# Patient Record
Sex: FEMALE | Race: BLACK OR AFRICAN AMERICAN | Employment: OTHER | URBAN - METROPOLITAN AREA
[De-identification: names, ages, dates, MRNs, and addresses within clinical notes are randomized per-mention and may not be internally consistent; named-entity substitution may affect disease eponyms.]

---

## 2017-01-02 RX ORDER — LOSARTAN POTASSIUM 100 MG/1
TABLET ORAL
Qty: 30 TAB | Refills: 0 | Status: SHIPPED | OUTPATIENT
Start: 2017-01-02 | End: 2017-01-03 | Stop reason: SDUPTHER

## 2017-01-03 RX ORDER — LOSARTAN POTASSIUM 100 MG/1
100 TABLET ORAL DAILY
Qty: 90 TAB | Refills: 3 | Status: SHIPPED | OUTPATIENT
Start: 2017-01-03 | End: 2017-04-28 | Stop reason: SDUPTHER

## 2017-01-03 NOTE — TELEPHONE ENCOUNTER
Requested Prescriptions     Signed Prescriptions Disp Refills    losartan (COZAAR) 100 mg tablet 90 Tab 3     Sig: Take 1 Tab by mouth daily.      Authorizing Provider: Destiney Terrazas     Ordering User: Bennett Watson

## 2017-01-04 ENCOUNTER — OFFICE VISIT (OUTPATIENT)
Dept: INTERNAL MEDICINE CLINIC | Age: 82
End: 2017-01-04

## 2017-01-04 VITALS
DIASTOLIC BLOOD PRESSURE: 83 MMHG | HEIGHT: 58 IN | TEMPERATURE: 96.3 F | HEART RATE: 70 BPM | BODY MASS INDEX: 35.05 KG/M2 | OXYGEN SATURATION: 100 % | WEIGHT: 167 LBS | SYSTOLIC BLOOD PRESSURE: 173 MMHG | RESPIRATION RATE: 20 BRPM

## 2017-01-04 DIAGNOSIS — I10 ESSENTIAL HYPERTENSION WITH GOAL BLOOD PRESSURE LESS THAN 140/90: ICD-10-CM

## 2017-01-04 DIAGNOSIS — E11.9 CONTROLLED TYPE 2 DIABETES MELLITUS WITHOUT COMPLICATION, UNSPECIFIED LONG TERM INSULIN USE STATUS: Primary | ICD-10-CM

## 2017-01-04 DIAGNOSIS — E78.00 HYPERCHOLESTEROLEMIA: ICD-10-CM

## 2017-01-04 DIAGNOSIS — K29.70 GASTRITIS WITHOUT BLEEDING, UNSPECIFIED CHRONICITY, UNSPECIFIED GASTRITIS TYPE: ICD-10-CM

## 2017-01-04 RX ORDER — CLONIDINE HYDROCHLORIDE 0.3 MG/1
0.3 TABLET ORAL 3 TIMES DAILY
Qty: 90 TAB | Refills: 11 | Status: SHIPPED | OUTPATIENT
Start: 2017-01-04 | End: 2018-01-01 | Stop reason: SDUPTHER

## 2017-01-04 RX ORDER — PIOGLITAZONEHYDROCHLORIDE 15 MG/1
15 TABLET ORAL
Qty: 30 TAB | Refills: 5
Start: 2017-01-04 | End: 2018-10-10

## 2017-01-04 RX ORDER — ALPRAZOLAM 0.5 MG/1
0.5 TABLET ORAL
Qty: 15 TAB | Refills: 0 | Status: SHIPPED | OUTPATIENT
Start: 2017-01-04 | End: 2017-04-17 | Stop reason: SDUPTHER

## 2017-01-04 RX ORDER — FAMOTIDINE 20 MG/1
20 TABLET, FILM COATED ORAL
Qty: 30 TAB | Refills: 4 | Status: SHIPPED | OUTPATIENT
Start: 2017-01-04 | End: 2017-12-01 | Stop reason: SDUPTHER

## 2017-01-04 NOTE — PROGRESS NOTES
HISTORY OF PRESENT ILLNESS  Yesenia Blackburn is a 80 y.o. female here for follow up. She is not taking actos yet. She is worried about weight gain and fluid retention. not able to tolerate januvia,caused stomach cramp. Her A1c was 8. 7.on other diabetic meds. She had abd distension and chest pressure 1 month back,went to see in ,omeprazole helped her.wants to continue with some meds. Has HTN,on multiple meds. Doing well otherwise. HPI    Review of Systems   Constitutional: Negative. Negative for chills and fever. HENT: Negative. Eyes: Negative. Negative for blurred vision and double vision. Respiratory: Negative. Cardiovascular: Negative. Gastrointestinal: Positive for heartburn. Negative for abdominal pain, diarrhea and nausea. Musculoskeletal: Negative. Skin: Negative. Neurological: Negative. Endo/Heme/Allergies: Negative. Psychiatric/Behavioral: Negative. Physical Exam   Constitutional: She appears well-developed and well-nourished. No distress. Neck: Normal range of motion. Neck supple. No JVD present. No thyromegaly present. Cardiovascular: Normal rate, regular rhythm, normal heart sounds and intact distal pulses. Pulmonary/Chest: Effort normal and breath sounds normal. No respiratory distress. She has no wheezes. Abdominal: Soft. Bowel sounds are normal. She exhibits no distension. There is no tenderness. Musculoskeletal: She exhibits no edema or tenderness. No leg edema   Psychiatric: She has a normal mood and affect. Her behavior is normal.       ASSESSMENT and Marylene Seals was seen today for follow-up, hypertension, cholesterol problem and diabetes. Diagnoses and all orders for this visit:     type 2 diabetes mellitus without complication, unspecified long term insulin use status (HCC)    A1C was high. Adv pt to take low dose actos. reassured it should not cause weight gain. She has no H/O HF. Will monitor.   She is willing to take it for 1 month to see if it helps her.  -     pioglitazone (ACTOS) 15 mg tablet; Take 1 Tab by mouth nightly. Will do,  -     METABOLIC PANEL, COMPREHENSIVE  -     HEMOGLOBIN A1C WITH EAG after 1 month. Essential hypertension with goal blood pressure less than 140/90    Stable. on multiple meds. Will refill,  -     cloNIDine HCl (CATAPRES) 0.3 mg tablet; Take 1 Tab by mouth three (3) times daily. Hypercholesterolemia    Stable. Gastritis without bleeding, unspecified chronicity, unspecified gastritis type    Was on omeprazole which she finished up. it helped her. Will give,  -     famotidine (PEPCID) 20 mg tablet; Take 1 Tab by mouth nightly. Discussed expected course/resolution/complications of diagnosis in detail with patient. Medication risks/benefits/costs/interactions/alternatives discussed with patient. Pt was given an after visit summary which includes diagnoses, current medications & vitals. Pt expressed understanding with the diagnosis and plan.

## 2017-01-04 NOTE — PATIENT INSTRUCTIONS

## 2017-01-04 NOTE — LETTER
2/9/2017 9:03 AM 
 
Ms. Pelon Ortiz [address] [place] [id number] Dear Pelon Ortiz: Please find your most recent results below. Resulted Orders METABOLIC PANEL, COMPREHENSIVE Result Value Ref Range Glucose 137 (H) 65 - 99 mg/dL BUN 14 8 - 27 mg/dL Creatinine 0.84 0.57 - 1.00 mg/dL GFR est non-AA 65 >59 mL/min/1.73 GFR est AA 75 >59 mL/min/1.73  
 BUN/Creatinine ratio 17 11 - 26 Sodium 139 134 - 144 mmol/L Potassium 4.2 3.5 - 5.2 mmol/L Chloride 97 96 - 106 mmol/L  
 CO2 27 18 - 29 mmol/L Calcium 9.3 8.7 - 10.3 mg/dL Protein, total 7.3 6.0 - 8.5 g/dL Albumin 4.5 3.5 - 4.7 g/dL GLOBULIN, TOTAL 2.8 1.5 - 4.5 g/dL A-G Ratio 1.6 1.1 - 2.5 Bilirubin, total 0.3 0.0 - 1.2 mg/dL Alk. phosphatase 86 39 - 117 IU/L  
 AST (SGOT) 23 0 - 40 IU/L  
 ALT (SGPT) 21 0 - 32 IU/L Narrative Performed at:  [address]  [id number] : Darletta Dancer MD, Phone:  [phone] HEMOGLOBIN A1C WITH EAG Result Value Ref Range Hemoglobin A1c 7.2 (H) 4.8 - 5.6 % Comment:  
            Pre-diabetes: 5.7 - 6.4 Diabetes: >6.4 Glycemic control for adults with diabetes: <7.0 Estimated average glucose 160 mg/dL Narrative Performed at:  [address]  [id number] : Darletta Dancer MD, Phone:  [phone] RECOMMENDATIONS: 
Pelon Ortiz your labs indicate that your diabetes is better, keep up the good work! All other labs are stable Please call me if you have any questions: [phone] Sincerely, Ioana Cason MD

## 2017-01-04 NOTE — MR AVS SNAPSHOT
Visit Information Date & Time Provider Department Dept. Phone Encounter #  
 1/4/2017  1:45 PM Tarsha Pink, [address] [facility] [phone] [id number] Your Appointments 6/2/2017  8:40 AM  
ESTABLISHED PATIENT with Jamaal Walden MD  
[facility] ([address]) Appt Note: 6 month follow up  
 [place]  
[address] [phone] [id number] Upcoming Health Maintenance Date Due DTaP/Tdap/Td series (1 - Tdap) 3/1/1955 MEDICARE YEARLY EXAM 11/12/2016 FOOT EXAM Q1 11/12/2016 HEMOGLOBIN A1C Q6M 5/4/2017 MICROALBUMIN Q1 9/16/2017 LIPID PANEL Q1 9/16/2017 EYE EXAM RETINAL OR DILATED Q1 10/14/2017 GLAUCOMA SCREENING Q2Y 10/14/2018 Allergies as of 1/4/2017  Review Complete On: 1/4/2017 By: Tarsha Pink MD  
  
 Severity Noted Reaction Type Reactions Cortisone  05/11/2011    Other (comments) \"Make me feels weak, like I'm going to die\" Januvia [Sitagliptin]  11/09/2016    Other (comments)  
 weakness Current Immunizations  Reviewed on 9/14/2016 Name Date Influenza High Dose Vaccine PF 10/28/2014 Influenza Vaccine 9/14/2016, 10/19/2013 Pneumococcal Conjugate (PCV-13) 9/14/2016 Pneumococcal Vaccine (Unspecified Type) 10/1/2008 Not reviewed this visit You Were Diagnosed With   
  
 Codes Comments Controlled type 2 diabetes mellitus without complication, unspecified long term insulin use status ([facility] [address]    -  Primary ICD-10-CM: E11.9 ICD-9-CM: 250.00 Essential hypertension with goal blood pressure less than 140/90     ICD-10-CM: I10 
ICD-9-CM: 401.9 Hypercholesterolemia     ICD-10-CM: E78.00 ICD-9-CM: 272.0 Gastritis without bleeding, unspecified chronicity, unspecified gastritis type     ICD-10-CM: K29.70 ICD-9-CM: 535.50 Vitals BP Pulse Temp Resp Height(growth percentile) Weight(growth percentile) 173/83 (BP 1 Location: Left arm, BP Patient Position: Sitting) 70 96.3 °F (35.7 °C) (Oral) 20 4' 10\" (1.473 m) 167 lb (75.8 kg) SpO2 BMI OB Status Smoking Status 100% 34.9 kg/m2 Hysterectomy Never Smoker Vitals History BMI and BSA Data Body Mass Index Body Surface Area 34.9 kg/m 2 1.76 m 2 Preferred Pharmacy Pharmacy Name Phone [facility] [phone] Your Updated Medication List  
  
   
This list is accurate as of: 1/4/17  2:24 PM.  Always use your most recent med list.  
  
  
  
  
 ALPRAZolam 0.5 mg tablet Commonly known as:  Leward Lennox Take 1 Tab by mouth nightly as needed for Anxiety. Max Daily Amount: 0.5 mg.  
  
 aspirin 81 mg chewable tablet Take 81 mg by mouth daily. atorvastatin 20 mg tablet Commonly known as:  LIPITOR Take 1 Tab by mouth daily. cloNIDine HCl 0.3 mg tablet Commonly known as:  CATAPRES Take 1 Tab by mouth three (3) times daily. dilTIAZem  mg ER capsule Commonly known as:  CARDIZEM CD  
TAKE ONE CAPSULE BY MOUTH EVERY DAY  
  
 famotidine 20 mg tablet Commonly known as:  PEPCID Take 1 Tab by mouth nightly. furosemide 40 mg tablet Commonly known as:  LASIX TAKE ONE TABLET BY MOUTH EVERY DAY  
  
 glipiZIDE 5 mg tablet Commonly known as:  Birdie Ry Take 1 Tab by mouth two (2) times a day. losartan 100 mg tablet Commonly known as:  COZAAR Take 1 Tab by mouth daily. metFORMIN  mg tablet Commonly known as:  GLUCOPHAGE XR  
TAKE TWO TABLETS BY MOUTH EVERY DAY WITH DINNER  
  
 metoprolol tartrate 100 mg IR tablet Commonly known as:  LOPRESSOR Take 1.5 Tabs by mouth two (2) times a day. pioglitazone 15 mg tablet Commonly known as:  ACTOS Take 1 Tab by mouth nightly. potassium chloride 10 mEq tablet Commonly known as:  K-DUR, KLOR-CON Take 2 Tabs by mouth daily. TYLENOL 325 mg tablet Generic drug:  acetaminophen Take  by mouth every four (4) hours as needed for Pain. VITAMIN D3 1,000 unit Cap Generic drug:  cholecalciferol Take 1,000 Units by mouth daily. Prescriptions Sent to Pharmacy Refills  
 cloNIDine HCl (CATAPRES) 0.3 mg tablet 11 Sig: Take 1 Tab by mouth three (3) times daily. Class: Normal  
 Pharmacy: [facility] Ph #: [phone] Route: Oral  
 famotidine (PEPCID) 20 mg tablet 4 Sig: Take 1 Tab by mouth nightly. Class: Normal  
 Pharmacy: [facility] Ph #: [phone] Route: Oral  
  
Patient Instructions Learning About Diabetes Food Guidelines Your Care Instructions Meal planning is important to manage diabetes. It helps keep your blood sugar at a target level (which you set with your doctor). You don't have to eat special foods. You can eat what your family eats, including sweets once in a while. But you do have to pay attention to how often you eat and how much you eat of certain foods. You may want to work with a dietitian or a certified diabetes educator (CDE) to help you plan meals and snacks. A dietitian or CDE can also help you lose weight if that is one of your goals. What should you know about eating carbs? Managing the amount of carbohydrate (carbs) you eat is an important part of healthy meals when you have diabetes. Carbohydrate is found in many foods. · Learn which foods have carbs. And learn the amounts of carbs in different foods. ¨ Bread, cereal, pasta, and rice have about 15 grams of carbs in a serving. A serving is 1 slice of bread (1 ounce), ½ cup of cooked cereal, or 1/3 cup of cooked pasta or rice. ¨ Fruits have 15 grams of carbs in a serving.  A serving is 1 small fresh fruit, such as an apple or orange; ½ of a banana; ½ cup of cooked or canned fruit; ½ cup of fruit juice; 1 cup of melon or raspberries; or 2 tablespoons of dried fruit. ¨ Milk and no-sugar-added yogurt have 15 grams of carbs in a serving. A serving is 1 cup of milk or 2/3 cup of no-sugar-added yogurt. ¨ Starchy vegetables have 15 grams of carbs in a serving. A serving is ½ cup of mashed potatoes or sweet potato; 1 cup winter squash; ½ of a small baked potato; ½ cup of cooked beans; or ½ cup cooked corn or green peas. · Learn how much carbs to eat each day and at each meal. A dietitian or CDE can teach you how to keep track of the amount of carbs you eat. This is called carbohydrate counting. · If you are not sure how to count carbohydrate grams, use the Plate Method to plan meals. It is a good, quick way to make sure that you have a balanced meal. It also helps you spread carbs throughout the day. ¨ Divide your plate by types of foods. Put non-starchy vegetables on half the plate, meat or other protein food on one-quarter of the plate, and a grain or starchy vegetable in the final quarter of the plate. To this you can add a small piece of fruit and 1 cup of milk or yogurt, depending on how many carbs you are supposed to eat at a meal. 
· Try to eat about the same amount of carbs at each meal. Do not \"save up\" your daily allowance of carbs to eat at one meal. 
· Proteins have very little or no carbs per serving. Examples of proteins are beef, chicken, turkey, fish, eggs, tofu, cheese, cottage cheese, and peanut butter. A serving size of meat is 3 ounces, which is about the size of a deck of cards. Examples of meat substitute serving sizes (equal to 1 ounce of meat) are 1/4 cup of cottage cheese, 1 egg, 1 tablespoon of peanut butter, and ½ cup of tofu. How can you eat out and still eat healthy? · Learn to estimate the serving sizes of foods that have carbohydrate.  If you measure food at home, it will be easier to estimate the amount in a serving of restaurant food. · If the meal you order has too much carbohydrate (such as potatoes, corn, or baked beans), ask to have a low-carbohydrate food instead. Ask for a salad or green vegetables. · If you use insulin, check your blood sugar before and after eating out to help you plan how much to eat in the future. · If you eat more carbohydrate at a meal than you had planned, take a walk or do other exercise. This will help lower your blood sugar. What else should you know? · Limit saturated fat, such as the fat from meat and dairy products. This is a healthy choice because people who have diabetes are at higher risk of heart disease. So choose lean cuts of meat and nonfat or low-fat dairy products. Use olive or canola oil instead of butter or shortening when cooking. · Don't skip meals. Your blood sugar may drop too low if you skip meals and take insulin or certain medicines for diabetes. · Check with your doctor before you drink alcohol. Alcohol can cause your blood sugar to drop too low. Alcohol can also cause a bad reaction if you take certain diabetes medicines. Follow-up care is a key part of your treatment and safety. Be sure to make and go to all appointments, and call your doctor if you are having problems. It's also a good idea to know your test results and keep a list of the medicines you take. Where can you learn more? Go to http://stacey-marie.info/. Enter A328 in the search box to learn more about \"Learning About Diabetes Food Guidelines. \" Current as of: May 23, 2016 Content Version: 11.1 © 3037-0119 [employer]. Care instructions adapted under license by [employer] (which disclaims liability or warranty for this information).  If you have questions about a medical condition or this instruction, always ask your healthcare professional. [employer] disclaims any warranty or liability for your use of this information. Please provide this summary of care documentation to your next provider. Your primary care clinician is listed as Lana Valenzuela. If you have any questions after today's visit, please call [phone].

## 2017-01-04 NOTE — PROGRESS NOTES
Health Maintenance Due   Topic Date Due    DTaP/Tdap/Td series (1 - Tdap) 03/01/1955    MEDICARE YEARLY EXAM  11/12/2016    FOOT EXAM Q1  11/12/2016       Chief Complaint   Patient presents with    Follow-up    Hypertension    Cholesterol Problem    Diabetes       1. Have you been to the ER, urgent care clinic since your last visit? Hospitalized since your last visit? No    2. Have you seen or consulted any other health care providers outside of the [address] [place] [address] since your last visit? Include any pap smears or colon screening. No    3) Do you have an Advance Directive on file? no    4) Are you interested in receiving information on Advance Directives? NO      Patient is accompanied by self I have received verbal consent from Mikala Short to discuss any/all medical information while they are present in the room.

## 2017-02-01 ENCOUNTER — HOSPITAL ENCOUNTER (OUTPATIENT)
Dept: LAB | Age: 82
Discharge: HOME | End: 2017-02-01
Payer: MEDICARE

## 2017-02-01 PROCEDURE — 36415 COLL VENOUS BLD VENIPUNCTURE: CPT

## 2017-02-01 PROCEDURE — 80053 COMPREHEN METABOLIC PANEL: CPT

## 2017-02-01 PROCEDURE — 83036 HEMOGLOBIN GLYCOSYLATED A1C: CPT

## 2017-02-02 LAB
ALBUMIN SERPL-MCNC: 4.5 G/DL (ref 3.5–4.7)
ALBUMIN/GLOB SERPL: 1.6 {RATIO} (ref 1.1–2.5)
ALP SERPL-CCNC: 86 IU/L (ref 39–117)
ALT SERPL-CCNC: 21 IU/L (ref 0–32)
AST SERPL-CCNC: 23 IU/L (ref 0–40)
BILIRUB SERPL-MCNC: 0.3 MG/DL (ref 0–1.2)
BUN SERPL-MCNC: 14 MG/DL (ref 8–27)
BUN/CREAT SERPL: 17 (ref 11–26)
CALCIUM SERPL-MCNC: 9.3 MG/DL (ref 8.7–10.3)
CHLORIDE SERPL-SCNC: 97 MMOL/L (ref 96–106)
CO2 SERPL-SCNC: 27 MMOL/L (ref 18–29)
CREAT SERPL-MCNC: 0.84 MG/DL (ref 0.57–1)
EST. AVERAGE GLUCOSE BLD GHB EST-MCNC: 160 MG/DL
GLOBULIN SER CALC-MCNC: 2.8 G/DL (ref 1.5–4.5)
GLUCOSE SERPL-MCNC: 137 MG/DL (ref 65–99)
HBA1C MFR BLD: 7.2 % (ref 4.8–5.6)
POTASSIUM SERPL-SCNC: 4.2 MMOL/L (ref 3.5–5.2)
PROT SERPL-MCNC: 7.3 G/DL (ref 6–8.5)
SODIUM SERPL-SCNC: 139 MMOL/L (ref 134–144)

## 2017-02-08 ENCOUNTER — OFFICE VISIT (OUTPATIENT)
Dept: INTERNAL MEDICINE CLINIC | Age: 82
End: 2017-02-08

## 2017-02-08 VITALS
SYSTOLIC BLOOD PRESSURE: 139 MMHG | HEART RATE: 64 BPM | BODY MASS INDEX: 34.43 KG/M2 | DIASTOLIC BLOOD PRESSURE: 68 MMHG | OXYGEN SATURATION: 98 % | HEIGHT: 58 IN | RESPIRATION RATE: 20 BRPM | TEMPERATURE: 96.3 F | WEIGHT: 164 LBS

## 2017-02-08 DIAGNOSIS — E78.2 MIXED HYPERLIPIDEMIA: ICD-10-CM

## 2017-02-08 DIAGNOSIS — E11.9 CONTROLLED TYPE 2 DIABETES MELLITUS WITHOUT COMPLICATION, UNSPECIFIED LONG TERM INSULIN USE STATUS: Primary | ICD-10-CM

## 2017-02-08 DIAGNOSIS — I10 ESSENTIAL HYPERTENSION WITH GOAL BLOOD PRESSURE LESS THAN 140/90: ICD-10-CM

## 2017-02-08 NOTE — PROGRESS NOTES
Health Maintenance Due   Topic Date Due    DTaP/Tdap/Td series (1 - Tdap) 03/01/1955    MEDICARE YEARLY EXAM  11/12/2016    FOOT EXAM Q1  11/12/2016       Chief Complaint   Patient presents with    Follow-up     states meds (actos and famotidine) caused her extreme weakness per pt,     Hypertension    Cholesterol Problem    Diabetes     hasnt had actos in 1 1/2 weeks       1. Have you been to the ER, urgent care clinic since your last visit? Hospitalized since your last visit? No    2. Have you seen or consulted any other health care providers outside of the [address] [place] [address] since your last visit? Include any pap smears or colon screening. No    3) Do you have an Advance Directive on file? no    4) Are you interested in receiving information on Advance Directives? NO      Patient is accompanied by self I have received verbal consent from Ada Whitehead to discuss any/all medical information while they are present in the room.

## 2017-02-08 NOTE — PROGRESS NOTES
HISTORY OF PRESENT ILLNESS  Doreatha Mortimer is a 80 y.o. female here for follow up. She has taken actos for 1 month and then stopped it for 10 days due to nausea. did not gain any weight. She is watching diet too. She is not taking actos yet. She is worried about weight gain and fluid retention. not able to tolerate januvia,caused stomach cramp. Her A1c was 8. 0.on other diabetic meds. Has HTN,on multiple meds. Doing well otherwise. Follow-up   Pertinent negatives include no abdominal pain. Hypertension    Pertinent negatives include no blurred vision and no nausea. Cholesterol Problem   Pertinent negatives include no abdominal pain. Diabetes   Pertinent negatives include no abdominal pain. Review of Systems   Constitutional: Negative. Negative for chills and fever. HENT: Negative. Eyes: Negative. Negative for blurred vision and double vision. Respiratory: Negative. Cardiovascular: Negative. Gastrointestinal: Negative. Negative for abdominal pain, diarrhea and nausea. Musculoskeletal: Negative. Skin: Negative. Neurological: Negative. Endo/Heme/Allergies: Negative. Psychiatric/Behavioral: Negative. Physical Exam   Constitutional: She appears well-developed and well-nourished. No distress. Neck: Normal range of motion. Neck supple. No JVD present. No thyromegaly present. Cardiovascular: Normal rate, regular rhythm, normal heart sounds and intact distal pulses. Pulmonary/Chest: Effort normal and breath sounds normal. No respiratory distress. She has no wheezes. Musculoskeletal: She exhibits no edema or tenderness. No leg edema   Psychiatric: She has a normal mood and affect. Her behavior is normal.       HI and Jazmyn Preethi was seen today for follow-up, hypertension, cholesterol problem and diabetes.     Diagnoses and all orders for this visit:    Controlled type 2 diabetes mellitus without complication, unspecified long term insulin use status ([address]    A1C has improved to 7. 2. She has taken actos 15 mg HS. She is not willing to continue actos,mention makes her nauseous. But she is happy that she did not gained weight with this med and her a1c improved. She will try to take half tab a day. Essential hypertension with goal blood pressure less than 140/90    Stable. on multiple meds. Mixed hyperlipidemia    Lipid is stable. On statin. Discussed expected course/resolution/complications of diagnosis in detail with patient. Medication risks/benefits/costs/interactions/alternatives discussed with patient. Pt was given an after visit summary which includes diagnoses, current medications & vitals. Pt expressed understanding with the diagnosis and plan.

## 2017-02-08 NOTE — PATIENT INSTRUCTIONS

## 2017-02-08 NOTE — MR AVS SNAPSHOT
Visit Information Date & Time Provider Department Dept. Phone Encounter #  
 2/8/2017 10:30 AM Alla Mata, 607 [facility] Internal Medicine [phone] [id number] Your Appointments 6/2/2017  8:40 AM  
ESTABLISHED PATIENT with Nithin Denson MD  
[facility] ([address]) Appt Note: 6 month follow up  
 [address]  
[address] [facility],[address] [facility] 7 [address] Upcoming Health Maintenance Date Due DTaP/Tdap/Td series (1 - Tdap) 3/1/1955 MEDICARE YEARLY EXAM 11/12/2016 FOOT EXAM Q1 11/12/2016 HEMOGLOBIN A1C Q6M 8/1/2017 MICROALBUMIN Q1 9/16/2017 LIPID PANEL Q1 9/16/2017 EYE EXAM RETINAL OR DILATED Q1 10/14/2017 GLAUCOMA SCREENING Q2Y 10/14/2018 Allergies as of 2/8/2017  Review Complete On: 2/8/2017 By: Alla Mata MD  
  
 Severity Noted Reaction Type Reactions Cortisone  05/11/2011    Other (comments) \"Make me feels weak, like I'm going to die\" Januvia [Sitagliptin]  11/09/2016    Other (comments)  
 weakness Current Immunizations  Reviewed on 9/14/2016 Name Date Influenza High Dose Vaccine PF 10/28/2014 Influenza Vaccine 9/14/2016, 10/19/2013 Pneumococcal Conjugate (PCV-13) 9/14/2016 Pneumococcal Vaccine (Unspecified Type) 10/1/2008 Not reviewed this visit You Were Diagnosed With   
  
 Codes Comments Controlled type 2 diabetes mellitus without complication, unspecified long term insulin use status ([facility] [address]    -  Primary ICD-10-CM: E11.9 ICD-9-CM: 250.00 Essential hypertension with goal blood pressure less than 140/90     ICD-10-CM: I10 
ICD-9-CM: 401.9 Mixed hyperlipidemia     ICD-10-CM: E78.2 ICD-9-CM: 272.2 Vitals BP Pulse Temp Resp Height(growth percentile) Weight(growth percentile)  139/68 (BP 1 Location: Right arm, BP Patient Position: Sitting) 64 96.3 °F (35.7 °C) (Oral) 20 4' 10\" (1.473 m) 164 lb (74.4 kg) SpO2 BMI OB Status Smoking Status 98% 34.28 kg/m2 Hysterectomy Never Smoker Vitals History BMI and BSA Data Body Mass Index Body Surface Area  
 34.28 kg/m 2 1.74 m 2 Preferred Pharmacy Pharmacy Name Phone [facility] [phone] Your Updated Medication List  
  
   
This list is accurate as of: 2/8/17 10:53 AM.  Always use your most recent med list.  
  
  
  
  
 ALPRAZolam 0.5 mg tablet Commonly known as:  [place] Take 1 Tab by mouth nightly as needed for Anxiety. Max Daily Amount: 0.5 mg.  
  
 aspirin 81 mg chewable tablet Take 81 mg by mouth daily. atorvastatin 20 mg tablet Commonly known as:  LIPITOR Take 1 Tab by mouth daily. cloNIDine HCl 0.3 mg tablet Commonly known as:  CATAPRES Take 1 Tab by mouth three (3) times daily. dilTIAZem  mg ER capsule Commonly known as:  CARDIZEM CD  
TAKE ONE CAPSULE BY MOUTH EVERY DAY  
  
 famotidine 20 mg tablet Commonly known as:  PEPCID Take 1 Tab by mouth nightly. furosemide 40 mg tablet Commonly known as:  LASIX TAKE ONE TABLET BY MOUTH EVERY DAY  
  
 glipiZIDE 5 mg tablet Commonly known as:  Loura Efra Take 1 Tab by mouth two (2) times a day. losartan 100 mg tablet Commonly known as:  COZAAR Take 1 Tab by mouth daily. metFORMIN  mg tablet Commonly known as:  GLUCOPHAGE XR  
TAKE TWO TABLETS BY MOUTH EVERY DAY WITH DINNER  
  
 metoprolol tartrate 100 mg IR tablet Commonly known as:  LOPRESSOR Take 1.5 Tabs by mouth two (2) times a day. pioglitazone 15 mg tablet Commonly known as:  ACTOS Take 1 Tab by mouth nightly. potassium chloride 10 mEq tablet Commonly known as:  K-DUR, KLOR-CON Take 2 Tabs by mouth daily. TYLENOL 325 mg tablet Generic drug:  acetaminophen Take  by mouth every four (4) hours as needed for Pain. VITAMIN D3 1,000 unit Cap Generic drug:  cholecalciferol Take 1,000 Units by mouth daily. Patient Instructions Learning About Diabetes Food Guidelines Your Care Instructions Meal planning is important to manage diabetes. It helps keep your blood sugar at a target level (which you set with your doctor). You don't have to eat special foods. You can eat what your family eats, including sweets once in a while. But you do have to pay attention to how often you eat and how much you eat of certain foods. You may want to work with a dietitian or a certified diabetes educator (CDE) to help you plan meals and snacks. A dietitian or CDE can also help you lose weight if that is one of your goals. What should you know about eating carbs? Managing the amount of carbohydrate (carbs) you eat is an important part of healthy meals when you have diabetes. Carbohydrate is found in many foods. · Learn which foods have carbs. And learn the amounts of carbs in different foods. ¨ Bread, cereal, pasta, and rice have about 15 grams of carbs in a serving. A serving is 1 slice of bread (1 ounce), ½ cup of cooked cereal, or 1/3 cup of cooked pasta or rice. ¨ Fruits have 15 grams of carbs in a serving. A serving is 1 small fresh fruit, such as an apple or orange; ½ of a banana; ½ cup of cooked or canned fruit; ½ cup of fruit juice; 1 cup of melon or raspberries; or 2 tablespoons of dried fruit. ¨ Milk and no-sugar-added yogurt have 15 grams of carbs in a serving. A serving is 1 cup of milk or 2/3 cup of no-sugar-added yogurt. ¨ Starchy vegetables have 15 grams of carbs in a serving. A serving is ½ cup of mashed potatoes or sweet potato; 1 cup winter squash; ½ of a small baked potato; ½ cup of cooked beans; or ½ cup cooked corn or green peas.  
· Learn how much carbs to eat each day and at each meal. A dietitian or CDE can teach you how to keep track of the amount of carbs you eat. This is called carbohydrate counting. · If you are not sure how to count carbohydrate grams, use the Plate Method to plan meals. It is a good, quick way to make sure that you have a balanced meal. It also helps you spread carbs throughout the day. ¨ Divide your plate by types of foods. Put non-starchy vegetables on half the plate, meat or other protein food on one-quarter of the plate, and a grain or starchy vegetable in the final quarter of the plate. To this you can add a small piece of fruit and 1 cup of milk or yogurt, depending on how many carbs you are supposed to eat at a meal. 
· Try to eat about the same amount of carbs at each meal. Do not \"save up\" your daily allowance of carbs to eat at one meal. 
· Proteins have very little or no carbs per serving. Examples of proteins are beef, chicken, turkey, fish, eggs, tofu, cheese, cottage cheese, and peanut butter. A serving size of meat is 3 ounces, which is about the size of a deck of cards. Examples of meat substitute serving sizes (equal to 1 ounce of meat) are 1/4 cup of cottage cheese, 1 egg, 1 tablespoon of peanut butter, and ½ cup of tofu. How can you eat out and still eat healthy? · Learn to estimate the serving sizes of foods that have carbohydrate. If you measure food at home, it will be easier to estimate the amount in a serving of restaurant food. · If the meal you order has too much carbohydrate (such as potatoes, corn, or baked beans), ask to have a low-carbohydrate food instead. Ask for a salad or green vegetables. · If you use insulin, check your blood sugar before and after eating out to help you plan how much to eat in the future. · If you eat more carbohydrate at a meal than you had planned, take a walk or do other exercise. This will help lower your blood sugar. What else should you know? · Limit saturated fat, such as the fat from meat and dairy products.  This is a healthy choice because people who have diabetes are at higher risk of heart disease. So choose lean cuts of meat and nonfat or low-fat dairy products. Use olive or canola oil instead of butter or shortening when cooking. · Don't skip meals. Your blood sugar may drop too low if you skip meals and take insulin or certain medicines for diabetes. · Check with your doctor before you drink alcohol. Alcohol can cause your blood sugar to drop too low. Alcohol can also cause a bad reaction if you take certain diabetes medicines. Follow-up care is a key part of your treatment and safety. Be sure to make and go to all appointments, and call your doctor if you are having problems. It's also a good idea to know your test results and keep a list of the medicines you take. Where can you learn more? Go to http://stacey-marie.info/. Enter B535 in the search box to learn more about \"Learning About Diabetes Food Guidelines. \" Current as of: May 23, 2016 Content Version: 11.1 © 9501-7897 [employer]. Care instructions adapted under license by [employer] (which disclaims liability or warranty for this information). If you have questions about a medical condition or this instruction, always ask your healthcare professional. [address] any warranty or liability for your use of this information. Please provide this summary of care documentation to your next provider. Your primary care clinician is listed as Saúl Slater. If you have any questions after today's visit, please call [phone].

## 2017-03-08 ENCOUNTER — OFFICE VISIT (OUTPATIENT)
Dept: INTERNAL MEDICINE CLINIC | Age: 82
End: 2017-03-08

## 2017-03-08 ENCOUNTER — HOSPITAL ENCOUNTER (OUTPATIENT)
Dept: LAB | Age: 82
Discharge: HOME | End: 2017-03-08
Payer: MEDICARE

## 2017-03-08 VITALS
SYSTOLIC BLOOD PRESSURE: 160 MMHG | OXYGEN SATURATION: 96 % | DIASTOLIC BLOOD PRESSURE: 80 MMHG | RESPIRATION RATE: 20 BRPM | HEART RATE: 71 BPM | WEIGHT: 164 LBS | BODY MASS INDEX: 34.43 KG/M2 | HEIGHT: 58 IN | TEMPERATURE: 96.2 F

## 2017-03-08 DIAGNOSIS — M54.2 NECK PAIN: ICD-10-CM

## 2017-03-08 DIAGNOSIS — M25.561 ARTHRALGIA OF RIGHT KNEE: ICD-10-CM

## 2017-03-08 DIAGNOSIS — R20.2 PARESTHESIA OF BOTH HANDS: Primary | ICD-10-CM

## 2017-03-08 DIAGNOSIS — R53.83 FATIGUE, UNSPECIFIED TYPE: ICD-10-CM

## 2017-03-08 PROCEDURE — 84443 ASSAY THYROID STIM HORMONE: CPT

## 2017-03-08 PROCEDURE — 85025 COMPLETE CBC W/AUTO DIFF WBC: CPT

## 2017-03-08 PROCEDURE — 80053 COMPREHEN METABOLIC PANEL: CPT

## 2017-03-08 PROCEDURE — 36415 COLL VENOUS BLD VENIPUNCTURE: CPT

## 2017-03-08 PROCEDURE — 82607 VITAMIN B-12: CPT

## 2017-03-08 NOTE — LETTER
3/15/2017 8:36 AM 
 
Ms. Charo Nunez [address] [place] [phone] Dear Charo Nunez: Please find your most recent results below. Resulted Orders CBC WITH AUTOMATED DIFF Result Value Ref Range WBC 3.8 3.4 - 10.8 x10E3/uL  
 RBC 4.37 3.77 - 5.28 x10E6/uL HGB 12.3 11.1 - 15.9 g/dL HCT 38.2 34.0 - 46.6 % MCV 87 79 - 97 fL  
 MCH 28.1 26.6 - 33.0 pg  
 MCHC 32.2 31.5 - 35.7 g/dL  
 RDW 14.7 12.3 - 15.4 % PLATELET 230 833 - 067 x10E3/uL NEUTROPHILS 53 % Lymphocytes 36 % MONOCYTES 8 % EOSINOPHILS 3 % BASOPHILS 0 %  
 ABS. NEUTROPHILS 2.0 1.4 - 7.0 x10E3/uL Abs Lymphocytes 1.4 0.7 - 3.1 x10E3/uL  
 ABS. MONOCYTES 0.3 0.1 - 0.9 x10E3/uL  
 ABS. EOSINOPHILS 0.1 0.0 - 0.4 x10E3/uL  
 ABS. BASOPHILS 0.0 0.0 - 0.2 x10E3/uL IMMATURE GRANULOCYTES 0 %  
 ABS. IMM. GRANS. 0.0 0.0 - 0.1 x10E3/uL Narrative Performed at:  [address]  [id number] : Elsie Quesada MD, Phone:  [phone] METABOLIC PANEL, COMPREHENSIVE Result Value Ref Range Glucose 129 (H) 65 - 99 mg/dL BUN 15 8 - 27 mg/dL Creatinine 0.82 0.57 - 1.00 mg/dL GFR est non-AA 66 >59 mL/min/1.73 GFR est AA 77 >59 mL/min/1.73  
 BUN/Creatinine ratio 18 11 - 26 Sodium 138 134 - 144 mmol/L Potassium 4.2 3.5 - 5.2 mmol/L Chloride 97 96 - 106 mmol/L  
 CO2 26 18 - 29 mmol/L Calcium 9.2 8.7 - 10.3 mg/dL Protein, total 7.0 6.0 - 8.5 g/dL Albumin 4.2 3.5 - 4.7 g/dL GLOBULIN, TOTAL 2.8 1.5 - 4.5 g/dL A-G Ratio 1.5 1.1 - 2.5 Comment: **Effective March 13, 2017 the reference interval** 
  for A/G Ratio will be changing to: Age                Male          Female 0 -  7 days       1.1 - 2.3       1.1 - 2.3 
          8 - 30 days       1.2 - 2.8       1.2 - 2.8 
          1 -  6 months     1.3 - 3.6       1.3 - 3.6 
   7 months -  5 years      1.5 - 2.6       1.5 - 2.6 > 5 years      1.2 - 2.2       1.2 - 2.2 Bilirubin, total 0.4 0.0 - 1.2 mg/dL Alk. phosphatase 76 39 - 117 IU/L  
 AST (SGOT) 24 0 - 40 IU/L  
 ALT (SGPT) 21 0 - 32 IU/L Narrative Performed at:  [address]  [id number] : Arianna Persaud MD, Phone:  [phone] TSH 3RD GENERATION Result Value Ref Range TSH 3.060 0.450 - 4.500 uIU/mL Narrative Performed at:  [address]  [id number] : Arianna Persaud MD, Phone:  [phone] VITAMIN B12 Result Value Ref Range Vitamin B12 351 211 - 946 pg/mL Narrative Performed at:  [address]  [id number] : Arianna Persaud MD, Phone:  [phone] RECOMMENDATIONS: 
None. Keep up the good work! Please call me if you have any questions: [phone] Sincerely, Denice Murdock MD

## 2017-03-08 NOTE — PATIENT INSTRUCTIONS
Neck: Exercises  Your Care Instructions  Here are some examples of typical rehabilitation exercises for your condition. Start each exercise slowly. Ease off the exercise if you start to have pain. Your doctor or physical therapist will tell you when you can start these exercises and which ones will work best for you. How to do the exercises  Note: Stretching should make you feel a gentle stretch, but no pain. Stop any strengthening exercise that makes pain worse. Neck stretch    1. This stretch works best if you keep your shoulder down as you lean away from it. To help you remember to do this, start by relaxing your shoulders and lightly holding on to your thighs or your chair. 2. Tilt your head toward your shoulder and hold for 15 to 30 seconds. Let the weight of your head stretch your muscles. 3. If you would like a little added stretch, use your hand to gently and steadily pull your head toward your shoulder. For example, keeping your right shoulder down, lean your head to the left. 4. Repeat 2 to 4 times toward each shoulder. Diagonal neck stretch    1. Turn your head slightly toward the direction you will be stretching, and tilt your head diagonally toward your chest and hold for 15 to 30 seconds. 2. If you would like a little added stretch, use your hand to gently and steadily pull your head forward on the diagonal.  3. Repeat 2 to 4 times toward each side. Dorsal glide stretch    1. Sit or stand tall and look straight ahead. 2. Slowly tuck your chin as you glide your head backward over your body  3. Hold for a count of 6, and then relax for up to 10 seconds. 4. Repeat 8 to 12 times. Note: The dorsal glide stretches the back of the neck. If you feel pain, do not glide so far back. Some people find this exercise easier to do while lying on their backs with an ice pack on the neck. Chest and shoulder stretch    1.  Sit or stand tall and glide your head backward as in the dorsal glide stretch. 2. Raise both arms so that your hands are next to your ears. 3. Take a deep breath, and as you breathe out, lower your elbows down and behind your back. You will feel your shoulder blades slide down and together, and at the same time you will feel a stretch across your chest and the front of your shoulders. 4. Hold for about 6 seconds, and then relax for up to 10 seconds. 5. Repeat 8 to 12 times. Strengthening: Hands on head    1. Move your head backward, forward, and side to side against gentle pressure from your hands, holding each position for about 6 seconds. 2. Repeat 8 to 12 times. Follow-up care is a key part of your treatment and safety. Be sure to make and go to all appointments, and call your doctor if you are having problems. It's also a good idea to know your test results and keep a list of the medicines you take. Where can you learn more? Go to http://stacey-marie.info/. Enter P975 in the search box to learn more about \"Neck: Exercises. \"  Current as of: May 23, 2016  Content Version: 11.1  © 6097-0807 [employer]. Care instructions adapted under license by [employer] (which disclaims liability or warranty for this information). If you have questions about a medical condition or this instruction, always ask your healthcare professional. [address] any warranty or liability for your use of this information.

## 2017-03-08 NOTE — MR AVS SNAPSHOT
Visit Information Date & Time Provider Department Dept. Phone Encounter #  
 3/8/2017  9:30 AM Haley Francois MD [facility] Internal Medicine [phone] [id number] Your Appointments 3/8/2017  9:30 AM  
ACUTE CARE with Haley Francois MD  
[facility] Internal Medicine [address] [place] [address]) Appt Note: medical issues she is having; confirmed [address] [facility] [address] [facility] [address]  
[phone]  
  
   
 [address]  
  
    
 6/2/2017  8:40 AM  
ESTABLISHED PATIENT with Mira Hansen MD  
[facility] ([address] [facility]) Appt Note: 6 month follow up  
 [address] [facility] [address]  
[address] [address]  
  
    
 6/5/2017  8:30 AM  
ROUTINE CARE with Haley Francois MD  
[facility] Internal Medicine [address] [place] [address]) Appt Note: 4 month follow up [address] [facility] [address] [facility] [address] [facility] [address]  
[phone]  
  
   
 [address] Upcoming Health Maintenance Date Due DTaP/Tdap/Td series (1 - Tdap) 3/1/1955 MEDICARE YEARLY EXAM 11/12/2016 FOOT EXAM Q1 11/12/2016 HEMOGLOBIN A1C Q6M 8/1/2017 MICROALBUMIN Q1 9/16/2017 LIPID PANEL Q1 9/16/2017 EYE EXAM RETINAL OR DILATED Q1 10/14/2017 GLAUCOMA SCREENING Q2Y 10/14/2018 Allergies as of 3/8/2017  Review Complete On: 3/8/2017 By: Haley Francois MD  
  
 Severity Noted Reaction Type Reactions Cortisone  05/11/2011    Other (comments) \"Make me feels weak, like I'm going to die\" Januvia [Sitagliptin]  11/09/2016    Other (comments)  
 weakness Current Immunizations  Reviewed on 9/14/2016 Name Date Influenza High Dose Vaccine PF 10/28/2014 Influenza Vaccine 9/14/2016, 10/19/2013 Pneumococcal Conjugate (PCV-13) 9/14/2016 Pneumococcal Vaccine (Unspecified Type) 10/1/2008 Not reviewed this visit You Were Diagnosed With   
  
 Codes Comments Paresthesia of both hands    -  Primary ICD-10-CM: R20.2 ICD-9-CM: 782.0 Neck pain     ICD-10-CM: M54.2 ICD-9-CM: 723.1 Fatigue, unspecified type     ICD-10-CM: R53.83 ICD-9-CM: 780.79 Arthralgia of right knee     ICD-10-CM: M25.561 ICD-9-CM: 719.46 Vitals BP Pulse Temp Resp Height(growth percentile) Weight(growth percentile) 177/74 (BP 1 Location: Left arm, BP Patient Position: Sitting) 71 96.2 °F (35.7 °C) (Oral) 20 4' 10\" (1.473 m) 164 lb (74.4 kg) SpO2 BMI OB Status Smoking Status 96% 34.28 kg/m2 Hysterectomy Never Smoker Vitals History BMI and BSA Data Body Mass Index Body Surface Area  
 34.28 kg/m 2 1.74 m 2 [facility] [phone] Your Updated Medication List  
  
   
This list is accurate as of: 3/8/17  9:13 AM.  Always use your most recent med list.  
  
  
  
  
 ALPRAZolam 0.5 mg tablet Commonly known as:  [place] Take 1 Tab by mouth nightly as needed for Anxiety. Max Daily Amount: 0.5 mg.  
  
 aspirin 81 mg chewable tablet Take 81 mg by mouth daily. atorvastatin 20 mg tablet Commonly known as:  LIPITOR Take 1 Tab by mouth daily. cloNIDine HCl 0.3 mg tablet Commonly known as:  CATAPRES Take 1 Tab by mouth three (3) times daily. dilTIAZem  mg ER capsule Commonly known as:  CARDIZEM CD  
TAKE ONE CAPSULE BY MOUTH EVERY DAY  
  
 famotidine 20 mg tablet Commonly known as:  PEPCID Take 1 Tab by mouth nightly. furosemide 40 mg tablet Commonly known as:  LASIX TAKE ONE TABLET BY MOUTH EVERY DAY  
  
 glipiZIDE 5 mg tablet Commonly known as:  Ritika Betters Take 1 Tab by mouth two (2) times a day. losartan 100 mg tablet Commonly known as:  COZAAR Take 1 Tab by mouth daily. metFORMIN  mg tablet Commonly known as:  GLUCOPHAGE XR  
TAKE TWO TABLETS BY MOUTH EVERY DAY WITH DINNER  
  
 metoprolol tartrate 100 mg IR tablet Commonly known as:  LOPRESSOR Take 1.5 Tabs by mouth two (2) times a day. pioglitazone 15 mg tablet Commonly known as:  ACTOS Take 1 Tab by mouth nightly. potassium chloride 10 mEq tablet Commonly known as:  K-DUR, KLOR-CON Take 2 Tabs by mouth daily. TYLENOL 325 mg tablet Generic drug:  acetaminophen Take  by mouth every four (4) hours as needed for Pain. VITAMIN D3 1,000 unit Cap Generic drug:  cholecalciferol Take 1,000 Units by mouth daily. We Performed the Following CBC WITH AUTOMATED DIFF [19209 CPT(R)] METABOLIC PANEL, COMPREHENSIVE [07600 CPT(R)] TSH 3RD GENERATION [78043 CPT(R)] VITAMIN B12 [id number] CPT(R)] To-Do List   
 03/09/2017 Imaging:  XR SPINE CERV 4 OR 5 V Patient Instructions Neck: Exercises Your Care Instructions Here are some examples of typical rehabilitation exercises for your condition. Start each exercise slowly. Ease off the exercise if you start to have pain. Your doctor or physical therapist will tell you when you can start these exercises and which ones will work best for you. How to do the exercises Note: Stretching should make you feel a gentle stretch, but no pain. Stop any strengthening exercise that makes pain worse. Neck stretch 1. This stretch works best if you keep your shoulder down as you lean away from it. To help you remember to do this, start by relaxing your shoulders and lightly holding on to your thighs or your chair. 2. Tilt your head toward your shoulder and hold for 15 to 30 seconds. Let the weight of your head stretch your muscles. 3. If you would like a little added stretch, use your hand to gently and steadily pull your head toward your shoulder. For example, keeping your right shoulder down, lean your head to the left. 4. Repeat 2 to 4 times toward each shoulder. Diagonal neck stretch 1. Turn your head slightly toward the direction you will be stretching, and tilt your head diagonally toward your chest and hold for 15 to 30 seconds. 2. If you would like a little added stretch, use your hand to gently and steadily pull your head forward on the diagonal. 
3. Repeat 2 to 4 times toward each side. Dorsal glide stretch 1. Sit or stand tall and look straight ahead. 2. Slowly tuck your chin as you glide your head backward over your body 3. Hold for a count of 6, and then relax for up to 10 seconds. 4. Repeat 8 to 12 times. Note: The dorsal glide stretches the back of the neck. If you feel pain, do not glide so far back. Some people find this exercise easier to do while lying on their backs with an ice pack on the neck. Chest and shoulder stretch 1. Sit or stand tall and glide your head backward as in the dorsal glide stretch. 2. Raise both arms so that your hands are next to your ears. 3. Take a deep breath, and as you breathe out, lower your elbows down and behind your back. You will feel your shoulder blades slide down and together, and at the same time you will feel a stretch across your chest and the front of your shoulders. 4. Hold for about 6 seconds, and then relax for up to 10 seconds. 5. Repeat 8 to 12 times. Strengthening: Hands on head 1. Move your head backward, forward, and side to side against gentle pressure from your hands, holding each position for about 6 seconds. 2. Repeat 8 to 12 times. Follow-up care is a key part of your treatment and safety. Be sure to make and go to all appointments, and call your doctor if you are having problems. It's also a good idea to know your test results and keep a list of the medicines you take. Where can you learn more? Go to http://stacey-marie.info/. Enter P975 in the search box to learn more about \"Neck: Exercises. \" 
 Current as of: May 23, 2016 Content Version: 11.1 © 8826-3409 [employer]. Care instructions adapted under license by [employer] (which disclaims liability or warranty for this information). If you have questions about a medical condition or this instruction, always ask your healthcare professional. [address] any warranty or liability for your use of this information. Please provide this summary of care documentation to your next provider. Your primary care clinician is listed as Yobany Penn. If you have any questions after today's visit, please call [phone].

## 2017-03-08 NOTE — PROGRESS NOTES
Health Maintenance Due   Topic Date Due    DTaP/Tdap/Td series (1 - Tdap) 03/01/1955    MEDICARE YEARLY EXAM  11/12/2016    FOOT EXAM Q1  11/12/2016       Chief Complaint   Patient presents with    Follow-up     pt states she has no energy, having some numbness in her fingers    Hypertension    Cholesterol Problem    Diabetes       1. Have you been to the ER, urgent care clinic since your last visit? Hospitalized since your last visit? No    2. Have you seen or consulted any other health care providers outside of the [address] [place] [address] since your last visit? Include any pap smears or colon screening. No    3) Do you have an Advance Directive on file? no    4) Are you interested in receiving information on Advance Directives? NO      Patient is accompanied by self I have received verbal consent from Yesenia Blackburn to discuss any/all medical information while they are present in the room.

## 2017-03-08 NOTE — PROGRESS NOTES
HISTORY OF PRESENT ILLNESS  Patrici Ganser is a 80 y.o. female here with C/O fatigue lately She is too active,always going out. feels no energy. taking all meds regularly. no problem. Reports tingling in both hands lately. no weakness or numbness in arms. Has diabetes. on meds. taking regularly. has HTN,BP is little elevated today. complaint with med. HPI    Review of Systems   Constitutional: Negative. HENT: Negative. Eyes: Negative. Respiratory: Negative. Cardiovascular: Negative. Gastrointestinal: Negative. Genitourinary: Negative. Musculoskeletal: Positive for joint pain. Skin: Negative. Neurological: Positive for tingling. Endo/Heme/Allergies: Negative. Psychiatric/Behavioral: Negative. Physical Exam   Constitutional: She appears well-developed and well-nourished. No distress. Neck: Normal range of motion. Neck supple. No JVD present. No thyromegaly present. Cardiovascular: Normal rate, regular rhythm, normal heart sounds and intact distal pulses. Pulmonary/Chest: Effort normal and breath sounds normal. No respiratory distress. She has no wheezes. Musculoskeletal: She exhibits no tenderness. Neck:mild paravertebral facet tenderness. ROm OK   Psychiatric: She has a normal mood and affect. Her behavior is normal.       ASSESSMENT and PLAN  Kate Gonzalze was seen today for fatigue, hypertension, tingling and diabetes. Diagnoses and all orders for this visit:    Paresthesia of both hands  Might be pinch nerve in neck.'  Adv to change to memory foam pillow. Will check,  -     VITAMIN B12  -     XR SPINE CERV 4 OR 5 V; Future    Neck pain    Will do,  -     XR SPINE CERV 4 OR 5 V; Future    Fatigue, unspecified type    Will do,  -     CBC WITH AUTOMATED DIFF  -     METABOLIC PANEL, COMPREHENSIVE  -     TSH 3RD GENERATION    Arthralgia of right knee    On knee brace. not able to walk. want to renew [employer] parking card.     Discussed expected course/resolution/complications of diagnosis in detail with patient. Medication risks/benefits/costs/interactions/alternatives discussed with patient. Pt was given an after visit summary which includes diagnoses, current medications & vitals. Pt expressed understanding with the diagnosis and plan.

## 2017-03-09 LAB
ALBUMIN SERPL-MCNC: 4.2 G/DL (ref 3.5–4.7)
ALBUMIN/GLOB SERPL: 1.5 {RATIO} (ref 1.1–2.5)
ALP SERPL-CCNC: 76 IU/L (ref 39–117)
ALT SERPL-CCNC: 21 IU/L (ref 0–32)
AST SERPL-CCNC: 24 IU/L (ref 0–40)
BASOPHILS # BLD AUTO: 0 X10E3/UL (ref 0–0.2)
BASOPHILS NFR BLD AUTO: 0 %
BILIRUB SERPL-MCNC: 0.4 MG/DL (ref 0–1.2)
BUN SERPL-MCNC: 15 MG/DL (ref 8–27)
BUN/CREAT SERPL: 18 (ref 11–26)
CALCIUM SERPL-MCNC: 9.2 MG/DL (ref 8.7–10.3)
CHLORIDE SERPL-SCNC: 97 MMOL/L (ref 96–106)
CO2 SERPL-SCNC: 26 MMOL/L (ref 18–29)
CREAT SERPL-MCNC: 0.82 MG/DL (ref 0.57–1)
EOSINOPHIL # BLD AUTO: 0.1 X10E3/UL (ref 0–0.4)
EOSINOPHIL NFR BLD AUTO: 3 %
ERYTHROCYTE [DISTWIDTH] IN BLOOD BY AUTOMATED COUNT: 14.7 % (ref 12.3–15.4)
GLOBULIN SER CALC-MCNC: 2.8 G/DL (ref 1.5–4.5)
GLUCOSE SERPL-MCNC: 129 MG/DL (ref 65–99)
HCT VFR BLD AUTO: 38.2 % (ref 34–46.6)
HGB BLD-MCNC: 12.3 G/DL (ref 11.1–15.9)
IMM GRANULOCYTES # BLD: 0 X10E3/UL (ref 0–0.1)
IMM GRANULOCYTES NFR BLD: 0 %
LYMPHOCYTES # BLD AUTO: 1.4 X10E3/UL (ref 0.7–3.1)
LYMPHOCYTES NFR BLD AUTO: 36 %
MCH RBC QN AUTO: 28.1 PG (ref 26.6–33)
MCHC RBC AUTO-ENTMCNC: 32.2 G/DL (ref 31.5–35.7)
MCV RBC AUTO: 87 FL (ref 79–97)
MONOCYTES # BLD AUTO: 0.3 X10E3/UL (ref 0.1–0.9)
MONOCYTES NFR BLD AUTO: 8 %
NEUTROPHILS # BLD AUTO: 2 X10E3/UL (ref 1.4–7)
NEUTROPHILS NFR BLD AUTO: 53 %
PLATELET # BLD AUTO: 178 X10E3/UL (ref 150–379)
POTASSIUM SERPL-SCNC: 4.2 MMOL/L (ref 3.5–5.2)
PROT SERPL-MCNC: 7 G/DL (ref 6–8.5)
RBC # BLD AUTO: 4.37 X10E6/UL (ref 3.77–5.28)
SODIUM SERPL-SCNC: 138 MMOL/L (ref 134–144)
TSH SERPL DL<=0.005 MIU/L-ACNC: 3.06 UIU/ML (ref 0.45–4.5)
VIT B12 SERPL-MCNC: 351 PG/ML (ref 211–946)
WBC # BLD AUTO: 3.8 X10E3/UL (ref 3.4–10.8)

## 2017-03-15 ENCOUNTER — HOSPITAL ENCOUNTER (EMERGENCY)
Age: 82
Discharge: HOME | End: 2017-03-15
Attending: FAMILY MEDICINE

## 2017-03-15 VITALS
BODY MASS INDEX: 14.27 KG/M2 | HEIGHT: 58 IN | WEIGHT: 68 LBS | SYSTOLIC BLOOD PRESSURE: 179 MMHG | TEMPERATURE: 98.2 F | DIASTOLIC BLOOD PRESSURE: 78 MMHG | HEART RATE: 78 BPM | RESPIRATION RATE: 18 BRPM | OXYGEN SATURATION: 97 %

## 2017-03-15 DIAGNOSIS — J06.9 ACUTE UPPER RESPIRATORY INFECTION: Primary | ICD-10-CM

## 2017-03-15 RX ORDER — BENZONATATE 200 MG/1
200 CAPSULE ORAL
Qty: 21 CAP | Refills: 0 | Status: SHIPPED | OUTPATIENT
Start: 2017-03-15 | End: 2017-03-22

## 2017-03-15 RX ORDER — LORATADINE 10 MG/1
10 TABLET ORAL DAILY
Qty: 15 TAB | Refills: 0 | Status: SHIPPED | OUTPATIENT
Start: 2017-03-15 | End: 2017-04-05 | Stop reason: SDUPTHER

## 2017-03-15 RX ORDER — FLUTICASONE PROPIONATE 50 MCG
2 SPRAY, SUSPENSION (ML) NASAL DAILY
Qty: 1 BOTTLE | Refills: 0 | Status: SHIPPED | OUTPATIENT
Start: 2017-03-15 | End: 2017-06-28 | Stop reason: SDUPTHER

## 2017-03-15 RX ORDER — PROMETHAZINE HYDROCHLORIDE AND DEXTROMETHORPHAN HYDROBROMIDE 6.25; 15 MG/5ML; MG/5ML
5 SYRUP ORAL
Qty: 100 ML | Refills: 0 | Status: SHIPPED | OUTPATIENT
Start: 2017-03-15 | End: 2017-05-08 | Stop reason: ALTCHOICE

## 2017-03-15 NOTE — UC PROVIDER NOTE
Patient is a 80 y.o. female presenting with cough. The history is provided by the patient. Cough   This is a new problem. The current episode started yesterday. The problem occurs every few minutes. The problem has not changed since onset. The cough is non-productive. There has been no fever. Associated symptoms include chills, headaches (on coughing) and rhinorrhea. Pertinent negatives include no ear congestion, no sore throat, no myalgias, no shortness of breath, no wheezing, no nausea and no vomiting. She has tried nothing for the symptoms. She is not a smoker. Her past medical history is significant for bronchitis. Her past medical history does not include pneumonia or asthma. Past Medical History:   Diagnosis Date    Adverse effect of anesthesia     pt states she was able to feel everything during colonoscopy    Chronic bronchitis     Diabetes ([address]     Essential hypertension     Hypercholesterolemia     Hypertension         Past Surgical History:   Procedure Laterality Date    HX CHOLECYSTECTOMY      HX GYN      tubal ligation    HX HYSTERECTOMY           No family history on file. Social History     Social History    Marital status:      Spouse name: N/A    Number of children: N/A    Years of education: N/A     Occupational History    Not on file. Social History Main Topics    Smoking status: Never Smoker    Smokeless tobacco: Never Used    Alcohol use No    Drug use: No    Sexual activity: Not on file     Other Topics Concern    Not on file     Social History Narrative                ALLERGIES: Cortisone and Januvia [sitagliptin]    Review of Systems   Constitutional: Positive for chills. HENT: Positive for congestion, rhinorrhea, sinus pressure and sneezing. Negative for sore throat. Respiratory: Positive for cough. Negative for shortness of breath and wheezing. Gastrointestinal: Negative for nausea and vomiting. Musculoskeletal: Negative for myalgias. Neurological: Positive for headaches (on coughing). Vitals:    03/15/17 1742   BP: 179/78   Pulse: 78   Resp: 18   Temp: 98.2 °F (36.8 °C)   SpO2: 97%   Weight: 30.8 kg (68 lb)   Height: 4' 10\" (1.473 m)       Physical Exam   Constitutional: No distress. HENT:   Right Ear: Tympanic membrane and ear canal normal.   Left Ear: Tympanic membrane and ear canal normal.   Nose: Nose normal.   Mouth/Throat: No oropharyngeal exudate, posterior oropharyngeal edema or posterior oropharyngeal erythema. Eyes: Conjunctivae are normal. Right eye exhibits no discharge. Left eye exhibits no discharge. Neck: Neck supple. Pulmonary/Chest: Effort normal and breath sounds normal. No respiratory distress. She has no wheezes. She has no rales. Lymphadenopathy:     She has no cervical adenopathy. Skin: No rash noted. Nursing note and vitals reviewed.       MDM     Differential Diagnosis; Clinical Impression; Plan:     CLINICAL IMPRESSION:  Acute upper respiratory infection  (primary encounter diagnosis)      DDX    Plan:    Fluids/ gargles  Claritin/ allegra   Tylenol cold-sinus - max strength 1-2 tab 4 times/ day    with Advil as needed    Rx Flonase, tessalon and Promethazine DM suspension    Amount and/or Complexity of Data Reviewed:   Tests in the radiology section of CPT®:  Ordered   Review and summarize past medical records:  Yes  Risk of Significant Complications, Morbidity, and/or Mortality:   Presenting problems:  Low  Management options:  Low  Progress:   Patient progress:  Stable      Procedures

## 2017-03-15 NOTE — DISCHARGE INSTRUCTIONS
Viral Respiratory Infection: Care Instructions  Your Care Instructions  Viruses are very small organisms. They grow in number after they enter your body. There are many types that cause different illnesses, such as colds and the mumps. The symptoms of a viral respiratory infection often start quickly. They include a fever, sore throat, and runny nose. You may also just not feel well. Or you may not want to eat much. Most viral respiratory infections are not serious. They usually get better with time and self-care. Antibiotics are not used to treat a viral infection. That's because antibiotics will not help cure a viral illness. In some cases, antiviral medicine can help your body fight a serious viral infection. Follow-up care is a key part of your treatment and safety. Be sure to make and go to all appointments, and call your doctor if you are having problems. It's also a good idea to know your test results and keep a list of the medicines you take. How can you care for yourself at home? · Rest as much as possible until you feel better. · Be safe with medicines. Take your medicine exactly as prescribed. Call your doctor if you think you are having a problem with your medicine. You will get more details on the specific medicine your doctor prescribes. · Take an over-the-counter pain medicine, such as acetaminophen (Tylenol), ibuprofen (Advil, Motrin), or naproxen (Aleve), as needed for pain and fever. Read and follow all instructions on the label. Do not give aspirin to anyone younger than 20. It has been linked to Reye syndrome, a serious illness. · Drink plenty of fluids, enough so that your urine is light yellow or clear like water. Hot fluids, such as tea or soup, may help relieve congestion in your nose and throat. If you have kidney, heart, or liver disease and have to limit fluids, talk with your doctor before you increase the amount of fluids you drink.   · Try to clear mucus from your lungs by breathing deeply and coughing. · Gargle with warm salt water once an hour. This can help reduce swelling and throat pain. Use 1 teaspoon of salt mixed in 1 cup of warm water. · Do not smoke or allow others to smoke around you. If you need help quitting, talk to your doctor about stop-smoking programs and medicines. These can increase your chances of quitting for good. To avoid spreading the virus  · Cough or sneeze into a tissue. Then throw the tissue away. · If you don't have a tissue, use your hand to cover your cough or sneeze. Then clean your hand. You can also cough into your sleeve. · Wash your hands often. Use soap and warm water. Wash for 15 to 20 seconds each time. · If you don't have soap and water near you, you can clean your hands with alcohol wipes or gel. When should you call for help? Call your doctor now or seek immediate medical care if:  · You have a new or higher fever. · Your fever lasts more than 48 hours. · You have trouble breathing. · You have a fever with a stiff neck or a severe headache. · You are sensitive to light. · You feel very sleepy or confused. Watch closely for changes in your health, and be sure to contact your doctor if:  · You do not get better as expected. Where can you learn more? Go to http://stacey-marie.info/. Enter Z698 in the search box to learn more about \"Viral Respiratory Infection: Care Instructions. \"  Current as of: June 30, 2016  Content Version: 11.1  © 9463-8638 [employer]. Care instructions adapted under license by "[employer] (which disclaims liability or warranty for this information). If you have questions about a medical condition or this instruction, always ask your healthcare professional. [address] any warranty or liability for your use of this information.

## 2017-03-16 NOTE — TELEPHONE ENCOUNTER
----- Message from Nash Macias sent at 3/15/2017 12:45 PM EDT -----  Regarding: Dr. Sally Zarate  Pt. request a Rx for chest cold.  Best pt contact [phone]

## 2017-03-17 NOTE — TELEPHONE ENCOUNTER
Writer placed call to pt 3/16/2017. Pt stated she wanted to make sure that Dr Edu Pan knew that she went to [facility] and that she wanted to make sure there wasn't any other medication that she should be taking for her cold.  Writer conferred with Dr Edu Pan and she stated that if pt is no better in the next few days that she is supposed to call the office and make an appt, pt made aware and voiced understanding

## 2017-03-18 ENCOUNTER — HOSPITAL ENCOUNTER (EMERGENCY)
Age: 82
Discharge: HOME | End: 2017-03-18
Attending: FAMILY MEDICINE

## 2017-03-18 ENCOUNTER — APPOINTMENT (OUTPATIENT)
Dept: GENERAL RADIOLOGY | Age: 82
End: 2017-03-18
Attending: FAMILY MEDICINE

## 2017-03-18 VITALS
OXYGEN SATURATION: 97 % | RESPIRATION RATE: 18 BRPM | BODY MASS INDEX: 34.43 KG/M2 | DIASTOLIC BLOOD PRESSURE: 84 MMHG | HEIGHT: 58 IN | HEART RATE: 87 BPM | SYSTOLIC BLOOD PRESSURE: 190 MMHG | WEIGHT: 164 LBS | TEMPERATURE: 98.6 F

## 2017-03-18 DIAGNOSIS — J20.9 ACUTE BRONCHITIS, UNSPECIFIED ORGANISM: Primary | ICD-10-CM

## 2017-03-18 RX ORDER — CEFDINIR 300 MG/1
300 CAPSULE ORAL EVERY 12 HOURS
Qty: 20 CAP | Refills: 0 | Status: SHIPPED | OUTPATIENT
Start: 2017-03-18 | End: 2017-03-28

## 2017-03-18 NOTE — UC PROVIDER NOTE
Patient is a 80 y.o. female presenting with cough. The history is provided by the patient. Cough   This is a new problem. Episode onset: 2 weeks ago. The problem occurs every few minutes. The problem has not changed since onset. The cough is productive of sputum. There has been no fever. Associated symptoms include rhinorrhea. Pertinent negatives include no chest pain, no chills, no sweats, no ear congestion, no ear pain, no headaches, no sore throat, no myalgias, no shortness of breath, no wheezing, no nausea and no vomiting. Treatments tried: tessalon, claritin. The treatment provided no relief. She is not a smoker. Past Medical History:   Diagnosis Date    Adverse effect of anesthesia     pt states she was able to feel everything during colonoscopy    Chronic bronchitis     Diabetes ([address]     Essential hypertension     Hypercholesterolemia     Hypertension         Past Surgical History:   Procedure Laterality Date    HX CHOLECYSTECTOMY      HX GYN      tubal ligation    HX HYSTERECTOMY           History reviewed. No pertinent family history. Social History     Social History    Marital status:      Spouse name: N/A    Number of children: N/A    Years of education: N/A     Occupational History    Not on file. Social History Main Topics    Smoking status: Never Smoker    Smokeless tobacco: Never Used    Alcohol use No    Drug use: No    Sexual activity: Not on file     Other Topics Concern    Not on file     Social History Narrative                ALLERGIES: Cortisone and Januvia [sitagliptin]    Review of Systems   Constitutional: Negative for chills and fever. HENT: Positive for congestion and rhinorrhea. Negative for ear pain and sore throat. Respiratory: Positive for cough. Negative for shortness of breath and wheezing. Cardiovascular: Negative for chest pain and palpitations. Gastrointestinal: Negative for nausea and vomiting.    Musculoskeletal: Negative for myalgias. Skin: Negative for rash. Neurological: Negative for dizziness and headaches. Vitals:    03/18/17 1647   BP: 190/84   Pulse: 87   Resp: 18   Temp: 98.6 °F (37 °C)   SpO2: 97%   Weight: 74.4 kg (164 lb)   Height: 4' 10\" (1.473 m)       Physical Exam   Constitutional: She appears well-developed and well-nourished. No distress. HENT:   Right Ear: Tympanic membrane, external ear and ear canal normal.   Left Ear: Tympanic membrane, external ear and ear canal normal.   Nose: Rhinorrhea present. Right sinus exhibits no maxillary sinus tenderness and no frontal sinus tenderness. Left sinus exhibits no maxillary sinus tenderness and no frontal sinus tenderness. Mouth/Throat: Oropharynx is clear and moist and mucous membranes are normal. No oropharyngeal exudate, posterior oropharyngeal edema, posterior oropharyngeal erythema or tonsillar abscesses. Cardiovascular: Normal rate, regular rhythm and normal heart sounds. Pulmonary/Chest: Effort normal. No respiratory distress. She has no decreased breath sounds. She has wheezes in the left lower field. She has no rhonchi. She has no rales. Lymphadenopathy:     She has no cervical adenopathy. Neurological: She is alert. Skin: She is not diaphoretic. Psychiatric: She has a normal mood and affect. Her behavior is normal. Judgment and thought content normal.   Nursing note and vitals reviewed. CXR Results  (Last 48 hours)               03/18/17 1731  XR CHEST PA LAT Final result    Impression:  IMPRESSION: No acute process           Narrative:  INDICATION:  cough and congestion since Thursday       COMPARISON: 11/10/2015       FINDINGS: PA and lateral views of the chest demonstrate a stable   cardiomediastinal silhouette and clear lungs bilaterally. The visualized osseous   structures are unremarkable.                  MDM     Differential Diagnosis; Clinical Impression; Plan:     CLINICAL IMPRESSION:  Acute bronchitis, unspecified organism (primary encounter diagnosis)    Plan:  1. Omnicef  2. PCP if no improvement  Amount and/or Complexity of Data Reviewed:   Tests in the radiology section of CPT®:  Ordered and reviewed  Risk of Significant Complications, Morbidity, and/or Mortality:   Presenting problems: Moderate  Diagnostic procedures: Moderate  Management options:   Moderate  Progress:   Patient progress:  Stable      Procedures

## 2017-03-18 NOTE — DISCHARGE INSTRUCTIONS
Bronchitis: Care Instructions  Your Care Instructions    Bronchitis is inflammation of the bronchial tubes, which carry air to the lungs. The tubes swell and produce mucus, or phlegm. The mucus and inflamed bronchial tubes make you cough. You may have trouble breathing. Most cases of bronchitis are caused by viruses like those that cause colds. Antibiotics usually do not help and they may be harmful. Bronchitis usually develops rapidly and lasts about 2 to 3 weeks in otherwise healthy people. Follow-up care is a key part of your treatment and safety. Be sure to make and go to all appointments, and call your doctor if you are having problems. It's also a good idea to know your test results and keep a list of the medicines you take. How can you care for yourself at home? · Take all medicines exactly as prescribed. Call your doctor if you think you are having a problem with your medicine. · Get some extra rest.  · Take an over-the-counter pain medicine, such as acetaminophen (Tylenol), ibuprofen (Advil, Motrin), or naproxen (Aleve) to reduce fever and relieve body aches. Read and follow all instructions on the label. · Do not take two or more pain medicines at the same time unless the doctor told you to. Many pain medicines have acetaminophen, which is Tylenol. Too much acetaminophen (Tylenol) can be harmful. · Take an over-the-counter cough medicine that contains dextromethorphan to help quiet a dry, hacking cough so that you can sleep. Avoid cough medicines that have more than one active ingredient. Read and follow all instructions on the label. · Breathe moist air from a humidifier, hot shower, or sink filled with hot water. The heat and moisture will thin mucus so you can cough it out. · Do not smoke. Smoking can make bronchitis worse. If you need help quitting, talk to your doctor about stop-smoking programs and medicines. These can increase your chances of quitting for good.   When should you call for help? Call 911 anytime you think you may need emergency care. For example, call if:  · You have severe trouble breathing. Call your doctor now or seek immediate medical care if:  · You have new or worse trouble breathing. · You cough up dark brown or bloody mucus (sputum). · You have a new or higher fever. · You have a new rash. Watch closely for changes in your health, and be sure to contact your doctor if:  · You cough more deeply or more often, especially if you notice more mucus or a change in the color of your mucus. · You are not getting better as expected. Where can you learn more? Go to http://stacey-marie.info/. Enter H333 in the search box to learn more about \"Bronchitis: Care Instructions. \"  Current as of: May 23, 2016  Content Version: 11.1  © 1452-3722 [employer]. Care instructions adapted under license by [employer] (which disclaims liability or warranty for this information). If you have questions about a medical condition or this instruction, always ask your healthcare professional. [address] any warranty or liability for your use of this information.

## 2017-03-23 ENCOUNTER — TELEPHONE (OUTPATIENT)
Dept: INTERNAL MEDICINE CLINIC | Age: 82
End: 2017-03-23

## 2017-03-23 NOTE — TELEPHONE ENCOUNTER
[facility] ANDRE[facility] Front Office Pool                     Patient went to urgent care last Thursday with a coughing spell. Please call her at [phone] and let her know what she recommends.

## 2017-03-24 NOTE — TELEPHONE ENCOUNTER
----- Message from Elias Lopez sent at 3/23/2017  5:12 PM EDT -----  Regarding: /Telephone   Pt would like a call back form the doctor regarding an Rx that was prescribed by the urgent care center. Pt has a terrible cough. Pt has left several messages. The best number for that [phone].

## 2017-03-27 NOTE — TELEPHONE ENCOUNTER
Writer spoke with this patient on 2/24/2017 and pt very upset that she cant talk to Dr Nelson but that she had wanted her to know, writer assured pt that MD would be made aware.  Pt also very upset that when she is \"sick\" she doesn't want to have to see the NP or another MD she only wants to see Dr Kendrick Campbell tried to explain that NP works very closely with MD and due to pt load and need that I could not guarantee that we would always have an appt, pt very argumentative, allowed pt to vent and then ended conversation

## 2017-04-05 ENCOUNTER — OFFICE VISIT (OUTPATIENT)
Dept: INTERNAL MEDICINE CLINIC | Age: 82
End: 2017-04-05

## 2017-04-05 VITALS
HEART RATE: 62 BPM | BODY MASS INDEX: 34.38 KG/M2 | HEIGHT: 58 IN | RESPIRATION RATE: 16 BRPM | WEIGHT: 163.8 LBS | OXYGEN SATURATION: 97 % | TEMPERATURE: 97.1 F | DIASTOLIC BLOOD PRESSURE: 78 MMHG | SYSTOLIC BLOOD PRESSURE: 150 MMHG

## 2017-04-05 DIAGNOSIS — H61.21 IMPACTED CERUMEN OF RIGHT EAR: ICD-10-CM

## 2017-04-05 DIAGNOSIS — J30.9 ALLERGIC RHINITIS, UNSPECIFIED ALLERGIC RHINITIS TRIGGER, UNSPECIFIED RHINITIS SEASONALITY: ICD-10-CM

## 2017-04-05 DIAGNOSIS — R05.9 COUGH: Primary | ICD-10-CM

## 2017-04-05 RX ORDER — LORATADINE 10 MG/1
10 TABLET ORAL
Qty: 30 TAB | Refills: 0 | Status: SHIPPED | OUTPATIENT
Start: 2017-04-05 | End: 2017-05-08 | Stop reason: ALTCHOICE

## 2017-04-05 NOTE — MR AVS SNAPSHOT
Visit Information Date & Time Provider Department Dept. Phone Encounter #  
 4/5/2017  8:30 AM Pastor Santana, [address] [facility] Internal Medicine [phone] [id number] Your Appointments 6/2/2017  8:40 AM  
ESTABLISHED PATIENT with Obie Urean MD  
[facility] ([facility]) Appt Note: 6 month follow up  
 [address] [facility] [address]  
[address] [address]  
  
    
 6/5/2017  8:30 AM  
ROUTINE CARE with Aurora Ansari MD  
[facility] Internal Medicine [facility]) Appt Note: 4 month follow up [address] [address] [facility] [address]  
[phone]  
  
   
 [address] Upcoming Health Maintenance Date Due DTaP/Tdap/Td series (1 - Tdap) 3/1/1955 MEDICARE YEARLY EXAM 11/12/2016 FOOT EXAM Q1 11/12/2016 HEMOGLOBIN A1C Q6M 8/1/2017 MICROALBUMIN Q1 9/16/2017 LIPID PANEL Q1 9/16/2017 EYE EXAM RETINAL OR DILATED Q1 10/14/2017 GLAUCOMA SCREENING Q2Y 10/14/2018 Allergies as of 4/5/2017  Review Complete On: 4/5/2017 By: Pastor Santana NP Severity Noted Reaction Type Reactions Cortisone  05/11/2011    Other (comments) \"Make me feels weak, like I'm going to die\" Januvia [Sitagliptin]  11/09/2016    Other (comments)  
 weakness Current Immunizations  Reviewed on 9/14/2016 Name Date Influenza High Dose Vaccine PF 10/28/2014 Influenza Vaccine 9/14/2016, 10/19/2013 Pneumococcal Conjugate (PCV-13) 9/14/2016 Pneumococcal Vaccine (Unspecified Type) 10/1/2008 Not reviewed this visit You Were Diagnosed With   
  
 Codes Comments Cough    -  Primary ICD-10-CM: H78 ICD-9-CM: 786.2 Allergic rhinitis, unspecified allergic rhinitis trigger, unspecified rhinitis seasonality     ICD-10-CM: J30.9 ICD-9-CM: 477.9 Vitals BP Pulse Temp Resp Height(growth percentile) Weight(growth percentile) 150/78 (BP 1 Location: Right arm, BP Patient Position: Sitting) 62 97.1 °F (36.2 °C) (Oral) 16 4' 9.99\" (1.473 m) 163 lb 12.8 oz (74.3 kg) SpO2 BMI OB Status Smoking Status 97% 34.24 kg/m2 Hysterectomy Never Smoker Vitals History BMI and BSA Data Body Mass Index Body Surface Area  
 34.24 kg/m 2 1.74 m 2 Preferred Pharmacy Pharmacy Name Phone [facility] [phone] Your Updated Medication List  
  
   
This list is accurate as of: 4/5/17  8:57 AM.  Always use your most recent med list.  
  
  
  
  
 ALPRAZolam 0.5 mg tablet Commonly known as:  Sondra Dobbins Take 1 Tab by mouth nightly as needed for Anxiety. Max Daily Amount: 0.5 mg.  
  
 aspirin 81 mg chewable tablet Take 81 mg by mouth daily. atorvastatin 20 mg tablet Commonly known as:  LIPITOR Take 1 Tab by mouth daily. cloNIDine HCl 0.3 mg tablet Commonly known as:  CATAPRES Take 1 Tab by mouth three (3) times daily. dilTIAZem  mg ER capsule Commonly known as:  CARDIZEM CD  
TAKE ONE CAPSULE BY MOUTH EVERY DAY  
  
 famotidine 20 mg tablet Commonly known as:  PEPCID Take 1 Tab by mouth nightly. fluticasone 50 mcg/actuation nasal spray Commonly known as:  Amber Earnest 2 Sprays by Both Nostrils route daily. furosemide 40 mg tablet Commonly known as:  LASIX TAKE ONE TABLET BY MOUTH EVERY DAY  
  
 glipiZIDE 5 mg tablet Commonly known as:  Duane David Take 1 Tab by mouth two (2) times a day. loratadine 10 mg tablet Commonly known as:  Carloyn Ely Take 1 Tab by mouth daily as needed for Allergies. losartan 100 mg tablet Commonly known as:  COZAAR Take 1 Tab by mouth daily. metFORMIN  mg tablet Commonly known as:  GLUCOPHAGE XR  
TAKE TWO TABLETS BY MOUTH EVERY DAY WITH DINNER  
  
 metoprolol tartrate 100 mg IR tablet Commonly known as:  LOPRESSOR Take 1.5 Tabs by mouth two (2) times a day. pioglitazone 15 mg tablet Commonly known as:  ACTOS Take 1 Tab by mouth nightly. potassium chloride 10 mEq tablet Commonly known as:  K-DUR, KLOR-CON Take 2 Tabs by mouth daily. promethazine-dextromethorphan 6.25-15 mg/5 mL syrup Commonly known as:  PROMETHAZINE-DM Take 5 mL by mouth nightly as needed for Cough. TYLENOL 325 mg tablet Generic drug:  acetaminophen Take  by mouth every four (4) hours as needed for Pain. VITAMIN D3 1,000 unit Cap Generic drug:  cholecalciferol Take 1,000 Units by mouth daily. Prescriptions Sent to Pharmacy Refills  
 loratadine (CLARITIN) 10 mg tablet 0 Sig: Take 1 Tab by mouth daily as needed for Allergies. Class: Normal  
 Pharmacy: [facility] #: [phone] Route: Oral  
  
Patient Instructions Allergies: Care Instructions Your Care Instructions Allergies occur when your body's defense system (immune system) overreacts to certain substances. The immune system treats a harmless substance as if it were a harmful germ or virus. Many things can cause this overreaction, including pollens, medicine, food, dust, animal dander, and mold. Allergies can be mild or severe. Mild allergies can be managed with home treatment. But medicine may be needed to prevent problems. Managing your allergies is an important part of staying healthy. Your doctor may suggest that you have allergy testing to help find out what is causing your allergies. When you know what things trigger your symptoms, you can avoid them. This can prevent allergy symptoms and other health problems.  
For severe allergies that cause reactions that affect your whole body (anaphylactic reactions), your doctor may prescribe a shot of epinephrine to carry with you in case you have a severe reaction. Learn how to give yourself the shot and keep it with you at all times. Make sure it is not . Follow-up care is a key part of your treatment and safety. Be sure to make and go to all appointments, and call your doctor if you are having problems. It's also a good idea to know your test results and keep a list of the medicines you take. How can you care for yourself at home? · If you have been told by your doctor that dust or dust mites are causing your allergy, decrease the dust around your bed: 
[facility] sheets, pillowcases, and other bedding in hot water every week. ¨ Use dust-proof covers for pillows, duvets, and mattresses. Avoid plastic covers because they tear easily and do not \"breathe. \" Wash as instructed on the label. ¨ Do not use any blankets and pillows that you do not need. ¨ Use blankets that you can wash in your washing machine. ¨ Consider removing drapes and carpets, which attract and hold dust, from your bedroom. · If you are allergic to house dust and mites, do not use home humidifiers. Your doctor can suggest ways you can control dust and mites. · Look for signs of cockroaches. Cockroaches cause allergic reactions. Use cockroach baits to get rid of them. Then, clean your home well. Cockroaches like areas where grocery bags, newspapers, empty bottles, or cardboard boxes are stored. Do not keep these inside your home, and keep trash and food containers sealed. Seal off any spots where cockroaches might enter your home. · If you are allergic to mold, get rid of furniture, rugs, and drapes that smell musty. Check for mold in the bathroom. · If you are allergic to outdoor pollen or mold spores, use air-conditioning. Change or clean all filters every month. Keep windows closed. · If you are allergic to pollen, stay inside when pollen counts are high.  Use a vacuum  with a HEPA filter or a double-thickness filter at least two times each week. · Stay inside when air pollution is bad. Avoid paint fumes, perfumes, and other strong odors. · Avoid conditions that make your allergies worse. Stay away from smoke. Do not smoke or let anyone else smoke in your house. Do not use fireplaces or wood-burning stoves. · If you are allergic to your pets, change the air filter in your furnace every month. Use high-efficiency filters. · If you are allergic to pet dander, keep pets outside or out of your bedroom. Old carpet and cloth furniture can hold a lot of animal dander. You may need to replace them. When should you call for help? Give an epinephrine shot if: 
· You think you are having a severe allergic reaction. · You have symptoms in more than one body area, such as mild nausea and an itchy mouth. After giving an epinephrine shot call 911, even if you feel better. Call 911 if: 
· You have symptoms of a severe allergic reaction. These may include: 
¨ Sudden raised, red areas (hives) all over your body. ¨ Swelling of the throat, mouth, lips, or tongue. ¨ Trouble breathing. ¨ Passing out (losing consciousness). Or you may feel very lightheaded or suddenly feel weak, confused, or restless. · You have been given an epinephrine shot, even if you feel better. Call your doctor now or seek immediate medical care if: 
· You have symptoms of an allergic reaction, such as: ¨ A rash or hives (raised, red areas on the skin). ¨ Itching. ¨ Swelling. ¨ Belly pain, nausea, or vomiting. Watch closely for changes in your health, and be sure to contact your doctor if: 
· You do not get better as expected. Where can you learn more? Go to http://stacey-marie.info/. Enter W479 in the search box to learn more about \"Allergies: Care Instructions. \" Current as of: February 12, 2016 Content Version: 11.2 © 5485-9577 [employer].  Care instructions adapted under license by [address] (which disclaims liability or warranty for this information). If you have questions about a medical condition or this instruction, always ask your healthcare professional. [address] any warranty or liability for your use of this information. Introducing [facility]! Dear Charley Hernandez: Thank you for requesting a [employer] account. Our records indicate that you have previously registered for a [employer] account but its currently inactive. Please call our [employer] support line at [phone]. Additional Information If you have questions, please visit the Frequently Asked Questions section of the [employer] website at [email or web address] Remember, [employer] is NOT to be used for urgent needs. For medical emergencies, dial 911. Now available from your iPhone and Android! Please provide this summary of care documentation to your next provider. Your primary care clinician is listed as Alyse Burt. If you have any questions after today's visit, please call [phone].

## 2017-04-05 NOTE — PATIENT INSTRUCTIONS

## 2017-04-05 NOTE — PROGRESS NOTES
HISTORY OF PRESENT ILLNESS  Mireille Kat is a 80 y.o. female. This is a patient of Dr. Mehran Emerson who presents today with complaints of cough. The patient was seen at urgent care on 3/15/17 and 3/18/17 related to cough. She had negative x-ray of chest and was treated for bronchitis with cefdinir. She completed a 10 day course. She has been taking Flonase and Promethazine DM PRN, as prescribed, as well. Patient states she is feeling better, but she has continued with non-productive cough. She states cough seems to be worse in the afternoon and when she goes outside. She mentions that cough improves after use of Flonase. No fever or chills. No chest pain or shortness of breath. Visit Vitals    /78 (BP 1 Location: Right arm, BP Patient Position: Sitting)    Pulse 62    Temp 97.1 °F (36.2 °C) (Oral)    Resp 16    Ht 4' 9.99\" (1.473 m)    Wt 163 lb 12.8 oz (74.3 kg)    SpO2 97%    BMI 34.24 kg/m2     HPI    Review of Systems   Constitutional: Negative for chills, fever, malaise/fatigue and weight loss. HENT: Positive for congestion. Negative for ear pain and sore throat. Eyes: Negative for blurred vision. Respiratory: Positive for cough. Negative for shortness of breath and wheezing. Cardiovascular: Negative for chest pain, palpitations and leg swelling. Gastrointestinal: Negative for abdominal pain. Genitourinary: Negative. Musculoskeletal: Negative. Skin: Negative. Neurological: Negative for dizziness, tingling, tremors, weakness and headaches. Endo/Heme/Allergies: Negative. Psychiatric/Behavioral: Negative. Physical Exam   Constitutional: She is oriented to person, place, and time. She appears well-developed and well-nourished. HENT:   Head: Normocephalic and atraumatic. Left Ear: External ear normal.   Mouth/Throat: No oropharyngeal exudate.    Right ear canal with cerumen impaction  OP with cobblestoning, no exudate   Eyes: Conjunctivae are normal.   Neck: Neck supple. Cardiovascular: Normal rate, regular rhythm and normal heart sounds. Pulmonary/Chest: Effort normal and breath sounds normal. No respiratory distress. She has no wheezes. She has no rales. Abdominal: Soft. She exhibits no distension. Musculoskeletal: She exhibits no edema. Lymphadenopathy:     She has no cervical adenopathy. Neurological: She is alert and oriented to person, place, and time. Skin: Skin is warm and dry. Psychiatric: She has a normal mood and affect. Her behavior is normal.   Nursing note and vitals reviewed. ASSESSMENT and PLAN    ICD-10-CM ICD-9-CM    1. Cough R05 786.2 Will treat for allergic rhinitis, as below. Patient encouraged to call or return to office if symptoms do not improve or worsen. 2. Allergic rhinitis, unspecified allergic rhinitis trigger, unspecified rhinitis seasonality J30.9 477.9 Will order  loratadine (CLARITIN) 10 mg tablet, 1 tab po daily x 10 days, then daily PRN   3. Impacted cerumen of right ear H61.21 380.4 Will order  carbamide peroxide (DEBROX) 6.5 % otic solution 5 gtts to right ear bid x 3 days. Lab results and schedule of future lab studies reviewed with patient  Reviewed diet, exercise and weight control  Reviewed medications and side effects in detail  Patient encouraged to call or return to office if symptoms do not improve or worsen. Reviewed plan of care with patient who acknowledges understanding and agrees. If experiencing severe shortness of breath or chest pain, present to the ER. Follow- up with Dr. Rodrigo Lacy in 6-8 weeks or sooner as needed.

## 2017-04-06 ENCOUNTER — DOCUMENTATION ONLY (OUTPATIENT)
Dept: INTERNAL MEDICINE CLINIC | Age: 82
End: 2017-04-06

## 2017-04-07 ENCOUNTER — DOCUMENTATION ONLY (OUTPATIENT)
Dept: INTERNAL MEDICINE CLINIC | Age: 82
End: 2017-04-07

## 2017-04-17 ENCOUNTER — OFFICE VISIT (OUTPATIENT)
Dept: INTERNAL MEDICINE CLINIC | Age: 82
End: 2017-04-17

## 2017-04-17 VITALS
HEIGHT: 58 IN | WEIGHT: 161 LBS | RESPIRATION RATE: 22 BRPM | DIASTOLIC BLOOD PRESSURE: 84 MMHG | BODY MASS INDEX: 33.8 KG/M2 | HEART RATE: 67 BPM | TEMPERATURE: 97.6 F | OXYGEN SATURATION: 97 % | SYSTOLIC BLOOD PRESSURE: 177 MMHG

## 2017-04-17 DIAGNOSIS — G47.00 INSOMNIA, UNSPECIFIED TYPE: ICD-10-CM

## 2017-04-17 DIAGNOSIS — I10 ESSENTIAL HYPERTENSION WITH GOAL BLOOD PRESSURE LESS THAN 140/90: ICD-10-CM

## 2017-04-17 DIAGNOSIS — J30.1 ALLERGIC RHINITIS DUE TO POLLEN, UNSPECIFIED RHINITIS SEASONALITY: Primary | ICD-10-CM

## 2017-04-17 DIAGNOSIS — Z00.00 ROUTINE GENERAL MEDICAL EXAMINATION AT A HEALTH CARE FACILITY: ICD-10-CM

## 2017-04-17 DIAGNOSIS — H61.20 WAX IN EAR: ICD-10-CM

## 2017-04-17 DIAGNOSIS — Z00.00 MEDICARE ANNUAL WELLNESS VISIT, SUBSEQUENT: ICD-10-CM

## 2017-04-17 DIAGNOSIS — R05.9 COUGH: ICD-10-CM

## 2017-04-17 RX ORDER — GUAIFENESIN 600 MG/1
600 TABLET, EXTENDED RELEASE ORAL
Qty: 20 TAB | Refills: 0 | Status: SHIPPED | OUTPATIENT
Start: 2017-04-17 | End: 2017-06-28

## 2017-04-17 RX ORDER — PREDNISONE 5 MG/1
TABLET ORAL
Qty: 1 PACKAGE | Refills: 0 | Status: SHIPPED | OUTPATIENT
Start: 2017-04-17 | End: 2017-05-08 | Stop reason: ALTCHOICE

## 2017-04-17 RX ORDER — ALPRAZOLAM 0.5 MG/1
0.5 TABLET ORAL
Qty: 15 TAB | Refills: 0 | Status: SHIPPED | OUTPATIENT
Start: 2017-04-17 | End: 2017-10-02 | Stop reason: SDUPTHER

## 2017-04-17 RX ORDER — ATORVASTATIN CALCIUM 10 MG/1
TABLET, FILM COATED ORAL
COMMUNITY
Start: 2017-03-30 | End: 2017-05-08 | Stop reason: SDUPTHER

## 2017-04-17 NOTE — PROGRESS NOTES
Chief Complaint   Patient presents with    Cough     coughing x one month--Urgent Care X 2      Reviewed record  In preparation for visit and have obtained necessary documentation. 1. Have you been to the ER, urgent care clinic since your last visit? Hospitalized since your last visit? Yes-  Urgent Care X 2    2. Have you seen or consulted any other health care providers outside of the [address] [place] [address] since your last visit? Include any pap smears or colon screening.  No    Used 2 patient I. D. s

## 2017-04-17 NOTE — PROGRESS NOTES
This is an Initial Medicare Annual Wellness Exam (AWV) (Performed 12 months after IPPE or effective date of Medicare Part B enrollment, Once in a lifetime)    I have reviewed the patient's medical history in detail and updated the computerized patient record. History     Past Medical History:   Diagnosis Date    Adverse effect of anesthesia     pt states she was able to feel everything during colonoscopy    Chronic bronchitis     Diabetes ([address]     Essential hypertension     Hypercholesterolemia     Hypertension       Past Surgical History:   Procedure Laterality Date    HX CHOLECYSTECTOMY      HX GYN      tubal ligation    HX HYSTERECTOMY       Current Outpatient Prescriptions   Medication Sig Dispense Refill    atorvastatin (LIPITOR) 10 mg tablet       predniSONE (STERAPRED) 5 mg dose pack As directed for 6 days 1 Package 0    ALPRAZolam (XANAX) 0.5 mg tablet Take 1 Tab by mouth nightly as needed for Anxiety. Max Daily Amount: 0.5 mg. 15 Tab 0    guaiFENesin ER (MUCINEX) 600 mg ER tablet Take 1 Tab by mouth two (2) times daily as needed for Congestion. 20 Tab 0    loratadine (CLARITIN) 10 mg tablet Take 1 Tab by mouth daily as needed for Allergies. 30 Tab 0    fluticasone (FLONASE) 50 mcg/actuation nasal spray 2 Sprays by Both Nostrils route daily. 1 Bottle 0    cloNIDine HCl (CATAPRES) 0.3 mg tablet Take 1 Tab by mouth three (3) times daily. 90 Tab 11    famotidine (PEPCID) 20 mg tablet Take 1 Tab by mouth nightly. 30 Tab 4    pioglitazone (ACTOS) 15 mg tablet Take 1 Tab by mouth nightly. 30 Tab 5    atorvastatin (LIPITOR) 20 mg tablet Take 1 Tab by mouth daily. 90 Tab 3    furosemide (LASIX) 40 mg tablet TAKE ONE TABLET BY MOUTH EVERY DAY 30 Tab 11    metoprolol tartrate (LOPRESSOR) 100 mg IR tablet Take 1.5 Tabs by mouth two (2) times a day. 90 Tab 11    potassium chloride (K-DUR, KLOR-CON) 10 mEq tablet Take 2 Tabs by mouth daily.  60 Tab 11    diltiazem CD (CARDIZEM CD) 180 mg ER capsule TAKE ONE CAPSULE BY MOUTH EVERY DAY 30 Cap 11    metFORMIN ER (GLUCOPHAGE XR) 500 mg tablet TAKE TWO TABLETS BY MOUTH EVERY DAY WITH DINNER 60 Tab 11    glipiZIDE (GLUCOTROL) 5 mg tablet Take 1 Tab by mouth two (2) times a day. 60 Tab 11    Cholecalciferol, Vitamin D3, (VITAMIN D3) 1,000 unit cap Take 1,000 Units by mouth daily.  aspirin 81 mg chewable tablet Take 81 mg by mouth daily.  promethazine-dextromethorphan (PROMETHAZINE-DM) 6.25-15 mg/5 mL syrup Take 5 mL by mouth nightly as needed for Cough. 100 mL 0    losartan (COZAAR) 100 mg tablet Take 1 Tab by mouth daily. 90 Tab 3    acetaminophen (TYLENOL) 325 mg tablet Take  by mouth every four (4) hours as needed for Pain. Allergies   Allergen Reactions    Cortisone Other (comments)     \"Make me feels weak, like I'm going to die\"      Januvia [Sitagliptin] Other (comments)     weakness     History reviewed. No pertinent family history.   Social History   Substance Use Topics    Smoking status: Never Smoker    Smokeless tobacco: Never Used    Alcohol use No     Patient Active Problem List   Diagnosis Code    Hypercholesterolemia E78.00    Palpitations R00.2    Lightheadedness R44    PVC's (premature ventricular contractions) I49.3    Stasis edema I87.309    Encounter for long-term (current) use of other medications Z79.899    Unspecified constipation K59.00    Allergic rhinitis, cause unspecified J30.9    Accelerated essential hypertension I10    Mixed hyperlipidemia E78.2    Controlled type 2 diabetes mellitus without complication (HCC) S74.3    Chronic nasal congestion R09.81    Advance directive discussed with patient Z70.80    Essential hypertension with goal blood pressure less than 140/90 I10         Depression Risk Factor Screening:     PHQ 2 / 9, over the last two weeks 4/17/2017   Little interest or pleasure in doing things Not at all   Feeling down, depressed or hopeless Not at all   Total Score PHQ 2 0 Alcohol Risk Factor Screening: On any occasion during the past 3 months, have you had more than 3 drinks containing alcohol? No    Do you average more than 7 drinks per week? No    Functional Ability and Level of Safety:     Hearing Loss   normal-to-mild    Activities of Daily Living   Self-care. Requires assistance with: no ADLs    Fall Risk     Fall Risk Assessment, last 12 mths 4/17/2017   Able to walk? Yes   Fall in past 12 months? No     Abuse Screen   Patient is not abused    Review of Systems   Not required  annual medicare wellness exam    Physical Examination     No exam data present    Evaluation of Cognitive Function:  Mood/affect:  happy  Appearance: age appropriate and casually dressed  Family member/caregiver input: not present     No exam performed today, annual medicare wellness exam.    Patient Care Team:  Glen Hull MD as PCP - General (Internal Medicine)  Cj Londono MD as Physician (Ophthalmology)  Sary Edwards MD as Physician (Cardiology)  Ricardo Sharma RN as Ambulatory Care Navigator (Cardiology)  Cj Londono MD as Physician (Ophthalmology)  Jose Luis Castle. Devin Porter MD as Physician (Gastroenterology)  Glenys Pallas, LPN as [address] (Internal Medicine)    Advice/Referrals/Counseling   Education and counseling provided:  Are appropriate based on today's review and evaluation      Assessment/Plan       ICD-10-CM ICD-9-CM    1. Allergic rhinitis due to pollen, unspecified rhinitis seasonality J30.1 477.0 predniSONE (STERAPRED) 5 mg dose pack   2. Wax in ear H61.20 380.4    3. Essential hypertension with goal blood pressure less than 140/90 I10 401.9    4. Cough R05 786.2 guaiFENesin ER (MUCINEX) 600 mg ER tablet   5. Insomnia, unspecified type G47.00 780.52 ALPRAZolam (XANAX) 0.5 mg tablet   6.  Routine general medical examination at a health care facility Z00.00 V70.0    .  1. Patient has AMD at home and will bring into scan in her chart. 2. Discussed the following wellness screenings: Patient will consider zoster vaccination and is otherwise up to date on her wellness screenings. 3. AVS and preventative screenings table printed, reviewed, and handed to patient. Patient verbalized understanding to all information.

## 2017-04-17 NOTE — PROGRESS NOTES
HISTORY OF PRESENT ILLNESS  Debbie Avalos is a 80 y.o. female here with C/O  Cough and nasal congestion for 1 month. cough is thick. she is not able to bring up.was treated with omnicef and claritin. not better yet. Had ear wax on right ear. need to clean. Has diabetes. on meds. taking regularly. has HTN,BP is little elevated today. complaint with med. Reports insomnia. need xanax refill. Here for medicare wellness visit too. Cough     Allergies         Review of Systems   Constitutional: Negative. HENT: Positive for congestion. Eyes: Negative. Respiratory: Positive for cough and sputum production. Cardiovascular: Negative. Gastrointestinal: Negative. Genitourinary: Negative. Musculoskeletal: Positive for joint pain. Skin: Negative. Neurological: Negative. Endo/Heme/Allergies: Negative. Psychiatric/Behavioral: Negative. Physical Exam   Constitutional: She appears well-developed and well-nourished. No distress. HENT:   Head: Normocephalic and atraumatic. Right Ear: External ear normal.   Left Ear: External ear normal.   Mouth/Throat: Oropharynx is clear and moist. No oropharyngeal exudate. Nasal turbinates:red inflamed,NT    Cobble stoning present. Neck: Normal range of motion. Neck supple. No JVD present. No thyromegaly present. Cardiovascular: Normal rate, regular rhythm, normal heart sounds and intact distal pulses. Pulmonary/Chest: Effort normal and breath sounds normal. No respiratory distress. She has no wheezes. Musculoskeletal:   Neck:mild paravertebral facet tenderness. ROm OK   Psychiatric: She has a normal mood and affect. Her behavior is normal.       ASSESSMENT and PLAN  Sj Cevallos was seen today for cough, allergies and annual wellness visit. Diagnoses and all orders for this visit:    Allergic rhinitis due to pollen, unspecified rhinitis seasonality    Will call in,  -     predniSONE (STERAPRED) 5 mg dose pack;  As directed for 6 days    Wax in ear    Removed. Essential hypertension with goal blood pressure less than 140/90    Stable. on meds. Cough    Will call in,  -     guaiFENesin ER (MUCINEX) 600 mg ER tablet; Take 1 Tab by mouth two (2) times daily as needed for Congestion. Insomnia, unspecified type    Will refill,  -     ALPRAZolam (XANAX) 0.5 mg tablet; Take 1 Tab by mouth nightly as needed for Anxiety. Max Daily Amount: 0.5 mg.          Discussed expected course/resolution/complications of diagnosis in detail with patient. Medication risks/benefits/costs/interactions/alternatives discussed with patient. Pt was given an after visit summary which includes diagnoses, current medications & vitals. Pt expressed understanding with the diagnosis and plan.

## 2017-05-08 ENCOUNTER — OFFICE VISIT (OUTPATIENT)
Dept: INTERNAL MEDICINE CLINIC | Age: 82
End: 2017-05-08

## 2017-05-08 VITALS
TEMPERATURE: 96.2 F | WEIGHT: 163.5 LBS | OXYGEN SATURATION: 99 % | BODY MASS INDEX: 35.27 KG/M2 | HEIGHT: 57 IN | HEART RATE: 59 BPM | DIASTOLIC BLOOD PRESSURE: 65 MMHG | RESPIRATION RATE: 20 BRPM | SYSTOLIC BLOOD PRESSURE: 136 MMHG

## 2017-05-08 DIAGNOSIS — I10 ESSENTIAL HYPERTENSION WITH GOAL BLOOD PRESSURE LESS THAN 140/90: ICD-10-CM

## 2017-05-08 DIAGNOSIS — J30.1 ALLERGIC RHINITIS DUE TO POLLEN, UNSPECIFIED RHINITIS SEASONALITY: Primary | ICD-10-CM

## 2017-05-08 DIAGNOSIS — E11.9 CONTROLLED TYPE 2 DIABETES MELLITUS WITHOUT COMPLICATION, UNSPECIFIED LONG TERM INSULIN USE STATUS: ICD-10-CM

## 2017-05-08 DIAGNOSIS — K21.9 GASTROESOPHAGEAL REFLUX DISEASE WITHOUT ESOPHAGITIS: ICD-10-CM

## 2017-05-08 RX ORDER — LATANOPROST 50 UG/ML
SOLUTION/ DROPS OPHTHALMIC
COMMUNITY
Start: 2017-04-25 | End: 2018-12-26 | Stop reason: ALTCHOICE

## 2017-05-08 RX ORDER — MINERAL OIL
180 ENEMA (ML) RECTAL
Qty: 30 TAB | Refills: 1 | Status: SHIPPED | OUTPATIENT
Start: 2017-05-08 | End: 2017-11-06 | Stop reason: ALTCHOICE

## 2017-05-08 RX ORDER — TIMOLOL MALEATE 5 MG/ML
SOLUTION OPHTHALMIC
COMMUNITY
Start: 2017-04-25 | End: 2018-12-26 | Stop reason: ALTCHOICE

## 2017-05-08 NOTE — PROGRESS NOTES
Health Maintenance Due   Topic Date Due    DTaP/Tdap/Td series (1 - Tdap) 03/01/1955    FOOT EXAM Q1  11/12/2016       Chief Complaint   Patient presents with    Follow-up     states still not feeling better    Allergic Rhinitis       1. Have you been to the ER, urgent care clinic since your last visit? Hospitalized since your last visit?n      2. Have you seen or consulted any other health care providers outside of the [address] [place] [address] since your last visit? Include any pap smears or colon screening. No    3) Do you have an Advance Directive on file? no    4) Are you interested in receiving information on Advance Directives? NO      Patient is accompanied by self I have received verbal consent from Yusra Monk to discuss any/all medical information while they are present in the room.

## 2017-05-08 NOTE — MR AVS SNAPSHOT
Visit Information Date & Time Provider Department Dept. Phone Encounter #  
 5/8/2017  8:30 AM Jayne Oneill, [address] [facility] Internal Medicine [phone] [id number] Your Appointments 6/2/2017  8:40 AM  
ESTABLISHED PATIENT with Katty Matos MD  
[facility] ([facility]) Appt Note: 6 month follow up  
 [address]  [facility] [address]  
[address] [facility] [address] [address]  
  
    
 6/5/2017  8:30 AM  
ROUTINE CARE with Jayne Oneill MD  
[facility] Internal Medicine [facility]) Appt Note: 4 month follow up [address] [facility] [address]  
[phone]  
  
   
 [address] [facility] [address] Upcoming Health Maintenance Date Due DTaP/Tdap/Td series (1 - Tdap) 3/1/1955 FOOT EXAM Q1 11/12/2016 HEMOGLOBIN A1C Q6M 8/1/2017 INFLUENZA AGE 9 TO ADULT 8/1/2017 MICROALBUMIN Q1 9/16/2017 LIPID PANEL Q1 9/16/2017 EYE EXAM RETINAL OR DILATED Q1 10/14/2017 MEDICARE YEARLY EXAM 4/18/2018 GLAUCOMA SCREENING Q2Y 10/14/2018 Allergies as of 5/8/2017  Review Complete On: 5/8/2017 By: Jayne Oneill MD  
  
 Severity Noted Reaction Type Reactions Cortisone  05/11/2011    Other (comments) \"Make me feels weak, like I'm going to die\" Januvia [Sitagliptin]  11/09/2016    Other (comments)  
 weakness Current Immunizations  Reviewed on 9/14/2016 Name Date Influenza High Dose Vaccine PF 10/28/2014 Influenza Vaccine 9/14/2016, 10/19/2013 Pneumococcal Conjugate (PCV-13) 9/14/2016 Pneumococcal Vaccine (Unspecified Type) 10/1/2008 Not reviewed this visit You Were Diagnosed With   
  
 Codes Comments Allergic rhinitis due to pollen, unspecified rhinitis seasonality    -  Primary ICD-10-CM: J30.1 ICD-9-CM: 477.0 Essential hypertension with goal blood pressure less than 140/90     ICD-10-CM: I10 
ICD-9-CM: 401.9 Controlled type 2 diabetes mellitus without complication, unspecified long term insulin use status ([facility]     ICD-10-CM: E11.9 ICD-9-CM: 250.00 Vitals BP Pulse Temp Resp Height(growth percentile) Weight(growth percentile) 136/65 (BP 1 Location: Right arm, BP Patient Position: Sitting) (!) 59 96.2 °F (35.7 °C) (Oral) 20 4' 9\" (1.448 m) 163 lb 8 oz (74.2 kg) SpO2 BMI OB Status Smoking Status 99% 35.38 kg/m2 Hysterectomy Never Smoker Vitals History BMI and BSA Data Body Mass Index Body Surface Area  
 35.38 kg/m 2 1.73 m 2 Preferred Pharmacy Pharmacy Name Phone [facility] [phone] Your Updated Medication List  
  
   
This list is accurate as of: 5/8/17  8:31 AM.  Always use your most recent med list.  
  
  
  
  
 ALPRAZolam 0.5 mg tablet Commonly known as:  Joyner Hummingbird Take 1 Tab by mouth nightly as needed for Anxiety. Max Daily Amount: 0.5 mg.  
  
 aspirin 81 mg chewable tablet Take 81 mg by mouth daily. atorvastatin 20 mg tablet Commonly known as:  LIPITOR Take 1 Tab by mouth daily. cloNIDine HCl 0.3 mg tablet Commonly known as:  CATAPRES Take 1 Tab by mouth three (3) times daily. dilTIAZem  mg ER capsule Commonly known as:  CARDIZEM CD  
TAKE ONE CAPSULE BY MOUTH EVERY DAY  
  
 famotidine 20 mg tablet Commonly known as:  PEPCID Take 1 Tab by mouth nightly. fexofenadine 180 mg tablet Commonly known as:  Merle Rubén Take 1 Tab by mouth daily as needed for Allergies. fluticasone 50 mcg/actuation nasal spray Commonly known as:  Carmarilee Schultzusick 2 Sprays by Both Nostrils route daily. furosemide 40 mg tablet Commonly known as:  LASIX TAKE ONE TABLET BY MOUTH EVERY DAY  
  
 glipiZIDE 5 mg tablet Commonly known as:  Arleta Papa Take 1 Tab by mouth two (2) times a day. guaiFENesin  mg ER tablet Commonly known as:  Boom Ideagen Boom Take 1 Tab by mouth two (2) times daily as needed for Congestion. latanoprost 0.005 % ophthalmic solution Commonly known as:  XALATAN  
  
 losartan 100 mg tablet Commonly known as:  COZAAR  
TAKE ONE TABLET BY MOUTH EVERY DAY  
  
 metFORMIN  mg tablet Commonly known as:  GLUCOPHAGE XR  
TAKE TWO TABLETS BY MOUTH EVERY DAY WITH DINNER  
  
 metoprolol tartrate 100 mg IR tablet Commonly known as:  LOPRESSOR Take 1.5 Tabs by mouth two (2) times a day. omeprazole 40 mg capsule Commonly known as:  PRILOSEC  
TAKE ONE CAPSULE BY MOUTH EVERY DAY BEFORE BREAKFAST  
  
 pioglitazone 15 mg tablet Commonly known as:  ACTOS Take 1 Tab by mouth nightly. potassium chloride 10 mEq tablet Commonly known as:  KLOR-CON Take 2 Tabs by mouth daily. TIMOPTIC-XE 0.5 % ophthalmic gel-forming Generic drug:  timolol TYLENOL 325 mg tablet Generic drug:  acetaminophen Take  by mouth every four (4) hours as needed for Pain. VITAMIN D3 1,000 unit Cap Generic drug:  cholecalciferol Take 1,000 Units by mouth daily. Prescriptions Sent to Pharmacy Refills  
 fexofenadine (ALLEGRA) 180 mg tablet 1 Sig: Take 1 Tab by mouth daily as needed for Allergies. Class: Normal  
 Pharmacy: [facility] Ph #: [phone] Route: Oral  
  
Patient Instructions Managing Your Allergies: Care Instructions Your Care Instructions Managing your allergies is an important part of staying healthy. Your doctor will help you find out what may be causing the allergies. Common causes of allergy symptoms are house dust and dust mites, animal dander, mold, and pollen. As soon as you know what triggers your symptoms, try to reduce your exposure to your triggers. This can help prevent allergy symptoms, asthma, and other health problems. Ask your doctor about allergy medicine or immunotherapy.  These treatments may help reduce or prevent allergy symptoms. Follow-up care is a key part of your treatment and safety. Be sure to make and go to all appointments, and call your doctor if you are having problems. It's also a good idea to know your test results and keep a list of the medicines you take. How can you care for yourself at home? · If you think that dust or dust mites are causing your allergies: 
¨ Wash sheets, pillowcases, and other bedding every week in hot water. ¨ Use airtight, dust-proof covers for pillows, duvets, and mattresses. Avoid plastic covers, because they tend to tear quickly and do not \"breathe. \" Wash according to the instructions. ¨ Remove extra blankets and pillows that you don't need. ¨ Use blankets that are machine-washable. ¨ Don't use home humidifiers. They can help mites live longer. · Use air-conditioning. Change or clean all filters every month. Keep windows closed. Use high-efficiency air filters. Don't use window or attic fans, which draw dust into the air. · If you're allergic to pet dander, keep pets outside or, at the very least, out of your bedroom. Old carpet and cloth-covered furniture can hold a lot of animal dander. You may need to replace them. · Look for signs of cockroaches. Use cockroach baits to get rid of them. Then clean your home well. · If you're allergic to mold, don't keep indoor plants, because molds can grow in soil. Get rid of furniture, rugs, and drapes that smell musty. Check for mold in the bathroom. · If you're allergic to pollen, stay inside when pollen counts are high. · Don't smoke or let anyone else smoke in your house. Don't use fireplaces or wood-burning stoves. Avoid paint fumes, perfumes, and other strong odors. When should you call for help? Give an epinephrine shot if: 
· You think you are having a severe allergic reaction. · You have symptoms in more than one body area, such as mild nausea and an itchy mouth. After giving an epinephrine shot call 911, even if you feel better. Call 911 if: 
· You have symptoms of a severe allergic reaction. These may include: 
¨ Sudden raised, red areas (hives) all over your body. ¨ Swelling of the throat, mouth, lips, or tongue. ¨ Trouble breathing. ¨ Passing out (losing consciousness). Or you may feel very lightheaded or suddenly feel weak, confused, or restless. · You have been given an epinephrine shot, even if you feel better. Call your doctor now or seek immediate medical care if: 
· You have symptoms of an allergic reaction, such as: ¨ A rash or hives (raised, red areas on the skin). ¨ Itching. ¨ Swelling. ¨ Belly pain, nausea, or vomiting. Watch closely for changes in your health, and be sure to contact your doctor if: 
· Your allergies get worse. · You need help controlling your allergies. · You have questions about allergy testing. · You do not get better as expected. Where can you learn more? Go to http://stacey-marie.info/. Enter L249 in the search box to learn more about \"Managing Your Allergies: Care Instructions. \" Current as of: February 12, 2016 Content Version: 11.2 © 0997-4656 [employer]. Care instructions adapted under license by [employer] (which disclaims liability or warranty for this information). If you have questions about a medical condition or this instruction, always ask your healthcare professional. [place] [address] any warranty or liability for your use of this information. Introducing [facility]! Dear Irvin Gifford: Thank you for requesting a Ogorod account. Our records indicate that you have previously registered for a Ogorod account but its currently inactive. Please call our O[employer] support line at [phone]. Additional Information If you have questions, please visit the Frequently Asked Questions section of the [email or web address] website at [email or web address] Remember, [email or web address] is NOT to be used for urgent needs. For medical emergencies, dial 911. Now available from your iPhone and Android! Please provide this summary of care documentation to your next provider. Your primary care clinician is listed as Aurora Ansari. If you have any questions after today's visit, please call [phone].

## 2017-05-08 NOTE — PATIENT INSTRUCTIONS
Managing Your Allergies: Care Instructions  Your Care Instructions  Managing your allergies is an important part of staying healthy. Your doctor will help you find out what may be causing the allergies. Common causes of allergy symptoms are house dust and dust mites, animal dander, mold, and pollen. As soon as you know what triggers your symptoms, try to reduce your exposure to your triggers. This can help prevent allergy symptoms, asthma, and other health problems. Ask your doctor about allergy medicine or immunotherapy. These treatments may help reduce or prevent allergy symptoms. Follow-up care is a key part of your treatment and safety. Be sure to make and go to all appointments, and call your doctor if you are having problems. It's also a good idea to know your test results and keep a list of the medicines you take. How can you care for yourself at home? · If you think that dust or dust mites are causing your allergies:  ¨ Wash sheets, pillowcases, and other bedding every week in hot water. ¨ Use airtight, dust-proof covers for pillows, duvets, and mattresses. Avoid plastic covers, because they tend to tear quickly and do not \"breathe. \" Wash according to the instructions. ¨ Remove extra blankets and pillows that you don't need. ¨ Use blankets that are machine-washable. ¨ Don't use home humidifiers. They can help mites live longer. · Use air-conditioning. Change or clean all filters every month. Keep windows closed. Use high-efficiency air filters. Don't use window or attic fans, which draw dust into the air. · If you're allergic to pet dander, keep pets outside or, at the very least, out of your bedroom. Old carpet and cloth-covered furniture can hold a lot of animal dander. You may need to replace them. · Look for signs of cockroaches. Use cockroach baits to get rid of them. Then clean your home well. · If you're allergic to mold, don't keep indoor plants, because molds can grow in soil.  Get rid of furniture, rugs, and drapes that smell musty. Check for mold in the bathroom. · If you're allergic to pollen, stay inside when pollen counts are high. · Don't smoke or let anyone else smoke in your house. Don't use fireplaces or wood-burning stoves. Avoid paint fumes, perfumes, and other strong odors. When should you call for help? Give an epinephrine shot if:  · You think you are having a severe allergic reaction. · You have symptoms in more than one body area, such as mild nausea and an itchy mouth. After giving an epinephrine shot call 911, even if you feel better. Call 911 if:  · You have symptoms of a severe allergic reaction. These may include:  ¨ Sudden raised, red areas (hives) all over your body. ¨ Swelling of the throat, mouth, lips, or tongue. ¨ Trouble breathing. ¨ Passing out (losing consciousness). Or you may feel very lightheaded or suddenly feel weak, confused, or restless. · You have been given an epinephrine shot, even if you feel better. Call your doctor now or seek immediate medical care if:  · You have symptoms of an allergic reaction, such as:  ¨ A rash or hives (raised, red areas on the skin). ¨ Itching. ¨ Swelling. ¨ Belly pain, nausea, or vomiting. Watch closely for changes in your health, and be sure to contact your doctor if:  · Your allergies get worse. · You need help controlling your allergies. · You have questions about allergy testing. · You do not get better as expected. Where can you learn more? Go to http://stacey-marie.info/. Enter L249 in the search box to learn more about \"Managing Your Allergies: Care Instructions. \"  Current as of: February 12, 2016  Content Version: 11.2  © 1283-6531 [employer]. Care instructions adapted under license by [employer] (which disclaims liability or warranty for this information).  If you have questions about a medical condition or this instruction, always ask your healthcare professional. [address] any warranty or liability for your use of this information.

## 2017-05-08 NOTE — PROGRESS NOTES
HISTORY OF PRESENT ILLNESS  Cherylene Napoleon is a 80 y.o. female here with C/O  Cough and nasal congestion for 1 month. cough is thick. she is not able to bring up. Have hoarse voice in morning. Finished up prednisone and using flonase sometimes. Has diabetes. on meds. taking regularly. Not able to take actos. has HTN,BP is stakle. complaint with med. Labs reviewed. Follow-up     Allergic Rhinitis     Cough     Allergies         Review of Systems   Constitutional: Negative. HENT: Positive for congestion. Eyes: Negative. Respiratory: Positive for cough and sputum production. Cardiovascular: Negative. Gastrointestinal: Positive for heartburn. Genitourinary: Negative. Musculoskeletal: Negative. Skin: Negative. Neurological: Negative. Endo/Heme/Allergies: Negative. Psychiatric/Behavioral: Negative. Physical Exam   Constitutional: She appears well-developed and well-nourished. No distress. HENT:   Head: Normocephalic and atraumatic. Right Ear: External ear normal.   Left Ear: External ear normal.   Mouth/Throat: Oropharynx is clear and moist. No oropharyngeal exudate. Nasal turbinates:red inflamed,NT    Cobble stoning present. Neck: Normal range of motion. Neck supple. No JVD present. No thyromegaly present. Cardiovascular: Normal rate, regular rhythm, normal heart sounds and intact distal pulses. Pulmonary/Chest: Effort normal and breath sounds normal. No respiratory distress. She has no wheezes. Musculoskeletal:   Neck:mild paravertebral facet tenderness. ROm OK   Psychiatric: She has a normal mood and affect. Her behavior is normal.       ASSESSMENT and PLAN    Micheline Roe was seen today for follow-up and allergic rhinitis. Diagnoses and all orders for this visit:    Allergic rhinitis due to pollen, unspecified rhinitis seasonality    Will call in,  -     fexofenadine (ALLEGRA) 180 mg tablet; Take 1 Tab by mouth daily as needed for Allergies.     On flonase. Adv to drink warm water. Essential hypertension with goal blood pressure less than 140/90    On multiple Bp emds. stable BP. CMP was OK. Controlled type 2 diabetes mellitus without complication, unspecified long term insulin use status (HCC)    A1C was stable 7. 2.but she mention she is not able to take actos. Adv to watch carb. Gastroesophageal reflux disease without esophagitis    On pepcid every day,. She takes PPi AS needed. Discussed expected course/resolution/complications of diagnosis in detail with patient. Medication risks/benefits/costs/interactions/alternatives discussed with patient. Pt was given an after visit summary which includes diagnoses, current medications & vitals. Pt expressed understanding with the diagnosis and plan.

## 2017-06-02 ENCOUNTER — OFFICE VISIT (OUTPATIENT)
Dept: CARDIOLOGY CLINIC | Age: 82
End: 2017-06-02

## 2017-06-02 ENCOUNTER — TELEPHONE (OUTPATIENT)
Dept: INTERNAL MEDICINE CLINIC | Age: 82
End: 2017-06-02

## 2017-06-02 VITALS
HEIGHT: 57 IN | RESPIRATION RATE: 16 BRPM | BODY MASS INDEX: 35.47 KG/M2 | SYSTOLIC BLOOD PRESSURE: 120 MMHG | WEIGHT: 164.4 LBS | HEART RATE: 60 BPM | DIASTOLIC BLOOD PRESSURE: 64 MMHG

## 2017-06-02 DIAGNOSIS — E11.9 CONTROLLED TYPE 2 DIABETES MELLITUS WITHOUT COMPLICATION, UNSPECIFIED LONG TERM INSULIN USE STATUS: ICD-10-CM

## 2017-06-02 DIAGNOSIS — R09.81 CHRONIC NASAL CONGESTION: ICD-10-CM

## 2017-06-02 DIAGNOSIS — E78.2 MIXED HYPERLIPIDEMIA: ICD-10-CM

## 2017-06-02 DIAGNOSIS — I10 ESSENTIAL HYPERTENSION WITH GOAL BLOOD PRESSURE LESS THAN 140/90: Primary | ICD-10-CM

## 2017-06-02 NOTE — TELEPHONE ENCOUNTER
Call placed to pt, made pt aware that Perla Cifuentes had already left for the day and offered to help pt if I could.  Patient had just wanted to ask what multivit was recommended for someone her age, writer advised that Centrum silver was a good choice and pt voiced understanding and appreciation

## 2017-06-02 NOTE — MR AVS SNAPSHOT
Visit Information Date & Time Provider Department Dept. Phone Encounter #  
 6/2/2017  8:40 AM Gilma Zheng MD [facility] [phone] [id number] Your Appointments 6/5/2017  8:30 AM  
ROUTINE CARE with Thuy Tian MD  
[facility] Internal Medicine [address]) Appt Note: 4 month follow up [address] [facility] [address] [place] [address] [facility] [address]  
[phone]  
  
   
 [address] [facility] [address] Upcoming Health Maintenance Date Due DTaP/Tdap/Td series (1 - Tdap) 3/1/1955 FOOT EXAM Q1 11/12/2016 HEMOGLOBIN A1C Q6M 8/1/2017 INFLUENZA AGE 9 TO ADULT 8/1/2017 MICROALBUMIN Q1 9/16/2017 LIPID PANEL Q1 9/16/2017 MEDICARE YEARLY EXAM 4/18/2018 EYE EXAM RETINAL OR DILATED Q1 4/25/2018 GLAUCOMA SCREENING Q2Y 4/25/2019 Allergies as of 6/2/2017  Review Complete On: 6/2/2017 By: Phyllis Mireles Severity Noted Reaction Type Reactions Cortisone  05/11/2011    Other (comments) \"Make me feels weak, like I'm going to die\" Januvia [Sitagliptin]  11/09/2016    Other (comments)  
 weakness Current Immunizations  Reviewed on 9/14/2016 Name Date Influenza High Dose Vaccine PF 10/28/2014 Influenza Vaccine 9/14/2016, 10/19/2013 Pneumococcal Conjugate (PCV-13) 9/14/2016 Pneumococcal Vaccine (Unspecified Type) 10/1/2008 Not reviewed this visit Vitals BP Pulse Resp Height(growth percentile) Weight(growth percentile) BMI  
 120/64 (BP 1 Location: Left arm, BP Patient Position: Sitting) 60 16 4' 9\" (1.448 m) 164 lb 6.4 oz (74.6 kg) 35.58 kg/m2 OB Status Smoking Status Hysterectomy Never Smoker Vitals History BMI and BSA Data Body Mass Index Body Surface Area 35.58 kg/m 2 1.73 m 2 Preferred Pharmacy Pharmacy Name Phone [facility] [phone] Your Updated Medication List  
 This list is accurate as of: 6/2/17  9:06 AM.  Always use your most recent med list.  
  
  
  
  
 ALPRAZolam 0.5 mg tablet Commonly known as:  Fernanda Eric Take 1 Tab by mouth nightly as needed for Anxiety. Max Daily Amount: 0.5 mg.  
  
 aspirin 81 mg chewable tablet Take 81 mg by mouth daily. atorvastatin 20 mg tablet Commonly known as:  LIPITOR Take 1 Tab by mouth daily. cloNIDine HCl 0.3 mg tablet Commonly known as:  CATAPRES Take 1 Tab by mouth three (3) times daily. dilTIAZem  mg ER capsule Commonly known as:  CARDIZEM CD  
TAKE ONE CAPSULE BY MOUTH EVERY DAY  
  
 famotidine 20 mg tablet Commonly known as:  PEPCID Take 1 Tab by mouth nightly. fexofenadine 180 mg tablet Commonly known as:  Marene Peterson Take 1 Tab by mouth daily as needed for Allergies. fluticasone 50 mcg/actuation nasal spray Commonly known as:  Marilyn Frame 2 Sprays by Both Nostrils route daily. furosemide 40 mg tablet Commonly known as:  LASIX TAKE ONE TABLET BY MOUTH EVERY DAY  
  
 glipiZIDE 5 mg tablet Commonly known as:  Corean Shingles Take 1 Tab by mouth two (2) times a day. guaiFENesin  mg ER tablet Commonly known as:  Boom & Boom Take 1 Tab by mouth two (2) times daily as needed for Congestion. latanoprost 0.005 % ophthalmic solution Commonly known as:  XALATAN  
  
 losartan 100 mg tablet Commonly known as:  COZAAR  
TAKE ONE TABLET BY MOUTH EVERY DAY  
  
 metFORMIN  mg tablet Commonly known as:  GLUCOPHAGE XR  
TAKE TWO TABLETS BY MOUTH EVERY DAY WITH DINNER  
  
 metoprolol tartrate 100 mg IR tablet Commonly known as:  LOPRESSOR Take 1.5 Tabs by mouth two (2) times a day. omeprazole 40 mg capsule Commonly known as:  PRILOSEC  
TAKE ONE CAPSULE BY MOUTH EVERY DAY BEFORE BREAKFAST  
  
 pioglitazone 15 mg tablet Commonly known as:  ACTOS Take 1 Tab by mouth nightly. potassium chloride 10 mEq tablet Commonly known as:  KLOR-CON Take 2 Tabs by mouth daily. TIMOPTIC-XE 0.5 % ophthalmic gel-forming Generic drug:  timolol TYLENOL 325 mg tablet Generic drug:  acetaminophen Take  by mouth every four (4) hours as needed for Pain. VITAMIN D3 1,000 unit Cap Generic drug:  cholecalciferol Take 1,000 Units by mouth daily. Introducing [facility]! Dear Carlie Nguyễn: Thank you for requesting a [employer] account. Our records indicate that you have previously registered for a [employer] account but its currently inactive. Please call our [employer] support line at [phone]. Additional Information If you have questions, please visit the Frequently Asked Questions section of the [employer] website at [email or web address] Remember, [employer] is NOT to be used for urgent needs. For medical emergencies, dial 911. Now available from your iPhone and Android! Please provide this summary of care documentation to your next provider. Your primary care clinician is listed as Se Weber. If you have any questions after today's visit, please call [phone].

## 2017-06-02 NOTE — TELEPHONE ENCOUNTER
Pt called and has requested a call back from [place] only. Pt would not tell me what questions she had.

## 2017-06-02 NOTE — PROGRESS NOTES
[facility] [address]: Sydnee Griffin  [phone]    HPI:  Ms. Cristiana Denson is a 81 yo F with a h/o HTN (previously on nadolol), hyperlipidemia, DM2 seen in the past for palpitations. Found on holter to have benign ectopy and started on beta blocker for symptoms. Previously off higher dose HCTZ due to low Na+. BP has been resistant; BP had improved on norvasc but had worsened LE edema. U/S 11/2012 for DVT was negative. Echo 8/2012 noted normal LV systolic function with mild to moderate MR; echo 2/17/14 unchanged. Seen by vascular in past for LE edema and c/w vascular insufficiency and recommended leg elevation and compression stockings. Echo 4/2015 was normal with EF 60% and mild MR. Since her last visit, she overall has been doing well from a cardiac standpoint with no exertional chest pain. Her breathing has been stable. She has been compliant with her medications. She did have a URI a few weeks ago and she had congestion she thinks for the last few months. She was recently started on Allegra, but has not been taking it regularly and does admit to nasal drainage and itchy eyes. She is compensated on exam with clear lungs and normotensive with a blood pressure of 120/64 with a heart rate of 60. Assessment and Plan:   1. Resistant hypertension. Blood pressure has been well controlled with a history of white coat hypertension and no changes made. Will have her follow back in six months. 2. Mitral regurgitation. Mild in the past and echocardiograms from here on an as needed basis. 3. Cough and nasal drainage. Likely allergies and she is going to try to take the recently prescribed Allegra more regularly. 4. Venous insufficiency. 5. Type 2 diabetes. .She  has a past medical history of Adverse effect of anesthesia; Chronic bronchitis; Diabetes ([address] Essential hypertension; Hypercholesterolemia; and Hypertension. All other systems negative except as above.      PE  Vitals: 06/02/17 0848   BP: 120/64   Pulse: 60   Resp: 16   Weight: 164 lb 6.4 oz (74.6 kg)   Height: 4' 9\" (1.448 m)    Body mass index is 35.58 kg/(m^2). General appearance - alert, well appearing, and in no distress  Mental status - affect appropriate to mood, pleasant   Neck - supple, no JVD, no bruits   Chest - clear to auscultation, no wheezes, rales or rhonchi  Heart - normal rate, regular rhythm, normal S1, S2, I-II/VI systolic murmur LUSB, rubs, clicks or gallops  Abdomen - soft, nontender, nondistended  Extremities - peripheral pulses normal, trace pedal edema bilateral lower extremities.      Recent Labs:  Lab Results   Component Value Date/Time    Cholesterol, total 124 09/16/2016 08:09 AM    HDL Cholesterol 41 09/16/2016 08:09 AM    LDL, calculated 53 09/16/2016 08:09 AM    Triglyceride 149 09/16/2016 08:09 AM    CHOL/HDL Ratio 3.3 08/21/2012 04:10 AM     Lab Results   Component Value Date/Time    Creatinine 0.82 03/08/2017 10:03 AM     Lab Results   Component Value Date/Time    BUN 15 03/08/2017 10:03 AM     Lab Results   Component Value Date/Time    Potassium 4.2 03/08/2017 10:03 AM     Lab Results   Component Value Date/Time    Hemoglobin A1c 7.2 02/01/2017 07:56 AM     Lab Results   Component Value Date/Time    HGB 12.3 03/08/2017 10:03 AM     Lab Results   Component Value Date/Time    PLATELET 630 59/91/3948 10:03 AM       Reviewed:  Past Medical History:   Diagnosis Date    Adverse effect of anesthesia     pt states she was able to feel everything during colonoscopy    Chronic bronchitis     Diabetes ([address]     Essential hypertension     Hypercholesterolemia     Hypertension      History   Smoking Status    Never Smoker   Smokeless Tobacco    Never Used     History   Alcohol Use No     Allergies   Allergen Reactions    Cortisone Other (comments)     \"Make me feels weak, like I'm going to die\"      Januvia [Sitagliptin] Other (comments)     weakness       Current Outpatient Prescriptions Medication Sig    latanoprost (XALATAN) 0.005 % ophthalmic solution     TIMOPTIC-XE 0.5 % ophthalmic gel-forming     fexofenadine (ALLEGRA) 180 mg tablet Take 1 Tab by mouth daily as needed for Allergies.  losartan (COZAAR) 100 mg tablet TAKE ONE TABLET BY MOUTH EVERY DAY    omeprazole (PRILOSEC) 40 mg capsule TAKE ONE CAPSULE BY MOUTH EVERY DAY BEFORE BREAKFAST    ALPRAZolam (XANAX) 0.5 mg tablet Take 1 Tab by mouth nightly as needed for Anxiety. Max Daily Amount: 0.5 mg.    guaiFENesin ER (MUCINEX) 600 mg ER tablet Take 1 Tab by mouth two (2) times daily as needed for Congestion.  fluticasone (FLONASE) 50 mcg/actuation nasal spray 2 Sprays by Both Nostrils route daily.  cloNIDine HCl (CATAPRES) 0.3 mg tablet Take 1 Tab by mouth three (3) times daily.  famotidine (PEPCID) 20 mg tablet Take 1 Tab by mouth nightly.  pioglitazone (ACTOS) 15 mg tablet Take 1 Tab by mouth nightly.  atorvastatin (LIPITOR) 20 mg tablet Take 1 Tab by mouth daily.  furosemide (LASIX) 40 mg tablet TAKE ONE TABLET BY MOUTH EVERY DAY    metoprolol tartrate (LOPRESSOR) 100 mg IR tablet Take 1.5 Tabs by mouth two (2) times a day.  potassium chloride (K-DUR, KLOR-CON) 10 mEq tablet Take 2 Tabs by mouth daily.  diltiazem CD (CARDIZEM CD) 180 mg ER capsule TAKE ONE CAPSULE BY MOUTH EVERY DAY    metFORMIN ER (GLUCOPHAGE XR) 500 mg tablet TAKE TWO TABLETS BY MOUTH EVERY DAY WITH DINNER    glipiZIDE (GLUCOTROL) 5 mg tablet Take 1 Tab by mouth two (2) times a day.  acetaminophen (TYLENOL) 325 mg tablet Take  by mouth every four (4) hours as needed for Pain.  Cholecalciferol, Vitamin D3, (VITAMIN D3) 1,000 unit cap Take 1,000 Units by mouth daily.  aspirin 81 mg chewable tablet Take 81 mg by mouth daily. No current facility-administered medications for this visit.         Dionne Joseph MD  [facility] [place]  [address] [facility] [address]  [address]

## 2017-06-05 ENCOUNTER — HOSPITAL ENCOUNTER (OUTPATIENT)
Dept: LAB | Age: 82
Discharge: HOME | End: 2017-06-05
Payer: MEDICARE

## 2017-06-05 ENCOUNTER — OFFICE VISIT (OUTPATIENT)
Dept: INTERNAL MEDICINE CLINIC | Age: 82
End: 2017-06-05

## 2017-06-05 VITALS
SYSTOLIC BLOOD PRESSURE: 117 MMHG | TEMPERATURE: 97.1 F | DIASTOLIC BLOOD PRESSURE: 67 MMHG | WEIGHT: 162.2 LBS | HEIGHT: 57 IN | BODY MASS INDEX: 34.99 KG/M2 | HEART RATE: 64 BPM | OXYGEN SATURATION: 99 % | RESPIRATION RATE: 20 BRPM

## 2017-06-05 DIAGNOSIS — E11.9 CONTROLLED TYPE 2 DIABETES MELLITUS WITHOUT COMPLICATION, UNSPECIFIED LONG TERM INSULIN USE STATUS: ICD-10-CM

## 2017-06-05 DIAGNOSIS — R53.83 FATIGUE, UNSPECIFIED TYPE: Primary | ICD-10-CM

## 2017-06-05 DIAGNOSIS — I10 ESSENTIAL HYPERTENSION WITH GOAL BLOOD PRESSURE LESS THAN 140/90: ICD-10-CM

## 2017-06-05 PROCEDURE — 85027 COMPLETE CBC AUTOMATED: CPT

## 2017-06-05 PROCEDURE — 84443 ASSAY THYROID STIM HORMONE: CPT

## 2017-06-05 PROCEDURE — 36415 COLL VENOUS BLD VENIPUNCTURE: CPT

## 2017-06-05 PROCEDURE — 83036 HEMOGLOBIN GLYCOSYLATED A1C: CPT

## 2017-06-05 PROCEDURE — 80053 COMPREHEN METABOLIC PANEL: CPT

## 2017-06-05 NOTE — MR AVS SNAPSHOT
Visit Information Date & Time Provider Department Dept. Phone Encounter #  
 6/5/2017  8:30 AM Maynor Cornelius, [address] [facility] [phone] [id number] Your Appointments 12/1/2017  8:40 AM  
ESTABLISHED PATIENT with Amy Shipley MD  
[facility] ([facility]) Appt Note: 6 month follow up  
 [phone]  
[phone] Upcoming Health Maintenance Date Due DTaP/Tdap/Td series (1 - Tdap) 3/1/1955 FOOT EXAM Q1 11/12/2016 HEMOGLOBIN A1C Q6M 8/1/2017 INFLUENZA AGE 9 TO ADULT 8/1/2017 MICROALBUMIN Q1 9/16/2017 LIPID PANEL Q1 9/16/2017 MEDICARE YEARLY EXAM 4/18/2018 EYE EXAM RETINAL OR DILATED Q1 4/25/2018 GLAUCOMA SCREENING Q2Y 4/25/2019 Allergies as of 6/5/2017  Review Complete On: 6/5/2017 By: Maynor Cornelius MD  
  
 Severity Noted Reaction Type Reactions Cortisone  05/11/2011    Other (comments) \"Make me feels weak, like I'm going to die\" Januvia [Sitagliptin]  11/09/2016    Other (comments)  
 weakness Current Immunizations  Reviewed on 9/14/2016 Name Date Influenza High Dose Vaccine PF 10/28/2014 Influenza Vaccine 9/14/2016, 10/19/2013 Pneumococcal Conjugate (PCV-13) 9/14/2016 Pneumococcal Vaccine (Unspecified Type) 10/1/2008 Not reviewed this visit You Were Diagnosed With   
  
 Codes Comments Fatigue, unspecified type    -  Primary ICD-10-CM: R53.83 ICD-9-CM: 780.79 Essential hypertension with goal blood pressure less than 140/90     ICD-10-CM: I10 
ICD-9-CM: 401.9 Controlled type 2 diabetes mellitus without complication, unspecified long term insulin use status ([facility]     ICD-10-CM: E11.9 ICD-9-CM: 250.00 Vitals  BP Pulse Temp Resp Height(growth percentile) Weight(growth percentile)  
 117/67 (BP 1 Location: Left arm, BP Patient Position: Sitting) 64 97.1 °F (36.2 °C) (Oral) 20 4' 9\" (1.448 m) 162 lb 3.2 oz (73.6 kg) SpO2 BMI OB Status Smoking Status 99% 35.1 kg/m2 Hysterectomy Never Smoker Vitals History BMI and BSA Data Body Mass Index Body Surface Area  
 35.1 kg/m 2 1.72 m 2 Preferred Pharmacy Pharmacy Name Phone [facility] [phone] Your Updated Medication List  
  
   
This list is accurate as of: 6/5/17  8:40 AM.  Always use your most recent med list.  
  
  
  
  
 ALPRAZolam 0.5 mg tablet Commonly known as:  [place] Take 1 Tab by mouth nightly as needed for Anxiety. Max Daily Amount: 0.5 mg.  
  
 aspirin 81 mg chewable tablet Take 81 mg by mouth daily. * atorvastatin 20 mg tablet Commonly known as:  LIPITOR Take 1 Tab by mouth daily. * atorvastatin 10 mg tablet Commonly known as:  LIPITOR  
TAKE ONE TABLET BY MOUTH DAILY  
  
 cloNIDine HCl 0.3 mg tablet Commonly known as:  CATAPRES Take 1 Tab by mouth three (3) times daily. dilTIAZem  mg ER capsule Commonly known as:  CARDIZEM CD  
TAKE ONE CAPSULE BY MOUTH EVERY DAY  
  
 famotidine 20 mg tablet Commonly known as:  PEPCID Take 1 Tab by mouth nightly. fexofenadine 180 mg tablet Commonly known as:  Shruti Nielson Take 1 Tab by mouth daily as needed for Allergies. fluticasone 50 mcg/actuation nasal spray Commonly known as:  Lindsay Carlos 2 Sprays by Both Nostrils route daily. furosemide 40 mg tablet Commonly known as:  LASIX TAKE ONE TABLET BY MOUTH EVERY DAY  
  
 glipiZIDE 5 mg tablet Commonly known as:  Gatito Sauger Take 1 Tab by mouth two (2) times a day. guaiFENesin  mg ER tablet Commonly known as:  [employer] [place] Take 1 Tab by mouth two (2) times daily as needed for Congestion. latanoprost 0.005 % ophthalmic solution Commonly known as:  XALATAN  
  
 losartan 100 mg tablet Commonly known as:  COZAAR  
 TAKE ONE TABLET BY MOUTH EVERY DAY  
  
 metFORMIN  mg tablet Commonly known as:  GLUCOPHAGE XR  
TAKE TWO TABLETS BY MOUTH EVERY DAY WITH DINNER  
  
 metoprolol tartrate 100 mg IR tablet Commonly known as:  LOPRESSOR Take 1.5 Tabs by mouth two (2) times a day. omeprazole 40 mg capsule Commonly known as:  PRILOSEC  
TAKE ONE CAPSULE BY MOUTH EVERY DAY BEFORE BREAKFAST  
  
 pioglitazone 15 mg tablet Commonly known as:  ACTOS Take 1 Tab by mouth nightly. potassium chloride 10 mEq tablet Commonly known as:  KLOR-CON Take 2 Tabs by mouth daily. TIMOPTIC-XE 0.5 % ophthalmic gel-forming Generic drug:  timolol TYLENOL 325 mg tablet Generic drug:  acetaminophen Take  by mouth every four (4) hours as needed for Pain. VITAMIN D3 1,000 unit Cap Generic drug:  cholecalciferol Take 1,000 Units by mouth daily. * Notice: This list has 2 medication(s) that are the same as other medications prescribed for you. Read the directions carefully, and ask your doctor or other care provider to review them with you. We Performed the Following CBC W/O DIFF [51301 CPT(R)] HEMOGLOBIN A1C WITH EAG [52338 CPT(R)] METABOLIC PANEL, COMPREHENSIVE [85092 CPT(R)] TSH 3RD GENERATION [37220 CPT(R)] Introducing [facility]! Dear Dontrell Storey: Thank you for requesting a [employer] account. Our records indicate that you have previously registered for a [employer] account but its currently inactive. Please call our [employer] support line at [phone]. Additional Information If you have questions, please visit the Frequently Asked Questions section of the [employer] website at [email or web address] Remember, [employer] is NOT to be used for urgent needs. For medical emergencies, dial 911. Now available from your iPhone and Android! Please provide this summary of care documentation to your next provider. Your primary care clinician is listed as Se Weber. If you have any questions after today's visit, please call [phone].

## 2017-06-05 NOTE — PROGRESS NOTES
HISTORY OF PRESENT ILLNESS  Arielle Magallanes is a 80 y.o. female here with C/O  Fatigue lately. not eating enough. No chest pain or SOB. Started taking centrum silver but her leg cramps. adv her to take half tablet a day. Have hoarse voice in morning. Finished up prednisone and using flonase sometimes. Has diabetes. on meds. taking regularly. off [place] and [place] and actos. has HTN,BP is stakle. complaint with med. Labs reviewed. eye check up is up to date. Hypertension      Cholesterol Problem     Diabetes     Hoarse    Associated symptoms include congestion and cough. Follow-up         Review of Systems   Constitutional: Negative. HENT: Positive for congestion and hoarse voice. Eyes: Negative. Respiratory: Positive for cough and sputum production. Cardiovascular: Negative. Gastrointestinal: Positive for heartburn. Genitourinary: Negative. Musculoskeletal: Negative. Skin: Negative. Neurological: Negative. Endo/Heme/Allergies: Negative. Psychiatric/Behavioral: Negative. Physical Exam   Constitutional: She appears well-developed and well-nourished. No distress. HENT:   Head: Normocephalic and atraumatic. Right Ear: External ear normal.   Left Ear: External ear normal.   Mouth/Throat: Oropharynx is clear and moist. No oropharyngeal exudate. Nasal turbinates:red inflamed,NT    Cobble stoning present. Neck: Normal range of motion. Neck supple. No JVD present. No thyromegaly present. Cardiovascular: Normal rate, regular rhythm, normal heart sounds and intact distal pulses. Pulmonary/Chest: Effort normal and breath sounds normal. No respiratory distress. She has no wheezes. Musculoskeletal:   Neck:mild paravertebral facet tenderness. ROm OK   Psychiatric: She has a normal mood and affect. Her behavior is normal.       HI and Cyndie Newman was seen today for hypertension, cholesterol problem, diabetes and hoarse.     Diagnoses and all orders for this visit:    Fatigue, unspecified type    Will do,  -     METABOLIC PANEL, COMPREHENSIVE  -     CBC W/O DIFF  -     TSH 3RD GENERATION    Essential hypertension with goal blood pressure less than 140/90    Stable. on multiple meds. Will do,  -     METABOLIC PANEL, COMPREHENSIVE    Controlled type 2 diabetes mellitus without complication, unspecified long term insulin use status (HCC)    Stable. will do,  -     METABOLIC PANEL, COMPREHENSIVE  -     HEMOGLOBIN A1C WITH EAG      Discussed expected course/resolution/complications of diagnosis in detail with patient. Medication risks/benefits/costs/interactions/alternatives discussed with patient. Pt was given an after visit summary which includes diagnoses, current medications & vitals. Pt expressed understanding with the diagnosis and plan.

## 2017-06-05 NOTE — PROGRESS NOTES
Health Maintenance Due   Topic Date Due    DTaP/Tdap/Td series (1 - Tdap) 03/01/1955    FOOT EXAM Q1  11/12/2016       Chief Complaint   Patient presents with    Hypertension     4 month follow up, states energy level is decreased lately    Cholesterol Problem    Diabetes    Hoarse     states every evening she gets build up of clear mucous in her throat       1. Have you been to the ER, urgent care clinic since your last visit? Hospitalized since your last visit? No    2. Have you seen or consulted any other health care providers outside of the [address] [place] [address] since your last visit? Include any pap smears or colon screening. No    3) Do you have an Advance Directive on file? no    4) Are you interested in receiving information on Advance Directives? NO      Patient is accompanied by self I have received verbal consent from Camron Fortune to discuss any/all medical information while they are present in the room.

## 2017-06-05 NOTE — LETTER
6/7/2017 3:23 PM 
 
Ms. Arielle Rivera [place] [phone] Dear Arielle Magallanes: Please find your most recent results below. Resulted Orders METABOLIC PANEL, COMPREHENSIVE Result Value Ref Range Glucose 152 (H) 65 - 99 mg/dL BUN 14 8 - 27 mg/dL Creatinine 0.79 0.57 - 1.00 mg/dL GFR est non-AA 69 >59 mL/min/1.73 GFR est AA 80 >59 mL/min/1.73  
 BUN/Creatinine ratio 18 12 - 28 Sodium 140 134 - 144 mmol/L Potassium 4.3 3.5 - 5.2 mmol/L Chloride 99 96 - 106 mmol/L  
 CO2 26 18 - 29 mmol/L Calcium 8.8 8.7 - 10.3 mg/dL Protein, total 6.8 6.0 - 8.5 g/dL Albumin 4.2 3.5 - 4.7 g/dL GLOBULIN, TOTAL 2.6 1.5 - 4.5 g/dL A-G Ratio 1.6 1.2 - 2.2 Bilirubin, total 0.4 0.0 - 1.2 mg/dL Alk. phosphatase 80 39 - 117 IU/L  
 AST (SGOT) 19 0 - 40 IU/L  
 ALT (SGPT) 17 0 - 32 IU/L Narrative Performed at:  [address]  [id number] : Mehran Flores MD, Phone:  [phone] CBC W/O DIFF Result Value Ref Range WBC 3.9 3.4 - 10.8 x10E3/uL  
 RBC 4.32 3.77 - 5.28 x10E6/uL HGB 12.2 11.1 - 15.9 g/dL HCT 37.4 34.0 - 46.6 % MCV 87 79 - 97 fL  
 MCH 28.2 26.6 - 33.0 pg  
 MCHC 32.6 31.5 - 35.7 g/dL  
 RDW 14.5 12.3 - 15.4 % PLATELET 316 353 - 140 x10E3/uL Narrative Performed at:  [address]  [id number] : Mehran Flores MD, Phone:  [phone] TSH 3RD GENERATION Result Value Ref Range TSH 3.530 0.450 - 4.500 uIU/mL Narrative Performed at:  [address]  [id number] : Mehran Flores MD, Phone:  [phone] HEMOGLOBIN A1C WITH EAG Result Value Ref Range Hemoglobin A1c 7.2 (H) 4.8 - 5.6 % Comment:  
            Pre-diabetes: 5.7 - 6.4 Diabetes: >6.4 Glycemic control for adults with diabetes: <7.0 Estimated average glucose 160 mg/dL Narrative Performed at:  [address]  [id number] : Mehran Flores MD, Phone:  [phone] RECOMMENDATIONS: 
None. Keep up the good work! Please call me if you have any questions: [phone] Sincerely, Nohemy Finley MD

## 2017-06-06 LAB
ALBUMIN SERPL-MCNC: 4.2 G/DL (ref 3.5–4.7)
ALBUMIN/GLOB SERPL: 1.6 {RATIO} (ref 1.2–2.2)
ALP SERPL-CCNC: 80 IU/L (ref 39–117)
ALT SERPL-CCNC: 17 IU/L (ref 0–32)
AST SERPL-CCNC: 19 IU/L (ref 0–40)
BILIRUB SERPL-MCNC: 0.4 MG/DL (ref 0–1.2)
BUN SERPL-MCNC: 14 MG/DL (ref 8–27)
BUN/CREAT SERPL: 18 (ref 12–28)
CALCIUM SERPL-MCNC: 8.8 MG/DL (ref 8.7–10.3)
CHLORIDE SERPL-SCNC: 99 MMOL/L (ref 96–106)
CO2 SERPL-SCNC: 26 MMOL/L (ref 18–29)
CREAT SERPL-MCNC: 0.79 MG/DL (ref 0.57–1)
ERYTHROCYTE [DISTWIDTH] IN BLOOD BY AUTOMATED COUNT: 14.5 % (ref 12.3–15.4)
EST. AVERAGE GLUCOSE BLD GHB EST-MCNC: 160 MG/DL
GLOBULIN SER CALC-MCNC: 2.6 G/DL (ref 1.5–4.5)
GLUCOSE SERPL-MCNC: 152 MG/DL (ref 65–99)
HBA1C MFR BLD: 7.2 % (ref 4.8–5.6)
HCT VFR BLD AUTO: 37.4 % (ref 34–46.6)
HGB BLD-MCNC: 12.2 G/DL (ref 11.1–15.9)
MCH RBC QN AUTO: 28.2 PG (ref 26.6–33)
MCHC RBC AUTO-ENTMCNC: 32.6 G/DL (ref 31.5–35.7)
MCV RBC AUTO: 87 FL (ref 79–97)
PLATELET # BLD AUTO: 163 X10E3/UL (ref 150–379)
POTASSIUM SERPL-SCNC: 4.3 MMOL/L (ref 3.5–5.2)
PROT SERPL-MCNC: 6.8 G/DL (ref 6–8.5)
RBC # BLD AUTO: 4.32 X10E6/UL (ref 3.77–5.28)
SODIUM SERPL-SCNC: 140 MMOL/L (ref 134–144)
TSH SERPL DL<=0.005 MIU/L-ACNC: 3.53 UIU/ML (ref 0.45–4.5)
WBC # BLD AUTO: 3.9 X10E3/UL (ref 3.4–10.8)

## 2017-06-27 ENCOUNTER — TELEPHONE (OUTPATIENT)
Dept: INTERNAL MEDICINE CLINIC | Age: 82
End: 2017-06-27

## 2017-06-28 ENCOUNTER — OFFICE VISIT (OUTPATIENT)
Dept: INTERNAL MEDICINE CLINIC | Age: 82
End: 2017-06-28

## 2017-06-28 VITALS
BODY MASS INDEX: 34.91 KG/M2 | TEMPERATURE: 97.6 F | OXYGEN SATURATION: 95 % | HEART RATE: 69 BPM | WEIGHT: 161.8 LBS | HEIGHT: 57 IN | DIASTOLIC BLOOD PRESSURE: 76 MMHG | SYSTOLIC BLOOD PRESSURE: 135 MMHG

## 2017-06-28 DIAGNOSIS — I87.2 VENOUS INSUFFICIENCY: ICD-10-CM

## 2017-06-28 DIAGNOSIS — R05.9 COUGH: ICD-10-CM

## 2017-06-28 DIAGNOSIS — R60.0 PEDAL EDEMA: Primary | ICD-10-CM

## 2017-06-28 DIAGNOSIS — J30.9 ALLERGIC RHINITIS, UNSPECIFIED ALLERGIC RHINITIS TRIGGER, UNSPECIFIED RHINITIS SEASONALITY: ICD-10-CM

## 2017-06-28 RX ORDER — GUAIFENESIN 600 MG/1
600 TABLET, EXTENDED RELEASE ORAL
Qty: 20 TAB | Refills: 0 | Status: SHIPPED | OUTPATIENT
Start: 2017-06-28 | End: 2017-09-06

## 2017-06-28 RX ORDER — FLUTICASONE PROPIONATE 50 MCG
2 SPRAY, SUSPENSION (ML) NASAL DAILY
Qty: 1 BOTTLE | Refills: 0 | Status: SHIPPED | OUTPATIENT
Start: 2017-06-28 | End: 2020-02-12 | Stop reason: ALTCHOICE

## 2017-06-28 NOTE — PATIENT INSTRUCTIONS
Leg and Ankle Edema: Care Instructions  Your Care Instructions  Swelling in the legs, ankles, and feet is called edema. It is common after you sit or stand for a while. Long plane flights or car rides often cause swelling in the legs and feet. You may also have swelling if you have to stand for long periods of time at your job. Problems with the veins in the legs (varicose veins) and changes in hormones can also cause swelling. Sometimes the swelling in the ankles and feet is caused by a more serious problem, such as heart failure, infection, blood clots, or liver or kidney disease. Follow-up care is a key part of your treatment and safety. Be sure to make and go to all appointments, and call your doctor if you are having problems. Its also a good idea to know your test results and keep a list of the medicines you take. How can you care for yourself at home? · If your doctor gave you medicine, take it as prescribed. Call your doctor if you think you are having a problem with your medicine. · Whenever you are resting, raise your legs up. Try to keep the swollen area higher than the level of your heart. · Take breaks from standing or sitting in one position. ¨ Walk around to increase the blood flow in your lower legs. ¨ Move your feet and ankles often while you stand, or tighten and relax your leg muscles. · Wear support stockings. Put them on in the morning, before swelling gets worse. · Eat a balanced diet. Lose weight if you need to. · Limit the amount of salt (sodium) in your diet. Salt holds fluid in the body and may increase swelling. When should you call for help? Call 911 anytime you think you may need emergency care. For example, call if:  · You have symptoms of a blood clot in your lung (called a pulmonary embolism). These may include:  ¨ Sudden chest pain. ¨ Trouble breathing. ¨ Coughing up blood.   Call your doctor now or seek immediate medical care if:  · You have signs of a blood clot, such as:  ¨ Pain in your calf, back of the knee, thigh, or groin. ¨ Redness and swelling in your leg or groin. · You have symptoms of infection, such as:  ¨ Increased pain, swelling, warmth, or redness. ¨ Red streaks or pus. ¨ A fever. Watch closely for changes in your health, and be sure to contact your doctor if:  · Your swelling is getting worse. · You have new or worsening pain in your legs. · You do not get better as expected. Where can you learn more? Go to http://stacey-marie.info/. Enter L481 in the search box to learn more about \"Leg and Ankle Edema: Care Instructions. \"  Current as of: March 20, 2017  Content Version: 11.3  © 7871-2271 [employer]. Care instructions adapted under license by [employer] (which disclaims liability or warranty for this information). If you have questions about a medical condition or this instruction, always ask your healthcare professional. [place] [address] any warranty or liability for your use of this information.

## 2017-06-28 NOTE — MR AVS SNAPSHOT
Visit Information Date & Time Provider Department Dept. Phone Encounter #  
 6/28/2017  1:00 PM Seth Lopez, [address] [facility] Internal Medicine [phone] [id number] Your Appointments 10/4/2017  8:15 AM  
ROUTINE CARE with Ray French MD  
[facility] Internal Medicine [facility]) Appt Note: 4 month follow up [address] [place] [address] [address] [place] [address]  
[phone]  
  
   
 [address]  
  
    
 12/1/2017  8:40 AM  
ESTABLISHED PATIENT with Dionne Joseph MD  
[facility] ([facility]) Appt Note: 6 month follow up  
 [address]  
[address] [address] Upcoming Health Maintenance Date Due DTaP/Tdap/Td series (1 - Tdap) 3[birth year]1/1955 FOOT EXAM Q1 11/12/2016 INFLUENZA AGE 9 TO ADULT 8/1/2017 MICROALBUMIN Q1 9/16/2017 LIPID PANEL Q1 9/16/2017 HEMOGLOBIN A1C Q6M 12/5/2017 MEDICARE YEARLY EXAM 4/18/2018 EYE EXAM RETINAL OR DILATED Q1 4/25/2018 GLAUCOMA SCREENING Q2Y 4/25/2019 Allergies as of 6/28/2017  Review Complete On: 6/28/2017 By: Ailyn Garcia LPN Severity Noted Reaction Type Reactions Cortisone  05/11/2011    Other (comments) \"Make me feels weak, like I'm going to die\" Januvia [Sitagliptin]  11/09/2016    Other (comments)  
 weakness Current Immunizations  Reviewed on 9/14/2016 Name Date Influenza High Dose Vaccine PF 10/28/2014 Influenza Vaccine 9/14/2016, 10/19/2013 Pneumococcal Conjugate (PCV-13) 9/14/2016 Pneumococcal Vaccine (Unspecified Type) 10/1/2008 Not reviewed this visit You Were Diagnosed With   
  
 Codes Comments Cough    -  Primary ICD-10-CM: W52 ICD-9-CM: 786.2 Allergic rhinitis, unspecified allergic rhinitis trigger, unspecified rhinitis seasonality     ICD-10-CM: J30.9 ICD-9-CM: 477.9 Venous insufficiency     ICD-10-CM: I87.2 ICD-9-CM: 459.81 Vitals BP Pulse Temp Height(growth percentile) Weight(growth percentile) SpO2  
 135/76 69 97.6 °F (36.4 °C) (Oral) 4' 9\" (1.448 m) 161 lb 12.8 oz (73.4 kg) 95% BMI OB Status Smoking Status 35.01 kg/m2 Hysterectomy Never Smoker BMI and BSA Data Body Mass Index Body Surface Area 35.01 kg/m 2 1.72 m 2 Preferred Pharmacy Pharmacy Name Phone [facility] [phone] Your Updated Medication List  
  
   
This list is accurate as of: 6/28/17  1:29 PM.  Always use your most recent med list.  
  
  
  
  
 ALPRAZolam 0.5 mg tablet Commonly known as:  Marisol Mannheim Take 1 Tab by mouth nightly as needed for Anxiety. Max Daily Amount: 0.5 mg.  
  
 aspirin 81 mg chewable tablet Take 81 mg by mouth daily. * atorvastatin 20 mg tablet Commonly known as:  LIPITOR Take 1 Tab by mouth daily. * atorvastatin 10 mg tablet Commonly known as:  LIPITOR  
TAKE ONE TABLET BY MOUTH DAILY  
  
 cloNIDine HCl 0.3 mg tablet Commonly known as:  CATAPRES Take 1 Tab by mouth three (3) times daily. dilTIAZem  mg ER capsule Commonly known as:  CARDIZEM CD  
TAKE ONE CAPSULE BY MOUTH EVERY DAY  
  
 famotidine 20 mg tablet Commonly known as:  PEPCID Take 1 Tab by mouth nightly. fexofenadine 180 mg tablet Commonly known as:  [place] Take 1 Tab by mouth daily as needed for Allergies. fluticasone 50 mcg/actuation nasal spray Commonly known as:  [place] Longest 2 Sprays by Both Nostrils route daily. furosemide 40 mg tablet Commonly known as:  LASIX TAKE ONE TABLET BY MOUTH EVERY DAY  
  
 glipiZIDE 5 mg tablet Commonly known as:  Hany Argue Take 1 Tab by mouth two (2) times a day. guaiFENesin  mg ER tablet Commonly known as:  [place] [address] Take 1 Tab by mouth two (2) times daily as needed for Congestion. latanoprost 0.005 % ophthalmic solution Commonly known as:  XALATAN  
  
 losartan 100 mg tablet Commonly known as:  COZAAR  
TAKE ONE TABLET BY MOUTH EVERY DAY  
  
 metFORMIN  mg tablet Commonly known as:  GLUCOPHAGE XR  
TAKE TWO TABLETS BY MOUTH EVERY DAY WITH DINNER  
  
 metoprolol tartrate 100 mg IR tablet Commonly known as:  LOPRESSOR Take 1.5 Tabs by mouth two (2) times a day. omeprazole 40 mg capsule Commonly known as:  PRILOSEC  
TAKE ONE CAPSULE BY MOUTH EVERY DAY BEFORE BREAKFAST  
  
 pioglitazone 15 mg tablet Commonly known as:  ACTOS Take 1 Tab by mouth nightly. potassium chloride 10 mEq tablet Commonly known as:  KLOR-CON Take 2 Tabs by mouth daily. TIMOPTIC-XE 0.5 % ophthalmic gel-forming Generic drug:  timolol TYLENOL 325 mg tablet Generic drug:  acetaminophen Take  by mouth every four (4) hours as needed for Pain. VITAMIN D3 1,000 unit Cap Generic drug:  cholecalciferol Take 1,000 Units by mouth daily. * Notice: This list has 2 medication(s) that are the same as other medications prescribed for you. Read the directions carefully, and ask your doctor or other care provider to review them with you. Prescriptions Sent to Pharmacy Refills  
 fluticasone (FLONASE) 50 mcg/actuation nasal spray 0 Si Sprays by Both Nostrils route daily. Class: Normal  
 Pharmacy: [facility] Ph #: [phone] Route: Both Nostrils  
 guaiFENesin ER (MUCINEX) 600 mg ER tablet 0 Sig: Take 1 Tab by mouth two (2) times daily as needed for Congestion. Class: Normal  
 Pharmacy: [facility] Ph #: [phone] Route: Oral  
  
Patient Instructions Leg and Ankle Edema: Care Instructions Your Care Instructions Swelling in the legs, ankles, and feet is called edema.  It is common after you sit or stand for a while. Long plane flights or car rides often cause swelling in the legs and feet. You may also have swelling if you have to stand for long periods of time at your job. Problems with the veins in the legs (varicose veins) and changes in hormones can also cause swelling. Sometimes the swelling in the ankles and feet is caused by a more serious problem, such as heart failure, infection, blood clots, or liver or kidney disease. Follow-up care is a key part of your treatment and safety. Be sure to make and go to all appointments, and call your doctor if you are having problems. Its also a good idea to know your test results and keep a list of the medicines you take. How can you care for yourself at home? · If your doctor gave you medicine, take it as prescribed. Call your doctor if you think you are having a problem with your medicine. · Whenever you are resting, raise your legs up. Try to keep the swollen area higher than the level of your heart. · Take breaks from standing or sitting in one position. ¨ Walk around to increase the blood flow in your lower legs. ¨ Move your feet and ankles often while you stand, or tighten and relax your leg muscles. · Wear support stockings. Put them on in the morning, before swelling gets worse. · Eat a balanced diet. Lose weight if you need to. · Limit the amount of salt (sodium) in your diet. Salt holds fluid in the body and may increase swelling. When should you call for help? Call 911 anytime you think you may need emergency care. For example, call if: 
· You have symptoms of a blood clot in your lung (called a pulmonary embolism). These may include: 
¨ Sudden chest pain. ¨ Trouble breathing. ¨ Coughing up blood. Call your doctor now or seek immediate medical care if: 
· You have signs of a blood clot, such as: 
¨ Pain in your calf, back of the knee, thigh, or groin. ¨ Redness and swelling in your leg or groin. · You have symptoms of infection, such as: 
¨ Increased pain, swelling, warmth, or redness. ¨ Red streaks or pus. ¨ A fever. Watch closely for changes in your health, and be sure to contact your doctor if: 
· Your swelling is getting worse. · You have new or worsening pain in your legs. · You do not get better as expected. Where can you learn more? Go to http://stacey-marie.info/. Enter Y596 in the search box to learn more about \"Leg and Ankle Edema: Care Instructions. \" Current as of: March 20, 2017 Content Version: 11.3 © 2237-1121 [employer]. Care instructions adapted under license by [employer] (which disclaims liability or warranty for this information). If you have questions about a medical condition or this instruction, always ask your healthcare professional. [address] any warranty or liability for your use of this information. Please provide this summary of care documentation to your next provider. Your primary care clinician is listed as Roberts Spatz. If you have any questions after today's visit, please call [phone].

## 2017-06-28 NOTE — PROGRESS NOTES
Chief Complaint   Patient presents with    Swelling     both ankles and both feet--swelling    Medication Evaluation     wants to discuss Xanax, Mucinex and Lasix     Reviewed record  In preparation for visit and have obtained necessary documentation. 1. Have you been to the ER, urgent care clinic since your last visit? Hospitalized since your last visit? No    2. Have you seen or consulted any other health care providers outside of the [facility] since your last visit? Include any pap smears or colon screening.     Used 2 patient I. D. 's

## 2017-06-28 NOTE — TELEPHONE ENCOUNTER
Call placed to pt and pt states her feet and ankles have been swollen for a \"few days now\", scheduled appointment with FNP for today, pt voiced understanding and appreciation

## 2017-06-28 NOTE — PROGRESS NOTES
HISTORY OF PRESENT ILLNESS  Kati Anaya is a 80 y.o. female. This is a patient of Dr. Doris Riggs who presents today with concerns of edema. She states she has noted intermittent foot and ankle swelling. It seems to be increased over the last two weeks. No chest pain or shortness of breath. Her history includes peripheral edema; she is taking Lasix 40 mg daily. She states leg swelling decreases overnight and worsens by the end of the day. The patient's history includes allergic rhinitis with cough, as well. She describes nasal drainage and post-nasal drip. Cough is intermittent and dry. She states she has an appointment with ENT, Dr. Carlyn Romano, next week. No fever or chills. Visit Vitals    /76    Pulse 69    Temp 97.6 °F (36.4 °C) (Oral)    Ht 4' 9\" (1.448 m)    Wt 161 lb 12.8 oz (73.4 kg)    SpO2 95%    BMI 35.01 kg/m2     HPI    Review of Systems   Constitutional: Negative for chills, fever and malaise/fatigue. HENT: Positive for congestion. Eyes: Negative for blurred vision. Respiratory: Positive for cough. Negative for sputum production, shortness of breath and wheezing. Cardiovascular: Positive for leg swelling. Negative for chest pain and palpitations. Gastrointestinal: Negative for abdominal pain. Genitourinary: Negative. Musculoskeletal: Negative. Skin: Negative. Neurological: Negative for dizziness and headaches. Endo/Heme/Allergies: Negative. Psychiatric/Behavioral: Negative. Physical Exam   Constitutional: She is oriented to person, place, and time. She appears well-developed and well-nourished. No distress. HENT:   Head: Normocephalic and atraumatic. Neck: Neck supple. Cardiovascular: Normal rate, regular rhythm, normal heart sounds and intact distal pulses. No murmur heard. Pulmonary/Chest: Effort normal and breath sounds normal. No respiratory distress. She has no wheezes. She has no rales. Abdominal: Soft. She exhibits no distension. Musculoskeletal: She exhibits edema. Trace pedal edema bilaterally   Neurological: She is alert and oriented to person, place, and time. Skin: Skin is warm and dry. Psychiatric: She has a normal mood and affect. Her behavior is normal.   Nursing note and vitals reviewed. ASSESSMENT and PLAN    ICD-10-CM ICD-9-CM    1. Pedal edema R60.0 782. 3 Discussed importance of elevating lower extremities when able. Low salt diet. Recommend compression stockings as tolerated. Continue Lasix 40 mg daily, as ordered. Patient encouraged to call or return to office if symptoms do not improve or worsen. If experiencing severe shortness of breath or chest pain, present to the ER. 2. Venous insufficiency I87.2 459.81 As above. 3. Allergic rhinitis, unspecified allergic rhinitis trigger, unspecified rhinitis seasonality J30.9 477.9 Will refill  fluticasone (FLONASE) 50 mcg/actuation nasal spray, 2 sprays to each nostril daily  Scheduled to see ENT next week. 4. Cough R05 786.2 Will refill:  guaiFENesin ER (MUCINEX) 600 mg ER tablet, 1 tab po bid PRN cough     Lab results and schedule of future lab studies reviewed with patient  Reviewed diet, exercise and weight control  Reviewed medications and side effects in detail  Patient encouraged to call or return to office if symptoms do not improve or worsen. Reviewed plan of care with patient who acknowledges understanding and agrees.

## 2017-07-04 RX ORDER — GLIPIZIDE 5 MG/1
TABLET ORAL
Qty: 60 TAB | Refills: 0 | Status: SHIPPED | COMMUNITY
Start: 2017-07-04 | End: 2017-08-08 | Stop reason: SDUPTHER

## 2017-07-24 RX ORDER — METFORMIN HYDROCHLORIDE 500 MG/1
TABLET, EXTENDED RELEASE ORAL
Qty: 60 TAB | Refills: 3 | Status: SHIPPED | OUTPATIENT
Start: 2017-07-24 | End: 2017-11-06 | Stop reason: SDUPTHER

## 2017-07-31 RX ORDER — DILTIAZEM HYDROCHLORIDE 180 MG/1
CAPSULE, COATED, EXTENDED RELEASE ORAL
Qty: 30 CAP | Refills: 0 | Status: SHIPPED | OUTPATIENT
Start: 2017-07-31 | End: 2017-09-01 | Stop reason: SDUPTHER

## 2017-08-28 ENCOUNTER — TELEPHONE (OUTPATIENT)
Dept: INTERNAL MEDICINE CLINIC | Age: 82
End: 2017-08-28

## 2017-08-28 NOTE — TELEPHONE ENCOUNTER
Pt is stating that she has not energy and did schedule an appt for 9/06/17. Please call and advise pt what she can do.

## 2017-09-01 RX ORDER — DILTIAZEM HYDROCHLORIDE 180 MG/1
CAPSULE, COATED, EXTENDED RELEASE ORAL
Qty: 30 CAP | Refills: 0 | Status: SHIPPED | OUTPATIENT
Start: 2017-09-01 | End: 2017-09-04 | Stop reason: SDUPTHER

## 2017-09-04 RX ORDER — DILTIAZEM HYDROCHLORIDE 180 MG/1
CAPSULE, COATED, EXTENDED RELEASE ORAL
Qty: 30 CAP | Refills: 0 | Status: SHIPPED | OUTPATIENT
Start: 2017-09-04 | End: 2017-10-30 | Stop reason: SDUPTHER

## 2017-09-06 ENCOUNTER — OFFICE VISIT (OUTPATIENT)
Dept: INTERNAL MEDICINE CLINIC | Age: 82
End: 2017-09-06

## 2017-09-06 ENCOUNTER — HOSPITAL ENCOUNTER (OUTPATIENT)
Dept: LAB | Age: 82
Discharge: HOME | End: 2017-09-06
Payer: MEDICARE

## 2017-09-06 VITALS
BODY MASS INDEX: 34.95 KG/M2 | SYSTOLIC BLOOD PRESSURE: 124 MMHG | TEMPERATURE: 96 F | HEART RATE: 64 BPM | WEIGHT: 162 LBS | RESPIRATION RATE: 20 BRPM | HEIGHT: 57 IN | DIASTOLIC BLOOD PRESSURE: 76 MMHG | OXYGEN SATURATION: 96 %

## 2017-09-06 DIAGNOSIS — Z78.0 MENOPAUSE: ICD-10-CM

## 2017-09-06 DIAGNOSIS — G47.00 INSOMNIA, UNSPECIFIED TYPE: ICD-10-CM

## 2017-09-06 DIAGNOSIS — I10 ESSENTIAL HYPERTENSION WITH GOAL BLOOD PRESSURE LESS THAN 140/90: ICD-10-CM

## 2017-09-06 DIAGNOSIS — I87.303 STASIS EDEMA, BILATERAL: ICD-10-CM

## 2017-09-06 DIAGNOSIS — E55.9 VITAMIN D DEFICIENCY: ICD-10-CM

## 2017-09-06 DIAGNOSIS — Z12.39 SCREENING FOR BREAST CANCER: ICD-10-CM

## 2017-09-06 DIAGNOSIS — E78.2 MIXED HYPERLIPIDEMIA: ICD-10-CM

## 2017-09-06 DIAGNOSIS — E11.9 CONTROLLED TYPE 2 DIABETES MELLITUS WITHOUT COMPLICATION, UNSPECIFIED LONG TERM INSULIN USE STATUS: Primary | ICD-10-CM

## 2017-09-06 PROCEDURE — 82043 UR ALBUMIN QUANTITATIVE: CPT

## 2017-09-06 PROCEDURE — 83036 HEMOGLOBIN GLYCOSYLATED A1C: CPT

## 2017-09-06 PROCEDURE — 36415 COLL VENOUS BLD VENIPUNCTURE: CPT

## 2017-09-06 PROCEDURE — 82306 VITAMIN D 25 HYDROXY: CPT

## 2017-09-06 PROCEDURE — 80053 COMPREHEN METABOLIC PANEL: CPT

## 2017-09-06 NOTE — PROGRESS NOTES
HISTORY OF PRESENT ILLNESS  Jessy Cardona is a 80 y.o. female here with C/O  Fatigue lately. not eating enough. she is taking nap after dinner at 6 pm.then she is not  able to fall asleep at night. adv her not to take a nap.feelign tired a lot. Has diabetes. on meds. taking regularly. off [place] and [place] and actos. has HTN,BP is stakle. complaint with med. Labs reviewed. eye check up is up to date. Need dexa scan and mammogram.  Lethargy     Malaise     Hypertension      Cholesterol Problem     Diabetes         Review of Systems   Constitutional: Negative. HENT: Positive for hoarse voice. Eyes: Negative. Cardiovascular: Negative. Gastrointestinal: Negative. Genitourinary: Negative. Musculoskeletal: Negative. Skin: Negative. Neurological: Negative. Endo/Heme/Allergies: Negative. Psychiatric/Behavioral: Negative. Physical Exam   Constitutional: She appears well-developed and well-nourished. No distress. HENT:   Head: Atraumatic. Neck: Normal range of motion. Neck supple. No tracheal deviation present. No thyromegaly present. Cardiovascular: Normal rate, regular rhythm, normal heart sounds and intact distal pulses. Pulmonary/Chest: Effort normal and breath sounds normal. No respiratory distress. She has no wheezes. Musculoskeletal: She exhibits edema. Trace edema   Psychiatric: She has a normal mood and affect. Her behavior is normal.       ASSESSMENT and PLAN    Diagnoses and all orders for this visit:    1. Controlled type 2 diabetes mellitus without complication, unspecified long term insulin use status ([facility] [address]    On med. will do,  -     METABOLIC PANEL, COMPREHENSIVE  -     HEMOGLOBIN A1C WITH EAG  -     MICROALBUMIN, UR, RAND    2. Essential hypertension with goal blood pressure less than 140/90    On multiple meds. Will do,  -     METABOLIC PANEL, COMPREHENSIVE    3.  Mixed hyperlipidemia  Will check,  -     METABOLIC PANEL, COMPREHENSIVE    4. Stasis edema, bilateral    On lasix. Leg elevation. 5. Insomnia, unspecified type  Stable. 6. Vitamin D deficiency  -     VITAMIN D, 25 HYDROXY    7. Screening for breast cancer  -     WILL MAMMO BI SCREENING INCL CAD; Future    8. Menopause  -     DEXA BONE DENSITY STUDY AXIAL; Future        Discussed expected course/resolution/complications of diagnosis in detail with patient. Medication risks/benefits/costs/interactions/alternatives discussed with patient. Pt was given an after visit summary which includes diagnoses, current medications & vitals. Pt expressed understanding with the diagnosis and plan.

## 2017-09-06 NOTE — PROGRESS NOTES
Health Maintenance Due   Topic Date Due    DTaP/Tdap/Td series (1 - Tdap) 03/01/1955    FOOT EXAM Q1  11/12/2016    INFLUENZA AGE 9 TO ADULT  08/01/2017    MICROALBUMIN Q1  09/16/2017    LIPID PANEL Q1  09/16/2017       Chief Complaint   Patient presents with    Lethargy     states has no energy, states shes only sleeping 4-5 hours at night and causing her to nap throughout the day    Malaise       1. Have you been to the ER, urgent care clinic since your last visit? Hospitalized since your last visit? No    2. Have you seen or consulted any other health care providers outside of the [address] [place] [address] since your last visit? Include any pap smears or colon screening. No    3) Do you have an Advance Directive on file? no    4) Are you interested in receiving information on Advance Directives? NO      Patient is accompanied by self I have received verbal consent from Eligio Rodriguez to discuss any/all medical information while they are present in the room.

## 2017-09-06 NOTE — MR AVS SNAPSHOT
Visit Information Date & Time Provider Department Dept. Phone Encounter #  
 9/6/2017  8:45 AM Bethany Peres MD [facility] Internal Medicine [phone] [id number] Your Appointments 10/4/2017  8:15 AM  
ROUTINE CARE with Bethany Peres MD  
[facility] Internal Medicine [address]) Appt Note: 4 month follow up [address] [place] [address]  
[phone]  
  
   
 [address]  
  
    
 12/1/2017  8:40 AM  
ESTABLISHED PATIENT with Tisha Blunt MD  
[facility] ([address]) Appt Note: 6 month follow up  
 [address]  
[address] [address] Upcoming Health Maintenance Date Due DTaP/Tdap/Td series (1 - Tdap) 3/1/1955 INFLUENZA AGE 9 TO ADULT 8/1/2017 MICROALBUMIN Q1 9/16/2017 LIPID PANEL Q1 9/16/2017 HEMOGLOBIN A1C Q6M 12/5/2017 MEDICARE YEARLY EXAM 4/18/2018 EYE EXAM RETINAL OR DILATED Q1 4/25/2018 FOOT EXAM Q1 9/6/2018 GLAUCOMA SCREENING Q2Y 4/25/2019 Allergies as of 9/6/2017  Review Complete On: 9/6/2017 By: Bethany Peres MD  
  
 Severity Noted Reaction Type Reactions Cortisone  05/11/2011    Other (comments) \"Make me feels weak, like I'm going to die\" Januvia [Sitagliptin]  11/09/2016    Other (comments)  
 weakness Current Immunizations  Reviewed on 9/14/2016 Name Date Influenza High Dose Vaccine PF 10/28/2014 Influenza Vaccine 9/14/2016, 10/19/2013 Pneumococcal Conjugate (PCV-13) 9/14/2016 ZZZ-RETIRED (DO NOT USE) Pneumococcal Vaccine (Unspecified Type) 10/1/2008 Not reviewed this visit You Were Diagnosed With   
  
 Codes Comments Controlled type 2 diabetes mellitus without complication, unspecified long term insulin use status ([facility]    -  Primary ICD-10-CM: E11.9 ICD-9-CM: 250.00 Essential hypertension with goal blood pressure less than 140/90     ICD-10-CM: I10 
ICD-9-CM: 401.9 Mixed hyperlipidemia     ICD-10-CM: E78.2 ICD-9-CM: 272.2 Stasis edema, bilateral     ICD-10-CM: I87.303 ICD-9-CM: 459.30 Insomnia, unspecified type     ICD-10-CM: G47.00 ICD-9-CM: 780.52 Vitamin D deficiency     ICD-10-CM: E55.9 ICD-9-CM: 268.9 Screening for breast cancer     ICD-10-CM: Z12.39 
ICD-9-CM: V76.10 Menopause     ICD-10-CM: Z78.0 ICD-9-CM: 627.2 Vitals BP Pulse Temp Resp Height(growth percentile) Weight(growth percentile) 124/76 (BP 1 Location: Left arm, BP Patient Position: Sitting) 64 96 °F (35.6 °C) (Oral) 20 4' 9\" (1.448 m) 162 lb (73.5 kg) SpO2 BMI OB Status Smoking Status 96% 35.06 kg/m2 Hysterectomy Never Smoker Vitals History BMI and BSA Data Body Mass Index Body Surface Area 35.06 kg/m 2 1.72 m 2 Preferred Pharmacy Pharmacy Name Phone [facility] [phone] Your Updated Medication List  
  
   
This list is accurate as of: 9/6/17  9:24 AM.  Always use your most recent med list.  
  
  
  
  
 ALPRAZolam 0.5 mg tablet Commonly known as:  [place] Filemon Take 1 Tab by mouth nightly as needed for Anxiety. Max Daily Amount: 0.5 mg.  
  
 aspirin 81 mg chewable tablet Take 81 mg by mouth daily. atorvastatin 10 mg tablet Commonly known as:  LIPITOR  
TAKE ONE TABLET BY MOUTH DAILY  
  
 cloNIDine HCl 0.3 mg tablet Commonly known as:  CATAPRES Take 1 Tab by mouth three (3) times daily. dilTIAZem  mg ER capsule Commonly known as:  CARDIZEM CD  
TAKE ONE CAPSULE BY MOUTH EVERY DAY  
  
 famotidine 20 mg tablet Commonly known as:  PEPCID Take 1 Tab by mouth nightly. fexofenadine 180 mg tablet Commonly known as:  Wilburt Saunas Take 1 Tab by mouth daily as needed for Allergies. fluticasone 50 mcg/actuation nasal spray Commonly known as:  [place] 2 Sprays by Both Nostrils route daily. furosemide 40 mg tablet Commonly known as:  LASIX TAKE ONE TABLET BY MOUTH EVERY DAY  
  
 glipiZIDE 5 mg tablet Commonly known as:  Glennie Colfax Take 1 Tab by mouth two (2) times a day. latanoprost 0.005 % ophthalmic solution Commonly known as:  XALATAN  
  
 losartan 100 mg tablet Commonly known as:  COZAAR  
TAKE ONE TABLET BY MOUTH EVERY DAY  
  
 metFORMIN  mg tablet Commonly known as:  GLUCOPHAGE XR  
TAKE TWO TABLETS BY MOUTH EVERY DAY WITH DINNER  
  
 metoprolol tartrate 100 mg IR tablet Commonly known as:  LOPRESSOR Take 1.5 Tabs by mouth two (2) times a day. omeprazole 40 mg capsule Commonly known as:  PRILOSEC  
TAKE ONE CAPSULE BY MOUTH EVERY DAY BEFORE BREAKFAST  
  
 pioglitazone 15 mg tablet Commonly known as:  ACTOS Take 1 Tab by mouth nightly. potassium chloride 10 mEq tablet Commonly known as:  KLOR-CON Take 2 Tabs by mouth daily. TIMOPTIC-XE 0.5 % ophthalmic gel-forming Generic drug:  timolol TYLENOL 325 mg tablet Generic drug:  acetaminophen Take  by mouth every four (4) hours as needed for Pain. VITAMIN D3 1,000 unit Cap Generic drug:  cholecalciferol Take 1,000 Units by mouth daily. We Performed the Following HEMOGLOBIN A1C WITH EAG [25020 CPT(R)] METABOLIC PANEL, COMPREHENSIVE [33952 CPT(R)] MICROALBUMIN, UR, RAND [id number] CPT(R)] VITAMIN D, 25 HYDROXY [id number] CPT(R)] To-Do List   
 12/06/2017 Imaging:  DEXA BONE DENSITY STUDY AXIAL   
  
 12/06/2017 Imaging:  WILL MAMMO BI SCREENING INCL CAD Patient Instructions Sleep Tips What to avoid   
· Do not have drinks with caffeine, such as coffee or black tea, for 8 hours before bed. · Do not smoke or use other types of tobacco near bedtime. Nicotine is a stimulant and can keep you awake. · Avoid drinking alcohol late in the evening, because it can cause you to wake in the middle of the night. · Do not eat a big meal close to bedtime. If you are hungry, eat a light snack. · Do not drink a lot of water close to bedtime, because the need to urinate may wake you up during the night. · Do not read or watch TV in bed. Use the bed only for sleeping and sexual activity. What to try   
· Go to bed at the same time every night, and wake up at the same time every morning. Do not take naps during the day. · Keep your bedroom quiet, dark, and cool. · Get regular exercise, but not within 3 to 4 hours of your bedtime. · Sleep on a comfortable pillow and mattress. · If watching the clock makes you anxious, turn it facing away from you so you cannot see the time. · If you worry when you lie down, start a worry book. Well before bedtime, write down your worries, and then set the book and your concerns aside. · Try meditation or other relaxation techniques before you go to bed. · If you cannot fall asleep, get up and go to another room until you feel sleepy. Do something relaxing. Repeat your bedtime routine before you go to bed again. Make your house quiet and calm about an hour before bedtime. Turn down the lights, turn off the TV, log off the computer, and turn down the volume on music. This can help you relax after a busy day. Please provide this summary of care documentation to your next provider. Your primary care clinician is listed as Riaz Dowling. If you have any questions after today's visit, please call [phone].

## 2017-09-06 NOTE — LETTER
9/15/2017 9:19 AM 
 
[facility]. [facility] [phone] Dear Pavithra Can: Please find your most recent results below. Resulted Orders METABOLIC PANEL, COMPREHENSIVE Result Value Ref Range Glucose 139 (H) 65 - 99 mg/dL BUN 11 8 - 27 mg/dL Creatinine 0.76 0.57 - 1.00 mg/dL GFR est non-AA 73 >59 mL/min/1.73 GFR est AA 84 >59 mL/min/1.73  
 BUN/Creatinine ratio 14 12 - 28 Sodium 141 134 - 144 mmol/L Potassium 4.2 3.5 - 5.2 mmol/L Chloride 99 96 - 106 mmol/L  
 CO2 25 18 - 29 mmol/L Calcium 9.4 8.7 - 10.3 mg/dL Protein, total 7.0 6.0 - 8.5 g/dL Albumin 4.3 3.5 - 4.7 g/dL GLOBULIN, TOTAL 2.7 1.5 - 4.5 g/dL A-G Ratio 1.6 1.2 - 2.2 Bilirubin, total 0.3 0.0 - 1.2 mg/dL Alk. phosphatase 94 39 - 117 IU/L  
 AST (SGOT) 29 0 - 40 IU/L  
 ALT (SGPT) 25 0 - 32 IU/L Narrative Performed at:  [address]  [id number] : Jarett Pena MD, Phone:  [phone] HEMOGLOBIN A1C WITH EAG Result Value Ref Range Hemoglobin A1c 6.9 (H) 4.8 - 5.6 % Comment:  
            Pre-diabetes: 5.7 - 6.4 Diabetes: >6.4 Glycemic control for adults with diabetes: <7.0 Estimated average glucose 151 mg/dL Narrative Performed at:  [address]  [id number] : Jarett Pena MD, Phone:  [phone] MICROALBUMIN, UR, RAND Result Value Ref Range Microalbumin, urine 5.5 Not Estab. ug/mL Narrative Performed at:  [address]  [id number] : Jarett Pena MD, Phone:  [phone] VITAMIN D, 25 HYDROXY Result Value Ref Range VITAMIN D, 25-HYDROXY 37.7 30.0 - 100.0 ng/mL    Comment:  
   Vitamin D deficiency has been defined by the [facility] practice guideline as a 
 level of serum 25-OH vitamin D less than 20 ng/mL (1,2). The Endocrine Society went on to further define vitamin D 
insufficiency as a level between 21 and 29 ng/mL (2). 1. IOM ([place] of [employer]). 2010. Dietary reference 
   intakes for calcium and D. [facility]: The 
   [employer]. 2. Mary MF, Vidal NC, Merrill ARRIOLA, et al. 
   Evaluation, treatment, and prevention of vitamin D 
   deficiency: an Endocrine Society clinical practice 
   guideline. JCEM. 2011 Jul; 96(7):1911-30. Narrative Performed at:  [address]  [id number] : Brittany Early MD, Phone:  [phone] RECOMMENDATION Dara Garduno  your recent labs indicate that your diabetes is slightly better, keep up the good work and continue on a diabetic diet and exercise as tolerated. Please call me if you have any questions: [phone] Sincerely, David Conteh MD

## 2017-09-07 LAB
25(OH)D3+25(OH)D2 SERPL-MCNC: 37.7 NG/ML (ref 30–100)
ALBUMIN SERPL-MCNC: 4.3 G/DL (ref 3.5–4.7)
ALBUMIN/GLOB SERPL: 1.6 {RATIO} (ref 1.2–2.2)
ALP SERPL-CCNC: 94 IU/L (ref 39–117)
ALT SERPL-CCNC: 25 IU/L (ref 0–32)
AST SERPL-CCNC: 29 IU/L (ref 0–40)
BILIRUB SERPL-MCNC: 0.3 MG/DL (ref 0–1.2)
BUN SERPL-MCNC: 11 MG/DL (ref 8–27)
BUN/CREAT SERPL: 14 (ref 12–28)
CALCIUM SERPL-MCNC: 9.4 MG/DL (ref 8.7–10.3)
CHLORIDE SERPL-SCNC: 99 MMOL/L (ref 96–106)
CO2 SERPL-SCNC: 25 MMOL/L (ref 18–29)
CREAT SERPL-MCNC: 0.76 MG/DL (ref 0.57–1)
EST. AVERAGE GLUCOSE BLD GHB EST-MCNC: 151 MG/DL
GLOBULIN SER CALC-MCNC: 2.7 G/DL (ref 1.5–4.5)
GLUCOSE SERPL-MCNC: 139 MG/DL (ref 65–99)
HBA1C MFR BLD: 6.9 % (ref 4.8–5.6)
MICROALBUMIN UR-MCNC: 5.5 UG/ML
POTASSIUM SERPL-SCNC: 4.2 MMOL/L (ref 3.5–5.2)
PROT SERPL-MCNC: 7 G/DL (ref 6–8.5)
SODIUM SERPL-SCNC: 141 MMOL/L (ref 134–144)

## 2017-09-11 NOTE — PROGRESS NOTES
Labs stable. Hgb A1c 6.9. Encourage patient to eat a healther Mediterranean style diet with 55% or less of calories from carbohydrates. Try to eat more vegetables, whole fruit, nuts, whole grains, yogurt and less refined grains. Eat less red meat. Chicken and fish would be good protein sources.  Aim to eat less than 45 grams of carbohydrates per meal.

## 2017-09-20 ENCOUNTER — HOSPITAL ENCOUNTER (OUTPATIENT)
Dept: MAMMOGRAPHY | Age: 82
Discharge: HOME | End: 2017-09-20
Attending: INTERNAL MEDICINE
Payer: MEDICARE

## 2017-09-20 ENCOUNTER — HOSPITAL ENCOUNTER (OUTPATIENT)
Dept: BONE DENSITY | Age: 82
Discharge: HOME | End: 2017-09-20
Attending: INTERNAL MEDICINE
Payer: MEDICARE

## 2017-09-20 DIAGNOSIS — Z78.0 MENOPAUSE: ICD-10-CM

## 2017-09-20 DIAGNOSIS — Z12.31 ENCOUNTER FOR SCREENING MAMMOGRAM FOR BREAST CANCER: ICD-10-CM

## 2017-09-20 PROCEDURE — 77067 SCR MAMMO BI INCL CAD: CPT

## 2017-09-20 PROCEDURE — 77080 DXA BONE DENSITY AXIAL: CPT

## 2017-09-20 NOTE — LETTER
9/29/2017 2:47 PM 
 
Ms. Dawna Ricci [address] [facility] 7 [phone] Dear Dawna Ricci: Please find your most recent results below. Resulted Orders DEXA BONE DENSITY STUDY AXIAL Narrative Bone Mineral Density Indication:  Screening for osteoporosis Age: 80 Sex: Female. Menopause status: postmenopausal. 
Hormone replacement therapy: None Number of falls in the past year:   None. Risk factors for osteoporosis:  Lasix Current medication for osteoporosis: Vitamin D. Comparison: 2014 Technique: Imaging was performed on the [employer]. World Health Organization 
meta-analysis fracture risk calculator (FRAX) analysis was performed for 10 year 
fracture risk probability assessment Excluded sites: 0 Findings: 
  
Fractures identified on Lateral scanogram:  None Femoral Neck:  Left Bone mineral density (gm/cm2):? 1.060 
% of peak bone mass: 102 
% for age matched controls:? 80 T-score: 0.2 Z-score: 1.4 Total Hip: Left Bone mineral density (gm/cm2):  1.103 
% of peak bone mass:   109 
% for age matched controls:  125 T-score:   0.8 Z-score:  1.8 Lumbar Spine:  L1-4 Bone mineral density (gm/cm2):  1.501 
% of peak bone mass:  125  
% for age matched controls:  80 T-score:  2.5 Z-score:  3.4 T score the distal one third left radius -0.5 Impression Impression: This patient is normal using the World Health Organization criteria The patient has had a prior study. While no formal comparison is feasible, the 
following observations are noted:  No change is observed. 10 year probability of major osteoporotic fracture:  3.4% 10 year probability of hip fracture:  0.5% Recommendations: 
Therapy recommendations need to be tailored to each individual patient.  Using 
the [facility]) FRAX absolute fracture algorithm, the 
National Osteoporosis Foundation recommends beginning pharmacological therapy in 
postmenopausal women and men over the age of 48 with a 8 year probability of a 
hip fracture of >3% OR with the 10 year probability of a major osteoporotic 
fracture of >20%. Please reconsider testing based on risk factors. Currently, Medicare will only 
reimburse for a central DXA examination every two years, unless the patient is 
on chronic glucocorticoid therapy. Note: Please note that reliable, valid comparisons cannot be made between 
studies which have been performed on machines from different manufacturers. If 
clinically warranted, a follow up study performed at this site, on the same 
unit, would allow the most sensitive assessment of change in bone mineral 
density. RECOMMENDATIONS: 
None. Keep up the good work! Please call me if you have any questions: [phone] Sincerely, JUNE DEXA 1

## 2017-09-20 NOTE — LETTER
9/29/2017 2:47 PM 
 
Ms. Jackie Segovia [address] [facility] 7 [phone] Dear Jackie Segovia: Please find your most recent results below. Resulted Orders [facility] MAMMO BI SCREENING INCL CAD Narrative STUDY: Bilateral digital screening mammogram 
 
INDICATION:  Screening. COMPARISON:  Prior studies dating back to 2012 BREAST COMPOSITION:  There are scattered areas of fibroglandular density. FINDINGS: Bilateral digital screening mammography was performed and is 
interpreted in conjunction with a computer assisted detection (CAD) system. No 
suspicious masses or calcifications are identified. There has been no 
significant change. Impression IMPRESSION: 
BI-RADS 1: Negative. No mammographic evidence of malignancy. RECOMMENDATIONS: 
Next screening mammogram is recommended in one year. The patient will be notified of these results. RECOMMENDATIONS: 
Jackie Segovia your recent mammogram was stable, recheck in 1 year. Please call me if you have any questions: [phone] Sincerely, JUNE [facility] 1

## 2017-09-25 ENCOUNTER — ANESTHESIA (OUTPATIENT)
Dept: ENDOSCOPY | Age: 82
End: 2017-09-25
Payer: MEDICARE

## 2017-09-25 ENCOUNTER — HOSPITAL ENCOUNTER (OUTPATIENT)
Age: 82
Setting detail: OUTPATIENT SURGERY
Discharge: HOME | End: 2017-09-25
Attending: INTERNAL MEDICINE | Admitting: INTERNAL MEDICINE
Payer: MEDICARE

## 2017-09-25 ENCOUNTER — ANESTHESIA EVENT (OUTPATIENT)
Dept: ENDOSCOPY | Age: 82
End: 2017-09-25
Payer: MEDICARE

## 2017-09-25 VITALS
HEART RATE: 67 BPM | BODY MASS INDEX: 32.25 KG/M2 | SYSTOLIC BLOOD PRESSURE: 153 MMHG | OXYGEN SATURATION: 97 % | WEIGHT: 160 LBS | HEIGHT: 59 IN | DIASTOLIC BLOOD PRESSURE: 66 MMHG | TEMPERATURE: 97.5 F | RESPIRATION RATE: 20 BRPM

## 2017-09-25 LAB
GLUCOSE BLD STRIP.AUTO-MCNC: 112 MG/DL (ref 65–100)
SERVICE CMNT-IMP: ABNORMAL

## 2017-09-25 PROCEDURE — 76040000019: Performed by: INTERNAL MEDICINE

## 2017-09-25 PROCEDURE — 77030009426 HC FCPS BIOP ENDOSC BSC -B: Performed by: INTERNAL MEDICINE

## 2017-09-25 PROCEDURE — 74011250636 HC RX REV CODE- 250/636

## 2017-09-25 PROCEDURE — 77030027957 HC TBNG IRR ENDOGTR BUSS -B: Performed by: INTERNAL MEDICINE

## 2017-09-25 PROCEDURE — 74011000250 HC RX REV CODE- 250

## 2017-09-25 PROCEDURE — 88305 TISSUE EXAM BY PATHOLOGIST: CPT | Performed by: INTERNAL MEDICINE

## 2017-09-25 PROCEDURE — 82962 GLUCOSE BLOOD TEST: CPT

## 2017-09-25 PROCEDURE — 76060000031 HC ANESTHESIA FIRST 0.5 HR: Performed by: INTERNAL MEDICINE

## 2017-09-25 RX ORDER — DIPHENHYDRAMINE HYDROCHLORIDE 50 MG/ML
50 INJECTION, SOLUTION INTRAMUSCULAR; INTRAVENOUS ONCE
Status: DISCONTINUED | OUTPATIENT
Start: 2017-09-25 | End: 2017-09-25 | Stop reason: HOSPADM

## 2017-09-25 RX ORDER — MIDAZOLAM HYDROCHLORIDE 1 MG/ML
.25-1 INJECTION, SOLUTION INTRAMUSCULAR; INTRAVENOUS
Status: DISCONTINUED | OUTPATIENT
Start: 2017-09-25 | End: 2017-09-25 | Stop reason: HOSPADM

## 2017-09-25 RX ORDER — LIDOCAINE HYDROCHLORIDE 20 MG/ML
INJECTION, SOLUTION EPIDURAL; INFILTRATION; INTRACAUDAL; PERINEURAL AS NEEDED
Status: DISCONTINUED | OUTPATIENT
Start: 2017-09-25 | End: 2017-09-25 | Stop reason: HOSPADM

## 2017-09-25 RX ORDER — DEXTROMETHORPHAN/PSEUDOEPHED 2.5-7.5/.8
1.2 DROPS ORAL
Status: DISCONTINUED | OUTPATIENT
Start: 2017-09-25 | End: 2017-09-25 | Stop reason: HOSPADM

## 2017-09-25 RX ORDER — PROPOFOL 10 MG/ML
INJECTION, EMULSION INTRAVENOUS AS NEEDED
Status: DISCONTINUED | OUTPATIENT
Start: 2017-09-25 | End: 2017-09-25 | Stop reason: HOSPADM

## 2017-09-25 RX ORDER — FENTANYL CITRATE 50 UG/ML
100 INJECTION, SOLUTION INTRAMUSCULAR; INTRAVENOUS
Status: DISCONTINUED | OUTPATIENT
Start: 2017-09-25 | End: 2017-09-25 | Stop reason: HOSPADM

## 2017-09-25 RX ORDER — FLUMAZENIL 0.1 MG/ML
0.2 INJECTION INTRAVENOUS
Status: DISCONTINUED | OUTPATIENT
Start: 2017-09-25 | End: 2017-09-25 | Stop reason: HOSPADM

## 2017-09-25 RX ORDER — SODIUM CHLORIDE 9 MG/ML
INJECTION, SOLUTION INTRAVENOUS
Status: DISCONTINUED | OUTPATIENT
Start: 2017-09-25 | End: 2017-09-25 | Stop reason: HOSPADM

## 2017-09-25 RX ORDER — SODIUM CHLORIDE 0.9 % (FLUSH) 0.9 %
5-10 SYRINGE (ML) INJECTION AS NEEDED
Status: DISCONTINUED | OUTPATIENT
Start: 2017-09-25 | End: 2017-09-25 | Stop reason: HOSPADM

## 2017-09-25 RX ORDER — SODIUM CHLORIDE 0.9 % (FLUSH) 0.9 %
5-10 SYRINGE (ML) INJECTION EVERY 8 HOURS
Status: DISCONTINUED | OUTPATIENT
Start: 2017-09-25 | End: 2017-09-25 | Stop reason: HOSPADM

## 2017-09-25 RX ORDER — ATROPINE SULFATE 0.1 MG/ML
0.5 INJECTION INTRAVENOUS
Status: DISCONTINUED | OUTPATIENT
Start: 2017-09-25 | End: 2017-09-25 | Stop reason: HOSPADM

## 2017-09-25 RX ORDER — EPINEPHRINE 0.1 MG/ML
1 INJECTION INTRACARDIAC; INTRAVENOUS
Status: DISCONTINUED | OUTPATIENT
Start: 2017-09-25 | End: 2017-09-25 | Stop reason: HOSPADM

## 2017-09-25 RX ORDER — SODIUM CHLORIDE 9 MG/ML
100 INJECTION, SOLUTION INTRAVENOUS CONTINUOUS
Status: DISCONTINUED | OUTPATIENT
Start: 2017-09-25 | End: 2017-09-25 | Stop reason: HOSPADM

## 2017-09-25 RX ORDER — NALOXONE HYDROCHLORIDE 0.4 MG/ML
0.4 INJECTION, SOLUTION INTRAMUSCULAR; INTRAVENOUS; SUBCUTANEOUS
Status: DISCONTINUED | OUTPATIENT
Start: 2017-09-25 | End: 2017-09-25 | Stop reason: HOSPADM

## 2017-09-25 RX ADMIN — PROPOFOL 50 MG: 10 INJECTION, EMULSION INTRAVENOUS at 10:18

## 2017-09-25 RX ADMIN — PROPOFOL 50 MG: 10 INJECTION, EMULSION INTRAVENOUS at 10:21

## 2017-09-25 RX ADMIN — LIDOCAINE HYDROCHLORIDE 60 MG: 20 INJECTION, SOLUTION EPIDURAL; INFILTRATION; INTRACAUDAL; PERINEURAL at 10:13

## 2017-09-25 RX ADMIN — PROPOFOL 50 MG: 10 INJECTION, EMULSION INTRAVENOUS at 10:30

## 2017-09-25 RX ADMIN — PROPOFOL 50 MG: 10 INJECTION, EMULSION INTRAVENOUS at 10:24

## 2017-09-25 RX ADMIN — SODIUM CHLORIDE: 9 INJECTION, SOLUTION INTRAVENOUS at 09:55

## 2017-09-25 RX ADMIN — PROPOFOL 50 MG: 10 INJECTION, EMULSION INTRAVENOUS at 10:15

## 2017-09-25 RX ADMIN — PROPOFOL 50 MG: 10 INJECTION, EMULSION INTRAVENOUS at 10:33

## 2017-09-25 RX ADMIN — PROPOFOL 50 MG: 10 INJECTION, EMULSION INTRAVENOUS at 10:25

## 2017-09-25 NOTE — DISCHARGE INSTRUCTIONS
[address] [facility] [address] [place] [address], [address] [facility]    EGD/COLON DISCHARGE INSTRUCTIONS    Pavithra Can  [id number]  1934    Discomfort:  Sore throat- throat lozenges or warm salt water gargle  redness at IV site- apply warm compress to area; if redness or soreness persist- contact your physician  Gaseous discomfort- walking, belching will help relieve any discomfort  You may not operate a vehicle for 12 hours  You may not engage in an occupation involving machinery or appliances for rest of today  You may not drink alcoholic beverages for at least 12 hours  Avoid making any critical decisions for at least 24 hour  DIET  You may resume your regular diet - however -  remember your colon is empty and a heavy meal will produce gas. Avoid these foods:  vegetables, fried / greasy foods, carbonated drinks    ACTIVITY  You may resume your normal daily activities   Spend the remainder of the day resting -  avoid any strenuous activity. CALL M.D. ANY SIGN OF   Increasing pain, nausea, vomiting  Abdominal distension (swelling)  New increased bleeding (oral or rectal)  Fever (chills)  Pain in chest area  Bloody discharge from nose or mouth  Shortness of breath    Follow-up Instructions:   Call Dr. Chris Espinoza for any questions or problems. Telephone # [phone]    ENDOSCOPY FINDINGS:   Your endoscopy was normal. Biopsies of the esophagus were taken. We will contact you about the pathology results. Your colonoscopy showed diverticulosis and internal hemorrhoids. Please maintain a high fiber diet. No further screening colonoscopy is needed. Signed By: Asim Sim MD     9/25/2017  10:39 AM

## 2017-09-25 NOTE — H&P
[address]  [address] [facility] 12, 6[facility]      History and Physical       NAME:  Luke Reddy   :   1934   MRN:   [id number]             History of Present Illness:  Patient is a 80 y.o. who is seen for dysphagia and colon cancer screening. Last colonoscopy was in  at which time no polyps were removed. PMH:  Past Medical History:   Diagnosis Date    Adverse effect of anesthesia     pt states she was able to feel everything during colonoscopy    Chronic bronchitis     Diabetes ([address]     Essential hypertension     Hypercholesterolemia     Hypertension        PSH:  Past Surgical History:   Procedure Laterality Date    HX CHOLECYSTECTOMY      HX GYN      tubal ligation    HX HYSTERECTOMY         Allergies: Allergies   Allergen Reactions    Cortisone Other (comments)     \"Make me feels weak, like I'm going to die\"      Januvia [Sitagliptin] Other (comments)     weakness       Home Medications:  Prior to Admission Medications   Prescriptions Last Dose Informant Patient Reported? Taking? ALPRAZolam (XANAX) 0.5 mg tablet 2017 at Unknown time  No Yes   Sig: Take 1 Tab by mouth nightly as needed for Anxiety. Max Daily Amount: 0.5 mg. Cholecalciferol, Vitamin D3, (VITAMIN D3) 1,000 unit cap 2017 at Unknown time  Yes Yes   Sig: Take 1,000 Units by mouth daily. TIMOPTIC-XE 0.5 % ophthalmic gel-forming 2017 at Unknown time  Yes Yes   acetaminophen (TYLENOL) 325 mg tablet 2017 at Unknown time  Yes Yes   Sig: Take  by mouth every four (4) hours as needed for Pain. aspirin 81 mg chewable tablet 2017 at Unknown time  Yes Yes   Sig: Take 81 mg by mouth daily. atorvastatin (LIPITOR) 10 mg tablet 2017 at Unknown time  No Yes   Sig: TAKE ONE TABLET BY MOUTH DAILY   cloNIDine HCl (CATAPRES) 0.3 mg tablet 2017 at Unknown time  No Yes   Sig: Take 1 Tab by mouth three (3) times daily.    dilTIAZem CD (CARDIZEM CD) 180 mg ER capsule 2017 at Unknown time  No Yes   Sig: TAKE ONE CAPSULE BY MOUTH EVERY DAY   famotidine (PEPCID) 20 mg tablet 2017 at Unknown time  No Yes   Sig: Take 1 Tab by mouth nightly. fexofenadine (ALLEGRA) 180 mg tablet Not Taking at Unknown time  No No   Sig: Take 1 Tab by mouth daily as needed for Allergies. fluticasone (FLONASE) 50 mcg/actuation nasal spray 2017 at Unknown time  No Yes   Si Sprays by Both Nostrils route daily. furosemide (LASIX) 40 mg tablet 2017 at Unknown time  No Yes   Sig: TAKE ONE TABLET BY MOUTH EVERY DAY   glipiZIDE (GLUCOTROL) 5 mg tablet 2017 at Unknown time  No Yes   Sig: Take 1 Tab by mouth two (2) times a day. latanoprost (XALATAN) 0.005 % ophthalmic solution 2017 at Unknown time  Yes Yes   losartan (COZAAR) 100 mg tablet 2017 at Unknown time  No Yes   Sig: TAKE ONE TABLET BY MOUTH EVERY DAY   metFORMIN ER (GLUCOPHAGE XR) 500 mg tablet 2017 at Unknown time  No Yes   Sig: TAKE TWO TABLETS BY MOUTH EVERY DAY WITH DINNER   metoprolol tartrate (LOPRESSOR) 100 mg IR tablet 2017 at Unknown time  No Yes   Sig: Take 1.5 Tabs by mouth two (2) times a day. omeprazole (PRILOSEC) 40 mg capsule 2017 at Unknown time  No Yes   Sig: TAKE ONE CAPSULE BY MOUTH EVERY DAY BEFORE BREAKFAST   pioglitazone (ACTOS) 15 mg tablet 2017 at Unknown time  No Yes   Sig: Take 1 Tab by mouth nightly.    potassium chloride (KLOR-CON) 10 mEq tablet 2017 at Unknown time  No Yes   Sig: TAKE TWO TABLETS BY MOUTH DAILY      Facility-Administered Medications: None       Hospital Medications:  Current Facility-Administered Medications   Medication Dose Route Frequency    0.9% sodium chloride infusion  100 mL/hr IntraVENous CONTINUOUS    sodium chloride (NS) flush 5-10 mL  5-10 mL IntraVENous Q8H    sodium chloride (NS) flush 5-10 mL  5-10 mL IntraVENous PRN    midazolam (VERSED) injection 0.25-10 mg  0.25-10 mg IntraVENous Multiple    fentaNYL citrate (PF) injection 100 mcg  100 mcg IntraVENous Multiple    naloxone (NARCAN) injection 0.4 mg  0.4 mg IntraVENous Multiple    flumazenil (ROMAZICON) 0.1 mg/mL injection 0.2 mg  0.2 mg IntraVENous Multiple    simethicone (MYLICON) 90TM/8.8JK oral drops 80 mg  1.2 mL Oral Multiple    diphenhydrAMINE (BENADRYL) injection 50 mg  50 mg IntraVENous ONCE    atropine injection 0.5 mg  0.5 mg IntraVENous ONCE PRN    EPINEPHrine (ADRENALIN) 0.1 mg/mL syringe 1 mg  1 mg Endoscopically ONCE PRN     Facility-Administered Medications Ordered in Other Encounters   Medication Dose Route Frequency    0.9% sodium chloride infusion   IntraVENous CONTINUOUS       Social History:  Social History   Substance Use Topics    Smoking status: Never Smoker    Smokeless tobacco: Never Used    Alcohol use No       Family History:  History reviewed. No pertinent family history. Review of Systems:      Constitutional: negative fever, negative chills, negative weight loss  Eyes:   negative visual changes  ENT:   negative sore throat, tongue or lip swelling  Respiratory:  negative cough, negative dyspnea  Cards:  negative for chest pain, palpitations, lower extremity edema  GI:   See HPI  :  negative for frequency, dysuria  Integument:  negative for rash and pruritus  Heme:  negative for easy bruising and gum/nose bleeding  Musculoskel: negative for myalgias,  back pain and muscle weakness  Neuro: negative for headaches, dizziness, vertigo  Psych:  negative for feelings of anxiety, depression       Objective:   Patient Vitals for the past 8 hrs:   BP Temp Pulse Resp SpO2 Height Weight   09/25/17 0922 140/77 98 °F (36.7 °C) 66 15 100 % 4' 11\" (1.499 m) 72.6 kg (160 lb)             EXAM:     NEURO-a&o   HEENT-wnl   LUNGS-clear    COR-regular rate and rhythym     ABD-soft , no tenderness, no rebound, good bs     EXT-no edema     Data Review     No results for input(s): WBC, HGB, HCT, PLT, HGBEXT, HCTEXT, PLTEXT in the last 72 hours.   No results for input(s): NA, K, CL, CO2, BUN, CREA, GLU, PHOS, CA in the last 72 hours. No results for input(s): SGOT, GPT, AP, TBIL, TP, ALB, GLOB, GGT, AML, LPSE in the last 72 hours. No lab exists for component: AMYP, HLPSE  No results for input(s): INR, PTP, APTT in the last 72 hours. No lab exists for component: INREXT       Assessment:   · Dysphagia  · Colon cancer screening     Patient Active Problem List   Diagnosis Code    Hypercholesterolemia E78.00    PVC's (premature ventricular contractions) I49.3    Stasis edema I87.309    Encounter for long-term (current) use of other medications Z79.899    Unspecified constipation K59.00    Allergic rhinitis, cause unspecified J30.9    Mixed hyperlipidemia E78.2    Controlled type 2 diabetes mellitus without complication (HCC) F10.7    Chronic nasal congestion R09.81    Advance directive discussed with patient Z70.80    Essential hypertension with goal blood pressure less than 140/90 I10     Plan:   · Endoscopic procedure with MAC     Signed By: Milly Green.  Nikki Rosales MD     9/25/2017  10:11 AM

## 2017-09-25 NOTE — ROUTINE PROCESS
Savi Sona  1934  [id number]    Situation:  Verbal report received from: Bernard Green RN  Procedure: Procedure(s):  ESOPHAGOGASTRODUODENOSCOPY (EGD)  COLONOSCOPY  ESOPHAGOGASTRODUODENAL (EGD) BIOPSY    Background:    Preoperative diagnosis: SCREENING, DYSPHAGIA  Postoperative diagnosis: 1.- Internal Hemorrhoids  2.- Diverticulosis    :  Dr. Sarbjit Magdaleno  Assistant(s): Endoscopy Technician-1: Jamarcus Silvestre  Endoscopy RN-1: Casilda Landau, RN  Endoscopy RN-2: Lola Sabillon RN    Specimens:   ID Type Source Tests Collected by Time Destination   1 : Esophagus Biopsy Preservative   Wesley Magdaleno MD 9/25/2017 1021 Pathology     H. Pylori  no    Assessment:  Intra-procedure medications   Anesthesia gave intra-procedure sedation and medications, see anesthesia flow sheet yes    Intravenous fluids: NS@ KVO     Vital signs stable     Abdominal assessment: round and soft     Recommendation:  Discharge patient per MD order.     Family or Friend   Permission to share finding with family or friend yes

## 2017-09-25 NOTE — ANESTHESIA POSTPROCEDURE EVALUATION
Post-Anesthesia Evaluation and Assessment    Patient: Tiffanie Mann MRN: [id number]  SSN: [id number]    YOB: 1934  Age: 80 y.o. Sex: female       Cardiovascular Function/Vital Signs  Visit Vitals    /66    Pulse 67    Temp 36.4 °C (97.5 °F)    Resp 20    Ht 4' 11\" (1.499 m)    Wt 72.6 kg (160 lb)    SpO2 97%    Breastfeeding No    BMI 32.32 kg/m2       Patient is status post MAC anesthesia for Procedure(s):  ESOPHAGOGASTRODUODENOSCOPY (EGD)  COLONOSCOPY  ESOPHAGOGASTRODUODENAL (EGD) BIOPSY. Nausea/Vomiting: None    Postoperative hydration reviewed and adequate. Pain:  Pain Scale 1: Numeric (0 - 10) (09/25/17 1110)  Pain Intensity 1: 0 (09/25/17 1110)   Managed    Neurological Status: At baseline    Mental Status and Level of Consciousness: Arousable    Pulmonary Status:   O2 Device: Room air (09/25/17 1110)   Adequate oxygenation and airway patent    Complications related to anesthesia: None    Post-anesthesia assessment completed.  No concerns    Signed By: Armani Hall DO     September 25, 2017

## 2017-09-25 NOTE — PROCEDURES
[address]  [facility] 25, [address] [facility]        Colonoscopy Operative Report    Pavithra Can  [id number]  1934      Procedure Type:   Colonoscopy - screening    Indications: Screening colonoscopy        Pre-operative Diagnosis: see indication above    Post-operative Diagnosis:  See findings below    :  Asim Sim MD      Referring Provider: Isaac Camargo MD      Sedation:  MAC anesthesia      Procedure Details:  After informed consent was obtained with all risks and benefits of procedure explained and preoperative exam completed, the patient was taken to the endoscopy suite and placed in the left lateral decubitus position. Upon sequential sedation as per above, a digital rectal exam was performed demonstrating internal hemorrhoids. The Olympus pediatric videocolonoscope  was inserted in the rectum and carefully advanced to the cecum, which was identified by the ileocecal valve and appendiceal orifice. The cecum was identified by the ileocecal valve and appendiceal orifice. The quality of preparation was good. The colonoscope was slowly withdrawn with careful evaluation between folds. Retroflexion in the rectum was completed . Findings:   Rectum: small internal hemorrhoids  Sigmoid: mild diverticulosis  Descending Colon: normal  Transverse Colon: normal  Ascending Colon: normal  Cecum: normal  Terminal Ileum: not intubated      Specimen Removed:  none    Complications: None. EBL:  None. Impression:   1. Mild diverticulosis  3. Small internal hemorrhoids    Recommendations:  1. High fiber diet education  2. No further screening colonoscopy needed. Diagnostic colonoscopy to be performed as clinically indicated based on general health. Signed By: Asim Sim MD     9/25/2017  10:42 AM

## 2017-09-25 NOTE — ANESTHESIA PREPROCEDURE EVALUATION
Anesthetic History   No history of anesthetic complications            Review of Systems / Medical History  Patient summary reviewed, nursing notes reviewed and pertinent labs reviewed    Pulmonary    COPD               Neuro/Psych   Within defined limits           Cardiovascular    Hypertension: poorly controlled        Dysrhythmias : PVC  Hyperlipidemia         GI/Hepatic/Renal  Within defined limits              Endo/Other    Diabetes    Obesity     Other Findings              Physical Exam    Airway  Mallampati: II  TM Distance: > 6 cm  Neck ROM: normal range of motion   Mouth opening: Normal     Cardiovascular  Regular rate and rhythm,  S1 and S2 normal,  no murmur, click, rub, or gallop             Dental    Dentition: Edentulous     Pulmonary  Breath sounds clear to auscultation               Abdominal  GI exam deferred       Other Findings            Anesthetic Plan    ASA: 3  Anesthesia type: MAC          Induction: Intravenous  Anesthetic plan and risks discussed with: Patient

## 2017-09-25 NOTE — PROGRESS NOTES

## 2017-09-25 NOTE — PROCEDURES
[address]  [address] [place] [address], [address] (EGD) Procedure Note    Gretel Macias  1934  [id number]      Procedure: Endoscopic Gastroduodenoscopy --diagnostic, with biopsy    Indication:  dysphagia     Pre-operative Diagnosis: see indication above    Post-operative Diagnosis: see findings below    : Marlon Elise. Aliyah Guzman MD    Referring Provider:  Ryan Valdez MD      Anesthesia/Sedation:  MAC anesthesia Propofol        Procedure Details     After informed consent was obtained for the procedure, with all risks and benefits of procedure explained the patient was taken to the endoscopy suite and placed in the left lateral decubitus position. Following sequential administration of sedation as per above, the endoscope was inserted into the mouth and advanced under direct vision to second portion of the duodenum. A careful inspection was made as the gastroscope was withdrawn, including a retroflexed view of the proximal stomach; findings and interventions are described below. Findings:   Esophagus:normal - cold biopsies taken  Stomach: normal   Duodenum: normal      Therapies: as above  Specimens: 1. esophagus         EBL: None      Complications:   None; patient tolerated the procedure well. Impression:    As above    Recommendations: Follow up surgical pathology  Proceed to colonoscopy    Signed By: Marlon Elise.  Aliyah Guzman MD     9/25/2017  10:40 AM

## 2017-09-25 NOTE — IP AVS SNAPSHOT
2700 [facility] [address] [place] [address] 
[phone] Patient: Stevenson Ortega MRN: [id number] YGA:[id number] You are allergic to the following Allergen Reactions Cortisone Other (comments) \"Make me feels weak, like I'm going to die\" Januvia (Sitagliptin) Other (comments)  
 weakness Recent Documentation Height Weight Breastfeeding? BMI OB Status Smoking Status 1.499 m 72.6 kg No 32.32 kg/m2 Hysterectomy Never Smoker Emergency Contacts Name Discharge Info Relation Home Work Mobile [address] CAREGIVER [3] Son [22] [phone] About your hospitalization You were admitted on:  September 25, 2017 You last received care in the:  [facility] ENDOSCOPY You were discharged on:  September 25, 2017 Unit phone number:  [phone] Why you were hospitalized Your primary diagnosis was:  Not on File Providers Seen During Your Hospitalizations Provider Role Specialty Primary office phone Wesley Love MD Attending Provider Gastroenterology [phone] Your Primary Care Physician (PCP) Primary Care Physician Office Phone Office Fax [address], [facility] [place] [address] [phone] Follow-up Information None Your Appointments Wednesday October 04, 2017  8:15 AM EDT ROUTINE CARE with Dereck Homans, MD  
[facility] Internal Medicine [address] [place] [address] [address] [place] [address]  
[phone] Current Discharge Medication List  
  
CONTINUE these medications which have CHANGED Dose & Instructions Dispensing Information Comments Morning Noon Evening Bedtime  
 potassium chloride 10 mEq tablet Commonly known as:  KLOR-CON What changed:  See the new instructions. Your last dose was: Your next dose is: TAKE TWO TABLETS BY MOUTH DAILY Quantity:  60 Tab Refills:  6 CONTINUE these medications which have NOT CHANGED Dose & Instructions Dispensing Information Comments Morning Noon Evening Bedtime ALPRAZolam 0.5 mg tablet Commonly known as:  Xochitl Barney Your last dose was: Your next dose is:    
   
   
 Dose:  0.5 mg Take 1 Tab by mouth nightly as needed for Anxiety. Max Daily Amount: 0.5 mg.  
 Quantity:  15 Tab Refills:  0  
     
   
   
   
  
 aspirin 81 mg chewable tablet Your last dose was: Your next dose is:    
   
   
 Dose:  81 mg Take 81 mg by mouth daily. Refills:  0  
     
   
   
   
  
 atorvastatin 10 mg tablet Commonly known as:  LIPITOR Your last dose was: Your next dose is: TAKE ONE TABLET BY MOUTH DAILY Quantity:  30 Tab Refills:  5  
     
   
   
   
  
 cloNIDine HCl 0.3 mg tablet Commonly known as:  CATAPRES Your last dose was: Your next dose is:    
   
   
 Dose:  0.3 mg Take 1 Tab by mouth three (3) times daily. Quantity:  90 Tab Refills:  11  
     
   
   
   
  
 dilTIAZem  mg ER capsule Commonly known as:  CARDIZEM CD Your last dose was: Your next dose is: TAKE ONE CAPSULE BY MOUTH EVERY DAY Quantity:  30 Cap Refills:  0  
     
   
   
   
  
 famotidine 20 mg tablet Commonly known as:  PEPCID Your last dose was: Your next dose is:    
   
   
 Dose:  20 mg Take 1 Tab by mouth nightly. Quantity:  30 Tab Refills:  4  
     
   
   
   
  
 fexofenadine 180 mg tablet Commonly known as:  Imer Ricketts Your last dose was: Your next dose is:    
   
   
 Dose:  180 mg Take 1 Tab by mouth daily as needed for Allergies. Quantity:  30 Tab Refills:  1  
     
   
   
   
  
 fluticasone 50 mcg/actuation nasal spray Commonly known as:  Gill Hathaway Your last dose was: Your next dose is: Dose:  2 Spray 2 Sprays by Both Nostrils route daily. Quantity:  1 Bottle Refills:  0  
     
   
   
   
  
 furosemide 40 mg tablet Commonly known as:  LASIX Your last dose was: Your next dose is: TAKE ONE TABLET BY MOUTH EVERY DAY Quantity:  30 Tab Refills:  11  
     
   
   
   
  
 glipiZIDE 5 mg tablet Commonly known as:  Glenn Picking Your last dose was: Your next dose is: Take 1 Tab by mouth two (2) times a day. Quantity:  60 Tab Refills:  11  
     
   
   
   
  
 latanoprost 0.005 % ophthalmic solution Commonly known as:  Quynh Santos Your last dose was: Your next dose is:    
   
   
  Refills:  0  
     
   
   
   
  
 losartan 100 mg tablet Commonly known as:  COZAAR Your last dose was: Your next dose is: TAKE ONE TABLET BY MOUTH EVERY DAY Quantity:  90 Tab Refills:  3  
     
   
   
   
  
 metFORMIN  mg tablet Commonly known as:  GLUCOPHAGE XR Your last dose was: Your next dose is: TAKE TWO TABLETS BY MOUTH EVERY DAY WITH DINNER Quantity:  60 Tab Refills:  3  
     
   
   
   
  
 metoprolol tartrate 100 mg IR tablet Commonly known as:  LOPRESSOR Your last dose was: Your next dose is:    
   
   
 Dose:  150 mg Take 1.5 Tabs by mouth two (2) times a day. Quantity:  90 Tab Refills:  11  
     
   
   
   
  
 omeprazole 40 mg capsule Commonly known as:  PRILOSEC Your last dose was: Your next dose is: TAKE ONE CAPSULE BY MOUTH EVERY DAY BEFORE BREAKFAST Quantity:  30 Cap Refills:  5  
     
   
   
   
  
 pioglitazone 15 mg tablet Commonly known as:  ACTOS Your last dose was: Your next dose is:    
   
   
 Dose:  15 mg Take 1 Tab by mouth nightly. Quantity:  30 Tab Refills:  5  
     
   
   
   
  
 TIMOPTIC-XE 0.5 % ophthalmic gel-forming Generic drug:  timolol Your last dose was: Your next dose is:    
   
   
  Refills:  0  
     
   
   
   
  
 TYLENOL 325 mg tablet Generic drug:  acetaminophen Your last dose was: Your next dose is: Take  by mouth every four (4) hours as needed for Pain. Refills:  0  
     
   
   
   
  
 VITAMIN D3 1,000 unit Cap Generic drug:  cholecalciferol Your last dose was: Your next dose is:    
   
   
 Dose:  1000 Units Take 1,000 Units by mouth daily. Refills:  0 Where to Get Your Medications These medications were sent to [facility]  Jose Raul Professor Vincenzo Boyle [facility] [address] [place] [address] Phone:  [phone]  
  potassium chloride 10 mEq tablet Discharge Instructions [address] 
[address] EGD/COLON DISCHARGE INSTRUCTIONS Nik Molina [phone] 
1934 Discomfort: 
Sore throat- throat lozenges or warm salt water gargle 
redness at IV site- apply warm compress to area; if redness or soreness persist- contact your physician Gaseous discomfort- walking, belching will help relieve any discomfort You may not operate a vehicle for 12 hours You may not engage in an occupation involving machinery or appliances for rest of today You may not drink alcoholic beverages for at least 12 hours Avoid making any critical decisions for at least 24 hour DIET You may resume your regular diet  however -  remember your colon is empty and a heavy meal will produce gas. Avoid these foods:  vegetables, fried / greasy foods, carbonated drinks ACTIVITY You may resume your normal daily activities Spend the remainder of the day resting -  avoid any strenuous activity. CALL M.D. ANY SIGN OF Increasing pain, nausea, vomiting Abdominal distension (swelling) New increased bleeding (oral or rectal) Fever (chills) Pain in chest area Bloody discharge from nose or mouth Shortness of breath Follow-up Instructions: 
 Call Dr. Bishnu Owen for any questions or problems. Telephone # [phone] ENDOSCOPY FINDINGS: 
 Your endoscopy was normal. Biopsies of the esophagus were taken. We will contact you about the pathology results. Your colonoscopy showed diverticulosis and internal hemorrhoids. Please maintain a high fiber diet. No further screening colonoscopy is needed. Signed By: Magnolia Marie. Cas Quiroz MD   
 9/25/2017  10:39 AM 
  
 
 
Discharge Orders None ACO Transitions of Care Introducing [employer] [address] Sissy Rosenthal offers a voluntary care coordination program to provide high quality service and care to [facility] fee-for-service beneficiaries. Cindy Kennedy was designed to help you enhance your health and well-being through the following services: ? Transitions of Care  support for individuals who are transitioning from one care setting to another (example: Hospital to home). ? Chronic and Complex Care Coordination  support for individuals and caregivers of those with serious or chronic illnesses or with more than one chronic (ongoing) condition and those who take a number of different medications. If you meet specific medical criteria, a [address] [place] [address] Rd may call you directly to coordinate your care with your primary care physician and your other care providers. For questions about the [facility] programs, please, contact your physicians office. For general questions or additional information about Accountable Care Organizations: 
Please visit www.medicare.gov/acos. html or call 1-800-MEDICARE (7-410.670.3288) TTY users should call 2-265.764.1237. General Information Please provide this summary of care documentation to your next provider. Patient Signature:  ____________________________________________________________ Date:  ____________________________________________________________  
  
Gwendloyn Finger Provider Signature:  ____________________________________________________________ Date:  ____________________________________________________________

## 2017-09-27 RX ORDER — METOPROLOL TARTRATE 100 MG/1
TABLET ORAL
Qty: 90 TAB | Refills: 0 | Status: SHIPPED | OUTPATIENT
Start: 2017-09-27 | End: 2017-10-30 | Stop reason: SDUPTHER

## 2017-10-02 DIAGNOSIS — G47.00 INSOMNIA, UNSPECIFIED TYPE: ICD-10-CM

## 2017-10-02 RX ORDER — ALPRAZOLAM 0.5 MG/1
0.5 TABLET ORAL
Qty: 15 TAB | Refills: 0 | Status: SHIPPED | OUTPATIENT
Start: 2017-10-02 | End: 2017-10-04 | Stop reason: SDUPTHER

## 2017-10-04 ENCOUNTER — OFFICE VISIT (OUTPATIENT)
Dept: INTERNAL MEDICINE CLINIC | Age: 82
End: 2017-10-04

## 2017-10-04 VITALS
RESPIRATION RATE: 20 BRPM | DIASTOLIC BLOOD PRESSURE: 59 MMHG | WEIGHT: 162 LBS | BODY MASS INDEX: 32.66 KG/M2 | HEIGHT: 59 IN | TEMPERATURE: 96.4 F | SYSTOLIC BLOOD PRESSURE: 106 MMHG | OXYGEN SATURATION: 98 % | HEART RATE: 58 BPM

## 2017-10-04 DIAGNOSIS — R09.81 CHRONIC NASAL CONGESTION: ICD-10-CM

## 2017-10-04 DIAGNOSIS — I10 ESSENTIAL HYPERTENSION WITH GOAL BLOOD PRESSURE LESS THAN 140/90: ICD-10-CM

## 2017-10-04 DIAGNOSIS — Z23 ENCOUNTER FOR IMMUNIZATION: ICD-10-CM

## 2017-10-04 DIAGNOSIS — E11.9 CONTROLLED TYPE 2 DIABETES MELLITUS WITHOUT COMPLICATION, UNSPECIFIED LONG TERM INSULIN USE STATUS: Primary | ICD-10-CM

## 2017-10-04 DIAGNOSIS — F41.0 ANXIETY ATTACK: ICD-10-CM

## 2017-10-04 RX ORDER — ALPRAZOLAM 0.5 MG/1
0.25 TABLET ORAL
Qty: 30 TAB | Refills: 0 | Status: SHIPPED | OUTPATIENT
Start: 2017-10-04 | End: 2018-06-21 | Stop reason: SDUPTHER

## 2017-10-04 NOTE — PATIENT INSTRUCTIONS
Vaccine Information Statement    Influenza (Flu) Vaccine (Inactivated or Recombinant): What you need to know    Many Vaccine Information Statements are available in Occitan and other languages. See www.immunize.org/vis  Hojas de Información Sobre Vacunas están disponibles en Español y en muchos otros idiomas. Visite www.immunize.org/vis    1. Why get vaccinated? Influenza (flu) is a contagious disease that spreads around the [place] every year, usually between October and May. Flu is caused by influenza viruses, and is spread mainly by coughing, sneezing, and close contact. Anyone can get flu. Flu strikes suddenly and can last several days. Symptoms vary by age, but can include:   fever/chills   sore throat   muscle aches   fatigue   cough   headache    runny or stuffy nose    Flu can also lead to pneumonia and blood infections, and cause diarrhea and seizures in children. If you have a medical condition, such as heart or lung disease, flu can make it worse. Flu is more dangerous for some people. Infants and young children, people 72years of age and older, pregnant women, and people with certain health conditions or a weakened immune system are at greatest risk. Each year thousands of people in the [facility] die from flu, and many more are hospitalized. Flu vaccine can:   keep you from getting flu,   make flu less severe if you do get it, and   keep you from spreading flu to your family and other people. 2. Inactivated and recombinant flu vaccines    A dose of flu vaccine is recommended every flu season. Children 6 months through 6years of age may need two doses during the same flu season. Everyone else needs only one dose each flu season.        Some inactivated flu vaccines contain a very small amount of a mercury-based preservative called thimerosal. Studies have not shown thimerosal in vaccines to be harmful, but flu vaccines that do not contain thimerosal are available. There is no live flu virus in flu shots. They cannot cause the flu. There are many flu viruses, and they are always changing. Each year a new flu vaccine is made to protect against three or four viruses that are likely to cause disease in the upcoming flu season. But even when the vaccine doesnt exactly match these viruses, it may still provide some protection    Flu vaccine cannot prevent:   flu that is caused by a virus not covered by the vaccine, or   illnesses that look like flu but are not. It takes about 2 weeks for protection to develop after vaccination, and protection lasts through the flu season. 3. Some people should not get this vaccine    Tell the person who is giving you the vaccine:     If you have any severe, life-threatening allergies. If you ever had a life-threatening allergic reaction after a dose of flu vaccine, or have a severe allergy to any part of this vaccine, you may be advised not to get vaccinated. Most, but not all, types of flu vaccine contain a small amount of egg protein.  If you ever had Guillain-Barré Syndrome (also called GBS). Some people with a history of GBS should not get this vaccine. This should be discussed with your doctor.  If you are not feeling well. It is usually okay to get flu vaccine when you have a mild illness, but you might be asked to come back when you feel better. 4. Risks of a vaccine reaction    With any medicine, including vaccines, there is a chance of reactions. These are usually mild and go away on their own, but serious reactions are also possible. Most people who get a flu shot do not have any problems with it.      Minor problems following a flu shot include:    soreness, redness, or swelling where the shot was given     hoarseness   sore, red or itchy eyes   cough   fever   aches   headache   itching   fatigue  If these problems occur, they usually begin soon after the shot and last 1 or 2 days. More serious problems following a flu shot can include the following:     There may be a small increased risk of Guillain-Barré Syndrome (GBS) after inactivated flu vaccine. This risk has been estimated at 1 or 2 additional cases per million people vaccinated. This is much lower than the risk of severe complications from flu, which can be prevented by flu vaccine.  Young children who get the flu shot along with pneumococcal vaccine (PCV13) and/or DTaP vaccine at the same time might be slightly more likely to have a seizure caused by fever. Ask your doctor for more information. Tell your doctor if a child who is getting flu vaccine has ever had a seizure. Problems that could happen after any injected vaccine:      People sometimes faint after a medical procedure, including vaccination. Sitting or lying down for about 15 minutes can help prevent fainting, and injuries caused by a fall. Tell your doctor if you feel dizzy, or have vision changes or ringing in the ears.  Some people get severe pain in the shoulder and have difficulty moving the arm where a shot was given. This happens very rarely.  Any medication can cause a severe allergic reaction. Such reactions from a vaccine are very rare, estimated at about 1 in a million doses, and would happen within a few minutes to a few hours after the vaccination. As with any medicine, there is a very remote chance of a vaccine causing a serious injury or death. The safety of vaccines is always being monitored. For more information, visit: www.cdc.gov/vaccinesafety/    5. What if there is a serious reaction? What should I look for?  Look for anything that concerns you, such as signs of a severe allergic reaction, very high fever, or unusual behavior.     Signs of a severe allergic reaction can include hives, swelling of the face and throat, difficulty breathing, a fast heartbeat, dizziness, and weakness  usually within a few minutes to a few hours after the vaccination. What should I do?  If you think it is a severe allergic reaction or other emergency that cant wait, call 9-1-1 and get the person to the nearest hospital. Otherwise, call your doctor.  Reactions should be reported to the Vaccine Adverse Event Reporting System (VAERS). Your doctor should file this report, or you can do it yourself through  the VAERS web site at www.vaers. [facility].gov, or by calling 8-929.766.1861. VAERS does not give medical advice. 6. The National Vaccine Injury Compensation Program    The [facility] Vaccine Injury Compensation Program (VICP) is a federal program that was created to compensate people who may have been injured by certain vaccines. Persons who believe they may have been injured by a vaccine can learn about the program and about filing a claim by calling 5-862.348.7052 or visiting the [employer] website at www.[facility].gov/vaccinecompensation. There is a time limit to file a claim for compensation. 7. How can I learn more?  Ask your healthcare provider. He or she can give you the vaccine package insert or suggest other sources of information.  Call your local or state health department.  Contact the Centers for Disease Control and Prevention (CDC):  - Call 8-128.592.7914 (1-800-CDC-INFO) or  - Visit CDCs website at www.cdc.gov/flu    Vaccine Information Statement   Inactivated Influenza Vaccine   8/7/2015  42 CHIARA Corrales [id number]    Department of Health and Human Services  Centers for Disease Control and Prevention    Office Use Only

## 2017-10-04 NOTE — MR AVS SNAPSHOT
Visit Information Date & Time Provider Department Dept. Phone Encounter #  
 10/4/2017  8:15 AM Nancy Erwin MD [facility] Internal Medicine [phone] [id number] Your Appointments 12/1/2017  8:40 AM  
ESTABLISHED PATIENT with Jhonny Osborn MD  
[facility] ([address]) Appt Note: 6 month follow up  
 [address] [facility] [address]  
[address] [place] [address]  
  
    
 1/3/2018  8:45 AM  
ROUTINE CARE with Nancy Erwin MD  
[facility] Internal Medicine [address]) Appt Note: 4 month follow up. ... [address] [facility] [address]  
[phone]  
  
   
 [address] Upcoming Health Maintenance Date Due DTaP/Tdap/Td series (1 - Tdap) 3/1/1955 INFLUENZA AGE 9 TO ADULT 8/1/2017 LIPID PANEL Q1 9/16/2017 HEMOGLOBIN A1C Q6M 3/6/2018 MEDICARE YEARLY EXAM 4/18/2018 EYE EXAM RETINAL OR DILATED Q1 4/25/2018 FOOT EXAM Q1 9/6/2018 MICROALBUMIN Q1 9/6/2018 GLAUCOMA SCREENING Q2Y 4/25/2019 COLONOSCOPY 9/25/2027 Allergies as of 10/4/2017  Review Complete On: 10/4/2017 By: Nancy Erwin MD  
  
 Severity Noted Reaction Type Reactions Cortisone  05/11/2011    Other (comments) \"Make me feels weak, like I'm going to die\" Januvia [Sitagliptin]  11/09/2016    Other (comments)  
 weakness Current Immunizations  Reviewed on 9/14/2016 Name Date Influenza High Dose Vaccine PF  Incomplete, 10/28/2014 Influenza Vaccine 9/14/2016, 10/19/2013 Pneumococcal Conjugate (PCV-13) 9/14/2016 ZZZ-RETIRED (DO NOT USE) Pneumococcal Vaccine (Unspecified Type) 10/1/2008 Not reviewed this visit You Were Diagnosed With   
  
 Codes Comments Controlled type 2 diabetes mellitus without complication, unspecified long term insulin use status ([facility].)    -  Primary ICD-10-CM: E11.9 ICD-9-CM: 250.00 Encounter for immunization     ICD-10-CM: M17 ICD-9-CM: V03.89 Essential hypertension with goal blood pressure less than 140/90     ICD-10-CM: I10 
ICD-9-CM: 401.9 Chronic nasal congestion     ICD-10-CM: R09.81 ICD-9-CM: 478.19 Anxiety attack     ICD-10-CM: F41.0 ICD-9-CM: 300.01 Vitals BP Pulse Temp Resp Height(growth percentile) Weight(growth percentile) 106/59 (BP 1 Location: Left arm, BP Patient Position: Sitting) (!) 58 96.4 °F (35.8 °C) (Oral) 20 4' 11\" (1.499 m) 162 lb (73.5 kg) SpO2 BMI OB Status Smoking Status 98% 32.72 kg/m2 Hysterectomy Never Smoker Vitals History BMI and BSA Data Body Mass Index Body Surface Area 32.72 kg/m 2 1.75 m 2 Preferred Pharmacy Pharmacy Name Phone Sabrina Curry, [facility] [phone] Your Updated Medication List  
  
   
This list is accurate as of: 10/4/17  8:27 AM.  Always use your most recent med list.  
  
  
  
  
 ALPRAZolam 0.5 mg tablet Commonly known as:  Carletha Puller Take 0.5 Tabs by mouth nightly as needed for Anxiety. Max Daily Amount: 0.25 mg.  
  
 aspirin 81 mg chewable tablet Take 81 mg by mouth daily. atorvastatin 10 mg tablet Commonly known as:  LIPITOR  
TAKE ONE TABLET BY MOUTH DAILY  
  
 cloNIDine HCl 0.3 mg tablet Commonly known as:  CATAPRES Take 1 Tab by mouth three (3) times daily. dilTIAZem  mg ER capsule Commonly known as:  CARDIZEM CD  
TAKE ONE CAPSULE BY MOUTH EVERY DAY  
  
 famotidine 20 mg tablet Commonly known as:  PEPCID Take 1 Tab by mouth nightly. fexofenadine 180 mg tablet Commonly known as:  Randine Kiesha Take 1 Tab by mouth daily as needed for Allergies. fluticasone 50 mcg/actuation nasal spray Commonly known as:  Michael [place] 2 Sprays by Both Nostrils route daily. furosemide 40 mg tablet Commonly known as:  LASIX TAKE ONE TABLET BY MOUTH EVERY DAY  
  
 glipiZIDE 5 mg tablet Commonly known as:  Brooksie Fillers Take 1 Tab by mouth two (2) times a day. latanoprost 0.005 % ophthalmic solution Commonly known as:  XALATAN  
  
 losartan 100 mg tablet Commonly known as:  COZAAR  
TAKE ONE TABLET BY MOUTH EVERY DAY  
  
 metFORMIN  mg tablet Commonly known as:  GLUCOPHAGE XR  
TAKE TWO TABLETS BY MOUTH EVERY DAY WITH DINNER  
  
 metoprolol tartrate 100 mg IR tablet Commonly known as:  LOPRESSOR  
TAKE 1 AND 1/2 TABLET BY MOUTH 2 TIMES A DAY  
  
 omeprazole 40 mg capsule Commonly known as:  PRILOSEC  
TAKE ONE CAPSULE BY MOUTH EVERY DAY BEFORE BREAKFAST  
  
 pioglitazone 15 mg tablet Commonly known as:  ACTOS Take 1 Tab by mouth nightly. potassium chloride 10 mEq tablet Commonly known as:  KLOR-CON  
TAKE TWO TABLETS BY MOUTH DAILY  
  
 TIMOPTIC-XE 0.5 % ophthalmic gel-forming Generic drug:  timolol TYLENOL 325 mg tablet Generic drug:  acetaminophen Take  by mouth every four (4) hours as needed for Pain. VITAMIN D3 1,000 unit Cap Generic drug:  cholecalciferol Take 1,000 Units by mouth daily. Prescriptions Printed Refills ALPRAZolam (XANAX) 0.5 mg tablet 0 Sig: Take 0.5 Tabs by mouth nightly as needed for Anxiety. Max Daily Amount: 0.25 mg.  
 Class: Print Route: Oral  
  
We Performed the Following INFLUENZA VIRUS VACCINE, HIGH DOSE SEASONAL, PRESERVATIVE FREE [53483 CPT(R)] Patient Instructions Vaccine Information Statement Influenza (Flu) Vaccine (Inactivated or Recombinant): What you need to know Many Vaccine Information Statements are available in [place] and other languages. See www.immunize.org/vis Hojas de Información Sobre Vacunas están disponibles en Español y en muchos otros idiomas. Visite www.immunize.org/vis 1. Why get vaccinated?  
 
Influenza (flu) is a contagious disease that spreads around the [facility] in the [place] every year, usually between October and May. Flu is caused by influenza viruses, and is spread mainly by coughing, sneezing, and close contact. Anyone can get flu. Flu strikes suddenly and can last several days. Symptoms vary by age, but can include: 
 fever/chills  sore throat  muscle aches  fatigue  cough  headache  runny or stuffy nose Flu can also lead to pneumonia and blood infections, and cause diarrhea and seizures in children. If you have a medical condition, such as heart or lung disease, flu can make it worse. Flu is more dangerous for some people. Infants and young children, people 72years of age and older, pregnant women, and people with certain health conditions or a weakened immune system are at greatest risk. Each year thousands of people in the [facility] die from flu, and many more are hospitalized. Flu vaccine can: 
 keep you from getting flu, 
 make flu less severe if you do get it, and 
 keep you from spreading flu to your family and other people. 2. Inactivated and recombinant flu vaccines A dose of flu vaccine is recommended every flu season. Children 6 months through 6years of age may need two doses during the same flu season. Everyone else needs only one dose each flu season. Some inactivated flu vaccines contain a very small amount of a mercury-based preservative called thimerosal. Studies have not shown thimerosal in vaccines to be harmful, but flu vaccines that do not contain thimerosal are available. There is no live flu virus in flu shots. They cannot cause the flu. There are many flu viruses, and they are always changing. Each year a new flu vaccine is made to protect against three or four viruses that are likely to cause disease in the upcoming flu season. But even when the vaccine doesnt exactly match these viruses, it may still provide some protection Flu vaccine cannot prevent:  flu that is caused by a virus not covered by the vaccine, or 
 illnesses that look like flu but are not. It takes about 2 weeks for protection to develop after vaccination, and protection lasts through the flu season. 3. Some people should not get this vaccine Tell the person who is giving you the vaccine:  If you have any severe, life-threatening allergies. If you ever had a life-threatening allergic reaction after a dose of flu vaccine, or have a severe allergy to any part of this vaccine, you may be advised not to get vaccinated. Most, but not all, types of flu vaccine contain a small amount of egg protein.  If you ever had Guillain-Barré Syndrome (also called GBS). Some people with a history of GBS should not get this vaccine. This should be discussed with your doctor.  If you are not feeling well. It is usually okay to get flu vaccine when you have a mild illness, but you might be asked to come back when you feel better. 4. Risks of a vaccine reaction With any medicine, including vaccines, there is a chance of reactions. These are usually mild and go away on their own, but serious reactions are also possible. Most people who get a flu shot do not have any problems with it. Minor problems following a flu shot include:  
 soreness, redness, or swelling where the shot was given  hoarseness  sore, red or itchy eyes  cough  fever  aches  headache  itching  fatigue If these problems occur, they usually begin soon after the shot and last 1 or 2 days. More serious problems following a flu shot can include the following:  There may be a small increased risk of Guillain-Barré Syndrome (GBS) after inactivated flu vaccine. This risk has been estimated at 1 or 2 additional cases per million people vaccinated. This is much lower than the risk of severe complications from flu, which can be prevented by flu vaccine.  Young children who get the flu shot along with pneumococcal vaccine (PCV13) and/or DTaP vaccine at the same time might be slightly more likely to have a seizure caused by fever. Ask your doctor for more information. Tell your doctor if a child who is getting flu vaccine has ever had a seizure. Problems that could happen after any injected vaccine:  People sometimes faint after a medical procedure, including vaccination. Sitting or lying down for about 15 minutes can help prevent fainting, and injuries caused by a fall. Tell your doctor if you feel dizzy, or have vision changes or ringing in the ears.  Some people get severe pain in the shoulder and have difficulty moving the arm where a shot was given. This happens very rarely.  Any medication can cause a severe allergic reaction. Such reactions from a vaccine are very rare, estimated at about 1 in a million doses, and would happen within a few minutes to a few hours after the vaccination. As with any medicine, there is a very remote chance of a vaccine causing a serious injury or death. The safety of vaccines is always being monitored. For more information, visit: www.cdc.gov/vaccinesafety/ 
 
5. What if there is a serious reaction? What should I look for?  Look for anything that concerns you, such as signs of a severe allergic reaction, very high fever, or unusual behavior. Signs of a severe allergic reaction can include hives, swelling of the face and throat, difficulty breathing, a fast heartbeat, dizziness, and weakness  usually within a few minutes to a few hours after the vaccination. What should I do?  If you think it is a severe allergic reaction or other emergency that cant wait, call 9-1-1 and get the person to the nearest hospital. Otherwise, call your doctor.  Reactions should be reported to the Vaccine Adverse Event Reporting System (VAERS).  Your doctor should file this report, or you can do it yourself through  the VAERS web site at www.vaers. [facility].gov, or by calling 6-926.279.6546. VAERS does not give medical advice. 6. The National Vaccine Injury Compensation Program 
 
The [facility] Vaccine Injury Compensation Program (VICP) is a federal program that was created to compensate people who may have been injured by certain vaccines. Persons who believe they may have been injured by a vaccine can learn about the program and about filing a claim by calling 7-963.357.3641 or visiting the [facility] [address] website at www.[facility].gov/vaccinecompensation. There is a time limit to file a claim for compensation. 7. How can I learn more?  Ask your healthcare provider. He or she can give you the vaccine package insert or suggest other sources of information.  Call your local or state health department.  Contact the Centers for Disease Control and Prevention (CDC): 
- Call 9-912.611.7836 (1-800-CDC-INFO) or 
- Visit CDCs website at www.cdc.gov/flu Vaccine Information Statement Inactivated Influenza Vaccine 8/7/2015 
[address] [id number] Department of [facility] [employer] Centers for Disease Control and Prevention Office Use Only Please provide this summary of care documentation to your next provider. Your primary care clinician is listed as Maria Del Rosario Julian. If you have any questions after today's visit, please call [phone].

## 2017-10-04 NOTE — PROGRESS NOTES
Health Maintenance Due   Topic Date Due    DTaP/Tdap/Td series (1 - Tdap) 03/01/1955    INFLUENZA AGE 9 TO ADULT  08/01/2017    LIPID PANEL Q1  09/16/2017       Chief Complaint   Patient presents with    Hypertension     4 month follow up    Cholesterol Problem    Diabetes    Immunization/Injection     wants flu shot       1. Have you been to the ER, urgent care clinic since your last visit? Hospitalized since your last visit?n      2. Have you seen or consulted any other health care providers outside of the [address] [place] [address] since your last visit? Include any pap smears or colon screening. No    3) Do you have an Advance Directive on file? no    4) Are you interested in receiving information on Advance Directives? NO      Patient is accompanied by self I have received verbal consent from Darrius Brown to discuss any/all medical information while they are present in the room. Darrius Brown is a 80 y.o. female  who presents for routine immunizations. She denies any symptoms , reactions or allergies that would exclude them from being immunized today. Risks and adverse reactions were discussed and the VIS was given to them. All questions were addressed. She was observed for 10 min post injection. There were no reactions observed.     Diony Kim LPN

## 2017-10-04 NOTE — PROGRESS NOTES
HISTORY OF PRESENT ILLNESS  Titus Sanchez is a 80 y.o. female here with C/O post nasal drip and nasla congestion. has been using allegra,not much flonase. no cough. Has diabetes and HTN,stable. labs are reviewed with her. Colonoscopy is done,normal.  Need flu shot. Lyndsey Turner is in critical condition in [facility].need xanax refill. Hypertension      Cholesterol Problem     Diabetes     Immunization/Injection         Review of Systems   Constitutional: Negative. HENT: Positive for congestion. Eyes: Negative. Cardiovascular: Negative. Gastrointestinal: Negative. Genitourinary: Negative. Musculoskeletal: Negative. Skin: Negative. Neurological: Negative. Endo/Heme/Allergies: Negative. Psychiatric/Behavioral: The patient is nervous/anxious. Physical Exam   Constitutional: She appears well-developed and well-nourished. No distress. HENT:   Head: Normocephalic and atraumatic. Right Ear: External ear normal.   Left Ear: External ear normal.   Mouth/Throat: Oropharynx is clear and moist. No oropharyngeal exudate. Nasal turbinates:red inflamed,NT    Cobble stoning present. Neck: Normal range of motion. Neck supple. No tracheal deviation present. No thyromegaly present. Cardiovascular: Normal rate, regular rhythm, normal heart sounds and intact distal pulses. Pulmonary/Chest: Effort normal and breath sounds normal. No respiratory distress. She has no wheezes. Musculoskeletal: She exhibits no edema. Psychiatric: She has a normal mood and affect. Her behavior is normal.   anxious       ASSESSMENT and PLAN    Diagnoses and all orders for this visit:    1. Controlled type 2 diabetes mellitus without complication, unspecified long term insulin use status ([facility])    A1C is 6. 9.stable on current regimen. 2. Encounter for immunization  -     Influenza virus vaccine (FLUZONE HIGH DOSE) 65 years and older (34947)    3.  Essential hypertension with goal blood pressure less than 140/90    On multiple BP meds. stable. 4. Chronic nasal congestion    On allegra and flonase. adv to use royal jelly    5. Anxiety attack  -     ALPRAZolam (XANAX) 0.5 mg tablet; Take 0.5 Tabs by mouth nightly as needed for Anxiety. Max Daily Amount: 0.25 mg. #30 no refill      Discussed expected course/resolution/complications of diagnosis in detail with patient. Medication risks/benefits/costs/interactions/alternatives discussed with patient. Pt was given an after visit summary which includes diagnoses, current medications & vitals. Pt expressed understanding with the diagnosis and plan.

## 2017-10-30 DIAGNOSIS — I87.303 STASIS EDEMA, BILATERAL: ICD-10-CM

## 2017-10-30 RX ORDER — FUROSEMIDE 40 MG/1
TABLET ORAL
Qty: 30 TAB | Refills: 0 | Status: SHIPPED | OUTPATIENT
Start: 2017-10-30 | End: 2017-12-28 | Stop reason: SDUPTHER

## 2017-10-30 RX ORDER — DILTIAZEM HYDROCHLORIDE 180 MG/1
CAPSULE, COATED, EXTENDED RELEASE ORAL
Qty: 30 CAP | Refills: 0 | Status: SHIPPED | OUTPATIENT
Start: 2017-10-30 | End: 2017-12-28 | Stop reason: SDUPTHER

## 2017-10-30 RX ORDER — METOPROLOL TARTRATE 100 MG/1
TABLET ORAL
Qty: 90 TAB | Refills: 0 | Status: SHIPPED | OUTPATIENT
Start: 2017-10-30 | End: 2017-12-28 | Stop reason: SDUPTHER

## 2017-11-06 ENCOUNTER — OFFICE VISIT (OUTPATIENT)
Dept: INTERNAL MEDICINE CLINIC | Age: 82
End: 2017-11-06

## 2017-11-06 VITALS
HEART RATE: 64 BPM | RESPIRATION RATE: 20 BRPM | TEMPERATURE: 96.7 F | SYSTOLIC BLOOD PRESSURE: 135 MMHG | OXYGEN SATURATION: 96 % | HEIGHT: 59 IN | BODY MASS INDEX: 32.7 KG/M2 | WEIGHT: 162.2 LBS | DIASTOLIC BLOOD PRESSURE: 69 MMHG

## 2017-11-06 DIAGNOSIS — M54.12 CERVICAL RADICULOPATHY: ICD-10-CM

## 2017-11-06 DIAGNOSIS — B37.2 CANDIDAL INTERTRIGO: Primary | ICD-10-CM

## 2017-11-06 DIAGNOSIS — E11.9 CONTROLLED TYPE 2 DIABETES MELLITUS WITHOUT COMPLICATION, UNSPECIFIED LONG TERM INSULIN USE STATUS: ICD-10-CM

## 2017-11-06 DIAGNOSIS — I10 ESSENTIAL HYPERTENSION WITH GOAL BLOOD PRESSURE LESS THAN 140/90: ICD-10-CM

## 2017-11-06 DIAGNOSIS — J30.1 ALLERGIC RHINITIS DUE TO POLLEN, UNSPECIFIED CHRONICITY, UNSPECIFIED SEASONALITY: ICD-10-CM

## 2017-11-06 RX ORDER — FLUCONAZOLE 150 MG/1
150 TABLET ORAL DAILY
Qty: 2 TAB | Refills: 0 | Status: SHIPPED | OUTPATIENT
Start: 2017-11-06 | End: 2017-11-08

## 2017-11-06 RX ORDER — CETIRIZINE HCL 10 MG
10 TABLET ORAL
Qty: 30 TAB | Refills: 1 | Status: SHIPPED | OUTPATIENT
Start: 2017-11-06 | End: 2018-03-02 | Stop reason: SDUPTHER

## 2017-11-06 RX ORDER — METFORMIN HYDROCHLORIDE 500 MG/1
1000 TABLET, EXTENDED RELEASE ORAL
Qty: 180 TAB | Refills: 3 | Status: SHIPPED | OUTPATIENT
Start: 2017-11-06 | End: 2018-05-16 | Stop reason: SDUPTHER

## 2017-11-06 RX ORDER — NYSTATIN 100000 [USP'U]/G
POWDER TOPICAL 2 TIMES DAILY
Qty: 1 BOTTLE | Refills: 0 | Status: SHIPPED | OUTPATIENT
Start: 2017-11-06 | End: 2018-12-26 | Stop reason: ALTCHOICE

## 2017-11-06 RX ORDER — LOSARTAN POTASSIUM 100 MG/1
100 TABLET ORAL DAILY
Qty: 90 TAB | Refills: 3 | Status: SHIPPED | OUTPATIENT
Start: 2017-11-06 | End: 2018-03-20 | Stop reason: SDUPTHER

## 2017-11-06 NOTE — MR AVS SNAPSHOT
Visit Information Date & Time Provider Department Dept. Phone Encounter #  
 11/6/2017  9:45 AM Carmen Melara MD [facility] Internal Medicine [phone] [id number] Your Appointments 11/6/2017  9:45 AM  
ACUTE CARE with Carmen Melara MD  
[facility]) Appt Note: has a rash; cf  
 [address] [facility] [address] [facility] [address]  
[phone]  
  
   
 [address]  
  
    
 12/1/2017  8:40 AM  
ESTABLISHED PATIENT with Arabella Redmond MD  
[facility] ([facility]) Appt Note: 6 month follow up  
 [address] [facility] [address]  
[address] [address]  
  
    
 1/3/2018  8:45 AM  
ROUTINE CARE with Carmen Melara MD  
[facility]) Appt Note: 4 month follow up. ... [address] [facility] [address]  
[phone]  
  
   
 [address] [facility] [address] [address]  
  
    
 1/15/2018  8:15 AM  
ROUTINE CARE with Carmen Melara MD  
[facility]) Appt Note: follow up [address] [facility] [address]  
[phone] Upcoming Health Maintenance Date Due DTaP/Tdap/Td series (1 - Tdap) 3/1/1955 LIPID PANEL Q1 9/16/2017 HEMOGLOBIN A1C Q6M 3/6/2018 MEDICARE YEARLY EXAM 4/18/2018 EYE EXAM RETINAL OR DILATED Q1 4/25/2018 FOOT EXAM Q1 9/6/2018 MICROALBUMIN Q1 9/6/2018 GLAUCOMA SCREENING Q2Y 4/25/2019 COLONOSCOPY 9/25/2027 Allergies as of 11/6/2017  Review Complete On: 11/6/2017 By: Álvaro Rush LPN Severity Noted Reaction Type Reactions Cortisone  05/11/2011    Other (comments) \"Make me feels weak, like I'm going to die\" Nikouvia [Sitagliptin]  11/09/2016    Other (comments)  
 weakness Current Immunizations  Reviewed on 10/4/2017 Name Date Influenza High Dose Vaccine PF 10/4/2017, 10/28/2014 Influenza Vaccine 9/14/2016, 10/19/2013 Pneumococcal Conjugate (PCV-13) 9/14/2016 ZZZ-RETIRED (DO NOT USE) Pneumococcal Vaccine (Unspecified Type) 10/1/2008 Not reviewed this visit You Were Diagnosed With   
  
 Codes Comments Candidal intertrigo    -  Primary ICD-10-CM: B37.2 ICD-9-CM: 112.3 Cervical radiculopathy     ICD-10-CM: M54.12 
ICD-9-CM: 723.4 Essential hypertension with goal blood pressure less than 140/90     ICD-10-CM: I10 
ICD-9-CM: 401.9 Allergic rhinitis due to pollen, unspecified chronicity, unspecified seasonality     ICD-10-CM: J30.1 ICD-9-CM: 477.0 Controlled type 2 diabetes mellitus without complication, unspecified long term insulin use status ([facility]     ICD-10-CM: E11.9 ICD-9-CM: 250.00 Vitals BP Pulse Temp Resp Height(growth percentile) Weight(growth percentile) 135/69 (BP 1 Location: Right arm, BP Patient Position: Sitting) 64 96.7 °F (35.9 °C) (Oral) 20 4' 11\" (1.499 m) 162 lb 3.2 oz (73.6 kg) SpO2 BMI OB Status Smoking Status 96% 32.76 kg/m2 Hysterectomy Never Smoker Vitals History BMI and BSA Data Body Mass Index Body Surface Area 32.76 kg/m 2 1.75 m 2 Preferred Pharmacy Pharmacy Name Phone Marychuy Bennett, [facility] [phone] Your Updated Medication List  
  
   
This list is accurate as of: 11/6/17  9:35 AM.  Always use your most recent med list.  
  
  
  
  
 ALPRAZolam 0.5 mg tablet Commonly known as:  [place] Take 0.5 Tabs by mouth nightly as needed for Anxiety. Max Daily Amount: 0.25 mg.  
  
 aspirin 81 mg chewable tablet Take 81 mg by mouth daily. atorvastatin 10 mg tablet Commonly known as:  LIPITOR  
TAKE ONE TABLET BY MOUTH DAILY  
  
 cetirizine 10 mg tablet Commonly known as:  ZYRTEC Take 1 Tab by mouth nightly as needed for Allergies. cloNIDine HCl 0.3 mg tablet Commonly known as:  CATAPRES Take 1 Tab by mouth three (3) times daily. dilTIAZem  mg ER capsule Commonly known as:  CARDIZEM CD  
TAKE ONE CAPSULE BY MOUTH EVERY DAY  
  
 famotidine 20 mg tablet Commonly known as:  PEPCID Take 1 Tab by mouth nightly. fluconazole 150 mg tablet Commonly known as:  DIFLUCAN Take 1 Tab by mouth daily for 2 days. FDA advises cautious prescribing of oral fluconazole in pregnancy. fluticasone 50 mcg/actuation nasal spray Commonly known as:  Yusef Cyphers 2 Sprays by Both Nostrils route daily. furosemide 40 mg tablet Commonly known as:  LASIX TAKE ONE TABLET BY MOUTH EVERY DAY  
  
 glipiZIDE 5 mg tablet Commonly known as:  [place] Take 1 Tab by mouth two (2) times a day. latanoprost 0.005 % ophthalmic solution Commonly known as:  XALATAN  
  
 losartan 100 mg tablet Commonly known as:  COZAAR Take 1 Tab by mouth daily. metFORMIN  mg tablet Commonly known as:  GLUCOPHAGE XR Take 2 Tabs by mouth daily (with dinner). metoprolol tartrate 100 mg IR tablet Commonly known as:  LOPRESSOR  
TAKE 1 AND 1/2 TABLET BY MOUTH 2 TIMES A DAY  
  
 nystatin powder Commonly known as:  MYCOSTATIN Apply  to affected area two (2) times a day. For 2 weeks  
  
 omeprazole 40 mg capsule Commonly known as:  PRILOSEC  
TAKE ONE CAPSULE BY MOUTH EVERY DAY BEFORE BREAKFAST  
  
 pioglitazone 15 mg tablet Commonly known as:  ACTOS Take 1 Tab by mouth nightly. potassium chloride 10 mEq tablet Commonly known as:  KLOR-CON  
TAKE TWO TABLETS BY MOUTH DAILY  
  
 TIMOPTIC-XE 0.5 % ophthalmic gel-forming Generic drug:  timolol TYLENOL 325 mg tablet Generic drug:  acetaminophen Take  by mouth every four (4) hours as needed for Pain. VITAMIN D3 1,000 unit Cap Generic drug:  cholecalciferol Take 1,000 Units by mouth daily. Prescriptions Sent to Pharmacy Refills  
 fluconazole (DIFLUCAN) 150 mg tablet 0 Sig: Take 1 Tab by mouth daily for 2 days. FDA advises cautious prescribing of oral fluconazole in pregnancy. Class: Normal  
 Pharmacy: [facility] Ph #: [phone] Route: Oral  
 nystatin (MYCOSTATIN) powder 0 Sig: Apply  to affected area two (2) times a day. For 2 weeks Class: Normal  
 [facility]: [phone] Route: Topical  
 metFORMIN ER (GLUCOPHAGE XR) 500 mg tablet 3 Sig: Take 2 Tabs by mouth daily (with dinner). Class: Normal  
 Pharmacy: [facility] Ph #: [phone] Route: Oral  
 losartan (COZAAR) 100 mg tablet 3 Sig: Take 1 Tab by mouth daily. Class: Normal  
 Pharmacy: [facility] Ph #: [phone] Route: Oral  
 cetirizine (ZYRTEC) 10 mg tablet 1 Sig: Take 1 Tab by mouth nightly as needed for Allergies. Class: Normal  
 [facility] Ph #: [phone] Route: Oral  
  
To-Do List   
 11/07/2017 Imaging:  XR SPINE CERV 4 OR 5 V Patient Instructions Neck Arthritis: Exercises Your Care Instructions Here are some examples of typical rehabilitation exercises for your condition. Start each exercise slowly. Ease off the exercise if you start to have pain. Your doctor or physical therapist will tell you when you can start these exercises and which ones will work best for you. How to do the exercises Neck stretches to the side 1. This stretch works best if you keep your shoulder down as you lean away from it. To help you remember to do this, start by relaxing your shoulders and lightly holding on to your thighs or your chair. 2. Tilt your head toward your shoulder and hold for 15 to 30 seconds. Let the weight of your head stretch your muscles. 3. Repeat 2 to 4 times toward each shoulder. Chin tuck 1. Lie on the floor with a rolled-up towel under your neck. Your head should be touching the floor. 2. Slowly bring your chin toward your chest. 
3. Hold for a count of 6, and then relax for up to 10 seconds. 4. Repeat 8 to 12 times. Active cervical rotation 1. Sit in a firm chair, or stand up straight. 2. Keeping your chin level, turn your head to the right, and hold for 15 to 30 seconds. 3. Turn your head to the left and hold for 15 to 30 seconds. 4. Repeat 2 to 4 times to each side. Shoulder blade squeeze 1. While standing, squeeze your shoulder blades together. 2. Do not raise your shoulders up as you are squeezing. 3. Hold for 6 seconds. 4. Repeat 8 to 12 times. Shoulder rolls 1. Sit comfortably with your feet shoulder-width apart. You can also do this exercise standing up. 2. Roll your shoulders up, then back, and then down in a smooth, circular motion. 3. Repeat 2 to 4 times. Follow-up care is a key part of your treatment and safety. Be sure to make and go to all appointments, and call your doctor if you are having problems. It's also a good idea to know your test results and keep a list of the medicines you take. Where can you learn more? Go to http://stacey-marie.info/. Enter L347 in the search box to learn more about \"Neck Arthritis: Exercises. \" Current as of: March 21, 2017 Content Version: 11.4 © 9954-7072 [employer]. Care instructions adapted under license by [employer] (which disclaims liability or warranty for this information). If you have questions about a medical condition or this instruction, always ask your healthcare professional. [address] any warranty or liability for your use of this information. Please provide this summary of care documentation to your next provider. Your primary care clinician is listed as Tracie Gu. If you have any questions after today's visit, please call [phone].

## 2017-11-06 NOTE — PATIENT INSTRUCTIONS
Neck Arthritis: Exercises  Your Care Instructions  Here are some examples of typical rehabilitation exercises for your condition. Start each exercise slowly. Ease off the exercise if you start to have pain. Your doctor or physical therapist will tell you when you can start these exercises and which ones will work best for you. How to do the exercises  Neck stretches to the side    1. This stretch works best if you keep your shoulder down as you lean away from it. To help you remember to do this, start by relaxing your shoulders and lightly holding on to your thighs or your chair. 2. Tilt your head toward your shoulder and hold for 15 to 30 seconds. Let the weight of your head stretch your muscles. 3. Repeat 2 to 4 times toward each shoulder. Chin tuck    1. Lie on the floor with a rolled-up towel under your neck. Your head should be touching the floor. 2. Slowly bring your chin toward your chest.  3. Hold for a count of 6, and then relax for up to 10 seconds. 4. Repeat 8 to 12 times. Active cervical rotation    1. Sit in a firm chair, or stand up straight. 2. Keeping your chin level, turn your head to the right, and hold for 15 to 30 seconds. 3. Turn your head to the left and hold for 15 to 30 seconds. 4. Repeat 2 to 4 times to each side. Shoulder blade squeeze    1. While standing, squeeze your shoulder blades together. 2. Do not raise your shoulders up as you are squeezing. 3. Hold for 6 seconds. 4. Repeat 8 to 12 times. Shoulder rolls    1. Sit comfortably with your feet shoulder-width apart. You can also do this exercise standing up. 2. Roll your shoulders up, then back, and then down in a smooth, circular motion. 3. Repeat 2 to 4 times. Follow-up care is a key part of your treatment and safety. Be sure to make and go to all appointments, and call your doctor if you are having problems. It's also a good idea to know your test results and keep a list of the medicines you take.   Where can you learn more? Go to http://stacey-marie.info/. Enter L266 in the search box to learn more about \"Neck Arthritis: Exercises. \"  Current as of: March 21, 2017  Content Version: 11.4  © 2854-0978 [employer], [employer]. Care instructions adapted under license by [employer] (which disclaims liability or warranty for this information). If you have questions about a medical condition or this instruction, always ask your healthcare professional. [address] any warranty or liability for your use of this information.

## 2017-11-06 NOTE — PROGRESS NOTES
HISTORY OF PRESENT ILLNESS  Karyle Parlor is a 80 y.o. female here with C/O rash in both breasts fold. It is itching a lot. She has post nasal drip and nasla congestion. has been using allegra,not much flonase. no cough. Report bilateral no fall or injury  numbness in the upper arm if she is sleeping on sides. ,  No numbness. Here for medication evaluations. Need refill on medicine too. Has diabetes and HTN,stable. labs are reviewed with her. Colonoscopy is done,normal.  Asking if she needs to use omeprazole. No heartburn. Rash      Hypertension      Medication Evaluation     Cholesterol Problem     Diabetes         Review of Systems   Constitutional: Negative. HENT: Positive for congestion. Eyes: Negative. Cardiovascular: Negative. Gastrointestinal: Negative. Genitourinary: Negative. Musculoskeletal: Negative. Skin: Positive for rash. Neurological: Positive for sensory change. Endo/Heme/Allergies: Negative. Physical Exam   Constitutional: She appears well-developed and well-nourished. No distress. HENT:          Neck: Normal range of motion. Neck supple. No tracheal deviation present. No thyromegaly present. Cardiovascular: Normal rate, regular rhythm, normal heart sounds and intact distal pulses. Pulmonary/Chest: Effort normal and breath sounds normal. No respiratory distress. She has no wheezes. Musculoskeletal: She exhibits no edema. Neck: Spine nontender. Spurling sign positive on right. ROM not restricted   Skin: Rash noted. Breast fold;red blochy infection  With well demarcated border. Itchy. Psychiatric: She has a normal mood and affect. Her behavior is normal.       ASSESSMENT and PLAN    Diagnoses and all orders for this visit:    1. Candidal intertrigo  -     fluconazole (DIFLUCAN) 150 mg tablet; Take 1 Tab by mouth daily for 2 days. FDA advises cautious prescribing of oral fluconazole in pregnancy. -     nystatin (MYCOSTATIN) powder;  Apply to affected area two (2) times a day. For 2 weeks    2. Cervical radiculopathy  -     XR SPINE CERV 4 OR 5 V; Future    3. Essential hypertension with goal blood pressure less than 140/90  Will call in,  -     losartan (COZAAR) 100 mg tablet; Take 1 Tab by mouth daily. 4. Allergic rhinitis due to pollen, unspecified chronicity, unspecified seasonality    We will change Allegra to,  -     cetirizine (ZYRTEC) 10 mg tablet; Take 1 Tab by mouth nightly as needed for Allergies. 5. Controlled type 2 diabetes mellitus without complication, unspecified long term insulin use status (HCC)    Stable A1c. Will refill,  -     metFORMIN ER (GLUCOPHAGE XR) 500 mg tablet; Take 2 Tabs by mouth daily (with dinner). Discussed expected course/resolution/complications of diagnosis in detail with patient. Medication risks/benefits/costs/interactions/alternatives discussed with patient. Pt was given an after visit summary which includes diagnoses, current medications & vitals. Pt expressed understanding with the diagnosis and plan.

## 2017-11-07 ENCOUNTER — HOSPITAL ENCOUNTER (OUTPATIENT)
Dept: GENERAL RADIOLOGY | Age: 82
Discharge: HOME | End: 2017-11-07
Payer: MEDICARE

## 2017-11-07 DIAGNOSIS — M54.2 NECK PAIN: ICD-10-CM

## 2017-11-07 DIAGNOSIS — R20.2 PARESTHESIA OF BOTH HANDS: ICD-10-CM

## 2017-11-07 PROCEDURE — 72050 X-RAY EXAM NECK SPINE 4/5VWS: CPT

## 2017-11-07 NOTE — LETTER
11/15/2017 9:00 AM 
 
[facility] [address]renetta [address] [facility] [address] Dear Karyle Parlor: Please find your most recent results below. Resulted Orders XR SPINE CERV 4 OR 5 V Narrative Cervical EXAM:  XR SPINE CERV 4 OR 5 V 
 
INDICATION:  neck pain and hand tingling COMPARISON: None. FINDINGS: AP, lateral, bilateral oblique and open mouth odontoid views  of the 
cervical spine were obtained. There is normal vertebral body height. Bone 
mineralization is mildly diminished. There is straightening of cervical 
lordosis although no listhesis. Borderline C3-4 and moderate to severe C5-6 and 
C6-7 degenerative disc space narrowing is shown. Endplate osteophyte formation 
is present at these levels with uncovertebral joint osteophytes as well. The 
posterior processes are intact. There is moderate left facet osteoarthrosis at 
C2-3 and C3-4, and mild right facet osteoarthrosis at C2-3 C4-5. Loletta Nunnery There is mild 
osseous foraminal narrowing present at C5-6 and C6-7 bilaterally. No 
prevertebral soft tissue swelling is shown. Impression IMPRESSION: Mild osteopenia. Degenerative findings. Loletta Nunnery RECOMMENDATIONS: 
Karyle Parlor  your recent xray showed Osteopenia (thinning of the bones). I recommend for you to take Caltrate 600 mg one tablet twice per day. Caltrate is sold over the counter and you may ask the pharmacist if they have a generic of the Caltrate. Please call me if you have any questions: [phone] Sincerely, [employer] [facility]

## 2017-11-07 NOTE — LETTER
11/13/2017 10:42 AM 
 
Ms. Annmarie Rubinstein [address] [facility] [address] [address] Dear Randi Rubinstein: Please find your most recent results below. Resulted Orders XR SPINE CERV 4 OR 5 V Narrative Cervical EXAM:  XR SPINE CERV 4 OR 5 V 
 
INDICATION:  neck pain and hand tingling COMPARISON: None. FINDINGS: AP, lateral, bilateral oblique and open mouth odontoid views  of the 
cervical spine were obtained. There is normal vertebral body height. Bone 
mineralization is mildly diminished. There is straightening of cervical 
lordosis although no listhesis. Borderline C3-4 and moderate to severe C5-6 and 
C6-7 degenerative disc space narrowing is shown. Endplate osteophyte formation 
is present at these levels with uncovertebral joint osteophytes as well. The 
posterior processes are intact. There is moderate left facet osteoarthrosis at 
C2-3 and C3-4, and mild right facet osteoarthrosis at C2-3 C4-5. Peggyann Gang There is mild 
osseous foraminal narrowing present at C5-6 and C6-7 bilaterally. No 
prevertebral soft tissue swelling is shown. Impression IMPRESSION: Mild osteopenia. Degenerative findings. Peggyann Gang RECOMMENDATIONS: 
Annmarie Rubinstein  your recent Dexa Bone Density test showed Osteopenia (thinning of the bones). I recommend for you to take Caltrate 600 mg one tablet twice per day. Caltrate is sold over the counter and you may ask the pharmacist if they have a generic of the Caltrate. Please call me if you have any questions: [phone] Sincerely, [employer] [facility]

## 2017-11-21 ENCOUNTER — HOSPITAL ENCOUNTER (EMERGENCY)
Age: 82
Discharge: HOME | End: 2017-11-21
Attending: STUDENT IN AN ORGANIZED HEALTH CARE EDUCATION/TRAINING PROGRAM | Admitting: STUDENT IN AN ORGANIZED HEALTH CARE EDUCATION/TRAINING PROGRAM
Payer: MEDICARE

## 2017-11-21 VITALS
BODY MASS INDEX: 33.26 KG/M2 | DIASTOLIC BLOOD PRESSURE: 82 MMHG | TEMPERATURE: 98.1 F | WEIGHT: 165 LBS | SYSTOLIC BLOOD PRESSURE: 181 MMHG | HEART RATE: 69 BPM | RESPIRATION RATE: 20 BRPM | OXYGEN SATURATION: 99 % | HEIGHT: 59 IN

## 2017-11-21 DIAGNOSIS — K62.5 RECTAL BLEEDING: Primary | ICD-10-CM

## 2017-11-21 LAB
ALBUMIN SERPL-MCNC: 3.8 G/DL (ref 3.5–5)
ALBUMIN/GLOB SERPL: 0.9 {RATIO} (ref 1.1–2.2)
ALP SERPL-CCNC: 101 U/L (ref 45–117)
ALT SERPL-CCNC: 29 U/L (ref 12–78)
ANION GAP SERPL CALC-SCNC: 7 MMOL/L (ref 5–15)
AST SERPL-CCNC: 26 U/L (ref 15–37)
BASOPHILS # BLD: 0 K/UL (ref 0–0.1)
BASOPHILS NFR BLD: 0 % (ref 0–1)
BILIRUB SERPL-MCNC: 0.3 MG/DL (ref 0.2–1)
BUN SERPL-MCNC: 15 MG/DL (ref 6–20)
BUN/CREAT SERPL: 15 (ref 12–20)
CALCIUM SERPL-MCNC: 9.4 MG/DL (ref 8.5–10.1)
CHLORIDE SERPL-SCNC: 101 MMOL/L (ref 97–108)
CO2 SERPL-SCNC: 30 MMOL/L (ref 21–32)
CREAT SERPL-MCNC: 1.01 MG/DL (ref 0.55–1.02)
EOSINOPHIL # BLD: 0.2 K/UL (ref 0–0.4)
EOSINOPHIL NFR BLD: 3 % (ref 0–7)
ERYTHROCYTE [DISTWIDTH] IN BLOOD BY AUTOMATED COUNT: 13.8 % (ref 11.5–14.5)
GLOBULIN SER CALC-MCNC: 4.1 G/DL (ref 2–4)
GLUCOSE SERPL-MCNC: 126 MG/DL (ref 65–100)
HCT VFR BLD AUTO: 37.4 % (ref 35–47)
HEMOCCULT STL QL: POSITIVE
HGB BLD-MCNC: 12.5 G/DL (ref 11.5–16)
LYMPHOCYTES # BLD: 1.7 K/UL (ref 0.8–3.5)
LYMPHOCYTES NFR BLD: 33 % (ref 12–49)
MCH RBC QN AUTO: 28.2 PG (ref 26–34)
MCHC RBC AUTO-ENTMCNC: 33.4 G/DL (ref 30–36.5)
MCV RBC AUTO: 84.2 FL (ref 80–99)
MONOCYTES # BLD: 0.4 K/UL (ref 0–1)
MONOCYTES NFR BLD: 8 % (ref 5–13)
NEUTS SEG # BLD: 2.8 K/UL (ref 1.8–8)
NEUTS SEG NFR BLD: 56 % (ref 32–75)
PLATELET # BLD AUTO: 188 K/UL (ref 150–400)
POTASSIUM SERPL-SCNC: 3.6 MMOL/L (ref 3.5–5.1)
PROT SERPL-MCNC: 7.9 G/DL (ref 6.4–8.2)
RBC # BLD AUTO: 4.44 M/UL (ref 3.8–5.2)
SODIUM SERPL-SCNC: 138 MMOL/L (ref 136–145)
WBC # BLD AUTO: 5 K/UL (ref 3.6–11)

## 2017-11-21 PROCEDURE — 85025 COMPLETE CBC W/AUTO DIFF WBC: CPT | Performed by: STUDENT IN AN ORGANIZED HEALTH CARE EDUCATION/TRAINING PROGRAM

## 2017-11-21 PROCEDURE — 80053 COMPREHEN METABOLIC PANEL: CPT | Performed by: STUDENT IN AN ORGANIZED HEALTH CARE EDUCATION/TRAINING PROGRAM

## 2017-11-21 PROCEDURE — 82272 OCCULT BLD FECES 1-3 TESTS: CPT | Performed by: STUDENT IN AN ORGANIZED HEALTH CARE EDUCATION/TRAINING PROGRAM

## 2017-11-21 PROCEDURE — 36415 COLL VENOUS BLD VENIPUNCTURE: CPT | Performed by: STUDENT IN AN ORGANIZED HEALTH CARE EDUCATION/TRAINING PROGRAM

## 2017-11-21 PROCEDURE — 99283 EMERGENCY DEPT VISIT LOW MDM: CPT

## 2017-11-22 NOTE — ED PROVIDER NOTES
HPI Comments: 80 y.o. female with past medical history significant for HTN, hypercholesterolemia, diabetes, and chronic bronchitis who presents from home with chief complaint of rectal bleeding. Patient states that she had 2 bowel movements today at 1500 and 1700 and noticed large amounts of bright red blood in the toilet bowl. Patient states that the stool itself was brown. She reports having a colonoscopy 2 months ago which was remarkable for mild sigmoid diverticulosis as well as small internal rectal hemorrhoids. She denies having hx of similar symptoms. Patient denies having any pain with bowel movements or abdominal pain. There are no other acute medical concerns at this time. Social hx: nonsmoker, no EtOH use, no drug use  PCP: Eduardo Tesfaye MD    Note written by Jocelyn Arcos, as dictated by Bulmaro Morejon MD 10:24 PM      The history is provided by the patient. No  was used. Past Medical History:   Diagnosis Date    Adverse effect of anesthesia     pt states she was able to feel everything during colonoscopy    Chronic bronchitis     Diabetes ([address]     Essential hypertension     Hypercholesterolemia     Hypertension        Past Surgical History:   Procedure Laterality Date    COLONOSCOPY N/A 9/25/2017    COLONOSCOPY performed by Bren Barney. Arash Hernandez MD at [facility] ENDOSCOPY    HX CHOLECYSTECTOMY      HX GYN      tubal ligation    HX HYSTERECTOMY           Family History:   Problem Relation Age of Onset    No Known Problems Mother     No Known Problems Father     No Known Problems Daughter     No Known Problems Daughter     No Known Problems Daughter     No Known Problems Son     No Known Problems Son     No Known Problems Son        Social History     Social History    Marital status:      Spouse name: N/A    Number of children: N/A    Years of education: N/A     Occupational History    Not on file.      Social History Main Topics    Smoking status: Never Smoker    Smokeless tobacco: Never Used    Alcohol use No    Drug use: No    Sexual activity: Not on file      Comment:  had 6 grown children (2 )     Other Topics Concern    Not on file     Social History Narrative         ALLERGIES: Cortisone and Januvia [sitagliptin]    Review of Systems   Constitutional: Negative for activity change, diaphoresis, fatigue and fever. HENT: Negative for congestion and sore throat. Eyes: Negative for photophobia and visual disturbance. Respiratory: Negative for chest tightness and shortness of breath. Cardiovascular: Negative for chest pain, palpitations and leg swelling. Gastrointestinal: Positive for anal bleeding. Negative for abdominal pain, blood in stool, constipation, diarrhea, nausea, rectal pain and vomiting. Genitourinary: Negative for difficulty urinating, dysuria, flank pain, frequency and hematuria. Musculoskeletal: Negative for back pain. Neurological: Negative for dizziness, syncope, numbness and headaches. All other systems reviewed and are negative. Vitals:    17 1937   BP: 181/82   Pulse: 69   Resp: 20   Temp: 98.1 °F (36.7 °C)   SpO2: 99%   Weight: 74.8 kg (165 lb)   Height: 4' 11\" (1.499 m)            Physical Exam   Constitutional: She is oriented to person, place, and time. She appears well-developed and well-nourished. No distress. HENT:   Head: Normocephalic and atraumatic. Nose: Nose normal.   Mouth/Throat: Oropharynx is clear and moist. No oropharyngeal exudate. Eyes: Conjunctivae and EOM are normal. Right eye exhibits no discharge. Left eye exhibits no discharge. No scleral icterus. Neck: Normal range of motion. Neck supple. No JVD present. No tracheal deviation present. No thyromegaly present. Cardiovascular: Normal rate, regular rhythm, normal heart sounds and intact distal pulses. Exam reveals no gallop and no friction rub. No murmur heard.   Pulmonary/Chest: Effort normal and breath sounds normal. No stridor. No respiratory distress. She has no wheezes. She has no rales. She exhibits no tenderness. Abdominal: Bowel sounds are normal. She exhibits no distension and no mass. There is no tenderness. There is no rebound. Genitourinary: Rectal exam shows guaiac positive stool. Genitourinary Comments: Bright red blood per rectum    Musculoskeletal: Normal range of motion. She exhibits no edema or tenderness. Lymphadenopathy:     She has no cervical adenopathy. Neurological: She is alert and oriented to person, place, and time. No cranial nerve deficit. Coordination normal.   Skin: Skin is warm and dry. No rash noted. She is not diaphoretic. No erythema. No pallor. Psychiatric: She has a normal mood and affect. Her behavior is normal. Judgment and thought content normal.   Note written by Jocelyn Hernandez, as dictated by Christ Henderson MD 10:24 PM      MDM  Number of Diagnoses or Management Options  Rectal bleeding:      Amount and/or Complexity of Data Reviewed  Clinical lab tests: ordered and reviewed  Review and summarize past medical records: yes    Risk of Complications, Morbidity, and/or Mortality  Presenting problems: moderate  Diagnostic procedures: moderate  Management options: moderate    Patient Progress  Patient progress: stable    ED Course       Procedures    3:12 PM  The patient has been reevaluated. The patient is ready for discharge. The patient's signs, symptoms, diagnosis, and discharge instructions have been discussed and the patient/ family has conveyed their understanding. The patient is to follow up as recommended or return to the ED should their symptoms worsen. Plan has been discussed and the patient is in agreement. LABORATORY TESTS:  No results found for this or any previous visit (from the past 12 hour(s)). IMAGING RESULTS:  No orders to display     No results found.       MEDICATIONS GIVEN:  Medications - No data to display    IMPRESSION:  1. Rectal bleeding        PLAN:  1. Discharge Medication List as of 11/21/2017 10:43 PM        2. Follow-up Information     Follow up With Details Comments Contact Info    Edwin Ervin MD  If symptoms worsen [address]  [address]  [phone]      [facility] EMERGENCY [facility]  [phone]    Wesley Smith MD Schedule an appointment as soon as possible for a visit  [place] [address]  [phone]            Return to ED for new or worsening symptoms       David Gonzalez MD

## 2017-11-22 NOTE — ED TRIAGE NOTES
TRIAGE NOTE: patient arrived from home with c/o rectal bleeding. Patient had 2 episodes of bright red blood in the commode this evening. Patient denies any abdominal pain. Denies N/V/D. Denies dizziness.

## 2017-11-22 NOTE — DISCHARGE INSTRUCTIONS
Rectal Bleeding: Care Instructions  Your Care Instructions    Rectal bleeding in small amounts is common. You may see red spotting on toilet paper or drops of blood in the toilet. Rectal bleeding has many possible causes, from something as minor as hemorrhoids to something as serious as colon cancer. You may need more tests to find the cause of your bleeding. Follow-up care is a key part of your treatment and safety. Be sure to make and go to all appointments, and call your doctor if you are having problems. It's also a good idea to know your test results and keep a list of the medicines you take. How can you care for yourself at home? · Avoid aspirin and other nonsteroidal anti-inflammatory drugs (NSAIDs), such as ibuprofen (Advil, Motrin) and naproxen (Aleve). They can cause you to bleed more. Ask your doctor if you can take acetaminophen (Tylenol). Read and follow all instructions on the label. · Use a stool softener that contains bran or psyllium. You can save money by buying bran or psyllium (available in bulk at most health food stores) and sprinkling it on foods or stirring it into fruit juice. You can also use a product such as Metamucil or Citrucel. · Take your medicines exactly as directed. Call your doctor if you think you are having a problem with your medicine. When should you call for help? Call 911 anytime you think you may need emergency care. For example, call if:  ? · You passed out (lost consciousness). ?Call your doctor now or seek immediate medical care if:  ? · You have new or worse pain. ? · You have new or worse bleeding from the rectum. ? · You are dizzy or light-headed, or you feel like you may faint. ? Watch closely for changes in your health, and be sure to contact your doctor if:  ? · You cannot pass stools or gas. ? · You do not get better as expected. Where can you learn more? Go to http://stacey-marie.info/.   Enter R774 in the search box to learn more about \"Rectal Bleeding: Care Instructions. \"  Current as of: May 12, 2017  Content Version: 11.4  © 8760-6217 Healthwise, [address]. Care instructions adapted under license by [employer] (which disclaims liability or warranty for this information). If you have questions about a medical condition or this instruction, always ask your healthcare professional. [address] any warranty or liability for your use of this information.

## 2017-11-27 ENCOUNTER — OFFICE VISIT (OUTPATIENT)
Dept: INTERNAL MEDICINE CLINIC | Age: 82
End: 2017-11-27

## 2017-11-27 VITALS
HEIGHT: 59 IN | SYSTOLIC BLOOD PRESSURE: 117 MMHG | DIASTOLIC BLOOD PRESSURE: 60 MMHG | BODY MASS INDEX: 33.18 KG/M2 | TEMPERATURE: 97.5 F | OXYGEN SATURATION: 95 % | RESPIRATION RATE: 20 BRPM | WEIGHT: 164.6 LBS | HEART RATE: 58 BPM

## 2017-11-27 DIAGNOSIS — K62.5 BRBPR (BRIGHT RED BLOOD PER RECTUM): Primary | ICD-10-CM

## 2017-11-27 DIAGNOSIS — M47.812 NECK ARTHRITIS: ICD-10-CM

## 2017-11-27 DIAGNOSIS — I10 ESSENTIAL HYPERTENSION WITH GOAL BLOOD PRESSURE LESS THAN 140/90: ICD-10-CM

## 2017-11-27 DIAGNOSIS — E11.9 CONTROLLED TYPE 2 DIABETES MELLITUS WITHOUT COMPLICATION, UNSPECIFIED LONG TERM INSULIN USE STATUS: ICD-10-CM

## 2017-11-27 DIAGNOSIS — K64.9 HEMORRHOIDS, UNSPECIFIED HEMORRHOID TYPE: ICD-10-CM

## 2017-11-27 NOTE — PROGRESS NOTES
Health Maintenance Due   Topic Date Due    DTaP/Tdap/Td series (1 - Tdap) 03/01/1955    LIPID PANEL Q1  09/16/2017       Chief Complaint   Patient presents with   [facility] ED Follow-up     rectal bleed and constipation, no further bleeding       1. Have you been to the ER, urgent care clinic since your last visit? Hospitalized since your last visit? yes     2. Have you seen or consulted any other health care providers outside of the [address] [place] [address] since your last visit? Include any pap smears or colon screening. No    3) Do you have an Advance Directive on file? no    4) Are you interested in receiving information on Advance Directives? NO      Patient is accompanied by self I have received verbal consent from Mathew Teran to discuss any/all medical information while they are present in the room.

## 2017-11-27 NOTE — MR AVS SNAPSHOT
Visit Information Date & Time Provider Department Dept. Phone Encounter #  
 11/27/2017  8:30 AM Connor Shah, [address] [facility] Internal Medicine [phone] Your Appointments 12/1/2017  8:40 AM  
ESTABLISHED PATIENT with Mery Cuellar MD  
[facility] ([facility]uan [facility]) Appt Note: 6 month follow up  
 [address] [facility] [address]  
[address] [address]  
  
    
 1/3/2018  8:45 AM  
ROUTINE CARE with Connor Shah MD  
[facility] Internal Medicine [facility]) Appt Note: 4 month follow up. ... [address] [facility] [address]  
[phone]  
  
   
 [facility] [address] [facility] [address] [address] [address]  
  
    
 1/15/2018  8:15 AM  
ROUTINE CARE with Connor Shah MD  
[facility] Internal Medicine [facility]ria) Appt Note: follow up [address] [place] [address] [address] [place] [address]  
[phone] Upcoming Health Maintenance Date Due DTaP/Tdap/Td series (1 - Tdap) 3/1/1955 LIPID PANEL Q1 9/16/2017 HEMOGLOBIN A1C Q6M 3/6/2018 MEDICARE YEARLY EXAM 4/18/2018 EYE EXAM RETINAL OR DILATED Q1 4/25/2018 FOOT EXAM Q1 9/6/2018 MICROALBUMIN Q1 9/6/2018 GLAUCOMA SCREENING Q2Y 4/25/2019 COLONOSCOPY 9/25/2027 Allergies as of 11/27/2017  Review Complete On: 11/27/2017 By: Connor Shah MD  
  
 Severity Noted Reaction Type Reactions Cortisone  05/11/2011    Other (comments) \"Make me feels weak, like I'm going to die\" Januvia [Sitagliptin]  11/09/2016    Other (comments)  
 weakness Current Immunizations  Reviewed on 10/4/2017 Name Date Influenza High Dose Vaccine PF 10/4/2017, 10/28/2014 Influenza Vaccine 9/14/2016, 10/19/2013 Pneumococcal Conjugate (PCV-13) 9/14/2016 ZZZ-RETIRED (DO NOT USE) Pneumococcal Vaccine (Unspecified Type) 10/1/2008 Not reviewed this visit You Were Diagnosed With   
  
 Codes Comments BRBPR (bright red blood per rectum)    -  Primary ICD-10-CM: K62.5 ICD-9-CM: 569.3 Hemorrhoids, unspecified hemorrhoid type     ICD-10-CM: K64.9 ICD-9-CM: 455.6 Controlled type 2 diabetes mellitus without complication, unspecified long term insulin use status ([facility]     ICD-10-CM: E11.9 ICD-9-CM: 250.00 Essential hypertension with goal blood pressure less than 140/90     ICD-10-CM: I10 
ICD-9-CM: 401.9 Neck arthritis ([facility]     ICD-10-CM: M46.92 
ICD-9-CM: 721.0 Vitals BP Pulse Temp Resp Height(growth percentile) Weight(growth percentile)  
 117/60 (BP 1 Location: Left arm, BP Patient Position: Sitting) (!) 58 97.5 °F (36.4 °C) (Oral) 20 4' 11\" (1.499 m) 164 lb 9.6 oz (74.7 kg) SpO2 BMI OB Status Smoking Status 95% 33.25 kg/m2 Hysterectomy Never Smoker Vitals History BMI and BSA Data Body Mass Index Body Surface Area  
 33.25 kg/m 2 1.76 m 2 Preferred Pharmacy Pharmacy Name Phone Wendy Stewart, [facility] [phone] Your Updated Medication List  
  
   
This list is accurate as of: 11/27/17  8:49 AM.  Always use your most recent med list.  
  
  
  
  
 ALPRAZolam 0.5 mg tablet Commonly known as:  Rubia Hearing Take 0.5 Tabs by mouth nightly as needed for Anxiety. Max Daily Amount: 0.25 mg.  
  
 aspirin 81 mg chewable tablet Take 81 mg by mouth daily. atorvastatin 10 mg tablet Commonly known as:  LIPITOR  
TAKE ONE TABLET BY MOUTH DAILY  
  
 cetirizine 10 mg tablet Commonly known as:  ZYRTEC Take 1 Tab by mouth nightly as needed for Allergies. cloNIDine HCl 0.3 mg tablet Commonly known as:  CATAPRES Take 1 Tab by mouth three (3) times daily. dilTIAZem  mg ER capsule Commonly known as:  CARDIZEM CD  
TAKE ONE CAPSULE BY MOUTH EVERY DAY  
  
 famotidine 20 mg tablet Commonly known as:  PEPCID Take 1 Tab by mouth nightly. fluticasone 50 mcg/actuation nasal spray Commonly known as:  Euna Himanshu 2 Sprays by Both Nostrils route daily. furosemide 40 mg tablet Commonly known as:  LASIX TAKE ONE TABLET BY MOUTH EVERY DAY  
  
 glipiZIDE 5 mg tablet Commonly known as:  Francisco Meuse Take 1 Tab by mouth two (2) times a day. latanoprost 0.005 % ophthalmic solution Commonly known as:  XALATAN  
  
 losartan 100 mg tablet Commonly known as:  COZAAR Take 1 Tab by mouth daily. metFORMIN  mg tablet Commonly known as:  GLUCOPHAGE XR Take 2 Tabs by mouth daily (with dinner). metoprolol tartrate 100 mg IR tablet Commonly known as:  LOPRESSOR  
TAKE 1 AND 1/2 TABLET BY MOUTH 2 TIMES A DAY  
  
 nystatin powder Commonly known as:  MYCOSTATIN Apply  to affected area two (2) times a day. For 2 weeks  
  
 omeprazole 40 mg capsule Commonly known as:  PRILOSEC  
TAKE ONE CAPSULE BY MOUTH EVERY DAY BEFORE BREAKFAST  
  
 pioglitazone 15 mg tablet Commonly known as:  ACTOS Take 1 Tab by mouth nightly. potassium chloride 10 mEq tablet Commonly known as:  KLOR-CON  
TAKE TWO TABLETS BY MOUTH DAILY  
  
 TIMOPTIC-XE 0.5 % ophthalmic gel-forming Generic drug:  timolol TYLENOL 325 mg tablet Generic drug:  acetaminophen Take  by mouth every four (4) hours as needed for Pain. VITAMIN D3 1,000 unit Cap Generic drug:  cholecalciferol Take 1,000 Units by mouth daily. We Performed the Following REFERRAL TO COLON AND RECTAL SURGERY [REF17 Custom] Comments: For possible hemorrhoid Referral Information Referral ID Referred By Referred To  
  
 [id number] [facility]   
   [address] Visits Status Start Date End Date 1 New Request 11/27/17 11/27/18  If your referral has a status of pending review or denied, additional information will be sent to support the outcome of this decision. Patient Instructions High-Fiber Diet: Care Instructions Your Care Instructions A high-fiber diet may help you relieve constipation and feel less bloated. Your doctor and dietitian will help you make a high-fiber eating plan based on your personal needs. The plan will include the things you like to eat. It will also make sure that you get 30 grams of fiber a day. Before you make changes to the way you eat, be sure to talk with your doctor or dietitian. Follow-up care is a key part of your treatment and safety. Be sure to make and go to all appointments, and call your doctor if you are having problems. It's also a good idea to know your test results and keep a list of the medicines you take. How can you care for yourself at home? · You can increase how much fiber you get if you eat more of certain foods. These foods include: ¨ Whole-grain breads and cereals. ¨ Fruits, such as pears, apples, and peaches. Eat the skins, peels, and seeds, if you can. ¨ Vegetables, such as broccoli, cabbage, spinach, carrots, asparagus, and squash. ¨ Starchy vegetables. These include potatoes with skins, kidney beans, and lima beans. · Take a fiber supplement every day if your doctor recommends it. Examples are Benefiber, Citrucel, FiberCon, and Metamucil. Ask your doctor how much to take. · Drink plenty of fluids, enough so that your urine is light yellow or clear like water. If you have kidney, heart, or liver disease and have to limit fluids, talk with your doctor before you increase the amount of fluids you drink. · Get some exercise every day. Exercise helps stool move through the colon. It also helps prevent constipation. · Keep a food diary. Try to notice and write down what foods cause gas, pain, or other symptoms. Then you can avoid these foods. Where can you learn more? Go to http://karina.info/. Enter C208 in the search box to learn more about \"High-Fiber Diet: Care Instructions. \" Current as of: May 12, 2017 Content Version: 11.4 © 5363-8365 Healthwise, [employer]. Care instructions adapted under license by [employer] (which disclaims liability or warranty for this information). If you have questions about a medical condition or this instruction, always ask your healthcare professional. [address] any warranty or liability for your use of this information. Please provide this summary of care documentation to your next provider. Your primary care clinician is listed as Tim Feng. If you have any questions after today's visit, please call [phone].

## 2017-11-27 NOTE — PATIENT INSTRUCTIONS
High-Fiber Diet: Care Instructions  Your Care Instructions    A high-fiber diet may help you relieve constipation and feel less bloated. Your doctor and dietitian will help you make a high-fiber eating plan based on your personal needs. The plan will include the things you like to eat. It will also make sure that you get 30 grams of fiber a day. Before you make changes to the way you eat, be sure to talk with your doctor or dietitian. Follow-up care is a key part of your treatment and safety. Be sure to make and go to all appointments, and call your doctor if you are having problems. It's also a good idea to know your test results and keep a list of the medicines you take. How can you care for yourself at home? · You can increase how much fiber you get if you eat more of certain foods. These foods include:  ¨ Whole-grain breads and cereals. ¨ Fruits, such as pears, apples, and peaches. Eat the skins, peels, and seeds, if you can. ¨ Vegetables, such as broccoli, cabbage, spinach, carrots, asparagus, and squash. ¨ Starchy vegetables. These include potatoes with skins, kidney beans, and lima beans. · Take a fiber supplement every day if your doctor recommends it. Examples are Benefiber, Citrucel, FiberCon, and Metamucil. Ask your doctor how much to take. · Drink plenty of fluids, enough so that your urine is light yellow or clear like water. If you have kidney, heart, or liver disease and have to limit fluids, talk with your doctor before you increase the amount of fluids you drink. · Get some exercise every day. Exercise helps stool move through the colon. It also helps prevent constipation. · Keep a food diary. Try to notice and write down what foods cause gas, pain, or other symptoms. Then you can avoid these foods. Where can you learn more? Go to http://stacey-marie.info/. Enter C781 in the search box to learn more about \"High-Fiber Diet: Care Instructions. \"  Current as of: May 12, 2017  Content Version: 11.4  © 7361-1715 Healthwise, [employer]. Care instructions adapted under license by [employer] (which disclaims liability or warranty for this information). If you have questions about a medical condition or this instruction, always ask your healthcare professional. [address] any warranty or liability for your use of this information.

## 2017-11-27 NOTE — PROGRESS NOTES
HISTORY OF PRESENT ILLNESS  Jemima Goldsmith is a 80 y.o. female here for ER follow-up. She had bright red blood per rectum twice which brought her to ER. Stool guaiac was positive. But hemorrhage stopped. CBC was normal.  She was sent home. No further bleeding per rectum. She had history of hemorrhoid and surgery done in the past.  She was constipated. Has diabetes and HTN,stable. labs are reviewed with her. Colonoscopy is done,normal.  Labs reviewed. ED Follow-up     Hypertension      Rectal Bleeding     Diabetes         Review of Systems   Constitutional: Negative. HENT: Positive for congestion. Eyes: Negative. Cardiovascular: Negative. Gastrointestinal: Positive for anal bleeding. Genitourinary: Negative. Musculoskeletal: Negative. Skin: Negative. Neurological: Negative. Endo/Heme/Allergies: Negative. Psychiatric/Behavioral: The patient is nervous/anxious. Physical Exam   Constitutional: She appears well-developed and well-nourished. No distress. Neck: Normal range of motion. Neck supple. No tracheal deviation present. No thyromegaly present. Cardiovascular: Normal rate, regular rhythm, normal heart sounds and intact distal pulses. Pulmonary/Chest: Effort normal and breath sounds normal. No respiratory distress. She has no wheezes. Abdominal: Soft. Bowel sounds are normal. She exhibits no distension. There is no tenderness. Musculoskeletal: She exhibits no edema. Psychiatric: She has a normal mood and affect. Her behavior is normal.       ASSESSMENT and PLAN  Diagnoses and all orders for this visit:    1. BRBPR (bright red blood per rectum)    Most likely from internal hemorrhoids. .  Need to be on high-fiber diet. No bleeding now. CBC was normal.  His bleeding recur,will refer her to,  -     [facility] and Rectal    2. Hemorrhoids, unspecified hemorrhoid type    Had hemorrhoid surgery in the past.  Will refer,  -     [facility] and Rectal     3. Controlled type 2 diabetes mellitus without complication, unspecified long term insulin use status (HCC)    Controlled. 4. Essential hypertension with goal blood pressure less than 140/90    Controlled with current regimen. 5. Neck arthritis ([address]    Need to do neck exercise. X-ray was not done yet. Discussed expected course/resolution/complications of diagnosis in detail with patient. Medication risks/benefits/costs/interactions/alternatives discussed with patient. Pt was given an after visit summary which includes diagnoses, current medications & vitals. Pt expressed understanding with the diagnosis and plan.

## 2017-12-01 ENCOUNTER — OFFICE VISIT (OUTPATIENT)
Dept: CARDIOLOGY CLINIC | Age: 82
End: 2017-12-01

## 2017-12-01 VITALS
RESPIRATION RATE: 16 BRPM | HEIGHT: 59 IN | DIASTOLIC BLOOD PRESSURE: 60 MMHG | BODY MASS INDEX: 33.06 KG/M2 | HEART RATE: 64 BPM | WEIGHT: 164 LBS | SYSTOLIC BLOOD PRESSURE: 130 MMHG

## 2017-12-01 DIAGNOSIS — I87.2 VENOUS INSUFFICIENCY: ICD-10-CM

## 2017-12-01 DIAGNOSIS — K29.70 GASTRITIS WITHOUT BLEEDING, UNSPECIFIED CHRONICITY, UNSPECIFIED GASTRITIS TYPE: ICD-10-CM

## 2017-12-01 DIAGNOSIS — I10 BENIGN ESSENTIAL HTN: Primary | ICD-10-CM

## 2017-12-01 DIAGNOSIS — E11.9 CONTROLLED TYPE 2 DIABETES MELLITUS WITHOUT COMPLICATION, UNSPECIFIED LONG TERM INSULIN USE STATUS: ICD-10-CM

## 2017-12-01 RX ORDER — MULTIVIT WITH IRON,MINERALS
1 TABLET ORAL DAILY
COMMUNITY

## 2017-12-01 NOTE — MR AVS SNAPSHOT
Visit Information Date & Time Provider Department Dept. Phone Encounter #  
 12/1/2017  8:40 AM Mery Cuellar MD [facility] Kaela Shukla [phone] [id number] Your Appointments 1/15/2018  8:15 AM  
ROUTINE CARE with Connor Shah MD  
[facility]) Appt Note: follow up [address] [facility] [address]  
[phone]  
  
   
 [facility]  
  
    
 6/8/2018  8:40 AM  
ESTABLISHED PATIENT with Mery Cuellar MD  
[facility] ([facility]) Appt Note: 6 mo fu appt Count includes the [facility] [address] [facility]  
[address] [facility] [address] [facility] 7 [address] Upcoming Health Maintenance Date Due DTaP/Tdap/Td series (1 - Tdap) 3[birth year]1/1955 LIPID PANEL Q1 9/16/2017 HEMOGLOBIN A1C Q6M 3/6/2018 MEDICARE YEARLY EXAM 4/18/2018 EYE EXAM RETINAL OR DILATED Q1 4/25/2018 FOOT EXAM Q1 9/6/2018 MICROALBUMIN Q1 9/6/2018 GLAUCOMA SCREENING Q2Y 4/25/2019 COLONOSCOPY 9/25/2027 Allergies as of 12/1/2017  Review Complete On: 12/1/2017 By: Kara Seip Severity Noted Reaction Type Reactions Cortisone  05/11/2011    Other (comments) \"Make me feels weak, like I'm going to die\" Januvia [Sitagliptin]  11/09/2016    Other (comments)  
 weakness Current Immunizations  Reviewed on 10/4/2017 Name Date Influenza High Dose Vaccine PF 10/4/2017, 10/28/2014 Influenza Vaccine 9/14/2016, 10/19/2013 Pneumococcal Conjugate (PCV-13) 9/14/2016 ZZZ-RETIRED (DO NOT USE) Pneumococcal Vaccine (Unspecified Type) 10/1/2008 Not reviewed this visit Vitals BP Pulse Resp Height(growth percentile) Weight(growth percentile) BMI  
 130/60 (BP 1 Location: Right arm, BP Patient Position: Sitting) 64 16 4' 11\" (1.499 m) 164 lb (74.4 kg) 33.12 kg/m2 OB Status Smoking Status Hysterectomy Never Smoker Vitals History BMI and BSA Data Body Mass Index Body Surface Area  
 33.12 kg/m 2 1.76 m 2 Preferred Pharmacy Pharmacy Name Phone Sarkis Ragland [phone] Your Updated Medication List  
  
   
This list is accurate as of: 12/1/17  9:00 AM.  Always use your most recent med list.  
  
  
  
  
 ALPRAZolam 0.5 mg tablet Commonly known as:  Kaleta Kelsey Take 0.5 Tabs by mouth nightly as needed for Anxiety. Max Daily Amount: 0.25 mg.  
  
 aspirin 81 mg chewable tablet Take 81 mg by mouth daily. atorvastatin 10 mg tablet Commonly known as:  LIPITOR  
TAKE ONE TABLET BY MOUTH DAILY CALCIUM 600 + D 600-125 mg-unit Tab Generic drug:  calcium-cholecalciferol (d3) Take 1 Tab by mouth daily. cetirizine 10 mg tablet Commonly known as:  ZYRTEC Take 1 Tab by mouth nightly as needed for Allergies. cloNIDine HCl 0.3 mg tablet Commonly known as:  CATAPRES Take 1 Tab by mouth three (3) times daily. cod liver oil Cap Take 1 Cap by mouth daily. dilTIAZem  mg ER capsule Commonly known as:  CARDIZEM CD  
TAKE ONE CAPSULE BY MOUTH EVERY DAY  
  
 famotidine 20 mg tablet Commonly known as:  PEPCID Take 1 Tab by mouth nightly. fluticasone 50 mcg/actuation nasal spray Commonly known as:  Mamie Midland 2 Sprays by Both Nostrils route daily. furosemide 40 mg tablet Commonly known as:  LASIX TAKE ONE TABLET BY MOUTH EVERY DAY  
  
 glipiZIDE 5 mg tablet Commonly known as:  Maxcine Lowry Take 1 Tab by mouth two (2) times a day. latanoprost 0.005 % ophthalmic solution Commonly known as:  XALATAN  
  
 losartan 100 mg tablet Commonly known as:  COZAAR Take 1 Tab by mouth daily. metFORMIN  mg tablet Commonly known as:  GLUCOPHAGE XR Take 2 Tabs by mouth daily (with dinner). metoprolol tartrate 100 mg IR tablet Commonly known as:  LOPRESSOR  
TAKE 1 AND 1/2 TABLET BY MOUTH 2 TIMES A DAY  
  
 nystatin powder Commonly known as:  MYCOSTATIN Apply  to affected area two (2) times a day. For 2 weeks  
  
 omeprazole 40 mg capsule Commonly known as:  PRILOSEC  
TAKE ONE CAPSULE BY MOUTH EVERY DAY BEFORE BREAKFAST  
  
 pioglitazone 15 mg tablet Commonly known as:  ACTOS Take 1 Tab by mouth nightly. potassium chloride 10 mEq tablet Commonly known as:  KLOR-CON  
TAKE TWO TABLETS BY MOUTH DAILY  
  
 TIMOPTIC-XE 0.5 % ophthalmic gel-forming Generic drug:  timolol TYLENOL 325 mg tablet Generic drug:  acetaminophen Take  by mouth every four (4) hours as needed for Pain. VITAMIN D3 1,000 unit Cap Generic drug:  cholecalciferol Take 1,000 Units by mouth daily. Please provide this summary of care documentation to your next provider. Your primary care clinician is listed as Henri Thompson. If you have any questions after today's visit, please call [phone].

## 2017-12-01 NOTE — PROGRESS NOTES
[place]: Blane Carrel  [phone]    HPI:  Ms. Nancie Kaur is a 81 yo F with a h/o HTN (previously on nadolol), hyperlipidemia, DM2 seen in the past for palpitations. Found on holter to have benign ectopy and started on beta blocker for symptoms. Previously off higher dose HCTZ due to low Na+. BP has been resistant; BP had improved on norvasc but had worsened LE edema. U/S 11/2012 for DVT was negative. Echo 8/2012 noted normal LV systolic function with mild to moderate MR; echo 2/17/14 unchanged. Seen by vascular in past for LE edema and c/w vascular insufficiency and recommended leg elevation and compression stockings. Echo 4/2015 was normal with EF 60% and mild MR. Since her last visit, she continues to do okay from a cardiac standpoint. No chest pain, shortness of breath, palpitations. She has been compliant with her medications. She may be having issues with hemorrhoids, but she says that has settled down now. She is compensated on exam with clear lungs and normotensive with a blood pressure of 130/60. Assessment and Plan:   1. Resistant hypertension. Blood pressure is controlled and no changes made. History of white coat hypertension. Will have her follow back in six months. 2. Mitral regurgitation. Mild in the past.  Echocardiogram from here on an as needed basis. 3. Venous insufficiency. 4. Type 2 diabetes. .She  has a past medical history of Adverse effect of anesthesia; Chronic bronchitis; Diabetes ([address] Essential hypertension; Hypercholesterolemia; and Hypertension. All other systems negative except as above. PE  Vitals:    12/01/17 0832   BP: 130/60   Pulse: 64   Resp: 16   Weight: 164 lb (74.4 kg)   Height: 4' 11\" (1.499 m)    Body mass index is 33.12 kg/(m^2).    General appearance - alert, well appearing, and in no distress  Mental status - affect appropriate to mood, pleasant   Neck - supple, no JVD, no bruits   Chest - clear to auscultation, no wheezes, rales or rhonchi  Heart - normal rate, regular rhythm, normal S1, S2, I-II/VI systolic murmur LUSB, rubs, clicks or gallops  Abdomen - soft, nontender, nondistended  Extremities - peripheral pulses normal, trace pedal edema bilateral lower extremities. Recent Labs:  Lab Results   Component Value Date/Time    Cholesterol, total 124 09/16/2016 08:09 AM    HDL Cholesterol 41 09/16/2016 08:09 AM    LDL, calculated 53 09/16/2016 08:09 AM    Triglyceride 149 09/16/2016 08:09 AM    CHOL/HDL Ratio 3.3 08/21/2012 04:10 AM     Lab Results   Component Value Date/Time    Creatinine 1.01 11/21/2017 08:08 PM     Lab Results   Component Value Date/Time    BUN 15 11/21/2017 08:08 PM     Lab Results   Component Value Date/Time    Potassium 3.6 11/21/2017 08:08 PM     Lab Results   Component Value Date/Time    Hemoglobin A1c 6.9 09/06/2017 09:38 AM     Lab Results   Component Value Date/Time    HGB 12.5 11/21/2017 08:08 PM     Lab Results   Component Value Date/Time    PLATELET 365 69/28/9889 08:08 PM       Reviewed:  Past Medical History:   Diagnosis Date    Adverse effect of anesthesia     pt states she was able to feel everything during colonoscopy    Chronic bronchitis     Diabetes ([address]     Essential hypertension     Hypercholesterolemia     Hypertension      History   Smoking Status    Never Smoker   Smokeless Tobacco    Never Used     History   Alcohol Use No     Allergies   Allergen Reactions    Cortisone Other (comments)     \"Make me feels weak, like I'm going to die\"      Januvia [Sitagliptin] Other (comments)     weakness       Current Outpatient Prescriptions   Medication Sig    cod liver oil cap Take 1 Cap by mouth daily.  calcium-cholecalciferol, d3, (CALCIUM 600 + D) 600-125 mg-unit tab Take 1 Tab by mouth daily.  nystatin (MYCOSTATIN) powder Apply  to affected area two (2) times a day. For 2 weeks    metFORMIN ER (GLUCOPHAGE XR) 500 mg tablet Take 2 Tabs by mouth daily (with dinner).     losartan (COZAAR) 100 mg tablet Take 1 Tab by mouth daily.  cetirizine (ZYRTEC) 10 mg tablet Take 1 Tab by mouth nightly as needed for Allergies.  dilTIAZem CD (CARDIZEM CD) 180 mg ER capsule TAKE ONE CAPSULE BY MOUTH EVERY DAY    furosemide (LASIX) 40 mg tablet TAKE ONE TABLET BY MOUTH EVERY DAY    metoprolol tartrate (LOPRESSOR) 100 mg IR tablet TAKE 1 AND 1/2 TABLET BY MOUTH 2 TIMES A DAY    ALPRAZolam (XANAX) 0.5 mg tablet Take 0.5 Tabs by mouth nightly as needed for Anxiety. Max Daily Amount: 0.25 mg.  potassium chloride (KLOR-CON) 10 mEq tablet TAKE TWO TABLETS BY MOUTH DAILY    fluticasone (FLONASE) 50 mcg/actuation nasal spray 2 Sprays by Both Nostrils route daily.  atorvastatin (LIPITOR) 10 mg tablet TAKE ONE TABLET BY MOUTH DAILY    latanoprost (XALATAN) 0.005 % ophthalmic solution     TIMOPTIC-XE 0.5 % ophthalmic gel-forming     omeprazole (PRILOSEC) 40 mg capsule TAKE ONE CAPSULE BY MOUTH EVERY DAY BEFORE BREAKFAST    cloNIDine HCl (CATAPRES) 0.3 mg tablet Take 1 Tab by mouth three (3) times daily.  famotidine (PEPCID) 20 mg tablet Take 1 Tab by mouth nightly.  pioglitazone (ACTOS) 15 mg tablet Take 1 Tab by mouth nightly.  glipiZIDE (GLUCOTROL) 5 mg tablet Take 1 Tab by mouth two (2) times a day.  acetaminophen (TYLENOL) 325 mg tablet Take  by mouth every four (4) hours as needed for Pain.  Cholecalciferol, Vitamin D3, (VITAMIN D3) 1,000 unit cap Take 1,000 Units by mouth daily.  aspirin 81 mg chewable tablet Take 81 mg by mouth daily. No current facility-administered medications for this visit.         Lisa Burton MD  [facility] [place]  [address] [facility] [address]  [address]

## 2017-12-04 RX ORDER — FAMOTIDINE 20 MG/1
TABLET, FILM COATED ORAL
Qty: 30 TAB | Refills: 0 | Status: SHIPPED | OUTPATIENT
Start: 2017-12-04 | End: 2018-02-12 | Stop reason: SDUPTHER

## 2017-12-22 RX ORDER — ATORVASTATIN CALCIUM 10 MG/1
TABLET, FILM COATED ORAL
Qty: 30 TAB | Refills: 0 | Status: SHIPPED | OUTPATIENT
Start: 2017-12-22 | End: 2018-02-23 | Stop reason: SDUPTHER

## 2017-12-28 DIAGNOSIS — I87.303 STASIS EDEMA, BILATERAL: ICD-10-CM

## 2017-12-28 RX ORDER — DILTIAZEM HYDROCHLORIDE 180 MG/1
CAPSULE, COATED, EXTENDED RELEASE ORAL
Qty: 30 CAP | Refills: 0 | Status: SHIPPED | OUTPATIENT
Start: 2017-12-28 | End: 2017-12-29 | Stop reason: SDUPTHER

## 2017-12-29 DIAGNOSIS — I87.303 STASIS EDEMA, BILATERAL: ICD-10-CM

## 2017-12-29 RX ORDER — FUROSEMIDE 40 MG/1
TABLET ORAL
Qty: 30 TAB | Refills: 0 | Status: SHIPPED | OUTPATIENT
Start: 2017-12-29 | End: 2018-07-16

## 2017-12-29 RX ORDER — METOPROLOL TARTRATE 100 MG/1
TABLET ORAL
Qty: 90 TAB | Refills: 0 | Status: SHIPPED | OUTPATIENT
Start: 2017-12-29 | End: 2018-02-23 | Stop reason: SDUPTHER

## 2017-12-29 RX ORDER — FUROSEMIDE 40 MG/1
TABLET ORAL
Qty: 30 TAB | Refills: 0 | Status: SHIPPED | OUTPATIENT
Start: 2017-12-29 | End: 2017-12-29 | Stop reason: SDUPTHER

## 2017-12-29 RX ORDER — DILTIAZEM HYDROCHLORIDE 180 MG/1
180 CAPSULE, COATED, EXTENDED RELEASE ORAL DAILY
Qty: 30 CAP | Refills: 3 | Status: SHIPPED | OUTPATIENT
Start: 2017-12-29 | End: 2018-05-16 | Stop reason: SDUPTHER

## 2017-12-29 NOTE — TELEPHONE ENCOUNTER
Requested Prescriptions     Signed Prescriptions Disp Refills    dilTIAZem CD (CARDIZEM CD) 180 mg ER capsule 30 Cap 3     Sig: Take 1 Cap by mouth daily.      Authorizing Provider: Guillermo Guerrero     Ordering User: Jose Harp

## 2018-01-01 DIAGNOSIS — I10 ESSENTIAL HYPERTENSION WITH GOAL BLOOD PRESSURE LESS THAN 140/90: ICD-10-CM

## 2018-01-02 RX ORDER — CLONIDINE HYDROCHLORIDE 0.3 MG/1
TABLET ORAL
Qty: 90 TAB | Refills: 0 | Status: SHIPPED | OUTPATIENT
Start: 2018-01-02 | End: 2018-03-02 | Stop reason: SDUPTHER

## 2018-01-24 ENCOUNTER — OFFICE VISIT (OUTPATIENT)
Dept: INTERNAL MEDICINE CLINIC | Age: 83
End: 2018-01-24

## 2018-01-24 VITALS
SYSTOLIC BLOOD PRESSURE: 138 MMHG | BODY MASS INDEX: 32.78 KG/M2 | TEMPERATURE: 98 F | WEIGHT: 162.6 LBS | HEIGHT: 59 IN | HEART RATE: 61 BPM | OXYGEN SATURATION: 96 % | RESPIRATION RATE: 20 BRPM | DIASTOLIC BLOOD PRESSURE: 77 MMHG

## 2018-01-24 DIAGNOSIS — I10 ESSENTIAL HYPERTENSION WITH GOAL BLOOD PRESSURE LESS THAN 140/90: ICD-10-CM

## 2018-01-24 DIAGNOSIS — H04.123 DRY EYES: ICD-10-CM

## 2018-01-24 DIAGNOSIS — K64.9 HEMORRHOIDS, UNSPECIFIED HEMORRHOID TYPE: ICD-10-CM

## 2018-01-24 DIAGNOSIS — E11.21 TYPE 2 DIABETES MELLITUS WITH NEPHROPATHY (HCC): Primary | ICD-10-CM

## 2018-01-24 DIAGNOSIS — E78.2 MIXED HYPERLIPIDEMIA: ICD-10-CM

## 2018-01-24 NOTE — MR AVS SNAPSHOT
[address] [place] [address] [place] [address] 
[phone] Patient: Raz Tierney MRN: [id number] PDW:[id number]/[id number] Visit Information Date & Time Provider Department Dept. Phone Encounter #  
 1/24/2018  1:45 PM Ellen Joseph, 607 [facility] Internal Medicine [id number] Your Appointments 1/24/2018  1:45 PM  
ROUTINE CARE with Ellen Joseph MD  
[facility] Internal Medicine [facility]) Appt Note: follow up; follow up [address] [facility] [address] [address]  
[phone]  
  
   
 [address] [facility] [address]  
  
    
 6/8/2018  8:40 AM  
ESTABLISHED PATIENT with Parker Saunders MD  
[facility] ([facility]) Appt Note: 6 mo fu appt [address] [facility] [address] [facility]  
[address] [facility] [address] Upcoming Health Maintenance Date Due DTaP/Tdap/Td series (1 - Tdap) 3[birth year]1/1955 LIPID PANEL Q1 9/16/2017 HEMOGLOBIN A1C Q6M 3/6/2018 MEDICARE YEARLY EXAM 4/18/2018 EYE EXAM RETINAL OR DILATED Q1 4/25/2018 FOOT EXAM Q1 9/6/2018 MICROALBUMIN Q1 9/6/2018 GLAUCOMA SCREENING Q2Y 4/25/2019 COLONOSCOPY 9/25/2027 Allergies as of 1/24/2018  Review Complete On: 1/24/2018 By: Ellen Joseph MD  
  
 Severity Noted Reaction Type Reactions Cortisone  05/11/2011    Other (comments) \"Make me feels weak, like I'm going to die\" Januvia [Sitagliptin]  11/09/2016    Other (comments)  
 weakness Current Immunizations  Reviewed on 10/4/2017 Name Date Influenza High Dose Vaccine PF 10/4/2017, 10/28/2014 Influenza Vaccine 9/14/2016, 10/19/2013 Pneumococcal Conjugate (PCV-13) 9/14/2016 ZZZ-RETIRED (DO NOT USE) Pneumococcal Vaccine (Unspecified Type) 10/1/2008 Not reviewed this visit You Were Diagnosed With   
  
 Codes Comments Type 2 diabetes mellitus with nephropathy [facility]    -  Primary ICD-10-CM: E11.21 
ICD-9-CM: 250.40, 583.81 Essential hypertension with goal blood pressure less than 140/90     ICD-10-CM: I10 
ICD-9-CM: 401.9 Mixed hyperlipidemia     ICD-10-CM: E78.2 ICD-9-CM: 272.2 Dry eyes     ICD-10-CM: K04.490 ICD-9-CM: 375.15 Hemorrhoids, unspecified hemorrhoid type     ICD-10-CM: K64.9 ICD-9-CM: 099. 6 Vitals BP Pulse Temp Resp Height(growth percentile) Weight(growth percentile) 138/77 (BP 1 Location: Left arm, BP Patient Position: Sitting) 61 98 °F (36.7 °C) (Oral) 20 4' 11\" (1.499 m) 162 lb 9.6 oz (73.8 kg) SpO2 BMI OB Status Smoking Status 96% 32.84 kg/m2 Hysterectomy Never Smoker Vitals History BMI and BSA Data Body Mass Index Body Surface Area  
 32.84 kg/m 2 1.75 m 2 Preferred Pharmacy Pharmacy Name Phone Marilynn Robert [facility] [phone] Your Updated Medication List  
  
   
This list is accurate as of: 1/24/18  1:23 PM.  Always use your most recent med list.  
  
  
  
  
 ALPRAZolam 0.5 mg tablet Commonly known as:  Joline Mcburney Take 0.5 Tabs by mouth nightly as needed for Anxiety. Max Daily Amount: 0.25 mg.  
  
 artificial tears(hypromellose) 0.3 % Gel ophthalmic ointment Commonly known as:  SYSTANE GEL Administer 10 g to both eyes as needed. aspirin 81 mg chewable tablet Take 81 mg by mouth daily. atorvastatin 10 mg tablet Commonly known as:  LIPITOR  
TAKE ONE TABLET BY MOUTH DAILY CALCIUM 600 + D 600-125 mg-unit Tab Generic drug:  calcium-cholecalciferol (d3) Take 1 Tab by mouth daily. cetirizine 10 mg tablet Commonly known as:  ZYRTEC Take 1 Tab by mouth nightly as needed for Allergies. cloNIDine HCl 0.3 mg tablet Commonly known as:  CATAPRES  
TAKE ONE TABLET BY MOUTH 3 TIMES A DAY  
  
 cod liver oil Cap Take 1 Cap by mouth daily. dilTIAZem  mg ER capsule Commonly known as:  CARDIZEM CD Take 1 Cap by mouth daily. famotidine 20 mg tablet Commonly known as:  PEPCID  
TAKE ONE TABLET BY MOUTH NIGHTLY  
  
 fluticasone 50 mcg/actuation nasal spray Commonly known as:  Leartis  2 Sprays by Both Nostrils route daily. furosemide 40 mg tablet Commonly known as:  LASIX TAKE ONE TABLET BY MOUTH EVERY DAY  
  
 glipiZIDE 5 mg tablet Commonly known as:  Isabela Hair Take 1 Tab by mouth two (2) times a day. latanoprost 0.005 % ophthalmic solution Commonly known as:  XALATAN  
  
 losartan 100 mg tablet Commonly known as:  COZAAR Take 1 Tab by mouth daily. metFORMIN  mg tablet Commonly known as:  GLUCOPHAGE XR Take 2 Tabs by mouth daily (with dinner). metoprolol tartrate 100 mg IR tablet Commonly known as:  LOPRESSOR  
TAKE 1 AND 1/2 TABLET BY MOUTH 2 TIMES A DAY  
  
 nystatin powder Commonly known as:  MYCOSTATIN Apply  to affected area two (2) times a day. For 2 weeks  
  
 omeprazole 40 mg capsule Commonly known as:  PRILOSEC  
TAKE ONE CAPSULE BY MOUTH EVERY DAY BEFORE BREAKFAST  
  
 pioglitazone 15 mg tablet Commonly known as:  ACTOS Take 1 Tab by mouth nightly. potassium chloride 10 mEq tablet Commonly known as:  KLOR-CON  
TAKE TWO TABLETS BY MOUTH DAILY  
  
 TIMOPTIC-XE 0.5 % ophthalmic gel-forming Generic drug:  timolol TYLENOL 325 mg tablet Generic drug:  acetaminophen Take  by mouth every four (4) hours as needed for Pain. VITAMIN D3 1,000 unit Cap Generic drug:  cholecalciferol Take 1,000 Units by mouth daily. Prescriptions Sent to Pharmacy Refills  
 artificial tears,hypromellose, (SYSTANE GEL) 0.3 % gel ophthalmic ointment 1 Sig: Administer 10 g to both eyes as needed. Class: Normal  
 Pharmacy: [facility] Ph #: [phone] Route: Both Eyes We Performed the Following CBC W/O DIFF [88029 CPT(R)] HEMOGLOBIN A1C WITH EAG [16478 CPT(R)] LIPID PANEL [15625 CPT(R)] METABOLIC PANEL, COMPREHENSIVE [22811 CPT(R)] Please provide this summary of care documentation to your next provider. Your primary care clinician is listed as Annalisa Triplett. If you have any questions after today's visit, please call [phone].

## 2018-01-24 NOTE — PROGRESS NOTES
Health Maintenance Due   Topic Date Due    DTaP/Tdap/Td series (1 - Tdap) 03[birth year]01/1955    LIPID PANEL Q1  09/16/2017       Chief Complaint   Patient presents with    Hypertension    Diabetes    Cholesterol Problem       1. Have you been to the ER, urgent care clinic since your last visit? Hospitalized since your last visit? No    2. Have you seen or consulted any other health care providers outside of the [address] [place] [address] since your last visit? Include any pap smears or colon screening. No    3) Do you have an Advance Directive on file? no    4) Are you interested in receiving information on Advance Directives? NO      Patient is accompanied by self I have received [facility] to discuss any/all medical information while they are present in the room.

## 2018-01-24 NOTE — LETTER
2/5/2018 10:36 AM 
 
Ms. Tori Moore [address] [place] [id number] Dear Tori Moore: Please find your most recent results below. Resulted Orders METABOLIC PANEL, COMPREHENSIVE Result Value Ref Range Glucose 138 (H) 65 - 99 mg/dL BUN 12 8 - 27 mg/dL Creatinine 0.82 0.57 - 1.00 mg/dL GFR est non-AA 66 >59 mL/min/1.73 GFR est AA 77 >59 mL/min/1.73  
 BUN/Creatinine ratio 15 12 - 28 Sodium 139 134 - 144 mmol/L Potassium 4.0 3.5 - 5.2 mmol/L Chloride 98 96 - 106 mmol/L  
 CO2 28 18 - 29 mmol/L Calcium 9.5 8.7 - 10.3 mg/dL Protein, total 7.3 6.0 - 8.5 g/dL Albumin 4.4 3.5 - 4.7 g/dL GLOBULIN, TOTAL 2.9 1.5 - 4.5 g/dL A-G Ratio 1.5 1.2 - 2.2 Bilirubin, total 0.4 0.0 - 1.2 mg/dL Alk. phosphatase 91 39 - 117 IU/L  
 AST (SGOT) 19 0 - 40 IU/L  
 ALT (SGPT) 18 0 - 32 IU/L Narrative Performed at:  [address]  [id number] : Zhou Early MD, Phone:  [phone] CBC W/O DIFF Result Value Ref Range WBC 4.5 3.4 - 10.8 x10E3/uL  
 RBC 4.52 3.77 - 5.28 x10E6/uL HGB 12.8 11.1 - 15.9 g/dL HCT 38.1 34.0 - 46.6 % MCV 84 79 - 97 fL  
 MCH 28.3 26.6 - 33.0 pg  
 MCHC 33.6 31.5 - 35.7 g/dL  
 RDW 14.4 12.3 - 15.4 % PLATELET 940 636 - 891 x10E3/uL Narrative Performed at:  [address]  [id number] : Zhou Early MD, Phone:  [phone] HEMOGLOBIN A1C WITH EAG Result Value Ref Range Hemoglobin A1c 6.9 (H) 4.8 - 5.6 % Comment:  
            Pre-diabetes: 5.7 - 6.4 Diabetes: >6.4 Glycemic control for adults with diabetes: <7.0 Estimated average glucose 151 mg/dL Narrative Performed at:  [address]  [id number] : Zhou Early MD, Phone:  [phone] LIPID PANEL Result Value Ref Range Cholesterol, total 149 100 - 199 mg/dL Triglyceride 182 (H) 0 - 149 mg/dL HDL Cholesterol 42 >39 mg/dL VLDL, calculated 36 5 - 40 mg/dL LDL, calculated 71 0 - 99 mg/dL Narrative Performed at:  [address]  [id number] : Tamara Hallman MD, Phone:  [phone] CVD REPORT Result Value Ref Range INTERPRETATION Note Comment:  
   Supplemental report is available. Narrative Performed at:  [address] [place] [address]  [id number] : Dotty oCoper PhD, Phone:  [phone] DIABETES PATIENT EDUCATION Result Value Ref Range PDF Image Not applicable Narrative Performed at:  [address] [place] [address]  [id number] : Dotty Cooper PhD, Phone:  [phone] RECOMMENDATIONS: 
None. Keep up the good work! Please call me if you have any questions: [phone] Sincerely, Romero Hastings MD

## 2018-01-24 NOTE — PROGRESS NOTES
HISTORY OF PRESENT ILLNESS  Duy Javed is a 80 y.o. female here for follow-up. She is doing well. Has hypertension and diabetes. Compliant with medication no chest pain palpitation or shortness of breath   report dry watery eyes. She reads book  a lot. No further bleeding per rectum. She had history of hemorrhoid and surgery done in the past.    Has diabetes and HTN,stable. labs are reviewed with her. Colonoscopy is done,normal.  DEXA scan normal.  Report rash in the breast fold, improved with hydrocortisone cream.  Now no rash. Advised her to change her bra to get cotton lining bra. Labs reviewed. Hypertension      Diabetes     Cholesterol Problem     Watery Eyes         Review of Systems   Constitutional: Negative. HENT: Negative. Eyes: Positive for discharge. Cardiovascular: Negative. Gastrointestinal: Positive for anal bleeding. Genitourinary: Negative. Musculoskeletal: Negative. Skin: Negative. Neurological: Negative. Endo/Heme/Allergies: Negative. Psychiatric/Behavioral: Negative. Physical Exam   Constitutional: She appears well-developed and well-nourished. No distress. Eyes: Conjunctivae and EOM are normal. Pupils are equal, round, and reactive to light. Right eye exhibits no discharge. Left eye exhibits no discharge. No scleral icterus. Neck: Normal range of motion. Neck supple. No tracheal deviation present. No thyromegaly present. Cardiovascular: Normal rate, regular rhythm, normal heart sounds and intact distal pulses. Pulmonary/Chest: Effort normal and breath sounds normal. No respiratory distress. She has no wheezes. Musculoskeletal: She exhibits no edema. Psychiatric: She has a normal mood and affect. Her behavior is normal.       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   Will check,    -     METABOLIC PANEL, COMPREHENSIVE  -     HEMOGLOBIN A1C WITH EAG    2. Essential hypertension with goal blood pressure less than 140/90    On multiple medications blood pressure stable. Will check,  -     METABOLIC PANEL, COMPREHENSIVE  -     CBC W/O DIFF    3. Mixed hyperlipidemia    Lipid was normal.  Will check,  -     LIPID PANEL    4. Dry eyes  Probably dry eyes. Will order,    -     artificial tears,hypromellose, (SYSTANE GEL) 0.3 % gel ophthalmic ointment; Administer 10 g to both eyes as needed. 5. Hemorrhoids, unspecified hemorrhoid type    No more bleeding. Will check,  -     CBC W/O DIFF      Discussed expected course/resolution/complications of diagnosis in detail with patient. Medication risks/benefits/costs/interactions/alternatives discussed with patient. Pt was given an after visit summary which includes diagnoses, current medications & vitals. Pt expressed understanding with the diagnosis and plan.

## 2018-01-25 ENCOUNTER — HOSPITAL ENCOUNTER (OUTPATIENT)
Dept: LAB | Age: 83
Discharge: HOME | End: 2018-01-25
Payer: MEDICARE

## 2018-01-25 PROCEDURE — 83036 HEMOGLOBIN GLYCOSYLATED A1C: CPT

## 2018-01-25 PROCEDURE — 85027 COMPLETE CBC AUTOMATED: CPT

## 2018-01-25 PROCEDURE — 80061 LIPID PANEL: CPT

## 2018-01-25 PROCEDURE — 80053 COMPREHEN METABOLIC PANEL: CPT

## 2018-01-25 PROCEDURE — 36415 COLL VENOUS BLD VENIPUNCTURE: CPT

## 2018-01-26 LAB
ALBUMIN SERPL-MCNC: 4.4 G/DL (ref 3.5–4.7)
ALBUMIN/GLOB SERPL: 1.5 {RATIO} (ref 1.2–2.2)
ALP SERPL-CCNC: 91 IU/L (ref 39–117)
ALT SERPL-CCNC: 18 IU/L (ref 0–32)
AST SERPL-CCNC: 19 IU/L (ref 0–40)
BILIRUB SERPL-MCNC: 0.4 MG/DL (ref 0–1.2)
BUN SERPL-MCNC: 12 MG/DL (ref 8–27)
BUN/CREAT SERPL: 15 (ref 12–28)
CALCIUM SERPL-MCNC: 9.5 MG/DL (ref 8.7–10.3)
CHLORIDE SERPL-SCNC: 98 MMOL/L (ref 96–106)
CHOLEST SERPL-MCNC: 149 MG/DL (ref 100–199)
CO2 SERPL-SCNC: 28 MMOL/L (ref 18–29)
CREAT SERPL-MCNC: 0.82 MG/DL (ref 0.57–1)
ERYTHROCYTE [DISTWIDTH] IN BLOOD BY AUTOMATED COUNT: 14.4 % (ref 12.3–15.4)
EST. AVERAGE GLUCOSE BLD GHB EST-MCNC: 151 MG/DL
GFR SERPLBLD CREATININE-BSD FMLA CKD-EPI: 66 ML/MIN/1.73
GFR SERPLBLD CREATININE-BSD FMLA CKD-EPI: 77 ML/MIN/1.73
GLOBULIN SER CALC-MCNC: 2.9 G/DL (ref 1.5–4.5)
GLUCOSE SERPL-MCNC: 138 MG/DL (ref 65–99)
HBA1C MFR BLD: 6.9 % (ref 4.8–5.6)
HCT VFR BLD AUTO: 38.1 % (ref 34–46.6)
HDLC SERPL-MCNC: 42 MG/DL
HGB BLD-MCNC: 12.8 G/DL (ref 11.1–15.9)
INTERPRETATION, 910389: NORMAL
LDLC SERPL CALC-MCNC: 71 MG/DL (ref 0–99)
Lab: NORMAL
MCH RBC QN AUTO: 28.3 PG (ref 26.6–33)
MCHC RBC AUTO-ENTMCNC: 33.6 G/DL (ref 31.5–35.7)
MCV RBC AUTO: 84 FL (ref 79–97)
PLATELET # BLD AUTO: 177 X10E3/UL (ref 150–379)
POTASSIUM SERPL-SCNC: 4 MMOL/L (ref 3.5–5.2)
PROT SERPL-MCNC: 7.3 G/DL (ref 6–8.5)
RBC # BLD AUTO: 4.52 X10E6/UL (ref 3.77–5.28)
SODIUM SERPL-SCNC: 139 MMOL/L (ref 134–144)
TRIGL SERPL-MCNC: 182 MG/DL (ref 0–149)
VLDLC SERPL CALC-MCNC: 36 MG/DL (ref 5–40)
WBC # BLD AUTO: 4.5 X10E3/UL (ref 3.4–10.8)

## 2018-02-12 DIAGNOSIS — K29.70 GASTRITIS WITHOUT BLEEDING, UNSPECIFIED CHRONICITY, UNSPECIFIED GASTRITIS TYPE: ICD-10-CM

## 2018-02-13 RX ORDER — FAMOTIDINE 20 MG/1
TABLET, FILM COATED ORAL
Qty: 30 TAB | Refills: 0 | Status: SHIPPED | OUTPATIENT
Start: 2018-02-13 | End: 2018-03-14 | Stop reason: SDUPTHER

## 2018-02-23 RX ORDER — METOPROLOL TARTRATE 100 MG/1
TABLET ORAL
Qty: 90 TAB | Refills: 0 | Status: SHIPPED | OUTPATIENT
Start: 2018-02-23 | End: 2018-02-23 | Stop reason: SDUPTHER

## 2018-02-26 RX ORDER — ATORVASTATIN CALCIUM 10 MG/1
10 TABLET, FILM COATED ORAL DAILY
Qty: 30 TAB | Refills: 5 | Status: SHIPPED | OUTPATIENT
Start: 2018-02-26 | End: 2018-11-04 | Stop reason: SDUPTHER

## 2018-02-26 RX ORDER — METOPROLOL TARTRATE 100 MG/1
TABLET ORAL
Qty: 90 TAB | Refills: 4 | Status: SHIPPED | OUTPATIENT
Start: 2018-02-26 | End: 2018-11-23 | Stop reason: SDUPTHER

## 2018-02-26 RX ORDER — ATORVASTATIN CALCIUM 10 MG/1
TABLET, FILM COATED ORAL
Qty: 30 TAB | Refills: 0 | Status: SHIPPED | OUTPATIENT
Start: 2018-02-26 | End: 2018-02-26 | Stop reason: SDUPTHER

## 2018-03-01 DIAGNOSIS — J30.1 ALLERGIC RHINITIS DUE TO POLLEN, UNSPECIFIED CHRONICITY, UNSPECIFIED SEASONALITY: ICD-10-CM

## 2018-03-01 DIAGNOSIS — I10 ESSENTIAL HYPERTENSION WITH GOAL BLOOD PRESSURE LESS THAN 140/90: ICD-10-CM

## 2018-03-02 RX ORDER — CETIRIZINE HCL 10 MG
TABLET ORAL
Qty: 30 TAB | Refills: 0 | Status: SHIPPED | COMMUNITY
Start: 2018-03-02 | End: 2018-05-02 | Stop reason: SDUPTHER

## 2018-03-02 RX ORDER — DICLOFENAC SODIUM 10 MG/G
GEL TOPICAL
Qty: 100 G | Refills: 0 | Status: SHIPPED | COMMUNITY
Start: 2018-03-02 | End: 2018-03-14 | Stop reason: CLARIF

## 2018-03-02 RX ORDER — CLONIDINE HYDROCHLORIDE 0.3 MG/1
TABLET ORAL
Qty: 90 TAB | Refills: 0 | Status: SHIPPED | COMMUNITY
Start: 2018-03-02 | End: 2018-05-04 | Stop reason: SDUPTHER

## 2018-03-14 ENCOUNTER — TELEPHONE (OUTPATIENT)
Dept: INTERNAL MEDICINE CLINIC | Age: 83
End: 2018-03-14

## 2018-03-14 DIAGNOSIS — K29.70 GASTRITIS WITHOUT BLEEDING, UNSPECIFIED CHRONICITY, UNSPECIFIED GASTRITIS TYPE: ICD-10-CM

## 2018-03-14 RX ORDER — FAMOTIDINE 20 MG/1
TABLET, FILM COATED ORAL
Qty: 30 TAB | Refills: 0 | Status: SHIPPED | OUTPATIENT
Start: 2018-03-14 | End: 2018-05-17 | Stop reason: SDUPTHER

## 2018-03-14 RX ORDER — MELOXICAM 7.5 MG/1
7.5 TABLET ORAL DAILY
Qty: 90 TAB | Refills: 1 | Status: SHIPPED | OUTPATIENT
Start: 2018-03-14 | End: 2018-12-26 | Stop reason: ALTCHOICE

## 2018-03-20 ENCOUNTER — TELEPHONE (OUTPATIENT)
Dept: INTERNAL MEDICINE CLINIC | Age: 83
End: 2018-03-20

## 2018-03-20 DIAGNOSIS — I10 ESSENTIAL HYPERTENSION WITH GOAL BLOOD PRESSURE LESS THAN 140/90: ICD-10-CM

## 2018-03-20 RX ORDER — LOSARTAN POTASSIUM 100 MG/1
TABLET ORAL
Qty: 30 TAB | Refills: 0 | Status: SHIPPED | OUTPATIENT
Start: 2018-03-20 | End: 2018-05-16 | Stop reason: SDUPTHER

## 2018-03-20 NOTE — TELEPHONE ENCOUNTER
----- Message from Jerald Pinto sent at 3/20/2018  1:18 PM EDT -----  Regarding: Dr. Karissa Thomas  Patient's legs around the knee are aching all the time for the last couple of days. Tylenol is not helping. No swelling or redness. She would like a call back to see if she should come in for an appt or not. Best contact number [phone].

## 2018-03-21 ENCOUNTER — TELEPHONE (OUTPATIENT)
Dept: INTERNAL MEDICINE CLINIC | Age: 83
End: 2018-03-21

## 2018-03-21 NOTE — TELEPHONE ENCOUNTER
----- Message from Shanda Moreira sent at 3/21/2018  8:03 AM EDT -----  Regarding: Dr. Dwayne Ordonez  Pt is concerned about taking medication prescribed by her former doctor, since she has not seen that other doctor in over a year, pt would like to discuss with Dr Dread Douglas contact # [phone]

## 2018-03-21 NOTE — TELEPHONE ENCOUNTER
Call placed to pt and she wanted to clarify metformin and nasal spray, writer clarified and pt voiced understanding

## 2018-03-28 ENCOUNTER — TELEPHONE (OUTPATIENT)
Dept: INTERNAL MEDICINE CLINIC | Age: 83
End: 2018-03-28

## 2018-03-28 NOTE — TELEPHONE ENCOUNTER
----- Message from [facility] sent at 3/28/2018  1:17 PM EDT -----  Regarding: Dr. Brandy Novak  Pt wanted to ask the nurse a question. Pt declined the reason. Pt best contact [phone].

## 2018-03-29 NOTE — TELEPHONE ENCOUNTER
Call placed to patient, she states she wants to make appointment with MD states her sleep is being disturbed due to intermittent numbness to her hands if she lays on her side and that's causing her energy level during the day to be very low. Writer assisted pt with scheduling an appointment, she wishes only to see Dr Kusum Noyola, appt scheduled.  Advised to look for a pillow that had more neck support until that appointment, voiced understanding and appreciation

## 2018-04-11 ENCOUNTER — OFFICE VISIT (OUTPATIENT)
Dept: INTERNAL MEDICINE CLINIC | Age: 83
End: 2018-04-11

## 2018-04-11 VITALS
BODY MASS INDEX: 33.06 KG/M2 | HEART RATE: 97 BPM | DIASTOLIC BLOOD PRESSURE: 64 MMHG | SYSTOLIC BLOOD PRESSURE: 114 MMHG | HEIGHT: 59 IN | OXYGEN SATURATION: 95 % | WEIGHT: 164 LBS | TEMPERATURE: 98.2 F | RESPIRATION RATE: 20 BRPM

## 2018-04-11 DIAGNOSIS — R20.0 NUMBNESS OF HAND: Primary | ICD-10-CM

## 2018-04-11 DIAGNOSIS — E11.21 TYPE 2 DIABETES MELLITUS WITH NEPHROPATHY (HCC): ICD-10-CM

## 2018-04-11 DIAGNOSIS — R53.83 FATIGUE, UNSPECIFIED TYPE: ICD-10-CM

## 2018-04-11 DIAGNOSIS — Z23 ENCOUNTER FOR IMMUNIZATION: ICD-10-CM

## 2018-04-11 DIAGNOSIS — E78.2 MIXED HYPERLIPIDEMIA: ICD-10-CM

## 2018-04-11 DIAGNOSIS — Z00.00 MEDICARE ANNUAL WELLNESS VISIT, SUBSEQUENT: ICD-10-CM

## 2018-04-11 DIAGNOSIS — I10 ESSENTIAL HYPERTENSION WITH GOAL BLOOD PRESSURE LESS THAN 140/90: ICD-10-CM

## 2018-04-11 NOTE — PROGRESS NOTES
Jose Brown is a 80 y.o. female and presents for annual Medicare Wellness Visit. Problem List: Reviewed with patient and discussed risk factors. Patient Active Problem List   Diagnosis Code    Hypercholesterolemia E78.00    PVC's (premature ventricular contractions) I49.3    Stasis edema I87.309    Encounter for long-term (current) use of other medications Z79.899    Unspecified constipation K59.00    Allergic rhinitis, cause unspecified J30.9    Mixed hyperlipidemia E78.2    Controlled type 2 diabetes mellitus without complication ([address] O89.8    Chronic nasal congestion R09.81    Advance directive discussed with patient Z70.80    Essential hypertension with goal blood pressure less than 140/90 I10    Type 2 diabetes mellitus with nephropathy ([facility] [address] E11.21       Current medical providers:  Patient Care Team:  Cristian Sharp MD as PCP - General (Internal Medicine)  Bashir Aguilera MD as Physician (Ophthalmology)  Molly Najera MD as Physician (Cardiology)  Omero Scanlon, GABO as Ambulatory Care Navigator (Cardiology)  Bashir Aguilera MD as Physician (Ophthalmology)  Stevenson Kaminski. Michelle Bueno MD as Physician (Gastroenterology)  Mark Anthony Morris LPN as Ambulatory Care Navigator (Internal Medicine)    PSH: Reviewed with patient  Past Surgical History:   Procedure Laterality Date    COLONOSCOPY N/A 9/25/2017    COLONOSCOPY performed by Stevenson Kaminski.  Michelle Bueno MD at [facility] ENDOSCOPY    HX CHOLECYSTECTOMY      HX GYN      tubal ligation    HX HYSTERECTOMY          SH: Reviewed with patient  Social History   Substance Use Topics    Smoking status: Never Smoker    Smokeless tobacco: Never Used    Alcohol use No       FH: Reviewed with patient  Family History   Problem Relation Age of Onset    No Known Problems Mother     No Known Problems Father     No Known Problems Daughter     No Known Problems Daughter     No Known Problems Daughter     No Known Problems Son     No Known Problems Son     No Known Problems Son        Medications/Allergies: Reviewed with patient  Current Outpatient Prescriptions on File Prior to Visit   Medication Sig Dispense Refill    losartan (COZAAR) 100 mg tablet TAKE ONE TABLET BY MOUTH DAILY 30 Tab 0    meloxicam (MOBIC) 7.5 mg tablet Take 1 Tab by mouth daily. (discontinue voltaren gel) 90 Tab 1    famotidine (PEPCID) 20 mg tablet TAKE ONE TABLET BY MOUTH NIGHTLY 30 Tab 0    furosemide (LASIX) 40 mg tablet TAKE ONE TABLET BY MOUTH EVERY DAY 30 Tab 3    cetirizine (ZYRTEC) 10 mg tablet TAKE ONE TABLET BY MOUTH NIGHTLY AS NEEDED FOR ALLERGIES 30 Tab 0    cloNIDine HCl (CATAPRES) 0.3 mg tablet TAKE ONE TABLET BY MOUTH 3 TIMES A DAY 90 Tab 0    glipiZIDE (GLUCOTROL) 5 mg tablet TAKE ONE TABLET BY MOUTH 2 TIMES A DAY 60 Tab 3    metoprolol tartrate (LOPRESSOR) 100 mg IR tablet Take 1.5 tab Po BID 90 Tab 4    atorvastatin (LIPITOR) 10 mg tablet Take 1 Tab by mouth daily. 30 Tab 5    artificial tears,hypromellose, (SYSTANE GEL) 0.3 % gel ophthalmic ointment Administer 10 g to both eyes as needed. 1 Bottle 1    dilTIAZem CD (CARDIZEM CD) 180 mg ER capsule Take 1 Cap by mouth daily. 30 Cap 3    cod liver oil cap Take 1 Cap by mouth daily.  calcium-cholecalciferol, d3, (CALCIUM 600 + D) 600-125 mg-unit tab Take 1 Tab by mouth daily.  nystatin (MYCOSTATIN) powder Apply  to affected area two (2) times a day. For 2 weeks 1 Bottle 0    metFORMIN ER (GLUCOPHAGE XR) 500 mg tablet Take 2 Tabs by mouth daily (with dinner). 180 Tab 3    ALPRAZolam (XANAX) 0.5 mg tablet Take 0.5 Tabs by mouth nightly as needed for Anxiety. Max Daily Amount: 0.25 mg. 30 Tab 0    potassium chloride (KLOR-CON) 10 mEq tablet TAKE TWO TABLETS BY MOUTH DAILY 60 Tab 6    fluticasone (FLONASE) 50 mcg/actuation nasal spray 2 Sprays by Both Nostrils route daily.  1 Bottle 0    latanoprost (XALATAN) 0.005 % ophthalmic solution       TIMOPTIC-XE 0.5 % ophthalmic gel-forming       omeprazole (PRILOSEC) 40 mg capsule TAKE ONE CAPSULE BY MOUTH EVERY DAY BEFORE BREAKFAST 30 Cap 5    acetaminophen (TYLENOL) 325 mg tablet Take  by mouth every four (4) hours as needed for Pain.  Cholecalciferol, Vitamin D3, (VITAMIN D3) 1,000 unit cap Take 1,000 Units by mouth daily.  aspirin 81 mg chewable tablet Take 81 mg by mouth daily.  furosemide (LASIX) 40 mg tablet TAKE ONE TABLET BY MOUTH EVERY DAY 30 Tab 0    pioglitazone (ACTOS) 15 mg tablet Take 1 Tab by mouth nightly. 30 Tab 5     No current facility-administered medications on file prior to visit. Allergies   Allergen Reactions    Cortisone Other (comments)     \"Make me feels weak, like I'm going to die\"      Januvia [Sitagliptin] Other (comments)     weakness       Objective:  Visit Vitals    /64 (BP 1 Location: Left arm, BP Patient Position: Sitting)    Pulse 97    Temp 98.2 °F (36.8 °C) (Oral)    Resp 20    Ht 4' 11\" (1.499 m)    Wt 164 lb (74.4 kg)    SpO2 95%    BMI 33.12 kg/m2    Body mass index is 33.12 kg/(m^2). Assessment of cognitive impairment: Alert and oriented x 3    Depression Screen:   PHQ over the last two weeks 4/11/2018   Little interest or pleasure in doing things Not at all   Feeling down, depressed or hopeless Not at all   Total Score PHQ 2 0       Fall Risk Assessment:    Fall Risk Assessment, last 12 mths 4/11/2018   Able to walk? Yes   Fall in past 12 months? No       Functional Ability:   Does the patient exhibit a steady gait? yes   How long did it take the patient to get up and walk from a sitting position? 10 sec   Is the patient self reliant?  (ie can do own laundry, meals, household chores)  yes     Does the patient handle his/her own medications? yes     Does the patient handle his/her own money? yes     Is the patients home safe (ie good lighting, handrails on stairs and bath, etc.)?    yes     Did you notice or did patient express any hearing difficulties? no     Did you notice or did patient express any vision difficulties?   no     Were distance and reading eye charts used? no       Advance Care Planning:   Patient was offered the opportunity to discuss advance care planning:  yes     Does patient have an Advance Directive:  yes   If no, did you provide information on Caring Connections? yes       Plan:      Orders Placed This Encounter    XR SPINE CERV 4 OR 5 V    Tetanus, diphtheria toxoids and acellular pertussis (TDAP) vaccine, in individuals >=7 years, IM    METABOLIC PANEL, COMPREHENSIVE    CBC W/O DIFF    TSH 3RD GENERATION    HEMOGLOBIN A1C WITH EAG    MICROALBUMIN, UR, RAND W/ MICROALB/CREAT RATIO       Health Maintenance   Topic Date Due    DTaP/Tdap/Td series (1 - Tdap) 03/01/1955    EYE EXAM RETINAL OR DILATED Q1  04/25/2018    HEMOGLOBIN A1C Q6M  07/25/2018    FOOT EXAM Q1  09/06/2018    MICROALBUMIN Q1  09/06/2018    LIPID PANEL Q1  01/25/2019    MEDICARE YEARLY EXAM  04/12/2019    GLAUCOMA SCREENING Q2Y  04/25/2019    COLONOSCOPY  09/25/2027    Bone Densitometry (Dexa) Screening  Completed    ZOSTER VACCINE AGE 60>  Completed    Pneumococcal 65+ Low/Medium Risk  Completed    Influenza Age 5 to Adult  Completed       *Patient verbalized understanding and agreement with the plan. A copy of the After Visit Summary with personalized health plan was given to the patient today.

## 2018-04-11 NOTE — LETTER
4/20/2018 12:57 PM 
 
[facility]. [facility] [address] [id number] Dear Caridad Fairbanks: Please find your most recent results below. Resulted Orders METABOLIC PANEL, COMPREHENSIVE Result Value Ref Range Glucose 138 (H) 65 - 99 mg/dL BUN 14 8 - 27 mg/dL Creatinine 0.79 0.57 - 1.00 mg/dL GFR est non-AA 69 >59 mL/min/1.73 GFR est AA 79 >59 mL/min/1.73  
 BUN/Creatinine ratio 18 12 - 28 Sodium 140 134 - 144 mmol/L Potassium 3.8 3.5 - 5.2 mmol/L Chloride 98 96 - 106 mmol/L  
 CO2 27 18 - 29 mmol/L Calcium 9.3 8.7 - 10.3 mg/dL Protein, total 7.0 6.0 - 8.5 g/dL Albumin 4.4 3.5 - 4.7 g/dL GLOBULIN, TOTAL 2.6 1.5 - 4.5 g/dL A-G Ratio 1.7 1.2 - 2.2 Bilirubin, total 0.3 0.0 - 1.2 mg/dL Alk. phosphatase 87 39 - 117 IU/L  
 AST (SGOT) 22 0 - 40 IU/L  
 ALT (SGPT) 18 0 - 32 IU/L Narrative Performed at:  [address]  [id number] : Olamide Finley MD, Phone:  [phone] CBC W/O DIFF Result Value Ref Range WBC 4.1 3.4 - 10.8 x10E3/uL  
 RBC 4.55 3.77 - 5.28 x10E6/uL HGB 12.6 11.1 - 15.9 g/dL HCT 37.6 34.0 - 46.6 % MCV 83 79 - 97 fL  
 MCH 27.7 26.6 - 33.0 pg  
 MCHC 33.5 31.5 - 35.7 g/dL  
 RDW 15.3 12.3 - 15.4 % PLATELET 793 612 - 247 x10E3/uL Narrative Performed at:  [address]  [id number] : Olamide Finley MD, Phone:  [phone] TSH 3RD GENERATION Result Value Ref Range TSH 4.860 (H) 0.450 - 4.500 uIU/mL Narrative Performed at:  [address]  [id number] : Olamide Finley MD, Phone:  [phone] HEMOGLOBIN A1C WITH EAG Result Value Ref Range Hemoglobin A1c 6.8 (H) 4.8 - 5.6 % Comment:  
            Pre-diabetes: 5.7 - 6.4 Diabetes: >6.4 Glycemic control for adults with diabetes: <7.0 Estimated average glucose 148 mg/dL Narrative Performed at:  [address]  [id number] : Niharika Rubio MD, Phone:  [phone] MICROALBUMIN, UR, RAND W/ MICROALB/CREAT RATIO Result Value Ref Range Creatinine, urine 97.1 Not Estab. mg/dL Microalbumin, urine 14.3 Not Estab. ug/mL Microalb/Creat ratio (ug/mg creat.) 14.7 0.0 - 30.0 mg/g creat Narrative Performed at:  [address]  [id number] : Niharika Rubio MD, Phone:  [phone] RECOMMENDATIONS: 
Gladies Margoth your labs indicate that your thyroid level is a little low, we will recheck those levels in 6 weeks, I have enclosed that lab slip for your convenience All other labs are stable Please call me if you have any questions: [phone] Sincerely, Stpehanie Berry MD

## 2018-04-11 NOTE — PATIENT INSTRUCTIONS
Vaccine Information Statement     Tdap (Tetanus, Diphtheria, Pertussis) Vaccine: What You Need to Know    Many Vaccine Information Statements are available in [place] and other languages. See www.immunize.org/vis. Hojas de Información Sobre Vacunas están disponibles en español y en muchos otros idiomas. Visite SaundraScale.[email or web address]    1. Why get vaccinated? Tetanus, diphtheria, and pertussis are very serious diseases. Tdap vaccine can protect us from these diseases. And, Tdap vaccine given to pregnant women can protect  babies against pertussis. TETANUS (Lockjaw) is rare in the [facility] today. It causes painful muscle tightening and stiffness, usually all over the body.  It can lead to tightening of muscles in the head and neck so you cant open your mouth, swallow, or sometimes even breathe. Tetanus kills about 1 out of 10 people who are infected even after receiving the best medical care. DIPHTHERIA is also rare in the [facility] today. It can cause a thick coating to form in the back of the throat.  It can lead to breathing problems, heart failure, paralysis, and death. PERTUSSIS (Whooping Cough) causes severe coughing spells, which can cause difficulty breathing, vomiting, and disturbed sleep.  It can also lead to weight loss, incontinence, and rib fractures. Up to 2 in 100 adolescents and 5 in 100 adults with pertussis are hospitalized or have complications, which could include pneumonia or death. These diseases are caused by bacteria. Diphtheria and pertussis are spread from person to person through secretions from coughing or sneezing. Tetanus enters the body through cuts, scratches, or wounds. Before vaccines, as many as 200,000 cases of diphtheria, 200,000 cases of pertussis, and hundreds of cases of tetanus, were reported in the [place] each year.  Since vaccination began, reports of cases for tetanus and diphtheria have dropped by about 99% and for pertussis by about 80%. 2. Tdap vaccine    Tdap vaccine can protect adolescents and adults from tetanus, diphtheria, and pertussis. One dose of Tdap is routinely given at age 6 or 15. People who did not get Tdap at that age should get it as soon as possible. Tdap is especially important for health care professionals and anyone having close contact with a baby younger than 12 months. Pregnant women should get a dose of Tdap during every pregnancy, to protect the  from pertussis. Infants are most at risk for severe, life-threatening complications from pertussis. Another vaccine, called Td, protects against tetanus and diphtheria, but not pertussis. A Td booster should be given every 10 years. Tdap may be given as one of these boosters if you have never gotten Tdap before. Tdap may also be given after a severe cut or burn to prevent tetanus infection. Your doctor or the person giving you the vaccine can give you more information. Tdap may safely be given at the same time as other vaccines. 3. Some people should not get this vaccine     A person who has ever had a life-threatening allergic reaction after a previous dose of any diphtheria, tetanus or pertussis containing vaccine, OR has a severe allergy to any part of this vaccine, should not get Tdap vaccine. Tell the person giving the vaccine about any severe allergies.  Anyone who had coma or long repeated seizures within 7 days after a childhood dose of DTP or DTaP, or a previous dose of Tdap, should not get Tdap, unless a cause other than the vaccine was found. They can still get Td.  Talk to your doctor if you:  - have seizures or another nervous system problem,  - had severe pain or swelling after any vaccine containing diphtheria, tetanus or pertussis,   - ever had a condition called Guillain Barré Syndrome (GBS),  - arent feeling well on the day the shot is scheduled.     4. Risks    With any medicine, including vaccines, there is a chance of side effects. These are usually mild and go away on their own. Serious reactions are also possible but are rare. Most people who get Tdap vaccine do not have any problems with it. Mild Problems following Tdap  (Did not interfere with activities)   Pain where the shot was given (about 3 in 4 adolescents or 2 in 3 adults)   Redness or swelling where the shot was given (about 1 person in 5)   Mild fever of at least 100.4°F (up to about 1 in 25 adolescents or 1 in 100 adults)   Headache (about 3 or 4 people in 10)   Tiredness (about 1 person in 3 or 4)   Nausea, vomiting, diarrhea, stomach ache (up to 1 in 4 adolescents or 1 in 10 adults)   Chills,  sore joints (about 1 person in 10)   Body aches (about 1 person in 3 or 4)    Rash, swollen glands (uncommon)    Moderate Problems following Tdap  (Interfered with activities, but did not require medical attention)   Pain where the shot was given (up to 1 in 5 or 6)    Redness or swelling where the shot was given (up to about 1 in 16 adolescents or 1 in 12 adults)   Fever over 102°F (about 1 in 100 adolescents or 1 in 250 adults)   Headache (about 1 in 7 adolescents or 1 in 10 adults)   Nausea, vomiting, diarrhea, stomach ache (up to 1 or 3 people in 100)   Swelling of the entire arm where the shot was given (up to about 1 in 500). Severe Problems following Tdap  (Unable to perform usual activities; required medical attention)   Swelling, severe pain, bleeding, and redness in the arm where the shot was given (rare). Problems that could happen after any vaccine:     People sometimes faint after a medical procedure, including vaccination. Sitting or lying down for about 15 minutes can help prevent fainting, and injuries caused by a fall. Tell your doctor if you feel dizzy, or have vision changes or ringing in the ears.      Some people get severe pain in the shoulder and have difficulty moving the arm where a shot was given. This happens very rarely.  Any medication can cause a severe allergic reaction. Such reactions from a vaccine are very rare, estimated at fewer than 1 in a million doses, and would happen within a few minutes to a few hours after the vaccination. As with any medicine, there is a very remote chance of a vaccine causing a serious injury or death. The safety of vaccines is always being monitored. For more information, visit: www.cdc.gov/vaccinesafety/    5. What if there is a serious problem? What should I look for?  Look for anything that concerns you, such as signs of a severe allergic reaction, very high fever, or unusual behavior.  Signs of a severe allergic reaction can include hives, swelling of the face and throat, difficulty breathing, a fast heartbeat, dizziness, and weakness. These would usually start a few minutes to a few hours after the vaccination. What should I do?  If you think it is a severe allergic reaction or other emergency that cant wait, call 9-1-1 or get the person to the nearest hospital. Otherwise, call your doctor.  Afterward, the reaction should be reported to the Vaccine Adverse Event Reporting System (VAERS). Your doctor might file this report, or you can do it yourself through the VAERS web site at www.vaers. [facility].gov, or by calling 0-987.634.9740. VAERS does not give medical advice. 6. The National Vaccine Injury Compensation Program    The [facility] Vaccine Injury Compensation Program (VICP) is a federal program that was created to compensate people who may have been injured by certain vaccines. Persons who believe they may have been injured by a vaccine can learn about the program and about filing a claim by calling 7-477.690.7889 or visiting the [employer] website at www.[facility].gov/vaccinecompensation. There is a time limit to file a claim for compensation. 7. How can I learn more?  Ask your doctor.  He or she can give you the vaccine package insert or suggest other sources of information.  Call your local or state health department.  Contact the Centers for Disease Control and Prevention (CDC):  - Call 6-836.278.2767 (1-800-CDC-INFO) or  - Visit CDCs website at www.cdc.gov/vaccines      Vaccine Information Statement   Tdap Vaccine  (2/24/2015)  [address] Jung [id number]    Department of Health and Human Services  Centers for Disease Control and Prevention    Office Use Only  Schedule of Personalized Health Plan  (Provide Copy to Patient)  The best way to stay healthy is to live a healthy lifestyle. A healthy lifestyle includes regular exercise, eating a well-balanced diet, keeping a healthy weight and not smoking. Regular physical exams and screening tests are another important way to take care of yourself. Preventive exams provided by health care providers can find health problems early when treatment works best and can keep you from getting certain diseases or illnesses. Preventive services include exams, lab tests, screenings, shots, monitoring and information to help you take care of your own health. All people over 65 should have a pneumonia shot. Pneumonia shots are usually only needed once in a lifetime unless your doctor decides differently. All people over 65 should have a yearly flu shot. People over 65 are at medium to high risk for Hepatitis B. Three shots are needed for complete protection. In addition to your physical exam, some screening tests are recommended:    Bone mass measurement (dexa scan) is recommended every two years. up to date  Diabetes Mellitus screening is recommended every year. up top date    Glaucoma is an eye disease caused by high pressure in the eye. An eye exam is recommended every year. Up to date    Cardiovascular screening tests that check your cholesterol and other blood fat (lipid) levels are recommended every five years.  Up to date    Colorectal Cancer screening tests help to find pre-cancerous polyps (growths in the colon) so they can be removed before they turn into cancer. Tests ordered for screening depend on your personal and family history risk factors. Screening for Breast Cancer is recommended yearly with a mammogram.N/A    Screening for Cervical Cancer is recommended every two years (annually for certain risk factors, such as previous history of STD or abnormal PAP in past 7 years), with a Pelvic Exam with PAP. N/A    Here is a list of your current Health Maintenance items with a due date:  Health Maintenance   Topic Date Due    DTaP/Tdap/Td series (1 - Tdap) 03[birth year]/1955    EYE EXAM RETINAL OR DILATED Q1  04/25/2018    HEMOGLOBIN A1C Q6M  07/25/2018    FOOT EXAM Q1  09/06/2018    MICROALBUMIN Q1  09/06/2018    LIPID PANEL Q1  01/25/2019    MEDICARE YEARLY EXAM  04/12/2019    GLAUCOMA SCREENING Q2Y  04/25/2019    COLONOSCOPY  09/25/2027    Bone Densitometry (Dexa) Screening  Completed    ZOSTER VACCINE AGE 60>  Completed    Pneumococcal 65+ Low/Medium Risk  Completed    Influenza Age 5 to Adult  Completed

## 2018-04-11 NOTE — PROGRESS NOTES
HPI    ROS    Physical Exam       HISTORY OF PRESENT ILLNESS  Savi Magallanes is a 80 y.o. female here for follow-up. Report fatigue lately. She is not sleeping well at night. Report numbness on right hand. No tingling. .    She is doing well. Has hypertension and diabetes. Compliant with medication no chest pain palpitation or shortness of breath  Has diabetes and HTN,stable. labs are reviewed with her. Here for Medicare wellness visit. Has living will she will bring a copy. Labs reviewed. Hypertension      Diabetes     Cholesterol Problem     Watery Eyes         Review of Systems   Constitutional: Negative. HENT: Negative. Cardiovascular: Negative. Genitourinary: Negative. Musculoskeletal: Negative. Skin: Negative. Neurological: Negative. Endo/Heme/Allergies: Negative. Psychiatric/Behavioral: Negative. Physical Exam   Constitutional: She appears well-developed and well-nourished. No distress. Eyes: Conjunctivae and EOM are normal. Pupils are equal, round, and reactive to light. Right eye exhibits no discharge. Left eye exhibits no discharge. No scleral icterus. Neck: Normal range of motion. Neck supple. No tracheal deviation present. No thyromegaly present. Cardiovascular: Normal rate, regular rhythm, normal heart sounds and intact distal pulses. Pulmonary/Chest: Effort normal and breath sounds normal. No respiratory distress. She has no wheezes. Musculoskeletal: She exhibits no edema. Psychiatric: She has a normal mood and affect. Her behavior is normal.   Tinel sign and Phalen sign negative. On the right hand. ASSESSMENT and PLAN  Diagnoses and all orders for this visit:    1. Numbness of hand    No carpal tunnel. Probable radiculopathy. Will check,  -     XR SPINE CERV 4 OR 5 V; Future    2. Encounter for immunization  We will give,  -     Tetanus, diphtheria toxoids and acellular pertussis (TDAP) vaccine, in individuals >=7 years, IM    3.  Type 2 diabetes mellitus with nephropathy (HCC)    Stable. Will check,  -     METABOLIC PANEL, COMPREHENSIVE  -     HEMOGLOBIN A1C WITH EAG  -     MICROALBUMIN, UR, RAND W/ MICROALB/CREAT RATIO    4. Essential hypertension with goal blood pressure less than 140/90    Stable on current regimen. Will continue same.  -     METABOLIC PANEL, COMPREHENSIVE  -     CBC W/O DIFF    5. Fatigue, unspecified type    Not sleeping well at night. Need to change mattress and pillow. Will check,  -     METABOLIC PANEL, COMPREHENSIVE  -     CBC W/O DIFF  -     TSH 3RD GENERATION    6. Mixed hyperlipidemia  Stable. 7. Medicare annual wellness visit, subsequent          Discussed expected course/resolution/complications of diagnosis in detail with patient. Medication risks/benefits/costs/interactions/alternatives discussed with patient. Pt was given an after visit summary which includes diagnoses, current medications & vitals. Pt expressed understanding with the diagnosis and plan.

## 2018-04-11 NOTE — PROGRESS NOTES
Health Maintenance Due   Topic Date Due    DTaP/Tdap/Td series (1 - Tdap) 03/01/1955    EYE EXAM RETINAL OR DILATED Q1  04/25/2018       Chief Complaint   Patient presents with   Suly Pinks    Numbness     hands bilateral    Immunization/Injection     TDAP due       1. Have you been to the ER, urgent care clinic since your last visit? Hospitalized since your last visit? No    2. Have you seen or consulted any other health care providers outside of the [facility] since your last visit? Include any pap smears or colon screening. No    3) Do you have an Advance Directive on file? no    4) Are you interested in receiving information on Advance Directives? NO      Patient is accompanied by self I have received verbal consent from Jose G Liu to discuss any/all medical information while they are present in the room. Jose G Liu is a 80 y.o. female  who presents for routine immunizations. She denies any symptoms , reactions or allergies that would exclude them from being immunized today. Risks and adverse reactions were discussed and the VIS was given to them. All questions were addressed. She was observed for 10 min post injection. There were no reactions observed.     Jono Almeida LPN

## 2018-04-12 ENCOUNTER — HOSPITAL ENCOUNTER (OUTPATIENT)
Dept: LAB | Age: 83
Discharge: HOME | End: 2018-04-12
Payer: MEDICARE

## 2018-04-12 PROCEDURE — 83036 HEMOGLOBIN GLYCOSYLATED A1C: CPT

## 2018-04-12 PROCEDURE — 84443 ASSAY THYROID STIM HORMONE: CPT

## 2018-04-12 PROCEDURE — 36415 COLL VENOUS BLD VENIPUNCTURE: CPT

## 2018-04-12 PROCEDURE — 85027 COMPLETE CBC AUTOMATED: CPT

## 2018-04-12 PROCEDURE — 80053 COMPREHEN METABOLIC PANEL: CPT

## 2018-04-12 PROCEDURE — 82043 UR ALBUMIN QUANTITATIVE: CPT

## 2018-04-13 ENCOUNTER — HOSPITAL ENCOUNTER (OUTPATIENT)
Dept: GENERAL RADIOLOGY | Age: 83
Discharge: HOME | End: 2018-04-13
Payer: MEDICARE

## 2018-04-13 DIAGNOSIS — R20.0 NUMBNESS OF HAND: ICD-10-CM

## 2018-04-13 LAB
ALBUMIN SERPL-MCNC: 4.4 G/DL (ref 3.5–4.7)
ALBUMIN/CREAT UR: 14.7 MG/G CREAT (ref 0–30)
ALBUMIN/GLOB SERPL: 1.7 {RATIO} (ref 1.2–2.2)
ALP SERPL-CCNC: 87 IU/L (ref 39–117)
ALT SERPL-CCNC: 18 IU/L (ref 0–32)
AST SERPL-CCNC: 22 IU/L (ref 0–40)
BILIRUB SERPL-MCNC: 0.3 MG/DL (ref 0–1.2)
BUN SERPL-MCNC: 14 MG/DL (ref 8–27)
BUN/CREAT SERPL: 18 (ref 12–28)
CALCIUM SERPL-MCNC: 9.3 MG/DL (ref 8.7–10.3)
CHLORIDE SERPL-SCNC: 98 MMOL/L (ref 96–106)
CO2 SERPL-SCNC: 27 MMOL/L (ref 18–29)
CREAT SERPL-MCNC: 0.79 MG/DL (ref 0.57–1)
CREAT UR-MCNC: 97.1 MG/DL
ERYTHROCYTE [DISTWIDTH] IN BLOOD BY AUTOMATED COUNT: 15.3 % (ref 12.3–15.4)
EST. AVERAGE GLUCOSE BLD GHB EST-MCNC: 148 MG/DL
GFR SERPLBLD CREATININE-BSD FMLA CKD-EPI: 69 ML/MIN/1.73
GFR SERPLBLD CREATININE-BSD FMLA CKD-EPI: 79 ML/MIN/1.73
GLOBULIN SER CALC-MCNC: 2.6 G/DL (ref 1.5–4.5)
GLUCOSE SERPL-MCNC: 138 MG/DL (ref 65–99)
HBA1C MFR BLD: 6.8 % (ref 4.8–5.6)
HCT VFR BLD AUTO: 37.6 % (ref 34–46.6)
HGB BLD-MCNC: 12.6 G/DL (ref 11.1–15.9)
MCH RBC QN AUTO: 27.7 PG (ref 26.6–33)
MCHC RBC AUTO-ENTMCNC: 33.5 G/DL (ref 31.5–35.7)
MCV RBC AUTO: 83 FL (ref 79–97)
MICROALBUMIN UR-MCNC: 14.3 UG/ML
PLATELET # BLD AUTO: 168 X10E3/UL (ref 150–379)
POTASSIUM SERPL-SCNC: 3.8 MMOL/L (ref 3.5–5.2)
PROT SERPL-MCNC: 7 G/DL (ref 6–8.5)
RBC # BLD AUTO: 4.55 X10E6/UL (ref 3.77–5.28)
SODIUM SERPL-SCNC: 140 MMOL/L (ref 134–144)
TSH SERPL DL<=0.005 MIU/L-ACNC: 4.86 UIU/ML (ref 0.45–4.5)
WBC # BLD AUTO: 4.1 X10E3/UL (ref 3.4–10.8)

## 2018-04-13 PROCEDURE — 72050 X-RAY EXAM NECK SPINE 4/5VWS: CPT

## 2018-04-13 NOTE — LETTER
4/19/2018 11:16 AM 
 
Ms. Maame Henriquez [address] [facility] 7 [id number] Dear Maame Henriquez: Please find your most recent results below. Resulted Orders XR SPINE CERV 4 OR 5 V Narrative INDICATION:  Right hand numbness. COMPARISON:  11/7/2017. FINDINGS:   
Five views, AP, lateral, bilateral oblique, and open-mouth odontoid were 
obtained of the cervical spine. The vertebral bodies show satisfactory height and alignment. Slight spurring at C4-C5, narrowed discs and anterior and posterior spurring 
with greater on the right foraminal encroachment at C5-C7, and multilevel facet 
arthropathy are seen. The prevertebral soft tissues are not widened. The odontoid appears intact. Impression IMPRESSION:   
Cervical degenerative spondylosis. RECOMMENDATIONS: 
 
Neck spondylosis and DJD.  Need physical therapy. Please call me if you have any questions: [phone] Sincerely, [employer] [facility] [address] [place] [address] Sw

## 2018-04-19 DIAGNOSIS — R79.89 ABNORMAL THYROID BLOOD TEST: Primary | ICD-10-CM

## 2018-04-19 NOTE — PROGRESS NOTES
Probable low thyroid. Will repeat TSH and free T4 in 6 weeks to decide if she needs to be on medications. All other labs are stable.

## 2018-04-25 RX ORDER — OMEPRAZOLE 40 MG/1
CAPSULE, DELAYED RELEASE ORAL
Qty: 30 CAP | Refills: 0 | Status: SHIPPED | OUTPATIENT
Start: 2018-04-25 | End: 2018-08-11 | Stop reason: SDUPTHER

## 2018-05-02 DIAGNOSIS — J30.1 ALLERGIC RHINITIS DUE TO POLLEN: ICD-10-CM

## 2018-05-02 RX ORDER — CETIRIZINE HCL 10 MG
TABLET ORAL
Qty: 30 TAB | Refills: 0 | Status: SHIPPED | OUTPATIENT
Start: 2018-05-02 | End: 2018-05-03 | Stop reason: SDUPTHER

## 2018-05-03 RX ORDER — CETIRIZINE HCL 10 MG
TABLET ORAL
Qty: 90 TAB | Refills: 2 | Status: SHIPPED | OUTPATIENT
Start: 2018-05-03 | End: 2018-07-03 | Stop reason: SDUPTHER

## 2018-05-04 DIAGNOSIS — I10 ESSENTIAL HYPERTENSION WITH GOAL BLOOD PRESSURE LESS THAN 140/90: ICD-10-CM

## 2018-05-04 RX ORDER — CLONIDINE HYDROCHLORIDE 0.3 MG/1
TABLET ORAL
Qty: 90 TAB | Refills: 0 | Status: SHIPPED | OUTPATIENT
Start: 2018-05-04 | End: 2018-05-08 | Stop reason: SDUPTHER

## 2018-05-08 DIAGNOSIS — I10 ESSENTIAL HYPERTENSION WITH GOAL BLOOD PRESSURE LESS THAN 140/90: ICD-10-CM

## 2018-05-09 RX ORDER — CLONIDINE HYDROCHLORIDE 0.3 MG/1
TABLET ORAL
Qty: 90 TAB | Refills: 0 | Status: SHIPPED | OUTPATIENT
Start: 2018-05-09 | End: 2018-07-03 | Stop reason: SDUPTHER

## 2018-05-16 DIAGNOSIS — I10 ESSENTIAL HYPERTENSION WITH GOAL BLOOD PRESSURE LESS THAN 140/90: ICD-10-CM

## 2018-05-16 RX ORDER — DILTIAZEM HYDROCHLORIDE 180 MG/1
180 CAPSULE, COATED, EXTENDED RELEASE ORAL DAILY
Qty: 30 CAP | Refills: 3 | Status: SHIPPED | OUTPATIENT
Start: 2018-05-16 | End: 2019-01-18 | Stop reason: SDUPTHER

## 2018-05-16 RX ORDER — METFORMIN HYDROCHLORIDE 500 MG/1
1000 TABLET, EXTENDED RELEASE ORAL
Qty: 180 TAB | Refills: 3 | Status: SHIPPED | OUTPATIENT
Start: 2018-05-16 | End: 2018-07-16 | Stop reason: SDUPTHER

## 2018-05-16 RX ORDER — LOSARTAN POTASSIUM 100 MG/1
100 TABLET ORAL DAILY
Qty: 30 TAB | Refills: 5 | Status: SHIPPED | OUTPATIENT
Start: 2018-05-16 | End: 2018-07-16 | Stop reason: SDUPTHER

## 2018-05-17 ENCOUNTER — TELEPHONE (OUTPATIENT)
Dept: INTERNAL MEDICINE CLINIC | Age: 83
End: 2018-05-17

## 2018-05-17 DIAGNOSIS — K29.70 GASTRITIS WITHOUT BLEEDING, UNSPECIFIED CHRONICITY, UNSPECIFIED GASTRITIS TYPE: ICD-10-CM

## 2018-05-17 RX ORDER — FAMOTIDINE 20 MG/1
TABLET, FILM COATED ORAL
Qty: 30 TAB | Refills: 0 | Status: SHIPPED | OUTPATIENT
Start: 2018-05-17 | End: 2018-06-18 | Stop reason: SDUPTHER

## 2018-05-17 NOTE — TELEPHONE ENCOUNTER
----- Message from Nancy Zapata sent at 5/17/2018  9:14 AM EDT -----  Regarding: Ali/telephone  Pt is requesting a to know what type of vitamin she could take. She stated she just does not have much energy. Pts number is [phone].

## 2018-05-18 NOTE — TELEPHONE ENCOUNTER
Call placed to pt and advised that centrum silver is a highly recommended multivit for her age group, pt voiced understanding and appreciation for return phone call

## 2018-05-31 DIAGNOSIS — R79.89 ABNORMAL THYROID BLOOD TEST: ICD-10-CM

## 2018-06-02 DIAGNOSIS — R79.89 ABNORMAL THYROID BLOOD TEST: ICD-10-CM

## 2018-06-12 ENCOUNTER — OFFICE VISIT (OUTPATIENT)
Dept: CARDIOLOGY CLINIC | Age: 83
End: 2018-06-12

## 2018-06-12 VITALS
HEART RATE: 70 BPM | HEIGHT: 59 IN | BODY MASS INDEX: 33.44 KG/M2 | RESPIRATION RATE: 16 BRPM | DIASTOLIC BLOOD PRESSURE: 80 MMHG | SYSTOLIC BLOOD PRESSURE: 140 MMHG | WEIGHT: 165.9 LBS

## 2018-06-12 DIAGNOSIS — I10 ESSENTIAL HYPERTENSION WITH GOAL BLOOD PRESSURE LESS THAN 140/90: Primary | ICD-10-CM

## 2018-06-12 DIAGNOSIS — E11.21 TYPE 2 DIABETES MELLITUS WITH NEPHROPATHY (HCC): ICD-10-CM

## 2018-06-12 DIAGNOSIS — E78.2 MIXED HYPERLIPIDEMIA: ICD-10-CM

## 2018-06-12 DIAGNOSIS — I49.3 PVC'S (PREMATURE VENTRICULAR CONTRACTIONS): ICD-10-CM

## 2018-06-12 NOTE — PROGRESS NOTES
[address]: Fabiola Kan  [phone]    HPI:  Ms. Neema Arreola is a 81 yo F with a h/o HTN (previously on nadolol), hyperlipidemia, DM2 seen in the past for palpitations. Found on holter to have benign ectopy and started on beta blocker for symptoms. Previously off higher dose HCTZ due to low Na+. BP has been resistant; BP had improved on norvasc but had worsened LE edema. U/S 11/2012 for DVT was negative. Echo 8/2012 noted normal LV systolic function with mild to moderate MR; echo 2/17/14 unchanged. Seen by vascular in past for LE edema and c/w vascular insufficiency and recommended leg elevation and compression stockings. Echo 4/2015 was normal with EF 60% and mild MR. She has been doing okay. She does note when she lays on her right side, she will have numbness in her hand when she wakes up. She also notes some increased edema when she is at [employer] that she attributes possibly to the shoes she wears to [employer]. She takes her Lasix in the evening and tries to avoid it when she is playing bingo at night. She does admit to some dietary indiscretion with eating out and [employer] [place]. She denies any chest pain or shortness of breath. She is compensated on exam with clear lungs. Blood pressure is controlled. Assessment and Plan:   1. Resistant hypertension. Blood pressure is controlled and no changes made. History of white coat hypertension. We will have her follow back in six months. 2. Venous insufficiency. I gave her a prescription for compression stockings, which she will try and she will also try to decrease her salt intake. 3. Mitral regurgitation. Mild in the past and echocardiogram on an as needed basis. 4. Type 2 diabetes. .She  has a past medical history of Adverse effect of anesthesia; Chronic bronchitis; Diabetes ([address] Essential hypertension; Hypercholesterolemia; and Hypertension. All other systems negative except as above.      PE  Vitals:    06/12/18 1523   BP: 140/80   Pulse: 70   Resp: 16   Weight: 165 lb 14.4 oz (75.3 kg)   Height: 4' 11\" (1.499 m)    Body mass index is 33.51 kg/(m^2). General appearance - alert, well appearing, and in no distress  Mental status - affect appropriate to mood, pleasant   Neck - supple, no JVD, no bruits   Chest - clear to auscultation, no wheezes, rales or rhonchi  Heart - normal rate, regular rhythm, normal S1, S2, I-II/VI systolic murmur LUSB, rubs, clicks or gallops  Abdomen - soft, nontender, nondistended  Extremities - peripheral pulses normal, trace pedal edema bilateral lower extremities.      Recent Labs:  Lab Results   Component Value Date/Time    Cholesterol, total 149 01/25/2018 07:57 AM    HDL Cholesterol 42 01/25/2018 07:57 AM    LDL, calculated 71 01/25/2018 07:57 AM    Triglyceride 182 (H) 01/25/2018 07:57 AM    CHOL/HDL Ratio 3.3 08/21/2012 04:10 AM     Lab Results   Component Value Date/Time    Creatinine 0.79 04/12/2018 07:12 AM     Lab Results   Component Value Date/Time    BUN 14 04/12/2018 07:12 AM     Lab Results   Component Value Date/Time    Potassium 3.8 04/12/2018 07:12 AM     Lab Results   Component Value Date/Time    Hemoglobin A1c 6.8 (H) 04/12/2018 07:12 AM     Lab Results   Component Value Date/Time    HGB 12.6 04/12/2018 07:12 AM     Lab Results   Component Value Date/Time    PLATELET 821 44/40/8855 07:12 AM       Reviewed:  Past Medical History:   Diagnosis Date    Adverse effect of anesthesia     pt states she was able to feel everything during colonoscopy    Chronic bronchitis     Diabetes ([address]     Essential hypertension     Hypercholesterolemia     Hypertension      History   Smoking Status    Never Smoker   Smokeless Tobacco    Never Used     History   Alcohol Use No     Allergies   Allergen Reactions    Cortisone Other (comments)     \"Make me feels weak, like I'm going to die\"      Januvia [Sitagliptin] Other (comments)     weakness       Current Outpatient Prescriptions Medication Sig    famotidine (PEPCID) 20 mg tablet TAKE ONE TABLET BY MOUTH NIGHTLY    dilTIAZem CD (CARDIZEM CD) 180 mg ER capsule Take 1 Cap by mouth daily.  losartan (COZAAR) 100 mg tablet Take 1 Tab by mouth daily.  metFORMIN ER (GLUCOPHAGE XR) 500 mg tablet Take 2 Tabs by mouth daily (with dinner).  cloNIDine HCl (CATAPRES) 0.3 mg tablet TAKE ONE TABLET BY MOUTH 3 TIMES A DAY    cetirizine (ZYRTEC) 10 mg tablet TAKE ONE TABLET BY MOUTH NIGHTLY AS NEEDED FOR ALLERGIES    omeprazole (PRILOSEC) 40 mg capsule TAKE ONE CAPSULE BY MOUTH EVERY DAY BEFORE BREAKFAST    meloxicam (MOBIC) 7.5 mg tablet Take 1 Tab by mouth daily. (discontinue voltaren gel)    furosemide (LASIX) 40 mg tablet TAKE ONE TABLET BY MOUTH EVERY DAY    glipiZIDE (GLUCOTROL) 5 mg tablet TAKE ONE TABLET BY MOUTH 2 TIMES A DAY    metoprolol tartrate (LOPRESSOR) 100 mg IR tablet Take 1.5 tab Po BID    atorvastatin (LIPITOR) 10 mg tablet Take 1 Tab by mouth daily.  artificial tears,hypromellose, (SYSTANE GEL) 0.3 % gel ophthalmic ointment Administer 10 g to both eyes as needed.  furosemide (LASIX) 40 mg tablet TAKE ONE TABLET BY MOUTH EVERY DAY    cod liver oil cap Take 1 Cap by mouth daily.  calcium-cholecalciferol, d3, (CALCIUM 600 + D) 600-125 mg-unit tab Take 1 Tab by mouth daily.  nystatin (MYCOSTATIN) powder Apply  to affected area two (2) times a day. For 2 weeks    ALPRAZolam (XANAX) 0.5 mg tablet Take 0.5 Tabs by mouth nightly as needed for Anxiety. Max Daily Amount: 0.25 mg.  potassium chloride (KLOR-CON) 10 mEq tablet TAKE TWO TABLETS BY MOUTH DAILY    fluticasone (FLONASE) 50 mcg/actuation nasal spray 2 Sprays by Both Nostrils route daily.  latanoprost (XALATAN) 0.005 % ophthalmic solution     TIMOPTIC-XE 0.5 % ophthalmic gel-forming     pioglitazone (ACTOS) 15 mg tablet Take 1 Tab by mouth nightly.  acetaminophen (TYLENOL) 325 mg tablet Take  by mouth every four (4) hours as needed for Pain.  Cholecalciferol, Vitamin D3, (VITAMIN D3) 1,000 unit cap Take 1,000 Units by mouth daily.  aspirin 81 mg chewable tablet Take 81 mg by mouth daily. No current facility-administered medications for this visit.         Glen Hahn MD  [facility] heart and Vascular [place]  [address] [facility] [address]  [facility], [address]

## 2018-06-12 NOTE — MR AVS SNAPSHOT
[address] [facility] [address] [place] [address] 
[phone] Patient: Jose G Liu MRN: [id number] YQU:[id number] Visit Information Date & Time Provider Department Dept. Phone Encounter #  
 6/12/2018  3:40 PM Polo Carrington MD [facility] [place] [phone] [id number] Your Appointments 12/14/2018  9:00 AM  
ESTABLISHED PATIENT with Polo Carrington MD  
[facility] ([facility]) Appt Note: 6 mo f/u per Dr. Lorie Aldana [address]  
[address] [facility] [address] Upcoming Health Maintenance Date Due  
 EYE EXAM RETINAL OR DILATED Q1 4/25/2018 Influenza Age 5 to Adult 8/1/2018 FOOT EXAM Q1 9/6/2018 HEMOGLOBIN A1C Q6M 10/12/2018 LIPID PANEL Q1 1/25/2019 MEDICARE YEARLY EXAM 4/12/2019 MICROALBUMIN Q1 4/12/2019 GLAUCOMA SCREENING Q2Y 4/25/2019 COLONOSCOPY 9/25/2027 DTaP/Tdap/Td series (2 - Td) 4/11/2028 Allergies as of 6/12/2018  Review Complete On: 6/12/2018 By: Hilary Wade Severity Noted Reaction Type Reactions Cortisone  05/11/2011    Other (comments) \"Make me feels weak, like I'm going to die\" Januvia [Sitagliptin]  11/09/2016    Other (comments)  
 weakness Current Immunizations  Reviewed on 1/24/2018 Name Date Influenza High Dose Vaccine PF 10/4/2017, 10/28/2014 Influenza Vaccine 9/14/2016, 10/19/2013 Pneumococcal Conjugate (PCV-13) 9/14/2016 Tdap 4/11/2018 ZZZ-RETIRED (DO NOT USE) Pneumococcal Vaccine (Unspecified Type) 10/1/2008 Not reviewed this visit Vitals BP Pulse Resp Height(growth percentile) Weight(growth percentile) BMI  
 140/80 (BP 1 Location: Right arm, BP Patient Position: Sitting) 70 16 4' 11\" (1.499 m) 165 lb 14.4 oz (75.3 kg) 33.51 kg/m2 OB Status Smoking Status Hysterectomy Never Smoker BMI and BSA Data Body Mass Index Body Surface Area  
 33.51 kg/m 2 1.77 m 2 Preferred Pharmacy Pharmacy Name Phone [facility] [phone] Your Updated Medication List  
  
   
This list is accurate as of 6/12/18  3:59 PM.  Always use your most recent med list.  
  
  
  
  
 ALPRAZolam 0.5 mg tablet Commonly known as:  Kelly Corti Take 0.5 Tabs by mouth nightly as needed for Anxiety. Max Daily Amount: 0.25 mg.  
  
 artificial tears(hypromellose) 0.3 % Gel ophthalmic ointment Commonly known as:  SYSTANE GEL Administer 10 g to both eyes as needed. aspirin 81 mg chewable tablet Take 81 mg by mouth daily. atorvastatin 10 mg tablet Commonly known as:  LIPITOR Take 1 Tab by mouth daily. CALCIUM 600 + D 600-125 mg-unit Tab Generic drug:  calcium-cholecalciferol (d3) Take 1 Tab by mouth daily. cetirizine 10 mg tablet Commonly known as:  ZYRTEC  
TAKE ONE TABLET BY MOUTH NIGHTLY AS NEEDED FOR ALLERGIES  
  
 cloNIDine HCl 0.3 mg tablet Commonly known as:  CATAPRES  
TAKE ONE TABLET BY MOUTH 3 TIMES A DAY  
  
 cod liver oil Cap Take 1 Cap by mouth daily. dilTIAZem  mg ER capsule Commonly known as:  CARDIZEM CD Take 1 Cap by mouth daily. famotidine 20 mg tablet Commonly known as:  PEPCID  
TAKE ONE TABLET BY MOUTH NIGHTLY  
  
 fluticasone 50 mcg/actuation nasal spray Commonly known as:  Nely [place] 2 Sprays by Both Nostrils route daily. * furosemide 40 mg tablet Commonly known as:  LASIX TAKE ONE TABLET BY MOUTH EVERY DAY  
  
 * furosemide 40 mg tablet Commonly known as:  LASIX TAKE ONE TABLET BY MOUTH EVERY DAY  
  
 glipiZIDE 5 mg tablet Commonly known as:  GLUCOTROL  
TAKE ONE TABLET BY MOUTH 2 TIMES A DAY  
  
 latanoprost 0.005 % ophthalmic solution Commonly known as:  XALATAN  
  
 losartan 100 mg tablet Commonly known as:  COZAAR Take 1 Tab by mouth daily. meloxicam 7.5 mg tablet Commonly known as:  MOBIC Take 1 Tab by mouth daily. (discontinue voltaren gel)  
  
 metFORMIN  mg tablet Commonly known as:  GLUCOPHAGE XR Take 2 Tabs by mouth daily (with dinner). metoprolol tartrate 100 mg IR tablet Commonly known as:  LOPRESSOR Take 1.5 tab Po BID  
  
 nystatin powder Commonly known as:  MYCOSTATIN Apply  to affected area two (2) times a day. For 2 weeks  
  
 omeprazole 40 mg capsule Commonly known as:  PRILOSEC  
TAKE ONE CAPSULE BY MOUTH EVERY DAY BEFORE BREAKFAST  
  
 pioglitazone 15 mg tablet Commonly known as:  ACTOS Take 1 Tab by mouth nightly. potassium chloride 10 mEq tablet Commonly known as:  KLOR-CON  
TAKE TWO TABLETS BY MOUTH DAILY  
  
 TIMOPTIC-XE 0.5 % ophthalmic gel-forming Generic drug:  timolol TYLENOL 325 mg tablet Generic drug:  acetaminophen Take  by mouth every four (4) hours as needed for Pain. VITAMIN D3 1,000 unit Cap Generic drug:  cholecalciferol Take 1,000 Units by mouth daily. * Notice: This list has 2 medication(s) that are the same as other medications prescribed for you. Read the directions carefully, and ask your doctor or other care provider to review them with you. Please provide this summary of care documentation to your next provider. Your primary care clinician is listed as Komal Pinzon. If you have any questions after today's visit, please call [phone].

## 2018-06-18 DIAGNOSIS — K29.70 GASTRITIS WITHOUT BLEEDING, UNSPECIFIED CHRONICITY, UNSPECIFIED GASTRITIS TYPE: ICD-10-CM

## 2018-06-18 RX ORDER — FAMOTIDINE 20 MG/1
TABLET, FILM COATED ORAL
Qty: 30 TAB | Refills: 0 | Status: SHIPPED | OUTPATIENT
Start: 2018-06-18 | End: 2018-07-20 | Stop reason: SDUPTHER

## 2018-06-21 DIAGNOSIS — F41.0 ANXIETY ATTACK: ICD-10-CM

## 2018-06-25 RX ORDER — ALPRAZOLAM 0.5 MG/1
TABLET ORAL
Qty: 30 TAB | Refills: 0 | Status: SHIPPED | OUTPATIENT
Start: 2018-06-25 | End: 2019-05-29 | Stop reason: SDUPTHER

## 2018-06-26 ENCOUNTER — TELEPHONE (OUTPATIENT)
Dept: INTERNAL MEDICINE CLINIC | Age: 83
End: 2018-06-26

## 2018-06-27 NOTE — TELEPHONE ENCOUNTER
Call placed to pt and she stated that she was seen by Dr Nicole Pop 6/12/18 and that she had verbalized to him that she is noticing ankle swelling every evening. She stated that he ordered her IAN hose but she can not afford $70 per pair, advised to try OTC support hose that are available at local pharm, decrease sodium intake and elevated her legs when able, pt voiced understanding and appreciation.  Also scheduled pt follow up appt for 8/8/18

## 2018-07-03 DIAGNOSIS — I10 ESSENTIAL HYPERTENSION WITH GOAL BLOOD PRESSURE LESS THAN 140/90: ICD-10-CM

## 2018-07-03 DIAGNOSIS — I87.303 STASIS EDEMA, BILATERAL: ICD-10-CM

## 2018-07-03 RX ORDER — CLONIDINE HYDROCHLORIDE 0.3 MG/1
0.3 TABLET ORAL 3 TIMES DAILY
Qty: 270 TAB | Refills: 1 | Status: SHIPPED | OUTPATIENT
Start: 2018-07-03 | End: 2018-08-02 | Stop reason: SDUPTHER

## 2018-07-03 RX ORDER — FUROSEMIDE 40 MG/1
40 TABLET ORAL DAILY
Qty: 90 TAB | Refills: 1 | Status: SHIPPED | OUTPATIENT
Start: 2018-07-03 | End: 2018-10-10 | Stop reason: SDUPTHER

## 2018-07-03 RX ORDER — CETIRIZINE HCL 10 MG
TABLET ORAL
Qty: 90 TAB | Refills: 1 | Status: SHIPPED | OUTPATIENT
Start: 2018-07-03 | End: 2018-08-02 | Stop reason: SDUPTHER

## 2018-07-16 ENCOUNTER — OFFICE VISIT (OUTPATIENT)
Dept: INTERNAL MEDICINE CLINIC | Age: 83
End: 2018-07-16

## 2018-07-16 VITALS
TEMPERATURE: 97.2 F | OXYGEN SATURATION: 97 % | WEIGHT: 161 LBS | BODY MASS INDEX: 32.46 KG/M2 | HEART RATE: 62 BPM | HEIGHT: 59 IN | RESPIRATION RATE: 20 BRPM | SYSTOLIC BLOOD PRESSURE: 130 MMHG | DIASTOLIC BLOOD PRESSURE: 70 MMHG

## 2018-07-16 DIAGNOSIS — R79.89 ABNORMAL THYROID BLOOD TEST: ICD-10-CM

## 2018-07-16 DIAGNOSIS — E55.9 VITAMIN D DEFICIENCY: ICD-10-CM

## 2018-07-16 DIAGNOSIS — M54.12 RADICULOPATHY, CERVICAL: ICD-10-CM

## 2018-07-16 DIAGNOSIS — R53.83 FATIGUE, UNSPECIFIED TYPE: ICD-10-CM

## 2018-07-16 DIAGNOSIS — I10 ESSENTIAL HYPERTENSION WITH GOAL BLOOD PRESSURE LESS THAN 140/90: ICD-10-CM

## 2018-07-16 DIAGNOSIS — E11.21 TYPE 2 DIABETES MELLITUS WITH NEPHROPATHY (HCC): ICD-10-CM

## 2018-07-16 DIAGNOSIS — R60.0 BILATERAL LEG EDEMA: Primary | ICD-10-CM

## 2018-07-16 RX ORDER — LOSARTAN POTASSIUM 100 MG/1
100 TABLET ORAL DAILY
Qty: 90 TAB | Refills: 3 | Status: SHIPPED | OUTPATIENT
Start: 2018-07-16 | End: 2018-12-26 | Stop reason: ALTCHOICE

## 2018-07-16 RX ORDER — POTASSIUM CHLORIDE 750 MG/1
10 TABLET, EXTENDED RELEASE ORAL DAILY
Qty: 90 TAB | Refills: 3 | Status: SHIPPED | OUTPATIENT
Start: 2018-07-16 | End: 2019-02-20 | Stop reason: SDUPTHER

## 2018-07-16 RX ORDER — METFORMIN HYDROCHLORIDE 500 MG/1
1000 TABLET, EXTENDED RELEASE ORAL
Qty: 180 TAB | Refills: 3 | Status: SHIPPED | OUTPATIENT
Start: 2018-07-16 | End: 2019-02-20 | Stop reason: SDUPTHER

## 2018-07-16 NOTE — PATIENT INSTRUCTIONS
Neck Arthritis: Exercises Your Care Instructions Here are some examples of typical rehabilitation exercises for your condition. Start each exercise slowly. Ease off the exercise if you start to have pain. Your doctor or physical therapist will tell you when you can start these exercises and which ones will work best for you. How to do the exercises Neck stretches to the side 1. This stretch works best if you keep your shoulder down as you lean away from it. To help you remember to do this, start by relaxing your shoulders and lightly holding on to your thighs or your chair. 2. Tilt your head toward your shoulder and hold for 15 to 30 seconds. Let the weight of your head stretch your muscles. 3. Repeat 2 to 4 times toward each shoulder. Chin tuck 1. Lie on the floor with a rolled-up towel under your neck. Your head should be touching the floor. 2. Slowly bring your chin toward your chest. 
3. Hold for a count of 6, and then relax for up to 10 seconds. 4. Repeat 8 to 12 times. Active cervical rotation 1. Sit in a firm chair, or stand up straight. 2. Keeping your chin level, turn your head to the right, and hold for 15 to 30 seconds. 3. Turn your head to the left and hold for 15 to 30 seconds. 4. Repeat 2 to 4 times to each side. Shoulder blade squeeze 1. While standing, squeeze your shoulder blades together. 2. Do not raise your shoulders up as you are squeezing. 3. Hold for 6 seconds. 4. Repeat 8 to 12 times. Shoulder rolls 1. Sit comfortably with your feet shoulder-width apart. You can also do this exercise standing up. 2. Roll your shoulders up, then back, and then down in a smooth, circular motion. 3. Repeat 2 to 4 times. Follow-up care is a key part of your treatment and safety. Be sure to make and go to all appointments, and call your doctor if you are having problems. It's also a good idea to know your test results and keep a list of the medicines you take.  
Where can you learn more? Go to http://stacey-marie.info/. Enter V035 in the search box to learn more about \"Neck Arthritis: Exercises. \" Current as of: November 29, 2017 Content Version: 11.7 © 8356-3163 [employer]. Care instructions adapted under license by [employer] (which disclaims liability or warranty for this information). If you have questions about a medical condition or this instruction, always ask your healthcare professional. [address] any warranty or liability for your use of this information.

## 2018-07-16 NOTE — PROGRESS NOTES
HISTORY OF PRESENT ILLNESS  Cristo Walker is a 80 y.o. female here for follow-up. She has been feeling fatigued and tired lately. Had abnormal thyroid function test, need to repeat it. Has hypertension, multiple medications. Blood pressure is normal.  No chest pain palpitation or shortness of breath. Noticed bilateral leg edema which is getting worse. No shortness of breath or orthopnea. No weight gain. Has diabetes, blood sugar under control. She has seen ophthalmologist.  Had tingling and numbness in the arms  , Has DJD in the neck. Have finished her physical therapy in the past.  Refused to go back to see therapist again. Labs reviewed,    HPI    Review of Systems   Constitutional: Positive for malaise/fatigue. HENT: Negative. Eyes: Negative. Respiratory: Negative. Cardiovascular: Positive for leg swelling. Gastrointestinal: Negative. Genitourinary: Negative. Musculoskeletal: Negative. Skin: Negative. Neurological: Negative. Endo/Heme/Allergies: Negative. Psychiatric/Behavioral: Negative. Physical Exam   Constitutional: She appears well-developed and well-nourished. No distress. Neck: Normal range of motion. Neck supple. No JVD present. No thyromegaly present. Cardiovascular: Normal rate, regular rhythm, normal heart sounds and intact distal pulses. Pulmonary/Chest: Effort normal and breath sounds normal. No respiratory distress. She has no wheezes. Musculoskeletal: She exhibits edema. She exhibits no tenderness. Bilateral ankle: 1+ leg edema present. Nontender. Lymphadenopathy:     She has no cervical adenopathy. Psychiatric: She has a normal mood and affect. Her behavior is normal.       ASSESSMENT and PLAN  Diagnoses and all orders for this visit:    1. Bilateral leg edema    Leg elevation. Watch salt intake. Advised to increase Lasix 40 mg in the morning and 20 mg at noon for 1 week. And then go back to 40 mg once a day.   Potassium chloride should be same dosage. will  check,   -     METABOLIC PANEL, COMPREHENSIVE  -     potassium chloride (KLOR-CON) 10 mEq tablet; Take 1 Tab by mouth daily. 2. Fatigue, unspecified type    might be from thyroid. Will check,  -     CBC WITH AUTOMATED DIFF  -     METABOLIC PANEL, COMPREHENSIVE  -     TSH 3RD GENERATION    3. Abnormal thyroid blood test  If abnormal this time, she will be on Synthroid. Will repeat,  -     T4, FREE  -     TSH 3RD GENERATION    4. Essential hypertension with goal blood pressure less than 140/90    Stable with current regimen. Will continue same.  -     METABOLIC PANEL, COMPREHENSIVE  -     losartan (COZAAR) 100 mg tablet; Take 1 Tab by mouth daily. 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 6. Radiculopathy, cervical    Finished physical therapy. If it got worse advised her to let me know to send for pain management.     7. Vitamin D deficiency  -     VITAMIN D, 25 HYDROXY

## 2018-07-16 NOTE — PROGRESS NOTES
Chief Complaint   Patient presents with    Ankle swelling     both ankles swelling    Fatigue     1. Have you been to the ER, urgent care clinic since your last visit? Hospitalized since your last visit? No    2. Have you seen or consulted any other health care providers outside of the [facility] since your last visit? Include any pap smears or colon screening. No     Needs med refills.

## 2018-07-16 NOTE — MR AVS SNAPSHOT
[address] [facility] 102 [address] 
[phone] Patient: Nelli Arriola MRN: [id number] LDR:8/9/2719 Visit Information Date & Time Provider Department Dept. Phone Encounter #  
 7/16/2018  9:00 AM Jeromy Patel [facility] Medicine [phone] [id number] Your Appointments 7/16/2018  9:00 AM  
ROUTINE CARE with Alla Mata MD  
[facility] Internal Medicine [address] [place] [address]) Appt Note: fatigue [address] [facility] 102 [facility] [address]  
[phone]  
  
   
 [address] [place] [address]  
  
    
 8/8/2018  8:45 AM  
ROUTINE CARE with Alla Mata MD  
[facility] Internal Medicine [address] [place] [address]) Appt Note: 4 month follow up & leg swelling [address] [facility] [address] [place] [address]  
[phone]  
  
    
 12/14/2018  9:00 AM  
ESTABLISHED PATIENT with Nithin Denson MD  
[facility] ([address] [place] [address]) Appt Note: 6 mo f/u per Dr. Seth Caal [address] [place] [id number] Upcoming Health Maintenance Date Due  
 EYE EXAM RETINAL OR DILATED Q1 4/25/2018 Influenza Age 5 to Adult 8/1/2018 FOOT EXAM Q1 9/6/2018 HEMOGLOBIN A1C Q6M 10/12/2018 LIPID PANEL Q1 1/25/2019 MEDICARE YEARLY EXAM 4/12/2019 MICROALBUMIN Q1 4/12/2019 GLAUCOMA SCREENING Q2Y 4/25/2019 COLONOSCOPY 9/25/2027 DTaP/Tdap/Td series (2 - Td) 4/11/2028 Allergies as of 7/16/2018  Review Complete On: 7/16/2018 By: Alla Mata MD  
  
 Severity Noted Reaction Type Reactions Cortisone  05/11/2011    Other (comments) \"Make me feels weak, like I'm going to die\" Januvia [Sitagliptin]  11/09/2016    Other (comments)  
 weakness Current Immunizations  Reviewed on 1/24/2018 Name Date Influenza High Dose Vaccine PF 10/4/2017, 10/28/2014 Influenza Vaccine 9/14/2016, 10/19/2013 Pneumococcal Conjugate (PCV-13) 9/14/2016 Tdap 4/11/2018 ZZZ-RETIRED (DO NOT USE) Pneumococcal Vaccine (Unspecified Type) 10/1/2008 Not reviewed this visit You Were Diagnosed With   
  
 Codes Comments Bilateral leg edema    -  Primary ICD-10-CM: R60.0 ICD-9-CM: 873. 3 Fatigue, unspecified type     ICD-10-CM: R53.83 ICD-9-CM: 780.79 Abnormal thyroid blood test     ICD-10-CM: R94.6 ICD-9-CM: 794.5 Essential hypertension with goal blood pressure less than 140/90     ICD-10-CM: I10 
ICD-9-CM: 401.9 Type 2 diabetes mellitus with nephropathy (HCC)     ICD-10-CM: E11.21 
ICD-9-CM: 250.40, 583.81 Radiculopathy, cervical     ICD-10-CM: M54.12 
ICD-9-CM: 723.4 Vitamin D deficiency     ICD-10-CM: E55.9 ICD-9-CM: 268.9 Vitals BP Pulse Temp Resp Height(growth percentile) Weight(growth percentile) 130/70 62 97.2 °F (36.2 °C) (Oral) 20 4' 11\" (1.499 m) 161 lb (73 kg) SpO2 BMI OB Status Smoking Status 97% 32.52 kg/m2 Hysterectomy Never Smoker Vitals History BMI and BSA Data Body Mass Index Body Surface Area 32.52 kg/m 2 1.74 m 2 Preferred Pharmacy Pharmacy Name Phone [facility] [phone] Your Updated Medication List  
  
   
This list is accurate as of 7/16/18  8:58 AM.  Always use your most recent med list.  
  
  
  
  
 ALPRAZolam 0.5 mg tablet Commonly known as:  XANAX  
TAKE ONE TABLET BY MOUTH AT BEDTIME AS NEEDED ANXIETY  
  
 artificial tears(hypromellose) 0.3 % Gel ophthalmic ointment Commonly known as:  SYSTANE GEL Administer 10 g to both eyes as needed. aspirin 81 mg chewable tablet Take 81 mg by mouth daily. atorvastatin 10 mg tablet Commonly known as:  LIPITOR Take 1 Tab by mouth daily. CALCIUM 600 + D 600-125 mg-unit Tab Generic drug:  calcium-cholecalciferol (d3) Take 1 Tab by mouth daily. cetirizine 10 mg tablet Commonly known as:  ZYRTEC  
TAKE ONE TABLET BY MOUTH NIGHTLY AS NEEDED FOR ALLERGIES  
  
 cloNIDine HCl 0.3 mg tablet Commonly known as:  CATAPRES Take 1 Tab by mouth three (3) times daily. cod liver oil Cap Take 1 Cap by mouth daily. dilTIAZem  mg ER capsule Commonly known as:  CARDIZEM CD Take 1 Cap by mouth daily. famotidine 20 mg tablet Commonly known as:  PEPCID  
TAKE ONE TABLET BY MOUTH NIGHTLY  
  
 fluticasone 50 mcg/actuation nasal spray Commonly known as:  Stone Riser 2 Sprays by Both Nostrils route daily. furosemide 40 mg tablet Commonly known as:  LASIX Take 1 Tab by mouth daily. glipiZIDE 5 mg tablet Commonly known as:  GLUCOTROL  
TAKE ONE TABLET BY MOUTH 2 TIMES A DAY  
  
 latanoprost 0.005 % ophthalmic solution Commonly known as:  XALATAN  
  
 losartan 100 mg tablet Commonly known as:  COZAAR Take 1 Tab by mouth daily. meloxicam 7.5 mg tablet Commonly known as:  MOBIC Take 1 Tab by mouth daily. (discontinue voltaren gel)  
  
 metFORMIN  mg tablet Commonly known as:  GLUCOPHAGE XR Take 2 Tabs by mouth daily (with dinner). metoprolol tartrate 100 mg IR tablet Commonly known as:  LOPRESSOR Take 1.5 tab Po BID  
  
 nystatin powder Commonly known as:  MYCOSTATIN Apply  to affected area two (2) times a day. For 2 weeks  
  
 omeprazole 40 mg capsule Commonly known as:  PRILOSEC  
TAKE ONE CAPSULE BY MOUTH EVERY DAY BEFORE BREAKFAST  
  
 pioglitazone 15 mg tablet Commonly known as:  ACTOS Take 1 Tab by mouth nightly. potassium chloride 10 mEq tablet Commonly known as:  KLOR-CON Take 1 Tab by mouth daily. TIMOPTIC-XE 0.5 % ophthalmic gel-forming Generic drug:  timolol TYLENOL 325 mg tablet Generic drug:  acetaminophen Take  by mouth every four (4) hours as needed for Pain. VITAMIN D3 1,000 unit Cap Generic drug:  cholecalciferol Take 1,000 Units by mouth daily. Prescriptions Sent to Pharmacy Refills  
 metFORMIN ER (GLUCOPHAGE XR) 500 mg tablet 3 Sig: Take 2 Tabs by mouth daily (with dinner). Class: Normal  
 Pharmacy: [facility] Ph #: [phone] Route: Oral  
 potassium chloride (KLOR-CON) 10 mEq tablet 3 Sig: Take 1 Tab by mouth daily. Class: Normal  
 Pharmacy: [facility] Ph #: [phone] Route: Oral  
 losartan (COZAAR) 100 mg tablet 3 Sig: Take 1 Tab by mouth daily. Class: Normal  
 Pharmacy: [facility] Ph #: [phone] Route: Oral  
  
We Performed the Following CBC WITH AUTOMATED DIFF [66172 CPT(R)] HEMOGLOBIN A1C WITH EAG [70838 CPT(R)] METABOLIC PANEL, COMPREHENSIVE [54062 CPT(R)] T4, FREE [id number] CPT(R)] TSH 3RD GENERATION [85401 CPT(R)] VITAMIN D, 25 HYDROXY [id number] CPT(R)] Patient Instructions Neck Arthritis: Exercises Your Care Instructions Here are some examples of typical rehabilitation exercises for your condition. Start each exercise slowly. Ease off the exercise if you start to have pain. Your doctor or physical therapist will tell you when you can start these exercises and which ones will work best for you. How to do the exercises Neck stretches to the side 1. This stretch works best if you keep your shoulder down as you lean away from it. To help you remember to do this, start by relaxing your shoulders and lightly holding on to your thighs or your chair. 2. Tilt your head toward your shoulder and hold for 15 to 30 seconds. Let the weight of your head stretch your muscles. 3. Repeat 2 to 4 times toward each shoulder. Chin tuck 1. Lie on the floor with a rolled-up towel under your neck. Your head should be touching the floor. 2. Slowly bring your chin toward your chest. 
3. Hold for a count of 6, and then relax for up to 10 seconds. 4. Repeat 8 to 12 times. Active cervical rotation 1. Sit in a firm chair, or stand up straight. 2. Keeping your chin level, turn your head to the right, and hold for 15 to 30 seconds. 3. Turn your head to the left and hold for 15 to 30 seconds. 4. Repeat 2 to 4 times to each side. Shoulder blade squeeze 1. While standing, squeeze your shoulder blades together. 2. Do not raise your shoulders up as you are squeezing. 3. Hold for 6 seconds. 4. Repeat 8 to 12 times. Shoulder rolls 1. Sit comfortably with your feet shoulder-width apart. You can also do this exercise standing up. 2. Roll your shoulders up, then back, and then down in a smooth, circular motion. 3. Repeat 2 to 4 times. Follow-up care is a key part of your treatment and safety. Be sure to make and go to all appointments, and call your doctor if you are having problems. It's also a good idea to know your test results and keep a list of the medicines you take. Where can you learn more? Go to http://stacey-marie.info/. Enter G336 in the search box to learn more about \"Neck Arthritis: Exercises. \" Current as of: November 29, 2017 Content Version: 11.7 © 2187-4179 [employer]. Care instructions adapted under license by [employer] (which disclaims liability or warranty for this information). If you have questions about a medical condition or this instruction, always ask your healthcare professional. [place] [address] any warranty or liability for your use of this information. Introducing [facility]! Dear Melisa Maurice: Thank you for requesting a paymio account. Our records indicate that you have previously registered for a paymio account but its currently inactive. Please call our paymio support line at [phone]. Additional Information If you have questions, please visit the Frequently Asked Questions section of the [employer] website at [email or web address] Remember, [facility] is NOT to be used for urgent needs. For medical emergencies, dial 911. Now available from your iPhone and Android! Please provide this summary of care documentation to your next provider. Your primary care clinician is listed as Tarsha Pink. If you have any questions after today's visit, please call [phone].

## 2018-07-17 ENCOUNTER — HOSPITAL ENCOUNTER (OUTPATIENT)
Dept: LAB | Age: 83
Discharge: HOME | End: 2018-07-17
Payer: MEDICARE

## 2018-07-17 PROCEDURE — 80053 COMPREHEN METABOLIC PANEL: CPT

## 2018-07-17 PROCEDURE — 85025 COMPLETE CBC W/AUTO DIFF WBC: CPT

## 2018-07-17 PROCEDURE — 36415 COLL VENOUS BLD VENIPUNCTURE: CPT

## 2018-07-17 PROCEDURE — 82306 VITAMIN D 25 HYDROXY: CPT

## 2018-07-17 PROCEDURE — 84439 ASSAY OF FREE THYROXINE: CPT

## 2018-07-17 PROCEDURE — 83036 HEMOGLOBIN GLYCOSYLATED A1C: CPT

## 2018-07-17 PROCEDURE — 84443 ASSAY THYROID STIM HORMONE: CPT

## 2018-07-19 LAB
25(OH)D3+25(OH)D2 SERPL-MCNC: 35.6 NG/ML (ref 30–100)
ALBUMIN SERPL-MCNC: 4.3 G/DL (ref 3.5–4.7)
ALBUMIN/GLOB SERPL: 1.5 {RATIO} (ref 1.2–2.2)
ALP SERPL-CCNC: 83 IU/L (ref 39–117)
ALT SERPL-CCNC: 15 IU/L (ref 0–32)
AST SERPL-CCNC: 17 IU/L (ref 0–40)
BASOPHILS # BLD AUTO: 0 X10E3/UL (ref 0–0.2)
BASOPHILS NFR BLD AUTO: 0 %
BILIRUB SERPL-MCNC: 0.3 MG/DL (ref 0–1.2)
BUN SERPL-MCNC: 13 MG/DL (ref 8–27)
BUN/CREAT SERPL: 13 (ref 12–28)
CALCIUM SERPL-MCNC: 9.3 MG/DL (ref 8.7–10.3)
CHLORIDE SERPL-SCNC: 98 MMOL/L (ref 96–106)
CO2 SERPL-SCNC: 25 MMOL/L (ref 20–29)
CREAT SERPL-MCNC: 1.02 MG/DL (ref 0.57–1)
EOSINOPHIL # BLD AUTO: 0.1 X10E3/UL (ref 0–0.4)
EOSINOPHIL NFR BLD AUTO: 2 %
ERYTHROCYTE [DISTWIDTH] IN BLOOD BY AUTOMATED COUNT: 15.1 % (ref 12.3–15.4)
EST. AVERAGE GLUCOSE BLD GHB EST-MCNC: 163 MG/DL
GLOBULIN SER CALC-MCNC: 2.8 G/DL (ref 1.5–4.5)
GLUCOSE SERPL-MCNC: 161 MG/DL (ref 65–99)
HBA1C MFR BLD: 7.3 % (ref 4.8–5.6)
HCT VFR BLD AUTO: 36.7 % (ref 34–46.6)
HGB BLD-MCNC: 12.1 G/DL (ref 11.1–15.9)
IMM GRANULOCYTES # BLD: 0 X10E3/UL (ref 0–0.1)
IMM GRANULOCYTES NFR BLD: 0 %
INTERPRETATION: NORMAL
LYMPHOCYTES # BLD AUTO: 1.4 X10E3/UL (ref 0.7–3.1)
LYMPHOCYTES NFR BLD AUTO: 31 %
Lab: NORMAL
MCH RBC QN AUTO: 28.3 PG (ref 26.6–33)
MCHC RBC AUTO-ENTMCNC: 33 G/DL (ref 31.5–35.7)
MCV RBC AUTO: 86 FL (ref 79–97)
MONOCYTES # BLD AUTO: 0.5 X10E3/UL (ref 0.1–0.9)
MONOCYTES NFR BLD AUTO: 10 %
NEUTROPHILS # BLD AUTO: 2.6 X10E3/UL (ref 1.4–7)
NEUTROPHILS NFR BLD AUTO: 57 %
PLATELET # BLD AUTO: 175 X10E3/UL (ref 150–379)
POTASSIUM SERPL-SCNC: 3.9 MMOL/L (ref 3.5–5.2)
PROT SERPL-MCNC: 7.1 G/DL (ref 6–8.5)
RBC # BLD AUTO: 4.28 X10E6/UL (ref 3.77–5.28)
SODIUM SERPL-SCNC: 138 MMOL/L (ref 134–144)
T4 FREE SERPL-MCNC: 1.04 NG/DL (ref 0.82–1.77)
TSH SERPL DL<=0.005 MIU/L-ACNC: 2.51 UIU/ML (ref 0.45–4.5)
WBC # BLD AUTO: 4.6 X10E3/UL (ref 3.4–10.8)

## 2018-07-20 DIAGNOSIS — K29.70 GASTRITIS WITHOUT BLEEDING, UNSPECIFIED CHRONICITY, UNSPECIFIED GASTRITIS TYPE: ICD-10-CM

## 2018-07-23 DIAGNOSIS — K29.70 GASTRITIS WITHOUT BLEEDING, UNSPECIFIED CHRONICITY, UNSPECIFIED GASTRITIS TYPE: ICD-10-CM

## 2018-07-23 RX ORDER — FAMOTIDINE 20 MG/1
TABLET, FILM COATED ORAL
Qty: 30 TAB | Refills: 0 | Status: SHIPPED | OUTPATIENT
Start: 2018-07-23 | End: 2018-07-23 | Stop reason: SDUPTHER

## 2018-07-24 RX ORDER — FAMOTIDINE 20 MG/1
TABLET, FILM COATED ORAL
Qty: 30 TAB | Refills: 0 | Status: SHIPPED | OUTPATIENT
Start: 2018-07-24 | End: 2018-10-10 | Stop reason: SDUPTHER

## 2018-08-02 DIAGNOSIS — I10 ESSENTIAL HYPERTENSION WITH GOAL BLOOD PRESSURE LESS THAN 140/90: ICD-10-CM

## 2018-08-02 RX ORDER — CETIRIZINE HCL 10 MG
TABLET ORAL
Qty: 90 TAB | Refills: 1 | Status: SHIPPED | OUTPATIENT
Start: 2018-08-02 | End: 2019-04-29 | Stop reason: SDUPTHER

## 2018-08-02 RX ORDER — CLONIDINE HYDROCHLORIDE 0.3 MG/1
0.3 TABLET ORAL 3 TIMES DAILY
Qty: 270 TAB | Refills: 1 | Status: SHIPPED | OUTPATIENT
Start: 2018-08-02 | End: 2019-01-29 | Stop reason: SDUPTHER

## 2018-08-13 RX ORDER — OMEPRAZOLE 40 MG/1
CAPSULE, DELAYED RELEASE ORAL
Qty: 30 CAP | Refills: 0 | Status: SHIPPED | OUTPATIENT
Start: 2018-08-13 | End: 2018-10-16 | Stop reason: SDUPTHER

## 2018-08-22 ENCOUNTER — TELEPHONE (OUTPATIENT)
Dept: INTERNAL MEDICINE CLINIC | Age: 83
End: 2018-08-22

## 2018-08-22 DIAGNOSIS — K29.70 GASTRITIS WITHOUT BLEEDING, UNSPECIFIED CHRONICITY, UNSPECIFIED GASTRITIS TYPE: ICD-10-CM

## 2018-08-22 RX ORDER — FAMOTIDINE 20 MG/1
TABLET, FILM COATED ORAL
Qty: 30 TAB | Refills: 0 | Status: CANCELLED | OUTPATIENT
Start: 2018-08-22

## 2018-08-22 NOTE — TELEPHONE ENCOUNTER
Called patient to verify she had her medication refill she called in needing today. Pt states she did  her Pepcid today and does not need any add'l meds at this time.

## 2018-09-12 ENCOUNTER — TELEPHONE (OUTPATIENT)
Dept: INTERNAL MEDICINE CLINIC | Age: 83
End: 2018-09-12

## 2018-09-12 NOTE — TELEPHONE ENCOUNTER
Patient is requesting to speak with Lo Peng. .. I told her that Lo Peng is not Dr Ashley Whitehead nurse. She just needs to speak with someone that can answer her question, and did not want to tell me what her question was. .. Please call her this afternoon if possible because she has a few errands to run this morning.  Thank you

## 2018-09-13 NOTE — TELEPHONE ENCOUNTER
Call placed to pt and pt had a few questions regarding IAN hose, writer provided clarification and pt voiced understanding and appreciation

## 2018-09-21 DIAGNOSIS — K29.70 GASTRITIS WITHOUT BLEEDING, UNSPECIFIED CHRONICITY, UNSPECIFIED GASTRITIS TYPE: ICD-10-CM

## 2018-09-24 RX ORDER — FAMOTIDINE 20 MG/1
TABLET, FILM COATED ORAL
Qty: 30 TAB | Refills: 0 | Status: SHIPPED | OUTPATIENT
Start: 2018-09-24 | End: 2019-01-22 | Stop reason: SDUPTHER

## 2018-10-10 ENCOUNTER — HOSPITAL ENCOUNTER (OUTPATIENT)
Dept: LAB | Age: 83
Discharge: HOME | End: 2018-10-10
Payer: MEDICARE

## 2018-10-10 ENCOUNTER — OFFICE VISIT (OUTPATIENT)
Dept: INTERNAL MEDICINE CLINIC | Age: 83
End: 2018-10-10

## 2018-10-10 VITALS
TEMPERATURE: 98.2 F | WEIGHT: 162 LBS | OXYGEN SATURATION: 97 % | RESPIRATION RATE: 18 BRPM | BODY MASS INDEX: 32.66 KG/M2 | SYSTOLIC BLOOD PRESSURE: 136 MMHG | HEIGHT: 59 IN | HEART RATE: 67 BPM | DIASTOLIC BLOOD PRESSURE: 77 MMHG

## 2018-10-10 DIAGNOSIS — M54.12 CERVICAL RADICULOPATHY: ICD-10-CM

## 2018-10-10 DIAGNOSIS — I87.303 STASIS EDEMA, BILATERAL: ICD-10-CM

## 2018-10-10 DIAGNOSIS — E11.21 TYPE 2 DIABETES MELLITUS WITH NEPHROPATHY (HCC): ICD-10-CM

## 2018-10-10 DIAGNOSIS — E78.2 MIXED HYPERLIPIDEMIA: ICD-10-CM

## 2018-10-10 DIAGNOSIS — Z23 ENCOUNTER FOR IMMUNIZATION: ICD-10-CM

## 2018-10-10 DIAGNOSIS — I10 ESSENTIAL HYPERTENSION WITH GOAL BLOOD PRESSURE LESS THAN 140/90: Primary | ICD-10-CM

## 2018-10-10 PROCEDURE — 36415 COLL VENOUS BLD VENIPUNCTURE: CPT

## 2018-10-10 PROCEDURE — 82043 UR ALBUMIN QUANTITATIVE: CPT

## 2018-10-10 PROCEDURE — 80053 COMPREHEN METABOLIC PANEL: CPT

## 2018-10-10 PROCEDURE — 83036 HEMOGLOBIN GLYCOSYLATED A1C: CPT

## 2018-10-10 RX ORDER — FUROSEMIDE 40 MG/1
40 TABLET ORAL DAILY
Qty: 90 TAB | Refills: 1 | Status: SHIPPED | OUTPATIENT
Start: 2018-10-10 | End: 2019-04-17 | Stop reason: SDUPTHER

## 2018-10-10 NOTE — PROGRESS NOTES
HISTORY OF PRESENT ILLNESS Aditya Samuels is a 80 y.o. female here for follow-up. Report right numbness sometimes when she is sleeping on right side. Has cervical spondylosis. Did not finish her physical therapy. Has hypertension, multiple medications. Blood pressure is normal.  No chest pain palpitation or shortness of breath. Noticed bilateral leg edema which is getting worse. No shortness of breath or orthopnea. No weight gain. Watching salt. Has diabetes, blood sugar under control. She has seen ophthalmologist.  
Need flu shot. Numbness Other Review of Systems Constitutional: Positive for malaise/fatigue. HENT: Negative. Eyes: Negative. Respiratory: Negative. Cardiovascular: Positive for leg swelling. Gastrointestinal: Negative. Genitourinary: Negative. Musculoskeletal: Negative. Skin: Negative. Neurological: Positive for numbness. Endo/Heme/Allergies: Negative. Psychiatric/Behavioral: Negative. Physical Exam  
Constitutional: She appears well-developed and well-nourished. No distress. Neck: Normal range of motion. Neck supple. No JVD present. No thyromegaly present. Cardiovascular: Normal rate, regular rhythm, normal heart sounds and intact distal pulses. Pulmonary/Chest: Effort normal and breath sounds normal. No respiratory distress. She has no wheezes. Musculoskeletal: She exhibits edema. She exhibits no tenderness. Bilateral ankle: 1+ leg edema present. Nontender. Lymphadenopathy:  
  She has no cervical adenopathy. Psychiatric: She has a normal mood and affect. Her behavior is normal.  
 
 
ASSESSMENT and PLAN Diagnoses and all orders for this visit: 1. Essential hypertension with goal blood pressure less than 140/90 Stable on current regimen. Will continue same. Will check, 
-     REFERRAL TO CARDIOLOGY 
-     METABOLIC PANEL, COMPREHENSIVE 2. Stasis edema, bilateral 
 
 leg elevation, watch salt. Advised to take extra 20 mg Lasix at noon time 2 days a week. Need to see Dr. Krista Zafar for repeat echocardiogram. 
-     furosemide (LASIX) 40 mg tablet; Take 1 Tab by mouth daily. 
-     REFERRAL TO CARDIOLOGY 3. Encounter for immunization Will give, 
-     Influenza Vaccine Inactivated (IIV)(FLUAD), Subunit, Adjuvanted, IM, (81157) -     Administration fee () for Medicare insured patients 4. Mixed hyperlipidemia Stable, on statin. 5. Type 2 diabetes mellitus with nephropathy ([facility] [address] On metformin and glipizide. A1c stable. Will check, 
-     METABOLIC PANEL, COMPREHENSIVE 
-     HEMOGLOBIN A1C WITH EAG 
-     MICROALBUMIN, UR, RAND W/ MICROALB/CREAT RATIO 6. Cervical radiculopathy 
 
Causing arm numbness. Advised to start physical therapy and cervical collar. Will refer, 
-     REFERRAL TO PHYSICAL THERAPY Discussed expected course/resolution/complications of diagnosis in detail with patient. Medication risks/benefits/costs/interactions/alternatives discussed with patient. Pt was given an after visit summary which includes diagnoses, current medications & vitals. Pt expressed understanding with the diagnosis and plan.

## 2018-10-10 NOTE — PROGRESS NOTES
Patient identified with 2 ID's, Name and  Rose Areas is a 80 y.o. female Chief Complaint Patient presents with  Numbness  
  right hand goes numb while sleeping  Other  
  describes having low energy  Other  
  right knee \"feels like it is going to give out\" 1. Have you been to the ER, urgent care clinic since your last visit? Hospitalized since your last visit? No 
 
2. Have you seen or consulted any other health care providers outside of the [address] [place] [address] since your last visit? Include any pap smears or colon screening. No  
 
Health Maintenance Topic Date Due  Shingrix Vaccine Age 50> (1 of 2) 1984  
 EYE EXAM RETINAL OR DILATED Q1  2018  
 FOOT EXAM Q1  2018  HEMOGLOBIN A1C Q6M  2019  LIPID PANEL Q1  2019  MEDICARE YEARLY EXAM  2019  MICROALBUMIN Q1  2019  GLAUCOMA SCREENING Q2Y  2019  COLONOSCOPY  2027  
 DTaP/Tdap/Td series (2 - Td) 2028  Bone Densitometry (Dexa) Screening  Completed  Pneumococcal 65+ Low/Medium Risk  Completed  Influenza Age 5 to Adult  Completed In the event something were to happen to you and you were unable to speak on your behalf, do you have an Advance Directive/ Living Will in place stating your wishes? No  
  
If no, would you like information?  declined Visit Vitals  /77 (BP 1 Location: Left arm, BP Patient Position: Sitting)  Pulse 67  Temp 98.2 °F (36.8 °C) (Oral)  Resp 18  Ht 4' 11\" (1.499 m)  Wt 162 lb (73.5 kg)  SpO2 97%  BMI 32.72 kg/m2 Medication Reconciliation reviewed with patient on this date Fall Risk Assessment, last 12 mths 2018 Able to walk? Yes Fall in past 12 months? No  
 
 
PHQ over the last two weeks 10/10/2018 Little interest or pleasure in doing things Not at all Feeling down, depressed, irritable, or hopeless Not at all Total Score PHQ 2 0  
 
 
 Learning Assessment 2/27/2015 PRIMARY LEARNER Patient HIGHEST LEVEL OF EDUCATION - PRIMARY LEARNER  DID NOT GRADUATE HIGH SCHOOL  
BARRIERS PRIMARY LEARNER NONE  
CO-LEARNER CAREGIVER No  
PRIMARY LANGUAGE ENGLISH  
LEARNER PREFERENCE PRIMARY READING  
ANSWERED BY Patient RELATIONSHIP SELF Abuse Screening Questionnaire 10/10/2018 Do you ever feel afraid of your partner? Rolando Tarango Are you in a relationship with someone who physically or mentally threatens you? Rolando Tarango Is it safe for you to go home? Jayashree Diego The patient presents for routine flu immunization, denies any symptoms, reactions or allergies that would exclude them from being immunized today. Risks and adverse reactions were discussed and the VIS was given to them. Vaccine administered to left deltoid. All questions were addressed. Patient was observed 15 min post injection. There were no reactions observed. No reactions noted.

## 2018-10-10 NOTE — MR AVS SNAPSHOT
[address] [place] [address] [address] [facility] 102 [address] 
[phone] Patient: Jessica Velasquez MRN: [id number] Visit Information Date & Time Provider Department Dept. Phone Encounter #  
 10/10/2018  9:45 AM Saloni Lerma MD [facility] [id number] Your Appointments 11/7/2018  8:15 AM  
ROUTINE CARE with Saloni Lerma MD  
[facility]) Appt Note: 4 month follow up & leg swelling; r/s 4 month follow up & leg swelling [address] [facility] [address] [facility] 102 [place] [address]  
[phone]  
  
   
 [address] [facility] [address]  
  
    
 12/14/2018  9:00 AM  
ESTABLISHED PATIENT with Edil Avila MD  
[facility] ([facility]) Appt Note: 6 mo f/u per Dr. Diane Tovar 200 [address]  
[address] [facility] 7 [phone] Upcoming Health Maintenance Date Due Shingrix Vaccine Age 50> (1 of 2) 3/1/1984 EYE EXAM RETINAL OR DILATED Q1 4/25/2018 Influenza Age 5 to Adult 8/1/2018 FOOT EXAM Q1 9/6/2018 HEMOGLOBIN A1C Q6M 1/17/2019 LIPID PANEL Q1 1/25/2019 MEDICARE YEARLY EXAM 4/12/2019 MICROALBUMIN Q1 4/12/2019 GLAUCOMA SCREENING Q2Y 4/25/2019 COLONOSCOPY 9/25/2027 DTaP/Tdap/Td series (2 - Td) 4/11/2028 Allergies as of 10/10/2018  Review Complete On: 10/10/2018 By: Saloni Lerma MD  
  
 Severity Noted Reaction Type Reactions Cortisone  05/11/2011    Other (comments) \"Make me feels weak, like I'm going to die\" Januvia [Sitagliptin]  11/09/2016    Other (comments)  
 weakness Current Immunizations  Reviewed on 1/24/2018 Name Date Influenza High Dose Vaccine PF 10/4/2017, 10/28/2014 Influenza Vaccine 9/14/2016, 10/19/2013 Influenza Vaccine (Tri) Adjuvanted  Incomplete Pneumococcal Conjugate (PCV-13) 9/14/2016 Tdap 4/11/2018 ZZZ-RETIRED (DO NOT USE) Pneumococcal Vaccine (Unspecified Type) 10/1/2008 Not reviewed this visit You Were Diagnosed With   
  
 Codes Comments Essential hypertension with goal blood pressure less than 140/90    -  Primary ICD-10-CM: I10 
ICD-9-CM: 401.9 Stasis edema, bilateral     ICD-10-CM: I87.303 ICD-9-CM: 459.30 Encounter for immunization     ICD-10-CM: T52 ICD-9-CM: V03.89 Mixed hyperlipidemia     ICD-10-CM: E78.2 ICD-9-CM: 272.2 Type 2 diabetes mellitus with nephropathy (HCC)     ICD-10-CM: E11.21 
ICD-9-CM: 250.40, 583.81 Cervical radiculopathy     ICD-10-CM: M54.12 
ICD-9-CM: 723.4 Vitals BP Pulse Temp Resp Height(growth percentile) Weight(growth percentile) 136/77 (BP 1 Location: Left arm, BP Patient Position: Sitting) 67 98.2 °F (36.8 °C) (Oral) 18 4' 11\" (1.499 m) 162 lb (73.5 kg) SpO2 BMI OB Status Smoking Status 97% 32.72 kg/m2 Hysterectomy Never Smoker Vitals History BMI and BSA Data Body Mass Index Body Surface Area 32.72 kg/m 2 1.75 m 2 Preferred Pharmacy Pharmacy Name Phone [facility] [phone] Your Updated Medication List  
  
   
This list is accurate as of 10/10/18  9:46 AM.  Always use your most recent med list.  
  
  
  
  
 ALPRAZolam 0.5 mg tablet Commonly known as:  XANAX  
TAKE ONE TABLET BY MOUTH AT BEDTIME AS NEEDED ANXIETY  
  
 artificial tears(hypromellose) 0.3 % Gel ophthalmic ointment Commonly known as:  SYSTANE GEL Administer 10 g to both eyes as needed. aspirin 81 mg chewable tablet Take 81 mg by mouth daily. atorvastatin 10 mg tablet Commonly known as:  LIPITOR Take 1 Tab by mouth daily. CALCIUM 600 + D 600-125 mg-unit Tab Generic drug:  calcium-cholecalciferol (d3) Take 1 Tab by mouth daily. cetirizine 10 mg tablet Commonly known as:  ZYRTEC  
TAKE ONE TABLET BY MOUTH NIGHTLY AS NEEDED FOR ALLERGIES  
  
 cloNIDine HCl 0.3 mg tablet Commonly known as:  CATAPRES Take 1 Tab by mouth three (3) times daily. cod liver oil Cap Take 1 Cap by mouth daily. dilTIAZem  mg ER capsule Commonly known as:  CARDIZEM CD Take 1 Cap by mouth daily. famotidine 20 mg tablet Commonly known as:  PEPCID  
TAKE ONE TABLET BY MOUTH NIGHTLY  
  
 fluticasone 50 mcg/actuation nasal spray Commonly known as:  Gaudencio Diver 2 Sprays by Both Nostrils route daily. furosemide 40 mg tablet Commonly known as:  LASIX Take 1 Tab by mouth daily. glipiZIDE 5 mg tablet Commonly known as:  GLUCOTROL  
TAKE ONE TABLET BY MOUTH 2 TIMES A DAY  
  
 latanoprost 0.005 % ophthalmic solution Commonly known as:  XALATAN  
  
 losartan 100 mg tablet Commonly known as:  COZAAR Take 1 Tab by mouth daily. meloxicam 7.5 mg tablet Commonly known as:  MOBIC Take 1 Tab by mouth daily. (discontinue voltaren gel)  
  
 metFORMIN  mg tablet Commonly known as:  GLUCOPHAGE XR Take 2 Tabs by mouth daily (with dinner). metoprolol tartrate 100 mg IR tablet Commonly known as:  LOPRESSOR Take 1.5 tab Po BID  
  
 nystatin powder Commonly known as:  MYCOSTATIN Apply  to affected area two (2) times a day. For 2 weeks  
  
 omeprazole 40 mg capsule Commonly known as:  PRILOSEC  
TAKE ONE CAPSULE BY MOUTH EVERY DAY BEFORE BREAKFAST potassium chloride 10 mEq tablet Commonly known as:  KLOR-CON Take 1 Tab by mouth daily. TIMOPTIC-XE 0.5 % ophthalmic gel-forming Generic drug:  timolol TYLENOL 325 mg tablet Generic drug:  acetaminophen Take  by mouth every four (4) hours as needed for Pain. VITAMIN D3 1,000 unit Cap Generic drug:  cholecalciferol Take 1,000 Units by mouth daily. Prescriptions Sent to Pharmacy Refills furosemide (LASIX) 40 mg tablet 1 Sig: Take 1 Tab by mouth daily. Class: Normal  
 [facility] Ph #: [phone] Route: Oral  
  
We Performed the Following ADMIN INFLUENZA VIRUS VAC [ HCPCS] HEMOGLOBIN A1C WITH EAG [96786 CPT(R)] INFLUENZA VACCINE INACTIVATED (IIV), SUBUNIT, ADJUVANTED, IM [id number] CPT(R)] METABOLIC PANEL, COMPREHENSIVE [55682 CPT(R)] MICROALBUMIN, UR, RAND W/ MICROALB/CREAT RATIO [id number] CPT(R)] REFERRAL TO CARDIOLOGY [BPO54 Custom] REFERRAL TO PHYSICAL THERAPY [DWJ80 Custom] Referral Information Referral ID Referred By Referred To  
  
 [id number] Vane AGUILAR MD   
   [address] [place] [address] [address] [place] [address] Phone: [phone] Fax: [phone] Visits Status Start Date End Date 1 New Request 10/10/18 10/10/19 If your referral has a status of pending review or denied, additional information will be sent to support the outcome of this decision. Referral ID Referred By Referred To  
 [id number] Ana María AGUILAR Not Available Visits Status Start Date End Date 1 New Request 10/10/18 10/10/19 If your referral has a status of pending review or denied, additional information will be sent to support the outcome of this decision. Patient Instructions Vaccine Information Statement Influenza (Flu) Vaccine (Inactivated or Recombinant): What you need to know Many Vaccine Information Statements are available in [place]ian and other languages. See www.immunize.org/vis Hojas de Información Sobre Vacunas están disponibles en Español y en muchos otros idiomas. Visite www.immunize.org/vis 1. Why get vaccinated? Influenza (flu) is a contagious disease that spreads around the [place] every year, usually between October and May.   
 
Flu is caused by influenza viruses, and is spread mainly by coughing, sneezing, and close contact. Anyone can get flu. Flu strikes suddenly and can last several days. Symptoms vary by age, but can include: 
 fever/chills  sore throat  muscle aches  fatigue  cough  headache  runny or stuffy nose Flu can also lead to pneumonia and blood infections, and cause diarrhea and seizures in children. If you have a medical condition, such as heart or lung disease, flu can make it worse. Flu is more dangerous for some people. Infants and young children, people 72years of age and older, pregnant women, and people with certain health conditions or a weakened immune system are at greatest risk. Each year thousands of people in the [facility] die from flu, and many more are hospitalized. Flu vaccine can: 
 keep you from getting flu, 
 make flu less severe if you do get it, and 
 keep you from spreading flu to your family and other people. 2. Inactivated and recombinant flu vaccines A dose of flu vaccine is recommended every flu season. Children 6 months through 6years of age may need two doses during the same flu season. Everyone else needs only one dose each flu season. Some inactivated flu vaccines contain a very small amount of a mercury-based preservative called thimerosal. Studies have not shown thimerosal in vaccines to be harmful, but flu vaccines that do not contain thimerosal are available. There is no live flu virus in flu shots. They cannot cause the flu. There are many flu viruses, and they are always changing. Each year a new flu vaccine is made to protect against three or four viruses that are likely to cause disease in the upcoming flu season. But even when the vaccine doesnt exactly match these viruses, it may still provide some protection Flu vaccine cannot prevent: 
 flu that is caused by a virus not covered by the vaccine, or 
 illnesses that look like flu but are not. It takes about 2 weeks for protection to develop after vaccination, and protection lasts through the flu season. 3. Some people should not get this vaccine Tell the person who is giving you the vaccine:  If you have any severe, life-threatening allergies. If you ever had a life-threatening allergic reaction after a dose of flu vaccine, or have a severe allergy to any part of this vaccine, you may be advised not to get vaccinated. Most, but not all, types of flu vaccine contain a small amount of egg protein.  If you ever had Guillain-Barré Syndrome (also called GBS). Some people with a history of GBS should not get this vaccine. This should be discussed with your doctor.  If you are not feeling well. It is usually okay to get flu vaccine when you have a mild illness, but you might be asked to come back when you feel better. 4. Risks of a vaccine reaction With any medicine, including vaccines, there is a chance of reactions. These are usually mild and go away on their own, but serious reactions are also possible. Most people who get a flu shot do not have any problems with it. Minor problems following a flu shot include:  
 soreness, redness, or swelling where the shot was given  hoarseness  sore, red or itchy eyes  cough  fever  aches  headache  itching  fatigue If these problems occur, they usually begin soon after the shot and last 1 or 2 days. More serious problems following a flu shot can include the following:  There may be a small increased risk of Guillain-Barré Syndrome (GBS) after inactivated flu vaccine. This risk has been estimated at 1 or 2 additional cases per million people vaccinated. This is much lower than the risk of severe complications from flu, which can be prevented by flu vaccine.    
 
 Young children who get the flu shot along with pneumococcal vaccine (PCV13) and/or DTaP vaccine at the same time might be slightly more likely to have a seizure caused by fever. Ask your doctor for more information. Tell your doctor if a child who is getting flu vaccine has ever had a seizure. Problems that could happen after any injected vaccine:  People sometimes faint after a medical procedure, including vaccination. Sitting or lying down for about 15 minutes can help prevent fainting, and injuries caused by a fall. Tell your doctor if you feel dizzy, or have vision changes or ringing in the ears.  Some people get severe pain in the shoulder and have difficulty moving the arm where a shot was given. This happens very rarely.  Any medication can cause a severe allergic reaction. Such reactions from a vaccine are very rare, estimated at about 1 in a million doses, and would happen within a few minutes to a few hours after the vaccination. As with any medicine, there is a very remote chance of a vaccine causing a serious injury or death. The safety of vaccines is always being monitored. For more information, visit: www.cdc.gov/vaccinesafety/ 
 
 
The [facility] Vaccine Injury Compensation Program (VICP) is a federal program that was created to compensate people who may have been injured by certain vaccines. Persons who believe they may have been injured by a vaccine can learn about the program and about filing a claim by calling 7-670.388.1182 or visiting the [employer] website at [email or web address].gov/vaccinecompensation. There is a time limit to file a claim for compensation. 7. How can I learn more?  Ask your healthcare provider. He or she can give you the vaccine package insert or suggest other sources of information.  Call your local or state health department.  Contact the Centers for Disease Control and Prevention (CDC): 
- Call 1-360.653.7923 (3-519-KLE-INFO) or 
- Visit CDCs website at www.cdc.gov/flu Vaccine Information Statement Inactivated Influenza Vaccine 8/7/2015 
[address] Brandy Brice [id number] [facility] and [place] Centers for Disease Control and Prevention Office Use Only Neck Arthritis: Exercises Your Care Instructions Here are some examples of typical rehabilitation exercises for your condition. Start each exercise slowly. Ease off the exercise if you start to have pain. Your doctor or physical therapist will tell you when you can start these exercises and which ones will work best for you. How to do the exercises Neck stretches to the side 1. This stretch works best if you keep your shoulder down as you lean away from it. To help you remember to do this, start by relaxing your shoulders and lightly holding on to your thighs or your chair. 2. Tilt your head toward your shoulder and hold for 15 to 30 seconds. Let the weight of your head stretch your muscles. 3. Repeat 2 to 4 times toward each shoulder. Chin tuck 1. Lie on the floor with a rolled-up towel under your neck.  Your head should be touching the floor. 2. Slowly bring your chin toward your chest. 
3. Hold for a count of 6, and then relax for up to 10 seconds. 4. Repeat 8 to 12 times. Active cervical rotation 1. Sit in a firm chair, or stand up straight. 2. Keeping your chin level, turn your head to the right, and hold for 15 to 30 seconds. 3. Turn your head to the left and hold for 15 to 30 seconds. 4. Repeat 2 to 4 times to each side. Shoulder blade squeeze 1. While standing, squeeze your shoulder blades together. 2. Do not raise your shoulders up as you are squeezing. 3. Hold for 6 seconds. 4. Repeat 8 to 12 times. Shoulder rolls 1. Sit comfortably with your feet shoulder-width apart. You can also do this exercise standing up. 2. Roll your shoulders up, then back, and then down in a smooth, circular motion. 3. Repeat 2 to 4 times. Follow-up care is a key part of your treatment and safety. Be sure to make and go to all appointments, and call your doctor if you are having problems. It's also a good idea to know your test results and keep a list of the medicines you take. Where can you learn more? Go to http://stacey-marie.info/. Enter X825 in the search box to learn more about \"Neck Arthritis: Exercises. \" Current as of: November 29, 2017 Content Version: 11.8 © 5697-4588 [employer]. Care instructions adapted under license by [employer] (which disclaims liability or warranty for this information). If you have questions about a medical condition or this instruction, always ask your healthcare professional. [place] [address] any warranty or liability for your use of this information. Introducing [facility] & HEALTH SERVICES! Dear Joanne Biswas: Thank you for requesting a [employer] account. Our records indicate that you have previously registered for a [employer] account but its currently inactive. Please call our [employer] support line at [phone]. Additional Information If you have questions, please visit the Frequently Asked Questions section of the [employer] website at [email or web address] Remember, [employer] is NOT to be used for urgent needs. For medical emergencies, dial 911. Now available from your iPhone and Android! Please provide this summary of care documentation to your next provider. Your primary care clinician is listed as Ginger Blackwood. If you have any questions after today's visit, please call [phone].

## 2018-10-10 NOTE — PATIENT INSTRUCTIONS
Vaccine Information Statement Influenza (Flu) Vaccine (Inactivated or Recombinant): What you need to know Many Vaccine Information Statements are available in [place]ian and other languages. See www.immunize.org/vis Hojas de Información Sobre Vacunas están disponibles en Español y en muchos otros idiomas. Visite www.immunize.org/vis 1. Why get vaccinated? Influenza (flu) is a contagious disease that spreads around the [place] every year, usually between October and May. Flu is caused by influenza viruses, and is spread mainly by coughing, sneezing, and close contact. Anyone can get flu. Flu strikes suddenly and can last several days. Symptoms vary by age, but can include: 
 fever/chills  sore throat  muscle aches  fatigue  cough  headache  runny or stuffy nose Flu can also lead to pneumonia and blood infections, and cause diarrhea and seizures in children. If you have a medical condition, such as heart or lung disease, flu can make it worse. Flu is more dangerous for some people. Infants and young children, people 72years of age and older, pregnant women, and people with certain health conditions or a weakened immune system are at greatest risk. Each year thousands of people in the [facility] die from flu, and many more are hospitalized. Flu vaccine can: 
 keep you from getting flu, 
 make flu less severe if you do get it, and 
 keep you from spreading flu to your family and other people. 2. Inactivated and recombinant flu vaccines A dose of flu vaccine is recommended every flu season. Children 6 months through 6years of age may need two doses during the same flu season. Everyone else needs only one dose each flu season.   
 
 
Some inactivated flu vaccines contain a very small amount of a mercury-based preservative called thimerosal. Studies have not shown thimerosal in vaccines to be harmful, but flu vaccines that do not contain thimerosal are available. There is no live flu virus in flu shots. They cannot cause the flu. There are many flu viruses, and they are always changing. Each year a new flu vaccine is made to protect against three or four viruses that are likely to cause disease in the upcoming flu season. But even when the vaccine doesnt exactly match these viruses, it may still provide some protection Flu vaccine cannot prevent: 
 flu that is caused by a virus not covered by the vaccine, or 
 illnesses that look like flu but are not. It takes about 2 weeks for protection to develop after vaccination, and protection lasts through the flu season. 3. Some people should not get this vaccine Tell the person who is giving you the vaccine:  If you have any severe, life-threatening allergies. If you ever had a life-threatening allergic reaction after a dose of flu vaccine, or have a severe allergy to any part of this vaccine, you may be advised not to get vaccinated. Most, but not all, types of flu vaccine contain a small amount of egg protein.  If you ever had Guillain-Barré Syndrome (also called GBS). Some people with a history of GBS should not get this vaccine. This should be discussed with your doctor.  If you are not feeling well. It is usually okay to get flu vaccine when you have a mild illness, but you might be asked to come back when you feel better. 4. Risks of a vaccine reaction With any medicine, including vaccines, there is a chance of reactions. These are usually mild and go away on their own, but serious reactions are also possible. Most people who get a flu shot do not have any problems with it. Minor problems following a flu shot include:  
 soreness, redness, or swelling where the shot was given  hoarseness  sore, red or itchy eyes  cough  fever  aches  headache  itching  fatigue If these problems occur, they usually begin soon after the shot and last 1 or 2 days. More serious problems following a flu shot can include the following:  There may be a small increased risk of Guillain-Barré Syndrome (GBS) after inactivated flu vaccine. This risk has been estimated at 1 or 2 additional cases per million people vaccinated. This is much lower than the risk of severe complications from flu, which can be prevented by flu vaccine.  Young children who get the flu shot along with pneumococcal vaccine (PCV13) and/or DTaP vaccine at the same time might be slightly more likely to have a seizure caused by fever. Ask your doctor for more information. Tell your doctor if a child who is getting flu vaccine has ever had a seizure. Problems that could happen after any injected vaccine:  People sometimes faint after a medical procedure, including vaccination. Sitting or lying down for about 15 minutes can help prevent fainting, and injuries caused by a fall. Tell your doctor if you feel dizzy, or have vision changes or ringing in the ears.  Some people get severe pain in the shoulder and have difficulty moving the arm where a shot was given. This happens very rarely.  Any medication can cause a severe allergic reaction. Such reactions from a vaccine are very rare, estimated at about 1 in a million doses, and would happen within a few minutes to a few hours after the vaccination. As with any medicine, there is a very remote chance of a vaccine causing a serious injury or death. The safety of vaccines is always being monitored. For more information, visit: www.cdc.gov/vaccinesafety/ 
 
5. What if there is a serious reaction? What should I look for?  Look for anything that concerns you, such as signs of a severe allergic reaction, very high fever, or unusual behavior.  
 
Signs of a severe allergic reaction can include hives, swelling of the face and throat, difficulty breathing, a fast heartbeat, dizziness, and weakness  usually within a few minutes to a few hours after the vaccination. What should I do?  If you think it is a severe allergic reaction or other emergency that cant wait, call 9-1-1 and get the person to the nearest hospital. Otherwise, call your doctor.  Reactions should be reported to the Vaccine Adverse Event Reporting System (VAERS). Your doctor should file this report, or you can do it yourself through  the VAERS web site at www.vaers. [facility].gov, or by calling 6-327.574.9729. VAERS does not give medical advice. 6. The National Vaccine Injury Compensation Program 
 
The [facility] Vaccine Injury Compensation Program (VICP) is a federal program that was created to compensate people who may have been injured by certain vaccines. Persons who believe they may have been injured by a vaccine can learn about the program and about filing a claim by calling 0-731.959.3115 or visiting the [address] [employer] website at www.[facility].gov/vaccinecompensation. There is a time limit to file a claim for compensation. 7. How can I learn more?  Ask your healthcare provider. He or she can give you the vaccine package insert or suggest other sources of information.  Call your local or state health department.  Contact the Centers for Disease Control and Prevention (CDC): 
- Call 8-449.737.4375 (1-800-CDC-INFO) or 
- Visit CDCs website at www.cdc.gov/flu Vaccine Information Statement Inactivated Influenza Vaccine 8/7/2015 
42 UBrandy Miller Novel 374RH-97 Department of [facility] and [employer] Centers for Disease Control and Prevention Office Use Only Neck Arthritis: Exercises Your Care Instructions Here are some examples of typical rehabilitation exercises for your condition. Start each exercise slowly. Ease off the exercise if you start to have pain.  
Your doctor or physical therapist will tell you when you can start these exercises and which ones will work best for you. How to do the exercises Neck stretches to the side 1. This stretch works best if you keep your shoulder down as you lean away from it. To help you remember to do this, start by relaxing your shoulders and lightly holding on to your thighs or your chair. 2. Tilt your head toward your shoulder and hold for 15 to 30 seconds. Let the weight of your head stretch your muscles. 3. Repeat 2 to 4 times toward each shoulder. Chin tuck 1. Lie on the floor with a rolled-up towel under your neck. Your head should be touching the floor. 2. Slowly bring your chin toward your chest. 
3. Hold for a count of 6, and then relax for up to 10 seconds. 4. Repeat 8 to 12 times. Active cervical rotation 1. Sit in a firm chair, or stand up straight. 2. Keeping your chin level, turn your head to the right, and hold for 15 to 30 seconds. 3. Turn your head to the left and hold for 15 to 30 seconds. 4. Repeat 2 to 4 times to each side. Shoulder blade squeeze 1. While standing, squeeze your shoulder blades together. 2. Do not raise your shoulders up as you are squeezing. 3. Hold for 6 seconds. 4. Repeat 8 to 12 times. Shoulder rolls 1. Sit comfortably with your feet shoulder-width apart. You can also do this exercise standing up. 2. Roll your shoulders up, then back, and then down in a smooth, circular motion. 3. Repeat 2 to 4 times. Follow-up care is a key part of your treatment and safety. Be sure to make and go to all appointments, and call your doctor if you are having problems. It's also a good idea to know your test results and keep a list of the medicines you take. Where can you learn more? Go to http://stacey-marie.info/. Enter D128 in the search box to learn more about \"Neck Arthritis: Exercises. \" Current as of: November 29, 2017 Content Version: 11.8 © 6045-1801 Healthwise, [employer].  Care instructions adapted under license by [address] (which disclaims liability or warranty for this information). If you have questions about a medical condition or this instruction, always ask your healthcare professional. [address] any warranty or liability for your use of this information.

## 2018-10-11 LAB
ALBUMIN SERPL-MCNC: 4.1 G/DL (ref 3.5–4.7)
ALBUMIN/CREAT UR: 19.3 MG/G CREAT (ref 0–30)
ALBUMIN/GLOB SERPL: 1.3 {RATIO} (ref 1.2–2.2)
ALP SERPL-CCNC: 89 IU/L (ref 39–117)
ALT SERPL-CCNC: 16 IU/L (ref 0–32)
AST SERPL-CCNC: 22 IU/L (ref 0–40)
BILIRUB SERPL-MCNC: 0.4 MG/DL (ref 0–1.2)
BUN SERPL-MCNC: 9 MG/DL (ref 8–27)
BUN/CREAT SERPL: 12 (ref 12–28)
CALCIUM SERPL-MCNC: 9 MG/DL (ref 8.7–10.3)
CHLORIDE SERPL-SCNC: 102 MMOL/L (ref 96–106)
CO2 SERPL-SCNC: 27 MMOL/L (ref 20–29)
CREAT SERPL-MCNC: 0.78 MG/DL (ref 0.57–1)
CREAT UR-MCNC: 77.6 MG/DL
EST. AVERAGE GLUCOSE BLD GHB EST-MCNC: 166 MG/DL
GLOBULIN SER CALC-MCNC: 3.1 G/DL (ref 1.5–4.5)
GLUCOSE SERPL-MCNC: 138 MG/DL (ref 65–99)
HBA1C MFR BLD: 7.4 % (ref 4.8–5.6)
MICROALBUMIN UR-MCNC: 15 UG/ML
POTASSIUM SERPL-SCNC: 4.1 MMOL/L (ref 3.5–5.2)
PROT SERPL-MCNC: 7.2 G/DL (ref 6–8.5)
SODIUM SERPL-SCNC: 142 MMOL/L (ref 134–144)

## 2018-10-17 ENCOUNTER — OFFICE VISIT (OUTPATIENT)
Dept: CARDIOLOGY CLINIC | Age: 83
End: 2018-10-17

## 2018-10-17 VITALS
BODY MASS INDEX: 32.54 KG/M2 | HEIGHT: 59 IN | DIASTOLIC BLOOD PRESSURE: 80 MMHG | RESPIRATION RATE: 16 BRPM | HEART RATE: 64 BPM | SYSTOLIC BLOOD PRESSURE: 130 MMHG | WEIGHT: 161.4 LBS

## 2018-10-17 DIAGNOSIS — I49.3 PVC'S (PREMATURE VENTRICULAR CONTRACTIONS): ICD-10-CM

## 2018-10-17 DIAGNOSIS — E78.00 HYPERCHOLESTEROLEMIA: ICD-10-CM

## 2018-10-17 DIAGNOSIS — R60.0 BILATERAL LOWER EXTREMITY EDEMA: Primary | ICD-10-CM

## 2018-10-17 RX ORDER — OMEPRAZOLE 40 MG/1
CAPSULE, DELAYED RELEASE ORAL
Qty: 30 CAP | Refills: 0 | Status: SHIPPED | OUTPATIENT
Start: 2018-10-17 | End: 2018-12-26 | Stop reason: ALTCHOICE

## 2018-10-17 NOTE — PROGRESS NOTES
[facility] [place]: Crystal Kowalski  [phone]    HPI:  Ms. Eyad Betts is a 79 yo F with a h/o HTN (previously on nadolol), hyperlipidemia, DM2 seen in the past for palpitations. Found on holter to have benign ectopy and started on beta blocker for symptoms. Previously off higher dose HCTZ due to low Na+. BP has been resistant; BP had improved on norvasc but had worsened LE edema. U/S 11/2012 for DVT was negative. Echo 8/2012 noted normal LV systolic function with mild to moderate MR; echo 2/17/14 unchanged. Seen by vascular in past for LE edema and c/w vascular insufficiency and recommended leg elevation and compression stockings. Echo 4/2015 was normal with EF 60% and mild MR. She is here noting that over the last month, she has had more lower extremity edema. She saw her primary care physician, who had her take an extra Lasix, but she says she has only taken it two times because the other times she forgets to take it. She does think the Lasix did help some. She attributes the edema to sitting around more, that she plays Bingo four days a week, as well as watching a lot of Gunsmoke on television. She also notes this past month she has been eating more white bread. She denies any chest pain and her breathing has been stable. She is compensated on exam with clear lungs. She does have trace lower extremity edema bilaterally. Blood pressure is 130/80 and it has been normal at home. Her heart rate is 64. Assessment and Plan:   1. Lower extremity edema. Will obtain an echocardiogram for further evaluation. She also has mitral regurgitation that had been mild in the past.  If this is okay, could be venous insufficiency. Will have her follow back in six months. 2. Resistant hypertension. Blood pressure has been controlled. She does have a history of white coat hypertension. 3. Type 2 diabetes.        .She  has a past medical history of Adverse effect of anesthesia, Chronic bronchitis, Diabetes ([address] Essential hypertension, Hypercholesterolemia, and Hypertension. All other systems negative except as above. PE  Vitals:    10/17/18 1339   BP: 130/80   Pulse: 64   Resp: 16   Weight: 161 lb 6.4 oz (73.2 kg)   Height: 4' 11\" (1.499 m)    Body mass index is 32.6 kg/m². General appearance - alert, well appearing, and in no distress  Mental status - affect appropriate to mood, pleasant   Neck - supple, no JVD, no bruits   Chest - clear to auscultation, no wheezes, rales or rhonchi  Heart - normal rate, regular rhythm, normal S1, S2, I-II/VI systolic murmur LUSB, rubs, clicks or gallops  Abdomen - soft, nontender, nondistended  Extremities - peripheral pulses normal, trace pedal edema bilateral lower extremities.      Recent Labs:  Lab Results   Component Value Date/Time    Cholesterol, total 149 01/25/2018 07:57 AM    HDL Cholesterol 42 01/25/2018 07:57 AM    LDL, calculated 71 01/25/2018 07:57 AM    Triglyceride 182 (H) 01/25/2018 07:57 AM    CHOL/HDL Ratio 3.3 08/21/2012 04:10 AM     Lab Results   Component Value Date/Time    Creatinine 0.78 10/10/2018 10:25 AM     Lab Results   Component Value Date/Time    BUN 9 10/10/2018 10:25 AM     Lab Results   Component Value Date/Time    Potassium 4.1 10/10/2018 10:25 AM     Lab Results   Component Value Date/Time    Hemoglobin A1c 7.4 (H) 10/10/2018 10:25 AM     Lab Results   Component Value Date/Time    HGB 12.1 07/17/2018 12:00 AM     Lab Results   Component Value Date/Time    PLATELET 013 06/58/0820 12:00 AM       Reviewed:  Past Medical History:   Diagnosis Date    Adverse effect of anesthesia     pt states she was able to feel everything during colonoscopy    Chronic bronchitis     Diabetes ([address]     Essential hypertension     Hypercholesterolemia     Hypertension      Social History     Tobacco Use   Smoking Status Never Smoker   Smokeless Tobacco Never Used     Social History     Substance and Sexual Activity   Alcohol Use No    Alcohol/week: 0.0 oz     Allergies   Allergen Reactions    Cortisone Other (comments)     \"Make me feels weak, like I'm going to die\"      Januvia [Sitagliptin] Other (comments)     weakness       Current Outpatient Medications   Medication Sig    omeprazole (PRILOSEC) 40 mg capsule TAKE ONE CAPSULE BY MOUTH EVERY DAY BEFORE BREAKFAST    furosemide (LASIX) 40 mg tablet Take 1 Tab by mouth daily.  famotidine (PEPCID) 20 mg tablet TAKE ONE TABLET BY MOUTH NIGHTLY    cetirizine (ZYRTEC) 10 mg tablet TAKE ONE TABLET BY MOUTH NIGHTLY AS NEEDED FOR ALLERGIES    cloNIDine HCl (CATAPRES) 0.3 mg tablet Take 1 Tab by mouth three (3) times daily.  metFORMIN ER (GLUCOPHAGE XR) 500 mg tablet Take 2 Tabs by mouth daily (with dinner).  potassium chloride (KLOR-CON) 10 mEq tablet Take 1 Tab by mouth daily.  losartan (COZAAR) 100 mg tablet Take 1 Tab by mouth daily.  ALPRAZolam (XANAX) 0.5 mg tablet TAKE ONE TABLET BY MOUTH AT BEDTIME AS NEEDED ANXIETY    dilTIAZem CD (CARDIZEM CD) 180 mg ER capsule Take 1 Cap by mouth daily.  meloxicam (MOBIC) 7.5 mg tablet Take 1 Tab by mouth daily. (discontinue voltaren gel) (Patient not taking: Reported on 10/10/2018)    glipiZIDE (GLUCOTROL) 5 mg tablet TAKE ONE TABLET BY MOUTH 2 TIMES A DAY    metoprolol tartrate (LOPRESSOR) 100 mg IR tablet Take 1.5 tab Po BID    atorvastatin (LIPITOR) 10 mg tablet Take 1 Tab by mouth daily.  artificial tears,hypromellose, (SYSTANE GEL) 0.3 % gel ophthalmic ointment Administer 10 g to both eyes as needed.  cod liver oil cap Take 1 Cap by mouth daily.  calcium-cholecalciferol, d3, (CALCIUM 600 + D) 600-125 mg-unit tab Take 1 Tab by mouth daily.  nystatin (MYCOSTATIN) powder Apply  to affected area two (2) times a day. For 2 weeks (Patient not taking: Reported on 10/10/2018)    fluticasone (FLONASE) 50 mcg/actuation nasal spray 2 Sprays by Both Nostrils route daily.     latanoprost (XALATAN) 0.005 % ophthalmic solution  TIMOPTIC-XE 0.5 % ophthalmic gel-forming     acetaminophen (TYLENOL) 325 mg tablet Take  by mouth every four (4) hours as needed for Pain.  Cholecalciferol, Vitamin D3, (VITAMIN D3) 1,000 unit cap Take 1,000 Units by mouth daily.  aspirin 81 mg chewable tablet Take 81 mg by mouth daily. No current facility-administered medications for this visit.         Preethi Aguilera MD  [facility] heart and Vascular [place]  [address], [address] [facility] [address]  [address]

## 2018-10-17 NOTE — PROGRESS NOTES
Labs stable. Hgb A1c is 7.4. Encourage patient to eat a healther Mediterranean style diet with 55% or less of calories from carbohydrates. Try to eat more vegetables, whole fruit, nuts, whole grains, yogurt and less refined grains. Eat less red meat. Chicken and fish would be good protein sources.  Aim to eat less than 45 grams of carbohydrates per meal.

## 2018-10-20 NOTE — PROGRESS NOTES
Called the pt and per NP, PIOTR Corbett advised her Labs stable. Hgb A1c is 7.4.  Encourage patient to eat a healther Mediterranean style diet with 55% or less of calories from carbohydrates. Try to eat more vegetables, whole fruit, nuts, whole grains, yogurt and less refined grains. Eat less red meat. Chicken and fish would be good protein sources. Aim to eat less than 45 grams of carbohydrates per meal. The pt voiced thanks and understanding.

## 2018-10-23 ENCOUNTER — TELEPHONE (OUTPATIENT)
Dept: INTERNAL MEDICINE CLINIC | Age: 83
End: 2018-10-23

## 2018-10-24 ENCOUNTER — CLINICAL SUPPORT (OUTPATIENT)
Dept: CARDIOLOGY CLINIC | Age: 83
End: 2018-10-24

## 2018-10-24 ENCOUNTER — HOSPITAL ENCOUNTER (OUTPATIENT)
Dept: PHYSICAL THERAPY | Age: 83
End: 2018-10-24

## 2018-10-24 ENCOUNTER — TELEPHONE (OUTPATIENT)
Dept: CARDIOLOGY CLINIC | Age: 83
End: 2018-10-24

## 2018-10-24 DIAGNOSIS — I49.3 PVC'S (PREMATURE VENTRICULAR CONTRACTIONS): ICD-10-CM

## 2018-10-24 DIAGNOSIS — E78.00 HYPERCHOLESTEROLEMIA: ICD-10-CM

## 2018-10-24 DIAGNOSIS — R60.0 BILATERAL LOWER EXTREMITY EDEMA: ICD-10-CM

## 2018-10-24 NOTE — TELEPHONE ENCOUNTER
----- Message from Wero Emmanuel MD sent at 10/24/2018 11:57 AM EDT -----  Please let pt know echo was overall normal. thx  ----- Message -----  From: Yon, Card Result In  Sent: 10/24/2018  11:57 AM  To: Wero Emmanuel MD      LVM for patient to return call at earliest convenience. LVM for patient to return call at earliest convenience.

## 2018-10-31 ENCOUNTER — HOSPITAL ENCOUNTER (OUTPATIENT)
Dept: PHYSICAL THERAPY | Age: 83
Discharge: HOME | End: 2018-10-31
Payer: MEDICARE

## 2018-10-31 PROCEDURE — 97161 PT EVAL LOW COMPLEX 20 MIN: CPT | Performed by: PHYSICAL THERAPIST

## 2018-10-31 PROCEDURE — G8985 CARRY GOAL STATUS: HCPCS | Performed by: PHYSICAL THERAPIST

## 2018-10-31 PROCEDURE — 97110 THERAPEUTIC EXERCISES: CPT | Performed by: PHYSICAL THERAPIST

## 2018-10-31 PROCEDURE — G8984 CARRY CURRENT STATUS: HCPCS | Performed by: PHYSICAL THERAPIST

## 2018-10-31 NOTE — PROGRESS NOTES
[employer] [facility] [address], [place], [address] Phone: [phone]  Fax: [phone] PT INITIAL EVALUATION NOTE - [facility] 2-15 Patient Name: Dev Bennett Date:10/31/2018 : 1934 [x]  Patient  Verified Payor: [place] [employer] / Plan: [address] / Product Type: Medicare / In time: 1035 AM  Out time: 11:25 AM 
Total Treatment Time (min): 50 Total Timed Codes (min): 15 
1:1 Treatment Time (MC only): 50 Visit #: 1 Treatment Area: Neck pain [M54.2] Subjective: Any medication changes, allergies to medications, adverse drug reactions, diagnosis change, or new procedure performed?: [] No    [x] Yes (see summary Date of onset/injury and chief compliant:  
Pt presents to PT with referral for cervical radiculopathy. She states \"tingling\" in R hand-- when sleeping on R side or when lifting heavy objects. She states inc'd R sided neck pain when sitting/looking down for prolonged periods- \"when I play bingo\". She sleeps with 3 pillows under her head. She had x-ray from MD-- \"I don't know the results, I guess if it were something bad I would've heard from her\". Pain:   Currently, no pain. Aggravated by:  See above Eased by: moving arm/hand Location and description of symptoms:  R sided neck pain Diagnostic Tests: [] Lab work [x] X-rays    [] CT [] MRI     [] Other: 
Results (per report of the patient): :not sure Per CC: Cervical degenerative spondylosis. Social/Recreation/Work: Not working. She  
 
Prior level of function: no history of tingling/arm pain 3 months ago Patient goal(s): \"to have things get better\" PMH: Significant for hysterectomy, gall bladder removal 
 
Headaches: Do you have headaches? [] Yes   [x] No 
 
Objective:   
 
Observation/posture:  
Pt demonstrates poor sitting and standing posture-- ant tilt scap bilat, protracted shldrs, forward head posture Active Movements: AROM Degrees Comments:pain, area Forward flexion 22 \"I can feel it\"- pt points to R lower cervical region Extension 45 No change in symptoms Rotation right 51 P! R  
Rotation left 54 No change in symptoms SB right 22 No change in symptoms SB left 19 \"stretch\" R UT region UE AROM: WNL bilat AROM shoulder flex, abd-- no onset of symptoms Strength (Upper Extremities): 
  R (0-5) L (0-5) Shoulder Elevation (C2-4) 5 5 Shoulder Abduction (C5) 4 4 Elbow Flexion (C6)  4 4 Elbow Extension (C7) 4 4 Wrist Flexion (C7) 4 4 Wrist Extension (C6) 4 4 Thumb Extension (C8) 4 4 Finger Abduction/Inter. (T1) 4 4 No onset of symptoms during strength testing Neurosensory: 
Sensory examination reveals intact. No current symptoms Palpation: 
Significant muscle banding/tightness bilat UT/LS Mod TTP R>L UT/LS, suboccipitals, cervical paraspinals Special Tests:  Cervical:  
     Spurling's:   [] R    [] L    [] +    [x] - Distraction:   [] R    [] L    [] +    [x] - (not able to accurately assess due to no current symptoms) Compression:  [] R    [] L    [] +    [x] - Outcome Measure: Patient presents with a FOTO Score of  58/100.   
 
15 min Therapeutic Exercise:  [x] See flow sheet : supine chin tuck, LS stretch, UT stretch, scap pinches Rationale: increase ROM and increase strength to improve the patients ability to sleep, lift objects with dec'd symptoms With 
 [x] TE 
 [] TA 
 [] neuro 
 [] other: Patient Education: [x] Review HEP [] Progressed/Changed HEP based on:  
[] positioning   [] body mechanics   [] transfers   [] heat/ice application   
[x] other: tiffanie/phys; PT POC; importance of performing HEP in order to maximize benefit of PT; reviewed proper posture and importance of postural awareness, hermelindo while sitting for prolonged periods; encouraged pt to stand every 45-60 min to reinforce posture; encouraged pt to sleep on 2 pillows vs 3 and to eventually transition to 1; also advised on sleeping supine vs R sidelying to avoid symptoms Pain Level (0-10 scale) post treatment: 0/10 Assessment: 
[x] See POC [] Other: 
 
Plan:  
[x] See POC Kely Zuleta, PT DPT     10/31/2018  10:32 AM

## 2018-10-31 NOTE — PROGRESS NOTES
[facility] [address] [place], [address] Phone: [phone]  Fax: [phone] Plan of Care/Statement of Necessity for Physical Therapy Services  2-15 Patient name: Srinath Angel  : 1934  Provider#: [id number] Referral source: Sienna Banuelos MD     
Medical/Treatment Diagnosis: Neck pain [M54.2] Prior Hospitalization: see medical history Comorbidities: see evaluation Prior Level of Function: see evaluation Medications: Verified on Patient Summary List 
Start of Care: 10/31/18      Onset Date: 3 months ago The Plan of Care and following information is based on the information from the initial evaluation. Assessment/ key information: Pt is an 80year old female presenting with signs and symptoms consistent with referring diagnosis of R cervical radiculopathy. She will benefit from PT to address problem list below Evaluation Complexity History MEDIUM  Complexity : 1-2 comorbidities / personal factors will impact the outcome/ POC ; Examination LOW Complexity : 1-2 Standardized tests and measures addressing body structure, function, activity limitation and / or participation in recreation  ;Presentation LOW Complexity : Stable, uncomplicated  ;Clinical Decision Making MEDIUM Complexity : FOTO score of 26-74 Overall Complexity Rating: LOW Problem List: decrease ROM, decrease ADL/ functional abilitiies, decrease activity tolerance and decrease flexibility/ joint mobility Treatment Plan may include any combination of the following: Therapeutic exercise, Therapeutic activities, Neuromuscular re-education, Physical agent/modality, Manual therapy, Patient education, Self Care training, Functional mobility training and Home safety training Patient / Family readiness to learn indicated by: asking questions, trying to perform skills and interest 
Persons(s) to be included in education: patient (P) Barriers to Learning/Limitations: None Patient Goal (s): see evaluation Patient Self Reported Health Status: good Rehabilitation Potential: good Short Term Goals: To be accomplished in 2-3 weeks: 
1) Pt will be independent in HEP 
2) Pt will report compliance with sleeping positions/dec'd pillows 3) Pt will report >/=25% decrease in exacerbation of symptoms Long Term Goals: To be accomplished in 6-8 weeks: 
1) Pt will report >/=75% decrease in radicular symptoms 2) Pt will report being able to sleep throughout the night without sx in AM 
3) Pt will be able to lift 10 lb object without onset of symptoms 4) Pt will improve FOTO score to >/=66/100 in order to demonstrate improvement in functional mobility Frequency / Duration: Patient to be seen 1-2 times per week for 6-8 weeks. Patient/ Caregiver education and instruction: self care, activity modification and exercises 
 
[x]  Plan of care has been reviewed with PTA 
 
G-Codes (GP) Carry  Current  CK= 40-59%  Goal  CJ= 20-39% The severity rating is based on clinical judgment and the FOTO Score score. Certification Period: 10/31/18- 1/25/19 Jalil Farley, PT 10/31/2018 10:33 AM 
 
________________________________________________________________________ I certify that the above Therapy Services are being furnished while the patient is under my care. I agree with the treatment plan and certify that this therapy is necessary. [de-identified] Signature:____________________  Date:____________Time: _________

## 2018-11-02 ENCOUNTER — HOSPITAL ENCOUNTER (OUTPATIENT)
Dept: PHYSICAL THERAPY | Age: 83
Discharge: HOME | End: 2018-11-02
Payer: MEDICARE

## 2018-11-02 PROCEDURE — 97110 THERAPEUTIC EXERCISES: CPT | Performed by: PHYSICAL THERAPIST

## 2018-11-02 PROCEDURE — 97140 MANUAL THERAPY 1/> REGIONS: CPT | Performed by: PHYSICAL THERAPIST

## 2018-11-02 NOTE — PROGRESS NOTES
PT DAILY TREATMENT NOTE - [facility] 2-15 Patient Name: Caitie Calderon Date:2018 : 1934 [x]  Patient  Verified Payor: [place] [employer] / Plan: Alden Leyva / Product Type: Medicare / In time:11:00  Out time:11:40 Total Treatment Time (min): 40 Total Timed Codes (min): 40 
1:1 Treatment Time ( only): 40 Visit #: 2 Treatment Area: Neck pain [M54.2] SUBJECTIVE Pain Level (0-10 scale): 0 Any medication changes, allergies to medications, adverse drug reactions, diagnosis change, or new procedure performed?: [x] No    [] Yes (see summary sheet for update) Subjective functional status/changes:   [] No changes reported Patient reports that she has been completing her HEP. She continues to experience R hand paresthesias with sleeping on her R side and writing in her word search book. OBJECTIVE Modality rationale: nt decrease pain to improve the patients ability to sleep Type Additional Details  
[] Estim: []Att   []Unatt    []TENS instruct []IFC  []Premod   []NMES []Other:  []w/US   []w/ice   []w/heat Position: Location:  
[]  Traction: [] Cervical       []Lumbar 
                     [] Prone          []Supine []Intermittent   []Continuous Lbs: 
[] before manual 
[] after manual 
[]w/heat  
[]  Ultrasound: []Continuous   [] Pulsed  
                    at: []1MHz   []3MHz Location: 
W/cm2:  
[] Paraffin Location:  
[]w/heat  
[]  Ice     []  Heat 
[]  Ice massage Position: Location:  
[]  Laser 
[]  Other: Position: Location:  
[]  Vasopneumatic Device Pressure:       [] lo [] med [] hi  
Temperature:   
 
[x] Skin assessment post-treatment:  [x]intact []redness- no adverse reaction 
  []redness  adverse reaction:  
 
30 min Therapeutic Exercise:  [x] See flow sheet :added Rows and B shoulder extension red Theraband Rationale: increase ROM and increase strength to improve the patients ability to sleep and complete word searches 10 min Manual Therapy: Grade 1 L cervical side glides C3-6, general cervical side gliding Rationale: increase ROM and increase tissue extensibility to improve the patients ability to sleep and complete word searches With 
 [x] TE 
 [] TA 
 [] neuro 
 [] other: Patient Education: [x] Review HEP [] Progressed/Changed HEP based on:  
[] positioning   [] body mechanics   [] transfers   [] heat/ice application   
[] other:   
 
Other Objective/Functional Measures: nt  
 
Pain Level (0-10 scale) post treatment:0 
 
ASSESSMENT/Changes in Function:  
Patient tolerated treatment well today. Patient will continue to benefit from skilled PT services to modify and progress therapeutic interventions, address functional mobility deficits, address ROM deficits, address strength deficits, analyze and address soft tissue restrictions, analyze and cue movement patterns, analyze and modify body mechanics/ergonomics and assess and modify postural abnormalities to attain remaining goals. Progress towards goals / Updated goals: 
nt 
 
PLAN 
[]  Upgrade activities as tolerated     [x]  Continue plan of care 
[]  Update interventions per flow sheet      
[]  Discharge due to:_ 
[]  Other:_ Bradley Vargas, PT 11/2/2018  11:41 AM

## 2018-11-05 RX ORDER — ATORVASTATIN CALCIUM 10 MG/1
TABLET, FILM COATED ORAL
Qty: 30 TAB | Refills: 0 | Status: SHIPPED | OUTPATIENT
Start: 2018-11-05 | End: 2019-01-04 | Stop reason: SDUPTHER

## 2018-11-07 ENCOUNTER — HOSPITAL ENCOUNTER (OUTPATIENT)
Dept: PHYSICAL THERAPY | Age: 83
Discharge: HOME | End: 2018-11-07
Payer: MEDICARE

## 2018-11-07 PROCEDURE — 97140 MANUAL THERAPY 1/> REGIONS: CPT | Performed by: PHYSICAL THERAPIST

## 2018-11-07 PROCEDURE — 97110 THERAPEUTIC EXERCISES: CPT | Performed by: PHYSICAL THERAPIST

## 2018-11-07 NOTE — PROGRESS NOTES
PT DAILY TREATMENT NOTE - [facility] 2-15 Patient Name: Rowan Gutierres Date:2018 : 1934 [x]  Patient  Verified Payor: [place] [employer] / Plan: Alden Leyva / Product Type: Medicare / In time:11:05  Out time:11:50 Total Treatment Time (min): 45 Total Timed Codes (min): 45 
1:1 Treatment Time ( only): 45 Visit #: 3 Treatment Area: Neck pain [M54.2] SUBJECTIVE Pain Level (0-10 scale): 0 Any medication changes, allergies to medications, adverse drug reactions, diagnosis change, or new procedure performed?: [x] No    [] Yes (see summary sheet for update) Subjective functional status/changes:   [] No changes reported \"last night was the first night I didn't wake up with any tingling! \" She reports compliance with PT recommendation of decreasing to 2 pillows vs 3-4 while sleeping. She states she will occasionally get tinging only in R hand when gripping steering wheel hard or when gripping her pen hard while writing Overall feels like she is getting better OBJECTIVE Modality rationale: nt decrease pain to improve the patients ability to sleep Type Additional Details  
[] Estim: []Att   []Unatt    []TENS instruct []IFC  []Premod   []NMES []Other:  []w/US   []w/ice   []w/heat Position: Location:  
[]  Traction: [] Cervical       []Lumbar 
                     [] Prone          []Supine []Intermittent   []Continuous Lbs: 
[] before manual 
[] after manual 
[]w/heat  
[]  Ultrasound: []Continuous   [] Pulsed  
                    at: []1MHz   []3MHz Location: 
W/cm2:  
[] Paraffin Location:  
[]w/heat  
[]  Ice     []  Heat 
[]  Ice massage Position: Location:  
[]  Laser 
[]  Other: Position: Location:  
[]  Vasopneumatic Device Pressure:       [] lo [] med [] hi  
Temperature:   
 
[x] Skin assessment post-treatment:  [x]intact []redness- no adverse reaction 
  []redness  adverse reaction: 35 min Therapeutic Exercise:  [x] See flow sheet :added Rows and B shoulder extension red Theraband Rationale: increase ROM and increase strength to improve the patients ability to sleep and complete word searches 10 min Manual Therapy: Grade 1 L cervical side glides C3-6, general cervical side gliding STM/TPR R UT/LS Manual cervical distraction Rationale: increase ROM and increase tissue extensibility to improve the patients ability to sleep and complete word searches With 
 [x] TE 
 [] TA 
 [] neuro 
 [] other: Patient Education: [x] Review HEP [] Progressed/Changed HEP based on:  
[] positioning   [] body mechanics   [] transfers   [] heat/ice application   
[] other:   
 
Other Objective/Functional Measures: nt  
 
Pain Level (0-10 scale) post treatment:0 
 
ASSESSMENT/Changes in Function:  
Slight tingling down R UE reported to therapist after completion of corner stretch-- encouraged pt to hold from performing at home. Overall mayco therapy session well Patient will continue to benefit from skilled PT services to modify and progress therapeutic interventions, address functional mobility deficits, address ROM deficits, address strength deficits, analyze and address soft tissue restrictions, analyze and cue movement patterns, analyze and modify body mechanics/ergonomics and assess and modify postural abnormalities to attain remaining goals.  
  
      
Progress towards goals / Updated goals: 
nt 
 
PLAN 
[]  Upgrade activities as tolerated     [x]  Continue plan of care 
[]  Update interventions per flow sheet      
[]  Discharge due to:_ 
[]  Other:_   
 
Wiliam Bell PT 11/7/2018  11:15 AM

## 2018-11-09 ENCOUNTER — HOSPITAL ENCOUNTER (OUTPATIENT)
Dept: PHYSICAL THERAPY | Age: 83
Discharge: HOME | End: 2018-11-09
Payer: MEDICARE

## 2018-11-09 PROCEDURE — 97110 THERAPEUTIC EXERCISES: CPT | Performed by: PHYSICAL THERAPIST

## 2018-11-09 PROCEDURE — 97140 MANUAL THERAPY 1/> REGIONS: CPT | Performed by: PHYSICAL THERAPIST

## 2018-11-09 NOTE — PROGRESS NOTES
PT DAILY TREATMENT NOTE - [facility] 2-15 Patient Name: Isiah Perez Date:2018 : 1934 [x]  Patient  Verified Payor: [place] [employer] / Plan: Alden Leyva / Product Type: Medicare / In time:11:05  Out time:11:30 Total Treatment Time (min): 25 Total Timed Codes (min): 25 
1:1 Treatment Time ([facility] only): 25 Visit #: 4 Treatment Area: Neck pain [M54.2] SUBJECTIVE Pain Level (0-10 scale): 0 Any medication changes, allergies to medications, adverse drug reactions, diagnosis change, or new procedure performed?: [x] No    [] Yes (see summary sheet for update) Subjective functional status/changes:   [] No changes reported \"I can feel myself making progress! \" She reports good compliance with HEP. No tingling in arm when she woke up this AM 
 
Pt 25 min early for PT appt. Requesting to leave by 11:30 AM 
 
OBJECTIVE Modality rationale: nt decrease pain to improve the patients ability to sleep Type Additional Details  
[] Estim: []Att   []Unatt    []TENS instruct []IFC  []Premod   []NMES []Other:  []w/US   []w/ice   []w/heat Position: Location:  
[]  Traction: [] Cervical       []Lumbar 
                     [] Prone          []Supine []Intermittent   []Continuous Lbs: 
[] before manual 
[] after manual 
[]w/heat  
[]  Ultrasound: []Continuous   [] Pulsed  
                    at: []1MHz   []3MHz Location: 
W/cm2:  
[] Paraffin Location:  
[]w/heat  
[]  Ice     []  Heat 
[]  Ice massage Position: Location:  
[]  Laser 
[]  Other: Position: Location:  
[]  Vasopneumatic Device Pressure:       [] lo [] med [] hi  
Temperature:   
 
[x] Skin assessment post-treatment:  [x]intact []redness- no adverse reaction 
  []redness  adverse reaction:  
 
17 min Therapeutic Exercise:  [x] See flow sheet :added Rows and B shoulder extension red Theraband Rationale: increase ROM and increase strength to improve the patients ability to sleep and complete word searches 8 min Manual Therapy: Grade 1 L cervical side glides C3-6, general cervical side gliding STM/TPR R UT/LS Manual cervical distraction Rationale: increase ROM and increase tissue extensibility to improve the patients ability to sleep and complete word searches With 
 [x] TE 
 [] TA 
 [] neuro 
 [] other: Patient Education: [x] Review HEP [] Progressed/Changed HEP based on:  
[] positioning   [] body mechanics   [] transfers   [] heat/ice application   
[] other:   
 
Other Objective/Functional Measures: nt  
 
Pain Level (0-10 scale) post treatment:0 
 
ASSESSMENT/Changes in Function:  
Progressing well. Patient will continue to benefit from skilled PT services to modify and progress therapeutic interventions, address functional mobility deficits, address ROM deficits, address strength deficits, analyze and address soft tissue restrictions, analyze and cue movement patterns, analyze and modify body mechanics/ergonomics and assess and modify postural abnormalities to attain remaining goals.  
  
      
Progress towards goals / Updated goals: 
nt 
 
PLAN 
[]  Upgrade activities as tolerated     [x]  Continue plan of care 
[]  Update interventions per flow sheet      
[]  Discharge due to:_ 
[]  Other:_   
 
Kely Zuleta, PT 11/9/2018  11:40 AM

## 2018-11-14 ENCOUNTER — HOSPITAL ENCOUNTER (OUTPATIENT)
Dept: PHYSICAL THERAPY | Age: 83
Discharge: HOME | End: 2018-11-14
Payer: MEDICARE

## 2018-11-14 PROCEDURE — 97140 MANUAL THERAPY 1/> REGIONS: CPT | Performed by: PHYSICAL THERAPIST

## 2018-11-14 PROCEDURE — 97110 THERAPEUTIC EXERCISES: CPT | Performed by: PHYSICAL THERAPIST

## 2018-11-14 NOTE — PROGRESS NOTES
PT DAILY TREATMENT NOTE - [facility] 2-15 Patient Name: Raymond Spears Date:2018 : 1934 [x]  Patient  Verified Payor: [place] [employer]ARE / Plan: Alden Headley [facility] / Product Type: Medicare / In time: 1035 AM  Out time: 11:15 AM 
Total Treatment Time (min): 40 Total Timed Codes (min): 40 
1:1 Treatment Time ( only): 40 Visit #: 6 Treatment Area: Neck pain [M54.2] SUBJECTIVE Pain Level (0-10 scale): 0 Any medication changes, allergies to medications, adverse drug reactions, diagnosis change, or new procedure performed?: [x] No    [] Yes (see summary sheet for update) Subjective functional status/changes:   [] No changes reported She reports onset of symptoms into R hand while doing standing scap pinches Overall is feeling much better- she no longer has symptoms when she wakes up in the AM, however she will occassionally get symptoms when holding pen/pencil or when driving- \"I think I  the steering wheel real hard\" OBJECTIVE Modality rationale: nt decrease pain to improve the patients ability to sleep Type Additional Details  
[] Estim: []Att   []Unatt    []TENS instruct []IFC  []Premod   []NMES []Other:  []w/US   []w/ice   []w/heat Position: Location:  
[]  Traction: [] Cervical       []Lumbar 
                     [] Prone          []Supine []Intermittent   []Continuous Lbs: 
[] before manual 
[] after manual 
[]w/heat  
[]  Ultrasound: []Continuous   [] Pulsed  
                    at: []1MHz   []3MHz Location: 
W/cm2:  
[] Paraffin Location:  
[]w/heat  
[]  Ice     []  Heat 
[]  Ice massage Position: Location:  
[]  Laser 
[]  Other: Position: Location:  
[]  Vasopneumatic Device Pressure:       [] lo [] med [] hi  
Temperature:   
 
[x] Skin assessment post-treatment:  [x]intact []redness- no adverse reaction 
  []redness  adverse reaction: 30 min Therapeutic Exercise:  [x] See flow sheet :  
Rationale: increase ROM and increase strength to improve the patients ability to sleep and complete word searches 10 min Manual Therapy: Grade 1 L cervical side glides C3-6, general cervical side gliding STM/TPR R UT/LS Manual cervical distraction Rationale: increase ROM and increase tissue extensibility to improve the patients ability to sleep and complete word searches With 
 [x] TE 
 [] TA 
 [] neuro 
 [] other: Patient Education: [x] Review HEP [] Progressed/Changed HEP based on:  
[] positioning   [] body mechanics   [] transfers   [] heat/ice application   
[] other:   
 
Other Objective/Functional Measures: nt  
 
Pain Level (0-10 scale) post treatment:0 
 
ASSESSMENT/Changes in Function:  
Dec'd symptoms/tenderness through R UT/LS after manual techniques. Progressing well. Reviewed and corrected form on scap pinches, which alleviated onset of radicular symptoms Patient will continue to benefit from skilled PT services to modify and progress therapeutic interventions, address functional mobility deficits, address ROM deficits, address strength deficits, analyze and address soft tissue restrictions, analyze and cue movement patterns, analyze and modify body mechanics/ergonomics and assess and modify postural abnormalities to attain remaining goals.  
  
      
Progress towards goals / Updated goals: 
nt 
 
PLAN 
[]  Upgrade activities as tolerated     [x]  Continue plan of care 
[]  Update interventions per flow sheet      
[]  Discharge due to:_ 
[]  Other:_   
 
Tete Santana, PT 11/14/2018  10:45 AM

## 2018-11-16 ENCOUNTER — HOSPITAL ENCOUNTER (OUTPATIENT)
Dept: PHYSICAL THERAPY | Age: 83
Discharge: HOME | End: 2018-11-16
Payer: MEDICARE

## 2018-11-16 PROCEDURE — 97110 THERAPEUTIC EXERCISES: CPT | Performed by: PHYSICAL THERAPIST

## 2018-11-16 PROCEDURE — 97140 MANUAL THERAPY 1/> REGIONS: CPT | Performed by: PHYSICAL THERAPIST

## 2018-11-16 NOTE — PROGRESS NOTES
PT DAILY TREATMENT NOTE - [facility] 2-15 Patient Name: Jie Ramesh Date:2018 : 1934 [x]  Patient  Verified Payor: [place] [employer]ARE / Plan: Alden Leyvay / Product Type: Medicare / In time: 11:00 AM  Out time: 11:45 AM 
Total Treatment Time (min): 45 Total Timed Codes (min): 45 
1:1 Treatment Time ( only): 40 Visit #: 6 Treatment Area: Neck pain [M54.2] SUBJECTIVE Pain Level (0-10 scale): 0 Any medication changes, allergies to medications, adverse drug reactions, diagnosis change, or new procedure performed?: [x] No    [] Yes (see summary sheet for update) Subjective functional status/changes:   [] No changes reported Pt states that she is still having tingling while gripping-- using pen/pencil and steering wheel. She reports overall significant decrease in radicular symptoms; she occasionally will get symptoms during scap pinch exercise OBJECTIVE Modality rationale: nt decrease pain to improve the patients ability to sleep Type Additional Details  
[] Estim: []Att   []Unatt    []TENS instruct []IFC  []Premod   []NMES []Other:  []w/US   []w/ice   []w/heat Position: Location:  
[]  Traction: [] Cervical       []Lumbar 
                     [] Prone          []Supine []Intermittent   []Continuous Lbs: 
[] before manual 
[] after manual 
[]w/heat  
[]  Ultrasound: []Continuous   [] Pulsed  
                    at: []1MHz   []3MHz Location: 
W/cm2:  
[] Paraffin Location:  
[]w/heat  
[]  Ice     []  Heat 
[]  Ice massage Position: Location:  
[]  Laser 
[]  Other: Position: Location:  
[]  Vasopneumatic Device Pressure:       [] lo [] med [] hi  
Temperature:   
 
[x] Skin assessment post-treatment:  [x]intact []redness- no adverse reaction 
  []redness  adverse reaction:  
 
35 min Therapeutic Exercise:  [x] See flow sheet :  
 Rationale: increase ROM and increase strength to improve the patients ability to sleep and complete word searches 10 min Manual Therapy: Grade 1 L cervical side glides C3-6, general cervical side gliding STM/TPR R UT/LS Manual cervical distraction Rationale: increase ROM and increase tissue extensibility to improve the patients ability to sleep and complete word searches With 
 [x] TE 
 [] TA 
 [] neuro 
 [] other: Patient Education: [x] Review HEP [] Progressed/Changed HEP based on:  
[] positioning   [] body mechanics   [] transfers   [] heat/ice application   
[] other:   
 
Other Objective/Functional Measures: nt  
 
Pain Level (0-10 scale) post treatment:0 
 
ASSESSMENT/Changes in Function:  
Pt educated on use of theracane for self TPR; progressing well Patient will continue to benefit from skilled PT services to modify and progress therapeutic interventions, address functional mobility deficits, address ROM deficits, address strength deficits, analyze and address soft tissue restrictions, analyze and cue movement patterns, analyze and modify body mechanics/ergonomics and assess and modify postural abnormalities to attain remaining goals.  
  
      
Progress towards goals / Updated goals: 
nt 
 
PLAN 
[]  Upgrade activities as tolerated     [x]  Continue plan of care 
[]  Update interventions per flow sheet      
[]  Discharge due to:_ 
[]  Other:_   
 
Jacky Sin PT 11/16/2018  11:35 AM

## 2018-11-21 ENCOUNTER — HOSPITAL ENCOUNTER (OUTPATIENT)
Dept: PHYSICAL THERAPY | Age: 83
Discharge: HOME | End: 2018-11-21
Payer: MEDICARE

## 2018-11-21 PROCEDURE — 97140 MANUAL THERAPY 1/> REGIONS: CPT | Performed by: PHYSICAL THERAPIST

## 2018-11-21 PROCEDURE — 97110 THERAPEUTIC EXERCISES: CPT | Performed by: PHYSICAL THERAPIST

## 2018-11-21 NOTE — PROGRESS NOTES
PT DAILY TREATMENT NOTE - [facility] 2-15 Patient Name: Dev Bennett Date:2018 : 1934 [x]  Patient  Verified Payor: [place] MED[employer]ARE / Plan: Alden Headley [facility] / Product Type: Medicare / In time: 11:10 AM  Out time: 11:50 AM 
Total Treatment Time (min): 40 Total Timed Codes (min): 40 
1:1 Treatment Time ( only): 40 Visit #: 5 Treatment Area: Neck pain [M54.2] SUBJECTIVE Pain Level (0-10 scale): 0 Any medication changes, allergies to medications, adverse drug reactions, diagnosis change, or new procedure performed?: [x] No    [] Yes (see summary sheet for update) Subjective functional status/changes:   [] No changes reported Pt woke up with tingling in R hand this AM-- she slept with 3 pillows and on her R side; relief of symptoms when sitting upright. She states the only other times she gets tingling in hand is when gripping-- either steering wheel or pencil- \"I hold them really tight, I don't know why\". Otherwise, no onset of symptoms throughout the day OBJECTIVE Modality rationale: nt decrease pain to improve the patients ability to sleep Type Additional Details  
[] Estim: []Att   []Unatt    []TENS instruct []IFC  []Premod   []NMES []Other:  []w/US   []w/ice   []w/heat Position: Location:  
[]  Traction: [] Cervical       []Lumbar 
                     [] Prone          []Supine []Intermittent   []Continuous Lbs: 
[] before manual 
[] after manual 
[]w/heat  
[]  Ultrasound: []Continuous   [] Pulsed  
                    at: []1MHz   []3MHz Location: 
W/cm2:  
[] Paraffin Location:  
[]w/heat  
[]  Ice     []  Heat 
[]  Ice massage Position: Location:  
[]  Laser 
[]  Other: Position: Location:  
[]  Vasopneumatic Device Pressure:       [] lo [] med [] hi  
Temperature:   
 
[x] Skin assessment post-treatment:  [x]intact []redness- no adverse reaction 
  []redness  adverse reaction: 30 min Therapeutic Exercise:  [x] See flow sheet :  
Rationale: increase ROM and increase strength to improve the patients ability to sleep and complete word searches 10 min Manual Therapy: Grade 1 L cervical side glides C3-6, general cervical side gliding STM/TPR R UT/LS Manual cervical distraction Rationale: increase ROM and increase tissue extensibility to improve the patients ability to sleep and complete word searches With 
 [x] TE 
 [] TA 
 [] neuro 
 [] other: Patient Education: [x] Review HEP [] Progressed/Changed HEP based on:  
[] positioning   [] body mechanics   [] transfers   [] heat/ice application   
[] other:   
 
Other Objective/Functional Measures: nt  
 
Pain Level (0-10 scale) post treatment:0 
 
ASSESSMENT/Changes in Function:  
Progressing well; plan for D/C next visit. Onset of tingling symptoms in R hand only during gripping activities likely originating from distal mechanism (ie carpal tunnel) vs cervical radic Patient will continue to benefit from skilled PT services to modify and progress therapeutic interventions, address functional mobility deficits, address ROM deficits, address strength deficits, analyze and address soft tissue restrictions, analyze and cue movement patterns, analyze and modify body mechanics/ergonomics and assess and modify postural abnormalities to attain remaining goals.  
  
      
Progress towards goals / Updated goals: 
nt 
 
PLAN 
[]  Upgrade activities as tolerated     [x]  Continue plan of care 
[]  Update interventions per flow sheet      
[]  Discharge due to:_ 
[]  Other:_   
 
Candy Kwan, PT 11/21/2018  11:14 AM

## 2018-11-26 ENCOUNTER — APPOINTMENT (OUTPATIENT)
Dept: PHYSICAL THERAPY | Age: 83
End: 2018-11-26
Payer: MEDICARE

## 2018-11-26 RX ORDER — METOPROLOL TARTRATE 100 MG/1
TABLET ORAL
Qty: 90 TAB | Refills: 0 | Status: SHIPPED | OUTPATIENT
Start: 2018-11-26 | End: 2018-11-28 | Stop reason: SDUPTHER

## 2018-11-28 ENCOUNTER — APPOINTMENT (OUTPATIENT)
Dept: PHYSICAL THERAPY | Age: 83
End: 2018-11-28
Payer: MEDICARE

## 2018-11-28 RX ORDER — METOPROLOL TARTRATE 100 MG/1
TABLET ORAL
Qty: 90 TAB | Refills: 0 | Status: SHIPPED | OUTPATIENT
Start: 2018-11-28 | End: 2019-04-17 | Stop reason: SDUPTHER

## 2018-11-28 NOTE — TELEPHONE ENCOUNTER
----- Message from Idalia Ludwig sent at 11/28/2018  1:09 PM EST -----  Regarding: Dr. Dilia Ceja  Pt requesting a refill on prescription: \"Metoprolol\". [address] on file. Pt best contact number is [phone].     Last OV 10/10/2018

## 2018-11-30 ENCOUNTER — HOSPITAL ENCOUNTER (OUTPATIENT)
Dept: PHYSICAL THERAPY | Age: 83
Discharge: HOME | End: 2018-11-30
Payer: MEDICARE

## 2018-11-30 PROCEDURE — G8985 CARRY GOAL STATUS: HCPCS | Performed by: PHYSICAL THERAPIST

## 2018-11-30 PROCEDURE — 97110 THERAPEUTIC EXERCISES: CPT | Performed by: PHYSICAL THERAPIST

## 2018-11-30 PROCEDURE — G8986 CARRY D/C STATUS: HCPCS | Performed by: PHYSICAL THERAPIST

## 2018-11-30 NOTE — PROGRESS NOTES
PT DAILY TREATMENT/Progress NOTE - [facility] 2-15 Patient Name: Zoe Victor Date:2018 : 1934 [x]  Patient  Verified Payor: [place] [employer] / Plan: Alden Headley y / Product Type: Medicare / In time: 1235 PM  Out time: 1:20 PM 
Total Treatment Time (min): 45 Total Timed Codes (min): 45 
1:1 Treatment Time ( only): 45 Visit #: 9 Treatment Area: Neck pain [M54.2] SUBJECTIVE Pain Level (0-10 scale): 0 Any medication changes, allergies to medications, adverse drug reactions, diagnosis change, or new procedure performed?: [x] No    [] Yes (see summary sheet for update) Subjective functional status/changes:   [] No changes reported Pt reports 75% improvement in symptoms since beginning PT. She has ordered the theracane, \"that spot feels so much better! \". She reports she is sleeping at night with two pillows, no symptoms in the AM. She reports she continues to have occasional tinging in R hand with gripping activities- such as holding a pencil or when gripping the steering wheel. OBJECTIVE 45 min Therapeutic Exercise:  [x] See flow sheet : Reviewed HEP; reassessment performed Rationale: increase ROM and increase strength to improve the patients ability to sleep and complete word searches HELD min Manual Therapy: Grade 1 L cervical side glides C3-6, general cervical side gliding STM/TPR R UT/LS Manual cervical distraction Rationale: increase ROM and increase tissue extensibility to improve the patients ability to sleep and complete word searches With 
 [x] TE 
 [] TA 
 [] neuro 
 [] other: Patient Education: [x] Review HEP [] Progressed/Changed HEP based on:  
[] positioning   [] body mechanics   [] transfers   [] heat/ice application   
[] other:   
 
Other Objective/Functional Measures: Active Movements: AROM Comments:pain, area Forward flexion No change in symptoms Extension No change in symptoms Rotation right No change in symptoms Rotation left No change in symptoms SB right No change in symptoms SB left No change in symptoms  
  
UE AROM: WNL bilat AROM shoulder flex, abd-- no onset of symptoms 
  
Strength (Upper Extremities): 
   R (0-5) L (0-5) Shoulder Elevation (C2-4) 5 5 Shoulder Abduction (C5) 4 4 Elbow Flexion (C6)  4 4 Elbow Extension (C7) 4 4 Wrist Flexion (C7) 4 4 Wrist Extension (C6) 4 4 Thumb Extension (C8) 4 4 Finger Abduction/Inter. (T1) 4 4 No onset of symptoms during strength testing 
  
Outcome Measure: FOTO= 96/100 (58 at eval) Pain Level (0-10 scale) post treatment:0 
 
ASSESSMENT/Changes in Function:  
Pt has completed 8 skilled PT visits. She has met 100% of PT goals. She improved score on functional outcome survey by 38 points indicating improvement in functional mobility. She reports 75% improvement in symptoms since beginning PT. Reviewed HEP today, pt given updated handout. Pt ready for D/C from PT. Progress towards goals / Updated goals: 
Short Term Goals: To be accomplished in 2-3 weeks: 
1) Pt will be independent in HEP MET 2) Pt will report compliance with sleeping positions/dec'd pillows MET 3) Pt will report >/=25% decrease in exacerbation of symptoms MET 
  
Long Term Goals: To be accomplished in 6-8 weeks: 
1) Pt will report >/=75% decrease in radicular symptoms MET 
2) Pt will report being able to sleep throughout the night without sx in AM MET 3) Pt will be able to lift 10 lb object without onset of symptoms MET 4) Pt will improve FOTO score to >/=66/100 in order to demonstrate improvement in functional mobility G-Codes (GP) Carry  Goal  CJ= 20-39%  D/C  CI= 1-19% PLAN 
D/C to HEP Tong Dixon PT 11/30/2018  1:00 PM

## 2018-12-04 NOTE — ANCILLARY DISCHARGE INSTRUCTIONS
[employer] [facility], [address] [place] [address] Phone: [phone]  Fax: [phone] Discharge Summary  2-15 Patient name: Anette Jacobs  : 1934  Provider#:[id number] Referral source: Jose Cordoba MD     
Medical/Treatment Diagnosis: Neck pain [M54.2] Prior Hospitalization: see medical history Comorbidities: see evaluation Prior Level of Function:see evaluation Medications: Verified on Patient Summary List 
 
Start of Care: 10/31/8      Onset Date:see evaluation Visits from Start of Care: 8     Missed Visits: see CC Reporting Period : 10/31/18 to 18 Summary of care: Active Movements: AROM Comments:pain, area Forward flexion No change in symptoms Extension No change in symptoms Rotation right No change in symptoms Rotation left No change in symptoms SB right No change in symptoms SB left No change in symptoms  
  
UE AROM: WNL bilat AROM shoulder flex, abd-- no onset of symptoms 
  
Strength (Upper Extremities): 
   R (0-5) L (0-5) Shoulder Elevation (C2-4) 5 5 Shoulder Abduction (C5) 4 4 Elbow Flexion (C6)  4 4 Elbow Extension (C7) 4 4 Wrist Flexion (C7) 4 4 Wrist Extension (C6) 4 4 Thumb Extension (C8) 4 4 Finger Abduction/Inter. (T1) 4 4 No onset of symptoms during strength testing 
  
Outcome Measure: FOTO= 96/100 (58 at eval) 
  
Progress towards goals / Updated goals: 
Short Term Goals: To be accomplished in 2-3 weeks: 
1) Pt will be independent in [facility] MET 2) Pt will report compliance with sleeping positions/dec'd pillows MET 3) Pt will report >/=25% decrease in exacerbation of symptoms MET 
  
Long Term Goals: To be accomplished in 6-8 weeks: 
1) Pt will report >/=75% decrease in radicular symptoms MET 
2) Pt will report being able to sleep throughout the night without sx in AM MET 3) Pt will be able to lift 10 lb object without onset of symptoms MET 
 4) Pt will improve FOTO score to >/=66/100 in order to demonstrate improvement in functional mobility MET 
  
ASSESSMENT Pt has completed 8 skilled PT visits. She has met 100% of PT goals. She improved score on functional outcome survey by 38 points indicating improvement in functional mobility. She reports 75% improvement in symptoms since beginning PT. Reviewed HEP today, pt given updated handout. Pt ready for D/C from PT. G-Codes (GP) Carry  Goal  CJ= 20-39%  D/C  CI= 1-19% RECOMMENDATIONS: 
[x]Discontinue therapy: [x]Patient has reached or is progressing toward set goals []Patient is non-compliant or has abdicated 
    []Due to lack of appreciable progress towards set goals Enrique Cisse, PT 12/4/2018 12:12 PM

## 2018-12-24 ENCOUNTER — TELEPHONE (OUTPATIENT)
Dept: INTERNAL MEDICINE CLINIC | Age: 83
End: 2018-12-24

## 2018-12-24 NOTE — TELEPHONE ENCOUNTER
Called the pt and advised her that it is best for her to make an appt to be seen to evaluate her medications.  Appt made for 12/26/18 @ 1683 with Gerhardt Anon, NP

## 2018-12-24 NOTE — TELEPHONE ENCOUNTER
Patient called and said her blood pressure medication has been recalled.  She said that she is going to stop taking it and wants a call back to know if there is an alternative

## 2018-12-26 ENCOUNTER — OFFICE VISIT (OUTPATIENT)
Dept: INTERNAL MEDICINE CLINIC | Age: 83
End: 2018-12-26

## 2018-12-26 VITALS
DIASTOLIC BLOOD PRESSURE: 60 MMHG | TEMPERATURE: 96.4 F | HEIGHT: 59 IN | OXYGEN SATURATION: 98 % | HEART RATE: 64 BPM | SYSTOLIC BLOOD PRESSURE: 124 MMHG | WEIGHT: 168 LBS | RESPIRATION RATE: 16 BRPM | BODY MASS INDEX: 33.87 KG/M2

## 2018-12-26 DIAGNOSIS — E66.09 CLASS 1 OBESITY DUE TO EXCESS CALORIES WITH SERIOUS COMORBIDITY AND BODY MASS INDEX (BMI) OF 33.0 TO 33.9 IN ADULT: ICD-10-CM

## 2018-12-26 DIAGNOSIS — I10 ESSENTIAL HYPERTENSION WITH GOAL BLOOD PRESSURE LESS THAN 140/90: Primary | ICD-10-CM

## 2018-12-26 RX ORDER — LISINOPRIL 10 MG/1
10 TABLET ORAL DAILY
Qty: 90 TAB | Refills: 1 | Status: SHIPPED | OUTPATIENT
Start: 2018-12-26 | End: 2019-02-20 | Stop reason: SDUPTHER

## 2018-12-26 NOTE — PROGRESS NOTES
Subjective:     Chief Complaint   Patient presents with    Medication Evaluation     discuss use of losartan. History of Present Illness    Dev Bennett is a 80y.o. year old female who is a patient of Dr. Lee Glez that presents today with a medication question. She recently saw that Losartan was recalled due to a cancer risk. She states she has a strong family hx of cancer and does not feel comfortable taking the medication. She has not taken it in 2 days. Discussed the recall with the patient and options moving forward. She denies any other new complaints at this time. NAD. No CP, SOB, GI, or  symptoms. Reviewed medications, recent lab work and imaging with patient. Pt reports compliance with medications. Current Outpatient Medications on File Prior to Visit   Medication Sig Dispense Refill    metoprolol tartrate (LOPRESSOR) 100 mg IR tablet Take 1 AND 1/2 tab BY MOUTH TWICE DAILY 90 Tab 0    atorvastatin (LIPITOR) 10 mg tablet TAKE ONE TABLET BY MOUTH DAILY 30 Tab 0    furosemide (LASIX) 40 mg tablet Take 1 Tab by mouth daily. 90 Tab 1    famotidine (PEPCID) 20 mg tablet TAKE ONE TABLET BY MOUTH NIGHTLY 30 Tab 0    cetirizine (ZYRTEC) 10 mg tablet TAKE ONE TABLET BY MOUTH NIGHTLY AS NEEDED FOR ALLERGIES 90 Tab 1    cloNIDine HCl (CATAPRES) 0.3 mg tablet Take 1 Tab by mouth three (3) times daily. 270 Tab 1    metFORMIN ER (GLUCOPHAGE XR) 500 mg tablet Take 2 Tabs by mouth daily (with dinner). 180 Tab 3    potassium chloride (KLOR-CON) 10 mEq tablet Take 1 Tab by mouth daily. 90 Tab 3    ALPRAZolam (XANAX) 0.5 mg tablet TAKE ONE TABLET BY MOUTH AT BEDTIME AS NEEDED ANXIETY 30 Tab 0    dilTIAZem CD (CARDIZEM CD) 180 mg ER capsule Take 1 Cap by mouth daily. 30 Cap 3    glipiZIDE (GLUCOTROL) 5 mg tablet TAKE ONE TABLET BY MOUTH 2 TIMES A DAY 60 Tab 3    artificial tears,hypromellose, (SYSTANE GEL) 0.3 % gel ophthalmic ointment Administer 10 g to both eyes as needed.  1 Bottle 1    cod liver oil cap Take 1 Cap by mouth daily.  calcium-cholecalciferol, d3, (CALCIUM 600 + D) 600-125 mg-unit tab Take 1 Tab by mouth daily.  acetaminophen (TYLENOL) 325 mg tablet Take  by mouth every four (4) hours as needed for Pain.  aspirin 81 mg chewable tablet Take 81 mg by mouth daily.  fluticasone (FLONASE) 50 mcg/actuation nasal spray 2 Sprays by Both Nostrils route daily. 1 Bottle 0     No current facility-administered medications on file prior to visit. Allergies   Allergen Reactions    Cortisone Other (comments)     \"Make me feels weak, like I'm going to die\"      Januvia [Sitagliptin] Other (comments)     weakness      Past Medical History:   Diagnosis Date    Adverse effect of anesthesia     pt states she was able to feel everything during colonoscopy    Chronic bronchitis     Diabetes ([address]     Essential hypertension     Hypercholesterolemia     Hypertension       Past Surgical History:   Procedure Laterality Date    COLONOSCOPY N/A 9/25/2017    COLONOSCOPY performed by Kasey Cushing. Merlinda Huger, MD at [facility] ENDOSCOPY    HX CHOLECYSTECTOMY      HX GYN      tubal ligation    HX HYSTERECTOMY        Social History     Tobacco Use    Smoking status: Never Smoker    Smokeless tobacco: Never Used   Substance Use Topics    Alcohol use: No     Alcohol/week: 0.0 oz    Drug use: No      Family History   Problem Relation Age of Onset    No Known Problems Mother     No Known Problems Father     No Known Problems Daughter     No Known Problems Daughter     No Known Problems Daughter     No Known Problems Son     No Known Problems Son     No Known Problems Son         Objective:     Vitals:    12/26/18 0807   BP: 124/60   Pulse: 64   Resp: 16   Temp: 96.4 °F (35.8 °C)   TempSrc: Oral   SpO2: 98%   Weight: 168 lb (76.2 kg)   Height: 4' 11\" (1.499 m)       Review of Systems   Constitutional: Negative. HENT: Negative. Eyes: Negative. Respiratory: Negative. Cardiovascular: Negative. Gastrointestinal: Negative. Genitourinary: Negative. Musculoskeletal: Negative. Skin: Negative. Neurological: Negative. Psychiatric/Behavioral: Negative. Physical Exam   Constitutional: She appears well-developed and well-nourished. No distress. Neck: Normal range of motion. Neck supple. No JVD present. No thyromegaly present. Cardiovascular: Normal rate, regular rhythm, normal heart sounds and intact distal pulses. Pulmonary/Chest: Effort normal and breath sounds normal. No respiratory distress. She has no wheezes. Musculoskeletal: She exhibits edema. She exhibits no tenderness. Bilateral ankle: 1+ leg edema present. Nontender. Lymphadenopathy:     She has no cervical adenopathy. Psychiatric: She has a normal mood and affect. Her behavior is normal.       Assessment/ Plan:   Diagnoses and all orders for this visit:    1. Essential hypertension with goal blood pressure less than 140/90   D/C losartan and begin  -     lisinopril (PRINIVIL, ZESTRIL) 10 mg tablet; Take 1 Tab by mouth daily. 2. Class 1 obesity due to excess calories with serious comorbidity and body mass index (BMI) of 33.0 to 33.9 in adult   Addressed weight, diet and exercise with patient. Decrease carbohydrates (white foods, sweet foods, sweet drinks and alcohol), increase green leafy vegetables and protein (lean meats and beans) with each meal. Avoid fried foods. Eat 3-5 small meals daily. Do not skip meals. Increase water intake. Increase physical activity to 30 minutes daily for health benefit or 60 minutes daily to prevent weight regain, as tolerated. Get 7-8 hours uninterrupted sleep nightly. Patient's plan of care has been reviewed with them.   Patient and/or family have verbally conveyed their understanding and agreement of the patient's signs, symptoms, diagnosis, treatment and prognosis and additionally agree to follow up as recommended or return to [facility] should their condition change prior to follow-up. Discharge instructions have also been provided to the patient with some educational information regarding their diagnosis as well a list of reasons why they would want to return to the office prior to their follow-up appointment should their condition change. Follow-up with Dr. Lee Glez as scheduled.

## 2018-12-26 NOTE — PROGRESS NOTES
Fern Diamond is a 80 y.o. female    Chief Complaint   Patient presents with    Medication Evaluation     discuss use of losartan. 1. Have you been to the ER, urgent care clinic since your last visit? Hospitalized since your last visit? No    2. Have you seen or consulted any other health care providers outside of the [facility] since your last visit? Include any pap smears or colon screening.  No     Visit Vitals  /60 (BP 1 Location: Left arm, BP Patient Position: Sitting)   Pulse 64   Temp 96.4 °F (35.8 °C) (Oral)   Resp 16   Ht 4' 11\" (1.499 m)   Wt 168 lb (76.2 kg)   SpO2 98%   BMI 33.93 kg/m²     Health Maintenance Due   Topic Date Due    Shingrix Vaccine Age 49> (1 of 2) 03/01/1984    FOOT EXAM Q1  09/06/2018    LIPID PANEL Q1  01/25/2019     Renelda Primrose, LPN

## 2019-01-21 RX ORDER — DILTIAZEM HYDROCHLORIDE 180 MG/1
180 CAPSULE, COATED, EXTENDED RELEASE ORAL DAILY
Qty: 30 CAP | Refills: 0 | Status: SHIPPED | OUTPATIENT
Start: 2019-01-21 | End: 2019-04-17 | Stop reason: SDUPTHER

## 2019-01-29 DIAGNOSIS — I10 ESSENTIAL HYPERTENSION WITH GOAL BLOOD PRESSURE LESS THAN 140/90: ICD-10-CM

## 2019-01-30 RX ORDER — CLONIDINE HYDROCHLORIDE 0.3 MG/1
TABLET ORAL
Qty: 90 TAB | Refills: 0 | Status: SHIPPED | OUTPATIENT
Start: 2019-01-30 | End: 2019-07-04 | Stop reason: SDUPTHER

## 2019-02-04 ENCOUNTER — OFFICE VISIT (OUTPATIENT)
Dept: INTERNAL MEDICINE CLINIC | Age: 84
End: 2019-02-04

## 2019-02-04 VITALS
RESPIRATION RATE: 18 BRPM | HEART RATE: 59 BPM | BODY MASS INDEX: 33.63 KG/M2 | TEMPERATURE: 96.3 F | HEIGHT: 59 IN | DIASTOLIC BLOOD PRESSURE: 72 MMHG | OXYGEN SATURATION: 96 % | SYSTOLIC BLOOD PRESSURE: 140 MMHG | WEIGHT: 166.8 LBS

## 2019-02-04 DIAGNOSIS — M79.641 PAIN OF RIGHT HAND: Primary | ICD-10-CM

## 2019-02-04 DIAGNOSIS — M54.12 RADICULOPATHY, CERVICAL: ICD-10-CM

## 2019-02-04 RX ORDER — MELOXICAM 7.5 MG/1
7.5 TABLET ORAL DAILY
Qty: 30 TAB | Refills: 0 | Status: SHIPPED | OUTPATIENT
Start: 2019-02-04 | End: 2019-02-20 | Stop reason: ALTCHOICE

## 2019-02-04 RX ORDER — OMEPRAZOLE 40 MG/1
CAPSULE, DELAYED RELEASE ORAL
Qty: 30 CAP | Refills: 0 | Status: SHIPPED | OUTPATIENT
Start: 2019-02-04 | End: 2019-02-20 | Stop reason: ALTCHOICE

## 2019-02-04 NOTE — PATIENT INSTRUCTIONS
Pinched Nerve in the Neck: Care Instructions  Your Care Instructions  A pinched nerve in the neck happens when a vertebra or disc in the upper part of your spine is damaged. This damage can happen because of an injury. Or it can just happen with age. The changes caused by the damage may put pressure on a nearby nerve root, pinching it. This causes symptoms such as sharp pain in your neck, shoulder, arm, hand, or back. You may also have tingling or numbness. Sometimes it makes your arm weaker. The symptoms are usually worse when you turn your head or strain your neck. For many people, the symptoms get better over time and finally go away. Early treatment usually includes medicines for pain and swelling. Sometimes physical therapy and special exercises may help. Follow-up care is a key part of your treatment and safety. Be sure to make and go to all appointments, and call your doctor if you are having problems. It's also a good idea to know your test results and keep a list of the medicines you take. How can you care for yourself at home? · Be safe with medicines. Read and follow all instructions on the label. ? If the doctor gave you a prescription medicine for pain, take it as prescribed. ? If you are not taking a prescription pain medicine, ask your doctor if you can take an over-the-counter medicine. · Try using a heating pad on a low or medium setting for 15 to 20 minutes every 2 or 3 hours. Try a warm shower in place of one session with the heating pad. You can also buy single-use heat wraps that last up to 8 hours. · You can also try an ice pack for 10 to 15 minutes every 2 to 3 hours. There isn't strong evidence that either heat or ice will help. But you can try them to see if they help you. · Don't spend too long in one position. Take short breaks to move around and change positions. · Wear a seat belt and shoulder harness when you are in a car.   · Sleep with a pillow under your head and neck that keeps your neck straight. · If you were given a neck brace (cervical collar) to limit neck motion, wear it as instructed for as many days as your doctor tells you to. Do not wear it longer than you were told to. Wearing a brace for too long can lead to neck stiffness and can weaken the neck muscles. · Follow your doctor's instructions for gentle neck-stretching exercises. · Do not smoke. Smoking can slow healing of your discs. If you need help quitting, talk to your doctor about stop-smoking programs and medicines. These can increase your chances of quitting for good. · Avoid strenuous work or exercise until your doctor says it is okay. When should you call for help? Call 911 anytime you think you may need emergency care. For example, call if:    · You are unable to move an arm or a leg at all.   [facility] your doctor now or seek immediate medical care if:    · You have new or worse symptoms in your arms, legs, chest, belly, or buttocks. Symptoms may include:  ? Numbness or tingling. ? Weakness. ? Pain.     · You lose bladder or bowel control.    Watch closely for changes in your health, and be sure to contact your doctor if:    · You are not getting better as expected. Where can you learn more? Go to http://stacey-marie.info/. Enter L553 in the search box to learn more about \"Pinched Nerve in the Neck: Care Instructions. \"  Current as of: September 20, 2018  Content Version: 11.9  © 5921-6037 Healthwise, Incorporated. Care instructions adapted under license by [employer] (which disclaims liability or warranty for this information). If you have questions about a medical condition or this instruction, always ask your healthcare professional. [place] [address] any warranty or liability for your use of this information.          Hand Pain: Care Instructions  Your Care Instructions    Common causes of hand pain are overuse and injuries, such as might happen during sports or home repair projects. Everyday wear and tear, especially as you get older, also can cause hand pain. Most minor hand injuries will heal on their own, and home treatment is usually all you need to do. If you have sudden and severe pain, you may need tests and treatment. Follow-up care is a key part of your treatment and safety. Be sure to make and go to all appointments, and call your doctor if you are having problems. It's also a good idea to know your test results and keep a list of the medicines you take. How can you care for yourself at home? · Take pain medicines exactly as directed. ? If the doctor gave you a prescription medicine for pain, take it as prescribed. ? If you are not taking a prescription pain medicine, ask your doctor if you can take an over-the-counter medicine. · Rest and protect your hand. Take a break from any activity that may cause pain. · Put ice or a cold pack on your hand for 10 to 20 minutes at a time. Put a thin cloth between the ice and your skin. · Prop up the sore hand on a pillow when you ice it or anytime you sit or lie down during the next 3 days. Try to keep it above the level of your heart. This will help reduce swelling. · If your doctor recommends a sling, splint, or elastic bandage to support your hand, wear it as directed. When should you call for help? Call 911 anytime you think you may need emergency care. For example, call if:    · Your hand turns cool or pale or changes color.    Call your doctor now or seek immediate medical care if:    · You cannot move your hand.     · Your hand pops, moves out of its normal position, and then returns to its normal position.     · You have signs of infection, such as:  ? Increased pain, swelling, warmth, or redness. ? Red streaks leading from the sore area. ? Pus draining from a place on your hand.   ? A fever.     · Your hand feels numb or tingly.    Watch closely for changes in your health, and be sure to contact your doctor if:    · Your hand feels unstable when you try to use it.     · You do not get better as expected.     · You have any new symptoms, such as swelling.     · Bruises from an injury to your hand last longer than 2 weeks. Where can you learn more? Go to http://stacey-marie.info/. Enter R273 in the search box to learn more about \"Hand Pain: Care Instructions. \"  Current as of: September 23, 2018  Content Version: 11.9  © 0738-0151 Healthwise, Incorporated. Care instructions adapted under license by [employer] (which disclaims liability or warranty for this information). If you have questions about a medical condition or this instruction, always ask your healthcare professional. [address] any warranty or liability for your use of this information.

## 2019-02-04 NOTE — PROGRESS NOTES
Subjective:     Chief Complaint   Patient presents with    Hand Pain     Right Hand       History of Present Illness    Mary Lima is a 80y.o. year old female who is a patient of Dr. Andrew Mc that presents today with complaints of right hand pain. She has a hx of the same. Dr. Andrew Mc has diagnosed her with cervical radiculopathy and referred her to PT. She states PT has not helped. She states that PT thought her pain might be related to grabbing her steering wheel too hard or because she does so much writing. She is concerned she has carpal tunnel. She states the pain is worse when she wakes up in the morning but improves throughout the day. She is not taking anything for her symptoms. No other new complaints at this time. NAD. No CP, SOB, GI, or  symptoms. Reviewed medications, recent lab work and imaging with patient. Pt reports compliance with medications. Current Outpatient Medications on File Prior to Visit   Medication Sig Dispense Refill    omeprazole (PRILOSEC) 40 mg capsule TAKE ONE CAPSULE BY MOUTH EVERY DAY BEFORE BREAKFAST 30 Cap 0    cloNIDine HCl (CATAPRES) 0.3 mg tablet TAKE ONE TABLET BY MOUTH 3 TIMES A DAY 90 Tab 0    dilTIAZem CD (CARDIZEM CD) 180 mg ER capsule Take 1 Cap by mouth daily. 30 Cap 0    atorvastatin (LIPITOR) 10 mg tablet TAKE ONE TABLET BY MOUTH DAILY 30 Tab 5    lisinopril (PRINIVIL, ZESTRIL) 10 mg tablet Take 1 Tab by mouth daily. 90 Tab 1    metoprolol tartrate (LOPRESSOR) 100 mg IR tablet Take 1 AND 1/2 tab BY MOUTH TWICE DAILY 90 Tab 0    furosemide (LASIX) 40 mg tablet Take 1 Tab by mouth daily. 90 Tab 1    cetirizine (ZYRTEC) 10 mg tablet TAKE ONE TABLET BY MOUTH NIGHTLY AS NEEDED FOR ALLERGIES 90 Tab 1    metFORMIN ER (GLUCOPHAGE XR) 500 mg tablet Take 2 Tabs by mouth daily (with dinner). 180 Tab 3    potassium chloride (KLOR-CON) 10 mEq tablet Take 1 Tab by mouth daily.  90 Tab 3    ALPRAZolam (XANAX) 0.5 mg tablet TAKE ONE TABLET BY MOUTH AT BEDTIME AS NEEDED ANXIETY 30 Tab 0    glipiZIDE (GLUCOTROL) 5 mg tablet TAKE ONE TABLET BY MOUTH 2 TIMES A DAY 60 Tab 3    artificial tears,hypromellose, (SYSTANE GEL) 0.3 % gel ophthalmic ointment Administer 10 g to both eyes as needed. 1 Bottle 1    cod liver oil cap Take 1 Cap by mouth daily.  calcium-cholecalciferol, d3, (CALCIUM 600 + D) 600-125 mg-unit tab Take 1 Tab by mouth daily.  fluticasone (FLONASE) 50 mcg/actuation nasal spray 2 Sprays by Both Nostrils route daily. 1 Bottle 0    acetaminophen (TYLENOL) 325 mg tablet Take  by mouth every four (4) hours as needed for Pain.  aspirin 81 mg chewable tablet Take 81 mg by mouth daily.  famotidine (PEPCID) 20 mg tablet TAKE ONE TABLET BY MOUTH NIGHTLY 90 Tab 4     No current facility-administered medications on file prior to visit. Allergies   Allergen Reactions    Cortisone Other (comments)     \"Make me feels weak, like I'm going to die\"      Januvia [Sitagliptin] Other (comments)     weakness      Past Medical History:   Diagnosis Date    Adverse effect of anesthesia     pt states she was able to feel everything during colonoscopy    Chronic bronchitis     Diabetes ([address]     Essential hypertension     Hypercholesterolemia     Hypertension       Past Surgical History:   Procedure Laterality Date    COLONOSCOPY N/A 9/25/2017    COLONOSCOPY performed by Marciano Steiner.  Ani Dominguez [facility] at [facility] ENDOSCOPY    HX CHOLECYSTECTOMY      HX GYN      tubal ligation    HX HYSTERECTOMY        Social History     Tobacco Use    Smoking status: Never Smoker    Smokeless tobacco: Never Used   Substance Use Topics    Alcohol use: No     Alcohol/week: 0.0 oz    Drug use: No      Family History   Problem Relation Age of Onset    No Known Problems Mother     No Known Problems Father     No Known Problems Daughter     No Known Problems Daughter     No Known Problems Daughter     No Known Problems Son     No Known Problems Son     No Known Problems Son         Objective:     Vitals:    02/04/19 1031   BP: 140/72   Pulse: (!) 59   Resp: 18   Temp: 96.3 °F (35.7 °C)   TempSrc: Oral   SpO2: 96%   Weight: 166 lb 12.8 oz (75.7 kg)   Height: 4' 11\" (1.499 m)       Review of Systems   Constitutional: Negative. HENT: Negative. Eyes: Negative. Respiratory: Negative. Cardiovascular: Negative. Gastrointestinal: Negative. Genitourinary: Negative. Musculoskeletal: Positive for myalgias. Skin: Negative. Neurological: Negative. Psychiatric/Behavioral: Negative. Physical Exam   Constitutional: She appears well-developed and well-nourished. No distress. HENT:   Head: Normocephalic and atraumatic. Eyes: EOM are normal. Pupils are equal, round, and reactive to light. Neck: Normal range of motion. Neck supple. No JVD present. No thyromegaly present. Cardiovascular: Normal rate, regular rhythm and normal heart sounds. Pulmonary/Chest: Effort normal and breath sounds normal. No respiratory distress. She has no wheezes. Musculoskeletal: She exhibits edema and tenderness (Righ hand tenderness. Denies neck or arm pain. ). Bilateral ankle: 1+ leg edema present. Nontender. Lymphadenopathy:     She has no cervical adenopathy. Skin: Skin is warm and dry. Psychiatric: She has a normal mood and affect. Her behavior is normal.       Assessment/ Plan:   Diagnoses and all orders for this visit:    1. Pain of right hand   Will order  -     meloxicam (MOBIC) 7.5 mg tablet; Take 1 Tab by mouth daily. 2. Radiculopathy, cervical   Will order  -     meloxicam (MOBIC) 7.5 mg tablet; Take 1 Tab by mouth daily. Patient's plan of care has been reviewed with them.   Patient and/or family have verbally conveyed their understanding and agreement of the patient's signs, symptoms, diagnosis, treatment and prognosis and additionally agree to follow up as recommended or return to [facility] should their condition change prior to follow-up. Discharge instructions have also been provided to the patient with some educational information regarding their diagnosis as well a list of reasons why they would want to return to the office prior to their follow-up appointment should their condition change. Follow-up with Dr. Sanchez Morales as scheduled.

## 2019-02-04 NOTE — PROGRESS NOTES
Health Maintenance Due   Topic Date Due    Shingrix Vaccine Age 49> (1 of 2) [birth year]/01/1984    FOOT EXAM Q1  09/06/2018    LIPID PANEL Q1  01/25/2019       Chief Complaint   Patient presents with    Hand Pain     Right Hand       1. Have you been to the ER, urgent care clinic since your last visit? Hospitalized since your last visit? No    2. Have you seen or consulted any other health care providers outside of the [address] [place] [address] since your last visit? Include any pap smears or colon screening. No    3) Do you have an Advance Directive on file? no    4) Are you interested in receiving information on Advance Directives? NO      Patient is accompanied by Self I have received verbal consent from Brenda Joshua to discuss any/all medical information while they are present in the room.

## 2019-02-20 ENCOUNTER — OFFICE VISIT (OUTPATIENT)
Dept: INTERNAL MEDICINE CLINIC | Age: 84
End: 2019-02-20

## 2019-02-20 VITALS
TEMPERATURE: 96.7 F | SYSTOLIC BLOOD PRESSURE: 136 MMHG | BODY MASS INDEX: 33.18 KG/M2 | OXYGEN SATURATION: 98 % | HEIGHT: 59 IN | HEART RATE: 63 BPM | RESPIRATION RATE: 18 BRPM | WEIGHT: 164.6 LBS | DIASTOLIC BLOOD PRESSURE: 63 MMHG

## 2019-02-20 DIAGNOSIS — I10 ESSENTIAL HYPERTENSION WITH GOAL BLOOD PRESSURE LESS THAN 140/90: ICD-10-CM

## 2019-02-20 DIAGNOSIS — E11.9 CONTROLLED TYPE 2 DIABETES MELLITUS WITHOUT COMPLICATION, UNSPECIFIED WHETHER LONG TERM INSULIN USE (HCC): ICD-10-CM

## 2019-02-20 DIAGNOSIS — G56.01 CARPAL TUNNEL SYNDROME OF RIGHT WRIST: Primary | ICD-10-CM

## 2019-02-20 DIAGNOSIS — E78.00 HYPERCHOLESTEROLEMIA: ICD-10-CM

## 2019-02-20 DIAGNOSIS — R60.0 BILATERAL LEG EDEMA: ICD-10-CM

## 2019-02-20 DIAGNOSIS — K29.70 GASTRITIS WITHOUT BLEEDING, UNSPECIFIED CHRONICITY, UNSPECIFIED GASTRITIS TYPE: ICD-10-CM

## 2019-02-20 DIAGNOSIS — E11.21 TYPE 2 DIABETES MELLITUS WITH NEPHROPATHY (HCC): ICD-10-CM

## 2019-02-20 RX ORDER — LISINOPRIL 10 MG/1
10 TABLET ORAL DAILY
Qty: 90 TAB | Refills: 1 | Status: SHIPPED | OUTPATIENT
Start: 2019-02-20 | End: 2019-05-28 | Stop reason: SDUPTHER

## 2019-02-20 RX ORDER — DICLOFENAC SODIUM 10 MG/G
GEL TOPICAL
Qty: 100 G | Refills: 1 | Status: SHIPPED | OUTPATIENT
Start: 2019-02-20 | End: 2021-04-26

## 2019-02-20 RX ORDER — METFORMIN HYDROCHLORIDE 500 MG/1
1000 TABLET, EXTENDED RELEASE ORAL
Qty: 180 TAB | Refills: 1 | Status: SHIPPED | OUTPATIENT
Start: 2019-02-20 | End: 2019-07-15 | Stop reason: SDUPTHER

## 2019-02-20 RX ORDER — GLIPIZIDE 5 MG/1
TABLET ORAL
Qty: 180 TAB | Refills: 1 | Status: SHIPPED | OUTPATIENT
Start: 2019-02-20 | End: 2019-05-30 | Stop reason: SDUPTHER

## 2019-02-20 RX ORDER — FAMOTIDINE 20 MG/1
20 TABLET, FILM COATED ORAL 2 TIMES DAILY
Qty: 180 TAB | Refills: 1 | Status: SHIPPED | OUTPATIENT
Start: 2019-02-20 | End: 2022-04-25 | Stop reason: ALTCHOICE

## 2019-02-20 RX ORDER — POTASSIUM CHLORIDE 750 MG/1
10 TABLET, EXTENDED RELEASE ORAL DAILY
Qty: 90 TAB | Refills: 3 | Status: SHIPPED | OUTPATIENT
Start: 2019-02-20 | End: 2019-08-07 | Stop reason: SDUPTHER

## 2019-02-20 NOTE — PROGRESS NOTES
Identified pt with two pt identifiers(name and ). Reviewed record in preparation for visit and have obtained necessary documentation. Chief Complaint Patient presents with  
 Hand Pain Radioculopathy of right hand. pt states, around 2am-5am right hand cramps up. 4 month f/u  Hypertension  Diabetes  Cholesterol Problem Health Maintenance Due Topic  Shingrix Vaccine Age 50> (1 of 2)  FOOT EXAM Q1   
 LIPID PANEL Q1 Vitals:  
 19 1059 BP: 136/63 Pulse: 63 Resp: 18 Temp: 96.7 °F (35.9 °C) TempSrc: Oral  
SpO2: 98% Weight: 164 lb 9.6 oz (74.7 kg) Height: 4' 11\" (1.499 m) PainSc:   0 - No pain Coordination of Care Questionnaire: 
:  
1) Have you been to an emergency room, urgent care, or hospitalized since your last visit?   no    
 
2. Have seen or consulted any other health care provider since your last visit? NO 
 
3) Do you have an Advanced Directive/ Living Will in place? NO If yes, do we have a copy on file NO If no, would you like information NO Patient is accompanied by self I have received verbal consent from Elena Franco to discuss any/all medical information while they are present in the room.

## 2019-02-20 NOTE — PROGRESS NOTES
HISTORY OF PRESENT ILLNESS Dawna Ricci is a 80 y.o. female here for follow-up. Report right hand pain and numbness mostly on the middle 3 fingers. Has cervical spondylosis, finished her physical therapy. She is uncomfortable with her wrist.  Not willing to take any meloxicam. 
Has hypertension, multiple medications. Blood pressure is normal.  No chest pain palpitation or shortness of breath. Has diabetes, blood sugar under control. She has seen ophthalmologist.  
Has gastritis, taking Pepcid. Need refill. Need lab work. Hand Pain Associated symptoms include tingling. Hypertension Diabetes Cholesterol Problem Review of Systems Constitutional: Negative. HENT: Negative. Eyes: Negative. Respiratory: Negative. Cardiovascular: Negative. Gastrointestinal: Negative. Genitourinary: Negative. Musculoskeletal: Positive for joint pain. Skin: Negative. Neurological: Positive for tingling. Endo/Heme/Allergies: Negative. Psychiatric/Behavioral: Negative. Physical Exam  
Constitutional: She appears well-developed and well-nourished. No distress. Neck: Normal range of motion. Neck supple. No JVD present. No thyromegaly present. Cardiovascular: Normal rate, regular rhythm, normal heart sounds and intact distal pulses. Pulmonary/Chest: Effort normal and breath sounds normal. No respiratory distress. She has no wheezes. Musculoskeletal: Normal range of motion. She exhibits no tenderness. Right hand: Tinel sign and Phalen sign negative. Lymphadenopathy:  
  She has no cervical adenopathy. Psychiatric: She has a normal mood and affect. Her behavior is normal.  
 
 
ASSESSMENT and PLAN Diagnoses and all orders for this visit: 1. Carpal tunnel syndrome of right wrist 
 
Rest.  Will order, 
-     Arm Brace (NEOPRENE WRIST SPLINT SUPPORT) [facility]; On right wrist 
-     diclofenac (VOLTAREN) 1 % gel;  Apply  to affected area two (2) times daily as needed. 2. Controlled type 2 diabetes mellitus without complication, unspecified whether long term insulin use ([address] On metformin and Glucotrol. Will check, 
 
-     METABOLIC PANEL, COMPREHENSIVE 
-     LIPID PANEL 
-     HEMOGLOBIN A1C WITH EAG 3. Hypercholesterolemia On statin. Will check, 
-     LIPID PANEL 4. Essential hypertension with goal blood pressure less than 140/90 Stable on current regimen. Will continue same. Will check, 
-     CBC W/O DIFF 
-     METABOLIC PANEL, COMPREHENSIVE 
-     lisinopril (PRINIVIL, ZESTRIL) 10 mg tablet; Take 1 Tab by mouth daily. 5. Bilateral leg edema -     potassium chloride (KLOR-CON) 10 mEq tablet; Take 1 Tab by mouth daily. 6. Type 2 diabetes mellitus with nephropathy (HCC) 
-     metFORMIN ER (GLUCOPHAGE XR) 500 mg tablet; Take 2 Tabs by mouth daily (with dinner). -     glipiZIDE (GLUCOTROL) 5 mg tablet; TAKE ONE TABLET BY MOUTH 2 TIMES A DAY 7. Gastritis without bleeding, unspecified chronicity, unspecified gastritis type Will refill, 
-     famotidine (PEPCID) 20 mg tablet; Take 1 Tab by mouth two (2) times a day. Discussed expected course/resolution/complications of diagnosis in detail with patient. Medication risks/benefits/costs/interactions/alternatives discussed with patient. Pt was given an after visit summary which includes diagnoses, current medications & vitals. Pt expressed understanding with the diagnosis and plan.

## 2019-02-20 NOTE — PATIENT INSTRUCTIONS
Carpal Tunnel Syndrome: Exercises Your Care Instructions Here are some examples of typical rehabilitation exercises for your condition. Start each exercise slowly. Ease off the exercise if you start to have pain. Your doctor or your physical or occupational therapist will tell you when you can start these exercises and which ones will work best for you. Warm-up stretches When you no longer have pain or numbness, you can do exercises to help prevent carpal tunnel syndrome from coming back. Do not do any stretch or movement that is uncomfortable or painful. 1. Rotate your wrist up, down, and from side to side. Repeat 4 times. 2. Stretch your fingers far apart. Relax them, and then stretch them again. Repeat 4 times. 3. Stretch your thumb by pulling it back gently, holding it, and then releasing it. Repeat 4 times. How to do the exercises Prayer stretch 1. Start with your palms together in front of your chest just below your chin. 2. Slowly lower your hands toward your waistline, keeping your hands close to your stomach and your palms together until you feel a mild to moderate stretch under your forearms. 3. Hold for at least 15 to 30 seconds. Repeat 2 to 4 times. Wrist flexor stretch 1. Extend your arm in front of you with your palm up. 2. Bend your wrist, pointing your hand toward the floor. 3. With your other hand, gently bend your wrist farther until you feel a mild to moderate stretch in your forearm. 4. Hold for at least 15 to 30 seconds. Repeat 2 to 4 times. Wrist extensor stretch 1. Repeat steps 1 through 4 of the stretch above, but begin with your extended hand palm down. Follow-up care is a key part of your treatment and safety. Be sure to make and go to all appointments, and call your doctor if you are having problems. It's also a good idea to know your test results and keep a list of the medicines you take. Where can you learn more? Go to http://stacey-marie.info/. Enter J775 in the search box to learn more about \"Carpal Tunnel Syndrome: Exercises. \" Current as of: September 20, 2018 Content Version: 11.9 © 2779-1418 [employer], [employer]. Care instructions adapted under license by [employer] (which disclaims liability or warranty for this information). If you have questions about a medical condition or this instruction, always ask your healthcare professional. [address] any warranty or liability for your use of this information.

## 2019-02-22 LAB
ALBUMIN SERPL-MCNC: 4.4 G/DL (ref 3.5–4.7)
ALBUMIN/GLOB SERPL: 1.5 {RATIO} (ref 1.2–2.2)
ALP SERPL-CCNC: 97 IU/L (ref 39–117)
ALT SERPL-CCNC: 19 IU/L (ref 0–32)
AST SERPL-CCNC: 19 IU/L (ref 0–40)
BILIRUB SERPL-MCNC: 0.4 MG/DL (ref 0–1.2)
BUN SERPL-MCNC: 11 MG/DL (ref 8–27)
BUN/CREAT SERPL: 12 (ref 12–28)
CALCIUM SERPL-MCNC: 9.3 MG/DL (ref 8.7–10.3)
CHLORIDE SERPL-SCNC: 100 MMOL/L (ref 96–106)
CHOLEST SERPL-MCNC: 145 MG/DL (ref 100–199)
CO2 SERPL-SCNC: 26 MMOL/L (ref 20–29)
CREAT SERPL-MCNC: 0.9 MG/DL (ref 0.57–1)
ERYTHROCYTE [DISTWIDTH] IN BLOOD BY AUTOMATED COUNT: 14.8 % (ref 12.3–15.4)
EST. AVERAGE GLUCOSE BLD GHB EST-MCNC: 177 MG/DL
GLOBULIN SER CALC-MCNC: 3 G/DL (ref 1.5–4.5)
GLUCOSE SERPL-MCNC: 156 MG/DL (ref 65–99)
HBA1C MFR BLD: 7.8 % (ref 4.8–5.6)
HCT VFR BLD AUTO: 39 % (ref 34–46.6)
HDLC SERPL-MCNC: 40 MG/DL
HGB BLD-MCNC: 13.1 G/DL (ref 11.1–15.9)
INTERPRETATION, 910389: NORMAL
INTERPRETATION: NORMAL
LDLC SERPL CALC-MCNC: 58 MG/DL (ref 0–99)
Lab: NORMAL
MCH RBC QN AUTO: 28 PG (ref 26.6–33)
MCHC RBC AUTO-ENTMCNC: 33.6 G/DL (ref 31.5–35.7)
MCV RBC AUTO: 83 FL (ref 79–97)
PDF IMAGE, 910387: NORMAL
PLATELET # BLD AUTO: 173 X10E3/UL (ref 150–379)
POTASSIUM SERPL-SCNC: 4 MMOL/L (ref 3.5–5.2)
PROT SERPL-MCNC: 7.4 G/DL (ref 6–8.5)
RBC # BLD AUTO: 4.68 X10E6/UL (ref 3.77–5.28)
SODIUM SERPL-SCNC: 141 MMOL/L (ref 134–144)
TRIGL SERPL-MCNC: 237 MG/DL (ref 0–149)
VLDLC SERPL CALC-MCNC: 47 MG/DL (ref 5–40)
WBC # BLD AUTO: 4.2 X10E3/UL (ref 3.4–10.8)

## 2019-02-25 RX ORDER — METOPROLOL TARTRATE 100 MG/1
TABLET ORAL
Qty: 90 TAB | Refills: 0 | Status: SHIPPED | OUTPATIENT
Start: 2019-02-25 | End: 2019-04-17 | Stop reason: SDUPTHER

## 2019-02-27 DIAGNOSIS — E11.21 TYPE 2 DIABETES MELLITUS WITH NEPHROPATHY (HCC): Primary | ICD-10-CM

## 2019-02-27 NOTE — PROGRESS NOTES
Diabetes is gradually getting worse. Will add Januvia 50 mg in the morning. Be on low-carb diet since triglyceride is also elevated.

## 2019-03-01 ENCOUNTER — TELEPHONE (OUTPATIENT)
Dept: INTERNAL MEDICINE CLINIC | Age: 84
End: 2019-03-01

## 2019-03-01 NOTE — TELEPHONE ENCOUNTER
Pt states one of her meds she read that the side effect may cause blood sugar to go up. Should she discontinue this med? Or try something different?

## 2019-03-01 NOTE — TELEPHONE ENCOUNTER
----- Message from Rob Devries sent at 2/28/2019  5:17 PM EST -----  Regarding: Susana Arrington / telephone   Patient is attempting to return missed phone call from office Best contact [phone]

## 2019-03-01 NOTE — TELEPHONE ENCOUNTER
Called pt with recommendations from Dr. Franki Maloney and sent her lab results out via [address] [place] [address].

## 2019-03-06 ENCOUNTER — OFFICE VISIT (OUTPATIENT)
Dept: INTERNAL MEDICINE CLINIC | Age: 84
End: 2019-03-06

## 2019-03-06 VITALS
TEMPERATURE: 95.6 F | WEIGHT: 164.4 LBS | RESPIRATION RATE: 18 BRPM | BODY MASS INDEX: 33.14 KG/M2 | OXYGEN SATURATION: 97 % | SYSTOLIC BLOOD PRESSURE: 131 MMHG | HEART RATE: 58 BPM | HEIGHT: 59 IN | DIASTOLIC BLOOD PRESSURE: 66 MMHG

## 2019-03-06 DIAGNOSIS — I10 ESSENTIAL HYPERTENSION WITH GOAL BLOOD PRESSURE LESS THAN 140/90: ICD-10-CM

## 2019-03-06 DIAGNOSIS — E78.00 HYPERCHOLESTEROLEMIA: ICD-10-CM

## 2019-03-06 DIAGNOSIS — E11.21 TYPE 2 DIABETES MELLITUS WITH NEPHROPATHY (HCC): Primary | ICD-10-CM

## 2019-03-06 DIAGNOSIS — M54.12 CERVICAL RADICULOPATHY: ICD-10-CM

## 2019-03-06 RX ORDER — OMEPRAZOLE 40 MG/1
CAPSULE, DELAYED RELEASE ORAL
COMMUNITY
Start: 2019-02-04 | End: 2019-07-24 | Stop reason: SDUPTHER

## 2019-03-06 RX ORDER — MELATONIN
DAILY
COMMUNITY

## 2019-03-06 RX ORDER — AZELASTINE 1 MG/ML
1 SPRAY, METERED NASAL 2 TIMES DAILY
COMMUNITY
End: 2019-04-29 | Stop reason: SDUPTHER

## 2019-03-06 RX ORDER — MELOXICAM 7.5 MG/1
TABLET ORAL
COMMUNITY
Start: 2019-02-04 | End: 2019-07-11 | Stop reason: SDUPTHER

## 2019-03-06 NOTE — PATIENT INSTRUCTIONS

## 2019-03-06 NOTE — PROGRESS NOTES
HISTORY OF PRESENT ILLNESS  Estee Penaloza is a 80 y.o. female here for follow-up. Here for medication evaluation. She is diabetic, A1c is getting worse. I have called in [address], patient is not willing to take it. Has hypertension, multiple medications. Blood pressure is normal.  No chest pain palpitation or shortness of breath. Has right wrist pain and arm pain. Has cervical radiculopathy and carpal tunnel. Using wrist splint which is helping her. Not willing to use meloxicam always. Labs done, all reviewed with patient. Hand Pain    Associated symptoms include tingling. Hypertension      Diabetes     Medication Evaluation         Review of Systems   Constitutional: Negative. HENT: Negative. Eyes: Negative. Respiratory: Negative. Cardiovascular: Negative. Gastrointestinal: Negative. Genitourinary: Negative. Musculoskeletal: Positive for joint pain. Skin: Negative. Neurological: Positive for tingling. Endo/Heme/Allergies: Negative. Psychiatric/Behavioral: Negative. Physical Exam   Constitutional: She appears well-developed and well-nourished. No distress. Neck: Normal range of motion. Neck supple. No JVD present. No thyromegaly present. Cardiovascular: Normal rate, regular rhythm, normal heart sounds and intact distal pulses. Pulmonary/Chest: Effort normal and breath sounds normal. No respiratory distress. She has no wheezes. Musculoskeletal: Normal range of motion. Lymphadenopathy:     She has no cervical adenopathy. Psychiatric: She has a normal mood and affect. Her behavior is normal.       ASSESSMENT and PLAN  Diagnoses and all orders for this visit:    1. Type 2 diabetes mellitus with nephropathy ([address]    On metformin and glipizide. Recent A1c 7.8. Patient mention she is having more carbohydrate and sweets lately. She will cut down on orange juice and bread and candies. I have ordered [address] in the pharmacy.   She is not willing to take it since it is too costly and she. Has a lot of things about [employer]. I have advised her to be an ADA diet and exercise. Repeat A1c in 3 months. 2. Essential hypertension with goal blood pressure less than 140/90  Stable blood pressure with current regimen. Will continue same. CMP stable. 3. Hypercholesterolemia  Stable lipid. 4. Cervical radiculopathy    Advised her to do neck arthritis exercise. Also using wrist splint for wrist.  Is helping her with pain. Not willing to take meloxicam every day. Advised her to use it as needed. Discussed expected course/resolution/complications of diagnosis in detail with patient. Medication risks/benefits/costs/interactions/alternatives discussed with patient. Pt was given an after visit summary which includes diagnoses, current medications & vitals. Pt expressed understanding with the diagnosis and plan.

## 2019-03-06 NOTE — PROGRESS NOTES
Identified pt with two pt identifiers(name and ). Reviewed record in preparation for visit and have obtained necessary documentation. All patient medications has been reviewed. Chief Complaint   Patient presents with    Medication Evaluation    Hypertension    Diabetes     pt states, lack of appetite due to BS results    Other     pt states, does not feel like herself. No fatigue or depression. Health Maintenance Due   Topic    Shingrix Vaccine Age 50> (1 of 2)    GLAUCOMA SCREENING Q2Y        Vitals:    19 0809   BP: 131/66   Pulse: (!) 58   Resp: 18   Temp: 95.6 °F (35.3 °C)   TempSrc: Oral   SpO2: 97%   Weight: 164 lb 6.4 oz (74.6 kg)   Height: 4' 11\" (1.499 m)   PainSc:   0 - No pain       Coordination of Care Questionnaire:  :   1) Have you been to an emergency room, urgent care, or hospitalized since your last visit?   no       2. Have seen or consulted any other health care provider since your last visit? NO    3) Do you have an Advanced Directive/ Living Will in place? NO  If yes, do we have a copy on file NO  If no, would you like information NO    Patient is accompanied by self I have received verbal consent from Luke Reddy to discuss any/all medical information while they are present in the room.

## 2019-03-15 DIAGNOSIS — I87.303 STASIS EDEMA, BILATERAL: ICD-10-CM

## 2019-03-18 RX ORDER — FUROSEMIDE 40 MG/1
40 TABLET ORAL DAILY
Qty: 30 TAB | Refills: 0 | Status: SHIPPED | OUTPATIENT
Start: 2019-03-18 | End: 2019-04-22 | Stop reason: SDUPTHER

## 2019-03-29 RX ORDER — METOPROLOL TARTRATE 100 MG/1
TABLET ORAL
Qty: 90 TAB | Refills: 0 | Status: SHIPPED | OUTPATIENT
Start: 2019-03-29 | End: 2019-05-28 | Stop reason: SDUPTHER

## 2019-04-17 ENCOUNTER — OFFICE VISIT (OUTPATIENT)
Dept: CARDIOLOGY CLINIC | Age: 84
End: 2019-04-17

## 2019-04-17 VITALS
HEIGHT: 59 IN | DIASTOLIC BLOOD PRESSURE: 84 MMHG | OXYGEN SATURATION: 97 % | RESPIRATION RATE: 16 BRPM | WEIGHT: 165.2 LBS | SYSTOLIC BLOOD PRESSURE: 132 MMHG | HEART RATE: 61 BPM | BODY MASS INDEX: 33.3 KG/M2

## 2019-04-17 DIAGNOSIS — R60.0 BILATERAL LOWER EXTREMITY EDEMA: ICD-10-CM

## 2019-04-17 DIAGNOSIS — I10 BENIGN ESSENTIAL HTN: Primary | ICD-10-CM

## 2019-04-17 DIAGNOSIS — I49.3 PVC'S (PREMATURE VENTRICULAR CONTRACTIONS): ICD-10-CM

## 2019-04-17 NOTE — PROGRESS NOTES
[facility]: Alayna Kennedys  [phone]    HPI:  Ms. Jami Rubio is a 81 yo F with a h/o HTN (previously on nadolol), hyperlipidemia, DM2 seen in the past for palpitations. Found on holter to have benign ectopy and started on beta blocker for symptoms. Previously off higher dose HCTZ due to low Na+. BP has been resistant; BP had improved on norvasc but had worsened LE edema. U/S 11/2012 for DVT was negative. Echo 8/2012 noted normal LV systolic function with mild to moderate MR; echo 2/17/14 unchanged. Seen by vascular in past for LE edema and c/w vascular insufficiency and recommended leg elevation and compression stockings. Echo 4/2015 was normal with EF 60% and mild MR. Since her last visit, overall she has been doing okay. She denies any chest pain and her breathing has been stable. No significant palpitations. Mostly, she complains of continued issues with right wrist pain. She tried a wrist splint, but this did not help. She was not excited about taking pain medication for this either. She is compensated on exam.  She has trace chronic bilateral lower extremity edema unchanged. Her blood pressure is well controlled at 132/84 with a heart rate of 61. Her weight is overall stable as well. Assessment and Plan:   1. Lower extremity edema. Consistent with venous insufficiency. Echocardiogram in 10/2018 demonstrated normal LV function and mitral regurgitation that was mild. Will have her follow back in six months. 2. Resistant hypertension. Blood pressure has been well controlled. She does have a history of white coat hypertension. 3. Type 2 diabetes. .She  has a past medical history of Adverse effect of anesthesia, Chronic bronchitis, Diabetes ([address] Essential hypertension, Hypercholesterolemia, and Hypertension. All other systems negative except as above.      PE  Vitals:    04/17/19 1251   BP: 132/84   Pulse: 61   Resp: 16   SpO2: 97%   Weight: 165 lb 3.2 oz (74.9 kg)   Height: 4' 11\" (1.499 m)    Body mass index is 33.37 kg/m². General appearance - alert, well appearing, and in no distress  Mental status - affect appropriate to mood, pleasant   Neck - supple, no JVD, no bruits   Chest - clear to auscultation, no wheezes, rales or rhonchi  Heart - normal rate, regular rhythm, normal S1, S2, I-II/VI systolic murmur LUSB, rubs, clicks or gallops  Abdomen - soft, nontender, nondistended  Extremities - peripheral pulses normal, trace pedal edema bilateral lower extremities.      Recent Labs:  Lab Results   Component Value Date/Time    Cholesterol, total 145 02/21/2019 08:02 AM    HDL Cholesterol 40 02/21/2019 08:02 AM    LDL, calculated 58 02/21/2019 08:02 AM    Triglyceride 237 (H) 02/21/2019 08:02 AM    CHOL/HDL Ratio 3.3 08/21/2012 04:10 AM     Lab Results   Component Value Date/Time    Creatinine 0.90 02/21/2019 08:02 AM     Lab Results   Component Value Date/Time    BUN 11 02/21/2019 08:02 AM     Lab Results   Component Value Date/Time    Potassium 4.0 02/21/2019 08:02 AM     Lab Results   Component Value Date/Time    Hemoglobin A1c 7.8 (H) 02/21/2019 08:02 AM     Lab Results   Component Value Date/Time    HGB 13.1 02/21/2019 08:02 AM     Lab Results   Component Value Date/Time    PLATELET 222 80/08/4644 08:02 AM       Reviewed:  Past Medical History:   Diagnosis Date    Adverse effect of anesthesia     pt states she was able to feel everything during colonoscopy    Chronic bronchitis     Diabetes [facility]     Essential hypertension     Hypercholesterolemia     Hypertension      Social History     Tobacco Use   Smoking Status Never Smoker   Smokeless Tobacco Never Used     Social History     Substance and Sexual Activity   Alcohol Use No    Alcohol/week: 0.0 oz     Allergies   Allergen Reactions    Cortisone Other (comments)     \"Make me feels weak, like I'm going to die\"      Januvia [Sitagliptin] Other (comments)     weakness       Current Outpatient Medications Medication Sig    metoprolol tartrate (LOPRESSOR) 100 mg IR tablet Take 1 AND 1/2 tab BY MOUTH TWICE DAILY    furosemide (LASIX) 40 mg tablet Take 1 Tab by mouth daily.  cholecalciferol (VITAMIN D3) 1,000 unit tablet Take  by mouth daily.  meloxicam (MOBIC) 7.5 mg tablet     dilTIAZem CD (CARDIZEM CD) 180 mg ER capsule Take 1 Cap by mouth daily.  diclofenac (VOLTAREN) 1 % gel Apply  to affected area two (2) times daily as needed.  potassium chloride (KLOR-CON) 10 mEq tablet Take 1 Tab by mouth daily.  metFORMIN ER (GLUCOPHAGE XR) 500 mg tablet Take 2 Tabs by mouth daily (with dinner).  glipiZIDE (GLUCOTROL) 5 mg tablet TAKE ONE TABLET BY MOUTH 2 TIMES A DAY    lisinopril (PRINIVIL, ZESTRIL) 10 mg tablet Take 1 Tab by mouth daily.  famotidine (PEPCID) 20 mg tablet Take 1 Tab by mouth two (2) times a day.  cloNIDine HCl (CATAPRES) 0.3 mg tablet TAKE ONE TABLET BY MOUTH 3 TIMES A DAY    atorvastatin (LIPITOR) 10 mg tablet TAKE ONE TABLET BY MOUTH DAILY    cetirizine (ZYRTEC) 10 mg tablet TAKE ONE TABLET BY MOUTH NIGHTLY AS NEEDED FOR ALLERGIES    ALPRAZolam (XANAX) 0.5 mg tablet TAKE ONE TABLET BY MOUTH AT BEDTIME AS NEEDED ANXIETY    artificial tears,hypromellose, (SYSTANE GEL) 0.3 % gel ophthalmic ointment Administer 10 g to both eyes as needed.  cod liver oil cap Take 1 Cap by mouth daily.  calcium-cholecalciferol, d3, (CALCIUM 600 + D) 600-125 mg-unit tab Take 1 Tab by mouth daily.  acetaminophen (TYLENOL) 325 mg tablet Take  by mouth every four (4) hours as needed for Pain.  aspirin 81 mg chewable tablet Take 81 mg by mouth daily.  omeprazole (PRILOSEC) 40 mg capsule TAKE ONE CAPSULE BY MOUTH EVERY DAY BEFORE BREAKFAST    azelastine (ASTELIN) 137 mcg (0.1 %) nasal spray 1 Spray two (2) times a day.  Use in each nostril as directed    omeprazole (PRILOSEC) 40 mg capsule     Arm Brace (NEOPRENE WRIST SPLINT SUPPORT) misc On right wrist    fluticasone (FLONASE) 50 mcg/actuation nasal spray 2 Sprays by Both Nostrils route daily. No current facility-administered medications for this visit.         Edgar Barrientos MD  [facility] heart and Vascular [place]  [facility], [address]  [address]

## 2019-04-22 DIAGNOSIS — I87.303 STASIS EDEMA, BILATERAL: ICD-10-CM

## 2019-04-22 RX ORDER — FUROSEMIDE 40 MG/1
40 TABLET ORAL DAILY
Qty: 30 TAB | Refills: 0 | Status: SHIPPED | OUTPATIENT
Start: 2019-04-22 | End: 2019-05-28 | Stop reason: SDUPTHER

## 2019-04-29 ENCOUNTER — TELEPHONE (OUTPATIENT)
Dept: INTERNAL MEDICINE CLINIC | Age: 84
End: 2019-04-29

## 2019-04-29 RX ORDER — CETIRIZINE HCL 10 MG
TABLET ORAL
Qty: 90 TAB | Refills: 1 | Status: SHIPPED | OUTPATIENT
Start: 2019-04-29 | End: 2020-07-13

## 2019-04-29 RX ORDER — AZELASTINE 1 MG/ML
1 SPRAY, METERED NASAL 2 TIMES DAILY
Qty: 1 BOTTLE | Refills: 3 | Status: SHIPPED | OUTPATIENT
Start: 2019-04-29 | End: 2020-02-12 | Stop reason: ALTCHOICE

## 2019-04-29 NOTE — TELEPHONE ENCOUNTER
----- Message from Adrien Ryan sent at 4/29/2019 11:01 AM EDT -----  Regarding: Dr. Cristo Lundberg: [phone]  Pt requesting a return call concerning having something sent into her pharmacy for allergy symptoms. Pt is having runny eyes, runny nose, slight sore throat, nasal draining in to throat. [employer] on file at [phone].

## 2019-05-01 ENCOUNTER — TELEPHONE (OUTPATIENT)
Dept: INTERNAL MEDICINE CLINIC | Age: 84
End: 2019-05-01

## 2019-05-01 DIAGNOSIS — J30.9 ALLERGIC RHINITIS, UNSPECIFIED SEASONALITY, UNSPECIFIED TRIGGER: Primary | ICD-10-CM

## 2019-05-01 NOTE — TELEPHONE ENCOUNTER
Per verbal order by Dr. Boubacar Hernandez, read back for confirmation called the pt and advised her that she may take Allegra 180mg daily and if she notes no improvement she should see an ENT/allergist. Per the pt she has seen Dr. Skipper Hammans in the past and would like to see him again since her allergies are so bad. She would like to take the Allegra, and will f/up with Dr. Skipper Hammans as directed. Allegra 180mg once daily #30 w/ 1 refill called in to pt's pharmacy.

## 2019-05-01 NOTE — TELEPHONE ENCOUNTER
----- Message from [facility] sent at 5/1/2019  8:22 AM EDT -----  Regarding: Dr. Laird Julio Cesar / telephone  Patient is calling to speak with nurse. Stated medication Dr. Rikki Elizondo prescribed she already has and does not work Rx- azelastine hcl nasa nasal,  Allergist-all day. Need to see if can give something else.        Best contact [phone]

## 2019-05-01 NOTE — ADDENDUM NOTE
Addended by: Maude Grayson on: 5/1/2019 10:45 AM     Modules accepted: [facility] Post [facility] Visit    Patient: Kalee Hurst Date: 3/26/2018   : 1938 Meds: Reviewed and updated in Epic   79 year old male Coumadin: Managed by: [facility]     Date and type of surgery: Evacuation of groin seroma and imbricated closure of wound on 2018 at [facility]    Reason for visit: Post-op visit    PMD: Bunny Evans MD   Appointment date: 2018  Cardiologist: Christiano Ruff MD  Appointment date: 2018    Readmission as an inpatient within 30 days from date of original surgery: No    HPI: On 2018 he underwent the above mentioned procedure and did well and was discharged from the hospital on 2018 with discharge instructions.      He was seen in our office today 3/26/2018 for a routine follow-up examination accompanied by his wife.     His general condition was good with no significant complaints. He denies fever or chills. No tenderness, edema, erythema, warmth or drainage from right groin incision. He is able to accomplish ADL's without difficulties and has been participating in cardiac rehab at [facility], which is going well. He denies sleep disturbances or appetite changes. Right groin incision is healing well without signs or symptoms of infection.     Activity/Exercise: Accomplishing all ADL's    Cardiac Rehab: Yes, at [facility]    Physical Examination    Vitals:    18 1201   BP: 102/68   Pulse: 67   Resp: 16   SpO2: 94%   Weight: 82.6 kg   Height: 5' 11\" (1.803 m)       Weight Weight: 82.6 kg (18 1201)   Height Height: 5' 11\" (180.3 cm) (18 1201)   BMI BMI (Calculated): 25.44 (18 120)     Lungs:Clear to auscultation bilaterally  Heart:Regular rate and rhythm, S1, S2 normal, no murmur, click, rub or gallop  Incision(s):Right groin Clean, dry and intact  Extremities:no edema, redness or tenderness in the calves or thighs    Diagnostics: none    Impression: Making a satisfactory surgical  recovery    Plan: Kalee SIMPSON Wages to call with any questions or concerns.  He should continue to follow up with his current physicians. No further follow up is required with my service.     Thank you for allowing us to participate in the care of your patient.  If you have any questions or concerns please do not hesitate to contact us.    Dr. Parker    CC: MD Bunny Soliman MD    On 3/26/2018, IAlanna RN scribed the services personally performed by Alejo Parker MD.The documentation recorded by the scribe accurately and completely reflects the service(s) I personally performed and the decisions made by me. DANIEL

## 2019-05-29 DIAGNOSIS — I10 ESSENTIAL HYPERTENSION WITH GOAL BLOOD PRESSURE LESS THAN 140/90: ICD-10-CM

## 2019-05-29 DIAGNOSIS — F41.0 ANXIETY ATTACK: ICD-10-CM

## 2019-05-29 RX ORDER — LISINOPRIL 10 MG/1
10 TABLET ORAL DAILY
Qty: 30 TAB | Refills: 5 | Status: SHIPPED | OUTPATIENT
Start: 2019-05-29 | End: 2020-06-30

## 2019-05-29 RX ORDER — METOPROLOL TARTRATE 100 MG/1
TABLET ORAL
Qty: 45 TAB | Refills: 4 | Status: SHIPPED | OUTPATIENT
Start: 2019-05-29 | End: 2019-11-28 | Stop reason: SDUPTHER

## 2019-05-30 RX ORDER — ALPRAZOLAM 0.5 MG/1
TABLET ORAL
Qty: 15 TAB | Refills: 0 | Status: SHIPPED | OUTPATIENT
Start: 2019-05-30 | End: 2019-11-28 | Stop reason: SDUPTHER

## 2019-06-19 ENCOUNTER — OFFICE VISIT (OUTPATIENT)
Dept: INTERNAL MEDICINE CLINIC | Age: 84
End: 2019-06-19

## 2019-06-19 VITALS
RESPIRATION RATE: 16 BRPM | OXYGEN SATURATION: 94 % | HEIGHT: 59 IN | WEIGHT: 161.2 LBS | DIASTOLIC BLOOD PRESSURE: 68 MMHG | HEART RATE: 61 BPM | BODY MASS INDEX: 32.5 KG/M2 | SYSTOLIC BLOOD PRESSURE: 122 MMHG | TEMPERATURE: 97.4 F

## 2019-06-19 DIAGNOSIS — E11.21 TYPE 2 DIABETES MELLITUS WITH NEPHROPATHY (HCC): ICD-10-CM

## 2019-06-19 DIAGNOSIS — I10 ESSENTIAL HYPERTENSION WITH GOAL BLOOD PRESSURE LESS THAN 140/90: Primary | ICD-10-CM

## 2019-06-19 DIAGNOSIS — Z00.00 MEDICARE ANNUAL WELLNESS VISIT, SUBSEQUENT: ICD-10-CM

## 2019-06-19 DIAGNOSIS — I87.2 CHRONIC VENOUS INSUFFICIENCY: ICD-10-CM

## 2019-06-19 DIAGNOSIS — Z71.89 ACP (ADVANCE CARE PLANNING): ICD-10-CM

## 2019-06-19 NOTE — PROGRESS NOTES
HISTORY OF PRESENT ILLNESS  Bill Ledesma is a 80 y.o. female here for follow-up. Report bilateral leg swelling on and off. 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 No shortness of breath orthopnea. Taking Lasix every day. Has diabetes, on metformin and glipizide. Blood sugar stable. Eye checkup up-to-date. Has hypertension, multiple medications. Blood pressure is normal.  No chest pain palpitation or shortness of breath. Here for Medicare wellness visit. Has no living well. Medication Evaluation     Hypertension      Diabetes     Cholesterol Problem         Review of Systems   Constitutional: Negative. HENT: Negative. Eyes: Negative. Respiratory: Negative. Cardiovascular: Positive for leg swelling. Gastrointestinal: Negative. Genitourinary: Negative. Skin: Negative. Neurological: Negative. Endo/Heme/Allergies: Negative. Psychiatric/Behavioral: Negative. Physical Exam   Constitutional: She appears well-developed and well-nourished. No distress. Neck: Normal range of motion. Neck supple. No JVD present. No thyromegaly present. Cardiovascular: Normal rate, regular rhythm, normal heart sounds and intact distal pulses. Pulmonary/Chest: Effort normal and breath sounds normal. No respiratory distress. She has no wheezes. Musculoskeletal: Normal range of motion. She exhibits edema. Trace leg edema. Lymphadenopathy:     She has no cervical adenopathy. Psychiatric: She has a normal mood and affect. Her behavior is normal.       ASSESSMENT and PLAN  Diagnoses and all orders for this visit:    1. Essential hypertension with goal blood pressure less than 140/90     stable on multiple medications. Will continue same. Will check,  -     METABOLIC PANEL, COMPREHENSIVE    2. Type 2 diabetes mellitus with nephropathy (HCC)  On glipizide and metformin.   Will check,    -     METABOLIC PANEL, COMPREHENSIVE  -     HEMOGLOBIN A1C WITH EAG  -     MICROALBUMIN, UR, RAND W/ MICROALB/CREAT RATIO    3. Medicare annual wellness visit, subsequent    4. ACP (advance care planning)    5. Chronic venous insufficiency  Leg elevation, low-salt diet. Need to wear stockings. Discussed expected course/resolution/complications of diagnosis in detail with patient. Medication risks/benefits/costs/interactions/alternatives discussed with patient. Pt was given an after visit summary which includes diagnoses, current medications & vitals. Pt expressed understanding with the diagnosis and plan.

## 2019-06-19 NOTE — PROGRESS NOTES
This is the Subsequent Medicare Annual Wellness Exam, performed 12 months or more after the Initial AWV or the last Subsequent AWV    I have reviewed the patient's medical history in detail and updated the computerized patient record. History     Past Medical History:   Diagnosis Date    Adverse effect of anesthesia     pt states she was able to feel everything during colonoscopy    Chronic bronchitis     Diabetes ([address]     Essential hypertension     Hypercholesterolemia     Hypertension       Past Surgical History:   Procedure Laterality Date    COLONOSCOPY N/A 9/25/2017    COLONOSCOPY performed by Andrey Hicks. Hubert Hanson MD at [facility] ENDOSCOPY    HX CHOLECYSTECTOMY      HX GYN      tubal ligation    HX HYSTERECTOMY       Current Outpatient Medications   Medication Sig Dispense Refill    glipiZIDE (GLUCOTROL) 5 mg tablet TAKE ONE TABLET BY MOUTH 2 TIMES A DAY 60 Tab 0    ALPRAZolam (XANAX) 0.5 mg tablet TAKE ONE TABLET BY MOUTH NIGHTLY AS NEEDED FOR ANXIETY, MAX DAILY AMOUNT IS 1 TABLET 15 Tab 0    furosemide (LASIX) 40 mg tablet Take 1 Tab by mouth daily. 30 Tab 3    metoprolol tartrate (LOPRESSOR) 100 mg IR tablet Take 1 AND 1/2 tab BY MOUTH TWICE DAILY 45 Tab 4    lisinopril (PRINIVIL, ZESTRIL) 10 mg tablet Take 1 Tab by mouth daily. 30 Tab 5    cetirizine (ZYRTEC) 10 mg tablet TAKE ONE TABLET BY MOUTH NIGHTLY AS NEEDED FOR ALLERGIES 90 Tab 1    azelastine (ASTELIN) 137 mcg (0.1 %) nasal spray 1 Powell by Both Nostrils route two (2) times a day. Use in each nostril as directed 1 Bottle 3    omeprazole (PRILOSEC) 40 mg capsule TAKE ONE CAPSULE BY MOUTH EVERY DAY BEFORE BREAKFAST 30 Cap 2    cholecalciferol (VITAMIN D3) 1,000 unit tablet Take  by mouth daily.  meloxicam (MOBIC) 7.5 mg tablet       omeprazole (PRILOSEC) 40 mg capsule       dilTIAZem CD (CARDIZEM CD) 180 mg ER capsule Take 1 Cap by mouth daily.  30 Cap 5    Arm Brace (NEOPRENE WRIST SPLINT SUPPORT) misc On right wrist 1 Each 0  diclofenac (VOLTAREN) 1 % gel Apply  to affected area two (2) times daily as needed. 100 g 1    potassium chloride (KLOR-CON) 10 mEq tablet Take 1 Tab by mouth daily. 90 Tab 3    metFORMIN ER (GLUCOPHAGE XR) 500 mg tablet Take 2 Tabs by mouth daily (with dinner). 180 Tab 1    famotidine (PEPCID) 20 mg tablet Take 1 Tab by mouth two (2) times a day. 180 Tab 1    cloNIDine HCl (CATAPRES) 0.3 mg tablet TAKE ONE TABLET BY MOUTH 3 TIMES A DAY 90 Tab 0    atorvastatin (LIPITOR) 10 mg tablet TAKE ONE TABLET BY MOUTH DAILY 30 Tab 5    artificial tears,hypromellose, (SYSTANE GEL) 0.3 % gel ophthalmic ointment Administer 10 g to both eyes as needed. 1 Bottle 1    cod liver oil cap Take 1 Cap by mouth daily.  calcium-cholecalciferol, d3, (CALCIUM 600 + D) 600-125 mg-unit tab Take 1 Tab by mouth daily.  fluticasone (FLONASE) 50 mcg/actuation nasal spray 2 Sprays by Both Nostrils route daily. 1 Bottle 0    acetaminophen (TYLENOL) 325 mg tablet Take  by mouth every four (4) hours as needed for Pain.  aspirin 81 mg chewable tablet Take 81 mg by mouth daily.        Allergies   Allergen Reactions    Cortisone Other (comments)     \"Make me feels weak, like I'm going to die\"      Januvia [Sitagliptin] Other (comments)     weakness     Family History   Problem Relation Age of Onset    No Known Problems Mother     No Known Problems Father     No Known Problems Daughter     No Known Problems Daughter     No Known Problems Daughter     No Known Problems Son     No Known Problems Son     No Known Problems Son      Social History     Tobacco Use    Smoking status: Never Smoker    Smokeless tobacco: Never Used   Substance Use Topics    Alcohol use: No     Alcohol/week: 0.0 oz     Patient Active Problem List   Diagnosis Code    Hypercholesterolemia E78.00    PVC's (premature ventricular contractions) I49.3    Stasis edema I87.309    Encounter for long-term (current) use of other medications Z79.891  Unspecified constipation K59.00    Allergic rhinitis, cause unspecified J30.9    Mixed hyperlipidemia E78.2    Controlled type 2 diabetes mellitus without complication (HCC) X06.4    Chronic nasal congestion R09.81    Advance directive discussed with patient Z70.80    Essential hypertension with goal blood pressure less than 140/90 I10    Type 2 diabetes mellitus with nephropathy (HCC) E11.21    Radiculopathy, cervical M54.12    Class 1 obesity due to excess calories with serious comorbidity and body mass index (BMI) of 33.0 to 33.9 in adult E66.09, Z68.33       Depression Risk Factor Screening:     3 most recent PHQ Screens 3/6/2019   Little interest or pleasure in doing things Not at all   Feeling down, depressed, irritable, or hopeless Not at all   Total Score PHQ 2 0     Alcohol Risk Factor Screening: You do not drink alcohol or very rarely. Functional Ability and Level of Safety:   Hearing Loss  Hearing is good. Activities of Daily Living  The home contains: no safety equipment. Patient does total self care    Fall Risk  Fall Risk Assessment, last 12 mths 3/6/2019   Able to walk? Yes   Fall in past 12 months?  No       Abuse Screen  Patient is not abused    Cognitive Screening   Evaluation of Cognitive Function:  Has your family/caregiver stated any concerns about your memory: no  Normal    Patient Care Team   Patient Care Team:  Arnaldo Camejo MD as PCP - General (Internal Medicine)  Rajni Farris MD as Physician (Ophthalmology)  Nohemi Quiñones MD as Physician (Cardiology)  Leia Wilson LCSW Odie Jo., RN as Ambulatory Care Navigator (Cardiology)  Rajni Farris MD as Physician (Ophthalmology)  Jairo Welsh MD as Physician (Gastroenterology)    Assessment/Plan   Education and counseling provided:  Are appropriate based on today's review and evaluation        Health Maintenance Due   Topic Date Due    Shingrix Vaccine Age 49> (1 of 2) 03/01/1984  Pneumococcal 65+ years (2 of 2 - PPSV23) 09/14/2017    GLAUCOMA SCREENING Q2Y  04/25/2019    EYE EXAM RETINAL OR DILATED  04/25/2019

## 2019-06-19 NOTE — PATIENT INSTRUCTIONS
Medicare Wellness Visit, Female The best way to live healthy is to have a lifestyle where you eat a well-balanced diet, exercise regularly, limit alcohol use, and quit all forms of tobacco/nicotine, if applicable. Regular preventive services are another way to keep healthy. Preventive services (vaccines, screening tests, monitoring & exams) can help personalize your care plan, which helps you manage your own care. Screening tests can find health problems at the earliest stages, when they are easiest to treat. [facility] follows the current, evidence-based guidelines published by the [facility] Dann Huber ([facility]) when recommending preventive services for our patients. Because we follow these guidelines, sometimes recommendations change over time as research supports it. (For example, mammograms used to be recommended annually. Even though Medicare will still pay for an annual mammogram, the newer guidelines recommend a mammogram every two years for women of average risk.) Of course, you and your doctor may decide to screen more often for some diseases, based on your risk and your health status. Preventive services for you include: - Medicare offers their members a free annual wellness visit, which is time for you and your primary care provider to discuss and plan for your preventive service needs. Take advantage of this benefit every year! 
-All adults over the age of 72 should receive the recommended pneumonia vaccines. Current USPSTF guidelines recommend a series of two vaccines for the best pneumonia protection.  
-All adults should have a flu vaccine yearly and a tetanus vaccine every 10 years. All adults age 61 and older should receive a shingles vaccine once in their lifetime.   
-A bone mass density test is recommended when a woman turns 65 to screen for osteoporosis. This test is only recommended one time, as a screening. Some providers will use this same test as a disease monitoring tool if you already have osteoporosis. -All adults age 38-68 who are overweight should have a diabetes screening test once every three years.  
-Other screening tests and preventive services for persons with diabetes include: an eye exam to screen for diabetic retinopathy, a kidney function test, a foot exam, and stricter control over your cholesterol.  
-Cardiovascular screening for adults with routine risk involves an electrocardiogram (ECG) at intervals determined by your doctor.  
-Colorectal cancer screenings should be done for adults age 54-65 with no increased risk factors for colorectal cancer. There are a number of acceptable methods of screening for this type of cancer. Each test has its own benefits and drawbacks. Discuss with your doctor what is most appropriate for you during your annual wellness visit. The different tests include: colonoscopy (considered the best screening method), a fecal occult blood test, a fecal DNA test, and sigmoidoscopy. -Breast cancer screenings are recommended every other year for women of normal risk, age 54-69. 
-Cervical cancer screenings for women over age 72 are only recommended with certain risk factors.  
-All adults born between [facility] should be screened once for Hepatitis C. Here is a list of your current Health Maintenance items (your personalized list of preventive services) with a due date: 
Health Maintenance Due Topic Date Due  Shingles Vaccine (1 of 2) 03/01/1984  Pneumococcal Vaccine (2 of 2 - PPSV23) 09/14/2017  Glaucoma Screening   04/25/2019 Velta Ganser Eye Exam  04/25/2019

## 2019-06-19 NOTE — ACP (ADVANCE CARE PLANNING)
Advance Care Planning (ACP) Provider Conversation Snapshot    Date of ACP Conversation: 06/19/19  Persons included in Conversation:  patient  Length of ACP Conversation in minutes:  16 minutes    Authorized Decision Maker (if patient is incapable of making informed decisions):    This person is:   Nitza Garay ,Son            For Patients with Decision Making Capacity:   Values/Goals: Exploration of values, goals, and preferences if recovery is not expected, even with continued medical treatment in the event of:  Imminent death  Severe, permanent brain injury    Conversation Outcomes / Follow-Up Plan:   Recommended completion of Advance Directive form after review of ACP materials and conversation with prospective healthcare agent

## 2019-06-19 NOTE — PROGRESS NOTES
Dylan Fu is a 80 y.o. female    Chief Complaint   Patient presents with    Cholesterol Problem    Diabetes    Hypertension       1. Have you been to the ER, urgent care clinic since your last visit? Hospitalized since your last visit? No     2. Have you seen or consulted any other health care providers outside of the [address] [place] [address] since your last visit? Include any pap smears or colon screening.   No      Visit Vitals  /68 (BP 1 Location: Right arm, BP Patient Position: Sitting)   Pulse 61   Temp 97.4 °F (36.3 °C) (Oral)   Resp 16   Ht 4' 11\" (1.499 m)   Wt 161 lb 3.2 oz (73.1 kg)   SpO2 94%   BMI 32.56 kg/m²

## 2019-06-21 LAB
ALBUMIN SERPL-MCNC: 4.2 G/DL (ref 3.5–4.7)
ALBUMIN/CREAT UR: 18.5 MG/G CREAT (ref 0–30)
ALBUMIN/GLOB SERPL: 1.4 {RATIO} (ref 1.2–2.2)
ALP SERPL-CCNC: 88 IU/L (ref 39–117)
ALT SERPL-CCNC: 20 IU/L (ref 0–32)
AST SERPL-CCNC: 23 IU/L (ref 0–40)
BILIRUB SERPL-MCNC: 0.3 MG/DL (ref 0–1.2)
BUN SERPL-MCNC: 13 MG/DL (ref 8–27)
BUN/CREAT SERPL: 14 (ref 12–28)
CALCIUM SERPL-MCNC: 9.2 MG/DL (ref 8.7–10.3)
CHLORIDE SERPL-SCNC: 103 MMOL/L (ref 96–106)
CO2 SERPL-SCNC: 26 MMOL/L (ref 20–29)
CREAT SERPL-MCNC: 0.95 MG/DL (ref 0.57–1)
CREAT UR-MCNC: 69.2 MG/DL
EST. AVERAGE GLUCOSE BLD GHB EST-MCNC: 171 MG/DL
GLOBULIN SER CALC-MCNC: 3.1 G/DL (ref 1.5–4.5)
GLUCOSE SERPL-MCNC: 146 MG/DL (ref 65–99)
HBA1C MFR BLD: 7.6 % (ref 4.8–5.6)
INTERPRETATION: NORMAL
Lab: NORMAL
MICROALBUMIN UR-MCNC: 12.8 UG/ML
POTASSIUM SERPL-SCNC: 4.2 MMOL/L (ref 3.5–5.2)
PROT SERPL-MCNC: 7.3 G/DL (ref 6–8.5)
SODIUM SERPL-SCNC: 144 MMOL/L (ref 134–144)

## 2019-07-02 ENCOUNTER — TELEPHONE (OUTPATIENT)
Dept: INTERNAL MEDICINE CLINIC | Age: 84
End: 2019-07-02

## 2019-07-02 NOTE — TELEPHONE ENCOUNTER
Returned pt's call. Phone went directly to a voicemail box which was full. Will attempt to call at a later time.

## 2019-07-03 NOTE — TELEPHONE ENCOUNTER
----- Message from Yudi Qudario sent at 7/2/2019  5:26 PM EDT -----  Regarding: Dr. Jcarlos Fajardo  Pt inquiring the status of request sent earlier today regarding knee pain from a fall on Saturday 06/29/2019. Pt stated, the knee pain is very sore especially when walking. Pt is a little fearful she might fall. Pt stated, she did not go to ER in regards to matter as she did not feel it was required. Pt has been applying heat to the sore spot \"Bolcgel\". Pt requesting for medication/ ointment to help with this issue to be sent to [facility]. Requesting to speak with the nurse in regards to this matter. Pt stated, she will be going to bank tomorrow morning.  Requesting for nurse to call anytime after 12 PM.   Best contact number [phone]

## 2019-07-03 NOTE — TELEPHONE ENCOUNTER
Called and verified pt with name and . Informed pt that I spoke with Dr. Gavino Zamora and explained pt's message in detail to MD. Informed pt that Dr. Gavino Zamora is recommending she be seen for the fall and knee pain/possible injury. Informed pt that I would look to see if our office has openings today or she could call [facility]. Explained how Maximo Vang comes to the home, is able to order x-ray's if necessary and will give us her report of the assessment and anything else that may have been ordered/done during the visit. Pt opted for [facility]. Pt was given their phone number and stated she is calling ASAP. No further questions at this time and pt verbalized understanding of why she needs to be evaluated.

## 2019-07-04 DIAGNOSIS — I10 ESSENTIAL HYPERTENSION WITH GOAL BLOOD PRESSURE LESS THAN 140/90: ICD-10-CM

## 2019-07-05 RX ORDER — CLONIDINE HYDROCHLORIDE 0.3 MG/1
TABLET ORAL
Qty: 90 TAB | Refills: 0 | Status: SHIPPED | OUTPATIENT
Start: 2019-07-05 | End: 2019-09-03 | Stop reason: SDUPTHER

## 2019-07-08 ENCOUNTER — TELEPHONE (OUTPATIENT)
Dept: INTERNAL MEDICINE CLINIC | Age: 84
End: 2019-07-08

## 2019-07-08 DIAGNOSIS — M17.9 OSTEOARTHRITIS OF KNEE, UNSPECIFIED LATERALITY, UNSPECIFIED OSTEOARTHRITIS TYPE: ICD-10-CM

## 2019-07-08 DIAGNOSIS — M25.569 KNEE PAIN, UNSPECIFIED CHRONICITY, UNSPECIFIED LATERALITY: Primary | ICD-10-CM

## 2019-07-08 NOTE — TELEPHONE ENCOUNTER
Wants to talk to someone regarding the pain in her knee  And wants to know what [facility] said.  Available today before 2 and tomorrow

## 2019-07-11 RX ORDER — MELOXICAM 7.5 MG/1
7.5 TABLET ORAL DAILY
Qty: 30 TAB | Refills: 1 | Status: SHIPPED | OUTPATIENT
Start: 2019-07-11 | End: 2021-06-28 | Stop reason: ALTCHOICE

## 2019-07-11 NOTE — TELEPHONE ENCOUNTER
Returned the pt's call and after verifying HIPAA, advised her that Dr. Esther Cárdenas reviewed her note from [facility]. Dr. Esther Cárdenas has prescribed meloxicam 7.5mg once daily and wants the pt to take OTC Tylenol BID for pain. She also referred the pt to orthopedics. After discussing locations of orthopedics with the pt, she was referred to [place] [address] and will call them to schedule an appointment. Patient will call back with appt date and time for records to be faxed.

## 2019-07-11 NOTE — TELEPHONE ENCOUNTER
----- Message from Johnny Bowman sent at 7/11/2019 10:03 AM EDT -----  Regarding: Dr. Dylan Alexander first and last name:(( patient))      Reason for call: Fluid       Callback required yes/no and why: Yes. ... For prescription      Best contact number(s):  [phone]       Details to clarify the request: Pt stated that this would be her 3rd Attempt to have the nurse to contact her about fluid in her knee and it being swollen. Pt is requesting to have a prescription sent to the [address] [place] [address] to get the fluid off and Pain.  Pharmacy callback: [phone]       Johnny Bowman

## 2019-07-15 ENCOUNTER — TELEPHONE (OUTPATIENT)
Dept: INTERNAL MEDICINE CLINIC | Age: 84
End: 2019-07-15

## 2019-07-15 DIAGNOSIS — E11.21 TYPE 2 DIABETES MELLITUS WITH NEPHROPATHY (HCC): ICD-10-CM

## 2019-07-15 RX ORDER — METFORMIN HYDROCHLORIDE 500 MG/1
1000 TABLET, EXTENDED RELEASE ORAL
Qty: 60 TAB | Refills: 0 | Status: SHIPPED | OUTPATIENT
Start: 2019-07-15 | End: 2019-08-13 | Stop reason: SDUPTHER

## 2019-07-16 NOTE — TELEPHONE ENCOUNTER
The pt returned call and after verifying HIPAA reviewed the pt's medications and why she is taking them. The pt also mentioned that the swelling in her legs has increased. She denies pain, redness, or erythema. Appt was made for 7/17/19 @ 2742. The pt voiced thanks and understanding.

## 2019-07-24 ENCOUNTER — OFFICE VISIT (OUTPATIENT)
Dept: INTERNAL MEDICINE CLINIC | Age: 84
End: 2019-07-24

## 2019-07-24 VITALS
TEMPERATURE: 97.4 F | HEART RATE: 68 BPM | BODY MASS INDEX: 32.82 KG/M2 | HEIGHT: 59 IN | OXYGEN SATURATION: 96 % | RESPIRATION RATE: 18 BRPM | SYSTOLIC BLOOD PRESSURE: 130 MMHG | WEIGHT: 162.8 LBS | DIASTOLIC BLOOD PRESSURE: 82 MMHG

## 2019-07-24 DIAGNOSIS — I87.2 VENOUS INSUFFICIENCY: Primary | ICD-10-CM
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
## 2019-07-24 DIAGNOSIS — R60.0 PEDAL EDEMA: ICD-10-CM

## 2019-07-24 DIAGNOSIS — I10 ESSENTIAL HYPERTENSION WITH GOAL BLOOD PRESSURE LESS THAN 140/90: ICD-10-CM

## 2019-07-24 DIAGNOSIS — M19.90 ARTHRITIS: ICD-10-CM

## 2019-07-24 DIAGNOSIS — M25.561 ACUTE PAIN OF RIGHT KNEE: ICD-10-CM

## 2019-07-24 RX ORDER — FUROSEMIDE 40 MG/1
20 TABLET ORAL DAILY
Qty: 7 TAB | Refills: 0 | Status: SHIPPED | OUTPATIENT
Start: 2019-07-24 | End: 2019-08-07 | Stop reason: ALTCHOICE

## 2019-07-24 NOTE — PROGRESS NOTES
HISTORY OF PRESENT ILLNESS  Sheron Odom is a 80 y.o. female. This is a patient of Dr. Rober Keller who presents today with complaints of ankle swelling. The patient states she has had increased swelling to bilateral ankles x 3 weeks. No chest pain or shortness of breath. She is currently taking Lasix 40 mg po daily. The patient mentions that she fell down onto her right knee about 2 weeks ago. She says she had x-ray done by [facility] that indicated arthritic change without acute process. She is wearing knee brace. She says she has been using Voltaren gel, which has helped with pain and knee is feeling much better. Visit Vitals  /82 (BP 1 Location: Right arm, BP Patient Position: Sitting)   Pulse 68   Temp 97.4 °F (36.3 °C) (Oral)   Resp 18   Ht 4' 11\" (1.499 m)   Wt 162 lb 12.8 oz (73.8 kg)   SpO2 96%   BMI 32.88 kg/m²     HPI    Review of Systems   Constitutional: Negative for chills and fever. HENT: Negative for congestion. Respiratory: Negative for cough and shortness of breath. Cardiovascular: Positive for leg swelling. Negative for chest pain. Gastrointestinal: Negative for abdominal pain. Genitourinary: Negative. Musculoskeletal: Positive for falls and joint pain. Skin: Negative. Neurological: Negative. Endo/Heme/Allergies: Negative. Psychiatric/Behavioral: Negative. Physical Exam   Constitutional: She is oriented to person, place, and time. She appears well-developed and well-nourished. No distress. HENT:   Head: Normocephalic and atraumatic. Neck: Neck supple. Cardiovascular: Normal rate and regular rhythm. Pulmonary/Chest: Effort normal and breath sounds normal. No respiratory distress. She has no wheezes. She has no rales. Abdominal: Soft. She exhibits no distension. Musculoskeletal: She exhibits edema and tenderness.    Trace pedal edema bilaterally  Mild right anterior knee tenderness; ROM of right knee intact   Neurological: She is alert and oriented to person, place, and time. Skin: Skin is warm and dry. Psychiatric: She has a normal mood and affect. Her behavior is normal.   Nursing note and vitals reviewed. ASSESSMENT and PLAN  Encounter Diagnoses   Name Primary?  Venous insufficiency Yes    Pedal edema     Acute pain of right knee     Arthritis     Fall, subsequent encounter     Essential hypertension with goal blood pressure less than 140/90      Encouraged elevation of lower extremities when possible, low salt diet, and compression stockings during the day. Will increase,   Lasix 40 mg in the morning and 20 mg at noon x 2 weeks. Then return to Lasix 40 mg po daily. May continue Voltaren 1% gel- 4g td bid PRN pain, as ordered. Lab results and schedule of future lab studies reviewed with patient  Reviewed medications and side effects in detail    Patient encouraged to call or return to office if symptoms do not improve or worsen. Reviewed plan of care with patient who acknowledges understanding and agrees. Follow-up in 2 weeks, or sooner as needed.

## 2019-07-24 NOTE — PROGRESS NOTES
Health Maintenance Due   Topic Date Due    Shingrix Vaccine Age 49> (1 of 2) 03/01/1984    Pneumococcal 65+ years (2 of 2 - PPSV23) 09/14/2017    GLAUCOMA SCREENING Q2Y  04/25/2019    EYE EXAM RETINAL OR DILATED  04/25/2019       Chief Complaint   Patient presents with    Leg Swelling     4+ pitting edema in ankles and feet    Foot Pain     Right foot    Knee Pain     Right knee s/p fall       1. Have you been to the ER, urgent care clinic since your last visit? Hospitalized since your last visit? No    2. Have you seen or consulted any other health care providers outside of the [address] [place] [address] since your last visit? Include any pap smears or colon screening. No    3) Do you have an Advance Directive on file? no    4) Are you interested in receiving information on Advance Directives? NO      Patient is accompanied by self I have received verbal consent from James Ku to discuss any/all medical information while they are present in the room.

## 2019-08-07 ENCOUNTER — OFFICE VISIT (OUTPATIENT)
Dept: INTERNAL MEDICINE CLINIC | Age: 84
End: 2019-08-07

## 2019-08-07 VITALS
WEIGHT: 162.4 LBS | HEART RATE: 62 BPM | HEIGHT: 59 IN | TEMPERATURE: 98 F | DIASTOLIC BLOOD PRESSURE: 80 MMHG | BODY MASS INDEX: 32.74 KG/M2 | SYSTOLIC BLOOD PRESSURE: 132 MMHG | OXYGEN SATURATION: 97 % | RESPIRATION RATE: 18 BRPM

## 2019-08-07 DIAGNOSIS — L85.3 DRY SKIN: ICD-10-CM

## 2019-08-07 DIAGNOSIS — I87.2 CHRONIC VENOUS INSUFFICIENCY: Primary | ICD-10-CM

## 2019-08-07 DIAGNOSIS — I87.2 VENOUS INSUFFICIENCY: ICD-10-CM

## 2019-08-07 DIAGNOSIS — E11.21 TYPE 2 DIABETES MELLITUS WITH NEPHROPATHY (HCC): ICD-10-CM

## 2019-08-07 DIAGNOSIS — I10 ESSENTIAL HYPERTENSION WITH GOAL BLOOD PRESSURE LESS THAN 140/90: ICD-10-CM

## 2019-08-07 DIAGNOSIS — R60.0 BILATERAL LEG EDEMA: ICD-10-CM

## 2019-08-07 RX ORDER — POTASSIUM CHLORIDE 750 MG/1
10 TABLET, EXTENDED RELEASE ORAL DAILY
Qty: 90 TAB | Refills: 3 | Status: SHIPPED | OUTPATIENT
Start: 2019-08-07 | End: 2020-09-02

## 2019-08-07 RX ORDER — FUROSEMIDE 40 MG/1
TABLET ORAL
Qty: 45 TAB | Refills: 5 | Status: SHIPPED | OUTPATIENT
Start: 2019-08-07 | End: 2020-09-04

## 2019-08-07 RX ORDER — AMMONIUM LACTATE 12 G/100G
LOTION TOPICAL
Qty: 1 BOTTLE | Refills: 1 | Status: SHIPPED | OUTPATIENT
Start: 2019-08-07 | End: 2021-03-10

## 2019-08-07 NOTE — PROGRESS NOTES
Health Maintenance Due   Topic Date Due    Shingrix Vaccine Age 49> (1 of 2) 03/01/1984    Pneumococcal 65+ years (2 of 2 - PPSV23) 09/14/2017    GLAUCOMA SCREENING Q2Y  04/25/2019    EYE EXAM RETINAL OR DILATED  04/25/2019    Influenza Age 5 to Adult  08/01/2019       Chief Complaint   Patient presents with    Cholesterol Problem     2 week follow up    Diabetes    Hypertension       1. Have you been to the ER, urgent care clinic since your last visit? Hospitalized since your last visit? No    2. Have you seen or consulted any other health care providers outside of the [address] [place] [address] since your last visit? Include any pap smears or colon screening. No    3) Do you have an Advance Directive on file? no    4) Are you interested in receiving information on Advance Directives? NO      Patient is accompanied by self I have received verbal consent from [facility] to discuss any/all medical information while they are present in the room.

## 2019-08-07 NOTE — PROGRESS NOTES
HISTORY OF PRESENT ILLNESS  Yana Yanez is a 80 y.o. female here for follow-up. Noticed to have more leg swelling lately. She has been playing bingo asked sitting on chair for long. Of time. No shortness of breath orthopnea. Has been using Lasix 40 mg in the morning and afternoon. Which is helping her. Has hypertension, on multiple medications. no chest pain or palpitation. Has diabetes, on metformin and glipizide. Blood sugar stable. Eye checkup up-to-date. Cholesterol Problem     Diabetes     Hypertension      Medication Evaluation         Review of Systems   Constitutional: Negative. HENT: Negative. Eyes: Negative. Respiratory: Negative. Cardiovascular: Positive for leg swelling. Gastrointestinal: Negative. Genitourinary: Negative. Skin: Negative. Neurological: Negative. Endo/Heme/Allergies: Negative. Psychiatric/Behavioral: Negative. Physical Exam   Constitutional: She appears well-developed and well-nourished. No distress. Neck: Normal range of motion. Neck supple. No JVD present. No thyromegaly present. Cardiovascular: Normal rate, regular rhythm, normal heart sounds and intact distal pulses. Pulmonary/Chest: Effort normal and breath sounds normal. No respiratory distress. She has no wheezes. Musculoskeletal: Normal range of motion. She exhibits edema. Trace leg edema. Lymphadenopathy:     She has no cervical adenopathy. Psychiatric: She has a normal mood and affect. Her behavior is normal.       ASSESSMENT and PLAN  Diagnoses and all orders for this visit:    1. Chronic venous insufficiency    Foot elevation and watch salt. Need to use stockings when she is playing [employer]. Will order,  -     OTHER; 15 mm Hg below knee stockings B/L  -     potassium chloride (KLOR-CON) 10 mEq tablet; Take 1 Tab by mouth daily. Will give,  -     furosemide (LASIX) 40 mg tablet; 1 tab po am and half tab at noon as needed for leg edema    2.  Essential hypertension with goal blood pressure less than 140/90  Stable on current blood pressure medicines. Will continue same. Labs are stable. 3. Type 2 diabetes mellitus with nephropathy (HCC)  A1c stable. On metformin and glipizide. Doing well. 4. Dry skin    Need to drink more fluid. Will give,  -     ammonium lactate (LAC-HYDRIN) 12 % lotion; rub in to affected area well    5. Venous insufficiency    6. Bilateral leg edema  -     potassium chloride (KLOR-CON) 10 mEq tablet; Take 1 Tab by mouth daily. Discussed expected course/resolution/complications of diagnosis in detail with patient. Medication risks/benefits/costs/interactions/alternatives discussed with patient. Pt was given an after visit summary which includes diagnoses, current medications & vitals. Pt expressed understanding with the diagnosis and plan.

## 2019-09-03 DIAGNOSIS — I10 ESSENTIAL HYPERTENSION WITH GOAL BLOOD PRESSURE LESS THAN 140/90: ICD-10-CM

## 2019-09-03 RX ORDER — CLONIDINE HYDROCHLORIDE 0.3 MG/1
TABLET ORAL
Qty: 90 TAB | Refills: 0 | Status: SHIPPED | OUTPATIENT
Start: 2019-09-03 | End: 2019-11-03 | Stop reason: SDUPTHER

## 2019-10-09 ENCOUNTER — OFFICE VISIT (OUTPATIENT)
Dept: INTERNAL MEDICINE CLINIC | Age: 84
End: 2019-10-09

## 2019-10-09 VITALS
DIASTOLIC BLOOD PRESSURE: 74 MMHG | WEIGHT: 163.6 LBS | TEMPERATURE: 98.4 F | HEIGHT: 59 IN | HEART RATE: 64 BPM | OXYGEN SATURATION: 96 % | RESPIRATION RATE: 15 BRPM | BODY MASS INDEX: 32.98 KG/M2 | SYSTOLIC BLOOD PRESSURE: 132 MMHG

## 2019-10-09 DIAGNOSIS — L84 CORN OF FOOT: ICD-10-CM

## 2019-10-09 DIAGNOSIS — Z78.0 POST-MENOPAUSAL: ICD-10-CM

## 2019-10-09 DIAGNOSIS — I73.9 PERIPHERAL VASCULAR DISEASE (HCC): ICD-10-CM

## 2019-10-09 DIAGNOSIS — Z23 ENCOUNTER FOR IMMUNIZATION: ICD-10-CM

## 2019-10-09 DIAGNOSIS — M21.611 BUNION OF GREAT TOE OF RIGHT FOOT: Primary | ICD-10-CM

## 2019-10-09 DIAGNOSIS — E11.21 TYPE 2 DIABETES MELLITUS WITH NEPHROPATHY (HCC): ICD-10-CM

## 2019-10-09 DIAGNOSIS — I87.2 VENOUS INSUFFICIENCY: ICD-10-CM

## 2019-10-09 DIAGNOSIS — I10 ESSENTIAL HYPERTENSION WITH GOAL BLOOD PRESSURE LESS THAN 140/90: ICD-10-CM

## 2019-10-09 DIAGNOSIS — M54.12 CERVICAL RADICULOPATHY: ICD-10-CM

## 2019-10-09 DIAGNOSIS — Z12.39 SCREENING BREAST EXAMINATION: ICD-10-CM

## 2019-10-09 NOTE — PROGRESS NOTES
Health Maintenance Due   Topic Date Due    Shingrix Vaccine Age 49> (1 of 2) 03/01/1984    Pneumococcal 65+ years (2 of 2 - PPSV23) 09/14/2017    GLAUCOMA SCREENING Q2Y  04/25/2019    EYE EXAM RETINAL OR DILATED  04/25/2019    Influenza Age 5 to Adult  08/01/2019       Chief Complaint   Patient presents with    Hypertension    Diabetes    Cholesterol Problem       1. Have you been to the ER, urgent care clinic since your last visit? Hospitalized since your last visit? No    2. Have you seen or consulted any other health care providers outside of the [address] [place] [address] since your last visit? Include any pap smears or colon screening. No    3) Do you have an Advance Directive on file? no    4) Are you interested in receiving information on Advance Directives? NO      Patient is accompanied by self I have received verbal consent from Myra Hutchinson to discuss any/all medical information while they are present in the room. Myra Hutchinson is a 80 y.o. female  who presents for routine immunization(s) Fluad. Patient denies any symptoms , reactions or allergies that would exclude them from being immunized today. Risks and adverse reactions were discussed and the VIS was given to them. Patient voiced full understanding and signed Adult Immunization Consent form. All questions were addressed. Patient was observed for 10 min post injection. There were no reactions observed.     [place] F[employer], SNEHAL

## 2019-10-09 NOTE — PATIENT INSTRUCTIONS
Vaccine Information Statement Influenza (Flu) Vaccine (Inactivated or Recombinant): What You Need to Know Many Vaccine Information Statements are available in German and other languages. See www.immunize.org/vis Hojas de información sobre vacunas están disponibles en español y en muchos otros idiomas. Visite www.immunize.org/vis 1. Why get vaccinated? Influenza vaccine can prevent influenza (flu). Flu is a contagious disease that spreads around the [facility] every year, usually between October and May. Anyone can get the flu, but it is more dangerous for some people. Infants and young children, people 72years of age and older, pregnant women, and people with certain health conditions or a weakened immune system are at greatest risk of flu complications. Pneumonia, bronchitis, sinus infections and ear infections are examples of flu-related complications. If you have a medical condition, such as heart disease, cancer or diabetes, flu can make it worse. Flu can cause fever and chills, sore throat, muscle aches, fatigue, cough, headache, and runny or stuffy nose. Some people may have vomiting and diarrhea, though this is more common in children than adults. Each year thousands of people in the [facility] die from flu, and many more are hospitalized. Flu vaccine prevents millions of illnesses and flu-related visits to the doctor each year. 2. Influenza vaccines CDC recommends everyone 10months of age and older get vaccinated every flu season. Children 6 months through 6years of age may need 2 doses during a single flu season. Everyone else needs only 1 dose each flu season. It takes about 2 weeks for protection to develop after vaccination. There are many flu viruses, and they are always changing. Each year a new flu vaccine is made to protect against three or four viruses that are likely to cause disease in the upcoming flu season.  Even when the vaccine doesnt exactly match these viruses, it may still provide some protection. Influenza vaccine does not cause flu. Influenza vaccine may be given at the same time as other vaccines. 3. Talk with your health care provider Tell your vaccine provider if the person getting the vaccine: 
 Has had an allergic reaction after a previous dose of influenza vaccine, or has any severe, life-threatening allergies.  Has ever had Guillain-Barré Syndrome (also called GBS). In some cases, your health care provider may decide to postpone influenza vaccination to a future visit. People with minor illnesses, such as a cold, may be vaccinated. People who are moderately or severely ill should usually wait until they recover before getting influenza vaccine. Your health care provider can give you more information. 4. Risks of a reaction  Soreness, redness, and swelling where shot is given, fever, muscle aches, and headache can happen after influenza vaccine.  There may be a very small increased risk of Guillain-Barré Syndrome (GBS) after inactivated influenza vaccine (the flu shot). Aydee Rubin children who get the flu shot along with pneumococcal vaccine (PCV13), and/or DTaP vaccine at the same time might be slightly more likely to have a seizure caused by fever. Tell your health care provider if a child who is getting flu vaccine has ever had a seizure. People sometimes faint after medical procedures, including vaccination. Tell your provider if you feel dizzy or have vision changes or ringing in the ears. As with any medicine, there is a very remote chance of a vaccine causing a severe allergic reaction, other serious injury, or death. 5. What if there is a serious problem? An allergic reaction could occur after the vaccinated person leaves the clinic.  If you see signs of a severe allergic reaction (hives, swelling of the face and throat, difficulty breathing, a fast heartbeat, dizziness, or weakness), call 9-1-1 and get the person to the nearest hospital. 
 
For other signs that concern you, call your health care provider. Adverse reactions should be reported to the Vaccine Adverse Event Reporting System (VAERS). Your health care provider will usually file this report, or you can do it yourself. Visit the VAERS website at www.vaers. hhs.gov or call 2-239.541.6632. VAERS is only for reporting reactions, and VAERS staff do not give medical advice. 6. The National Vaccine Injury Compensation Program 
 
The [facility] Vaccine Injury Compensation Program (VICP) is a federal program that was created to compensate people who may have been injured by certain vaccines. Visit the VICP website at www.[facility].gov/vaccinecompensation or call 9-559.573.1482 to learn about the program and about filing a claim. There is a time limit to file a claim for compensation. 7. How can I learn more?  Ask your health care provider.  Call your local or state health department.  Contact the Centers for Disease Control and Prevention (CDC): 
- Call 8-199.567.4763 (2-895-[facility]-INFO) or 
- Visit CDCs influenza website at www.cdc.gov/flu Vaccine Information Statement (Interim) Inactivated Influenza Vaccine 8/15/2019 
[address] CHI[address] Pauly UrbinaLeslie [id number] Department of Health and [employer] Centers for Disease Control and Prevention Office Use Only Neck Arthritis: Exercises Introduction Here are some examples of exercises for you to try. The exercises may be suggested for a condition or for rehabilitation. Start each exercise slowly. Ease off the exercises if you start to have pain. You will be told when to start these exercises and which ones will work best for you. How to do the exercises Neck stretches to the side 1. This stretch works best if you keep your shoulder down as you lean away from it.  To help you remember to do this, start by relaxing your shoulders and lightly holding on to your thighs or your chair. 2. Tilt your head toward your shoulder and hold for 15 to 30 seconds. Let the weight of your head stretch your muscles. 3. Repeat 2 to 4 times toward each shoulder. Chin tuck 1. Lie on the floor with a rolled-up towel under your neck. Your head should be touching the floor. 2. Slowly bring your chin toward your chest. 
3. Hold for a count of 6, and then relax for up to 10 seconds. 4. Repeat 8 to 12 times. Active cervical rotation 1. Sit in a firm chair, or stand up straight. 2. Keeping your chin level, turn your head to the right, and hold for 15 to 30 seconds. 3. Turn your head to the left and hold for 15 to 30 seconds. 4. Repeat 2 to 4 times to each side. Shoulder blade squeeze 1. While standing, squeeze your shoulder blades together. 2. Do not raise your shoulders up as you are squeezing. 3. Hold for 6 seconds. 4. Repeat 8 to 12 times. Shoulder rolls 1. Sit comfortably with your feet shoulder-width apart. You can also do this exercise standing up. 2. Roll your shoulders up, then back, and then down in a smooth, circular motion. 3. Repeat 2 to 4 times. Follow-up care is a key part of your treatment and safety. Be sure to make and go to all appointments, and call your doctor if you are having problems. It's also a good idea to know your test results and keep a list of the medicines you take. Where can you learn more? Go to http://stacey-marie.info/. Enter C522 in the search box to learn more about \"Neck Arthritis: Exercises. \" Current as of: June 26, 2019 Content Version: 12.2 © 7005-2564 [employer]. Care instructions adapted under license by [employer] (which disclaims liability or warranty for this information).  If you have questions about a medical condition or this instruction, always ask your healthcare professional. [employer] disclaims any warranty or liability for your use of this information.

## 2019-10-09 NOTE — PROGRESS NOTES
HISTORY OF PRESENT ILLNESS  Ranjan Rai is a 80 y.o. female here for follow-up. Reports sore on the right sided great toe. She is unable to walk well. Also noticed to have some corn. He is here for specialist yet. Has chronic venous insufficiency, using stockings. Still having some leg swelling. No shortness of breath orthopnea. Has hypertension, on multiple medications. no chest pain or palpitation. Reported tingling and numbness on spondylo-listhesis and pinched nerve. Has diabetes, on metformin and glipizide. Blood sugar stable. Eye checkup up-to-date. Need mammogram and bone density. No need flu shot. Cholesterol Problem     Diabetes     Hypertension      Medication Evaluation     Foot Pain     Numbness         Review of Systems   Constitutional: Negative. HENT: Negative. Eyes: Negative. Respiratory: Negative. Cardiovascular: Positive for leg swelling. Gastrointestinal: Negative. Genitourinary: Negative. Musculoskeletal: Positive for joint pain. Skin: Negative. Neurological: Positive for tingling and numbness. Endo/Heme/Allergies: Negative. Psychiatric/Behavioral: Negative. Physical Exam   Constitutional: She appears well-developed and well-nourished. No distress. Neck: Normal range of motion. Neck supple. No JVD present. No thyromegaly present. Cardiovascular: Normal rate, regular rhythm, normal heart sounds and intact distal pulses. Pulmonary/Chest: Effort normal and breath sounds normal. No respiratory distress. She has no wheezes. Musculoskeletal: Normal range of motion. She exhibits edema. Trace leg edema. Neck: Spine nontender. Mild paravertebral facet tenderness on the right side. Range of motion is okay. Right foot: Small sore on top of right-sided bunion. Small area of corn formations on the lateral side of the sole of foot. Slightly tender. Lymphadenopathy:     She has no cervical adenopathy.    Psychiatric: She has a normal mood and affect. Her behavior is normal.       ASSESSMENT and PLAN  Diagnoses and all orders for this visit:    1. Bunion of great toe of right foot    She will need specialized shoe. Will refer,  -     REFERRAL TO PODIATRY    2. Encounter for immunization    Will give,  -     INFLUENZA VACCINE INACTIVATED (IIV), SUBUNIT, ADJUVANTED, IM  -     ADMIN INFLUENZA VIRUS VAC    3. Corn of foot    Will refer,  -     REFERRAL TO PODIATRY    4. Type 2 diabetes mellitus with nephropathy (HCC)   A1c is stable. Continue same medications.  -     METABOLIC PANEL, COMPREHENSIVE  -     HEMOGLOBIN A1C WITH EAG    5. Essential hypertension with goal blood pressure less than 140/90  Stable. On multiple medications. Will check,    -     METABOLIC PANEL, COMPREHENSIVE    6. Venous insufficiency  Using stockings. Advised to do leg elevation. 7. Peripheral vascular disease ([address]    8. Cervical radiculopathy  Need to do neck arthritis exercise. Change pillow. 9. Screening breast examination  -     WILL MAMMO BI SCREENING INCL CAD; Future    10. Post-menopausal  -     DEXA BONE DENSITY STUDY AXIAL; Future              Discussed expected course/resolution/complications of diagnosis in detail with patient. Medication risks/benefits/costs/interactions/alternatives discussed with patient. Pt was given an after visit summary which includes diagnoses, current medications & vitals. Pt expressed understanding with the diagnosis and plan.

## 2019-10-10 LAB
ALBUMIN SERPL-MCNC: 4.1 G/DL (ref 3.5–4.7)
ALBUMIN/GLOB SERPL: 1.5 {RATIO} (ref 1.2–2.2)
ALP SERPL-CCNC: 86 IU/L (ref 39–117)
ALT SERPL-CCNC: 19 IU/L (ref 0–32)
AST SERPL-CCNC: 12 IU/L (ref 0–40)
BILIRUB SERPL-MCNC: 0.3 MG/DL (ref 0–1.2)
BUN SERPL-MCNC: 13 MG/DL (ref 8–27)
BUN/CREAT SERPL: 15 (ref 12–28)
CALCIUM SERPL-MCNC: 9.4 MG/DL (ref 8.7–10.3)
CHLORIDE SERPL-SCNC: 97 MMOL/L (ref 96–106)
CO2 SERPL-SCNC: 27 MMOL/L (ref 20–29)
CREAT SERPL-MCNC: 0.89 MG/DL (ref 0.57–1)
EST. AVERAGE GLUCOSE BLD GHB EST-MCNC: 183 MG/DL
GLOBULIN SER CALC-MCNC: 2.7 G/DL (ref 1.5–4.5)
GLUCOSE SERPL-MCNC: 185 MG/DL (ref 65–99)
HBA1C MFR BLD: 8 % (ref 4.8–5.6)
INTERPRETATION: NORMAL
Lab: NORMAL
POTASSIUM SERPL-SCNC: 4.3 MMOL/L (ref 3.5–5.2)
PROT SERPL-MCNC: 6.8 G/DL (ref 6–8.5)
SODIUM SERPL-SCNC: 139 MMOL/L (ref 134–144)

## 2019-10-18 ENCOUNTER — HOSPITAL ENCOUNTER (OUTPATIENT)
Dept: BONE DENSITY | Age: 84
Discharge: HOME | End: 2019-10-18
Attending: INTERNAL MEDICINE
Payer: MEDICARE

## 2019-10-18 ENCOUNTER — HOSPITAL ENCOUNTER (OUTPATIENT)
Dept: MAMMOGRAPHY | Age: 84
Discharge: HOME | End: 2019-10-18
Attending: INTERNAL MEDICINE
Payer: MEDICARE

## 2019-10-18 DIAGNOSIS — Z78.0 POST-MENOPAUSAL: ICD-10-CM

## 2019-10-18 DIAGNOSIS — Z12.31 VISIT FOR SCREENING MAMMOGRAM: ICD-10-CM

## 2019-10-18 PROCEDURE — 77080 DXA BONE DENSITY AXIAL: CPT

## 2019-10-18 PROCEDURE — 77067 SCR MAMMO BI INCL CAD: CPT

## 2019-10-19 DIAGNOSIS — E11.9 CONTROLLED TYPE 2 DIABETES MELLITUS WITHOUT COMPLICATION, UNSPECIFIED WHETHER LONG TERM INSULIN USE (HCC): Primary | ICD-10-CM

## 2019-10-19 DIAGNOSIS — I10 ESSENTIAL HYPERTENSION WITH GOAL BLOOD PRESSURE LESS THAN 140/90: ICD-10-CM

## 2019-10-19 DIAGNOSIS — E11.21 TYPE 2 DIABETES MELLITUS WITH NEPHROPATHY (HCC): ICD-10-CM

## 2019-10-19 NOTE — PROGRESS NOTES
Called the pt and after verifying HIPAA advised her of her lab results and the provider's recommendations.  Lab slip and ADA diet mailed to pt

## 2019-10-21 ENCOUNTER — TELEPHONE (OUTPATIENT)
Dept: INTERNAL MEDICINE CLINIC | Age: 84
End: 2019-10-21

## 2019-10-21 RX ORDER — METFORMIN HYDROCHLORIDE 500 MG/1
TABLET, EXTENDED RELEASE ORAL
Qty: 90 TAB | Refills: 5 | Status: SHIPPED | OUTPATIENT
Start: 2019-10-21 | End: 2020-10-15 | Stop reason: SDUPTHER

## 2019-10-21 NOTE — TELEPHONE ENCOUNTER
Called the pt and left a VM advising her that her A1c was 8.0 and her glucose the day of her labs was 185. Asked her to call the office with any further questions or concerns.

## 2019-10-22 ENCOUNTER — TELEPHONE (OUTPATIENT)
Dept: INTERNAL MEDICINE CLINIC | Age: 84
End: 2019-10-22

## 2019-10-22 NOTE — TELEPHONE ENCOUNTER
Pt states pharmacist told her to clarify on directions on taking Metformin.  Dosage is a little higher than usual

## 2019-10-23 NOTE — TELEPHONE ENCOUNTER
Returned the pt's call and after verifying HIPAA, the pt stated that her pharmacy would not fill her entire script yet due to insurance payment issues. Advised her to have the pharmacy call with any questions, but to increase her current dose to one in the morning and two at night. The pt voiced thanks and understanding.

## 2019-10-25 ENCOUNTER — OFFICE VISIT (OUTPATIENT)
Dept: CARDIOLOGY CLINIC | Age: 84
End: 2019-10-25

## 2019-10-25 VITALS
OXYGEN SATURATION: 97 % | WEIGHT: 161 LBS | SYSTOLIC BLOOD PRESSURE: 156 MMHG | HEIGHT: 59 IN | DIASTOLIC BLOOD PRESSURE: 76 MMHG | RESPIRATION RATE: 16 BRPM | BODY MASS INDEX: 32.46 KG/M2

## 2019-10-25 DIAGNOSIS — I10 BENIGN ESSENTIAL HTN: ICD-10-CM

## 2019-10-25 DIAGNOSIS — I87.2 VENOUS INSUFFICIENCY: Primary | ICD-10-CM

## 2019-10-25 DIAGNOSIS — E78.00 HYPERCHOLESTEROLEMIA: ICD-10-CM

## 2019-10-25 DIAGNOSIS — I49.3 PVC'S (PREMATURE VENTRICULAR CONTRACTIONS): ICD-10-CM

## 2019-10-25 NOTE — PROGRESS NOTES
Chief Complaint   Patient presents with    Follow-up     Visit Vitals  Resp 16   Ht 4' 11\" (1.499 m)   Wt 161 lb (73 kg)   BMI 32.52 kg/m²       Patient presents in office with c/o BLE edema, almost resolves completely overnight. Denies all other cardiac complaints. She states that she wakes up at 3AM with 10/10 pain in her right hand for 3 months. Dr. Red Serna believes pain to be d/t a pinched nerve and gave her exercises to perform. No recent hospital visits. No refills needed at this time.

## 2019-10-25 NOTE — PROGRESS NOTES
[facility]: Leatha Newberry  [phone]    HPI:  Ms. Yobany Middleton is a 81 yo F with a h/o HTN (previously on nadolol), hyperlipidemia, DM2 seen in the past for palpitations. Found on holter to have benign ectopy and started on beta blocker for symptoms. Previously off higher dose HCTZ due to low Na+. BP has been resistant; BP had improved on norvasc but had worsened LE edema. U/S 11/2012 for DVT was negative. Echo 8/2012 noted normal LV systolic function with mild to moderate MR; echo 2/17/14 unchanged. Seen by vascular in past for LE edema and c/w vascular insufficiency and recommended leg elevation and compression stockings. Echo 4/2015 was normal with EF 60% and mild MR. Since her last visit, overall she has been doing okay. No exertional chest pain and her breathing has been stable. She has chronic bilateral lower extremity edema that is unchanged. However, she notes when she plays [employer] not everyone has swelling like this and so she was wondering if anything could be done. In the past, this has been consistent with venous insufficiency and we talked a little bit about this. She is compensated on exam with clear lungs otherwise. Her blood pressure was initially 156/76, it was 135/75 on recheck. Assessment and Plan:    1. Lower extremity edema. Consistent with venous insufficiency. Echocardiogram in 2018 demonstrated normal LV function and mitral regurgitation that was mild. Continue with elevation of legs at rest, avoid socks, prn compression stockings. Will have her follow back in six months. 2. Resistant hypertension. Blood pressure is well controlled. History of white coat hypertension. 3. Type 2 diabetes. .She  has a past medical history of Adverse effect of anesthesia, Chronic bronchitis, Diabetes ([facility] [address] Essential hypertension, Hypercholesterolemia, and Hypertension. All other systems negative except as above.      PE  Vitals:    10/25/19 1129   BP: 156/76 Resp: 16   SpO2: 97%   Weight: 161 lb (73 kg)   Height: 4' 11\" (1.499 m)    Body mass index is 32.52 kg/m². General appearance - alert, well appearing, and in no distress  Mental status - affect appropriate to mood, pleasant   Neck - supple, no JVD, no bruits   Chest - clear to auscultation, no wheezes, rales or rhonchi  Heart - normal rate, regular rhythm, normal S1, S2, I-II/VI systolic murmur LUSB, rubs, clicks or gallops  Abdomen - soft, nontender, nondistended  Extremities - peripheral pulses normal, 1+ LE chronic edema bilateral lower extremities.      Recent Labs:  Lab Results   Component Value Date/Time    Cholesterol, total 145 02/21/2019 08:02 AM    HDL Cholesterol 40 02/21/2019 08:02 AM    LDL, calculated 58 02/21/2019 08:02 AM    Triglyceride 237 (H) 02/21/2019 08:02 AM    CHOL/HDL Ratio 3.3 08/21/2012 04:10 AM     Lab Results   Component Value Date/Time    Creatinine 0.89 10/09/2019 09:54 AM     Lab Results   Component Value Date/Time    BUN 13 10/09/2019 09:54 AM     Lab Results   Component Value Date/Time    Potassium 4.3 10/09/2019 09:54 AM     Lab Results   Component Value Date/Time    Hemoglobin A1c 8.0 (H) 10/09/2019 09:54 AM     Lab Results   Component Value Date/Time    HGB 13.1 02/21/2019 08:02 AM     Lab Results   Component Value Date/Time    PLATELET 854 19/19/1541 08:02 AM       Reviewed:  Past Medical History:   Diagnosis Date    Adverse effect of anesthesia     pt states she was able to feel everything during colonoscopy    Chronic bronchitis     Diabetes ([facility] [address]     Essential hypertension     Hypercholesterolemia     Hypertension      Social History     Tobacco Use   Smoking Status Never Smoker   Smokeless Tobacco Never Used     Social History     Substance and Sexual Activity   Alcohol Use No    Alcohol/week: 0.0 standard drinks     Allergies   Allergen Reactions    Cortisone Other (comments)     \"Make me feels weak, like I'm going to die\"      Januvia [Sitagliptin] Other (comments)     weakness       Current Outpatient Medications   Medication Sig    metFORMIN ER (GLUCOPHAGE XR) 500 mg tablet Take 500 mg in the morning and 1000 mg with dinner    atorvastatin (LIPITOR) 10 mg tablet TAKE ONE TABLET BY MOUTH DAILY    cloNIDine HCl (CATAPRES) 0.3 mg tablet TAKE ONE TABLET BY MOUTH 3 TIMES A DAY    OTHER 15 mm Hg below knee stockings B/L    ammonium lactate (LAC-HYDRIN) 12 % lotion rub in to affected area well    potassium chloride (KLOR-CON) 10 mEq tablet Take 1 Tab by mouth daily.  furosemide (LASIX) 40 mg tablet 1 tab po am and half tab at noon as needed for leg edema    glipiZIDE (GLUCOTROL) 5 mg tablet TAKE ONE TABLET BY MOUTH 2 TIMES A DAY    meloxicam (MOBIC) 7.5 mg tablet Take 1 Tab by mouth daily.  ALPRAZolam (XANAX) 0.5 mg tablet TAKE ONE TABLET BY MOUTH NIGHTLY AS NEEDED FOR ANXIETY, MAX DAILY AMOUNT IS 1 TABLET    metoprolol tartrate (LOPRESSOR) 100 mg IR tablet Take 1 AND 1/2 tab BY MOUTH TWICE DAILY    lisinopril (PRINIVIL, ZESTRIL) 10 mg tablet Take 1 Tab by mouth daily.  cetirizine (ZYRTEC) 10 mg tablet TAKE ONE TABLET BY MOUTH NIGHTLY AS NEEDED FOR ALLERGIES    azelastine (ASTELIN) 137 mcg (0.1 %) nasal spray 1 [place] by Both Nostrils route two (2) times a day. Use in each nostril as directed    omeprazole (PRILOSEC) 40 mg capsule TAKE ONE CAPSULE BY MOUTH EVERY DAY BEFORE BREAKFAST    cholecalciferol (VITAMIN D3) 1,000 unit tablet Take  by mouth daily.  dilTIAZem CD (CARDIZEM CD) 180 mg ER capsule Take 1 Cap by mouth daily.  Arm Brace (NEOPRENE WRIST SPLINT SUPPORT) misc On right wrist    diclofenac (VOLTAREN) 1 % gel Apply  to affected area two (2) times daily as needed.  famotidine (PEPCID) 20 mg tablet Take 1 Tab by mouth two (2) times a day.  artificial tears,hypromellose, (SYSTANE GEL) 0.3 % gel ophthalmic ointment Administer 10 g to both eyes as needed.  cod liver oil cap Take 1 Cap by mouth daily.     calcium-cholecalciferol, d3, (CALCIUM 600 + D) 600-125 mg-unit tab Take 1 Tab by mouth daily.  fluticasone (FLONASE) 50 mcg/actuation nasal spray 2 Sprays by Both Nostrils route daily.  acetaminophen (TYLENOL) 325 mg tablet Take  by mouth every four (4) hours as needed for Pain.  aspirin 81 mg chewable tablet Take 81 mg by mouth daily. No current facility-administered medications for this visit.         Chidi Valiente MD  [facility] [place]  [address] [facility] [address]  [address]

## 2019-11-12 DIAGNOSIS — H04.123 DRY EYES: ICD-10-CM

## 2019-11-13 RX ORDER — POLYETHYLENE GLYCOL 400 AND PROPYLENE GLYCOL 4; 3 MG/ML; MG/ML
SOLUTION/ DROPS OPHTHALMIC
Qty: 15 ML | Refills: 0 | Status: SHIPPED | OUTPATIENT
Start: 2019-11-13

## 2019-11-19 DIAGNOSIS — I10 ESSENTIAL HYPERTENSION WITH GOAL BLOOD PRESSURE LESS THAN 140/90: ICD-10-CM

## 2019-11-19 DIAGNOSIS — E11.9 CONTROLLED TYPE 2 DIABETES MELLITUS WITHOUT COMPLICATION, UNSPECIFIED WHETHER LONG TERM INSULIN USE (HCC): ICD-10-CM

## 2019-11-26 ENCOUNTER — TELEPHONE (OUTPATIENT)
Dept: INTERNAL MEDICINE CLINIC | Age: 84
End: 2019-11-26

## 2019-11-29 DIAGNOSIS — F41.0 ANXIETY ATTACK: ICD-10-CM

## 2019-11-29 DIAGNOSIS — I10 ESSENTIAL HYPERTENSION WITH GOAL BLOOD PRESSURE LESS THAN 140/90: ICD-10-CM

## 2019-12-02 RX ORDER — METOPROLOL TARTRATE 100 MG/1
TABLET ORAL
Qty: 45 TAB | Refills: 4 | Status: SHIPPED | OUTPATIENT
Start: 2019-12-02 | End: 2019-12-16 | Stop reason: SDUPTHER

## 2019-12-02 RX ORDER — CLONIDINE HYDROCHLORIDE 0.3 MG/1
TABLET ORAL
Qty: 90 TAB | Refills: 1 | Status: SHIPPED | OUTPATIENT
Start: 2019-12-02 | End: 2020-04-01

## 2019-12-02 RX ORDER — ALPRAZOLAM 0.5 MG/1
TABLET ORAL
Qty: 15 TAB | Refills: 0 | OUTPATIENT
Start: 2019-12-02

## 2019-12-16 RX ORDER — METOPROLOL TARTRATE 100 MG/1
TABLET ORAL
Qty: 45 TAB | Refills: 4 | Status: SHIPPED | OUTPATIENT
Start: 2019-12-16 | End: 2020-03-16 | Stop reason: SDUPTHER

## 2020-01-22 ENCOUNTER — TELEPHONE (OUTPATIENT)
Dept: INTERNAL MEDICINE CLINIC | Age: 85
End: 2020-01-22

## 2020-02-12 ENCOUNTER — OFFICE VISIT (OUTPATIENT)
Dept: INTERNAL MEDICINE CLINIC | Age: 85
End: 2020-02-12

## 2020-02-12 ENCOUNTER — DOCUMENTATION ONLY (OUTPATIENT)
Dept: INTERNAL MEDICINE CLINIC | Age: 85
End: 2020-02-12

## 2020-02-12 VITALS
TEMPERATURE: 97.5 F | HEIGHT: 59 IN | HEART RATE: 69 BPM | DIASTOLIC BLOOD PRESSURE: 78 MMHG | WEIGHT: 163 LBS | BODY MASS INDEX: 32.86 KG/M2 | OXYGEN SATURATION: 96 % | RESPIRATION RATE: 18 BRPM | SYSTOLIC BLOOD PRESSURE: 140 MMHG

## 2020-02-12 DIAGNOSIS — E78.2 MIXED HYPERLIPIDEMIA: ICD-10-CM

## 2020-02-12 DIAGNOSIS — I10 ESSENTIAL HYPERTENSION WITH GOAL BLOOD PRESSURE LESS THAN 140/90: Primary | ICD-10-CM

## 2020-02-12 DIAGNOSIS — E11.21 TYPE 2 DIABETES MELLITUS WITH NEPHROPATHY (HCC): ICD-10-CM

## 2020-02-12 DIAGNOSIS — J30.1 SEASONAL ALLERGIC RHINITIS DUE TO POLLEN: ICD-10-CM

## 2020-02-12 RX ORDER — MOMETASONE FUROATE 50 UG/1
2 SPRAY, METERED NASAL DAILY
Qty: 1 CONTAINER | Refills: 1 | Status: SHIPPED | OUTPATIENT
Start: 2020-02-12 | End: 2021-04-26

## 2020-02-12 RX ORDER — TIMOLOL MALEATE 5 MG/ML
SOLUTION OPHTHALMIC
COMMUNITY
End: 2021-04-26

## 2020-02-12 RX ORDER — DILTIAZEM HYDROCHLORIDE 90 MG/1
90 TABLET, FILM COATED ORAL 2 TIMES DAILY
Qty: 60 TAB | Refills: 3 | Status: SHIPPED | OUTPATIENT
Start: 2020-02-12 | End: 2020-03-05 | Stop reason: ALTCHOICE

## 2020-02-12 NOTE — PROGRESS NOTES
Chief Complaint   Patient presents with    Hypertension    Diabetes    Anxiety     1. Have you been to the ER, urgent care clinic since your last visit? Hospitalized since your last visit? No    2. Have you seen or consulted any other health care providers outside of the [address] [place] [address] since your last visit? Include any pap smears or colon screening.  No

## 2020-02-12 NOTE — PROGRESS NOTES
Approvedtoday   CaseId:[id number];Status:Approved; Review Type:Prior Auth; Coverage Start Date:01/13/2020; Coverage End Date:02/11/2021

## 2020-02-12 NOTE — PROGRESS NOTES
HISTORY OF PRESENT ILLNESS  Yesenia Blackburn is a 80 y.o. female here for follow-up. Has hypertension, on multiple medications. no chest pain or palpitation. Has diabetes, on metformin and glipizide. Blood sugar stable. Eye checkup up-to-date. Need foot exam.  Cardizem CD is making her nauseous. She would like to get a change to tablet form. Having nasal congestion and postnasal drip. Mild cough. Not able to afford azelastine nasal spray. Using Zyrtec. Hypertension      Diabetes     Cholesterol Problem     Medication Evaluation     Anxiety         Review of Systems   Constitutional: Negative. HENT: Positive for congestion. Eyes: Negative. Respiratory: Positive for cough. Cardiovascular: Positive for leg swelling. Gastrointestinal: Negative. Genitourinary: Negative. Musculoskeletal: Positive for joint pain. Skin: Negative. Neurological: Positive for numbness. Endo/Heme/Allergies: Negative. Psychiatric/Behavioral: Negative. Physical Exam  Constitutional:       General: She is not in acute distress. Appearance: Normal appearance. She is well-developed. She is obese. HENT:      Right Ear: Tympanic membrane normal.      Nose: Congestion present. Comments: Nasal turbinates:red inflamed,NT    Cobble stoning present. Mouth/Throat:      Mouth: Mucous membranes are moist.   Neck:      Musculoskeletal: Normal range of motion and neck supple. Thyroid: No thyromegaly. Vascular: No JVD. Cardiovascular:      Rate and Rhythm: Normal rate and regular rhythm. Heart sounds: Normal heart sounds. Pulmonary:      Effort: Pulmonary effort is normal. No respiratory distress. Breath sounds: Normal breath sounds. No wheezing. Musculoskeletal: Normal range of motion.       Comments: Diabetic foot exam:     Left Foot:   Visual Exam: normal    Pulse DP: 2+ (normal)   Filament test: normal sensation    Vibratory sensation: normal      Right Foot:   Visual Exam: normal    Pulse DP: 2+ (normal)   Filament test: normal sensation    Vibratory sensation: diminished     Lymphadenopathy:      Cervical: No cervical adenopathy. Neurological:      Mental Status: She is alert. Psychiatric:         Mood and Affect: Mood normal.         Behavior: Behavior normal.         ASSESSMENT and PLAN  Diagnoses and all orders for this visit:    1. Essential hypertension with goal blood pressure less than 140/90    Cardizem  mg tablets are making her nauseous. Will change,  -     dilTIAZem (CARDIZEM) 90 mg tablet; Take 1 Tab by mouth two (2) times a day. DC cardizem  mg  -     METABOLIC PANEL, COMPREHENSIVE    2. Seasonal allergic rhinitis due to pollen    Not able to afford azelastine. Will discontinue it. She is using Zyrtec, not helping much. Will add,  -     mometasone (NASONEX) 50 mcg/actuation nasal spray; 2 Sprays by Both Nostrils route daily. Able to use as nasal spray    3. Type 2 diabetes mellitus with nephropathy (HCC)    On glipizide and metformin. Will check,  -     METABOLIC PANEL, COMPREHENSIVE  -     HEMOGLOBIN A1C WITH EAG  -     MICROALBUMIN, UR, RAND W/ MICROALB/CREAT RATIO    4. Mixed hyperlipidemia     on statin. Will check,      -     LIPID PANEL  -     METABOLIC PANEL, COMPREHENSIVE        Discussed expected course/resolution/complications of diagnosis in detail with patient. Medication risks/benefits/costs/interactions/alternatives discussed with patient. Pt was given an after visit summary which includes diagnoses, current medications & vitals. Pt expressed understanding with the diagnosis and plan.

## 2020-02-13 LAB
ALBUMIN SERPL-MCNC: 4.3 G/DL (ref 3.6–4.6)
ALBUMIN/CREAT UR: 21 MG/G CREAT (ref 0–29)
ALBUMIN/GLOB SERPL: 1.5 {RATIO} (ref 1.2–2.2)
ALP SERPL-CCNC: 86 IU/L (ref 39–117)
ALT SERPL-CCNC: 17 IU/L (ref 0–32)
AST SERPL-CCNC: 21 IU/L (ref 0–40)
BILIRUB SERPL-MCNC: 0.3 MG/DL (ref 0–1.2)
BUN SERPL-MCNC: 10 MG/DL (ref 8–27)
BUN/CREAT SERPL: 13 (ref 12–28)
CALCIUM SERPL-MCNC: 8.9 MG/DL (ref 8.7–10.3)
CHLORIDE SERPL-SCNC: 99 MMOL/L (ref 96–106)
CHOLEST SERPL-MCNC: 133 MG/DL (ref 100–199)
CO2 SERPL-SCNC: 23 MMOL/L (ref 20–29)
CREAT SERPL-MCNC: 0.8 MG/DL (ref 0.57–1)
CREAT UR-MCNC: 86.1 MG/DL
EST. AVERAGE GLUCOSE BLD GHB EST-MCNC: 186 MG/DL
GLOBULIN SER CALC-MCNC: 2.8 G/DL (ref 1.5–4.5)
GLUCOSE SERPL-MCNC: 163 MG/DL (ref 65–99)
HBA1C MFR BLD: 8.1 % (ref 4.8–5.6)
HDLC SERPL-MCNC: 38 MG/DL
INTERPRETATION, 910389: NORMAL
LDLC SERPL CALC-MCNC: 62 MG/DL (ref 0–99)
Lab: NORMAL
MICROALBUMIN UR-MCNC: 18.4 UG/ML
POTASSIUM SERPL-SCNC: 4 MMOL/L (ref 3.5–5.2)
PROT SERPL-MCNC: 7.1 G/DL (ref 6–8.5)
SODIUM SERPL-SCNC: 138 MMOL/L (ref 134–144)
TRIGL SERPL-MCNC: 165 MG/DL (ref 0–149)
VLDLC SERPL CALC-MCNC: 33 MG/DL (ref 5–40)

## 2020-03-02 ENCOUNTER — TELEPHONE (OUTPATIENT)
Dept: INTERNAL MEDICINE CLINIC | Age: 85
End: 2020-03-02

## 2020-03-02 NOTE — TELEPHONE ENCOUNTER
----- Message from Tristin Guevara sent at 2/28/2020  4:22 PM EST -----  Regarding: Dr. Comfort Bradshaw first and last name: Christin Roberts  Reason for call:   like to discuss some important things.    Callback required yes/no and why:yes  Best contact number(s):  [phone]  Details to clarify the request:

## 2020-03-02 NOTE — TELEPHONE ENCOUNTER
Returned the pt's call and after verifying HIPAA the pt stated that she doesn't like the diltiazem that Dr. Crista Kat prescribed. She states that it causes her stomach to feel upset and \"Weak\". She would like to switch back to the medication that she was on previously. She also states that her medication is costing her a lot of money and she wants to know if there is anything that she can come off of. Advised that writer would speak to Dr. Crista Kat and get back to her. The pt stated that she actually wants to make an appt to discuss her concerns with Dr. Crista Kat. Appt made for Thursday 3/5/20 @ 11:00. The pt voiced thanks and understanding.

## 2020-03-05 ENCOUNTER — OFFICE VISIT (OUTPATIENT)
Dept: INTERNAL MEDICINE CLINIC | Age: 85
End: 2020-03-05

## 2020-03-05 VITALS
DIASTOLIC BLOOD PRESSURE: 70 MMHG | SYSTOLIC BLOOD PRESSURE: 130 MMHG | HEIGHT: 59 IN | RESPIRATION RATE: 16 BRPM | OXYGEN SATURATION: 98 % | HEART RATE: 65 BPM | TEMPERATURE: 97.1 F | WEIGHT: 163 LBS | BODY MASS INDEX: 32.86 KG/M2

## 2020-03-05 DIAGNOSIS — E11.21 TYPE 2 DIABETES MELLITUS WITH NEPHROPATHY (HCC): ICD-10-CM

## 2020-03-05 DIAGNOSIS — I10 ESSENTIAL HYPERTENSION WITH GOAL BLOOD PRESSURE LESS THAN 140/90: Primary | ICD-10-CM

## 2020-03-05 DIAGNOSIS — R11.0 NAUSEA: ICD-10-CM

## 2020-03-05 DIAGNOSIS — J30.1 ALLERGIC RHINITIS DUE TO POLLEN, UNSPECIFIED SEASONALITY: ICD-10-CM

## 2020-03-05 RX ORDER — DILTIAZEM HYDROCHLORIDE 180 MG/1
180 CAPSULE, COATED, EXTENDED RELEASE ORAL DAILY
Qty: 90 CAP | Refills: 1 | Status: SHIPPED | OUTPATIENT
Start: 2020-03-05 | End: 2020-11-04

## 2020-03-05 NOTE — PROGRESS NOTES
HISTORY OF PRESENT ILLNESS  Nelli Arriola is a 80 y.o. female here for follow-up. Has hypertension, on multiple medications. no chest pain or palpitation. Taking plain Cardizem 90 mg twice a day, making her more nauseous. She would like to go back to her original Cardizem CD tablet. Has diabetes, on metformin and glipizide. A1c 8, not watching diet. Eye checkup up-to-date. Has allergic rhinitis, taking Claritin, helping her. All labs reviewed with her. Hypertension    Associated symptoms include anxiety and nausea. Diabetes     Anxiety     Medication Evaluation     Follow-up         Review of Systems   Constitutional: Negative. Eyes: Negative. Cardiovascular: Negative. Gastrointestinal: Positive for nausea. Genitourinary: Negative. Musculoskeletal: Negative. Skin: Negative. Neurological: Negative. Endo/Heme/Allergies: Negative. Psychiatric/Behavioral: Negative. Physical Exam  Constitutional:       General: She is not in acute distress. Appearance: Normal appearance. She is well-developed. She is obese. HENT:      Mouth/Throat:      Mouth: Mucous membranes are moist.   Neck:      Musculoskeletal: Normal range of motion and neck supple. Thyroid: No thyromegaly. Vascular: No JVD. Cardiovascular:      Rate and Rhythm: Normal rate and regular rhythm. Pulses: Normal pulses. Heart sounds: Normal heart sounds. Pulmonary:      Effort: Pulmonary effort is normal. No respiratory distress. Breath sounds: Normal breath sounds. No wheezing. Abdominal:      General: Abdomen is flat. Bowel sounds are normal.      Palpations: Abdomen is soft. Musculoskeletal: Normal range of motion. Lymphadenopathy:      Cervical: No cervical adenopathy. Neurological:      Mental Status: She is alert.    Psychiatric:         Mood and Affect: Mood normal.         Behavior: Behavior normal.         ASSESSMENT and PLAN  Diagnoses and all orders for this visit: 1. Essential hypertension with goal blood pressure less than 140/90    She is not able to tolerate Cardizem 90 mg twice a day, making her more nauseous. She would like to go back to her original Cardizem CD once a day. Advised her to take it with food. Will give,  -     dilTIAZem CD (CARDIZEM CD) 180 mg ER capsule; Take 1 Cap by mouth daily. DC new cardizem 90 mg tab    2. Nausea  From plain Cardizem. Will change it. 3. Type 2 diabetes mellitus with nephropathy (HCC)    A1c 8, on metformin and glipizide. Not able to take Januvia. Will give, community diet and exercise. Be on ADA diet and exercise. -     canagliflozin (INVOKANA) 100 mg tablet; Take 1 Tab by mouth Daily (before breakfast). 4. Allergic rhinitis due to pollen, unspecified seasonality    Advised to take Claritin 10 mg every day. Discussed expected course/resolution/complications of diagnosis in detail with patient. Medication risks/benefits/costs/interactions/alternatives discussed with patient. Pt was given an after visit summary which includes diagnoses, current medications & vitals. Pt expressed understanding with the diagnosis and plan.

## 2020-03-05 NOTE — PROGRESS NOTES
1. Have you been to the ER, urgent care clinic since your last visit? Hospitalized since your last visit? No    2. Have you seen or consulted any other health care providers outside of the [address] [place] [address] since your last visit? Include any pap smears or colon screening.  No     Chief Complaint   Patient presents with    Follow-up    Medication Management       Visit Vitals  /70 (BP 1 Location: Left arm, BP Patient Position: Sitting)   Pulse 65   Temp 97.1 °F (36.2 °C) (Oral)   Resp 16   Ht 4' 11\" (1.499 m)   Wt 163 lb (73.9 kg)   SpO2 98%   BMI 32.92 kg/m²

## 2020-03-08 RX ORDER — OMEPRAZOLE 40 MG/1
CAPSULE, DELAYED RELEASE ORAL
Qty: 30 CAP | Refills: 0 | Status: SHIPPED | OUTPATIENT
Start: 2020-03-08 | End: 2020-03-09 | Stop reason: SDUPTHER

## 2020-03-09 RX ORDER — OMEPRAZOLE 40 MG/1
CAPSULE, DELAYED RELEASE ORAL
Qty: 30 CAP | Refills: 5 | Status: SHIPPED | OUTPATIENT
Start: 2020-03-09 | End: 2020-10-05 | Stop reason: SDUPTHER

## 2020-03-09 NOTE — TELEPHONE ENCOUNTER
Patient states the invokana med is too expensive out of pocket $66. Should she go back to what she was taking for sugar? She is questioning if she should be on anything for her sugar or can she try to control her diet to bring her levels down? Please contact patient. She is available before two today or after 5pm. Tomorrow she will be available after lunch time. Yes

## 2020-03-11 DIAGNOSIS — E11.21 TYPE 2 DIABETES MELLITUS WITH NEPHROPATHY (HCC): ICD-10-CM

## 2020-03-11 RX ORDER — LANCETS
EACH MISCELLANEOUS
Qty: 100 EACH | Refills: 11 | Status: SHIPPED | OUTPATIENT
Start: 2020-03-11

## 2020-03-13 ENCOUNTER — CLINICAL SUPPORT (OUTPATIENT)
Dept: INTERNAL MEDICINE CLINIC | Age: 85
End: 2020-03-13

## 2020-03-13 DIAGNOSIS — E11.9 CONTROLLED TYPE 2 DIABETES MELLITUS WITHOUT COMPLICATION, WITHOUT LONG-TERM CURRENT USE OF INSULIN (HCC): Primary | ICD-10-CM

## 2020-03-13 NOTE — PROGRESS NOTES
The patient presents today to the office for diabetic education and glucometer teaching. The American Diabetes Association diet was reviewed with the patient who was given the opportunity to ask questions. The pt had a glucometer and testing supplies. Educated the patient on proper handwashing prior to testing. A mock testing was performed to show technique. After answering any questions the patient performed her own blood sugar check. She was not fasting and her blood sugar was 147. Reviewed blood sugar log and discussed testing times (fasting in the morning, and 2 hours post prandial in the evening). The pt voiced thanks and understanding and will follow up in the office as scheduled with Dr. Diane Wolf. She will ring her blood sugar log with her to her appt.

## 2020-03-16 RX ORDER — METOPROLOL TARTRATE 100 MG/1
TABLET ORAL
Qty: 45 TAB | Refills: 4 | Status: SHIPPED | OUTPATIENT
Start: 2020-03-16 | End: 2020-08-14

## 2020-03-31 DIAGNOSIS — I10 ESSENTIAL HYPERTENSION WITH GOAL BLOOD PRESSURE LESS THAN 140/90: ICD-10-CM

## 2020-04-01 RX ORDER — CLONIDINE HYDROCHLORIDE 0.3 MG/1
TABLET ORAL
Qty: 90 TAB | Refills: 0 | Status: SHIPPED | OUTPATIENT
Start: 2020-04-01 | End: 2020-07-06

## 2020-04-14 ENCOUNTER — TELEPHONE (OUTPATIENT)
Dept: INTERNAL MEDICINE CLINIC | Age: 85
End: 2020-04-14

## 2020-04-14 NOTE — TELEPHONE ENCOUNTER
Call placed to patient and patient wanted to make sure that it is safe for her to continue to take her calcium supplement with all other medications, advised that it is not contraindicated to any of her other medications, patient voiced understanding

## 2020-04-14 NOTE — TELEPHONE ENCOUNTER
Patient called wanting to talk with a nurse. She refused to disclose the reason for the call. please call her back at [phone]

## 2020-04-16 RX ORDER — ATORVASTATIN CALCIUM 10 MG/1
TABLET, FILM COATED ORAL
Qty: 30 TAB | Refills: 0 | Status: SHIPPED | OUTPATIENT
Start: 2020-04-16 | End: 2020-06-17

## 2020-04-24 DIAGNOSIS — F41.0 ANXIETY ATTACK: ICD-10-CM

## 2020-04-24 NOTE — TELEPHONE ENCOUNTER
Requested Prescriptions     Pending Prescriptions Disp Refills    ALPRAZolam (XANAX) 0.5 mg tablet 15 Tab 0       03/05/2020 06/17/2020  [facility]

## 2020-04-27 RX ORDER — ALPRAZOLAM 0.5 MG/1
TABLET ORAL
Qty: 15 TAB | Refills: 0 | Status: SHIPPED | OUTPATIENT
Start: 2020-04-27 | End: 2020-08-26

## 2020-06-01 ENCOUNTER — TELEPHONE (OUTPATIENT)
Dept: INTERNAL MEDICINE CLINIC | Age: 85
End: 2020-06-01

## 2020-06-01 NOTE — TELEPHONE ENCOUNTER
Pt wants to know what she can take over the counter for night time mucus drainage.    Per Gideon Toledo Mucinex is recommended to take over the counter

## 2020-06-17 ENCOUNTER — VIRTUAL VISIT (OUTPATIENT)
Dept: INTERNAL MEDICINE CLINIC | Age: 85
End: 2020-06-17

## 2020-06-17 DIAGNOSIS — I10 ESSENTIAL HYPERTENSION WITH GOAL BLOOD PRESSURE LESS THAN 140/90: Primary | ICD-10-CM

## 2020-06-17 DIAGNOSIS — Z71.89 ACP (ADVANCE CARE PLANNING): ICD-10-CM

## 2020-06-17 DIAGNOSIS — E11.9 CONTROLLED TYPE 2 DIABETES MELLITUS WITHOUT COMPLICATION, WITHOUT LONG-TERM CURRENT USE OF INSULIN (HCC): ICD-10-CM

## 2020-06-17 DIAGNOSIS — E78.2 MIXED HYPERLIPIDEMIA: ICD-10-CM

## 2020-06-17 DIAGNOSIS — Z00.00 MEDICARE ANNUAL WELLNESS VISIT, SUBSEQUENT: ICD-10-CM

## 2020-06-17 DIAGNOSIS — J30.1 ALLERGIC RHINITIS DUE TO POLLEN, UNSPECIFIED SEASONALITY: ICD-10-CM

## 2020-06-17 DIAGNOSIS — E55.9 VITAMIN D DEFICIENCY: ICD-10-CM

## 2020-06-17 RX ORDER — ATORVASTATIN CALCIUM 10 MG/1
TABLET, FILM COATED ORAL
Qty: 30 TAB | Refills: 0 | Status: SHIPPED | OUTPATIENT
Start: 2020-06-17 | End: 2020-07-16

## 2020-06-17 NOTE — PROGRESS NOTES
Srikanth Baez is a 80 y.o. female    Chief Complaint   Patient presents with    Hypertension    Diabetes    Nasal Discharge     Patient states that she has some mucus in the back of her throat and the mucinex hasn't worked for her.  Ankle swelling     Patient states she has some swelling in both her ankles. Health Maintenance Due   Topic Date Due    Shingrix Vaccine Age 49> (1 of 2) 03/01/1984    Pneumococcal 65+ years (2 of 2 - PPSV23) 09/14/2017    GLAUCOMA SCREENING Q2Y  04/25/2019    Eye Exam Retinal or Dilated  04/25/2019    Medicare Yearly Exam  06/19/2020     Telephone call only [phone]    1. Have you been to the ER, urgent care clinic since your last visit? Hospitalized since your last visit? No    2. Have you seen or consulted any other health care providers outside of the [address] [place] [address] since your last visit? Include any pap smears or colon screening.  No

## 2020-06-17 NOTE — PATIENT INSTRUCTIONS
Medicare Wellness Visit, Female The best way to live healthy is to have a lifestyle where you eat a well-balanced diet, exercise regularly, limit alcohol use, and quit all forms of tobacco/nicotine, if applicable. Regular preventive services are another way to keep healthy. Preventive services (vaccines, screening tests, monitoring & exams) can help personalize your care plan, which helps you manage your own care. Screening tests can find health problems at the earliest stages, when they are easiest to treat. [facility] follows the current, evidence-based guidelines published by the [facility] Dann Ngo ([facility]) when recommending preventive services for our patients. Because we follow these guidelines, sometimes recommendations change over time as research supports it. (For example, mammograms used to be recommended annually. Even though Medicare will still pay for an annual mammogram, the newer guidelines recommend a mammogram every two years for women of average risk). Of course, you and your doctor may decide to screen more often for some diseases, based on your risk and your co-morbidities (chronic disease you are already diagnosed with). Preventive services for you include: - Medicare offers their members a free annual wellness visit, which is time for you and your primary care provider to discuss and plan for your preventive service needs. Take advantage of this benefit every year! 
-All adults over the age of 72 should receive the recommended pneumonia vaccines. Current USPSTF guidelines recommend a series of two vaccines for the best pneumonia protection.  
-All adults should have a flu vaccine yearly and a tetanus vaccine every 10 years.  
-All adults age 48 and older should receive the shingles vaccines (series of two vaccines). -All adults age 38-68 who are overweight should have a diabetes screening test once every three years. -All adults born between [birth year] and 1965 should be screened once for Hepatitis C. 
-Other screening tests and preventive services for persons with diabetes include: an eye exam to screen for diabetic retinopathy, a kidney function test, a foot exam, and stricter control over your cholesterol.  
-Cardiovascular screening for adults with routine risk involves an electrocardiogram (ECG) at intervals determined by your doctor.  
-Colorectal cancer screenings should be done for adults age 54-65 with no increased risk factors for colorectal cancer. There are a number of acceptable methods of screening for this type of cancer. Each test has its own benefits and drawbacks. Discuss with your doctor what is most appropriate for you during your annual wellness visit. The different tests include: colonoscopy (considered the best screening method), a fecal occult blood test, a fecal DNA test, and sigmoidoscopy. 
 
-A bone mass density test is recommended when a woman turns 65 to screen for osteoporosis. This test is only recommended one time, as a screening. Some providers will use this same test as a disease monitoring tool if you already have osteoporosis. -Breast cancer screenings are recommended every other year for women of normal risk, age 54-69. 
-Cervical cancer screenings for women over age 72 are only recommended with certain risk factors. Here is a list of your current Health Maintenance items (your personalized list of preventive services) with a due date: 
Health Maintenance Due Topic Date Due  Shingles Vaccine (1 of 2) 03/01/1984  Pneumococcal Vaccine (2 of 2 - PPSV23) 09/14/2017  Glaucoma Screening   04/25/2019 [facility] Eye Exam  04/25/2019

## 2020-06-17 NOTE — ACP (ADVANCE CARE PLANNING)

## 2020-06-17 NOTE — PROGRESS NOTES
This is the Subsequent Medicare Annual Wellness Exam, performed 12 months or more after the Initial AWV or the last Subsequent AWV    Consent: Rober Carmona, who was seen by synchronous (real-time) audio-video technology, and/or her healthcare decision maker, is aware that this patient-initiated, Telehealth encounter on 6/17/2020 is a billable service. While AWVs are fully covered by Medicare, any services rendered on this date that are not included in an AWV are subject to additional billing, with coverage as determined by her insurance carrier. She is aware that she may receive a bill for any such additional services and has provided verbal consent to proceed: Yes. I have reviewed the patient's medical history in detail and updated the computerized patient record.      History     Patient Active Problem List   Diagnosis Code    Hypercholesterolemia E78.00    PVC's (premature ventricular contractions) I49.3    Stasis edema I87.309    Encounter for long-term (current) use of other medications Z79.899    Unspecified constipation K59.00    Allergic rhinitis, cause unspecified J30.9    Mixed hyperlipidemia E78.2    Controlled type 2 diabetes mellitus without complication ([address] C61.6    Chronic nasal congestion R09.81    Advance directive discussed with patient Z70.80    Essential hypertension with goal blood pressure less than 140/90 I10    Type 2 diabetes mellitus with nephropathy (HCC) E11.21    Radiculopathy, cervical M54.12    Class 1 obesity due to excess calories with serious comorbidity and body mass index (BMI) of 33.0 to 33.9 in adult E66.09, Z68.33    Peripheral vascular disease (HCC) I73.9     Past Medical History:   Diagnosis Date    Adverse effect of anesthesia     pt states she was able to feel everything during colonoscopy    Chronic bronchitis     Diabetes ([address]     Essential hypertension     Hypercholesterolemia     Hypertension       Past Surgical History:   Procedure Laterality Date    COLONOSCOPY N/A 9/25/2017    COLONOSCOPY performed by Dianne Javier. Ramirez Dong MD at [facility] ENDOSCOPY    HX CHOLECYSTECTOMY      HX GYN      tubal ligation    HX HYSTERECTOMY       Current Outpatient Medications   Medication Sig Dispense Refill    ascorbic acid (VITAMIN C PO) Take  by mouth daily.  ALPRAZolam (XANAX) 0.5 mg tablet TAKE ONE TABLET BY MOUTH NIGHTLY AS NEEDED FOR ANXIETY. MAX DAILY AMOUNT 1 TABLET 15 Tab 0    atorvastatin (LIPITOR) 10 mg tablet TAKE ONE TABLET BY MOUTH DAILY 30 Tab 0    cloNIDine HCL (CATAPRES) 0.3 mg tablet TAKE ONE TABLET BY MOUTH 3 TIMES A DAY 90 Tab 0    metoprolol tartrate (LOPRESSOR) 100 mg IR tablet Take 1 AND 1/2 tab BY MOUTH TWICE DAILY 45 Tab 4    glucose blood VI test strips (True Metrix Glucose Test Strip) strip Use to check blood sugar daily and PRN 50 Strip 11    lancets misc Use to check blood sugar daily and  Each 11    omeprazole (PRILOSEC) 40 mg capsule TAKE ONE CAPSULE BY MOUTH EVERY DAY BEFORE BREAKFAST 30 Cap 5    dilTIAZem CD (CARDIZEM CD) 180 mg ER capsule Take 1 Cap by mouth daily. DC new cardizem 90 mg tab 90 Cap 1    canagliflozin (INVOKANA) 100 mg tablet Take 1 Tab by mouth Daily (before breakfast). 30 Tab 3    mometasone (NASONEX) 50 mcg/actuation nasal spray 2 Sprays by Both Nostrils route daily. Able to use as nasal spray 1 Container 1    glipiZIDE (GLUCOTROL) 5 mg tablet TAKE ONE TABLET BY MOUTH 2 TIMES A DAY 60 Tab 4    SYSTANE, PROPYLENE GLYCOL, 0.4-0.3 % drop Administer 10 g to both eyes as needed. 15 mL 0    metFORMIN ER (GLUCOPHAGE XR) 500 mg tablet Take 500 mg in the morning and 1000 mg with dinner 90 Tab 5    OTHER 15 mm Hg below knee stockings B/L 2 Each 0    potassium chloride (KLOR-CON) 10 mEq tablet Take 1 Tab by mouth daily. 90 Tab 3    furosemide (LASIX) 40 mg tablet 1 tab po am and half tab at noon as needed for leg edema 45 Tab 5    meloxicam (MOBIC) 7.5 mg tablet Take 1 Tab by mouth daily.  30 Tab 1    lisinopril (PRINIVIL, ZESTRIL) 10 mg tablet Take 1 Tab by mouth daily. 30 Tab 5    cetirizine (ZYRTEC) 10 mg tablet TAKE ONE TABLET BY MOUTH NIGHTLY AS NEEDED FOR ALLERGIES 90 Tab 1    cholecalciferol (VITAMIN D3) 1,000 unit tablet Take  by mouth daily.  diclofenac (VOLTAREN) 1 % gel Apply  to affected area two (2) times daily as needed. 100 g 1    famotidine (PEPCID) 20 mg tablet Take 1 Tab by mouth two (2) times a day. 180 Tab 1    cod liver oil cap Take 1 Cap by mouth daily.  calcium-cholecalciferol, d3, (CALCIUM 600 + D) 600-125 mg-unit tab Take 1 Tab by mouth daily.  acetaminophen (TYLENOL) 325 mg tablet Take  by mouth every four (4) hours as needed for Pain.  aspirin 81 mg chewable tablet Take 81 mg by mouth daily.       timolol (TIMOPTIC-XE) 0.5 % ophthalmic gel-forming timolol maleate 0.5 % eye gel forming solution      ammonium lactate (LAC-HYDRIN) 12 % lotion rub in to affected area well (Patient not taking: Reported on 2/12/2020) 1 Bottle 1    Arm Brace (NEOPRENE WRIST SPLINT SUPPORT) misc On right wrist (Patient not taking: Reported on 2/12/2020) 1 Each 0     Allergies   Allergen Reactions    Cortisone Other (comments)     \"Make me feels weak, like I'm going to die\"      Januvia [Sitagliptin] Other (comments)     weakness       Family History   Problem Relation Age of Onset    No Known Problems Mother     No Known Problems Father     No Known Problems Daughter     No Known Problems Daughter     No Known Problems Daughter     No Known Problems Son     No Known Problems Son     No Known Problems Son      Social History     Tobacco Use    Smoking status: Never Smoker    Smokeless tobacco: Never Used   Substance Use Topics    Alcohol use: No     Alcohol/week: 0.0 standard drinks       Depression Risk Factor Screening:     3 most recent PHQ Screens 6/17/2020   Little interest or pleasure in doing things Not at all   Feeling down, depressed, irritable, or hopeless Not at all   Total Score PHQ 2 0       Alcohol Risk Factor Screening:   Do you average 1 drink per night or more than 7 drinks a week:  Yes    On any one occasion in the past three months have you have had more than 3 drinks containing alcohol:  No      Functional Ability and Level of Safety:   Hearing: Hearing is good. Activities of Daily Living: The home contains: no safety equipment. Patient does total self care     Ambulation: with no difficulty     Fall Risk:  Fall Risk Assessment, last 12 mths 6/17/2020   Able to walk? Yes   Fall in past 12 months? No   Fall with injury? -   Number of falls in past 12 months -   Fall Risk Score -     Abuse Screen:  Patient is not abused       Cognitive Screening   Has your family/caregiver stated any concerns about your memory: no    Cognitive Screening: Normal - MMSE (Mini Mental Status Exam)    Patient Care Team   Patient Care Team:  Ruby Taveras MD as PCP - General (Internal Medicine)  Ruby Taveras MD as PCP - [facility] Provider  Marleen Ly MD as Physician (Ophthalmology)  Vee Botello MD as Physician (Cardiology)  Marleen Ly MD as Physician (Ophthalmology)  Otilia Kenney MD as Physician (Gastroenterology)    Assessment/Plan   Education and counseling provided:  Are appropriate based on today's review and evaluation  Bone mass measurement (DEXA)  Screening for glaucoma        Health Maintenance Due   Topic Date Due    Shingrix Vaccine Age 49> (1 of 2) 03/01/1984    Pneumococcal 65+ years (2 of 2 - PPSV23) 09/14/2017    GLAUCOMA SCREENING Q2Y  04/25/2019    Eye Exam Retinal or Dilated  04/25/2019       Denver Caller is a 80 y.o. female who was evaluated by an audio-video encounter for concerns as above. Patient identification was verified prior to start of the visit. A caregiver was present when appropriate.  Due to this being a TeleHealth encounter (During [place] [address] public health emergency), evaluation of the following organ systems was limited: Vitals/Constitutional/EENT/Resp/CV/GI//MS/Neuro/Skin/Heme-Lymph-Imm. Pursuant to the emergency declaration under the [facility], [facility] waiver authority and the [employer] and [employer] Act, this Virtual Visit was conducted, with patient's (and/or legal guardian's) consent, to reduce the patient's risk of exposure to COVID-19 and provide necessary medical care. Services were provided through a synchronous discussion virtually to substitute for in-person clinic visit. I was in the office. The patient was at home.       Aliza Rubio MD

## 2020-06-17 NOTE — TELEPHONE ENCOUNTER
Per verbal order by Dr. Sabine Dorsey, read back for confirmation, called the pt and advised her to hold her 500mg QAM dose and continue her 1000 mg QHS dose and Dr. Sabine Dorsey will recheck her labs with her NOV. The pt voiced thanks and understanding.

## 2020-06-17 NOTE — PROGRESS NOTES
Bill Beltran is a 80 y.o. female who was seen by synchronous (real-time) audio-video technology on 6/17/2020. Consent: Bill Beltran, who was seen by synchronous (real-time) audio-video technology, and/or her healthcare decision maker, is aware that this patient-initiated, Telehealth encounter on 6/17/2020 is a billable service, with coverage as determined by her insurance carrier. She is aware that she may receive a bill and has provided verbal consent to proceed: Yes. Assessment & Plan:   Diagnoses and all orders for this visit:    1. Essential hypertension with goal blood pressure less than 140/90    Stable blood pressure. On multiple medications. Will continue same. Will check,  -     METABOLIC PANEL, COMPREHENSIVE    2. Controlled type 2 diabetes mellitus without complication, without long-term current use of insulin (HCC)  Last A1c was 8.1. Advised her to be an ADA diet and exercise. She is not walking much. Advised her to walk every day. Will continue metformin and glipizide. And Invokana. Will check,    -     METABOLIC PANEL, COMPREHENSIVE  -     HEMOGLOBIN A1C WITH EAG    3. Mixed hyperlipidemia  -     METABOLIC PANEL, COMPREHENSIVE    4. Medicare annual wellness visit, subsequent    5. ACP (advance care planning)    6. Vitamin D deficiency    We will check,  -     VITAMIN D, 25 HYDROXY    7. Allergic rhinitis due to pollen, unspecified seasonality    Advised to use Allegra every day for the next few days. Advised to drink warm water. I spent at least 25 minutes on this visit with this established patient. Subjective: Bill Beltran is a 80 y.o. female who was seen for Hypertension; Diabetes; Nasal Discharge (Patient states that she has some mucus in the back of her throat and the mucinex hasn't worked for her. ); and Ankle swelling (Patient states she has some swelling in both her ankles. )    Ms. Mane   is staying home.   Report as some nasal congestion and postnasal drip for past several days. No shortness of breath cough or fever. She is diabetic, and not checking blood sugar. She is staying home. Compliant with medications. Eye checkup almost due. Has hypertension, compliant with medications. No need for medication refill. Denies chest pain palpitation or shortness of breath. Bone density density is up-to-date. Some ankle swelling. She she is advised to elevate her legs. Watching salt intake. Here for Medicare wellness visit. Has no living will. Prior to Admission medications    Medication Sig Start Date End Date Taking? Authorizing Provider   ascorbic acid (VITAMIN C PO) Take  by mouth daily. Yes Provider, Historical   ALPRAZolam (XANAX) 0.5 mg tablet TAKE ONE TABLET BY MOUTH NIGHTLY AS NEEDED FOR ANXIETY. MAX DAILY AMOUNT 1 TABLET 4/27/20  Yes Michele Anthony MD   atorvastatin (LIPITOR) 10 mg tablet TAKE ONE TABLET BY MOUTH DAILY 4/16/20  Yes Michele Anthony MD   cloNIDine HCL (CATAPRES) 0.3 mg tablet TAKE ONE TABLET BY MOUTH 3 TIMES A DAY 4/1/20  Yes Michele Anthony MD   metoprolol tartrate (LOPRESSOR) 100 mg IR tablet Take 1 AND 1/2 tab BY MOUTH TWICE DAILY 3/16/20  Yes Michele Anthony MD   glucose blood VI test strips (True Metrix Glucose Test Strip) strip Use to check blood sugar daily and PRN 3/11/20  Yes Michele Anthony MD   lancets misc Use to check blood sugar daily and PRN 3/11/20  Yes Michele Anthony MD   omeprazole (PRILOSEC) 40 mg capsule TAKE ONE CAPSULE BY MOUTH EVERY DAY BEFORE BREAKFAST 3/9/20  Yes Michele Anthony MD   dilTIAZem CD (CARDIZEM CD) 180 mg ER capsule Take 1 Cap by mouth daily. DC new cardizem 90 mg tab 3/5/20  Yes Michele Anthony MD   canagliflozin [facility]) 100 mg tablet Take 1 Tab by mouth Daily (before breakfast). 3/5/20  Yes Michele Anthony MD   mometasone (NASONEX) 50 mcg/actuation nasal spray 2 Sprays by Both Nostrils route daily.  Able to use as nasal spray 2/12/20  Yes Michele Anthony MD   glipiZIDE (GLUCOTROL) 5 mg tablet TAKE ONE TABLET BY MOUTH 2 TIMES A DAY 12/4/19  Yes Concetta Hooker NP   SYSTANE, PROPYLENE GLYCOL, 0.4-0.3 % drop Administer 10 g to both eyes as needed. 11/13/19  Yes Lakia Beck MD   metFORMIN ER (GLUCOPHAGE XR) 500 mg tablet Take 500 mg in the morning and 1000 mg with dinner 10/21/19  Yes Lakia Beck MD   OTHER 15 mm Hg below knee stockings B/L 8/7/19  Yes Lakia Beck MD   potassium chloride (KLOR-CON) 10 mEq tablet Take 1 Tab by mouth daily. 8/7/19  Yes Lakia Beck MD   furosemide (LASIX) 40 mg tablet 1 tab po am and half tab at noon as needed for leg edema 8/7/19  Yes Lakia Beck MD   meloxicam (MOBIC) 7.5 mg tablet Take 1 Tab by mouth daily. 7/11/19  Yes Lakia Beck MD   lisinopril (PRINIVIL, ZESTRIL) 10 mg tablet Take 1 Tab by mouth daily. 5/29/19  Yes Lakia Beck MD   cetirizine (ZYRTEC) 10 mg tablet TAKE ONE TABLET BY MOUTH NIGHTLY AS NEEDED FOR ALLERGIES 4/29/19  Yes Concetta Hooker NP   cholecalciferol (VITAMIN D3) 1,000 unit tablet Take  by mouth daily. Yes Provider, Historical   diclofenac (VOLTAREN) 1 % gel Apply  to affected area two (2) times daily as needed. 2/20/19  Yes Lakia Beck MD   famotidine (PEPCID) 20 mg tablet Take 1 Tab by mouth two (2) times a day. 2/20/19  Yes Lakia Beck MD   cod liver oil cap Take 1 Cap by mouth daily. Yes Provider, Historical   calcium-cholecalciferol, d3, (CALCIUM 600 + D) 600-125 mg-unit tab Take 1 Tab by mouth daily. Yes Provider, Historical   acetaminophen (TYLENOL) 325 mg tablet Take  by mouth every four (4) hours as needed for Pain. Yes Provider, Historical   aspirin 81 mg chewable tablet Take 81 mg by mouth daily.    Yes Other, MD Stacey   timolol (TIMOPTIC-XE) 0.5 % ophthalmic gel-forming timolol maleate 0.5 % eye gel forming solution    Provider, Historical   ammonium lactate (LAC-HYDRIN) 12 % lotion rub in to affected area well  Patient not taking: Reported on 2/12/2020 8/7/19   Lakia Beck MD   Arm Brace (NEOPRENE WRIST SPLINT SUPPORT) misc On right wrist  Patient not taking: Reported on 2/12/2020 2/20/19   Ruby Taveras MD     Allergies   Allergen Reactions    Cortisone Other (comments)     \"Make me feels weak, like I'm going to die\"      Januvia [Sitagliptin] Other (comments)     weakness       Past Medical History:   Diagnosis Date    Adverse effect of anesthesia     pt states she was able to feel everything during colonoscopy    Chronic bronchitis     Diabetes ([address]     Essential hypertension     Hypercholesterolemia     Hypertension        ROS for postnasal drip and ankle swelling. Objective:   Vital Signs: (As obtained by patient/caregiver at home)  There were no vitals taken for this visit. Constitutional: [x] Appears well-developed and well-nourished [x] No apparent distress      [] Abnormal -     Mental status: [x] Alert and awake  [x] Oriented to person/place/time [x] Able to follow commands    [] Abnormal -   HEENT normal.  Mild nasal congestion present. Neck: [x] No visualized mass [] Abnormal -     Pulmonary/Chest: [x] Respiratory effort normal   [x] No visualized signs of difficulty breathing or respiratory distress        [] Abnormal -      Musculoskeletal:   [x] Normal gait with no signs of ataxia         [x] Normal range of motion of neck        [] Abnormal -   Bilateral legs: Trace edema present. Neurological:        [x] No Facial Asymmetry (Cranial nerve 7 motor function) (limited exam due to video visit)          [x] No gaze palsy        [] Abnormal -          Skin:        [x] No significant exanthematous lesions or discoloration noted on facial skin         [] Abnormal -            Psychiatric:       [x] Normal Affect [] Abnormal -        [x] No Hallucinations    Other pertinent observable physical exam findings:-        We discussed the expected course, resolution and complications of the diagnosis(es) in detail. Medication risks, benefits, costs, interactions, and alternatives were discussed as indicated.   I advised her to contact the office if her condition worsens, changes or fails to improve as anticipated. She expressed understanding with the diagnosis(es) and plan. Fang Lovett is a 80 y.o. female who was evaluated by a video visit encounter for concerns as above. Patient identification was verified prior to start of the visit. A caregiver was present when appropriate. Due to this being a TeleHealth encounter (During [place]-[address] public health emergency), evaluation of the following organ systems was limited: Vitals/Constitutional/EENT/Resp/CV/GI//MS/Neuro/Skin/Heme-Lymph-Imm. Pursuant to the emergency declaration under the [facility] waiver authority and the [employer] and [employer] Act, this Virtual  Visit was conducted, with patient's (and/or legal guardian's) consent, to reduce the patient's risk of exposure to COVID-19 and provide necessary medical care. Services were provided through a video synchronous discussion virtually to substitute for in-person clinic visit. Patient and provider were located at their individual homes.       Zohreh Vickers MD

## 2020-06-17 NOTE — TELEPHONE ENCOUNTER
----- Message from German Aranda sent at 6/17/2020 12:07 PM EDT -----  Regarding: Dr. Itzel Hong: [phone]  Caller's first and last name: N/A  Reason for call: Pt stated forgot to mention can't take \"Metformin HCL\" in the morning, upsets stomach. Callback required yes/no and why: Yes, if necessary.   Best contact number(s): [phone]  Details to clarify the request: N/A

## 2020-06-23 ENCOUNTER — TELEPHONE (OUTPATIENT)
Dept: INTERNAL MEDICINE CLINIC | Age: 85
End: 2020-06-23

## 2020-06-23 NOTE — TELEPHONE ENCOUNTER
----- Message from Smiley Alexandre sent at 6/23/2020 11:37 AM EDT -----  Regarding: Joann Raza MD  General Message/Vendor Calls    Caller's first and last name: Linda Reason [[id number]]      Reason for call: Confirmation of lab scheduling       Callback required yes/no and why: Yes      Best contact number(s): [phone]      Details to clarify the request: pt would like to confirm if appt needed for labs based off documentation received.       Alexis Ashley

## 2020-06-24 NOTE — TELEPHONE ENCOUNTER
Returned the pt's call and after verifying HIPAA discussed her question. The pt asked what time [facility] in the hospital opened up. Advised her of the hours and that while they are still doing walk-ins, appointments are always appreciated. The pt stated that she will go Friday to have labs done. She voiced thanks and understanding.

## 2020-06-27 LAB
25(OH)D3+25(OH)D2 SERPL-MCNC: 33.3 NG/ML (ref 30–100)
ALBUMIN SERPL-MCNC: 4.2 G/DL (ref 3.6–4.6)
ALBUMIN/GLOB SERPL: 1.5 {RATIO} (ref 1.2–2.2)
ALP SERPL-CCNC: 95 IU/L (ref 39–117)
ALT SERPL-CCNC: 20 IU/L (ref 0–32)
AST SERPL-CCNC: 19 IU/L (ref 0–40)
BILIRUB SERPL-MCNC: 0.3 MG/DL (ref 0–1.2)
BUN SERPL-MCNC: 10 MG/DL (ref 8–27)
BUN/CREAT SERPL: 12 (ref 12–28)
CALCIUM SERPL-MCNC: 9.3 MG/DL (ref 8.7–10.3)
CHLORIDE SERPL-SCNC: 94 MMOL/L (ref 96–106)
CO2 SERPL-SCNC: 25 MMOL/L (ref 20–29)
CREAT SERPL-MCNC: 0.83 MG/DL (ref 0.57–1)
EST. AVERAGE GLUCOSE BLD GHB EST-MCNC: 232 MG/DL
GLOBULIN SER CALC-MCNC: 2.8 G/DL (ref 1.5–4.5)
GLUCOSE SERPL-MCNC: 235 MG/DL (ref 65–99)
HBA1C MFR BLD: 9.7 % (ref 4.8–5.6)
POTASSIUM SERPL-SCNC: 4.1 MMOL/L (ref 3.5–5.2)
PROT SERPL-MCNC: 7 G/DL (ref 6–8.5)
SODIUM SERPL-SCNC: 134 MMOL/L (ref 134–144)

## 2020-06-30 DIAGNOSIS — I10 ESSENTIAL HYPERTENSION WITH GOAL BLOOD PRESSURE LESS THAN 140/90: ICD-10-CM

## 2020-06-30 RX ORDER — LISINOPRIL 10 MG/1
TABLET ORAL
Qty: 30 TAB | Refills: 0 | Status: SHIPPED | OUTPATIENT
Start: 2020-06-30 | End: 2020-07-29 | Stop reason: SDUPTHER

## 2020-07-02 DIAGNOSIS — E11.9 CONTROLLED TYPE 2 DIABETES MELLITUS WITHOUT COMPLICATION, WITHOUT LONG-TERM CURRENT USE OF INSULIN (HCC): Primary | ICD-10-CM

## 2020-07-02 DIAGNOSIS — I10 ESSENTIAL HYPERTENSION WITH GOAL BLOOD PRESSURE LESS THAN 140/90: ICD-10-CM

## 2020-07-02 NOTE — PROGRESS NOTES
Uncontrolled diabetes. A1c is 9.7. Will increase Invokana to 300 mg a day. Will add Januvia 50 mg in the morning. She needs to be an ADA diet and exercise. Need to monitor blood sugar closely. All other labs are stable.

## 2020-07-06 ENCOUNTER — TELEPHONE (OUTPATIENT)
Dept: INTERNAL MEDICINE CLINIC | Age: 85
End: 2020-07-06

## 2020-07-06 DIAGNOSIS — E11.21 TYPE 2 DIABETES MELLITUS WITH NEPHROPATHY (HCC): Primary | ICD-10-CM

## 2020-07-06 RX ORDER — CLONIDINE HYDROCHLORIDE 0.3 MG/1
TABLET ORAL
Qty: 90 TAB | Refills: 0 | Status: SHIPPED | OUTPATIENT
Start: 2020-07-06 | End: 2020-07-29 | Stop reason: SDUPTHER

## 2020-07-06 NOTE — TELEPHONE ENCOUNTER
Pt would like Dr. Bee Angry to know her medications are too pricey. Can you advise what to do?   Pls call

## 2020-07-08 NOTE — TELEPHONE ENCOUNTER
Writer conferred with provider and per provider ok to increase as patient has but to have labs in 1 month to assess kidney function.  Advised patient regarding diabetic diet and diet information placed in mail with patients lab slip, patient voiced understanding and appreciation

## 2020-07-08 NOTE — TELEPHONE ENCOUNTER
Call placed to patient and she states both new medications for her DM are too expensive, states Dr Regina Jensen had suggested to increase her Metformin to 1000mg BID, states she has done that and is feeling well. Denies ability to monitor her BS at home.  Will advise provider and let patient know of providers answer

## 2020-07-13 RX ORDER — CETIRIZINE HCL 10 MG
TABLET ORAL
Qty: 100 TAB | Refills: 0 | Status: SHIPPED | OUTPATIENT
Start: 2020-07-13 | End: 2021-01-13

## 2020-07-29 DIAGNOSIS — I10 ESSENTIAL HYPERTENSION WITH GOAL BLOOD PRESSURE LESS THAN 140/90: ICD-10-CM

## 2020-07-29 DIAGNOSIS — E11.21 TYPE 2 DIABETES MELLITUS WITH NEPHROPATHY (HCC): ICD-10-CM

## 2020-07-29 RX ORDER — CLONIDINE HYDROCHLORIDE 0.3 MG/1
TABLET ORAL
Qty: 90 TAB | Refills: 4 | Status: SHIPPED | OUTPATIENT
Start: 2020-07-29 | End: 2021-02-03

## 2020-07-29 RX ORDER — LISINOPRIL 10 MG/1
TABLET ORAL
Qty: 30 TAB | Refills: 4 | Status: SHIPPED | OUTPATIENT
Start: 2020-07-29 | End: 2021-01-25

## 2020-07-29 RX ORDER — GLIPIZIDE 5 MG/1
TABLET ORAL
Qty: 60 TAB | Refills: 4 | Status: SHIPPED | OUTPATIENT
Start: 2020-07-29 | End: 2021-01-04

## 2020-07-29 NOTE — TELEPHONE ENCOUNTER
Requested Prescriptions     Pending Prescriptions Disp Refills    cloNIDine HCL (CATAPRES) 0.3 mg tablet 90 Tab 0    glipiZIDE (GLUCOTROL) 5 mg tablet 60 Tab 4    lisinopriL (PRINIVIL, ZESTRIL) 10 mg tablet 30 Tab 0     LOV 6/17

## 2020-08-04 LAB
BUN SERPL-MCNC: 9 MG/DL (ref 8–27)
BUN/CREAT SERPL: 10 (ref 12–28)
CALCIUM SERPL-MCNC: 9.1 MG/DL (ref 8.7–10.3)
CHLORIDE SERPL-SCNC: 97 MMOL/L (ref 96–106)
CO2 SERPL-SCNC: 24 MMOL/L (ref 20–29)
CREAT SERPL-MCNC: 0.91 MG/DL (ref 0.57–1)
GLUCOSE SERPL-MCNC: 257 MG/DL (ref 65–99)
INTERPRETATION: NORMAL
Lab: NORMAL
POTASSIUM SERPL-SCNC: 4.1 MMOL/L (ref 3.5–5.2)
SODIUM SERPL-SCNC: 136 MMOL/L (ref 134–144)

## 2020-08-05 NOTE — TELEPHONE ENCOUNTER
Creatinine increased mildly on increased dose of metformin. May continue current dose at this time. Encourage oral water intake, as tolerated.

## 2020-08-26 DIAGNOSIS — F41.0 ANXIETY ATTACK: ICD-10-CM

## 2020-08-26 RX ORDER — ALPRAZOLAM 0.5 MG/1
TABLET ORAL
Qty: 15 TAB | Refills: 0 | Status: SHIPPED | OUTPATIENT
Start: 2020-08-26 | End: 2020-08-26 | Stop reason: SDUPTHER

## 2020-08-26 RX ORDER — ALPRAZOLAM 0.5 MG/1
TABLET ORAL
Qty: 15 TAB | Refills: 0 | Status: SHIPPED | OUTPATIENT
Start: 2020-08-26 | End: 2021-01-06

## 2020-08-31 ENCOUNTER — TELEPHONE (OUTPATIENT)
Dept: INTERNAL MEDICINE CLINIC | Age: 85
End: 2020-08-31

## 2020-08-31 NOTE — TELEPHONE ENCOUNTER
Returned the pt's call and after verifying HIPAA reviewed her medications. Per the pt her diltiazem was $19 and this is very costly for her. She wants to know if she can get something cheaper. Famotidine is not covered by her insurance and she is asking to stop it. Per verbal order by Dr. Nelson, read back for confirmation, advised the pt that she can stop the famotidine. Also advised her to contact her pharmacist to to discuss the increase in the price of her medication. The pt voiced thanks and understanding.

## 2020-09-09 RX ORDER — METOPROLOL TARTRATE 100 MG/1
TABLET ORAL
Qty: 270 TAB | Refills: 1 | Status: SHIPPED | OUTPATIENT
Start: 2020-09-09 | End: 2020-10-05 | Stop reason: SDUPTHER

## 2020-09-10 ENCOUNTER — TELEPHONE (OUTPATIENT)
Dept: INTERNAL MEDICINE CLINIC | Age: 85
End: 2020-09-10

## 2020-09-10 NOTE — TELEPHONE ENCOUNTER
Pt wants to know if she can wait until her appointment in October to get her flu shot in the office? Pt usually gets this done before October. She also heard that the flu shot is a little different this year for the elderly. She wants to know if that is true?

## 2020-09-10 NOTE — TELEPHONE ENCOUNTER
Returned the pt's call and after verifying HIPAA advised her of the two different flu shots (high dose for seniors and regular). Also advised her that per Dr. Henrry Suarez, she may wait until her October visit to have her shot. The pt voiced thanks and understanding.

## 2020-10-05 ENCOUNTER — OFFICE VISIT (OUTPATIENT)
Dept: INTERNAL MEDICINE CLINIC | Age: 85
End: 2020-10-05
Payer: MEDICARE

## 2020-10-05 VITALS
HEART RATE: 97 BPM | DIASTOLIC BLOOD PRESSURE: 72 MMHG | OXYGEN SATURATION: 99 % | RESPIRATION RATE: 18 BRPM | SYSTOLIC BLOOD PRESSURE: 134 MMHG | BODY MASS INDEX: 33.06 KG/M2 | WEIGHT: 164 LBS | HEIGHT: 59 IN | TEMPERATURE: 98 F

## 2020-10-05 DIAGNOSIS — E78.00 HYPERCHOLESTEROLEMIA: ICD-10-CM

## 2020-10-05 DIAGNOSIS — E11.9 CONTROLLED TYPE 2 DIABETES MELLITUS WITHOUT COMPLICATION, UNSPECIFIED WHETHER LONG TERM INSULIN USE (HCC): ICD-10-CM

## 2020-10-05 DIAGNOSIS — I10 ESSENTIAL HYPERTENSION: Primary | ICD-10-CM

## 2020-10-05 DIAGNOSIS — Z23 NEEDS FLU SHOT: ICD-10-CM

## 2020-10-05 DIAGNOSIS — I49.3 PVC'S (PREMATURE VENTRICULAR CONTRACTIONS): ICD-10-CM

## 2020-10-05 DIAGNOSIS — K21.9 GASTROESOPHAGEAL REFLUX DISEASE WITHOUT ESOPHAGITIS: ICD-10-CM

## 2020-10-05 PROCEDURE — 99214 OFFICE O/P EST MOD 30 MIN: CPT | Performed by: INTERNAL MEDICINE

## 2020-10-05 PROCEDURE — 90694 VACC AIIV4 NO PRSRV 0.5ML IM: CPT

## 2020-10-05 PROCEDURE — G0008 ADMIN INFLUENZA VIRUS VAC: HCPCS

## 2020-10-05 RX ORDER — METOPROLOL TARTRATE 100 MG/1
TABLET ORAL
Qty: 45 TAB | Refills: 5 | Status: SHIPPED | OUTPATIENT
Start: 2020-10-05 | End: 2021-04-14

## 2020-10-05 RX ORDER — OMEPRAZOLE 40 MG/1
CAPSULE, DELAYED RELEASE ORAL
Qty: 30 CAP | Refills: 5 | Status: SHIPPED | OUTPATIENT
Start: 2020-10-05 | End: 2021-04-07

## 2020-10-05 NOTE — PROGRESS NOTES
HISTORY OF PRESENT ILLNESS  Gudelia Cintron is a 80 y.o. female. HPI    Review of Systems   Constitutional: Negative. HENT: Negative. Eyes: Negative. Respiratory: Negative. Cardiovascular: Negative. Gastrointestinal: Negative. Genitourinary: Negative. Musculoskeletal: Negative. Skin: Negative. Neurological: Negative. Endo/Heme/Allergies: Negative. Psychiatric/Behavioral: Negative. Physical Exam  Constitutional:       Appearance: Normal appearance. She is obese. HENT:      Mouth/Throat:      Mouth: Mucous membranes are moist.   Neck:      Musculoskeletal: Normal range of motion. Cardiovascular:      Rate and Rhythm: Normal rate and regular rhythm. Pulses: Normal pulses. Heart sounds: Normal heart sounds. Pulmonary:      Effort: Pulmonary effort is normal.      Breath sounds: Normal breath sounds. Neurological:      Mental Status: She is alert. Psychiatric:         Mood and Affect: Mood normal.         Behavior: Behavior normal.         Thought Content: Thought content normal.         ASSESSMENT and PLAN  Diagnoses and all orders for this visit:    1. Needs flu shot  -     FLU (FLUAD QUAD INFLUENZA VACCINE,QUAD,ADJUVANTED)    2. Essential hypertension    Will refill,  -     metoprolol tartrate (LOPRESSOR) 100 mg IR tablet; Take 1 AND 1/2 TABLETS BY MOUTH TWICE DAILY  -     METABOLIC PANEL, COMPREHENSIVE    3. PVC's (premature ventricular contractions)  -     metoprolol tartrate (LOPRESSOR) 100 mg IR tablet; Take 1 AND 1/2 TABLETS BY MOUTH TWICE DAILY    4. Hypercholesterolemia  Stable. On Lipitor. 5. Controlled type 2 diabetes mellitus without complication, unspecified whether long term insulin use (HCC)    Sugars are not controlled. Not able to afford Tradjenta or Tiny Prints. On metformin only. Will check,  -     METABOLIC PANEL, COMPREHENSIVE  -     HEMOGLOBIN A1C WITH EAG  -     MICROALBUMIN, UR, RAND W/ MICROALB/CREAT RATIO    6.  Gastroesophageal reflux disease without esophagitis  -     omeprazole (PRILOSEC) 40 mg capsule; TAKE ONE CAPSULE BY MOUTH EVERY DAY BEFORE BREAKFAST    Issues reviewed with her: referral to Diabetic Education department, diabetic diet discussed in detail, written exchange diet given, home glucose monitoring emphasized, all medications, side effects and compliance discussed carefully, foot care discussed and Podiatry visits discussed, annual eye examinations at Ophthalmology discussed, long term diabetic complications discussed and labs immediately prior to next visit.

## 2020-10-05 NOTE — PATIENT INSTRUCTIONS
Vaccine Information Statement Influenza (Flu) Vaccine (Inactivated or Recombinant): What You Need to Know Many Vaccine Information Statements are available in [place] and other languages. See www.immunize.org/vis Hojas de información sobre vacunas están disponibles en español y en muchos otros idiomas. Visite www.immunize.org/vis 1. Why get vaccinated? Influenza vaccine can prevent influenza (flu). Flu is a contagious disease that spreads around the [facility] every year, usually between October and May. Anyone can get the flu, but it is more dangerous for some people. Infants and young children, people 72years of age and older, pregnant women, and people with certain health conditions or a weakened immune system are at greatest risk of flu complications. Pneumonia, bronchitis, sinus infections and ear infections are examples of flu-related complications. If you have a medical condition, such as heart disease, cancer or diabetes, flu can make it worse. Flu can cause fever and chills, sore throat, muscle aches, fatigue, cough, headache, and runny or stuffy nose. Some people may have vomiting and diarrhea, though this is more common in children than adults. Each year thousands of people in the [facility] die from flu, and many more are hospitalized. Flu vaccine prevents millions of illnesses and flu-related visits to the doctor each year. 2. Influenza vaccines CDC recommends everyone 10months of age and older get vaccinated every flu season. Children 6 months through 6years of age may need 2 doses during a single flu season. Everyone else needs only 1 dose each flu season. It takes about 2 weeks for protection to develop after vaccination. There are many flu viruses, and they are always changing. Each year a new flu vaccine is made to protect against three or four viruses that are likely to cause disease in the upcoming flu season.  Even when the vaccine doesnt exactly match these viruses, it may still provide some protection. Influenza vaccine does not cause flu. Influenza vaccine may be given at the same time as other vaccines. 3. Talk with your health care provider Tell your vaccine provider if the person getting the vaccine: 
 Has had an allergic reaction after a previous dose of influenza vaccine, or has any severe, life-threatening allergies.  Has ever had Guillain-Barré Syndrome (also called GBS). In some cases, your health care provider may decide to postpone influenza vaccination to a future visit. People with minor illnesses, such as a cold, may be vaccinated. People who are moderately or severely ill should usually wait until they recover before getting influenza vaccine. Your health care provider can give you more information. 4. Risks of a reaction  Soreness, redness, and swelling where shot is given, fever, muscle aches, and headache can happen after influenza vaccine.  There may be a very small increased risk of Guillain-Barré Syndrome (GBS) after inactivated influenza vaccine (the flu shot). Mikel Polite children who get the flu shot along with pneumococcal vaccine (PCV13), and/or DTaP vaccine at the same time might be slightly more likely to have a seizure caused by fever. Tell your health care provider if a child who is getting flu vaccine has ever had a seizure. People sometimes faint after medical procedures, including vaccination. Tell your provider if you feel dizzy or have vision changes or ringing in the ears. As with any medicine, there is a very remote chance of a vaccine causing a severe allergic reaction, other serious injury, or death. 5. What if there is a serious problem? An allergic reaction could occur after the vaccinated person leaves the clinic.  If you see signs of a severe allergic reaction (hives, swelling of the face and throat, difficulty breathing, a fast heartbeat, dizziness, or weakness), call 9-1-1 and get the person to the nearest hospital. 
 
For other signs that concern you, call your health care provider. Adverse reactions should be reported to the Vaccine Adverse Event Reporting System (VAERS). Your health care provider will usually file this report, or you can do it yourself. Visit the VAERS website at www.vaers. hhs.gov or call 3-957.405.8921. VAERS is only for reporting reactions, and VAERS staff do not give medical advice. 6. The National Vaccine Injury Compensation Program 
 
The [facility] Vaccine Injury Compensation Program (VICP) is a federal program that was created to compensate people who may have been injured by certain vaccines. Visit the VICP website at www.[facility].gov/vaccinecompensation or call 9-262.969.8427 to learn about the program and about filing a claim. There is a time limit to file a claim for compensation. 7. How can I learn more?  Ask your health care provider.  Call your local or state health department.  Contact the Centers for Disease Control and Prevention (CDC): 
- Call 5-172.686.9109 (1-800-CDC-INFO) or 
- Visit CDCs influenza website at www.cdc.gov/flu Vaccine Information Statement (Interim) Inactivated Influenza Vaccine 8/15/2019 
[address] CHIARA Garcia [id number] Department of Health and [employer] Interactive Centers for Disease Control and Prevention Office Use Only Learning About Diabetes Food Guidelines Your Care Instructions Meal planning is important to manage diabetes. It helps keep your blood sugar at a target level (which you set with your doctor). You don't have to eat special foods. You can eat what your family eats, including sweets once in a while. But you do have to pay attention to how often you eat and how much you eat of certain foods. You may want to work with a dietitian or a certified diabetes educator (CDE) to help you plan meals and snacks.  A dietitian or CDE can also help you lose weight if that is one of your goals. What should you know about eating carbs? Managing the amount of carbohydrate (carbs) you eat is an important part of healthy meals when you have diabetes. Carbohydrate is found in many foods. · Learn which foods have carbs. And learn the amounts of carbs in different foods. ? Bread, cereal, pasta, and rice have about 15 grams of carbs in a serving. A serving is 1 slice of bread (1 ounce), ½ cup of cooked cereal, or 1/3 cup of cooked pasta or rice. ? Fruits have 15 grams of carbs in a serving. A serving is 1 small fresh fruit, such as an apple or orange; ½ of a banana; ½ cup of cooked or canned fruit; ½ cup of fruit juice; 1 cup of melon or raspberries; or 2 tablespoons of dried fruit. ? Milk and no-sugar-added yogurt have 15 grams of carbs in a serving. A serving is 1 cup of milk or 2/3 cup of no-sugar-added yogurt. ? Starchy vegetables have 15 grams of carbs in a serving. A serving is ½ cup of mashed potatoes or sweet potato; 1 cup winter squash; ½ of a small baked potato; ½ cup of cooked beans; or ½ cup cooked corn or green peas. · Learn how much carbs to eat each day and at each meal. A dietitian or CDE can teach you how to keep track of the amount of carbs you eat. This is called carbohydrate counting. · If you are not sure how to count carbohydrate grams, use the Plate Method to plan meals. It is a good, quick way to make sure that you have a balanced meal. It also helps you spread carbs throughout the day. ? Divide your plate by types of foods. Put non-starchy vegetables on half the plate, meat or other protein food on one-quarter of the plate, and a grain or starchy vegetable in the final quarter of the plate.  To this you can add a small piece of fruit and 1 cup of milk or yogurt, depending on how many carbs you are supposed to eat at a meal. 
· Try to eat about the same amount of carbs at each meal. Do not \"save up\" your daily allowance of carbs to eat at one meal. 
· Proteins have very little or no carbs per serving. Examples of proteins are beef, chicken, turkey, fish, eggs, tofu, cheese, cottage cheese, and peanut butter. A serving size of meat is 3 ounces, which is about the size of a deck of cards. Examples of meat substitute serving sizes (equal to 1 ounce of meat) are 1/4 cup of cottage cheese, 1 egg, 1 tablespoon of peanut butter, and ½ cup of tofu. How can you eat out and still eat healthy? · Learn to estimate the serving sizes of foods that have carbohydrate. If you measure food at home, it will be easier to estimate the amount in a serving of restaurant food. · If the meal you order has too much carbohydrate (such as potatoes, corn, or baked beans), ask to have a low-carbohydrate food instead. Ask for a salad or green vegetables. · If you use insulin, check your blood sugar before and after eating out to help you plan how much to eat in the future. · If you eat more carbohydrate at a meal than you had planned, take a walk or do other exercise. This will help lower your blood sugar. What else should you know? · Limit saturated fat, such as the fat from meat and dairy products. This is a healthy choice because people who have diabetes are at higher risk of heart disease. So choose lean cuts of meat and nonfat or low-fat dairy products. Use olive or canola oil instead of butter or shortening when cooking. · Don't skip meals. Your blood sugar may drop too low if you skip meals and take insulin or certain medicines for diabetes. · Check with your doctor before you drink alcohol. Alcohol can cause your blood sugar to drop too low. Alcohol can also cause a bad reaction if you take certain diabetes medicines. Follow-up care is a key part of your treatment and safety.  Be sure to make and go to all appointments, and call your doctor if you are having problems. It's also a good idea to know your test results and keep a list of the medicines you take. Where can you learn more? Go to http://www.gray.com/ Enter B621 in the search box to learn more about \"Learning About Diabetes Food Guidelines. \" Current as of: December 20, 2019               Content Version: 12.6 © 0947-6204 [employer]. Care instructions adapted under license by [employer] (which disclaims liability or warranty for this information). If you have questions about a medical condition or this instruction, always ask your healthcare professional. [place] [address] any warranty or liability for your use of this information.

## 2020-10-05 NOTE — PROGRESS NOTES
Health Maintenance Due   Topic Date Due    Shingrix Vaccine Age 49> (1 of 2) 03/01/1984    Pneumococcal 65+ years (2 of 2 - PPSV23) 09/14/2017    GLAUCOMA SCREENING Q2Y  04/25/2019    Eye Exam Retinal or Dilated  04/25/2019    Flu Vaccine (1) 09/01/2020       Chief Complaint   Patient presents with    Diabetes    Obesity    Cholesterol Problem       1. Have you been to the ER, urgent care clinic since your last visit? Hospitalized since your last visit? No    2. Have you seen or consulted any other health care providers outside of the [address] [place] [address] since your last visit? Include any pap smears or colon screening. No    3) Do you have an Advance Directive on file? no    4) Are you interested in receiving information on Advance Directives? NO      Patient is accompanied by self I have received verbal consent from Aubrey Womack to discuss any/all medical information while they are present in the room.

## 2020-10-07 ENCOUNTER — TRANSCRIBE ORDER (OUTPATIENT)
Dept: REGISTRATION | Age: 85
End: 2020-10-07

## 2020-10-07 DIAGNOSIS — Z12.31 VISIT FOR SCREENING MAMMOGRAM: Primary | ICD-10-CM

## 2020-10-10 LAB
ALBUMIN SERPL-MCNC: 4.5 G/DL (ref 3.6–4.6)
ALBUMIN/CREAT UR: 47 MG/G CREAT (ref 0–29)
ALBUMIN/GLOB SERPL: 1.7 {RATIO} (ref 1.2–2.2)
ALP SERPL-CCNC: 108 IU/L (ref 39–117)
ALT SERPL-CCNC: 19 IU/L (ref 0–32)
AST SERPL-CCNC: 19 IU/L (ref 0–40)
BILIRUB SERPL-MCNC: 0.4 MG/DL (ref 0–1.2)
BUN SERPL-MCNC: 10 MG/DL (ref 8–27)
BUN/CREAT SERPL: 12 (ref 12–28)
CALCIUM SERPL-MCNC: 9.2 MG/DL (ref 8.7–10.3)
CHLORIDE SERPL-SCNC: 95 MMOL/L (ref 96–106)
CO2 SERPL-SCNC: 27 MMOL/L (ref 20–29)
CREAT SERPL-MCNC: 0.86 MG/DL (ref 0.57–1)
CREAT UR-MCNC: 82.4 MG/DL
EST. AVERAGE GLUCOSE BLD GHB EST-MCNC: 266 MG/DL
GLOBULIN SER CALC-MCNC: 2.6 G/DL (ref 1.5–4.5)
GLUCOSE SERPL-MCNC: 254 MG/DL (ref 65–99)
HBA1C MFR BLD: 10.9 % (ref 4.8–5.6)
MICROALBUMIN UR-MCNC: 39 UG/ML
POTASSIUM SERPL-SCNC: 4.3 MMOL/L (ref 3.5–5.2)
PROT SERPL-MCNC: 7.1 G/DL (ref 6–8.5)
SODIUM SERPL-SCNC: 136 MMOL/L (ref 134–144)

## 2020-10-15 DIAGNOSIS — E11.21 TYPE 2 DIABETES MELLITUS WITH NEPHROPATHY (HCC): ICD-10-CM

## 2020-10-15 RX ORDER — METFORMIN HYDROCHLORIDE 500 MG/1
TABLET, EXTENDED RELEASE ORAL
Qty: 120 TAB | Refills: 5 | Status: SHIPPED | OUTPATIENT
Start: 2020-10-15 | End: 2021-02-08 | Stop reason: SDUPTHER

## 2020-10-15 NOTE — PROGRESS NOTES
Uncontrolled diabetes. As she is unable to afford her Januvia or Jardiance. Advised her to increase Metformin to 500 mg 2 tablet in the morning and 2 at night. She should be on glipizide 5 mg twice a day and be on ADA diet and exercise. Repeat BMP in 1 month.

## 2020-10-16 NOTE — PROGRESS NOTES
Reviewed results and recommendation with patient via telephone after confirming name and date of birth. Reviewed diabetic diet.

## 2020-10-21 ENCOUNTER — HOSPITAL ENCOUNTER (OUTPATIENT)
Dept: MAMMOGRAPHY | Age: 85
Discharge: HOME | End: 2020-10-21
Attending: INTERNAL MEDICINE
Payer: MEDICARE

## 2020-10-21 DIAGNOSIS — Z12.31 VISIT FOR SCREENING MAMMOGRAM: ICD-10-CM

## 2020-10-21 PROCEDURE — 77067 SCR MAMMO BI INCL CAD: CPT

## 2020-10-26 ENCOUNTER — OFFICE VISIT (OUTPATIENT)
Dept: CARDIOLOGY CLINIC | Age: 85
End: 2020-10-26
Payer: MEDICARE

## 2020-10-26 VITALS
BODY MASS INDEX: 33.75 KG/M2 | DIASTOLIC BLOOD PRESSURE: 90 MMHG | HEART RATE: 74 BPM | SYSTOLIC BLOOD PRESSURE: 140 MMHG | OXYGEN SATURATION: 98 % | RESPIRATION RATE: 14 BRPM | WEIGHT: 167.4 LBS | HEIGHT: 59 IN

## 2020-10-26 DIAGNOSIS — I87.2 VENOUS INSUFFICIENCY: Primary | ICD-10-CM

## 2020-10-26 DIAGNOSIS — E11.8 DM TYPE 2, CONTROLLED, WITH COMPLICATION (HCC): ICD-10-CM

## 2020-10-26 DIAGNOSIS — I49.3 PVC'S (PREMATURE VENTRICULAR CONTRACTIONS): ICD-10-CM

## 2020-10-26 DIAGNOSIS — I10 BENIGN ESSENTIAL HTN: ICD-10-CM

## 2020-10-26 PROCEDURE — G8427 DOCREV CUR MEDS BY ELIG CLIN: HCPCS | Performed by: INTERNAL MEDICINE

## 2020-10-26 PROCEDURE — 99214 OFFICE O/P EST MOD 30 MIN: CPT | Performed by: INTERNAL MEDICINE

## 2020-10-26 PROCEDURE — G8510 SCR DEP NEG, NO PLAN REQD: HCPCS | Performed by: INTERNAL MEDICINE

## 2020-10-26 PROCEDURE — 1101F PT FALLS ASSESS-DOCD LE1/YR: CPT | Performed by: INTERNAL MEDICINE

## 2020-10-26 PROCEDURE — G8536 NO DOC ELDER MAL SCRN: HCPCS | Performed by: INTERNAL MEDICINE

## 2020-10-26 PROCEDURE — 1090F PRES/ABSN URINE INCON ASSESS: CPT | Performed by: INTERNAL MEDICINE

## 2020-10-26 PROCEDURE — G8417 CALC BMI ABV UP PARAM F/U: HCPCS | Performed by: INTERNAL MEDICINE

## 2020-10-26 NOTE — PROGRESS NOTES
[facility]: Nga Katz  [phone]    HPI:  Ms. Berto Alejo is a 81 yo F with a h/o HTN (previously on nadolol), hyperlipidemia, DM2 seen in the past for palpitations. Found on holter to have benign ectopy and started on beta blocker for symptoms. Previously off higher dose HCTZ due to low Na+. BP has been resistant; BP had improved on norvasc but had worsened LE edema. U/S 11/2012 for DVT was negative. Echo 8/2012 noted normal LV systolic function with mild to moderate MR; echo 2/17/14 unchanged. Seen by vascular in past for LE edema and c/w vascular insufficiency and recommended leg elevation and compression stockings. Echo 4/2015 was normal with EF 60% and mild MR. Since her last visit, she overall has been doing well. She has had some elevation in her blood sugar and saw her primary care physician recently and they are making adjustments. With COVID, she has not been able to exercise as much and we did talk about trying to get in regular walking. She is on a street where she can safely walk outside and encouraged doing 20-30 minutes five days a week. Her passion was playing [employer], but now with the virus this has been a lot more challenging. She is able to purchase a game at the store and play it, but it is not the same as before. She is compensated on exam with clear lungs. She has chronic trace to 1+ bilateral lower extremity edema that is overall unchanged. She has been compliant with her medications. Blood pressure overall has been normal.  I reviewed personally her EKG. Assessment and Plan:    1. Lower extremity edema. Consistent with venous insufficiency. Echocardiogram in 2018 demonstrated normal LV function and mild mitral regurgitation. Continue with elevating legs, minimizing salt intake. Encouraged regular exercise and activity. Will tentatively have her follow back in one year. 2. Resistant hypertension. Blood pressure is controlled.   History of white coat hypertension. 3. Type 2 diabetes. She is having her blood sugars adjusted. Hopefully with routine exercise and activity, blood sugars will also improve. .She  has a past medical history of Adverse effect of anesthesia, Chronic bronchitis, Diabetes ([address] Essential hypertension, Hypercholesterolemia, and Hypertension. All other systems negative except as above. PE  Vitals:    10/26/20 1331   BP: (!) 140/90   Pulse: 74   Resp: 14   SpO2: 98%   Weight: 167 lb 6.4 oz (75.9 kg)   Height: 4' 11\" (1.499 m)    Body mass index is 33.81 kg/m². General appearance - alert, well appearing, and in no distress  Mental status - affect appropriate to mood, pleasant   Neck - supple, no JVD, no bruits   Chest - clear to auscultation, no wheezes, rales or rhonchi  Heart - normal rate, regular rhythm, normal S1, S2, I-II/VI systolic murmur LUSB, rubs, clicks or gallops  Abdomen - soft, nontender, nondistended  Extremities - peripheral pulses normal, 1+ LE chronic edema bilateral lower extremities.      Recent Labs:  Lab Results   Component Value Date/Time    Cholesterol, total 133 02/12/2020 09:57 AM    HDL Cholesterol 38 (L) 02/12/2020 09:57 AM    LDL, calculated 62 02/12/2020 09:57 AM    Triglyceride 165 (H) 02/12/2020 09:57 AM    CHOL/HDL Ratio 3.3 08/21/2012 04:10 AM     Lab Results   Component Value Date/Time    Creatinine 0.86 10/09/2020 08:34 AM     Lab Results   Component Value Date/Time    BUN 10 10/09/2020 08:34 AM     Lab Results   Component Value Date/Time    Potassium 4.3 10/09/2020 08:34 AM     Lab Results   Component Value Date/Time    Hemoglobin A1c 10.9 (H) 10/09/2020 08:34 AM     Lab Results   Component Value Date/Time    HGB 13.1 02/21/2019 08:02 AM     Lab Results   Component Value Date/Time    PLATELET 422 80/95/5476 08:02 AM       Reviewed:  Past Medical History:   Diagnosis Date    Adverse effect of anesthesia     pt states she was able to feel everything during colonoscopy    Chronic bronchitis     Diabetes [address] [address]     Essential hypertension     Hypercholesterolemia     Hypertension      Social History     Tobacco Use   Smoking Status Never Smoker   Smokeless Tobacco Never Used     Social History     Substance and Sexual Activity   Alcohol Use No    Alcohol/week: 0.0 standard drinks     Allergies   Allergen Reactions    Cortisone Other (comments)     \"Make me feels weak, like I'm going to die\"      Januvia [Sitagliptin] Other (comments)     weakness       Current Outpatient Medications   Medication Sig    metFORMIN ER (GLUCOPHAGE XR) 500 mg tablet Take 1000 mg in the morning and 1000 mg with dinner    metoprolol tartrate (LOPRESSOR) 100 mg IR tablet Take 1 AND 1/2 TABLETS BY MOUTH TWICE DAILY    omeprazole (PRILOSEC) 40 mg capsule TAKE ONE CAPSULE BY MOUTH EVERY DAY BEFORE BREAKFAST    furosemide (LASIX) 40 mg tablet TAKE1 TABLET EVERY DAY    potassium chloride SR (KLOR-CON 10) 10 mEq tablet TAKE ONE TABLET BY MOUTH DAILY    ALPRAZolam (XANAX) 0.5 mg tablet 1 tab po every day PRN    cloNIDine HCL (CATAPRES) 0.3 mg tablet TAKE ONE TABLET BY MOUTH 3 TIMES A DAY    glipiZIDE (GLUCOTROL) 5 mg tablet TAKE ONE TABLET BY MOUTH 2 TIMES A DAY    lisinopriL (PRINIVIL, ZESTRIL) 10 mg tablet TAKE ONE TABLET EVERY DAY    atorvastatin (LIPITOR) 10 mg tablet TAKE ONE TABLET BY MOUTH DAILY    cetirizine (ZYRTEC) 10 mg tablet TAKE ONE TABLET BY MOUTH NIGHTLY AS NEEDED FOR ALLERGIES    ascorbic acid (VITAMIN C PO) Take  by mouth daily.  glucose blood VI test strips (True Metrix Glucose Test Strip) strip Use to check blood sugar daily and PRN    lancets misc Use to check blood sugar daily and PRN    dilTIAZem CD (CARDIZEM CD) 180 mg ER capsule Take 1 Cap by mouth daily. DC new cardizem 90 mg tab    timolol (TIMOPTIC-XE) 0.5 % ophthalmic gel-forming timolol maleate 0.5 % eye gel forming solution    mometasone (NASONEX) 50 mcg/actuation nasal spray 2 Sprays by Both Nostrils route daily.  Able to use as nasal spray    SYSTANE, PROPYLENE GLYCOL, 0.4-0.3 % drop Administer 10 g to both eyes as needed.  OTHER 15 mm Hg below knee stockings B/L    ammonium lactate (LAC-HYDRIN) 12 % lotion rub in to affected area well    meloxicam (MOBIC) 7.5 mg tablet Take 1 Tab by mouth daily.  cholecalciferol (VITAMIN D3) 1,000 unit tablet Take  by mouth daily.  Arm Brace (NEOPRENE WRIST SPLINT SUPPORT) misc On right wrist    diclofenac (VOLTAREN) 1 % gel Apply  to affected area two (2) times daily as needed.  famotidine (PEPCID) 20 mg tablet Take 1 Tab by mouth two (2) times a day.  cod liver oil cap Take 1 Cap by mouth daily.  calcium-cholecalciferol, d3, (CALCIUM 600 + D) 600-125 mg-unit tab Take 1 Tab by mouth daily.  acetaminophen (TYLENOL) 325 mg tablet Take  by mouth every four (4) hours as needed for Pain.  aspirin 81 mg chewable tablet Take 81 mg by mouth daily. No current facility-administered medications for this visit.         Chelly Tavares MD  [facility]  [address] [facility] [address]  [facility], [address]

## 2020-11-03 ENCOUNTER — TELEPHONE (OUTPATIENT)
Dept: INTERNAL MEDICINE CLINIC | Age: 85
End: 2020-11-03

## 2020-11-03 NOTE — TELEPHONE ENCOUNTER
Patient wants a call back. She refused to give any detail as to the nature of the call. She mentioned needing a lab slip. I told her that I could mail her the lab order in the system and she said no. That she did not want me to mail it she only wanted it mailed from the nurse.

## 2020-11-03 NOTE — TELEPHONE ENCOUNTER
Call placed to pt - she asked that lab slip be mailed out to her. I advised her that the lab slip will be mailed out today.

## 2020-12-03 ENCOUNTER — HOSPITAL ENCOUNTER (OUTPATIENT)
Dept: LAB | Age: 85
Discharge: HOME | End: 2020-12-03
Payer: MEDICARE

## 2020-12-03 PROCEDURE — 36415 COLL VENOUS BLD VENIPUNCTURE: CPT

## 2020-12-03 PROCEDURE — 80048 BASIC METABOLIC PNL TOTAL CA: CPT

## 2020-12-04 LAB
BUN SERPL-MCNC: 8 MG/DL (ref 8–27)
BUN/CREAT SERPL: 10 (ref 12–28)
CALCIUM SERPL-MCNC: 9.4 MG/DL (ref 8.7–10.3)
CHLORIDE SERPL-SCNC: 99 MMOL/L (ref 96–106)
CO2 SERPL-SCNC: 25 MMOL/L (ref 20–29)
CREAT SERPL-MCNC: 0.8 MG/DL (ref 0.57–1)
GLUCOSE SERPL-MCNC: 188 MG/DL (ref 65–99)
POTASSIUM SERPL-SCNC: 3.9 MMOL/L (ref 3.5–5.2)
SODIUM SERPL-SCNC: 139 MMOL/L (ref 134–144)

## 2020-12-10 DIAGNOSIS — I87.2 CHRONIC VENOUS INSUFFICIENCY: ICD-10-CM

## 2020-12-11 DIAGNOSIS — I87.2 CHRONIC VENOUS INSUFFICIENCY: ICD-10-CM

## 2020-12-14 RX ORDER — FUROSEMIDE 40 MG/1
TABLET ORAL
Qty: 90 TAB | Refills: 0 | Status: SHIPPED | OUTPATIENT
Start: 2020-12-14 | End: 2021-02-08 | Stop reason: SDUPTHER

## 2020-12-15 RX ORDER — FUROSEMIDE 40 MG/1
TABLET ORAL
Qty: 30 TAB | Refills: 0 | Status: SHIPPED | OUTPATIENT
Start: 2020-12-15 | End: 2021-04-26

## 2021-01-01 DIAGNOSIS — E11.21 TYPE 2 DIABETES MELLITUS WITH NEPHROPATHY (HCC): ICD-10-CM

## 2021-01-04 RX ORDER — GLIPIZIDE 5 MG/1
TABLET ORAL
Qty: 60 TAB | Refills: 0 | Status: SHIPPED | OUTPATIENT
Start: 2021-01-04 | End: 2021-02-03

## 2021-01-06 DIAGNOSIS — F41.0 ANXIETY ATTACK: ICD-10-CM

## 2021-01-06 RX ORDER — ALPRAZOLAM 0.5 MG/1
TABLET ORAL
Qty: 15 TAB | Refills: 0 | Status: SHIPPED | OUTPATIENT
Start: 2021-01-06 | End: 2021-08-25

## 2021-01-13 RX ORDER — CETIRIZINE HCL 10 MG
TABLET ORAL
Qty: 90 TAB | Refills: 0 | Status: SHIPPED | OUTPATIENT
Start: 2021-01-13 | End: 2021-02-08 | Stop reason: ALTCHOICE

## 2021-02-08 ENCOUNTER — OFFICE VISIT (OUTPATIENT)
Dept: INTERNAL MEDICINE CLINIC | Age: 86
End: 2021-02-08
Payer: MEDICARE

## 2021-02-08 VITALS
HEART RATE: 69 BPM | WEIGHT: 163.8 LBS | RESPIRATION RATE: 16 BRPM | OXYGEN SATURATION: 99 % | TEMPERATURE: 97.4 F | DIASTOLIC BLOOD PRESSURE: 82 MMHG | BODY MASS INDEX: 33.02 KG/M2 | HEIGHT: 59 IN | SYSTOLIC BLOOD PRESSURE: 160 MMHG

## 2021-02-08 DIAGNOSIS — E78.2 MIXED HYPERLIPIDEMIA: ICD-10-CM

## 2021-02-08 DIAGNOSIS — J30.1 ALLERGIC RHINITIS DUE TO POLLEN, UNSPECIFIED SEASONALITY: ICD-10-CM

## 2021-02-08 DIAGNOSIS — E11.21 TYPE 2 DIABETES MELLITUS WITH NEPHROPATHY (HCC): Primary | ICD-10-CM

## 2021-02-08 DIAGNOSIS — R60.0 BILATERAL LEG EDEMA: ICD-10-CM

## 2021-02-08 DIAGNOSIS — E11.21 TYPE 2 DIABETES MELLITUS WITH NEPHROPATHY (HCC): ICD-10-CM

## 2021-02-08 DIAGNOSIS — I87.2 CHRONIC VENOUS INSUFFICIENCY: ICD-10-CM

## 2021-02-08 PROCEDURE — 1090F PRES/ABSN URINE INCON ASSESS: CPT | Performed by: INTERNAL MEDICINE

## 2021-02-08 PROCEDURE — G8536 NO DOC ELDER MAL SCRN: HCPCS | Performed by: INTERNAL MEDICINE

## 2021-02-08 PROCEDURE — G8417 CALC BMI ABV UP PARAM F/U: HCPCS | Performed by: INTERNAL MEDICINE

## 2021-02-08 PROCEDURE — G8427 DOCREV CUR MEDS BY ELIG CLIN: HCPCS | Performed by: INTERNAL MEDICINE

## 2021-02-08 PROCEDURE — 1101F PT FALLS ASSESS-DOCD LE1/YR: CPT | Performed by: INTERNAL MEDICINE

## 2021-02-08 PROCEDURE — 99214 OFFICE O/P EST MOD 30 MIN: CPT | Performed by: INTERNAL MEDICINE

## 2021-02-08 PROCEDURE — G8510 SCR DEP NEG, NO PLAN REQD: HCPCS | Performed by: INTERNAL MEDICINE

## 2021-02-08 RX ORDER — LORATADINE 10 MG/1
10 TABLET ORAL
Qty: 30 TAB | Refills: 1 | Status: SHIPPED | OUTPATIENT
Start: 2021-02-08

## 2021-02-08 RX ORDER — METFORMIN HYDROCHLORIDE 500 MG/1
TABLET, EXTENDED RELEASE ORAL
Qty: 90 TAB | Refills: 5 | Status: SHIPPED | OUTPATIENT
Start: 2021-02-08 | End: 2021-02-17

## 2021-02-08 RX ORDER — ATORVASTATIN CALCIUM 10 MG/1
TABLET, FILM COATED ORAL
Qty: 30 TAB | Refills: 5 | Status: SHIPPED | OUTPATIENT
Start: 2021-02-08 | End: 2021-08-12

## 2021-02-08 RX ORDER — POTASSIUM CHLORIDE 750 MG/1
10 TABLET, FILM COATED, EXTENDED RELEASE ORAL 2 TIMES DAILY
Qty: 30 TAB | Refills: 3 | Status: SHIPPED | OUTPATIENT
Start: 2021-02-08 | End: 2021-05-27 | Stop reason: SDUPTHER

## 2021-02-08 NOTE — PROGRESS NOTES
HISTORY OF PRESENT ILLNESS  Estee Marte is a 80 y.o. female here for follow-up. She is not exposed to Covid. Has diabetes, blood sugars running slightly high. She is not able to tolerate Metformin 4 tablets a day. She is only taking 3 tablet. Eye checkup done, up-to-date. Need foot exam.  Has hypertension, compliant with medications. Denies chest pain palpitation shortness of breath. Has leg edema, no shortness of breath or orthopnea. Doing well. Report nasal congestion and postnasal drip. No cough or shortness of breath. Has aches and pains in the joints. On meloxicam which is helping her. Did not receive a Covid vaccine yet. HPI      Review of Systems   Constitutional: Negative. HENT: Negative. Eyes: Negative. Respiratory: Negative. Cardiovascular: Positive for leg swelling. Gastrointestinal: Negative. Genitourinary: Negative. Musculoskeletal: Negative. Skin: Negative. Neurological: Negative. Endo/Heme/Allergies: Negative. Psychiatric/Behavioral: Negative. Physical Exam  Constitutional:       Appearance: Normal appearance. She is obese. HENT:      Mouth/Throat:      Mouth: Mucous membranes are moist.   Neck:      Musculoskeletal: Normal range of motion and neck supple. Cardiovascular:      Rate and Rhythm: Normal rate and regular rhythm. Pulses: Normal pulses. Heart sounds: Normal heart sounds. Pulmonary:      Effort: Pulmonary effort is normal.      Breath sounds: Normal breath sounds. Musculoskeletal:      Comments: Diabetic foot exam:     Left Foot:   Visual Exam: normal    Pulse DP: 2+ (normal)   Filament test: normal sensation    Vibratory sensation: normal      Right Foot:   Visual Exam: normal    Pulse DP: 2+ (normal)   Filament test: normal sensation    Vibratory sensation: normal       Neurological:      Mental Status: She is alert.    Psychiatric:         Mood and Affect: Mood normal.         Behavior: Behavior normal. Thought Content: Thought content normal.         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 She is not able to tolerate Metformin 2 tablet twice a day. Advised her to be an ADA diet and exercise. Taking Glucotrol. Will check,  -     metFORMIN ER (GLUCOPHAGE XR) 500 mg tablet; Take 500 mg in the morning and 1000 mg with dinner  -     METABOLIC PANEL, COMPREHENSIVE; Future  -     LIPID PANEL; Future  -     HEMOGLOBIN A1C WITH EAG; Future    2. Bilateral leg edema    On Lasix. Elevate legs. Will check,  -     potassium chloride SR (KLOR-CON 10) 10 mEq tablet; Take 1 Tab by mouth two (2) times a day. -     CBC WITH AUTOMATED DIFF; Future    3. Chronic venous insufficiency    Doing well. -     potassium chloride SR (KLOR-CON 10) 10 mEq tablet; Take 1 Tab by mouth two (2) times a day. 4. Mixed hyperlipidemia    We will refill,  -     atorvastatin (LIPITOR) 10 mg tablet; TAKE ONE TABLET BY MOUTH DAILY  -     METABOLIC PANEL, COMPREHENSIVE; Future  -     LIPID PANEL; Future    5. Allergic rhinitis due to pollen, unspecified seasonality    We will get,  -     loratadine (Claritin) 10 mg tablet; Take 1 Tab by mouth daily as needed for Allergies. Issues reviewed with her: referral to Diabetic Education department, diabetic diet discussed in detail, written exchange diet given, home glucose monitoring emphasized, all medications, side effects and compliance discussed carefully, foot care discussed and Podiatry visits discussed, annual eye examinations at Ophthalmology discussed, long term diabetic complications discussed and labs immediately prior to next visit.

## 2021-02-08 NOTE — PROGRESS NOTES
Maru Brambila is a 80 y.o. female     Chief Complaint   Patient presents with    Hypertension    Diabetes    Hyperlipidemia    GERD     1. Have you been to the ER, urgent care clinic since your last visit? Hospitalized since your last visit? No    2. Have you seen or consulted any other health care providers outside of the [address] [place] [address] since your last visit? Include any pap smears or colon screening.  No     Visit Vitals  BP (!) 160/82 (BP 1 Location: Left upper arm, BP Patient Position: Sitting, BP Cuff Size: Adult)   Pulse 69   Temp 97.4 °F (36.3 °C) (Oral)   Resp 16   Ht 4' 11\" (1.499 m)   Wt 163 lb 12.8 oz (74.3 kg)   SpO2 99%   BMI 33.08 kg/m²

## 2021-02-09 LAB
ALBUMIN SERPL-MCNC: 4.2 G/DL (ref 3.5–5)
ALBUMIN/GLOB SERPL: 1.2 {RATIO} (ref 1.1–2.2)
ALP SERPL-CCNC: 96 U/L (ref 45–117)
ALT SERPL-CCNC: 24 U/L (ref 12–78)
ANION GAP SERPL CALC-SCNC: 9 MMOL/L (ref 5–15)
AST SERPL-CCNC: 17 U/L (ref 15–37)
BASOPHILS # BLD: 0 K/UL (ref 0–0.1)
BASOPHILS NFR BLD: 1 % (ref 0–1)
BILIRUB SERPL-MCNC: 0.5 MG/DL (ref 0.2–1)
BUN SERPL-MCNC: 13 MG/DL (ref 6–20)
BUN/CREAT SERPL: 15 (ref 12–20)
CALCIUM SERPL-MCNC: 9.6 MG/DL (ref 8.5–10.1)
CHLORIDE SERPL-SCNC: 97 MMOL/L (ref 97–108)
CHOLEST SERPL-MCNC: 155 MG/DL
CO2 SERPL-SCNC: 30 MMOL/L (ref 21–32)
CREAT SERPL-MCNC: 0.87 MG/DL (ref 0.55–1.02)
DIFFERENTIAL METHOD BLD: ABNORMAL
EOSINOPHIL # BLD: 0.1 K/UL (ref 0–0.4)
EOSINOPHIL NFR BLD: 3 % (ref 0–7)
ERYTHROCYTE [DISTWIDTH] IN BLOOD BY AUTOMATED COUNT: 13.6 % (ref 11.5–14.5)
EST. AVERAGE GLUCOSE BLD GHB EST-MCNC: 235 MG/DL
GLOBULIN SER CALC-MCNC: 3.4 G/DL (ref 2–4)
GLUCOSE SERPL-MCNC: 214 MG/DL (ref 65–100)
HBA1C MFR BLD: 9.8 % (ref 4–5.6)
HCT VFR BLD AUTO: 40.3 % (ref 35–47)
HDLC SERPL-MCNC: 38 MG/DL
HDLC SERPL: 4.1 {RATIO} (ref 0–5)
HGB BLD-MCNC: 13.3 G/DL (ref 11.5–16)
IMM GRANULOCYTES # BLD AUTO: 0 K/UL (ref 0–0.04)
IMM GRANULOCYTES NFR BLD AUTO: 1 % (ref 0–0.5)
LDLC SERPL CALC-MCNC: 67.2 MG/DL (ref 0–100)
LIPID PROFILE,FLP: ABNORMAL
LYMPHOCYTES # BLD: 1.4 K/UL (ref 0.8–3.5)
LYMPHOCYTES NFR BLD: 34 % (ref 12–49)
MCH RBC QN AUTO: 28.1 PG (ref 26–34)
MCHC RBC AUTO-ENTMCNC: 33 G/DL (ref 30–36.5)
MCV RBC AUTO: 85 FL (ref 80–99)
MONOCYTES # BLD: 0.5 K/UL (ref 0–1)
MONOCYTES NFR BLD: 12 % (ref 5–13)
NEUTS SEG # BLD: 2 K/UL (ref 1.8–8)
NEUTS SEG NFR BLD: 49 % (ref 32–75)
NRBC # BLD: 0 K/UL (ref 0–0.01)
NRBC BLD-RTO: 0 PER 100 WBC
PLATELET # BLD AUTO: 184 K/UL (ref 150–400)
PMV BLD AUTO: 10.7 FL (ref 8.9–12.9)
POTASSIUM SERPL-SCNC: 3.8 MMOL/L (ref 3.5–5.1)
PROT SERPL-MCNC: 7.6 G/DL (ref 6.4–8.2)
RBC # BLD AUTO: 4.74 M/UL (ref 3.8–5.2)
SODIUM SERPL-SCNC: 136 MMOL/L (ref 136–145)
TRIGL SERPL-MCNC: 249 MG/DL (ref ?–150)
VLDLC SERPL CALC-MCNC: 49.8 MG/DL
WBC # BLD AUTO: 4.1 K/UL (ref 3.6–11)

## 2021-02-10 NOTE — PROGRESS NOTES
Triglycerides elevated. HgbA1c elevated to 9.8. Watch diet for sugars and carbohydrates. Please schedule follow-up appointment with Dr. Ricardo Brambila to discuss further.

## 2021-02-11 ENCOUNTER — TELEPHONE (OUTPATIENT)
Dept: INTERNAL MEDICINE CLINIC | Age: 86
End: 2021-02-11

## 2021-02-11 NOTE — TELEPHONE ENCOUNTER
----- Message from Rhona Dennis NP sent at 2/10/2021  1:25 PM EST -----  Triglycerides elevated. HgbA1c elevated to 9.8. Watch diet for sugars and carbohydrates. Please schedule follow-up appointment with Dr. Obdulia Moe to discuss further.

## 2021-02-17 ENCOUNTER — VIRTUAL VISIT (OUTPATIENT)
Dept: INTERNAL MEDICINE CLINIC | Age: 86
End: 2021-02-17
Payer: MEDICARE

## 2021-02-17 DIAGNOSIS — E78.2 MIXED HYPERLIPIDEMIA: ICD-10-CM

## 2021-02-17 DIAGNOSIS — E11.21 TYPE 2 DIABETES MELLITUS WITH NEPHROPATHY (HCC): ICD-10-CM

## 2021-02-17 DIAGNOSIS — I10 ESSENTIAL HYPERTENSION WITH GOAL BLOOD PRESSURE LESS THAN 140/90: Primary | ICD-10-CM

## 2021-02-17 PROCEDURE — G8432 DEP SCR NOT DOC, RNG: HCPCS | Performed by: INTERNAL MEDICINE

## 2021-02-17 PROCEDURE — 1090F PRES/ABSN URINE INCON ASSESS: CPT | Performed by: INTERNAL MEDICINE

## 2021-02-17 PROCEDURE — G8536 NO DOC ELDER MAL SCRN: HCPCS | Performed by: INTERNAL MEDICINE

## 2021-02-17 PROCEDURE — 1101F PT FALLS ASSESS-DOCD LE1/YR: CPT | Performed by: INTERNAL MEDICINE

## 2021-02-17 PROCEDURE — G8427 DOCREV CUR MEDS BY ELIG CLIN: HCPCS | Performed by: INTERNAL MEDICINE

## 2021-02-17 PROCEDURE — G8417 CALC BMI ABV UP PARAM F/U: HCPCS | Performed by: INTERNAL MEDICINE

## 2021-02-17 PROCEDURE — 99214 OFFICE O/P EST MOD 30 MIN: CPT | Performed by: INTERNAL MEDICINE

## 2021-02-17 RX ORDER — GLIPIZIDE 5 MG/1
TABLET ORAL
Qty: 60 TAB | Refills: 2
Start: 2021-02-17 | End: 2021-04-30

## 2021-02-17 RX ORDER — METFORMIN HYDROCHLORIDE 500 MG/1
TABLET, EXTENDED RELEASE ORAL
Qty: 90 TAB | Refills: 5
Start: 2021-02-17 | End: 2021-07-06

## 2021-02-17 NOTE — PROGRESS NOTES
Anahi Aguilar is a 80 y.o. female who was seen by synchronous (real-time) audio-video technology on 2/17/2021 for Labs and Hypertension        Assessment & Plan:   Diagnoses and all orders for this visit:    1. Essential hypertension with goal blood pressure less than 140/90  Stable blood pressure. On lisinopril, metoprolol, and Cardizem. CMP normal.     2. Type 2 diabetes mellitus with nephropathy ([address]    Still uncontrolled diabetes. A1c has improved. But right now A1c 9.8. Advised that to avoid carbohydrate rich diet and sweets. As she is already trying hard not to have too much sweet potatoes and pasta. We would like to add Jardiance along with her glipizide and Metformin regimen. -     metFORMIN ER (GLUCOPHAGE XR) 500 mg tablet; Take 1000 mg in the morning and 1000 mg with dinner  -     glipiZIDE (GLUCOTROL) 5 mg tablet; 1 tab po BID  -     empagliflozin (JARDIANCE) 10 mg tablet; Take 1 Tab by mouth daily. 3. Mixed hyperlipidemia  On Lipitor. Lipid panel seems stable. I spent at least 30 minutes on this visit with this established patient. Subjective:   Ms. Bob Spencer is here for follow-up. She has hypertension, compliant with medicine. My blood pressure seems stable. Denies chest pain palpitation or shortness of breath. She is diabetic, not monitoring blood sugar regularly. .  Normally she eats a lot of carb rich diet. Trying to work on diet. She eats sweet potatoes every day. Eye checkup just got canceled. She is going to reschedule it. Lab work done, A1c 9.8. She is on Metformin and glipizide. Not able to afford other antidiabetic medications. Has elevated lipids, on statin. No myalgia. Lab work seems stable with cholesterol. Received first dose of Covid vaccine. All labs discussed with her. Prior to Admission medications    Medication Sig Start Date End Date Taking?  Authorizing Provider   metFORMIN ER (GLUCOPHAGE XR) 500 mg tablet Take 1000 mg in the morning and 1000 mg with dinner 2/17/21  Yes Guillermo Kaba MD   glipiZIDE (GLUCOTROL) 5 mg tablet 1 tab po BID 2/17/21  Yes Guillermo Kaba MD   empagliflozin (JARDIANCE) 10 mg tablet Take 1 Tab by mouth daily. 2/17/21  Yes Guillermo Kaba MD   potassium chloride SR (KLOR-CON 10) 10 mEq tablet Take 1 Tab by mouth two (2) times a day. 2/8/21  Yes Guillermo Kaba MD   atorvastatin (LIPITOR) 10 mg tablet TAKE ONE TABLET BY MOUTH DAILY 2/8/21  Yes Guillermo Kaba MD   loratadine (Claritin) 10 mg tablet Take 1 Tab by mouth daily as needed for Allergies. 2/8/21  Yes Guillermo Kaba MD   cloNIDine HCL (CATAPRES) 0.3 mg tablet TAKE ONE TABLET BY MOUTH 3 TIMES A DAY 2/3/21  Yes Axel Monge NP   lisinopriL (PRINIVIL, ZESTRIL) 10 mg tablet TAKE ONE TABLET EVERY DAY 1/25/21  Yes Guillermo Kaba MD   ALPRAZolam Brittany Pile) 0.5 mg tablet TAKE ONE TABLET BY MOUTH NIGHTLY AS NEEDED FOR ANXIETY. MAX DAILY AMOUNT 1 TABLET 1/6/21  Yes Guillermo Kaba MD   furosemide (LASIX) 40 mg tablet TAKE1 TABLET EVERY DAY 12/15/20  Yes Balta Stokes NP   dilTIAZem ER (CARDIZEM CD) 180 mg capsule Take 1 Cap by mouth daily. 11/4/20  Yes Axel Monge NP   metoprolol tartrate (LOPRESSOR) 100 mg IR tablet Take 1 AND 1/2 TABLETS BY MOUTH TWICE DAILY 10/5/20  Yes Guillermo Kaba MD   omeprazole (PRILOSEC) 40 mg capsule TAKE ONE CAPSULE BY MOUTH EVERY DAY BEFORE BREAKFAST 10/5/20  Yes Guillermo Kaba MD   ascorbic acid (VITAMIN C PO) Take  by mouth daily. Yes Provider, Historical   timolol (TIMOPTIC-XE) 0.5 % ophthalmic gel-forming timolol maleate 0.5 % eye gel forming solution   Yes Provider, Historical   mometasone (NASONEX) 50 mcg/actuation nasal spray 2 Sprays by Both Nostrils route daily. Able to use as nasal spray 2/12/20  Yes Guillermo Kaba MD   SYSTANE, PROPYLENE GLYCOL, 0.4-0.3 % drop Administer 10 g to both eyes as needed.  11/13/19  Yes Guillermo Kaba MD   ammonium lactate (LAC-HYDRIN) 12 % lotion rub in to affected area well 8/7/19  Yes Guillermo Kaba MD   meloxicam (MOBIC) 7.5 mg tablet Take 1 Tab by mouth daily. 7/11/19  Yes Keenan Ramírez MD   cholecalciferol (VITAMIN D3) 1,000 unit tablet Take  by mouth daily. Yes Provider, Historical   diclofenac (VOLTAREN) 1 % gel Apply  to affected area two (2) times daily as needed. 2/20/19  Yes Keenan Ramírez MD   famotidine (PEPCID) 20 mg tablet Take 1 Tab by mouth two (2) times a day. 2/20/19  Yes Keenan Ramírez MD   cod liver oil cap Take 1 Cap by mouth daily. Yes Provider, Historical   calcium-cholecalciferol, d3, (CALCIUM 600 + D) 600-125 mg-unit tab Take 1 Tab by mouth daily. Yes Provider, Historical   acetaminophen (TYLENOL) 325 mg tablet Take  by mouth every four (4) hours as needed for Pain. Yes Provider, Historical   aspirin 81 mg chewable tablet Take 81 mg by mouth daily. Yes Other, MD Stacey   metFORMIN ER (GLUCOPHAGE XR) 500 mg tablet Take 500 mg in the morning and 1000 mg with dinner 2/8/21 2/17/21  Keenan Ramírez MD   glipiZIDE (GLUCOTROL) 5 mg tablet TAKE ONE TABLET BY MOUTH 2 TIMES A DAY 2/3/21 2/17/21  Smitha Jiménez NP   glucose blood VI test strips (True Metrix Glucose Test Strip) strip Use to check blood sugar daily and PRN 3/11/20   Keenan Ramírez MD   lancets misc Use to check blood sugar daily and PRN 3/11/20   Keenan Ramírez MD   OTHER 15 mm Hg below knee stockings B/L 8/7/19   Keenan Ramírez MD   Arm Brace (NEOPRENE WRIST SPLINT SUPPORT) misc On right wrist 2/20/19   Keenan Ramírez MD     Past Medical History:   Diagnosis Date    Adverse effect of anesthesia     pt states she was able to feel everything during colonoscopy    Chronic bronchitis     Diabetes ([address]     Essential hypertension     Hypercholesterolemia     Hypertension        ROS negative    Objective:   No flowsheet data found.        Constitutional: [x] Appears well-developed and well-nourished [x] No apparent distress      [] Abnormal -     Mental status: [x] Alert and awake  [x] Oriented to person/place/time [x] Able to follow commands    [] Abnormal - Eyes:   EOM    [x]  Normal    [] Abnormal -   Sclera  [x]  Normal    [] Abnormal -          Discharge [x]  None visible   [] Abnormal -     HENT: [x] Normocephalic, atraumatic  [] Abnormal -   [x] Mouth/Throat: Mucous membranes are moist    External Ears [x] Normal  [] Abnormal -    Neck: [x] No visualized mass [] Abnormal -     Pulmonary/Chest: [x] Respiratory effort normal   [x] No visualized signs of difficulty breathing or respiratory distress        [] Abnormal -      Musculoskeletal:   [x] Normal gait with no signs of ataxia         [x] Normal range of motion of neck        [] Abnormal -     Neurological:        [x] No Facial Asymmetry (Cranial nerve 7 motor function) (limited exam due to video visit)          [x] No gaze palsy        [] Abnormal -                    Psychiatric:       [x] Normal Affect [] Abnormal -        [x] No Hallucinations    Other pertinent observable physical exam findings:-        We discussed the expected course, resolution and complications of the diagnosis(es) in detail. Medication risks, benefits, costs, interactions, and alternatives were discussed as indicated. I advised her to contact the office if her condition worsens, changes or fails to improve as anticipated. She expressed understanding with the diagnosis(es) and plan. Srikanth Baez, who was evaluated through a patient-initiated, synchronous (real-time) audio-video encounter, and/or her healthcare decision maker, is aware that it is a billable service, with coverage as determined by her insurance carrier. She provided verbal consent to proceed: Yes, and patient identification was verified. It was conducted pursuant to the emergency declaration under the [address] [place] [address] and the [employer] General Act. A caregiver was present when appropriate. Ability to conduct physical exam was limited. I was in the office. The patient was at home.       Hermelindo Castillo MD

## 2021-02-17 NOTE — PROGRESS NOTES
Wilner Boateng is a 80 y.o. female  HIPAA verified by two patient identifiers. Health Maintenance Due   Topic    COVID-19 Vaccine (1 of 2)    Shingrix Vaccine Age 50> (1 of 2)    Pneumococcal 65+ years (2 of 2 - PPSV23)    GLAUCOMA SCREENING Q2Y     Eye Exam Retinal or Dilated      Chief Complaint   Patient presents with    Labs     No flowsheet data found. Pain Scale:0 /10  Pain Location:        1. Have you been to the ER, urgent care clinic since your last visit? Hospitalized since your last visit? No    2. Have you seen or consulted any other health care providers outside of the [address] [place] [address] since your last visit? Include any pap smears or colon screening.  No

## 2021-03-31 ENCOUNTER — TELEPHONE (OUTPATIENT)
Dept: INTERNAL MEDICINE CLINIC | Age: 86
End: 2021-03-31

## 2021-04-09 NOTE — TELEPHONE ENCOUNTER
Call placed to patient and left message to call back
Call placed to patient and patient just wanted to make sure it would be ok to use lozenger such as halls to help at times clear up scratchy throat, advised t was ok to do so, patient voiced understanding and appreciation
Please try and call again pt will be home all day
Pt wanted to know if she can drink cough syrup for her excessive mucus that she's been having in the morning when waking up.      Please Advise  Phone # [phone]
None

## 2021-04-14 RX ORDER — GLYCERIN ADULT
SUPPOSITORY, RECTAL RECTAL
Qty: 90 TAB | Refills: 0 | Status: SHIPPED | OUTPATIENT
Start: 2021-04-14

## 2021-04-26 ENCOUNTER — OFFICE VISIT (OUTPATIENT)
Dept: CARDIOLOGY CLINIC | Age: 86
End: 2021-04-26
Payer: MEDICARE

## 2021-04-26 VITALS
BODY MASS INDEX: 32.94 KG/M2 | OXYGEN SATURATION: 97 % | SYSTOLIC BLOOD PRESSURE: 120 MMHG | HEIGHT: 59 IN | WEIGHT: 163.4 LBS | HEART RATE: 60 BPM | RESPIRATION RATE: 16 BRPM | DIASTOLIC BLOOD PRESSURE: 70 MMHG

## 2021-04-26 DIAGNOSIS — I10 BENIGN ESSENTIAL HTN: Primary | ICD-10-CM

## 2021-04-26 DIAGNOSIS — I87.2 VENOUS INSUFFICIENCY: ICD-10-CM

## 2021-04-26 DIAGNOSIS — E11.8 DM TYPE 2, CONTROLLED, WITH COMPLICATION (HCC): ICD-10-CM

## 2021-04-26 PROCEDURE — G8510 SCR DEP NEG, NO PLAN REQD: HCPCS | Performed by: INTERNAL MEDICINE

## 2021-04-26 PROCEDURE — G8427 DOCREV CUR MEDS BY ELIG CLIN: HCPCS | Performed by: INTERNAL MEDICINE

## 2021-04-26 PROCEDURE — G8417 CALC BMI ABV UP PARAM F/U: HCPCS | Performed by: INTERNAL MEDICINE

## 2021-04-26 PROCEDURE — 99213 OFFICE O/P EST LOW 20 MIN: CPT | Performed by: INTERNAL MEDICINE

## 2021-04-26 PROCEDURE — 1090F PRES/ABSN URINE INCON ASSESS: CPT | Performed by: INTERNAL MEDICINE

## 2021-04-26 PROCEDURE — G8536 NO DOC ELDER MAL SCRN: HCPCS | Performed by: INTERNAL MEDICINE

## 2021-04-26 PROCEDURE — 1101F PT FALLS ASSESS-DOCD LE1/YR: CPT | Performed by: INTERNAL MEDICINE

## 2021-04-26 NOTE — PROGRESS NOTES
Chief Complaint   Patient presents with    Follow-up     6 month. Denies chest pain/shortness of breath/dizziness/swelling.       Visit Vitals  /70 (BP 1 Location: Left upper arm, BP Patient Position: Sitting, BP Cuff Size: Adult)   Pulse 60   Resp 16   Ht 4' 11\" (1.499 m)   Wt 163 lb 6.4 oz (74.1 kg)   SpO2 97%   BMI 33.00 kg/m²

## 2021-04-26 NOTE — PROGRESS NOTES
[facility]: Analisa Bond  [phone]    HPI:  Ms. Eduin Bonilla is a 79 yo F with a h/o HTN (previously on nadolol), hyperlipidemia, DM2 seen in the past for palpitations. Found on holter to have benign ectopy and started on beta blocker for symptoms. Previously off higher dose HCTZ due to low Na+. BP has been resistant; BP had improved on norvasc but had worsened LE edema. U/S 11/2012 for DVT was negative. Echo 8/2012 noted normal LV systolic function with mild to moderate MR; echo 2/17/14 unchanged. Seen by vascular in past for LE edema and c/w vascular insufficiency and recommended leg elevation and compression stockings. Echo 4/2015 was normal with EF 60% and mild MR. Since her last visit, she has been doing very well. She denies any chest pain, shortness of breath, palpitations, lightheadedness or dizziness. She has lost some weight and has been watching her diet closely and eating healthier and commended her on this. Her blood pressure is 120/70 with a heart rate of 60. She is compensated on exam with clear lungs and trace bilateral lower extremity edema unchanged. Assessment and Plan:   1. Lower extremity edema, venous insufficiency. Continues stable and compensated. Echocardiogram in the past demonstrated normal LV function. Continue with elevating legs, minimize salt intake and regular exercise and activity. Will have her follow back in six months. 2. Resistant hypertension. Blood pressure is controlled. History of white coat hypertension. 3. Type 2 diabetes. Followed by her primary care physician and adjustments being made there as needed. .She  has a past medical history of Adverse effect of anesthesia, Chronic bronchitis, Diabetes ([address] Essential hypertension, Hypercholesterolemia, and Hypertension. All other systems negative except as above.      PE  Vitals:    04/26/21 0845   BP: 120/70   Pulse: 60   Resp: 16   SpO2: 97%   Weight: 163 lb 6.4 oz (74.1 kg) Height: 4' 11\" (1.499 m)    Body mass index is 33 kg/m². General appearance - alert, well appearing, and in no distress  Mental status - affect appropriate to mood, pleasant   Neck - supple, no JVD, no bruits   Chest - clear to auscultation, no wheezes, rales or rhonchi  Heart - normal rate, regular rhythm, normal S1, S2, I-II/VI systolic murmur LUSB, rubs, clicks or gallops  Abdomen - soft, nontender, nondistended  Extremities - peripheral pulses normal, 1+ LE chronic edema bilateral lower extremities.      Recent Labs:  Lab Results   Component Value Date/Time    Cholesterol, total 155 02/09/2021 09:10 AM    HDL Cholesterol 38 02/09/2021 09:10 AM    LDL, calculated 67.2 02/09/2021 09:10 AM    Triglyceride 249 (H) 02/09/2021 09:10 AM    CHOL/HDL Ratio 4.1 02/09/2021 09:10 AM     Lab Results   Component Value Date/Time    Creatinine 0.87 02/09/2021 09:10 AM     Lab Results   Component Value Date/Time    BUN 13 02/09/2021 09:10 AM     Lab Results   Component Value Date/Time    Potassium 3.8 02/09/2021 09:10 AM     Lab Results   Component Value Date/Time    Hemoglobin A1c 9.8 (H) 02/09/2021 09:10 AM     Lab Results   Component Value Date/Time    HGB 13.3 02/09/2021 09:10 AM     Lab Results   Component Value Date/Time    PLATELET 004 79/76/3228 09:10 AM       Reviewed:  Past Medical History:   Diagnosis Date    Adverse effect of anesthesia     pt states she was able to feel everything during colonoscopy    Chronic bronchitis     Diabetes ([address]     Essential hypertension     Hypercholesterolemia     Hypertension      Social History     Tobacco Use   Smoking Status Never Smoker   Smokeless Tobacco Never Used     Social History     Substance and Sexual Activity   Alcohol Use No    Alcohol/week: 0.0 standard drinks     Allergies   Allergen Reactions    Cortisone Other (comments)     \"Make me feels weak, like I'm going to die\"      Januvia [Sitagliptin] Other (comments)     weakness       Current Outpatient Medications   Medication Sig    furosemide (LASIX) 40 mg tablet TAKE1 TABLET EVERY DAY    metoprolol tartrate (LOPRESSOR) 100 mg IR tablet Take 1 AND 1/2 TABLETS BY MOUTH TWICE DAILY    All Day Allergy 10 mg tablet TAKE ONE TABLET BY MOUTH NIGHTLY AS NEEDED FOR ALLERGIES    omeprazole (PRILOSEC) 40 mg capsule TAKE ONE CAPSULE BY MOUTH EVERY DAY BEFORE BREAKFAST    dilTIAZem ER (CARDIZEM CD) 180 mg capsule Take 1 Cap by mouth daily.  metFORMIN ER (GLUCOPHAGE XR) 500 mg tablet Take 1000 mg in the morning and 1000 mg with dinner    glipiZIDE (GLUCOTROL) 5 mg tablet 1 tab po BID    empagliflozin (JARDIANCE) 10 mg tablet Take 1 Tab by mouth daily.  potassium chloride SR (KLOR-CON 10) 10 mEq tablet Take 1 Tab by mouth two (2) times a day.  atorvastatin (LIPITOR) 10 mg tablet TAKE ONE TABLET BY MOUTH DAILY    loratadine (Claritin) 10 mg tablet Take 1 Tab by mouth daily as needed for Allergies.  cloNIDine HCL (CATAPRES) 0.3 mg tablet TAKE ONE TABLET BY MOUTH 3 TIMES A DAY    lisinopriL (PRINIVIL, ZESTRIL) 10 mg tablet TAKE ONE TABLET EVERY DAY    ALPRAZolam (XANAX) 0.5 mg tablet TAKE ONE TABLET BY MOUTH NIGHTLY AS NEEDED FOR ANXIETY. MAX DAILY AMOUNT 1 TABLET    ascorbic acid (VITAMIN C PO) Take  by mouth daily.  glucose blood VI test strips (True Metrix Glucose Test Strip) strip Use to check blood sugar daily and PRN    lancets misc Use to check blood sugar daily and PRN    SYSTANE, PROPYLENE GLYCOL, 0.4-0.3 % drop Administer 10 g to both eyes as needed.  OTHER 15 mm Hg below knee stockings B/L    meloxicam (MOBIC) 7.5 mg tablet Take 1 Tab by mouth daily.  cholecalciferol (VITAMIN D3) 1,000 unit tablet Take  by mouth daily.  Arm Brace (NEOPRENE WRIST SPLINT SUPPORT) misc On right wrist    famotidine (PEPCID) 20 mg tablet Take 1 Tab by mouth two (2) times a day.  cod liver oil cap Take 1 Cap by mouth daily.     calcium-cholecalciferol, d3, (CALCIUM 600 + D) 600-125 mg-unit tab Take 1 Tab by mouth daily.  acetaminophen (TYLENOL) 325 mg tablet Take  by mouth every four (4) hours as needed for Pain.  aspirin 81 mg chewable tablet Take 81 mg by mouth daily. No current facility-administered medications for this visit.         Sylvia Berry MD  [address] [facility] [place]  [address] [facility] [address]  [address]

## 2021-05-27 DIAGNOSIS — R60.0 BILATERAL LEG EDEMA: ICD-10-CM

## 2021-05-27 DIAGNOSIS — I87.2 CHRONIC VENOUS INSUFFICIENCY: ICD-10-CM

## 2021-05-27 RX ORDER — POTASSIUM CHLORIDE 750 MG/1
10 TABLET, FILM COATED, EXTENDED RELEASE ORAL 2 TIMES DAILY
Qty: 60 TABLET | Refills: 3 | Status: SHIPPED | OUTPATIENT
Start: 2021-05-27 | End: 2021-10-06

## 2021-05-27 NOTE — TELEPHONE ENCOUNTER
Spoke with pt regarding her Potassium Chloride scipt. Pt states she just picked up her refill and her sig is now stating to take 1 tab by mouth daily instead of two as Dr. Ping Palma instructed. I advised the pt that Dr. Ping Palma told her correct and I can see the script on file where it says Potassium Chlr take 1 tab twice daily. I advised pt the pharmacy made a mistake on their in. There was verbalizing understanding.    Mariela Bowman @ [facility]  States a mistake was made and she spoke with Jimy here and she corrected it

## 2021-05-27 NOTE — TELEPHONE ENCOUNTER
Patient called wanting to know if her potassium script had been changed to once a day since her last refill was twice a day. I told her that we had not changed the medication but she would rather hear that from a nurse or a doctor. I also suggested that she call her pharmacy since is looks as though it is a mistake on their part.

## 2021-06-28 ENCOUNTER — OFFICE VISIT (OUTPATIENT)
Dept: INTERNAL MEDICINE CLINIC | Age: 86
End: 2021-06-28
Payer: MEDICARE

## 2021-06-28 VITALS
HEART RATE: 68 BPM | WEIGHT: 162 LBS | OXYGEN SATURATION: 98 % | SYSTOLIC BLOOD PRESSURE: 122 MMHG | DIASTOLIC BLOOD PRESSURE: 80 MMHG | BODY MASS INDEX: 32.66 KG/M2 | HEIGHT: 59 IN | RESPIRATION RATE: 16 BRPM | TEMPERATURE: 97.7 F

## 2021-06-28 DIAGNOSIS — I10 ESSENTIAL HYPERTENSION: ICD-10-CM

## 2021-06-28 DIAGNOSIS — E11.21 TYPE 2 DIABETES MELLITUS WITH NEPHROPATHY (HCC): Primary | ICD-10-CM

## 2021-06-28 DIAGNOSIS — Z00.00 MEDICARE ANNUAL WELLNESS VISIT, SUBSEQUENT: ICD-10-CM

## 2021-06-28 DIAGNOSIS — I10 ESSENTIAL HYPERTENSION WITH GOAL BLOOD PRESSURE LESS THAN 140/90: ICD-10-CM

## 2021-06-28 DIAGNOSIS — Z71.89 ACP (ADVANCE CARE PLANNING): ICD-10-CM

## 2021-06-28 DIAGNOSIS — L30.9 DERMATITIS: ICD-10-CM

## 2021-06-28 PROCEDURE — 99497 ADVNCD CARE PLAN 30 MIN: CPT | Performed by: INTERNAL MEDICINE

## 2021-06-28 PROCEDURE — G8417 CALC BMI ABV UP PARAM F/U: HCPCS | Performed by: INTERNAL MEDICINE

## 2021-06-28 PROCEDURE — G0439 PPPS, SUBSEQ VISIT: HCPCS | Performed by: INTERNAL MEDICINE

## 2021-06-28 PROCEDURE — G8536 NO DOC ELDER MAL SCRN: HCPCS | Performed by: INTERNAL MEDICINE

## 2021-06-28 PROCEDURE — G8510 SCR DEP NEG, NO PLAN REQD: HCPCS | Performed by: INTERNAL MEDICINE

## 2021-06-28 PROCEDURE — G8427 DOCREV CUR MEDS BY ELIG CLIN: HCPCS | Performed by: INTERNAL MEDICINE

## 2021-06-28 PROCEDURE — 99214 OFFICE O/P EST MOD 30 MIN: CPT | Performed by: INTERNAL MEDICINE

## 2021-06-28 PROCEDURE — 1090F PRES/ABSN URINE INCON ASSESS: CPT | Performed by: INTERNAL MEDICINE

## 2021-06-28 PROCEDURE — 1101F PT FALLS ASSESS-DOCD LE1/YR: CPT | Performed by: INTERNAL MEDICINE

## 2021-06-28 RX ORDER — HYDROCORTISONE 25 MG/ML
LOTION TOPICAL 2 TIMES DAILY
Qty: 60 ML | Refills: 0 | Status: SHIPPED | OUTPATIENT
Start: 2021-06-28

## 2021-06-28 RX ORDER — CALCIUM CITRATE/VITAMIN D3 200MG-6.25
TABLET ORAL
Qty: 50 STRIP | Refills: 11 | Status: SHIPPED | OUTPATIENT
Start: 2021-06-28

## 2021-06-28 RX ORDER — INSULIN PUMP SYRINGE, 3 ML
EACH MISCELLANEOUS
Qty: 1 KIT | Refills: 0 | Status: SHIPPED | OUTPATIENT
Start: 2021-06-28

## 2021-06-28 RX ORDER — LISINOPRIL 10 MG/1
TABLET ORAL
Qty: 90 TABLET | Refills: 1 | Status: SHIPPED
Start: 2021-06-28 | End: 2021-08-04 | Stop reason: DRUGHIGH

## 2021-06-28 RX ORDER — LANCETS
EACH MISCELLANEOUS
Qty: 100 EACH | Refills: 5 | Status: SHIPPED | OUTPATIENT
Start: 2021-06-28

## 2021-06-28 NOTE — PATIENT INSTRUCTIONS
Medicare Wellness Visit, Female     The best way to live healthy is to have a lifestyle where you eat a well-balanced diet, exercise regularly, limit alcohol use, and quit all forms of tobacco/nicotine, if applicable. Regular preventive services are another way to keep healthy. Preventive services (vaccines, screening tests, monitoring & exams) can help personalize your care plan, which helps you manage your own care. Screening tests can find health problems at the earliest stages, when they are easiest to treat. [facility] follows the current, evidence-based guidelines published by the [facility] ([facility]) when recommending preventive services for our patients. Because we follow these guidelines, sometimes recommendations change over time as research supports it. (For example, mammograms used to be recommended annually. Even though Medicare will still pay for an annual mammogram, the newer guidelines recommend a mammogram every two years for women of average risk). Of course, you and your doctor may decide to screen more often for some diseases, based on your risk and your co-morbidities (chronic disease you are already diagnosed with). Preventive services for you include:  - Medicare offers their members a free annual wellness visit, which is time for you and your primary care provider to discuss and plan for your preventive service needs. Take advantage of this benefit every year!  -All adults over the age of 72 should receive the recommended pneumonia vaccines. Current USPSTF guidelines recommend a series of two vaccines for the best pneumonia protection.   -All adults should have a flu vaccine yearly and a tetanus vaccine every 10 years.   -All adults age 48 and older should receive the shingles vaccines (series of two vaccines).       -All adults age 38-68 who are overweight should have a diabetes screening test once every three years.   -All adults born between [birth year] and 1965 should be screened once for Hepatitis C.  -Other screening tests and preventive services for persons with diabetes include: an eye exam to screen for diabetic retinopathy, a kidney function test, a foot exam, and stricter control over your cholesterol.   -Cardiovascular screening for adults with routine risk involves an electrocardiogram (ECG) at intervals determined by your doctor.   -Colorectal cancer screenings should be done for adults age 54-65 with no increased risk factors for colorectal cancer. There are a number of acceptable methods of screening for this type of cancer. Each test has its own benefits and drawbacks. Discuss with your doctor what is most appropriate for you during your annual wellness visit. The different tests include: colonoscopy (considered the best screening method), a fecal occult blood test, a fecal DNA test, and sigmoidoscopy.    -A bone mass density test is recommended when a woman turns 65 to screen for osteoporosis. This test is only recommended one time, as a screening. Some providers will use this same test as a disease monitoring tool if you already have osteoporosis. -Breast cancer screenings are recommended every other year for women of normal risk, age 54-69.  -Cervical cancer screenings for women over age 72 are only recommended with certain risk factors. Here is a list of your current Health Maintenance items (your personalized list of preventive services) with a due date:  Health Maintenance Due   Topic Date Due    Shingles Vaccine (1 of 2) Never done    Pneumococcal Vaccine (2 of 2 - PPSV23) 09/14/2017    Eye Exam  04/25/2019            Learning About Meal Planning for Diabetes  Why plan your meals? Meal planning can be a key part of managing diabetes. Planning meals and snacks with the right balance of carbohydrate, protein, and fat can help you keep your blood sugar at the target level you set with your doctor.   You don't have to eat special foods. You can eat what your family eats, including sweets once in a while. But you do have to pay attention to how often you eat and how much you eat of certain foods. You may want to work with a dietitian or a certified diabetes educator. He or she can give you tips and meal ideas and can answer your questions about meal planning. This health professional can also help you reach a healthy weight if that is one of your goals. What plan is right for you? Your dietitian or diabetes educator may suggest that you start with the plate format or carbohydrate counting. The plate format  The plate format is a simple way to help you manage how you eat. You plan meals by learning how much space each food should take on a plate. Using the plate format helps you spread carbohydrate throughout the day. It can make it easier to keep your blood sugar level within your target range. It also helps you see if you're eating healthy portion sizes. To use the plate format, you put non-starchy vegetables on half your plate. Add meat or meat substitutes on one-quarter of the plate. Put a grain or starchy vegetable (such as brown rice or a potato) on the final quarter of the plate. You can add a small piece of fruit and some low-fat or fat-free milk or yogurt, depending on your carbohydrate goal for each meal.  Here are some tips for using the plate format:  · Make sure that you are not using an oversized plate. A 9-inch plate is best. Many restaurants use larger plates. · Get used to using the plate format at home. Then you can use it when you eat out. · Write down your questions about using the plate format. Talk to your doctor, a dietitian, or a diabetes educator about your concerns. Carbohydrate counting  With carbohydrate counting, you plan meals based on the amount of carbohydrate in each food. Carbohydrate raises blood sugar higher and more quickly than any other nutrient.  It is found in desserts, breads and cereals, and fruit. It's also found in starchy vegetables such as potatoes and corn, grains such as rice and pasta, and milk and yogurt. Spreading carbohydrate throughout the day helps keep your blood sugar levels within your target range. Your daily amount depends on several things, including your weight, how active you are, which diabetes medicines you take, and what your goals are for your blood sugar levels. A registered dietitian or diabetes educator can help you plan how much carbohydrate to include in each meal and snack. A guideline for your daily amount of carbohydrate is:  · 45 to 60 grams at each meal. That's about the same as 3 to 4 carbohydrate servings. · 15 to 20 grams at each snack. That's about the same as 1 carbohydrate serving. The Nutrition Facts label on packaged foods tells you how much carbohydrate is in a serving of the food. First, look at the serving size on the food label. Is that the amount you eat in a serving? All of the nutrition information on a food label is based on that serving size. So if you eat more or less than that, you'll need to adjust the other numbers. Total carbohydrate is the next thing you need to look for on the label. If you count carbohydrate servings, one serving of carbohydrate is 15 grams. For foods that don't come with labels, such as fresh fruits and vegetables, you'll need a guide that lists carbohydrate in these foods. Ask your doctor, dietitian, or diabetes educator about books or other nutrition guides you can use. If you take insulin, you need to know how many grams of carbohydrate are in a meal. This lets you know how much rapid-acting insulin to take before you eat. If you use an insulin pump, you get a constant rate of insulin during the day. So the pump must be programmed at meals to give you extra insulin to cover the rise in blood sugar after meals. When you know how much carbohydrate you will eat, you can take the right amount of insulin. Or, if you always use the same amount of insulin, you need to make sure that you eat the same amount of carbohydrate at meals. If you need more help to understand carbohydrate counting and food labels, ask your doctor, dietitian, or diabetes educator. How can you plan healthy meals? Here are some tips to get started:  · Plan your meals a week at a time. Don't forget to include snacks too. · Use cookbooks or online recipes to plan several main meals. Plan some quick meals for busy nights. You also can double some recipes that freeze well. Then you can save half for other busy nights when you don't have time to cook. · Make sure you have the ingredients you need for your recipes. If you're running low on basic items, put these items on your shopping list too. · List foods that you use to make breakfasts, lunches, and snacks. List plenty of fruits and vegetables. · Post this list on the refrigerator. Add to it as you think of more things you need. · Take the list to the store to do your weekly shopping. Follow-up care is a key part of your treatment and safety. Be sure to make and go to all appointments, and call your doctor if you are having problems. It's also a good idea to know your test results and keep a list of the medicines you take. Where can you learn more? Go to http://www.gray.com/  Enter M921 in the search box to learn more about \"Learning About Meal Planning for Diabetes. \"  Current as of: August 31, 2020               Content Version: 12.8  © 1476-1908 [employer]. Care instructions adapted under license by [employer] (which disclaims liability or warranty for this information). If you have questions about a medical condition or this instruction, always ask your healthcare professional. [address] any warranty or liability for your use of this information.

## 2021-06-28 NOTE — PROGRESS NOTES
Health Maintenance Due   Topic Date Due    Shingrix Vaccine Age 49> (1 of 2) Never done    Pneumococcal 65+ years (2 of 2 - PPSV23) 09/14/2017    Eye Exam Retinal or Dilated  04/25/2019    Medicare Yearly Exam  06/18/2021       Chief Complaint   Patient presents with    Complete Physical       1. Have you been to the ER, urgent care clinic since your last visit? Hospitalized since your last visit? No    2. Have you seen or consulted any other health care providers outside of the [address] [place] [address] since your last visit? Include any pap smears or colon screening. No    3) Do you have an Advance Directive on file? no    4) Are you interested in receiving information on Advance Directives? NO      Patient is accompanied by self I have received verbal consent from Andi Andre to discuss any/all medical information while they are present in the room.

## 2021-06-28 NOTE — PROGRESS NOTES
This is the Subsequent Medicare Annual Wellness Exam, performed 12 months or more after the Initial AWV or the last Subsequent AWV    I have reviewed the patient's medical history in detail and updated the computerized patient record. Assessment/Plan   Education and counseling provided:  Are appropriate based on today's review and evaluation  End-of-Life planning (with patient's consent)  Screening Mammography  Bone mass measurement (DEXA)  Screening for glaucoma         Depression Risk Factor Screening     3 most recent PHQ Screens 6/28/2021   Little interest or pleasure in doing things Not at all   Feeling down, depressed, irritable, or hopeless Not at all   Total Score PHQ 2 0       Alcohol Risk Screen    Do you average more than 1 drink per night or more than 7 drinks a week:  No    On any one occasion in the past three months have you have had more than 3 drinks containing alcohol:  No        Functional Ability and Level of Safety    Hearing: Hearing is good. Activities of Daily Living: The home contains: no safety equipment. Patient does total self care      Ambulation: with no difficulty     Fall Risk:  Fall Risk Assessment, last 12 mths 6/28/2021   Able to walk? Yes   Fall in past 12 months? 0   Do you feel unsteady? 0   Are you worried about falling 0   Number of falls in past 12 months -   Fall with injury?  -      Abuse Screen:  Patient is not abused       Cognitive Screening    Has your family/caregiver stated any concerns about your memory: no     Cognitive Screening: Normal - MMSE (Mini Mental Status Exam)    Health Maintenance Due     Health Maintenance Due   Topic Date Due    Shingrix Vaccine Age 49> (1 of 2) Never done    Pneumococcal 65+ years (2 of 2 - PPSV23) 09/14/2017    Eye Exam Retinal or Dilated  04/25/2019       Patient Care Team   Patient Care Team:  Leandro Cunha MD as PCP - General (Internal Medicine)  Leandro Cunha MD as PCP - [facility] Empaneled Provider  Adama Franks., MD as Physician (Ophthalmology)  Beth Arevalo MD as Physician (Cardiology)  Adama Silverman MD as Physician (Ophthalmology)  John Francis MD as Physician (Gastroenterology)    History     Patient Active Problem List   Diagnosis Code    Hypercholesterolemia E78.00    PVC's (premature ventricular contractions) I49.3    Stasis edema I87.309    Encounter for long-term (current) use of other medications Z79.899    Unspecified constipation K59.00    Allergic rhinitis, cause unspecified J30.9    Mixed hyperlipidemia E78.2    Controlled type 2 diabetes mellitus without complication ([address] E80.1    Chronic nasal congestion R09.81    Advance directive discussed with patient Z70.80    Essential hypertension with goal blood pressure less than 140/90 I10    Type 2 diabetes mellitus with nephropathy ([address] E11.21    Radiculopathy, cervical M54.12    Class 1 obesity due to excess calories with serious comorbidity and body mass index (BMI) of 33.0 to 33.9 in adult E66.09, Z68.33    Peripheral vascular disease (HCC) I73.9     Past Medical History:   Diagnosis Date    Adverse effect of anesthesia     pt states she was able to feel everything during colonoscopy    Chronic bronchitis     Diabetes ([address]     Essential hypertension     Hypercholesterolemia     Hypertension       Past Surgical History:   Procedure Laterality Date    COLONOSCOPY N/A 9/25/2017    COLONOSCOPY performed by Thao Sanders. Mela [facility]ala [facility] at [facility] ENDOSCOPY    HX CHOLECYSTECTOMY      HX GYN      tubal ligation    HX HYSTERECTOMY       Current Outpatient Medications   Medication Sig Dispense Refill    potassium chloride SR (KLOR-CON 10) 10 mEq tablet Take 1 Tablet by mouth two (2) times a day.  60 Tablet 3    cloNIDine HCL (CATAPRES) 0.3 mg tablet TAKE ONE TABLET BY MOUTH 3 TIMES A DAY 90 Tab 3    glipiZIDE (GLUCOTROL) 5 mg tablet TAKE ONE TABLET BY MOUTH 2 TIMES A DAY 60 Tab 3    furosemide (LASIX) 40 mg tablet TAKE1 TABLET EVERY DAY 30 Tab 5    metoprolol tartrate (LOPRESSOR) 100 mg IR tablet Take 1 AND 1/2 TABLETS BY MOUTH TWICE DAILY 90 Tab 3    All Day Allergy 10 mg tablet TAKE ONE TABLET BY MOUTH NIGHTLY AS NEEDED FOR ALLERGIES 90 Tab 0    omeprazole (PRILOSEC) 40 mg capsule TAKE ONE CAPSULE BY MOUTH EVERY DAY BEFORE BREAKFAST 30 Cap 3    dilTIAZem ER (CARDIZEM CD) 180 mg capsule Take 1 Cap by mouth daily. 30 Cap 4    metFORMIN ER (GLUCOPHAGE XR) 500 mg tablet Take 1000 mg in the morning and 1000 mg with dinner 90 Tab 5    empagliflozin (JARDIANCE) 10 mg tablet Take 1 Tab by mouth daily. 30 Tab 3    atorvastatin (LIPITOR) 10 mg tablet TAKE ONE TABLET BY MOUTH DAILY 30 Tab 5    loratadine (Claritin) 10 mg tablet Take 1 Tab by mouth daily as needed for Allergies. 30 Tab 1    lisinopriL (PRINIVIL, ZESTRIL) 10 mg tablet TAKE ONE TABLET EVERY DAY 90 Tab 1    ALPRAZolam (XANAX) 0.5 mg tablet TAKE ONE TABLET BY MOUTH NIGHTLY AS NEEDED FOR ANXIETY. MAX DAILY AMOUNT 1 TABLET 15 Tab 0    ascorbic acid (VITAMIN C PO) Take  by mouth daily.  glucose blood VI test strips (True Metrix Glucose Test Strip) strip Use to check blood sugar daily and PRN 50 Strip 11    lancets misc Use to check blood sugar daily and  Each 11    SYSTANE, PROPYLENE GLYCOL, 0.4-0.3 % drop Administer 10 g to both eyes as needed. 15 mL 0    OTHER 15 mm Hg below knee stockings B/L 2 Each 0    meloxicam (MOBIC) 7.5 mg tablet Take 1 Tab by mouth daily. 30 Tab 1    cholecalciferol (VITAMIN D3) 1,000 unit tablet Take  by mouth daily.  Arm Brace (NEOPRENE WRIST SPLINT SUPPORT) misc On right wrist 1 Each 0    famotidine (PEPCID) 20 mg tablet Take 1 Tab by mouth two (2) times a day. 180 Tab 1    cod liver oil cap Take 1 Cap by mouth daily.  calcium-cholecalciferol, d3, (CALCIUM 600 + D) 600-125 mg-unit tab Take 1 Tab by mouth daily.  acetaminophen (TYLENOL) 325 mg tablet Take  by mouth every four (4) hours as needed for Pain.       aspirin 81 mg chewable tablet Take 81 mg by mouth daily.        Allergies   Allergen Reactions    Cortisone Other (comments)     \"Make me feels weak, like I'm going to die\"      Januvia [Sitagliptin] Other (comments)     weakness       Family History   Problem Relation Age of Onset    No Known Problems Mother     No Known Problems Father     No Known Problems Daughter     No Known Problems Daughter     No Known Problems Daughter     No Known Problems Son     No Known Problems Son     No Known Problems Son      Social History     Tobacco Use    Smoking status: Never Smoker    Smokeless tobacco: Never Used   Substance Use Topics    Alcohol use: No     Alcohol/week: 0.0 standard drinks         Riley Vargas MD

## 2021-06-28 NOTE — PROGRESS NOTES
HISTORY OF PRESENT ILLNESS  Cristy Ward is a 80 y.o. female here for follow-up. Has diabetes, A1c was very high. On Metformin and Jardiance. Not checking blood sugar at all. Not sure if she is watching diet. Has hypertension, compliant with medications. Denies chest pain palpitation shortness of breath. Needs refill on lisinopril. On elevated lipid, on statin. Lipid panel was normal.  Noticed to have dry skin on both legs. Would like to get medicine. Here for Medicare wellness visit. Has no living will. Hypertension     Diabetes    GERD    Complete Physical        Review of Systems   Constitutional: Negative. HENT: Negative. Eyes: Negative. Respiratory: Negative. Cardiovascular: Positive for leg swelling. Gastrointestinal: Negative. Genitourinary: Negative. Musculoskeletal: Negative. Skin: Negative. Neurological: Negative. Endo/Heme/Allergies: Negative. Psychiatric/Behavioral: Negative. Physical Exam  Constitutional:       Appearance: Normal appearance. She is obese. HENT:      Mouth/Throat:      Mouth: Mucous membranes are moist.   Cardiovascular:      Rate and Rhythm: Normal rate and regular rhythm. Pulses: Normal pulses. Heart sounds: Normal heart sounds. Pulmonary:      Effort: Pulmonary effort is normal.      Breath sounds: Normal breath sounds. Musculoskeletal:      Cervical back: Normal range of motion and neck supple. Comments:        Skin:     General: Skin is dry. Comments: Both legs: Dry leathery skin present. Neurological:      Mental Status: She is alert. Psychiatric:         Mood and Affect: Mood normal.         Behavior: Behavior normal.         Thought Content: Thought content normal.         ASSESSMENT and PLAN  Diagnoses and all orders for this visit:    1. Type 2 diabetes mellitus with nephropathy (HCC)    Last A1c was uncontrolled. Advised her to be an ADA diet and exercise.   Will check,  -     METABOLIC PANEL, COMPREHENSIVE  -     CBC WITH AUTOMATED DIFF  -     HEMOGLOBIN A1C WITH EAG  -     MICROALBUMIN, UR, RAND W/ MICROALB/CREAT RATIO  -     Blood-Glucose Meter monitoring kit; check BS BID  -     glucose blood VI test strips (True Metrix Glucose Test Strip) strip; Use to check blood sugar daily and PRN  -     lancets misc; Check BS BID    2. Essential hypertension with goal blood pressure less than 140/90    Stable. Will refill,  -     lisinopriL (PRINIVIL, ZESTRIL) 10 mg tablet; 1 tab po qd    3. Essential hypertension    Stable on current regimen, will order,  -     METABOLIC PANEL, COMPREHENSIVE  -     CBC WITH AUTOMATED DIFF    4. Dermatitis  Need to drink more fluid. We will give,  -     hydrocortisone (HYTONE) 2.5 % lotion; Apply  to affected area two (2) times a day. use thin layer                    Issues reviewed with her: referral to Diabetic Education department, diabetic diet discussed in detail, written exchange diet given, home glucose monitoring emphasized, all medications, side effects and compliance discussed carefully, foot care discussed and Podiatry visits discussed, annual eye examinations at Ophthalmology discussed, long term diabetic complications discussed and labs immediately prior to next visit.

## 2021-06-28 NOTE — ACP (ADVANCE CARE PLANNING)

## 2021-07-03 DIAGNOSIS — E11.21 TYPE 2 DIABETES MELLITUS WITH NEPHROPATHY (HCC): ICD-10-CM

## 2021-07-05 LAB
ALBUMIN SERPL-MCNC: 4.6 G/DL (ref 3.6–4.6)
ALBUMIN/CREAT UR: 95 MG/G CREAT (ref 0–29)
ALBUMIN/GLOB SERPL: 1.5 {RATIO} (ref 1.2–2.2)
ALP SERPL-CCNC: 86 IU/L (ref 48–121)
ALT SERPL-CCNC: 16 IU/L (ref 0–32)
AST SERPL-CCNC: 20 IU/L (ref 0–40)
BASOPHILS # BLD AUTO: 0 X10E3/UL (ref 0–0.2)
BASOPHILS NFR BLD AUTO: 1 %
BILIRUB SERPL-MCNC: 0.4 MG/DL (ref 0–1.2)
BUN SERPL-MCNC: 12 MG/DL (ref 8–27)
BUN/CREAT SERPL: 16 (ref 12–28)
CALCIUM SERPL-MCNC: 9.6 MG/DL (ref 8.7–10.3)
CHLORIDE SERPL-SCNC: 97 MMOL/L (ref 96–106)
CO2 SERPL-SCNC: 24 MMOL/L (ref 20–29)
CREAT SERPL-MCNC: 0.77 MG/DL (ref 0.57–1)
CREAT UR-MCNC: 48.8 MG/DL
EOSINOPHIL # BLD AUTO: 0.1 X10E3/UL (ref 0–0.4)
EOSINOPHIL NFR BLD AUTO: 3 %
ERYTHROCYTE [DISTWIDTH] IN BLOOD BY AUTOMATED COUNT: 14.1 % (ref 11.7–15.4)
EST. AVERAGE GLUCOSE BLD GHB EST-MCNC: 212 MG/DL
GLOBULIN SER CALC-MCNC: 3.1 G/DL (ref 1.5–4.5)
GLUCOSE SERPL-MCNC: 174 MG/DL (ref 65–99)
HBA1C MFR BLD: 9 % (ref 4.8–5.6)
HCT VFR BLD AUTO: 39.8 % (ref 34–46.6)
HGB BLD-MCNC: 13.2 G/DL (ref 11.1–15.9)
IMM GRANULOCYTES # BLD AUTO: 0 X10E3/UL (ref 0–0.1)
IMM GRANULOCYTES NFR BLD AUTO: 0 %
LYMPHOCYTES # BLD AUTO: 1.3 X10E3/UL (ref 0.7–3.1)
LYMPHOCYTES NFR BLD AUTO: 31 %
MCH RBC QN AUTO: 28.1 PG (ref 26.6–33)
MCHC RBC AUTO-ENTMCNC: 33.2 G/DL (ref 31.5–35.7)
MCV RBC AUTO: 85 FL (ref 79–97)
MICROALBUMIN UR-MCNC: 46.6 UG/ML
MONOCYTES # BLD AUTO: 0.5 X10E3/UL (ref 0.1–0.9)
MONOCYTES NFR BLD AUTO: 12 %
NEUTROPHILS # BLD AUTO: 2.2 X10E3/UL (ref 1.4–7)
NEUTROPHILS NFR BLD AUTO: 53 %
PLATELET # BLD AUTO: 199 X10E3/UL (ref 150–450)
POTASSIUM SERPL-SCNC: 4 MMOL/L (ref 3.5–5.2)
PROT SERPL-MCNC: 7.7 G/DL (ref 6–8.5)
RBC # BLD AUTO: 4.69 X10E6/UL (ref 3.77–5.28)
SODIUM SERPL-SCNC: 136 MMOL/L (ref 134–144)
WBC # BLD AUTO: 4.1 X10E3/UL (ref 3.4–10.8)

## 2021-07-06 RX ORDER — METFORMIN HYDROCHLORIDE 500 MG/1
TABLET, EXTENDED RELEASE ORAL
Qty: 120 TABLET | Refills: 0 | Status: SHIPPED | OUTPATIENT
Start: 2021-07-06 | End: 2021-08-11

## 2021-07-07 NOTE — PROGRESS NOTES
Reviewed results and recommendations with patient via telephone after confirming name and date of birth. Patient notes she has only been taking 1 metformin  mg tablet in the morning and 2 in the evening. She plans to increased to 2 tabs bid, per order.

## 2021-07-07 NOTE — PROGRESS NOTES
Slight improvement of diabetes, but still  uncontrolled. Continue Metformin and glipizide. Need to add Jardiance 10 mg 1 every morning. Please ask patient if he does not get covered, she needs to call us. Advised to be on ADA diet and exercise.

## 2021-07-30 ENCOUNTER — TELEPHONE (OUTPATIENT)
Dept: INTERNAL MEDICINE CLINIC | Age: 86
End: 2021-07-30

## 2021-07-30 NOTE — TELEPHONE ENCOUNTER
dilTIAzem 180 mg   Pt stated that this medication has been bothering her stomach for about a month now. Pt also stated it makes her feel sick within 2 hours of taking it. Please advise.

## 2021-08-03 NOTE — TELEPHONE ENCOUNTER
----- Message from Abdullahi Kelly sent at 8/2/2021  5:07 PM EDT -----  Regarding: Dr Leo Wilson first and last name:      Reason for call:pt is follow up on message she left late Friday morning  7/30?2021 regarding the diltiazem medication said that gives her stomach irritation and would like advise on what to do    Callback required yes/no and why:yes for reason given above, said best to call after 1pm  tomorrow       Best contact number(s):[phone]      Details to clarify the request:      Abdullahi Kelly

## 2021-08-04 ENCOUNTER — VIRTUAL VISIT (OUTPATIENT)
Dept: INTERNAL MEDICINE CLINIC | Age: 86
End: 2021-08-04
Payer: MEDICARE

## 2021-08-04 DIAGNOSIS — E11.9 CONTROLLED TYPE 2 DIABETES MELLITUS WITHOUT COMPLICATION, UNSPECIFIED WHETHER LONG TERM INSULIN USE (HCC): ICD-10-CM

## 2021-08-04 DIAGNOSIS — R11.0 NAUSEA: Primary | ICD-10-CM

## 2021-08-04 DIAGNOSIS — E11.21 TYPE 2 DIABETES MELLITUS WITH NEPHROPATHY (HCC): ICD-10-CM

## 2021-08-04 DIAGNOSIS — I10 ESSENTIAL HYPERTENSION: ICD-10-CM

## 2021-08-04 PROCEDURE — 1101F PT FALLS ASSESS-DOCD LE1/YR: CPT | Performed by: INTERNAL MEDICINE

## 2021-08-04 PROCEDURE — G8510 SCR DEP NEG, NO PLAN REQD: HCPCS | Performed by: INTERNAL MEDICINE

## 2021-08-04 PROCEDURE — 1090F PRES/ABSN URINE INCON ASSESS: CPT | Performed by: INTERNAL MEDICINE

## 2021-08-04 PROCEDURE — 99214 OFFICE O/P EST MOD 30 MIN: CPT | Performed by: INTERNAL MEDICINE

## 2021-08-04 PROCEDURE — G8536 NO DOC ELDER MAL SCRN: HCPCS | Performed by: INTERNAL MEDICINE

## 2021-08-04 PROCEDURE — G8417 CALC BMI ABV UP PARAM F/U: HCPCS | Performed by: INTERNAL MEDICINE

## 2021-08-04 PROCEDURE — G8427 DOCREV CUR MEDS BY ELIG CLIN: HCPCS | Performed by: INTERNAL MEDICINE

## 2021-08-04 RX ORDER — LISINOPRIL 40 MG/1
40 TABLET ORAL DAILY
Qty: 30 TABLET | Refills: 1 | Status: SHIPPED | OUTPATIENT
Start: 2021-08-04 | End: 2021-12-15 | Stop reason: ALTCHOICE

## 2021-08-04 NOTE — PROGRESS NOTES
Health Maintenance Due   Topic Date Due    Shingrix Vaccine Age 49> (1 of 2) Never done    Pneumococcal 65+ years (2 of 2 - PPSV23) 09/14/2017    Eye Exam Retinal or Dilated  04/25/2019       No chief complaint on file. 1. Have you been to the ER, urgent care clinic since your last visit? Hospitalized since your last visit? No    2. Have you seen or consulted any other health care providers outside of the [address] [place] [address] since your last visit? Include any pap smears or colon screening. No    3) Do you have an Advance Directive on file? no    4) Are you interested in receiving information on Advance Directives? NO      Patient is accompanied by self I have received verbal consent from Tylor Sosa to discuss any/all medical information while they are present in the room.

## 2021-08-04 NOTE — TELEPHONE ENCOUNTER
Writer conferred with provider and requested a VV with patient for further discussion    Call placed to patient and scheduled a VV for 11:30am

## 2021-08-04 NOTE — PROGRESS NOTES
Andrea Abdalla is a 80 y.o. female who was seen by synchronous (real-time) audio-video technology on 8/4/2021 for Discuss Medications, Medication Problem (Diltiazem makes her feel sick ), and Nausea        Assessment & Plan:   Diagnoses and all orders for this visit:    1. Nausea  Every time she takes diltiazem CD, causing her to have abdominal cramping and nausea. She is not able to tolerate it at all. Will discontinue diltiazem CD. Will increase lisinopril to 40 mg a day  Since that kidney function test is okay. Will repeat kidney functions in couple of months. 2. Essential hypertension    Blood pressure stable, she is on multiple medications concerning clonidine diltiazem and lisinopril. Will discontinue diltiazem. Will increase,  -     lisinopriL (PRINIVIL, ZESTRIL) 40 mg tablet; Take 1 Tablet by mouth daily. DC lisinopril 10 mg and also DC cardizem CD    3. Controlled type 2 diabetes mellitus without complication, unspecified whether long term insulin use (HCC)  Recent A1c 9, was 9.8. She decided to take Jardiance, right now she is taking Jardiance 10 mg. Advised her to be an ADA diet and exercise. Continue all other medications for diabetes. 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 Subjective:       Prior to Admission medications    Medication Sig Start Date End Date Taking? Authorizing Provider   lisinopriL (PRINIVIL, ZESTRIL) 40 mg tablet Take 1 Tablet by mouth daily. DC lisinopril 10 mg and also DC cardizem CD 8/4/21  Yes Sridhar Oden MD   empagliflozin (Jardiance) 10 mg tablet Take 1 Tablet by mouth daily.  7/7/21  Yes Jaki Cormier NP   metFORMIN ER (GLUCOPHAGE XR) 500 mg tablet Take 2 tablets in the morning and 2 tablets with dinner 7/6/21  Yes Sridhar Oden MD   Blood-Glucose Meter monitoring kit check BS BID 6/28/21  Yes Sridhar Oden MD   glucose blood VI test strips (True Metrix Glucose Test Strip) strip Use to check blood sugar daily and PRN 6/28/21  Yes Sridhar Oden MD   lancets misc Check BS BID 6/28/21  Yes Sridhar Oden MD   hydrocortisone (HYTONE) 2.5 % lotion Apply  to affected area two (2) times a day. use thin layer 6/28/21  Yes Sridhar Oden MD   potassium chloride SR (KLOR-CON 10) 10 mEq tablet Take 1 Tablet by mouth two (2) times a day. 5/27/21  Yes Farzana Stokes NP   cloNIDine HCL (CATAPRES) 0.3 mg tablet TAKE ONE TABLET BY MOUTH 3 TIMES A DAY 4/30/21  Yes Jaki Cormier NP   glipiZIDE (GLUCOTROL) 5 mg tablet TAKE ONE TABLET BY MOUTH 2 TIMES A DAY 4/30/21  Yes Jaki Cormier NP   furosemide (LASIX) 40 mg tablet TAKE1 TABLET EVERY DAY 4/26/21  Yes Sridhar Oden MD   metoprolol tartrate (LOPRESSOR) 100 mg IR tablet Take 1 AND 1/2 TABLETS BY MOUTH TWICE DAILY 4/14/21  Yes Jaki Cormier NP   All Day Allergy 10 mg tablet TAKE ONE TABLET BY MOUTH NIGHTLY AS NEEDED FOR ALLERGIES 4/14/21  Yes Jaki Cormier NP   omeprazole (PRILOSEC) 40 mg capsule TAKE ONE CAPSULE BY MOUTH EVERY DAY BEFORE BREAKFAST 4/7/21  Yes Jaki Cormier NP   atorvastatin (LIPITOR) 10 mg tablet TAKE ONE TABLET BY MOUTH DAILY 2/8/21  Yes Sridhar Oden MD   loratadine (Claritin) 10 mg tablet Take 1 Tab by mouth daily as needed for Allergies. 2/8/21  Yes Sridhar Oden MD   ALPRAZolam [place]) 0.5 mg tablet TAKE ONE TABLET BY MOUTH NIGHTLY AS NEEDED FOR ANXIETY. MAX DAILY AMOUNT 1 TABLET 1/6/21  Yes Sridhar Oden MD   ascorbic acid (VITAMIN C PO) Take  by mouth daily. Yes Provider, Cassandra   lancets misc Use to check blood sugar daily and PRN 3/11/20  Yes Sridhar Oden MD   SYSTANE, PROPYLENE GLYCOL, 0.4-0.3 % drop Administer 10 g to both eyes as needed. 11/13/19  Yes Sridhar Oden MD   OTHER 15 mm Hg below knee stockings B/L 8/7/19  Yes Sridhar Oden MD   cholecalciferol (VITAMIN D3) 1,000 unit tablet Take  by mouth daily.    Yes Provider, Historical   Arm Brace (NEOPRENE WRIST SPLINT SUPPORT) [facility] right wrist 2/20/19  Yes Paulo Nieves MD   famotidine (PEPCID) 20 mg tablet Take 1 Tab by mouth two (2) times a day. 2/20/19  Yes Paulo Nieves MD   cod liver oil cap Take 1 Cap by mouth daily. Yes Provider, Historical   calcium-cholecalciferol, d3, (CALCIUM 600 + D) 600-125 mg-unit tab Take 1 Tab by mouth daily. Yes Provider, Historical   acetaminophen (TYLENOL) 325 mg tablet Take  by mouth every four (4) hours as needed for Pain. Yes Provider, Historical   aspirin 81 mg chewable tablet Take 81 mg by mouth daily. Yes Other, MD Stacey   lisinopriL (PRINIVIL, ZESTRIL) 10 mg tablet 1 tab po qd 6/28/21 8/4/21  Paulo Nieves MD   dilTIAZem ER (CARDIZEM CD) 180 mg capsule Take 1 Cap by mouth daily. Patient not taking: Reported on 8/4/2021 3/5/21 8/4/21  Lorena Lerma NP   empagliflozin (JARDIANCE) 10 mg tablet Take 1 Tab by mouth daily. 2/17/21 8/4/21  Paulo Nieves MD     Past Medical History:   Diagnosis Date    Adverse effect of anesthesia     pt states she was able to feel everything during colonoscopy    Chronic bronchitis     Diabetes ([address]     Essential hypertension     Hypercholesterolemia     Hypertension        ROS nausea and abd cramping. Objective:   No flowsheet data found.          Constitutional: [x] Appears well-developed and well-nourished [x] No apparent distress      [] Abnormal -     Mental status: [x] Alert and awake  [x] Oriented to person/place/time [x] Able to follow commands    [] Abnormal -     -     HENT: [x] Normocephalic, atraumatic  [] Abnormal -   [x] Mouth/Throat: Mucous membranes are moist    External Ears [x] Normal  [] Abnormal -    Neck: [x] No visualized mass [] Abnormal -     Pulmonary/Chest: [x] Respiratory effort normal   [x] No visualized signs of difficulty breathing or respiratory distress        [] Abnormal -      Musculoskeletal:   [x] Normal gait with no signs of ataxia         [x] Normal range of motion of neck        [] Abnormal -     Neurological: [x] No Facial Asymmetry (Cranial nerve 7 motor function) (limited exam due to video visit)          [x] No gaze palsy        [] Abnormal -          Skin:        [x] No significant exanthematous lesions or discoloration noted on facial skin         [] Abnormal -            Psychiatric:       [x] Normal Affect [] Abnormal -        [x] No Hallucinations    Other pertinent observable physical exam findings:-        We discussed the expected course, resolution and complications of the diagnosis(es) in detail. Medication risks, benefits, costs, interactions, and alternatives were discussed as indicated. I advised her to contact the office if her condition worsens, changes or fails to improve as anticipated. She expressed understanding with the diagnosis(es) and plan. Joe Kraus, was evaluated through a synchronous (real-time) audio-video encounter. The patient (or guardian if applicable) is aware that this is a billable service. Verbal consent to proceed has been obtained within the past 12 months. The visit was conducted pursuant to the emergency declaration under the [address] [place] [address] authority and the [employer] and [employer] Act. Patient identification was verified, and a caregiver was present when appropriate. The patient was located in a state where the provider was credentialed to provide care.       Kameron Estrada MD

## 2021-08-06 ENCOUNTER — TELEPHONE (OUTPATIENT)
Dept: INTERNAL MEDICINE CLINIC | Age: 86
End: 2021-08-06

## 2021-08-06 NOTE — TELEPHONE ENCOUNTER
Writer spoke with patient and advised to drink plenty of thin fluids prior to hs and she states she has been drinking a large glass of 2% milk, encouraged to have her milk at another time during the day to see if thin liquids will assist in thinning out and decreasing mucous. Patient denies any other sx and no sx during the day just states at night.  Advised patient to call back if sx continue or worsen, voiced understanding and appreciation

## 2021-08-10 DIAGNOSIS — E11.21 TYPE 2 DIABETES MELLITUS WITH NEPHROPATHY (HCC): ICD-10-CM

## 2021-08-11 RX ORDER — METFORMIN HYDROCHLORIDE 500 MG/1
TABLET, EXTENDED RELEASE ORAL
Qty: 120 TABLET | Refills: 0 | Status: SHIPPED | OUTPATIENT
Start: 2021-08-11 | End: 2021-09-27

## 2021-08-25 DIAGNOSIS — F41.0 ANXIETY ATTACK: ICD-10-CM

## 2021-08-25 DIAGNOSIS — I10 ESSENTIAL HYPERTENSION WITH GOAL BLOOD PRESSURE LESS THAN 140/90: ICD-10-CM

## 2021-08-25 RX ORDER — ALPRAZOLAM 0.5 MG/1
TABLET ORAL
Qty: 15 TABLET | Refills: 0 | Status: SHIPPED | OUTPATIENT
Start: 2021-08-25 | End: 2022-04-04

## 2021-08-25 RX ORDER — LISINOPRIL 10 MG/1
TABLET ORAL
Qty: 90 TABLET | Refills: 1 | Status: SHIPPED
Start: 2021-08-25 | End: 2021-12-15 | Stop reason: DRUGHIGH

## 2021-08-25 NOTE — TELEPHONE ENCOUNTER
Pt states she has never taken the lisinopril 40mg and does not want to take this  She states she is feeling fine and her blood pressure is not up  Pt states that she is doing fine with Lisinopril 10mg pt and she is able to take  capsules now, they are not hurting her stomach because she switched to taking the med in the evening right before she eats dinner  She thinks the increased dose will elevate her bp  Pt would like a new script for the 10mg lisinopril since she is out of it

## 2021-09-04 DIAGNOSIS — I10 ESSENTIAL HYPERTENSION WITH GOAL BLOOD PRESSURE LESS THAN 140/90: ICD-10-CM

## 2021-09-06 RX ORDER — CLONIDINE HYDROCHLORIDE 0.3 MG/1
TABLET ORAL
Qty: 90 TABLET | Refills: 0 | Status: SHIPPED | OUTPATIENT
Start: 2021-09-06 | End: 2021-10-01 | Stop reason: SDUPTHER

## 2021-09-07 DIAGNOSIS — E11.21 TYPE 2 DIABETES MELLITUS WITH NEPHROPATHY (HCC): ICD-10-CM

## 2021-09-07 RX ORDER — GLIPIZIDE 5 MG/1
TABLET ORAL
Qty: 60 TABLET | Refills: 3 | Status: SHIPPED | OUTPATIENT
Start: 2021-09-07 | End: 2022-01-06

## 2021-09-11 DIAGNOSIS — I10 ESSENTIAL HYPERTENSION: ICD-10-CM

## 2021-09-11 DIAGNOSIS — I49.3 PVC'S (PREMATURE VENTRICULAR CONTRACTIONS): ICD-10-CM

## 2021-09-13 RX ORDER — METOPROLOL TARTRATE 100 MG/1
TABLET ORAL
Qty: 90 TABLET | Refills: 0 | Status: SHIPPED | OUTPATIENT
Start: 2021-09-13 | End: 2021-10-15

## 2021-09-25 DIAGNOSIS — E11.21 TYPE 2 DIABETES MELLITUS WITH NEPHROPATHY (HCC): ICD-10-CM

## 2021-09-27 RX ORDER — METFORMIN HYDROCHLORIDE 500 MG/1
TABLET, EXTENDED RELEASE ORAL
Qty: 120 TABLET | Refills: 0 | Status: SHIPPED | OUTPATIENT
Start: 2021-09-27 | End: 2021-11-05

## 2021-10-01 DIAGNOSIS — I10 ESSENTIAL HYPERTENSION WITH GOAL BLOOD PRESSURE LESS THAN 140/90: ICD-10-CM

## 2021-10-01 RX ORDER — CLONIDINE HYDROCHLORIDE 0.3 MG/1
TABLET ORAL
Qty: 90 TABLET | Refills: 0 | Status: SHIPPED | OUTPATIENT
Start: 2021-10-01 | End: 2021-11-05

## 2021-10-06 DIAGNOSIS — I87.2 CHRONIC VENOUS INSUFFICIENCY: ICD-10-CM

## 2021-10-06 DIAGNOSIS — R60.0 BILATERAL LEG EDEMA: ICD-10-CM

## 2021-10-06 RX ORDER — POTASSIUM CHLORIDE 750 MG/1
TABLET, EXTENDED RELEASE ORAL
Qty: 60 TABLET | Refills: 0 | Status: SHIPPED | OUTPATIENT
Start: 2021-10-06 | End: 2021-10-21 | Stop reason: ALTCHOICE

## 2021-10-13 ENCOUNTER — TRANSCRIBE ORDER (OUTPATIENT)
Dept: SCHEDULING | Age: 86
End: 2021-10-13

## 2021-10-13 DIAGNOSIS — Z12.31 SCREENING MAMMOGRAM FOR HIGH-RISK PATIENT: Primary | ICD-10-CM

## 2021-10-13 DIAGNOSIS — K21.9 GASTROESOPHAGEAL REFLUX DISEASE WITHOUT ESOPHAGITIS: ICD-10-CM

## 2021-10-13 RX ORDER — OMEPRAZOLE 40 MG/1
CAPSULE, DELAYED RELEASE ORAL
Qty: 30 CAPSULE | Refills: 0 | Status: SHIPPED | OUTPATIENT
Start: 2021-10-13 | End: 2021-11-11

## 2021-10-21 ENCOUNTER — TELEPHONE (OUTPATIENT)
Dept: INTERNAL MEDICINE CLINIC | Age: 86
End: 2021-10-21

## 2021-10-21 ENCOUNTER — OFFICE VISIT (OUTPATIENT)
Dept: INTERNAL MEDICINE CLINIC | Age: 86
End: 2021-10-21
Payer: MEDICARE

## 2021-10-21 VITALS
RESPIRATION RATE: 16 BRPM | TEMPERATURE: 97.7 F | BODY MASS INDEX: 31.89 KG/M2 | SYSTOLIC BLOOD PRESSURE: 132 MMHG | HEIGHT: 59 IN | HEART RATE: 71 BPM | WEIGHT: 158.2 LBS | OXYGEN SATURATION: 99 % | DIASTOLIC BLOOD PRESSURE: 80 MMHG

## 2021-10-21 DIAGNOSIS — M54.50 LOW BACK PAIN, UNSPECIFIED BACK PAIN LATERALITY, UNSPECIFIED CHRONICITY, UNSPECIFIED WHETHER SCIATICA PRESENT: ICD-10-CM

## 2021-10-21 DIAGNOSIS — E11.21 TYPE 2 DIABETES MELLITUS WITH NEPHROPATHY (HCC): ICD-10-CM

## 2021-10-21 DIAGNOSIS — I10 ESSENTIAL HYPERTENSION WITH GOAL BLOOD PRESSURE LESS THAN 140/90: Primary | ICD-10-CM

## 2021-10-21 DIAGNOSIS — Z23 ENCOUNTER FOR IMMUNIZATION: ICD-10-CM

## 2021-10-21 DIAGNOSIS — M25.369 INSTABILITY OF KNEE JOINT, UNSPECIFIED LATERALITY: ICD-10-CM

## 2021-10-21 PROCEDURE — G8510 SCR DEP NEG, NO PLAN REQD: HCPCS | Performed by: INTERNAL MEDICINE

## 2021-10-21 PROCEDURE — G8417 CALC BMI ABV UP PARAM F/U: HCPCS | Performed by: INTERNAL MEDICINE

## 2021-10-21 PROCEDURE — 1090F PRES/ABSN URINE INCON ASSESS: CPT | Performed by: INTERNAL MEDICINE

## 2021-10-21 PROCEDURE — G8536 NO DOC ELDER MAL SCRN: HCPCS | Performed by: INTERNAL MEDICINE

## 2021-10-21 PROCEDURE — G0008 ADMIN INFLUENZA VIRUS VAC: HCPCS | Performed by: INTERNAL MEDICINE

## 2021-10-21 PROCEDURE — 99214 OFFICE O/P EST MOD 30 MIN: CPT | Performed by: INTERNAL MEDICINE

## 2021-10-21 PROCEDURE — 90694 VACC AIIV4 NO PRSRV 0.5ML IM: CPT | Performed by: INTERNAL MEDICINE

## 2021-10-21 PROCEDURE — 1101F PT FALLS ASSESS-DOCD LE1/YR: CPT | Performed by: INTERNAL MEDICINE

## 2021-10-21 PROCEDURE — G8427 DOCREV CUR MEDS BY ELIG CLIN: HCPCS | Performed by: INTERNAL MEDICINE

## 2021-10-21 RX ORDER — ARM BRACE
EACH MISCELLANEOUS
Qty: 2 EACH | Refills: 1 | Status: SHIPPED | OUTPATIENT
Start: 2021-10-21

## 2021-10-21 NOTE — PROGRESS NOTES
Health Maintenance Due   Topic Date Due    Shingrix Vaccine Age 49> (1 of 2) Never done    Pneumococcal 65+ years (2 of 2 - PPSV23) 09/14/2017    Eye Exam Retinal or Dilated  04/25/2019    Flu Vaccine (1) 09/01/2021       Chief Complaint   Patient presents with    Back Pain    Knee Pain    Other     4m f/u       1. Have you been to the ER, urgent care clinic since your last visit? Hospitalized since your last visit? No    2. Have you seen or consulted any other health care providers outside of the [address] [place] [address] since your last visit? Include any pap smears or colon screening. No    3) Do you have an Advance Directive on file? no    4) Are you interested in receiving information on Advance Directives? NO      Patient is accompanied by self I have received verbal consent from Estee Marte to discuss any/all medical information while they are present in the room.

## 2021-10-21 NOTE — PROGRESS NOTES
HISTORY OF PRESENT ILLNESS  Anahi Aguilar is a 80 y.o. female here for follow-up. Report pain on lower back, she is sitting and sleeping a lot. She is not sitting properly. No fall or injury. Also having instability in both knee when she tried to walk. Would like to get a knee brace. Has hypertension, compliant with medications. Denies chest pain palpitation shortness of breath. Needs refill on lisinopril. Has diabetes, compliant with medication. Need lab work. On elevated lipid, on statin. Lipid panel was normal.  Received Covid shot, wondering if she can get the third or booster shot. Also need mammogram.  Need flu shot. Hypertension     Diabetes    GERD    Back Pain     Knee Pain        Review of Systems   Constitutional: Negative. HENT: Negative. Eyes: Negative. Respiratory: Negative. Cardiovascular: Negative. Gastrointestinal: Negative. Genitourinary: Negative. Musculoskeletal: Positive for back pain and joint pain. Skin: Negative. Neurological: Negative. Endo/Heme/Allergies: Negative. Psychiatric/Behavioral: Negative. Physical Exam  Constitutional:       Appearance: Normal appearance. She is obese. HENT:      Mouth/Throat:      Mouth: Mucous membranes are moist.   Cardiovascular:      Rate and Rhythm: Normal rate and regular rhythm. Pulses: Normal pulses. Heart sounds: Normal heart sounds. Pulmonary:      Effort: Pulmonary effort is normal.      Breath sounds: Normal breath sounds. Musculoskeletal:      Cervical back: Normal range of motion and neck supple. Comments: Both knee: Wrapped with Ace bandage. Some instability noticed. Range of motion mildly restricted. Lower back: Spine nontender. Paravertebral facet tenderness present. Range of motion restricted. Skin:     General: Skin is warm. Neurological:      Mental Status: She is alert.    Psychiatric:         Mood and Affect: Mood normal.         Behavior: Behavior normal.         Thought Content: Thought content normal.         ASSESSMENT and PLAN  Diagnoses and all orders for this visit:    1. Essential hypertension with goal blood pressure less than 140/90    Stable blood pressure. On lisinopril, Cardizem CD and clonidine along with metoprolol. Will check,  -     METABOLIC PANEL, COMPREHENSIVE    2. Instability of knee joint, unspecified laterality    Need to do quad exercise. Will give,  -     Leg Brace (ACE Knee Brace) misc; by Does Not Apply route. Use in both knee daily    3. Low back pain, unspecified back pain laterality, unspecified chronicity, unspecified whether sciatica present  Bowel DJD. She does not have good posture. Advised her to do lower back exercise. Take Tylenol as needed. 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 Will check,  -     METABOLIC PANEL, COMPREHENSIVE  -     HEMOGLOBIN A1C WITH EAG    5. Encounter for immunization    We will give,  -     FLU (FLUAD QUAD INFLUENZA VACCINE,QUAD,ADJUVANTED)                    Issues reviewed with her: referral to Diabetic Education department, diabetic diet discussed in detail, written exchange diet given, home glucose monitoring emphasized, all medications, side effects and compliance discussed carefully, foot care discussed and Podiatry visits discussed, annual eye examinations at Ophthalmology discussed, long term diabetic complications discussed and labs immediately prior to next visit.

## 2021-10-21 NOTE — TELEPHONE ENCOUNTER
Spoke w/ Wojciech Bosch @ Outpatient Scheduling and made duplex appt for 10/22/21 @ 10:00am.  Pt advised in office.

## 2021-10-21 NOTE — PROGRESS NOTES
Fang Lovett is a 80 y.o. female  who presents for routine immunization(s). Patient denies any symptoms , reactions or allergies that would exclude them from being immunized today. Risks and adverse reactions were discussed. The patient/caregiver was provided the VIS and allotted time to read and ask questions prior to administration of vaccine. Patient voiced full understanding and signed Adult Immunization Consent form. All questions were addressed. Patient was observed for 10 min post injection. There were no reactions observed. Results for orders placed or performed in visit on 92/36/23   METABOLIC PANEL, COMPREHENSIVE   Result Value Ref Range    Glucose 174 (H) 65 - 99 mg/dL    BUN 12 8 - 27 mg/dL    Creatinine 0.77 0.57 - 1.00 mg/dL    GFR est non-AA 70 >59 mL/min/1.73    GFR est AA 80 >59 mL/min/1.73    BUN/Creatinine ratio 16 12 - 28    Sodium 136 134 - 144 mmol/L    Potassium 4.0 3.5 - 5.2 mmol/L    Chloride 97 96 - 106 mmol/L    CO2 24 20 - 29 mmol/L    Calcium 9.6 8.7 - 10.3 mg/dL    Protein, total 7.7 6.0 - 8.5 g/dL    Albumin 4.6 3.6 - 4.6 g/dL    GLOBULIN, TOTAL 3.1 1.5 - 4.5 g/dL    A-G Ratio 1.5 1.2 - 2.2    Bilirubin, total 0.4 0.0 - 1.2 mg/dL    Alk. phosphatase 86 48 - 121 IU/L    AST (SGOT) 20 0 - 40 IU/L    ALT (SGPT) 16 0 - 32 IU/L   CBC WITH AUTOMATED DIFF   Result Value Ref Range    WBC 4.1 3.4 - 10.8 x10E3/uL    RBC 4.69 3.77 - 5.28 x10E6/uL    HGB 13.2 11.1 - 15.9 g/dL    HCT 39.8 34.0 - 46.6 %    MCV 85 79 - 97 fL    MCH 28.1 26.6 - 33.0 pg    MCHC 33.2 31.5 - 35.7 g/dL    RDW 14.1 11.7 - 15.4 %    PLATELET 377 906 - 736 x10E3/uL    NEUTROPHILS 53 Not Estab. %    Lymphocytes 31 Not Estab. %    MONOCYTES 12 Not Estab. %    EOSINOPHILS 3 Not Estab. %    BASOPHILS 1 Not Estab. %    ABS. NEUTROPHILS 2.2 1.4 - 7.0 x10E3/uL    Abs Lymphocytes 1.3 0.7 - 3.1 x10E3/uL    ABS. MONOCYTES 0.5 0.1 - 0.9 x10E3/uL    ABS. EOSINOPHILS 0.1 0.0 - 0.4 x10E3/uL    ABS.  BASOPHILS 0.0 0.0 - 0.2 x10E3/uL    IMMATURE GRANULOCYTES 0 Not Estab. %    ABS. IMM.  GRANS. 0.0 0.0 - 0.1 x10E3/uL   HEMOGLOBIN A1C WITH EAG   Result Value Ref Range    Hemoglobin A1c 9.0 (H) 4.8 - 5.6 %    Estimated average glucose 212 mg/dL   MICROALBUMIN, UR, RAND W/ MICROALB/CREAT RATIO   Result Value Ref Range    Creatinine, urine 48.8 Not Estab. mg/dL    Microalbumin, urine 46.6 Not Estab. ug/mL    Microalb/Creat ratio (ug/mg creat.) 95 (H) 0 - 29 mg/g creat

## 2021-10-22 ENCOUNTER — HOSPITAL ENCOUNTER (OUTPATIENT)
Dept: MAMMOGRAPHY | Age: 86
Discharge: HOME | End: 2021-10-22
Attending: INTERNAL MEDICINE
Payer: MEDICARE

## 2021-10-22 DIAGNOSIS — Z12.31 SCREENING MAMMOGRAM FOR HIGH-RISK PATIENT: ICD-10-CM

## 2021-10-22 LAB
ALBUMIN SERPL-MCNC: 4.6 G/DL (ref 3.6–4.6)
ALBUMIN/GLOB SERPL: 1.6 {RATIO} (ref 1.2–2.2)
ALP SERPL-CCNC: 90 IU/L (ref 44–121)
ALT SERPL-CCNC: 13 IU/L (ref 0–32)
AST SERPL-CCNC: 16 IU/L (ref 0–40)
BILIRUB SERPL-MCNC: 0.3 MG/DL (ref 0–1.2)
BUN SERPL-MCNC: 9 MG/DL (ref 8–27)
BUN/CREAT SERPL: 12 (ref 12–28)
CALCIUM SERPL-MCNC: 9.6 MG/DL (ref 8.7–10.3)
CHLORIDE SERPL-SCNC: 95 MMOL/L (ref 96–106)
CO2 SERPL-SCNC: 24 MMOL/L (ref 20–29)
CREAT SERPL-MCNC: 0.78 MG/DL (ref 0.57–1)
EST. AVERAGE GLUCOSE BLD GHB EST-MCNC: 200 MG/DL
GLOBULIN SER CALC-MCNC: 2.9 G/DL (ref 1.5–4.5)
GLUCOSE SERPL-MCNC: 170 MG/DL (ref 65–99)
HBA1C MFR BLD: 8.6 % (ref 4.8–5.6)
POTASSIUM SERPL-SCNC: 4.3 MMOL/L (ref 3.5–5.2)
PROT SERPL-MCNC: 7.5 G/DL (ref 6–8.5)
SODIUM SERPL-SCNC: 132 MMOL/L (ref 134–144)

## 2021-10-22 PROCEDURE — 77067 SCR MAMMO BI INCL CAD: CPT

## 2021-10-25 ENCOUNTER — OFFICE VISIT (OUTPATIENT)
Dept: CARDIOLOGY CLINIC | Age: 86
End: 2021-10-25
Payer: MEDICARE

## 2021-10-25 VITALS
DIASTOLIC BLOOD PRESSURE: 98 MMHG | OXYGEN SATURATION: 98 % | BODY MASS INDEX: 31.65 KG/M2 | SYSTOLIC BLOOD PRESSURE: 140 MMHG | HEIGHT: 59 IN | WEIGHT: 157 LBS | HEART RATE: 75 BPM | RESPIRATION RATE: 16 BRPM

## 2021-10-25 DIAGNOSIS — E11.8 DM TYPE 2, CONTROLLED, WITH COMPLICATION (HCC): ICD-10-CM

## 2021-10-25 DIAGNOSIS — I87.2 VENOUS INSUFFICIENCY: ICD-10-CM

## 2021-10-25 DIAGNOSIS — I10 RESISTANT HYPERTENSION: Primary | ICD-10-CM

## 2021-10-25 PROCEDURE — 93000 ELECTROCARDIOGRAM COMPLETE: CPT | Performed by: INTERNAL MEDICINE

## 2021-10-25 PROCEDURE — 99214 OFFICE O/P EST MOD 30 MIN: CPT | Performed by: INTERNAL MEDICINE

## 2021-10-25 PROCEDURE — G8432 DEP SCR NOT DOC, RNG: HCPCS | Performed by: INTERNAL MEDICINE

## 2021-10-25 PROCEDURE — G8427 DOCREV CUR MEDS BY ELIG CLIN: HCPCS | Performed by: INTERNAL MEDICINE

## 2021-10-25 PROCEDURE — G8417 CALC BMI ABV UP PARAM F/U: HCPCS | Performed by: INTERNAL MEDICINE

## 2021-10-25 PROCEDURE — 1090F PRES/ABSN URINE INCON ASSESS: CPT | Performed by: INTERNAL MEDICINE

## 2021-10-25 PROCEDURE — 1101F PT FALLS ASSESS-DOCD LE1/YR: CPT | Performed by: INTERNAL MEDICINE

## 2021-10-25 PROCEDURE — G8536 NO DOC ELDER MAL SCRN: HCPCS | Performed by: INTERNAL MEDICINE

## 2021-10-25 PROCEDURE — 3052F HG A1C>EQUAL 8.0%<EQUAL 9.0%: CPT | Performed by: INTERNAL MEDICINE

## 2021-10-25 NOTE — PROGRESS NOTES
[address]: [place]  [phone]    HPI:  Ms. Marty Encarnacion is a 81 yo F with a h/o HTN (previously on nadolol), hyperlipidemia, DM2 seen in the past for palpitations. Found on holter to have benign ectopy and started on beta blocker for symptoms. Previously off higher dose HCTZ due to low Na+. BP has been resistant; BP had improved on norvasc but had worsened LE edema. U/S 11/2012 for DVT was negative. Echo 8/2012 noted normal LV systolic function with mild to moderate MR; echo 2/17/14 unchanged. Seen by vascular in past for LE edema and c/w vascular insufficiency and recommended leg elevation and compression stockings. Echo 4/2015 was normal with EF 60% and mild MR. Since her last visit, she has been doing okay. Just expresses frustration about having to deal with the pandemic. She goes to the grocery store, but otherwise ends up staying at home. She denies any exertional chest pain, shortness of breath, palpitations, lightheadedness or dizziness. She has been compliant with her medications. She is compensated on exam with clear lungs and no lower extremity edema. Her blood pressure is slightly up systolic at 696, but has been normal at home. I did recommend she at least check it once or twice a month. Assessment and Plan:   1. Lower extremity edema, venous insufficiency. Continues stable and compensated. Echocardiogram in the past demonstrated preserved LV function. Continue to elevate legs, minimize salt intake, regular exercise and activity. Will have her follow back in six months. 2. Resistant hypertension. Blood pressure is controlled and no changes made. History of white coat hypertension. 3. Type 2 diabetes. Followed by her primary care physician and adjustments are being made there as needed.           .She  has a past medical history of Adverse effect of anesthesia, Chronic bronchitis, Diabetes ([address] Essential hypertension, Hypercholesterolemia, and Hypertension. All other systems negative except as above. PE  Vitals:    10/25/21 0851 10/25/21 0903   BP: (!) 140/98 (!) 140/98   Pulse: 75    Resp: 16    SpO2: 98%    Weight: 157 lb (71.2 kg)    Height: 4' 11\" (1.499 m)     Body mass index is 31.71 kg/m². General appearance - alert, well appearing, and in no distress  Mental status - affect appropriate to mood, pleasant   Neck - supple, no JVD, no bruits   Chest - clear to auscultation, no wheezes, rales or rhonchi  Heart - normal rate, regular rhythm, normal S1, S2, I-II/VI systolic murmur LUSB, rubs, clicks or gallops  Abdomen - soft, nontender, nondistended  Extremities - peripheral pulses normal, 1+ LE chronic edema bilateral lower extremities.      Recent Labs:  Lab Results   Component Value Date/Time    Cholesterol, total 155 02/09/2021 09:10 AM    HDL Cholesterol 38 02/09/2021 09:10 AM    LDL, calculated 67.2 02/09/2021 09:10 AM    Triglyceride 249 (H) 02/09/2021 09:10 AM    CHOL/HDL Ratio 4.1 02/09/2021 09:10 AM     Lab Results   Component Value Date/Time    Creatinine 0.78 10/21/2021 04:06 PM     Lab Results   Component Value Date/Time    BUN 9 10/21/2021 04:06 PM     Lab Results   Component Value Date/Time    Potassium 4.3 10/21/2021 04:06 PM     Lab Results   Component Value Date/Time    Hemoglobin A1c 8.6 (H) 10/21/2021 04:06 PM     Lab Results   Component Value Date/Time    HGB 13.2 06/30/2021 07:50 AM     Lab Results   Component Value Date/Time    PLATELET 114 27/32/6357 07:50 AM       Reviewed:  Past Medical History:   Diagnosis Date    Adverse effect of anesthesia     pt states she was able to feel everything during colonoscopy    Chronic bronchitis     Diabetes ([address]     Essential hypertension     Hypercholesterolemia     Hypertension      Social History     Tobacco Use   Smoking Status Never Smoker   Smokeless Tobacco Never Used     Social History     Substance and Sexual Activity   Alcohol Use No    Alcohol/week: 0.0 standard drinks     Allergies   Allergen Reactions    Cortisone Other (comments)     \"Make me feels weak, like I'm going to die\"      Januvia [Sitagliptin] Other (comments)     weakness       Current Outpatient Medications   Medication Sig    Leg Brace (ACE Knee Brace) misc by Does Not Apply route. Use in both knee daily    metoprolol tartrate (LOPRESSOR) 100 mg IR tablet Take 1 AND 1/2 TABLETS BY MOUTH TWICE DAILY    omeprazole (PRILOSEC) 40 mg capsule TAKE ONE CAPSULE BY MOUTH EVERY DAY BEFORE BREAKFAST    cloNIDine HCL (CATAPRES) 0.3 mg tablet TAKE ONE TABLET BY MOUTH 3 TIMES A DAY    metFORMIN ER (GLUCOPHAGE XR) 500 mg tablet Take 2 tablets in the morning and 2 tablets with dinner    glipiZIDE (GLUCOTROL) 5 mg tablet TAKE ONE TABLET BY MOUTH 2 TIMES A DAY    lisinopriL (PRINIVIL, ZESTRIL) 10 mg tablet 1 tab po qd    ALPRAZolam (XANAX) 0.5 mg tablet TAKE ONE TABLET BY MOUTH NIGHTLY AS NEEDED FOR ANXIETY. MAX DAILY AMOUNT 1 TABLET    atorvastatin (LIPITOR) 10 mg tablet TAKE ONE TABLET BY MOUTH DAILY    dilTIAZem ER (CARDIZEM CD) 180 mg capsule Take 1 Cap by mouth daily.  lisinopriL (PRINIVIL, ZESTRIL) 40 mg tablet Take 1 Tablet by mouth daily. DC lisinopril 10 mg and also DC cardizem CD    empagliflozin (Jardiance) 10 mg tablet Take 1 Tablet by mouth daily.  Blood-Glucose Meter monitoring kit check BS BID    glucose blood VI test strips (True Metrix Glucose Test Strip) strip Use to check blood sugar daily and PRN    lancets misc Check BS BID    hydrocortisone (HYTONE) 2.5 % lotion Apply  to affected area two (2) times a day. use thin layer    All Day Allergy 10 mg tablet TAKE ONE TABLET BY MOUTH NIGHTLY AS NEEDED FOR ALLERGIES    loratadine (Claritin) 10 mg tablet Take 1 Tab by mouth daily as needed for Allergies.  ascorbic acid (VITAMIN C PO) Take  by mouth daily.     lancets misc Use to check blood sugar daily and PRN    SYSTANE, PROPYLENE GLYCOL, 0.4-0.3 % drop Administer 10 g to both eyes as needed.  OTHER 15 mm Hg below knee stockings B/L    cholecalciferol (VITAMIN D3) 1,000 unit tablet Take  by mouth daily.  Arm Brace (NEOPRENE WRIST SPLINT SUPPORT) misc On right wrist    famotidine (PEPCID) 20 mg tablet Take 1 Tab by mouth two (2) times a day.  cod liver oil cap Take 1 Cap by mouth daily.  calcium-cholecalciferol, d3, (CALCIUM 600 + D) 600-125 mg-unit tab Take 1 Tab by mouth daily.  acetaminophen (TYLENOL) 325 mg tablet Take  by mouth every four (4) hours as needed for Pain.  aspirin 81 mg chewable tablet Take 81 mg by mouth daily. No current facility-administered medications for this visit.        Ilir Garcia MD  [facility] [place]  [address], [address]  [address]

## 2021-10-29 NOTE — PROGRESS NOTES
Diabetes slightly improved but still elevated. Advised to be an ADA diet and exercise. Please call in general.  50 mg 1 tablet in the morning. Sodium level slightly low. Need to restrict fluid intake. She may use pinch of table salt once a day for 3 days. Repeat BMP in a month.

## 2021-11-01 DIAGNOSIS — E11.21 TYPE 2 DIABETES MELLITUS WITH NEPHROPATHY (HCC): Primary | ICD-10-CM

## 2021-11-05 DIAGNOSIS — E11.21 TYPE 2 DIABETES MELLITUS WITH NEPHROPATHY (HCC): ICD-10-CM

## 2021-11-05 DIAGNOSIS — I10 ESSENTIAL HYPERTENSION WITH GOAL BLOOD PRESSURE LESS THAN 140/90: ICD-10-CM

## 2021-11-05 RX ORDER — CLONIDINE HYDROCHLORIDE 0.3 MG/1
TABLET ORAL
Qty: 90 TABLET | Refills: 0 | Status: SHIPPED | OUTPATIENT
Start: 2021-11-05 | End: 2021-12-03

## 2021-11-05 RX ORDER — METFORMIN HYDROCHLORIDE 500 MG/1
TABLET, EXTENDED RELEASE ORAL
Qty: 120 TABLET | Refills: 0 | Status: SHIPPED | OUTPATIENT
Start: 2021-11-05 | End: 2021-12-15 | Stop reason: SDUPTHER

## 2021-11-11 DIAGNOSIS — K21.9 GASTROESOPHAGEAL REFLUX DISEASE WITHOUT ESOPHAGITIS: ICD-10-CM

## 2021-11-11 RX ORDER — OMEPRAZOLE 40 MG/1
CAPSULE, DELAYED RELEASE ORAL
Qty: 30 CAPSULE | Refills: 0 | Status: SHIPPED | OUTPATIENT
Start: 2021-11-11 | End: 2021-12-13

## 2021-12-06 ENCOUNTER — TELEPHONE (OUTPATIENT)
Dept: INTERNAL MEDICINE CLINIC | Age: 86
End: 2021-12-06

## 2021-12-06 NOTE — TELEPHONE ENCOUNTER
Returned call to pt, She states she had a nurse come in from her insurance and check on her, She did not like it and the  Nurse left papers for her to bring here and stated her blood pressure was elevated.  I advised her to bring them with her next week

## 2021-12-06 NOTE — TELEPHONE ENCOUNTER
[address] Practitioner from [facility],called stating that she had been to the patient's home to do some screenings. She said that patient's peripheral artery screening showed moderate PAD on both legs. She said that patient's blood pressure was 190/90 and 190/100.  She also performed a mini cognitive test. Paco's number if needed is [phone]

## 2021-12-15 ENCOUNTER — OFFICE VISIT (OUTPATIENT)
Dept: INTERNAL MEDICINE CLINIC | Age: 86
End: 2021-12-15
Payer: MEDICARE

## 2021-12-15 VITALS
DIASTOLIC BLOOD PRESSURE: 90 MMHG | HEIGHT: 59 IN | OXYGEN SATURATION: 98 % | SYSTOLIC BLOOD PRESSURE: 150 MMHG | TEMPERATURE: 97.8 F | RESPIRATION RATE: 18 BRPM | HEART RATE: 78 BPM | BODY MASS INDEX: 31.25 KG/M2 | WEIGHT: 155 LBS

## 2021-12-15 DIAGNOSIS — I73.9 PERIPHERAL VASCULAR DISEASE (HCC): ICD-10-CM

## 2021-12-15 DIAGNOSIS — E78.2 MIXED HYPERLIPIDEMIA: ICD-10-CM

## 2021-12-15 DIAGNOSIS — E78.00 HYPERCHOLESTEROLEMIA: ICD-10-CM

## 2021-12-15 DIAGNOSIS — E11.21 TYPE 2 DIABETES MELLITUS WITH NEPHROPATHY (HCC): ICD-10-CM

## 2021-12-15 DIAGNOSIS — I10 PRIMARY HYPERTENSION: Primary | ICD-10-CM

## 2021-12-15 PROCEDURE — 1101F PT FALLS ASSESS-DOCD LE1/YR: CPT | Performed by: INTERNAL MEDICINE

## 2021-12-15 PROCEDURE — G8427 DOCREV CUR MEDS BY ELIG CLIN: HCPCS | Performed by: INTERNAL MEDICINE

## 2021-12-15 PROCEDURE — G8510 SCR DEP NEG, NO PLAN REQD: HCPCS | Performed by: INTERNAL MEDICINE

## 2021-12-15 PROCEDURE — G8417 CALC BMI ABV UP PARAM F/U: HCPCS | Performed by: INTERNAL MEDICINE

## 2021-12-15 PROCEDURE — 99214 OFFICE O/P EST MOD 30 MIN: CPT | Performed by: INTERNAL MEDICINE

## 2021-12-15 PROCEDURE — 1090F PRES/ABSN URINE INCON ASSESS: CPT | Performed by: INTERNAL MEDICINE

## 2021-12-15 PROCEDURE — G8536 NO DOC ELDER MAL SCRN: HCPCS | Performed by: INTERNAL MEDICINE

## 2021-12-15 RX ORDER — LISINOPRIL 20 MG/1
20 TABLET ORAL DAILY
Qty: 30 TABLET | Refills: 5 | Status: SHIPPED | OUTPATIENT
Start: 2021-12-15 | End: 2022-08-17 | Stop reason: SDUPTHER

## 2021-12-15 RX ORDER — METFORMIN HYDROCHLORIDE 500 MG/1
TABLET, EXTENDED RELEASE ORAL
Qty: 120 TABLET | Refills: 5 | Status: SHIPPED | OUTPATIENT
Start: 2021-12-15 | End: 2022-02-21 | Stop reason: SDUPTHER

## 2021-12-15 NOTE — PROGRESS NOTES
HISTORY OF PRESENT ILLNESS  Ismael Dotson is a 80 y.o. female here for follow-up. She is doing well, she had [facility] nurse came to her house and monitored her. She brought her glucometer and strips. Will get 8 set up. A1c is still elevated but is slightly better. She is not taking Jardiance, probably insurance not covering it. She is taking Metformin 3 tablets sometimes she takes 4 tablets a day. Need eye checkup. Blood pressure elevated, compliant with medication. She brought all her medication. Memory seems okay. On elevated lipid, on statin. Lipid panel was normal.  Received Covid shot. Hypertension     Diabetes    GERD    Cholesterol Problem        Review of Systems   Constitutional: Negative. HENT: Negative. Eyes: Negative. Respiratory: Negative. Cardiovascular: Negative. Gastrointestinal: Negative. Genitourinary: Negative. Musculoskeletal: Negative. Skin: Negative. Neurological: Negative. Endo/Heme/Allergies: Negative. Psychiatric/Behavioral: Negative. Physical Exam  Constitutional:       Appearance: Normal appearance. She is obese. HENT:      Mouth/Throat:      Mouth: Mucous membranes are moist.   Cardiovascular:      Rate and Rhythm: Normal rate and regular rhythm. Pulses: Normal pulses. Heart sounds: Normal heart sounds. Pulmonary:      Effort: Pulmonary effort is normal.      Breath sounds: Normal breath sounds. Musculoskeletal:      Cervical back: Normal range of motion and neck supple. Comments:        Skin:     General: Skin is warm. Neurological:      Mental Status: She is alert. Psychiatric:         Mood and Affect: Mood normal.         Behavior: Behavior normal.         Thought Content: Thought content normal.         ASSESSMENT and PLAN  Diagnoses and all orders for this visit:    1. Primary hypertension  elevated blood pressure. On clonidine 3 times a day and Cardizem CD.  Will increase lisinopril,    -     lisinopriL (PRINIVIL, ZESTRIL) 20 mg tablet; Take 1 Tablet by mouth daily. DC lisinopril 10 mg    2. Type 2 diabetes mellitus with nephropathy (HCC)    Taking glipizide and Metformin. Did not take Jardiance, probably insurance is not covering it. Will discontinue Jardiance. A1c 8.6, slight improvement. Advised to monitor blood sugar closely and be on ADA diet. She brought her glucometer, I have my nurse to show her how to use it. Will increase,  -     metFORMIN ER (GLUCOPHAGE XR) 500 mg tablet; 2 tab po BID  -     REFERRAL TO OPHTHALMOLOGY    3. Peripheral vascular disease ([facility] [address]  doing well, has mild insufficiency on the left side. Taking aspirin and statin. 4. Hypercholesterolemia  on statin. Doing well. Issues reviewed with her: referral to Diabetic Education department, diabetic diet discussed in detail, written exchange diet given, home glucose monitoring emphasized, all medications, side effects and compliance discussed carefully, foot care discussed and Podiatry visits discussed, annual eye examinations at Ophthalmology discussed, long term diabetic complications discussed and labs immediately prior to next visit.

## 2021-12-15 NOTE — PROGRESS NOTES
Health Maintenance Due   Topic Date Due    Shingrix Vaccine Age 49> (1 of 2) Never done    Pneumococcal 65+ years (2 of 2 - PPSV23) 09/14/2017    Eye Exam Retinal or Dilated  04/25/2019    COVID-19 Vaccine (3 - Booster for [address] [place] [place] series) 09/15/2021       Chief Complaint   Patient presents with    Diabetes     follow up    Hypertension     follow up    Cholesterol Problem     follow up       1. Have you been to the ER, urgent care clinic since your last visit? Hospitalized since your last visit? No    2. Have you seen or consulted any other health care providers outside of the [address] [place] [address] since your last visit? Include any pap smears or colon screening. No    3) Do you have an Advance Directive on file? yes    4) Are you interested in receiving information on Advance Directives? NO      Patient is accompanied by self I have received verbal consent from Ismael Dotson to discuss any/all medical information while they are present in the room.

## 2022-01-31 DIAGNOSIS — I10 ESSENTIAL HYPERTENSION WITH GOAL BLOOD PRESSURE LESS THAN 140/90: ICD-10-CM

## 2022-01-31 RX ORDER — CLONIDINE HYDROCHLORIDE 0.3 MG/1
TABLET ORAL
Qty: 90 TABLET | Refills: 0 | Status: SHIPPED | OUTPATIENT
Start: 2022-01-31 | End: 2022-03-02

## 2022-02-07 RX ORDER — DILTIAZEM HYDROCHLORIDE 180 MG/1
CAPSULE, COATED, EXTENDED RELEASE ORAL
Qty: 30 CAPSULE | Refills: 5 | Status: SHIPPED | OUTPATIENT
Start: 2022-02-07 | End: 2022-08-17 | Stop reason: SDUPTHER

## 2022-02-21 ENCOUNTER — OFFICE VISIT (OUTPATIENT)
Dept: INTERNAL MEDICINE CLINIC | Age: 87
End: 2022-02-21
Payer: MEDICARE

## 2022-02-21 VITALS
WEIGHT: 150 LBS | SYSTOLIC BLOOD PRESSURE: 142 MMHG | HEART RATE: 62 BPM | OXYGEN SATURATION: 99 % | HEIGHT: 59 IN | BODY MASS INDEX: 30.24 KG/M2 | TEMPERATURE: 98.1 F | DIASTOLIC BLOOD PRESSURE: 80 MMHG | RESPIRATION RATE: 18 BRPM

## 2022-02-21 DIAGNOSIS — E11.21 TYPE 2 DIABETES MELLITUS WITH NEPHROPATHY (HCC): Primary | ICD-10-CM

## 2022-02-21 DIAGNOSIS — E78.00 HYPERCHOLESTEROLEMIA: ICD-10-CM

## 2022-02-21 DIAGNOSIS — E78.2 MIXED HYPERLIPIDEMIA: ICD-10-CM

## 2022-02-21 DIAGNOSIS — Z23 ENCOUNTER FOR IMMUNIZATION: ICD-10-CM

## 2022-02-21 DIAGNOSIS — Z78.0 POST-MENOPAUSE: ICD-10-CM

## 2022-02-21 DIAGNOSIS — I10 ESSENTIAL HYPERTENSION WITH GOAL BLOOD PRESSURE LESS THAN 140/90: ICD-10-CM

## 2022-02-21 PROCEDURE — 99214 OFFICE O/P EST MOD 30 MIN: CPT | Performed by: INTERNAL MEDICINE

## 2022-02-21 PROCEDURE — 1090F PRES/ABSN URINE INCON ASSESS: CPT | Performed by: INTERNAL MEDICINE

## 2022-02-21 PROCEDURE — 1101F PT FALLS ASSESS-DOCD LE1/YR: CPT | Performed by: INTERNAL MEDICINE

## 2022-02-21 PROCEDURE — G8536 NO DOC ELDER MAL SCRN: HCPCS | Performed by: INTERNAL MEDICINE

## 2022-02-21 PROCEDURE — G8510 SCR DEP NEG, NO PLAN REQD: HCPCS | Performed by: INTERNAL MEDICINE

## 2022-02-21 PROCEDURE — G8427 DOCREV CUR MEDS BY ELIG CLIN: HCPCS | Performed by: INTERNAL MEDICINE

## 2022-02-21 PROCEDURE — G8417 CALC BMI ABV UP PARAM F/U: HCPCS | Performed by: INTERNAL MEDICINE

## 2022-02-21 RX ORDER — ATORVASTATIN CALCIUM 10 MG/1
10 TABLET, FILM COATED ORAL DAILY
Qty: 90 TABLET | Refills: 1 | Status: SHIPPED | OUTPATIENT
Start: 2022-02-21 | End: 2022-06-16 | Stop reason: SDUPTHER

## 2022-02-21 RX ORDER — METFORMIN HYDROCHLORIDE 500 MG/1
TABLET, EXTENDED RELEASE ORAL
Qty: 120 TABLET | Refills: 5 | Status: SHIPPED | OUTPATIENT
Start: 2022-02-21 | End: 2022-08-17 | Stop reason: SDUPTHER

## 2022-02-21 NOTE — PROGRESS NOTES
Health Maintenance Due   Topic Date Due    Shingrix Vaccine Age 49> (1 of 2) Never done    Pneumococcal 65+ years (2 of 2 - PPSV23) 09/14/2017    Eye Exam Retinal or Dilated  04/25/2019    Foot Exam Q1  02/08/2022    Lipid Screen  02/09/2022       Chief Complaint   Patient presents with    Cholesterol Problem    Hypertension    Diabetes       1. Have you been to the ER, urgent care clinic since your last visit? Hospitalized since your last visit? No    2. Have you seen or consulted any other health care providers outside of the [address] [place] [address] since your last visit? Include any pap smears or colon screening. No    3) Do you have an Advance Directive on file? no    4) Are you interested in receiving information on Advance Directives? NO      Patient is accompanied by self I have received verbal consent from Emilia Bailey to discuss any/all medical information while they are present in the room.

## 2022-02-21 NOTE — PROGRESS NOTES
HISTORY OF PRESENT ILLNESS  Lorenzo Mendez is a 80 y.o. female here for follow-up. She is diabetic, lab work done last time showed A1c getting better but still high. I have increased her Metformin dosage but she did not start like this. Trying to watch her diet. Eye checkup up-to-date. Need foot exam.  Has hypertension, compliant with medications. Denies chest pain palpitation shortness of breath. Needs refill on lisinopril. On elevated lipid, on statin. Lipid panel was normal.  Need bone density. Mammogram up-to-date. Received COVID booster. Need shingles vaccine. Hypertension     Diabetes    GERD    Cholesterol Problem        Review of Systems   Constitutional: Negative. HENT: Negative. Eyes: Negative. Respiratory: Negative. Cardiovascular: Negative. Gastrointestinal: Negative. Genitourinary: Negative. Skin: Negative. Neurological: Negative. Endo/Heme/Allergies: Negative. Psychiatric/Behavioral: Negative. Physical Exam  Constitutional:       Appearance: Normal appearance. She is obese. HENT:      Mouth/Throat:      Mouth: Mucous membranes are moist.   Cardiovascular:      Rate and Rhythm: Normal rate and regular rhythm. Pulses: Normal pulses. Heart sounds: Normal heart sounds. Pulmonary:      Effort: Pulmonary effort is normal.      Breath sounds: Normal breath sounds. Musculoskeletal:      Cervical back: Normal range of motion and neck supple. Comments:   Diabetic foot exam:     Left Foot:   Visual Exam: normal    Pulse DP: 2+ (normal)   Filament test: normal sensation    Vibratory sensation: normal      Right Foot:   Visual Exam: normal    Pulse DP: 1+ (weak)   Filament test: normal sensation    Vibratory sensation: normal       Skin:     General: Skin is warm. Neurological:      Mental Status: She is alert. Psychiatric:         Mood and Affect: Mood normal.         Behavior: Behavior normal.         Thought Content:  Thought content normal.         ASSESSMENT and PLAN  Diagnoses and all orders for this visit:    1. Type 2 diabetes mellitus with nephropathy (HCC)    A1c 8.6, coming down but still high. I have increased her Metformin 500 mg 2 tablet twice a day. She did not start it. Advised her to start 2 tablet twice a day and repeat CMP in a month. Will refill,  -     empagliflozin (Jardiance) 10 mg tablet; Take 1 Tablet by mouth daily. -     metFORMIN ER (GLUCOPHAGE XR) 500 mg tablet; 2 tab po BID  -     METABOLIC PANEL, COMPREHENSIVE  -     HEMOGLOBIN A1C WITH EAG    2. Mixed hyperlipidemia  -     atorvastatin (LIPITOR) 10 mg tablet; Take 1 Tablet by mouth daily.  -     METABOLIC PANEL, COMPREHENSIVE  -     LIPID PANEL    3. Hypercholesterolemia  We will check lipid panel next month. On Lipitor. 4. Essential hypertension with goal blood pressure less than 140/90  Stable blood pressure. On lisinopril and Cardizem. Also on metoprolol and clonidine. 5. Encounter for immunization    Will order,  -     varicella-zoster recombinant, PF, (SHINGRIX) 50 mcg/0.5 mL susr injection; 0.5 mL by IntraMUSCular route once for 1 dose. 6. Post-menopause    Calcium rich diet. Will order,  -     DEXA BONE DENSITY STUDY AXIAL; Future    Discussed expected course/resolution/complications of diagnosis in detail with patient. Medication risks/benefits/costs/interactions/alternatives discussed with patient. Discussed COVID-19 infection precaution with patient. Pt was given an after visit summary which includes diagnoses, current medications & vitals. Pt expressed understanding with the diagnosis and plan.

## 2022-02-22 DIAGNOSIS — E11.21 TYPE 2 DIABETES MELLITUS WITH NEPHROPATHY (HCC): Primary | ICD-10-CM

## 2022-02-22 NOTE — TELEPHONE ENCOUNTER
Patient called stating that one of the scripts that [facility] sent to her pharmacy is $99. She does not know which medication it is. I told her that we would not know which medication needs to be replaced if we do not know the name of the medication. She said that I should be able to look it up in the computer to see what medication as last sent to her pharmacy. There were three mediations sent on 02/10/2022. She demanded to speak with a nurse. I told her that the nurse would not know which medication costs her $99. I told her I would send a message but in the meantime she should call her pharmacy and find out what medication it is. She refused to call the pharmacy then hung up on me while I was talking.

## 2022-02-23 NOTE — TELEPHONE ENCOUNTER
Pt returned call this morning requesting to speak with a nurse about her prescription. Pt was notified that Dr. Mi Fernandez sent over Emmyviry Cardoza to her pharmacy. Pt voiced understanding and stated that she will reach out to her pharmacy. denies

## 2022-02-28 ENCOUNTER — TELEPHONE (OUTPATIENT)
Dept: INTERNAL MEDICINE CLINIC | Age: 87
End: 2022-02-28

## 2022-02-28 NOTE — TELEPHONE ENCOUNTER
Pt states she has stopped taking jardiance since it upset her stomach. She wants to know if there is something else she should try?

## 2022-03-02 DIAGNOSIS — I10 ESSENTIAL HYPERTENSION WITH GOAL BLOOD PRESSURE LESS THAN 140/90: ICD-10-CM

## 2022-03-02 RX ORDER — CLONIDINE HYDROCHLORIDE 0.3 MG/1
TABLET ORAL
Qty: 90 TABLET | Refills: 0 | Status: SHIPPED | OUTPATIENT
Start: 2022-03-02 | End: 2022-04-04

## 2022-03-04 NOTE — TELEPHONE ENCOUNTER
Patient calling back about the issue dealing with her stomach while taking Jardiance meds. ,she stated no one has contact her,she doesn't see any message on her phone.

## 2022-03-07 DIAGNOSIS — E11.21 TYPE 2 DIABETES MELLITUS WITH NEPHROPATHY (HCC): ICD-10-CM

## 2022-03-07 NOTE — TELEPHONE ENCOUNTER
Returned call to pt and discussed medications and new medication that was ordered. She thought that a new med was ordered but the pharmacy did not have one. I advised her I would call the pharmacy. Call placed to to pharmacy and they state the RX was picked up on 2/23. Call placed to pt and advised of this. She will call the pharmacy and talk with them.

## 2022-03-07 NOTE — TELEPHONE ENCOUNTER
Patient couldn't remember the name of the med that suppose to substitute the Jardiance meds. and she still went to the pharmacy and they stated there wasn't any meds I did informed the patient of the Clonidine that was last order and she was sure about that.lov-2/21/22

## 2022-03-11 NOTE — TELEPHONE ENCOUNTER
Patient is still waiting on a response about the Substitution for Jardiance. and she also a little confuse about her return appt: 4 months or 4 wks. I informed her what was listed in the system and she stated that wasn't what she was told. lov-2/21/21

## 2022-03-15 NOTE — TELEPHONE ENCOUNTER
2. 21.22  6.13.22    Call placed to patient, patient states she never received her Clarence Moore. She informed me she had a very heated argument with her pharmacy and her medications were dispensed to the wrong patient. Would like this med resent to her pharamacy. States Candace Pearl is making her very sick.  (Nausea and stomach aches)

## 2022-03-19 PROBLEM — I73.9 PERIPHERAL VASCULAR DISEASE (HCC): Status: ACTIVE | Noted: 2019-10-09

## 2022-03-19 PROBLEM — E66.811 CLASS 1 OBESITY DUE TO EXCESS CALORIES WITH SERIOUS COMORBIDITY AND BODY MASS INDEX (BMI) OF 33.0 TO 33.9 IN ADULT: Status: ACTIVE | Noted: 2018-12-26

## 2022-03-19 PROBLEM — E11.21 TYPE 2 DIABETES MELLITUS WITH NEPHROPATHY (HCC): Status: ACTIVE | Noted: 2018-01-24

## 2022-03-19 PROBLEM — M54.12 RADICULOPATHY, CERVICAL: Status: ACTIVE | Noted: 2018-07-16

## 2022-03-19 PROBLEM — E66.09 CLASS 1 OBESITY DUE TO EXCESS CALORIES WITH SERIOUS COMORBIDITY AND BODY MASS INDEX (BMI) OF 33.0 TO 33.9 IN ADULT: Status: ACTIVE | Noted: 2018-12-26

## 2022-03-21 NOTE — TELEPHONE ENCOUNTER
calling back again stating it's the wrong medication. She can't do the Jardiance meds:it upset her stomach . she is very angery, Its taking so long to make that correction . lov-2/21/22 -6/13/22

## 2022-03-22 NOTE — TELEPHONE ENCOUNTER
Call placed to patient, she is stating she has been having a lot of problems with her pharmacy. States that when she went to go  new medication, they refilled her Jardiance instead of the Barbara Faustin. Pharmacy claimed they did not have the new med on file. Will call pharmacy once they open to inquire about this matter.

## 2022-03-24 NOTE — TELEPHONE ENCOUNTER
Call placed to [facility]loe, informed me patient's son picked up [place] on 3/17/22. F/u scheduled and patient was informed.    Scheduled f/u for her o further discuss medications with Dr Shelby Smith

## 2022-03-29 LAB
ALBUMIN SERPL-MCNC: 4.6 G/DL (ref 3.6–4.6)
ALBUMIN/GLOB SERPL: 1.7 {RATIO} (ref 1.2–2.2)
ALP SERPL-CCNC: 89 IU/L (ref 44–121)
ALT SERPL-CCNC: 13 IU/L (ref 0–32)
AST SERPL-CCNC: 20 IU/L (ref 0–40)
BILIRUB SERPL-MCNC: 0.5 MG/DL (ref 0–1.2)
BUN SERPL-MCNC: 11 MG/DL (ref 8–27)
BUN/CREAT SERPL: 15 (ref 12–28)
CALCIUM SERPL-MCNC: 9.5 MG/DL (ref 8.7–10.3)
CHLORIDE SERPL-SCNC: 101 MMOL/L (ref 96–106)
CHOLEST SERPL-MCNC: 137 MG/DL (ref 100–199)
CO2 SERPL-SCNC: 23 MMOL/L (ref 20–29)
CREAT SERPL-MCNC: 0.72 MG/DL (ref 0.57–1)
EGFR: 80 ML/MIN/1.73
EST. AVERAGE GLUCOSE BLD GHB EST-MCNC: 154 MG/DL
GLOBULIN SER CALC-MCNC: 2.7 G/DL (ref 1.5–4.5)
GLUCOSE SERPL-MCNC: 119 MG/DL (ref 65–99)
HBA1C MFR BLD: 7 % (ref 4.8–5.6)
HDLC SERPL-MCNC: 41 MG/DL
IMP & REVIEW OF LAB RESULTS: NORMAL
LDLC SERPL CALC-MCNC: 70 MG/DL (ref 0–99)
POTASSIUM SERPL-SCNC: 4.1 MMOL/L (ref 3.5–5.2)
PROT SERPL-MCNC: 7.3 G/DL (ref 6–8.5)
SODIUM SERPL-SCNC: 141 MMOL/L (ref 134–144)
TRIGL SERPL-MCNC: 150 MG/DL (ref 0–149)
VLDLC SERPL CALC-MCNC: 26 MG/DL (ref 5–40)

## 2022-04-03 DIAGNOSIS — I10 ESSENTIAL HYPERTENSION WITH GOAL BLOOD PRESSURE LESS THAN 140/90: ICD-10-CM

## 2022-04-03 DIAGNOSIS — F41.0 ANXIETY ATTACK: ICD-10-CM

## 2022-04-04 RX ORDER — ALPRAZOLAM 0.5 MG/1
TABLET ORAL
Qty: 15 TABLET | Refills: 0 | Status: SHIPPED | OUTPATIENT
Start: 2022-04-04 | End: 2022-10-25

## 2022-04-04 RX ORDER — CLONIDINE HYDROCHLORIDE 0.3 MG/1
TABLET ORAL
Qty: 90 TABLET | Refills: 0 | Status: SHIPPED | OUTPATIENT
Start: 2022-04-04 | End: 2022-05-09

## 2022-04-25 ENCOUNTER — OFFICE VISIT (OUTPATIENT)
Dept: CARDIOLOGY CLINIC | Age: 87
End: 2022-04-25
Payer: MEDICARE

## 2022-04-25 ENCOUNTER — OFFICE VISIT (OUTPATIENT)
Dept: INTERNAL MEDICINE CLINIC | Age: 87
End: 2022-04-25

## 2022-04-25 VITALS
DIASTOLIC BLOOD PRESSURE: 76 MMHG | HEIGHT: 59 IN | SYSTOLIC BLOOD PRESSURE: 142 MMHG | RESPIRATION RATE: 16 BRPM | OXYGEN SATURATION: 99 % | TEMPERATURE: 98.1 F | HEART RATE: 72 BPM | BODY MASS INDEX: 30.24 KG/M2 | WEIGHT: 150 LBS

## 2022-04-25 VITALS
RESPIRATION RATE: 17 BRPM | WEIGHT: 150 LBS | HEART RATE: 73 BPM | DIASTOLIC BLOOD PRESSURE: 72 MMHG | OXYGEN SATURATION: 97 % | BODY MASS INDEX: 30.24 KG/M2 | HEIGHT: 59 IN | SYSTOLIC BLOOD PRESSURE: 128 MMHG

## 2022-04-25 DIAGNOSIS — Z23 ENCOUNTER FOR IMMUNIZATION: ICD-10-CM

## 2022-04-25 DIAGNOSIS — B37.2 CANDIDAL INTERTRIGO: Primary | ICD-10-CM

## 2022-04-25 DIAGNOSIS — I10 RESISTANT HYPERTENSION: Primary | ICD-10-CM

## 2022-04-25 DIAGNOSIS — E78.2 MIXED HYPERLIPIDEMIA: ICD-10-CM

## 2022-04-25 DIAGNOSIS — I10 ESSENTIAL HYPERTENSION: ICD-10-CM

## 2022-04-25 DIAGNOSIS — I73.9 PERIPHERAL VASCULAR DISEASE (HCC): ICD-10-CM

## 2022-04-25 DIAGNOSIS — E11.8 DM TYPE 2, CONTROLLED, WITH COMPLICATION (HCC): ICD-10-CM

## 2022-04-25 DIAGNOSIS — I87.2 VENOUS INSUFFICIENCY: ICD-10-CM

## 2022-04-25 DIAGNOSIS — E11.21 TYPE 2 DIABETES MELLITUS WITH NEPHROPATHY (HCC): ICD-10-CM

## 2022-04-25 PROCEDURE — 90732 PPSV23 VACC 2 YRS+ SUBQ/IM: CPT | Performed by: INTERNAL MEDICINE

## 2022-04-25 PROCEDURE — 99214 OFFICE O/P EST MOD 30 MIN: CPT | Performed by: INTERNAL MEDICINE

## 2022-04-25 PROCEDURE — 3051F HG A1C>EQUAL 7.0%<8.0%: CPT | Performed by: INTERNAL MEDICINE

## 2022-04-25 PROCEDURE — 1101F PT FALLS ASSESS-DOCD LE1/YR: CPT | Performed by: INTERNAL MEDICINE

## 2022-04-25 PROCEDURE — G8417 CALC BMI ABV UP PARAM F/U: HCPCS | Performed by: INTERNAL MEDICINE

## 2022-04-25 PROCEDURE — G8536 NO DOC ELDER MAL SCRN: HCPCS | Performed by: INTERNAL MEDICINE

## 2022-04-25 PROCEDURE — G8427 DOCREV CUR MEDS BY ELIG CLIN: HCPCS | Performed by: INTERNAL MEDICINE

## 2022-04-25 PROCEDURE — G0009 ADMIN PNEUMOCOCCAL VACCINE: HCPCS | Performed by: INTERNAL MEDICINE

## 2022-04-25 PROCEDURE — G8510 SCR DEP NEG, NO PLAN REQD: HCPCS | Performed by: INTERNAL MEDICINE

## 2022-04-25 PROCEDURE — 1090F PRES/ABSN URINE INCON ASSESS: CPT | Performed by: INTERNAL MEDICINE

## 2022-04-25 RX ORDER — FLUCONAZOLE 150 MG/1
150 TABLET ORAL DAILY
Qty: 2 TABLET | Refills: 0 | Status: SHIPPED | OUTPATIENT
Start: 2022-04-25 | End: 2022-04-27

## 2022-04-25 RX ORDER — NYSTATIN 100000 [USP'U]/G
POWDER TOPICAL 2 TIMES DAILY
Qty: 1 EACH | Refills: 0 | Status: SHIPPED | OUTPATIENT
Start: 2022-04-25

## 2022-04-25 NOTE — PROGRESS NOTES
HISTORY OF PRESENT ILLNESS  Henry Melo is a 80 y.o. female here for follow-up. She is diabetic, A1c has improved. She is watching diet and exercise. I have started her on Jardiance which she could not tolerate because of abdominal pain. I have switched to [place]. She did not start it because she was confused and thought is the same medication. I have explained to her that it is a single medication but probably Adrian is not can give her abdominal pain. Has hypertension, compliant with medications. Denies chest pain palpitation shortness of breath. On elevated lipid, on statin. Lipid panel was normal.  Reported rash under the breast.  Itchy and painful. Cholesterol Problem    Hypertension     Diabetes    GERD        Review of Systems   Constitutional: Negative. HENT: Negative. Eyes: Negative. Respiratory: Negative. Cardiovascular: Negative. Gastrointestinal: Negative. Genitourinary: Negative. Skin: Positive for itching and rash. Neurological: Negative. Endo/Heme/Allergies: Negative. Psychiatric/Behavioral: Negative. Physical Exam  Constitutional:       Appearance: Normal appearance. She is obese. HENT:      Mouth/Throat:      Mouth: Mucous membranes are moist.   Cardiovascular:      Rate and Rhythm: Normal rate and regular rhythm. Pulses: Normal pulses. Heart sounds: Normal heart sounds. Pulmonary:      Effort: Pulmonary effort is normal.      Breath sounds: Normal breath sounds. Musculoskeletal:      Cervical back: Normal range of motion and neck supple. Skin:     General: Skin is warm. Comments: Both breast fold: Dark discoloration with elevated border present. Itchy. Neurological:      Mental Status: She is alert. Psychiatric:         Mood and Affect: Mood normal.         Behavior: Behavior normal.         Thought Content: Thought content normal.         ASSESSMENT and PLAN  Diagnoses and all orders for this visit:    1.  Candidal intertrigo    Need to air out. We will give,  -     nystatin (MYCOSTATIN) powder; Apply  to affected area two (2) times a day. -     fluconazole (DIFLUCAN) 150 mg tablet; Take 1 Tablet by mouth daily for 2 days. FDA advises cautious prescribing of oral fluconazole in pregnancy. 2. Encounter for immunization    We will give,  -     PNEUMOCOCCAL POLYSACCHARIDE VACCINE, 23-VALENT, ADULT OR IMMUNOSUPPRESSED PT DOSE,  -     varicella-zoster recombinant, PF, (SHINGRIX) 50 mcg/0.5 mL susr injection; 0.5 mL by IntraMUSCular route once for 1 dose. 3. Essential hypertension  Stable blood pressure. On Cardizem, metoprolol, lisinopril and clonidine. 4. Type 2 diabetes mellitus with nephropathy (HCC)  A1c has improved, 7. She is watching diet. On metformin and glipizide. She is not able to tolerate Jardiance because abdominal pain. I have switched to [place]. She did not understand, she will start it tomorrow. 5. Mixed hyperlipidemia  On statin. No myalgia. 6. Peripheral vascular disease ([address]  Doing well. On aspirin and statin. Discussed expected course/resolution/complications of diagnosis in detail with patient. Medication risks/benefits/costs/interactions/alternatives discussed with patient. Discussed COVID-19 infection precaution with patient. Pt was given an after visit summary which includes diagnoses, current medications & vitals. Pt expressed understanding with the diagnosis and plan.

## 2022-04-25 NOTE — PROGRESS NOTES
[facility]: [place]  ([phone]    HPI:  Ms. Jyotsna Mtz is a 81 yo F with a h/o HTN, hyperlipidemia, DM2 seen in the past for palpitations. Found on holter to have benign ectopy and started on beta blocker for symptoms. Previously off higher dose HCTZ due to low Na+. BP has been resistant; BP had improved on norvasc but had worsened LE edema. U/S 11/2012 for DVT was negative. Echo 8/2012 noted normal LV systolic function with mild to moderate MR; echo 2/17/14 unchanged. Seen by vascular in past for LE edema and c/w vascular insufficiency and recommended leg elevation and compression stockings. Echo 4/2015 was normal with EF 60% and mild MR. Overall she has been doing well. They are making some adjustments with her diabetes medication, as she could not tolerate Jardiance. It \"made her sick to her stomach\". From a symptom standpoint, she denies any exertional chest pain. Her breathing has been stable. No significant palpitations, lightheadedness or dizziness. She has been compliant with her medications. Her blood pressure is under good control and is 128/72 here. She does admit she still has a lot of apprehension with COVID and talked about that she is boosted that it is okay for her to try to increase her level of exercise and activity. She does note that there are some areas around the neighborhood where she can walk, also when she goes to [place]. She is compensated on exam with clear lungs. She has trace lower extremity edema. Assessment and Plan:   1. Lower extremity edema, venous insufficiency. Stable and compensated. Echocardiograms in the past demonstrated preserved LV function. Elevate legs, minimize salt intake, regular exercise. Will have her follow back in six months. 2. Resistant hypertension. Blood pressure is controlled and no changes made. History of white coat hypertension. 3. Type 2 diabetes.   Followed by her primary care physician and adjustments being made. She was unable to tolerate Jardiance. .She  has a past medical history of Adverse effect of anesthesia, Chronic bronchitis, Diabetes ([address] Essential hypertension, Hypercholesterolemia, and Hypertension. All other systems negative except as above. PE  Vitals:    04/25/22 0939   BP: 128/72   Pulse: 73   Resp: 17   SpO2: 97%   Weight: 150 lb (68 kg)   Height: 4' 11\" (1.499 m)    Body mass index is 30.3 kg/m². General appearance - alert, well appearing, and in no distress  Mental status - affect appropriate to mood, pleasant   Neck - supple, no JVD, no bruits   Chest - clear to auscultation, no wheezes, rales or rhonchi  Heart - normal rate, regular rhythm, normal S1, S2, I-II/VI systolic murmur LUSB, rubs, clicks or gallops  Abdomen - soft, nontender, nondistended  Extremities - peripheral pulses normal, trace chronic edema bilateral lower extremities.      Recent Labs:  Lab Results   Component Value Date/Time    Cholesterol, total 137 03/28/2022 09:14 AM    HDL Cholesterol 41 03/28/2022 09:14 AM    LDL, calculated 70 03/28/2022 09:14 AM    LDL, calculated 67.2 02/09/2021 09:10 AM    Triglyceride 150 (H) 03/28/2022 09:14 AM    CHOL/HDL Ratio 4.1 02/09/2021 09:10 AM     Lab Results   Component Value Date/Time    Creatinine 0.72 03/28/2022 09:14 AM     Lab Results   Component Value Date/Time    BUN 11 03/28/2022 09:14 AM     Lab Results   Component Value Date/Time    Potassium 4.1 03/28/2022 09:14 AM     Lab Results   Component Value Date/Time    Hemoglobin A1c 7.0 (H) 03/28/2022 09:14 AM     Lab Results   Component Value Date/Time    HGB 13.2 06/30/2021 07:50 AM     Lab Results   Component Value Date/Time    PLATELET 801 97/79/7533 07:50 AM       Reviewed:  Past Medical History:   Diagnosis Date    Adverse effect of anesthesia     pt states she was able to feel everything during colonoscopy    Chronic bronchitis     Diabetes ([address]     Essential hypertension     Hypercholesterolemia  Hypertension      Social History     Tobacco Use   Smoking Status Never Smoker   Smokeless Tobacco Never Used     Social History     Substance and Sexual Activity   Alcohol Use No    Alcohol/week: 0.0 standard drinks     Allergies   Allergen Reactions    Cortisone Other (comments)     \"Make me feels weak, like I'm going to die\"      Januvia [Sitagliptin] Other (comments)     weakness       Current Outpatient Medications   Medication Sig    omeprazole (PRILOSEC) 40 mg capsule TAKE ONE CAPSULE BY MOUTH EVERY DAY BEFORE BREAKFAST    cloNIDine HCL (CATAPRES) 0.3 mg tablet TAKE ONE TABLET BY MOUTH 3 TIMES A DAY    ALPRAZolam (XANAX) 0.5 mg tablet TAKE ONE TABLET BY MOUTH NIGHTLY AS NEEDED FOR ANXIETY. MAX DAILY AMOUNT 1 TABLET    atorvastatin (LIPITOR) 10 mg tablet Take 1 Tablet by mouth daily.  metFORMIN ER (GLUCOPHAGE XR) 500 mg tablet 2 tab po BID    metoprolol tartrate (LOPRESSOR) 100 mg IR tablet Take 1 AND 1/2 TABLETS BY MOUTH TWICE DAILY    glipiZIDE (GLUCOTROL) 5 mg tablet TAKE ONE TABLET BY MOUTH 2 TIMES A DAY    All Day Allergy 10 mg tablet TAKE ONE TABLET BY MOUTH NIGHTLY AS NEEDED FOR ALLERGIES    ascorbic acid (VITAMIN C PO) Take  by mouth daily.  SYSTANE, PROPYLENE GLYCOL, 0.4-0.3 % drop Administer 10 g to both eyes as needed.  cholecalciferol (VITAMIN D3) 1,000 unit tablet Take  by mouth daily.  cod liver oil cap Take 1 Cap by mouth daily.  calcium-cholecalciferol, d3, (CALCIUM 600 + D) 600-125 mg-unit tab Take 1 Tab by mouth daily.  acetaminophen (TYLENOL) 325 mg tablet Take  by mouth every four (4) hours as needed for Pain.  aspirin 81 mg chewable tablet Take 81 mg by mouth daily.  dapagliflozin (FARXIGA) 10 mg tab tablet Take 1 Tablet by mouth daily. DC jardiance (Patient not taking: Reported on 4/25/2022)    dilTIAZem ER (CARDIZEM CD) 180 mg capsule Take 1 Cap by mouth daily.  (Patient not taking: Reported on 4/25/2022)    lisinopriL (PRINIVIL, ZESTRIL) 20 mg tablet Take 1 Tablet by mouth daily. DC lisinopril 10 mg (Patient not taking: Reported on 4/25/2022)    Leg Brace (ACE Knee Brace) misc by Does Not Apply route. Use in both knee daily (Patient not taking: Reported on 4/25/2022)    Blood-Glucose Meter monitoring kit check BS BID (Patient not taking: Reported on 4/25/2022)    glucose blood VI test strips (True Metrix Glucose Test Strip) strip Use to check blood sugar daily and PRN (Patient not taking: Reported on 4/25/2022)    lancets misc Check BS BID (Patient not taking: Reported on 4/25/2022)    hydrocortisone (HYTONE) 2.5 % lotion Apply  to affected area two (2) times a day. use thin layer (Patient not taking: Reported on 4/25/2022)    loratadine (Claritin) 10 mg tablet Take 1 Tab by mouth daily as needed for Allergies. (Patient not taking: Reported on 4/25/2022)    lancets misc Use to check blood sugar daily and PRN (Patient not taking: Reported on 4/25/2022)    OTHER 15 mm Hg below knee stockings B/L (Patient not taking: Reported on 4/25/2022)    Arm Brace (NEOPRENE WRIST SPLINT SUPPORT) misc On right wrist (Patient not taking: Reported on 4/25/2022)    famotidine (PEPCID) 20 mg tablet Take 1 Tab by mouth two (2) times a day. (Patient not taking: Reported on 4/25/2022)     No current facility-administered medications for this visit.        Stephanie Houser MD  [facility] [place]  [address] [facility] [address]  [address]

## 2022-04-25 NOTE — PROGRESS NOTES
Health Maintenance Due   Topic Date Due    Shingrix Vaccine Age 49> (1 of 2) Never done    Pneumococcal 65+ years (2 - PPSV23 or PCV20) 09/14/2017    Eye Exam Retinal or Dilated  04/25/2019       Chief Complaint   Patient presents with    Hypertension    Diabetes    Discuss Medications       1. Have you been to the ER, urgent care clinic since your last visit? Hospitalized since your last visit? No    2. Have you seen or consulted any other health care providers outside of the [address] [place] [address] since your last visit? Include any pap smears or colon screening. No    3) Do you have an Advance Directive on file? no    4) Are you interested in receiving information on Advance Directives? NO      Patient is accompanied by self I have received verbal consent from Evin Islas to discuss any/all medical information while they are present in the room.

## 2022-05-03 ENCOUNTER — NURSE TRIAGE (OUTPATIENT)
Dept: OTHER | Facility: CLINIC | Age: 87
End: 2022-05-03

## 2022-05-03 NOTE — TELEPHONE ENCOUNTER
Caller was seen in ED yesterday. No new or worsening symptoms. Warm transfer provided to Pavan Kauffman at [facility] for scheduling.     Reason for Disposition   [1] Caller requesting NON-URGENT health information AND [2] PCP's office is the best resource    Protocols used: INFORMATION ONLY CALL - NO TRIAGE-ADULT-

## 2022-05-04 ENCOUNTER — OFFICE VISIT (OUTPATIENT)
Dept: INTERNAL MEDICINE CLINIC | Age: 87
End: 2022-05-04
Payer: MEDICARE

## 2022-05-04 VITALS
SYSTOLIC BLOOD PRESSURE: 210 MMHG | RESPIRATION RATE: 16 BRPM | TEMPERATURE: 98.4 F | HEART RATE: 88 BPM | WEIGHT: 148 LBS | HEIGHT: 59 IN | BODY MASS INDEX: 29.84 KG/M2 | OXYGEN SATURATION: 97 % | DIASTOLIC BLOOD PRESSURE: 100 MMHG

## 2022-05-04 DIAGNOSIS — S00.11XA BLACK EYE OF RIGHT SIDE, INITIAL ENCOUNTER: Primary | ICD-10-CM
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
## 2022-05-04 DIAGNOSIS — S09.90XA INJURY OF HEAD, INITIAL ENCOUNTER: ICD-10-CM

## 2022-05-04 DIAGNOSIS — R51.9 ACUTE NONINTRACTABLE HEADACHE, UNSPECIFIED HEADACHE TYPE: ICD-10-CM

## 2022-05-04 DIAGNOSIS — F43.21 GRIEF AT LOSS OF CHILD: ICD-10-CM

## 2022-05-04 DIAGNOSIS — F43.29 STRESS AND ADJUSTMENT REACTION: ICD-10-CM

## 2022-05-04 DIAGNOSIS — F11.90 OPIOID USE: ICD-10-CM

## 2022-05-04 DIAGNOSIS — Z63.4 GRIEF AT LOSS OF CHILD: ICD-10-CM

## 2022-05-04 PROCEDURE — G8510 SCR DEP NEG, NO PLAN REQD: HCPCS | Performed by: INTERNAL MEDICINE

## 2022-05-04 PROCEDURE — G8417 CALC BMI ABV UP PARAM F/U: HCPCS | Performed by: INTERNAL MEDICINE

## 2022-05-04 PROCEDURE — 1090F PRES/ABSN URINE INCON ASSESS: CPT | Performed by: INTERNAL MEDICINE

## 2022-05-04 PROCEDURE — 1101F PT FALLS ASSESS-DOCD LE1/YR: CPT | Performed by: INTERNAL MEDICINE

## 2022-05-04 PROCEDURE — 99214 OFFICE O/P EST MOD 30 MIN: CPT | Performed by: INTERNAL MEDICINE

## 2022-05-04 PROCEDURE — G8536 NO DOC ELDER MAL SCRN: HCPCS | Performed by: INTERNAL MEDICINE

## 2022-05-04 PROCEDURE — G8427 DOCREV CUR MEDS BY ELIG CLIN: HCPCS | Performed by: INTERNAL MEDICINE

## 2022-05-04 RX ORDER — NALOXONE HYDROCHLORIDE 4 MG/.1ML
SPRAY NASAL
Qty: 1 EACH | Refills: 0 | Status: SHIPPED | OUTPATIENT
Start: 2022-05-04

## 2022-05-04 RX ORDER — HYDROCODONE BITARTRATE AND ACETAMINOPHEN 5; 325 MG/1; MG/1
1 TABLET ORAL
Qty: 14 TABLET | Refills: 0 | Status: SHIPPED | OUTPATIENT
Start: 2022-05-04 | End: 2022-05-11

## 2022-05-04 SDOH — SOCIAL STABILITY - SOCIAL INSECURITY: DISSAPEARANCE AND DEATH OF FAMILY MEMBER: Z63.4
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
## 2022-05-07 DIAGNOSIS — I10 ESSENTIAL HYPERTENSION WITH GOAL BLOOD PRESSURE LESS THAN 140/90: ICD-10-CM

## 2022-05-09 RX ORDER — CLONIDINE HYDROCHLORIDE 0.3 MG/1
TABLET ORAL
Qty: 90 TABLET | Refills: 0 | Status: SHIPPED | OUTPATIENT
Start: 2022-05-09 | End: 2022-07-27 | Stop reason: SDUPTHER

## 2022-05-11 ENCOUNTER — OFFICE VISIT (OUTPATIENT)
Dept: INTERNAL MEDICINE CLINIC | Age: 87
End: 2022-05-11
Payer: MEDICARE

## 2022-05-11 VITALS
HEIGHT: 59 IN | OXYGEN SATURATION: 98 % | SYSTOLIC BLOOD PRESSURE: 156 MMHG | WEIGHT: 146 LBS | HEART RATE: 71 BPM | BODY MASS INDEX: 29.43 KG/M2 | DIASTOLIC BLOOD PRESSURE: 90 MMHG | RESPIRATION RATE: 18 BRPM | TEMPERATURE: 98.2 F

## 2022-05-11 DIAGNOSIS — I10 ESSENTIAL HYPERTENSION WITH GOAL BLOOD PRESSURE LESS THAN 140/90: ICD-10-CM

## 2022-05-11 DIAGNOSIS — S00.11XD BLACK EYE OF RIGHT SIDE, SUBSEQUENT ENCOUNTER: ICD-10-CM

## 2022-05-11 DIAGNOSIS — Z63.4 GRIEF AT LOSS OF CHILD: ICD-10-CM

## 2022-05-11 DIAGNOSIS — S09.90XD INJURY OF HEAD, SUBSEQUENT ENCOUNTER: ICD-10-CM

## 2022-05-11 DIAGNOSIS — F43.21 GRIEF AT LOSS OF CHILD: ICD-10-CM

## 2022-05-11 DIAGNOSIS — F41.0 ANXIETY ATTACK: Primary | ICD-10-CM

## 2022-05-11 PROCEDURE — G8417 CALC BMI ABV UP PARAM F/U: HCPCS | Performed by: INTERNAL MEDICINE

## 2022-05-11 PROCEDURE — 1090F PRES/ABSN URINE INCON ASSESS: CPT | Performed by: INTERNAL MEDICINE

## 2022-05-11 PROCEDURE — 99214 OFFICE O/P EST MOD 30 MIN: CPT | Performed by: INTERNAL MEDICINE

## 2022-05-11 PROCEDURE — G8536 NO DOC ELDER MAL SCRN: HCPCS | Performed by: INTERNAL MEDICINE

## 2022-05-11 PROCEDURE — G8427 DOCREV CUR MEDS BY ELIG CLIN: HCPCS | Performed by: INTERNAL MEDICINE

## 2022-05-11 PROCEDURE — G8510 SCR DEP NEG, NO PLAN REQD: HCPCS | Performed by: INTERNAL MEDICINE

## 2022-05-11 PROCEDURE — 1101F PT FALLS ASSESS-DOCD LE1/YR: CPT | Performed by: INTERNAL MEDICINE

## 2022-05-11 SDOH — SOCIAL STABILITY - SOCIAL INSECURITY: DISSAPEARANCE AND DEATH OF FAMILY MEMBER: Z63.4

## 2022-05-11 NOTE — PROGRESS NOTES
Health Maintenance Due   Topic Date Due    Shingrix Vaccine Age 49> (1 of 2) Never done    Eye Exam Retinal or Dilated  04/25/2019       Chief Complaint   Patient presents with    Head Pain    Cyst    Diabetes    Medication Evaluation       1. Have you been to the ER, urgent care clinic since your last visit? Hospitalized since your last visit? No    2. Have you seen or consulted any other health care providers outside of the [address] [place] [address] since your last visit? Include any pap smears or colon screening. No    3) Do you have an Advance Directive on file? no    4) Are you interested in receiving information on Advance Directives? NO      Patient is accompanied by self and daughter in lawI have received verbal consent from Jameel Marshall to discuss any/all medical information while they are present in the room.

## 2022-05-20 ENCOUNTER — NURSE TRIAGE (OUTPATIENT)
Dept: OTHER | Facility: CLINIC | Age: 87
End: 2022-05-20
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
## 2022-05-23 ENCOUNTER — OFFICE VISIT (OUTPATIENT)
Dept: INTERNAL MEDICINE CLINIC | Age: 87
End: 2022-05-23
Payer: MEDICARE

## 2022-05-23 VITALS
OXYGEN SATURATION: 96 % | HEART RATE: 61 BPM | TEMPERATURE: 98.1 F | BODY MASS INDEX: 29.41 KG/M2 | SYSTOLIC BLOOD PRESSURE: 138 MMHG | DIASTOLIC BLOOD PRESSURE: 76 MMHG | WEIGHT: 145.6 LBS

## 2022-05-23 DIAGNOSIS — G47.00 INSOMNIA, UNSPECIFIED TYPE: ICD-10-CM

## 2022-05-23 DIAGNOSIS — I10 ESSENTIAL HYPERTENSION WITH GOAL BLOOD PRESSURE LESS THAN 140/90: ICD-10-CM

## 2022-05-23 DIAGNOSIS — E11.21 TYPE 2 DIABETES MELLITUS WITH NEPHROPATHY (HCC): ICD-10-CM

## 2022-05-23 DIAGNOSIS — Z63.4 GRIEF AT LOSS OF CHILD: ICD-10-CM

## 2022-05-23 DIAGNOSIS — S09.90XD INJURY OF HEAD, SUBSEQUENT ENCOUNTER: ICD-10-CM

## 2022-05-23 DIAGNOSIS — F41.9 ANXIETY: Primary | ICD-10-CM

## 2022-05-23 DIAGNOSIS — F43.21 GRIEF AT LOSS OF CHILD: ICD-10-CM

## 2022-05-23 PROCEDURE — 1101F PT FALLS ASSESS-DOCD LE1/YR: CPT | Performed by: INTERNAL MEDICINE

## 2022-05-23 PROCEDURE — G8536 NO DOC ELDER MAL SCRN: HCPCS | Performed by: INTERNAL MEDICINE

## 2022-05-23 PROCEDURE — G8417 CALC BMI ABV UP PARAM F/U: HCPCS | Performed by: INTERNAL MEDICINE

## 2022-05-23 PROCEDURE — G8427 DOCREV CUR MEDS BY ELIG CLIN: HCPCS | Performed by: INTERNAL MEDICINE

## 2022-05-23 PROCEDURE — 99214 OFFICE O/P EST MOD 30 MIN: CPT | Performed by: INTERNAL MEDICINE

## 2022-05-23 PROCEDURE — 1090F PRES/ABSN URINE INCON ASSESS: CPT | Performed by: INTERNAL MEDICINE

## 2022-05-23 PROCEDURE — G8510 SCR DEP NEG, NO PLAN REQD: HCPCS | Performed by: INTERNAL MEDICINE

## 2022-05-23 SDOH — SOCIAL STABILITY - SOCIAL INSECURITY: DISSAPEARANCE AND DEATH OF FAMILY MEMBER: Z63.4

## 2022-05-23 NOTE — PROGRESS NOTES
Health Maintenance Due   Topic Date Due    Shingrix Vaccine Age 49> (1 of 2) Never done    Eye Exam Retinal or Dilated  04/25/2019       Chief Complaint   Patient presents with    Depression    Hypertension    Head Pain    Eye Injury       1. Have you been to the ER, urgent care clinic since your last visit? Hospitalized since your last visit? No    2. Have you seen or consulted any other health care providers outside of the [address] [place] [address] since your last visit? Include any pap smears or colon screening. No    3) Do you have an Advance Directive on file? no    4) Are you interested in receiving information on Advance Directives? NO      Patient is accompanied by son I have received verbal consent from Dedrick Hayes to discuss any/all medical information while they are present in the room.

## 2022-05-23 NOTE — PROGRESS NOTES
HISTORY OF PRESENT ILLNESS  Rashard Kasper is a 80 y.o. female here for follow-up. She is accompanied by her son. Head injury seems stable. Bump on the head is improving slowly. Still having headache, taking Tylenol as needed. Black eye has improved. No neurological deficit. Still having anxiety issues, using Xanax as needed. Has insomnia, not willing to take any medicine to sleep all night. She is afraid to sleep all night. Blood pressure seems okay right now. It was elevated. Taking all blood pressure medication. Has diabetes, monitoring blood sugar, seems stable. On Farxiga. Labs reviewed. Head Pain     Hypertension   Associated symptoms include headaches. Medication Evaluation  Associated symptoms include headaches. Diabetes  Associated symptoms include headaches. GERD  Associated symptoms include headaches. Depression  Associated symptoms include headaches. Review of Systems   Constitutional: Negative. HENT: Negative. Eyes: Negative. Respiratory: Negative. Cardiovascular: Negative. Gastrointestinal: Negative. Genitourinary: Negative. Musculoskeletal: Positive for joint pain. Skin: Negative. Neurological: Positive for headaches. Endo/Heme/Allergies: Negative. Psychiatric/Behavioral: Positive for depression. The patient is nervous/anxious and has insomnia. Physical Exam  Constitutional:       Appearance: Normal appearance. She is obese. HENT:      Head:      Comments:   Has had a forehead: Induration is improving slowly. Nose: Nose normal.      Mouth/Throat:      Mouth: Mucous membranes are moist.   Cardiovascular:      Rate and Rhythm: Normal rate and regular rhythm. Pulses: Normal pulses. Heart sounds: Normal heart sounds. Pulmonary:      Effort: Pulmonary effort is normal.      Breath sounds: Normal breath sounds. Musculoskeletal:      Cervical back: Normal range of motion and neck supple.    Skin:     General: Skin is warm.      Comments: Both breast fold: Dark discoloration with elevated border present. Itchy. Neurological:      General: No focal deficit present. Mental Status: She is alert and oriented to person, place, and time. Mental status is at baseline. Psychiatric:         Mood and Affect: Mood normal.         Behavior: Behavior normal.         Thought Content: Thought content normal.      Comments: Grieving seems stable. Mild anxiety present. ASSESSMENT and PLAN  Diagnoses and all orders for this visit:    1. Anxiety  Slowly improving. On Xanax as needed. 2. Insomnia, unspecified type  Not able to take any medication. She is afraid to sleep all night. 3. Grief at loss of child  Has lost her son, getting better. 4. Essential hypertension with goal blood pressure less than 140/90  Stable blood pressure. Same regimen, on metoprolol, clonidine, diltiazem. 5. Injury of head, subsequent encounter  Getting better. Still having bump on right-sided forehead. No neurological deficit. Black eye has improved significantly. 6. Type 2 diabetes mellitus with nephropathy (HCC)  A1c has improved. On Farxiga. Also on metformin. Discussed expected course/resolution/complications of diagnosis in detail with patient. Medication risks/benefits/costs/interactions/alternatives discussed with patient. Discussed COVID-19 infection precaution with patient. Pt was given an after visit summary which includes diagnoses, current medications & vitals. Pt expressed understanding with the diagnosis and plan.

## 2022-06-14 ENCOUNTER — NURSE TRIAGE (OUTPATIENT)
Dept: OTHER | Facility: CLINIC | Age: 87
End: 2022-06-14
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
## 2022-06-16 ENCOUNTER — OFFICE VISIT (OUTPATIENT)
Dept: INTERNAL MEDICINE CLINIC | Age: 87
End: 2022-06-16
Payer: MEDICARE

## 2022-06-16 VITALS
RESPIRATION RATE: 16 BRPM | HEART RATE: 62 BPM | OXYGEN SATURATION: 98 % | TEMPERATURE: 97.7 F | HEIGHT: 59 IN | WEIGHT: 146.6 LBS | BODY MASS INDEX: 29.56 KG/M2 | SYSTOLIC BLOOD PRESSURE: 142 MMHG | DIASTOLIC BLOOD PRESSURE: 86 MMHG

## 2022-06-16 DIAGNOSIS — G44.329 CHRONIC POST-TRAUMATIC HEADACHE, NOT INTRACTABLE: ICD-10-CM

## 2022-06-16 DIAGNOSIS — I49.3 PVC'S (PREMATURE VENTRICULAR CONTRACTIONS): ICD-10-CM

## 2022-06-16 DIAGNOSIS — I10 ESSENTIAL HYPERTENSION: ICD-10-CM

## 2022-06-16 DIAGNOSIS — S00.11XD BLACK EYE OF RIGHT SIDE, SUBSEQUENT ENCOUNTER: ICD-10-CM
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
## 2022-06-16 DIAGNOSIS — E78.2 MIXED HYPERLIPIDEMIA: ICD-10-CM

## 2022-06-16 PROCEDURE — 1090F PRES/ABSN URINE INCON ASSESS: CPT | Performed by: INTERNAL MEDICINE

## 2022-06-16 PROCEDURE — 1101F PT FALLS ASSESS-DOCD LE1/YR: CPT | Performed by: INTERNAL MEDICINE

## 2022-06-16 PROCEDURE — G8417 CALC BMI ABV UP PARAM F/U: HCPCS | Performed by: INTERNAL MEDICINE

## 2022-06-16 PROCEDURE — 99214 OFFICE O/P EST MOD 30 MIN: CPT | Performed by: INTERNAL MEDICINE

## 2022-06-16 PROCEDURE — G8536 NO DOC ELDER MAL SCRN: HCPCS | Performed by: INTERNAL MEDICINE

## 2022-06-16 PROCEDURE — G8432 DEP SCR NOT DOC, RNG: HCPCS | Performed by: INTERNAL MEDICINE

## 2022-06-16 PROCEDURE — G8427 DOCREV CUR MEDS BY ELIG CLIN: HCPCS | Performed by: INTERNAL MEDICINE

## 2022-06-16 RX ORDER — ATORVASTATIN CALCIUM 10 MG/1
10 TABLET, FILM COATED ORAL DAILY
Qty: 90 TABLET | Refills: 1 | Status: SHIPPED | OUTPATIENT
Start: 2022-06-16

## 2022-06-16 RX ORDER — METOPROLOL TARTRATE 100 MG/1
TABLET ORAL
Qty: 90 TABLET | Refills: 5 | Status: SHIPPED | OUTPATIENT
Start: 2022-06-16
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
## 2022-06-20 ENCOUNTER — OFFICE VISIT (OUTPATIENT)
Dept: CARDIOLOGY CLINIC | Age: 87
End: 2022-06-20
Payer: MEDICARE

## 2022-06-20 ENCOUNTER — TELEPHONE (OUTPATIENT)
Dept: INTERNAL MEDICINE CLINIC | Age: 87
End: 2022-06-20

## 2022-06-20 VITALS
HEIGHT: 59 IN | BODY MASS INDEX: 28.83 KG/M2 | HEART RATE: 64 BPM | DIASTOLIC BLOOD PRESSURE: 90 MMHG | RESPIRATION RATE: 16 BRPM | SYSTOLIC BLOOD PRESSURE: 190 MMHG | WEIGHT: 143 LBS | OXYGEN SATURATION: 98 %

## 2022-06-20 DIAGNOSIS — I87.2 VENOUS INSUFFICIENCY: ICD-10-CM

## 2022-06-20 DIAGNOSIS — M25.369 INSTABILITY OF KNEE JOINT, UNSPECIFIED LATERALITY: Primary | ICD-10-CM

## 2022-06-20 DIAGNOSIS — Z74.09 MOBILITY IMPAIRED: ICD-10-CM

## 2022-06-20 DIAGNOSIS — I49.3 PVC'S (PREMATURE VENTRICULAR CONTRACTIONS): ICD-10-CM

## 2022-06-20 DIAGNOSIS — M54.50 LOW BACK PAIN, UNSPECIFIED BACK PAIN LATERALITY, UNSPECIFIED CHRONICITY, UNSPECIFIED WHETHER SCIATICA PRESENT: ICD-10-CM

## 2022-06-20 DIAGNOSIS — I10 RESISTANT HYPERTENSION: Primary | ICD-10-CM

## 2022-06-20 DIAGNOSIS — E11.8 DM TYPE 2, CONTROLLED, WITH COMPLICATION (HCC): ICD-10-CM

## 2022-06-20 PROCEDURE — G8536 NO DOC ELDER MAL SCRN: HCPCS | Performed by: INTERNAL MEDICINE

## 2022-06-20 PROCEDURE — G8427 DOCREV CUR MEDS BY ELIG CLIN: HCPCS | Performed by: INTERNAL MEDICINE

## 2022-06-20 PROCEDURE — 99214 OFFICE O/P EST MOD 30 MIN: CPT | Performed by: INTERNAL MEDICINE

## 2022-06-20 PROCEDURE — 3051F HG A1C>EQUAL 7.0%<8.0%: CPT | Performed by: INTERNAL MEDICINE

## 2022-06-20 PROCEDURE — G8432 DEP SCR NOT DOC, RNG: HCPCS | Performed by: INTERNAL MEDICINE

## 2022-06-20 PROCEDURE — 1090F PRES/ABSN URINE INCON ASSESS: CPT | Performed by: INTERNAL MEDICINE

## 2022-06-20 PROCEDURE — 1123F ACP DISCUSS/DSCN MKR DOCD: CPT | Performed by: INTERNAL MEDICINE

## 2022-06-20 PROCEDURE — 1101F PT FALLS ASSESS-DOCD LE1/YR: CPT | Performed by: INTERNAL MEDICINE

## 2022-06-20 PROCEDURE — G8417 CALC BMI ABV UP PARAM F/U: HCPCS | Performed by: INTERNAL MEDICINE
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
## 2022-06-20 NOTE — TELEPHONE ENCOUNTER
----- Message from [facility] sent at 6/20/2022 12:06 PM EDT -----  Subject: Message to Provider    QUESTIONS  Information for Provider? Pt calling about home health care, she is   wondering how to go about getting it for herself. Please call with   information.  ---------------------------------------------------------------------------  --------------  CALL BACK INFO  What is the best way for the office to contact you? OK to leave message on   voicemail  Preferred Call Back Phone Number?  [phone]  ---------------------------------------------------------------------------  --------------  SCRIPT ANSWERS  undefined

## 2022-06-21 ENCOUNTER — TELEPHONE (OUTPATIENT)
Dept: INTERNAL MEDICINE CLINIC | Age: 87
End: 2022-06-21

## 2022-06-21 NOTE — TELEPHONE ENCOUNTER
----- Message from [facility] sent at 6/20/2022  5:00 PM EDT -----  Subject: Message to Provider    QUESTIONS  Information for Provider? Carlos from [facility] called in to let the   patient's PCP know that they are not in-network with the patient's   insurance company.   ---------------------------------------------------------------------------  --------------  Lear How INFO  What is the best way for the office to contact you? OK to leave message on   voicemail  Preferred Call Back Phone Number? [phone]  ---------------------------------------------------------------------------  --------------  SCRIPT ANSWERS  Relationship to Patient? Third Party  Third Party Type? Home Health Care? Representative Name?  [address]

## 2022-06-21 NOTE — TELEPHONE ENCOUNTER
Sent in a [facility] referral to [facility] home care instead of [facility] due to ins. Not being in -network. [phone].

## 2022-06-28 DIAGNOSIS — I10 ESSENTIAL HYPERTENSION WITH GOAL BLOOD PRESSURE LESS THAN 140/90: ICD-10-CM

## 2022-06-28 RX ORDER — LISINOPRIL 10 MG/1
TABLET ORAL
Qty: 30 TABLET | Refills: 0 | Status: SHIPPED | OUTPATIENT
Start: 2022-06-28 | End: 2022-08-17 | Stop reason: ALTCHOICE

## 2022-07-21 DIAGNOSIS — K21.9 GASTROESOPHAGEAL REFLUX DISEASE WITHOUT ESOPHAGITIS: ICD-10-CM

## 2022-07-21 RX ORDER — OMEPRAZOLE 40 MG/1
CAPSULE, DELAYED RELEASE ORAL
Qty: 90 CAPSULE | Refills: 0 | Status: SHIPPED | OUTPATIENT
Start: 2022-07-21

## 2022-07-27 ENCOUNTER — TELEPHONE (OUTPATIENT)
Dept: INTERNAL MEDICINE CLINIC | Age: 87
End: 2022-07-27

## 2022-07-27 ENCOUNTER — OFFICE VISIT (OUTPATIENT)
Dept: INTERNAL MEDICINE CLINIC | Age: 87
End: 2022-07-27
Payer: MEDICARE

## 2022-07-27 VITALS
TEMPERATURE: 97.6 F | RESPIRATION RATE: 16 BRPM | OXYGEN SATURATION: 99 % | HEIGHT: 59 IN | WEIGHT: 144 LBS | BODY MASS INDEX: 29.03 KG/M2 | HEART RATE: 64 BPM | SYSTOLIC BLOOD PRESSURE: 138 MMHG | DIASTOLIC BLOOD PRESSURE: 82 MMHG

## 2022-07-27 DIAGNOSIS — Z78.0 POST-MENOPAUSE: ICD-10-CM

## 2022-07-27 DIAGNOSIS — Z00.00 MEDICARE ANNUAL WELLNESS VISIT, SUBSEQUENT: ICD-10-CM

## 2022-07-27 DIAGNOSIS — I10 ESSENTIAL HYPERTENSION WITH GOAL BLOOD PRESSURE LESS THAN 140/90: Primary | ICD-10-CM

## 2022-07-27 DIAGNOSIS — E78.2 MIXED HYPERLIPIDEMIA: ICD-10-CM

## 2022-07-27 DIAGNOSIS — Z71.89 ACP (ADVANCE CARE PLANNING): ICD-10-CM

## 2022-07-27 DIAGNOSIS — E11.21 TYPE 2 DIABETES MELLITUS WITH NEPHROPATHY (HCC): ICD-10-CM

## 2022-07-27 DIAGNOSIS — Z23 ENCOUNTER FOR IMMUNIZATION: ICD-10-CM

## 2022-07-27 PROCEDURE — G8427 DOCREV CUR MEDS BY ELIG CLIN: HCPCS | Performed by: INTERNAL MEDICINE

## 2022-07-27 PROCEDURE — G8536 NO DOC ELDER MAL SCRN: HCPCS | Performed by: INTERNAL MEDICINE

## 2022-07-27 PROCEDURE — 1101F PT FALLS ASSESS-DOCD LE1/YR: CPT | Performed by: INTERNAL MEDICINE

## 2022-07-27 PROCEDURE — G0439 PPPS, SUBSEQ VISIT: HCPCS | Performed by: INTERNAL MEDICINE

## 2022-07-27 PROCEDURE — 1090F PRES/ABSN URINE INCON ASSESS: CPT | Performed by: INTERNAL MEDICINE

## 2022-07-27 PROCEDURE — 99497 ADVNCD CARE PLAN 30 MIN: CPT | Performed by: INTERNAL MEDICINE

## 2022-07-27 PROCEDURE — G8417 CALC BMI ABV UP PARAM F/U: HCPCS | Performed by: INTERNAL MEDICINE

## 2022-07-27 PROCEDURE — 99214 OFFICE O/P EST MOD 30 MIN: CPT | Performed by: INTERNAL MEDICINE

## 2022-07-27 PROCEDURE — G8510 SCR DEP NEG, NO PLAN REQD: HCPCS | Performed by: INTERNAL MEDICINE

## 2022-07-27 RX ORDER — CLONIDINE HYDROCHLORIDE 0.3 MG/1
TABLET ORAL
Qty: 90 TABLET | Refills: 5 | Status: SHIPPED | OUTPATIENT
Start: 2022-07-27
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
## 2022-07-27 NOTE — TELEPHONE ENCOUNTER
cloNIDine HCL (CATAPRES) 0.3 mg tablet [[id number]]     Order Details  Dose, Route, Frequency: As Directed   Dispense Quantity: 90 Tablet Refills: 5          Si tab po tid       Correct?

## 2022-07-27 NOTE — PROGRESS NOTES
Nursing staff confirmed patient with full name and . Prepared patient for visit today by obtaining vitals, verifying medication list and allergies, and briefly discussing reason for visit. Chief Complaint   Patient presents with    Follow Up Chronic Condition    Diabetes    Cholesterol Problem       Current Outpatient Medications   Medication Sig    omeprazole (PRILOSEC) 40 mg capsule TAKE ONE CAPSULE BY MOUTH EVERY DAY BEFORE BREAKFAST    glipiZIDE (GLUCOTROL) 5 mg tablet TAKE ONE TABLET BY MOUTH 2 TIMES A DAY    lisinopriL (PRINIVIL, ZESTRIL) 10 mg tablet TAKE ONE TABLET BY MOUTH EVERY DAY    atorvastatin (LIPITOR) 10 mg tablet Take 1 Tablet by mouth daily. metoprolol tartrate (LOPRESSOR) 100 mg IR tablet Take 1 1/2 tablets twice daily    cloNIDine HCL (CATAPRES) 0.3 mg tablet TAKE ONE TABLET BY MOUTH 3 TIMES A DAY    cloNIDine HCL (CATAPRES) 0.3 mg tablet TAKE ONE TABLET BY MOUTH 3 TIMES A DAY    dapagliflozin (FARXIGA) 10 mg tab tablet Take 1 Tablet by mouth daily. naloxone (NARCAN) 4 mg/actuation nasal spray Use 1 spray intranasally, then discard. Repeat with new spray every 2 min as needed for opioid overdose symptoms, alternating nostrils. nystatin (MYCOSTATIN) powder Apply  to affected area two (2) times a day. ALPRAZolam (XANAX) 0.5 mg tablet TAKE ONE TABLET BY MOUTH NIGHTLY AS NEEDED FOR ANXIETY. MAX DAILY AMOUNT 1 TABLET    metFORMIN ER (GLUCOPHAGE XR) 500 mg tablet 2 tab po BID    dilTIAZem ER (CARDIZEM CD) 180 mg capsule Take 1 Cap by mouth daily. lisinopriL (PRINIVIL, ZESTRIL) 20 mg tablet Take 1 Tablet by mouth daily. DC lisinopril 10 mg    Leg Brace (ACE Knee Brace) mis by Does Not Apply route.  Use in both knee daily    Blood-Glucose Meter monitoring kit check BS BID    glucose blood VI test strips (True Metrix Glucose Test Strip) strip Use to check blood sugar daily and PRN    lancets misc Check BS BID    hydrocortisone (HYTONE) 2.5 % lotion Apply  to affected area two (2) times a day. use thin layer    All Day Allergy 10 mg tablet TAKE ONE TABLET BY MOUTH NIGHTLY AS NEEDED FOR ALLERGIES    loratadine (Claritin) 10 mg tablet Take 1 Tab by mouth daily as needed for Allergies. ascorbic acid (VITAMIN C PO) Take  by mouth daily. lancets misc Use to check blood sugar daily and PRN    SYSTANE, PROPYLENE GLYCOL, 0.4-0.3 % drop Administer 10 g to both eyes as needed. OTHER 15 mm Hg below knee stockings B/L    cholecalciferol (VITAMIN D3) (1000 Units /25 mcg) tablet Take  by mouth daily. Arm Brace (NEOPRENE WRIST SPLINT SUPPORT) misc On right wrist    cod liver oil cap Take 1 Cap by mouth daily. calcium-cholecalciferol, d3, 600-125 mg-unit tab Take 1 Tab by mouth daily. acetaminophen (TYLENOL) 325 mg tablet Take  by mouth every four (4) hours as needed for Pain. aspirin 81 mg chewable tablet Take 81 mg by mouth daily. No current facility-administered medications for this visit. 1. \"Have you been to the ER, urgent care clinic since your last visit? Hospitalized since your last visit? \" No    2. \"Have you seen or consulted any other health care providers outside of the [address] [place] [address] since your last visit? \" No     3. For patients aged 39-70: Has the patient had a colonoscopy / FIT/ Cologuard? NA - based on age      If the patient is female:    4. For patients aged 41-77: Has the patient had a mammogram within the past 2 years? NA - based on age or sex      11. For patients aged 21-65: Has the patient had a pap smear?  NA - based on age or sex

## 2022-07-27 NOTE — PROGRESS NOTES
This is the Subsequent Medicare Annual Wellness Exam, performed 12 months or more after the Initial AWV or the last Subsequent AWV    I have reviewed the patient's medical history in detail and updated the computerized patient record. Assessment/Plan   Education and counseling provided:  Are appropriate based on today's review and evaluation  End-of-Life planning (with patient's consent)  Pneumococcal Vaccine  Screening Mammography  Bone mass measurement (DEXA)  Screening for glaucoma           Depression Risk Factor Screening     3 most recent PHQ Screens 7/27/2022   Little interest or pleasure in doing things Not at all   Feeling down, depressed, irritable, or hopeless Not at all   Total Score PHQ 2 0   Trouble falling or staying asleep, or sleeping too much -   Feeling tired or having little energy -   Poor appetite, weight loss, or overeating -   Feeling bad about yourself - or that you are a failure or have let yourself or your family down -   Trouble concentrating on things such as school, work, reading, or watching TV -   Moving or speaking so slowly that other people could have noticed; or the opposite being so fidgety that others notice -   Thoughts of being better off dead, or hurting yourself in some way -   PHQ 9 Score -       Alcohol & Drug Abuse Risk Screen    Do you average more than 1 drink per night or more than 7 drinks a week:  No    On any one occasion in the past three months have you have had more than 3 drinks containing alcohol:  No          Functional Ability and Level of Safety    Hearing: Hearing is good. Activities of Daily Living: The home contains: no safety equipment. Patient does total self care      Ambulation: with no difficulty     Fall Risk:  Fall Risk Assessment, last 12 mths 7/27/2022   Able to walk? Yes   Fall in past 12 months? 0   Do you feel unsteady? 1   Are you worried about falling 1   Is TUG test greater than 12 seconds?  1   Is the gait abnormal? 1   Number of falls in past 12 months 0   Fall with injury?  -      Abuse Screen:  Patient is not abused       Cognitive Screening    Has your family/caregiver stated any concerns about your memory: no     Cognitive Screening: Normal - MMSE (Mini Mental Status Exam)    Health Maintenance Due     Health Maintenance Due   Topic Date Due    Shingrix Vaccine Age 49> (1 of 2) Never done    Eye Exam Retinal or Dilated  04/25/2019    COVID-19 Vaccine (4 - Booster for Agata Gustavo series) 03/15/2022    MICROALBUMIN Q1  06/30/2022       Patient Care Team   Patient Care Team:  Keenan Ramírez MD as PCP - General (Internal Medicine Physician)  Keenan Ramírez MD as PCP - [facility]  No Christine MD as Physician (Ophthalmology)  Karyna Sotelo MD as Physician (Cardiovascular Disease Physician)  No Christine MD as Physician (Ophthalmology)  Jacquelin Crespo MD as Physician (Gastroenterology)    History     Patient Active Problem List   Diagnosis Code    Hypercholesterolemia E78.00    PVC's (premature ventricular contractions) I49.3    Stasis edema I87.309    Encounter for long-term (current) use of other medications Z79.899    Unspecified constipation K59.00    Allergic rhinitis, cause unspecified J30.9    Mixed hyperlipidemia E78.2    Controlled type 2 diabetes mellitus without complication ([address] F76.2    Chronic nasal congestion R09.81    Advance directive discussed with patient Z71.89    Essential hypertension with goal blood pressure less than 140/90 I10    Type 2 diabetes mellitus with nephropathy ([address] E11.21    Radiculopathy, cervical M54.12    Class 1 obesity due to excess calories with serious comorbidity and body mass index (BMI) of 33.0 to 33.9 in adult E66.09, Z68.33    Peripheral vascular disease ([address] I73.9     Past Medical History:   Diagnosis Date    Adverse effect of anesthesia     pt states she was able to feel everything during colonoscopy    Chronic bronchitis     Diabetes ([address]     Essential hypertension Hypercholesterolemia     Hypertension       Past Surgical History:   Procedure Laterality Date    COLONOSCOPY N/A 9/25/2017    COLONOSCOPY performed by Jevon Cha. Олег Friend MD at [facility] ENDOSCOPY    HX CHOLECYSTECTOMY      HX GYN      tubal ligation    HX HYSTERECTOMY       Current Outpatient Medications   Medication Sig Dispense Refill    omeprazole (PRILOSEC) 40 mg capsule TAKE ONE CAPSULE BY MOUTH EVERY DAY BEFORE BREAKFAST 90 Capsule 0    glipiZIDE (GLUCOTROL) 5 mg tablet TAKE ONE TABLET BY MOUTH 2 TIMES A DAY 60 Tablet 3    lisinopriL (PRINIVIL, ZESTRIL) 10 mg tablet TAKE ONE TABLET BY MOUTH EVERY DAY 30 Tablet 0    atorvastatin (LIPITOR) 10 mg tablet Take 1 Tablet by mouth daily. 90 Tablet 1    metoprolol tartrate (LOPRESSOR) 100 mg IR tablet Take 1 1/2 tablets twice daily 90 Tablet 5    cloNIDine HCL (CATAPRES) 0.3 mg tablet TAKE ONE TABLET BY MOUTH 3 TIMES A DAY 90 Tablet 0    cloNIDine HCL (CATAPRES) 0.3 mg tablet TAKE ONE TABLET BY MOUTH 3 TIMES A DAY 90 Tablet 1    dapagliflozin (FARXIGA) 10 mg tab tablet Take 1 Tablet by mouth daily. naloxone (NARCAN) 4 mg/actuation nasal spray Use 1 spray intranasally, then discard. Repeat with new spray every 2 min as needed for opioid overdose symptoms, alternating nostrils. 1 Each 0    nystatin (MYCOSTATIN) powder Apply  to affected area two (2) times a day. 1 Each 0    ALPRAZolam (XANAX) 0.5 mg tablet TAKE ONE TABLET BY MOUTH NIGHTLY AS NEEDED FOR ANXIETY. MAX DAILY AMOUNT 1 TABLET 15 Tablet 0    metFORMIN ER (GLUCOPHAGE XR) 500 mg tablet 2 tab po  Tablet 5    dilTIAZem ER (CARDIZEM CD) 180 mg capsule Take 1 Cap by mouth daily. 30 Capsule 5    lisinopriL (PRINIVIL, ZESTRIL) 20 mg tablet Take 1 Tablet by mouth daily. DC lisinopril 10 mg 30 Tablet 5    Leg Brace (ACE Knee Brace) misc by Does Not Apply route.  Use in both knee daily 2 Each 1    Blood-Glucose Meter monitoring kit check BS BID 1 Kit 0    glucose blood VI test strips (True Metrix Glucose Test Strip) strip Use to check blood sugar daily and PRN 50 Strip 11    lancets misc Check BS  Each 5    hydrocortisone (HYTONE) 2.5 % lotion Apply  to affected area two (2) times a day. use thin layer 60 mL 0    All Day Allergy 10 mg tablet TAKE ONE TABLET BY MOUTH NIGHTLY AS NEEDED FOR ALLERGIES 90 Tab 0    loratadine (Claritin) 10 mg tablet Take 1 Tab by mouth daily as needed for Allergies. 30 Tab 1    ascorbic acid (VITAMIN C PO) Take  by mouth daily. lancets misc Use to check blood sugar daily and  Each 11    SYSTANE, PROPYLENE GLYCOL, 0.4-0.3 % drop Administer 10 g to both eyes as needed. 15 mL 0    OTHER 15 mm Hg below knee stockings B/L 2 Each 0    cholecalciferol (VITAMIN D3) (1000 Units /25 mcg) tablet Take  by mouth daily. Arm Brace (NEOPRENE WRIST SPLINT SUPPORT) misc On right wrist 1 Each 0    cod liver oil cap Take 1 Cap by mouth daily. calcium-cholecalciferol, d3, 600-125 mg-unit tab Take 1 Tab by mouth daily. acetaminophen (TYLENOL) 325 mg tablet Take  by mouth every four (4) hours as needed for Pain. aspirin 81 mg chewable tablet Take 81 mg by mouth daily.        Allergies   Allergen Reactions    Cortisone Other (comments)     \"Make me feels weak, like I'm going to die\"      Januvia [Sitagliptin] Other (comments)     weakness       Family History   Problem Relation Age of Onset    No Known Problems Mother     No Known Problems Father     No Known Problems Daughter     No Known Problems Daughter     No Known Problems Daughter     No Known Problems Son     No Known Problems Son     No Known Problems Son      Social History     Tobacco Use    Smoking status: Never    Smokeless tobacco: Never   Substance Use Topics    Alcohol use: No     Alcohol/week: 0.0 standard drinks         Ayana Rick MD

## 2022-07-27 NOTE — TELEPHONE ENCOUNTER
Patient would like a call back. She thinks the script for the clonidine was filled incorrectly. Please call her back at [phone]

## 2022-07-27 NOTE — ACP (ADVANCE CARE PLANNING)

## 2022-07-27 NOTE — PATIENT INSTRUCTIONS

## 2022-07-29 LAB
ALBUMIN SERPL-MCNC: 4.4 G/DL (ref 3.6–4.6)
ALBUMIN/CREAT UR: 178 MG/G CREAT (ref 0–29)
ALBUMIN/GLOB SERPL: 1.7 {RATIO} (ref 1.2–2.2)
ALP SERPL-CCNC: 80 IU/L (ref 44–121)
ALT SERPL-CCNC: 15 IU/L (ref 0–32)
AST SERPL-CCNC: 19 IU/L (ref 0–40)
BILIRUB SERPL-MCNC: 0.3 MG/DL (ref 0–1.2)
BUN SERPL-MCNC: 9 MG/DL (ref 8–27)
BUN/CREAT SERPL: 12 (ref 12–28)
CALCIUM SERPL-MCNC: 9.2 MG/DL (ref 8.7–10.3)
CHLORIDE SERPL-SCNC: 99 MMOL/L (ref 96–106)
CO2 SERPL-SCNC: 26 MMOL/L (ref 20–29)
CREAT SERPL-MCNC: 0.76 MG/DL (ref 0.57–1)
CREAT UR-MCNC: 39.3 MG/DL
EGFR: 75 ML/MIN/1.73
EST. AVERAGE GLUCOSE BLD GHB EST-MCNC: 146 MG/DL
GLOBULIN SER CALC-MCNC: 2.6 G/DL (ref 1.5–4.5)
GLUCOSE SERPL-MCNC: 111 MG/DL (ref 65–99)
HBA1C MFR BLD: 6.7 % (ref 4.8–5.6)
MICROALBUMIN UR-MCNC: 69.9 UG/ML
POTASSIUM SERPL-SCNC: 4.5 MMOL/L (ref 3.5–5.2)
PROT SERPL-MCNC: 7 G/DL (ref 6–8.5)
SODIUM SERPL-SCNC: 137 MMOL/L (ref 134–144)
SPECIMEN STATUS REPORT, ROLRST: NORMAL

## 2022-08-16 ENCOUNTER — NURSE TRIAGE (OUTPATIENT)
Dept: OTHER | Facility: CLINIC | Age: 87
End: 2022-08-16

## 2022-08-17 ENCOUNTER — OFFICE VISIT (OUTPATIENT)
Dept: INTERNAL MEDICINE CLINIC | Age: 87
End: 2022-08-17
Payer: MEDICARE

## 2022-08-17 VITALS
HEART RATE: 64 BPM | WEIGHT: 144.6 LBS | SYSTOLIC BLOOD PRESSURE: 138 MMHG | OXYGEN SATURATION: 99 % | TEMPERATURE: 97.5 F | BODY MASS INDEX: 29.15 KG/M2 | DIASTOLIC BLOOD PRESSURE: 70 MMHG | HEIGHT: 59 IN | RESPIRATION RATE: 16 BRPM

## 2022-08-17 DIAGNOSIS — I10 ESSENTIAL HYPERTENSION WITH GOAL BLOOD PRESSURE LESS THAN 140/90: Primary | ICD-10-CM

## 2022-08-17 DIAGNOSIS — I10 PRIMARY HYPERTENSION: ICD-10-CM

## 2022-08-17 DIAGNOSIS — E11.21 TYPE 2 DIABETES MELLITUS WITH NEPHROPATHY (HCC): ICD-10-CM

## 2022-08-17 DIAGNOSIS — Z23 ENCOUNTER FOR IMMUNIZATION: ICD-10-CM

## 2022-08-17 PROCEDURE — 99214 OFFICE O/P EST MOD 30 MIN: CPT | Performed by: INTERNAL MEDICINE

## 2022-08-17 PROCEDURE — 1101F PT FALLS ASSESS-DOCD LE1/YR: CPT | Performed by: INTERNAL MEDICINE

## 2022-08-17 PROCEDURE — G8510 SCR DEP NEG, NO PLAN REQD: HCPCS | Performed by: INTERNAL MEDICINE

## 2022-08-17 PROCEDURE — G8427 DOCREV CUR MEDS BY ELIG CLIN: HCPCS | Performed by: INTERNAL MEDICINE

## 2022-08-17 PROCEDURE — G8417 CALC BMI ABV UP PARAM F/U: HCPCS | Performed by: INTERNAL MEDICINE

## 2022-08-17 PROCEDURE — G8536 NO DOC ELDER MAL SCRN: HCPCS | Performed by: INTERNAL MEDICINE

## 2022-08-17 PROCEDURE — 1090F PRES/ABSN URINE INCON ASSESS: CPT | Performed by: INTERNAL MEDICINE

## 2022-08-17 RX ORDER — DILTIAZEM HYDROCHLORIDE 180 MG/1
180 CAPSULE, COATED, EXTENDED RELEASE ORAL DAILY
Qty: 90 CAPSULE | Refills: 1 | Status: SHIPPED | OUTPATIENT
Start: 2022-08-17

## 2022-08-17 RX ORDER — METFORMIN HYDROCHLORIDE 500 MG/1
TABLET, EXTENDED RELEASE ORAL
Qty: 360 TABLET | Refills: 1 | Status: SHIPPED | OUTPATIENT
Start: 2022-08-17

## 2022-08-17 RX ORDER — LISINOPRIL 20 MG/1
20 TABLET ORAL DAILY
Qty: 90 TABLET | Refills: 1 | Status: SHIPPED | OUTPATIENT
Start: 2022-08-17
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
## 2022-08-17 NOTE — PROGRESS NOTES
Nursing staff confirmed patient with full name and . Prepared patient for visit today by obtaining vitals, verifying medication list and allergies, and briefly discussing reason for visit. Chief Complaint   Patient presents with    Hypertension    Diabetes    Cholesterol Problem    Depression       Current Outpatient Medications   Medication Sig    cloNIDine HCL (CATAPRES) 0.3 mg tablet 1 tab po tid    omeprazole (PRILOSEC) 40 mg capsule TAKE ONE CAPSULE BY MOUTH EVERY DAY BEFORE BREAKFAST    glipiZIDE (GLUCOTROL) 5 mg tablet TAKE ONE TABLET BY MOUTH 2 TIMES A DAY    lisinopriL (PRINIVIL, ZESTRIL) 10 mg tablet TAKE ONE TABLET BY MOUTH EVERY DAY    atorvastatin (LIPITOR) 10 mg tablet Take 1 Tablet by mouth daily. metoprolol tartrate (LOPRESSOR) 100 mg IR tablet Take 1 1/2 tablets twice daily    dapagliflozin (FARXIGA) 10 mg tab tablet Take 1 Tablet by mouth daily. naloxone (NARCAN) 4 mg/actuation nasal spray Use 1 spray intranasally, then discard. Repeat with new spray every 2 min as needed for opioid overdose symptoms, alternating nostrils. nystatin (MYCOSTATIN) powder Apply  to affected area two (2) times a day. ALPRAZolam (XANAX) 0.5 mg tablet TAKE ONE TABLET BY MOUTH NIGHTLY AS NEEDED FOR ANXIETY. MAX DAILY AMOUNT 1 TABLET    metFORMIN ER (GLUCOPHAGE XR) 500 mg tablet 2 tab po BID    dilTIAZem ER (CARDIZEM CD) 180 mg capsule Take 1 Cap by mouth daily. lisinopriL (PRINIVIL, ZESTRIL) 20 mg tablet Take 1 Tablet by mouth daily. DC lisinopril 10 mg    Leg Brace (ACE Knee Brace) misc by Does Not Apply route. Use in both knee daily    Blood-Glucose Meter monitoring kit check BS BID    glucose blood VI test strips (True Metrix Glucose Test Strip) strip Use to check blood sugar daily and PRN    lancets misc Check BS BID    hydrocortisone (HYTONE) 2.5 % lotion Apply  to affected area two (2) times a day.  use thin layer    All Day Allergy 10 mg tablet TAKE ONE TABLET BY MOUTH NIGHTLY AS NEEDED FOR ALLERGIES    loratadine (Claritin) 10 mg tablet Take 1 Tab by mouth daily as needed for Allergies. ascorbic acid (VITAMIN C PO) Take  by mouth daily. lancets misc Use to check blood sugar daily and PRN    SYSTANE, PROPYLENE GLYCOL, 0.4-0.3 % drop Administer 10 g to both eyes as needed. OTHER 15 mm Hg below knee stockings B/L    cholecalciferol (VITAMIN D3) (1000 Units /25 mcg) tablet Take  by mouth daily. Arm Brace (NEOPRENE WRIST SPLINT SUPPORT) misc On right wrist    cod liver oil cap Take 1 Cap by mouth daily. calcium-cholecalciferol, d3, 600-125 mg-unit tab Take 1 Tab by mouth daily. acetaminophen (TYLENOL) 325 mg tablet Take  by mouth every four (4) hours as needed for Pain. aspirin 81 mg chewable tablet Take 81 mg by mouth daily. No current facility-administered medications for this visit. 1. \"Have you been to the ER, urgent care clinic since your last visit? Hospitalized since your last visit? \" No    2. \"Have you seen or consulted any other health care providers outside of the [address] [place] [address] since your last visit? \" No     3. For patients aged 39-70: Has the patient had a colonoscopy / FIT/ Cologuard? NA - based on age      If the patient is female:    4. For patients aged 41-77: Has the patient had a mammogram within the past 2 years? NA - based on age or sex      11. For patients aged 21-65: Has the patient had a pap smear?  NA - based on age or sex

## 2022-08-26 ENCOUNTER — NURSE TRIAGE (OUTPATIENT)
Dept: OTHER | Facility: CLINIC | Age: 87
End: 2022-08-26

## 2022-08-26 NOTE — TELEPHONE ENCOUNTER
Received call from MELODY Gates 2 at [facility] with Red Flag Complaint. Subjective: Caller states \"I have foot swelling\"     Current Symptoms: Bilateral foot and ankle swelling. No redness, warmth or pain. Was taken off her water pill \"awhile back\". No chest pain or SOB. Not on her feet most of the day, not elevating either. Onset: a few weeks ago; unchanged    Associated Symptoms: NA    Pain Severity: 0/10    Temperature: denies fever     LMP: NA Pregnant: NA    Recommended disposition: See in Office Today or Tomorrow    Care advice provided, patient verbalizes understanding; denies any other questions or concerns; instructed to call back for any new or worsening symptoms. Patient/Caller agrees with recommended disposition; writer provided warm transfer to Feliberto at [facility] for appointment scheduling    Attention Provider: Thank you for allowing me to participate in the care of your patient. The patient was connected to triage in response to information provided to the [facility]. Please do not respond through this encounter as the response is not directed to a shared pool. Reason for Disposition   Patient wants to be seen    Protocols used:  Ankle Swelling-ADULT-[place]

## 2022-08-31 ENCOUNTER — OFFICE VISIT (OUTPATIENT)
Dept: INTERNAL MEDICINE CLINIC | Age: 87
End: 2022-08-31
Payer: MEDICARE

## 2022-08-31 VITALS
HEIGHT: 59 IN | WEIGHT: 145.4 LBS | TEMPERATURE: 97.7 F | SYSTOLIC BLOOD PRESSURE: 174 MMHG | BODY MASS INDEX: 29.31 KG/M2 | DIASTOLIC BLOOD PRESSURE: 86 MMHG | HEART RATE: 70 BPM | OXYGEN SATURATION: 98 % | RESPIRATION RATE: 16 BRPM

## 2022-08-31 DIAGNOSIS — I10 PRIMARY HYPERTENSION: ICD-10-CM

## 2022-08-31 DIAGNOSIS — E11.21 TYPE 2 DIABETES MELLITUS WITH NEPHROPATHY (HCC): ICD-10-CM

## 2022-08-31 DIAGNOSIS — E78.2 MIXED HYPERLIPIDEMIA: ICD-10-CM

## 2022-08-31 DIAGNOSIS — E16.2 HYPOGLYCEMIA: ICD-10-CM

## 2022-08-31 DIAGNOSIS — R60.0 BILATERAL LEG EDEMA: Primary | ICD-10-CM

## 2022-08-31 PROCEDURE — 1090F PRES/ABSN URINE INCON ASSESS: CPT | Performed by: INTERNAL MEDICINE

## 2022-08-31 PROCEDURE — G8427 DOCREV CUR MEDS BY ELIG CLIN: HCPCS | Performed by: INTERNAL MEDICINE

## 2022-08-31 PROCEDURE — 1101F PT FALLS ASSESS-DOCD LE1/YR: CPT | Performed by: INTERNAL MEDICINE

## 2022-08-31 PROCEDURE — G8417 CALC BMI ABV UP PARAM F/U: HCPCS | Performed by: INTERNAL MEDICINE

## 2022-08-31 PROCEDURE — G8510 SCR DEP NEG, NO PLAN REQD: HCPCS | Performed by: INTERNAL MEDICINE

## 2022-08-31 PROCEDURE — 99214 OFFICE O/P EST MOD 30 MIN: CPT | Performed by: INTERNAL MEDICINE

## 2022-08-31 PROCEDURE — G8536 NO DOC ELDER MAL SCRN: HCPCS | Performed by: INTERNAL MEDICINE

## 2022-08-31 RX ORDER — GLIPIZIDE 5 MG/1
5 TABLET, FILM COATED, EXTENDED RELEASE ORAL DAILY
Qty: 30 TABLET | Refills: 3 | Status: SHIPPED | OUTPATIENT
Start: 2022-08-31

## 2022-08-31 RX ORDER — FUROSEMIDE 20 MG/1
TABLET ORAL
Qty: 30 TABLET | Refills: 2 | Status: SHIPPED | OUTPATIENT
Start: 2022-08-31

## 2022-08-31 NOTE — PROGRESS NOTES
HISTORY OF PRESENT ILLNESS  Lois John is a 80 y.o. female here for follow-up. Noticed to have bilateral leg edema for last several weeks. She is watching salt. No shortness of breath or orthopnea. She was on water pills which was discontinued. Has hypertension, compliant with medication. Blood pressure slightly elevated today she did not take her medicine. Denies chest pain palpitation shortness of breath. Need refill on medicine. She feels woozy and dizzy and having some palpitations sometimes. She takes a glucose tablet which makes her feel better. She is a small eater, only eating 2 meals a day. On glipizide 5 mg twice a day. Last A1c was stable. Received first dose of shingle. Review of Systems   Constitutional: Negative. HENT: Negative. Eyes: Negative. Right black eye   Respiratory: Negative. Cardiovascular:  Positive for leg swelling. Gastrointestinal: Negative. Genitourinary: Negative. Skin: Negative. Neurological: Negative. Negative for sensory change, speech change, weakness and headaches. Endo/Heme/Allergies: Negative. Psychiatric/Behavioral: Negative. Physical Exam  Constitutional:       Appearance: Normal appearance. She is obese. Cardiovascular:      Rate and Rhythm: Normal rate and regular rhythm. Pulses: Normal pulses. Heart sounds: Normal heart sounds. Pulmonary:      Effort: Pulmonary effort is normal.      Breath sounds: Normal breath sounds. Musculoskeletal:         General: Swelling present. Cervical back: Normal range of motion and neck supple. Comments: Lower extremity: Bilateral 1+ leg edema present. Dorsal.  Normal.   Skin:     General: Skin is warm. Neurological:      General: No focal deficit present. Mental Status: She is alert and oriented to person, place, and time. Mental status is at baseline. Cranial Nerves: No cranial nerve deficit. Sensory: No sensory deficit. Motor: No weakness. Coordination: Coordination normal.   Psychiatric:         Mood and Affect: Mood normal.         Behavior: Behavior normal.         Thought Content: Thought content normal.      Comments: Doing well. ASSESSMENT and PLAN  Diagnoses and all orders for this visit:    1. Bilateral leg edema    We had stopped high diuretics. Advised to watch salt and do leg elevation. We will get,  -     furosemide (LASIX) 20 mg tablet; 1 tab po Monday,Wednesday and Friday    2. Primary hypertension  Blood pressure slightly elevated. She did not finish up all medicine today. Taking lisinopril, Cardizem CD, clonidine and metoprolol. Doing well. 3. Type 2 diabetes mellitus with nephropathy ([facility] [address]    She is not checking blood sugar but sometimes she feels sweaty and having palpitation. She takes a glucose pill which makes her feel better. She is a small eater, only eating 2 meals a day. We will reduce glipizide to 5 mg extended release once a day. -     glipiZIDE SR (Glucotrol XL) 5 mg CR tablet; Take 1 Tablet by mouth daily. Dc glipizide 5 mg BID  A1c is 6.7, great. 4. Mixed hyperlipidemia  On statin. Doing well. 5. Hypoglycemia  -     glipiZIDE SR (Glucotrol XL) 5 mg CR tablet; Take 1 Tablet by mouth daily. Dc glipizide 5 mg BID   Issues reviewed with her: referral to Diabetic Education department, diabetic diet discussed in detail, written exchange diet given, home glucose monitoring emphasized, all medications, side effects and compliance discussed carefully, foot care discussed and Podiatry visits discussed, annual eye examinations at Ophthalmology discussed, long term diabetic complications discussed and labs immediately prior to next visit.

## 2022-09-15 ENCOUNTER — TELEPHONE (OUTPATIENT)
Dept: INTERNAL MEDICINE CLINIC | Age: 87
End: 2022-09-15
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
## 2022-09-19 NOTE — TELEPHONE ENCOUNTER
Malvin Lieberman MD  You 3 days ago     SA  Continue Tylenol twice a day. Eye swelling has improved.

## 2022-09-30 ENCOUNTER — OFFICE VISIT (OUTPATIENT)
Dept: CARDIOLOGY CLINIC | Age: 87
End: 2022-09-30
Payer: MEDICARE

## 2022-09-30 VITALS
OXYGEN SATURATION: 99 % | HEIGHT: 59 IN | RESPIRATION RATE: 16 BRPM | HEART RATE: 75 BPM | DIASTOLIC BLOOD PRESSURE: 102 MMHG | WEIGHT: 144 LBS | SYSTOLIC BLOOD PRESSURE: 190 MMHG | BODY MASS INDEX: 29.03 KG/M2

## 2022-09-30 DIAGNOSIS — E11.8 DM TYPE 2, CONTROLLED, WITH COMPLICATION (HCC): ICD-10-CM

## 2022-09-30 DIAGNOSIS — I10 RESISTANT HYPERTENSION: Primary | ICD-10-CM

## 2022-09-30 DIAGNOSIS — I87.2 VENOUS INSUFFICIENCY: ICD-10-CM

## 2022-09-30 PROCEDURE — 1123F ACP DISCUSS/DSCN MKR DOCD: CPT | Performed by: INTERNAL MEDICINE

## 2022-09-30 PROCEDURE — G8536 NO DOC ELDER MAL SCRN: HCPCS | Performed by: INTERNAL MEDICINE

## 2022-09-30 PROCEDURE — 1090F PRES/ABSN URINE INCON ASSESS: CPT | Performed by: INTERNAL MEDICINE

## 2022-09-30 PROCEDURE — 99214 OFFICE O/P EST MOD 30 MIN: CPT | Performed by: INTERNAL MEDICINE

## 2022-09-30 PROCEDURE — G8427 DOCREV CUR MEDS BY ELIG CLIN: HCPCS | Performed by: INTERNAL MEDICINE

## 2022-09-30 PROCEDURE — 3044F HG A1C LEVEL LT 7.0%: CPT | Performed by: INTERNAL MEDICINE

## 2022-09-30 PROCEDURE — G8417 CALC BMI ABV UP PARAM F/U: HCPCS | Performed by: INTERNAL MEDICINE

## 2022-09-30 PROCEDURE — 1101F PT FALLS ASSESS-DOCD LE1/YR: CPT | Performed by: INTERNAL MEDICINE

## 2022-09-30 PROCEDURE — G8510 SCR DEP NEG, NO PLAN REQD: HCPCS | Performed by: INTERNAL MEDICINE

## 2022-09-30 PROCEDURE — 93000 ELECTROCARDIOGRAM COMPLETE: CPT | Performed by: INTERNAL MEDICINE
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
## 2022-10-24 DIAGNOSIS — F41.0 ANXIETY ATTACK: ICD-10-CM

## 2022-10-25 RX ORDER — ALPRAZOLAM 0.5 MG/1
TABLET ORAL
Qty: 15 TABLET | Refills: 0 | Status: SHIPPED | OUTPATIENT
Start: 2022-10-25

## 2022-10-27 ENCOUNTER — HOSPITAL ENCOUNTER (OUTPATIENT)
Dept: BONE DENSITY | Age: 87
Discharge: HOME | End: 2022-10-27
Attending: INTERNAL MEDICINE
Payer: MEDICARE

## 2022-10-27 DIAGNOSIS — Z78.0 POST-MENOPAUSE: ICD-10-CM

## 2022-10-27 PROCEDURE — 77080 DXA BONE DENSITY AXIAL: CPT

## 2022-11-02 ENCOUNTER — TELEPHONE (OUTPATIENT)
Dept: INTERNAL MEDICINE CLINIC | Age: 87
End: 2022-11-02

## 2022-11-02 NOTE — TELEPHONE ENCOUNTER
----- Message from Debora Ariza sent at 11/2/2022  4:37 PM EDT -----  Subject: Message to Provider    QUESTIONS  Information for Provider? Patient is requesting a call back regarding the   3rd booster vaccine for Covid, please advise   ---------------------------------------------------------------------------  --------------  Dayna Mejiaing INFO  [phone]; OK to leave message on voicemail  ---------------------------------------------------------------------------  --------------  SCRIPT ANSWERS  Relationship to Patient?  Self

## 2022-11-04 NOTE — TELEPHONE ENCOUNTER
Joan Espinoza was called with no answer, message on  left to call back regarding note below. Left recommendation that Bivalent covid booster is recommended for everyone as of September.

## 2022-11-14 DIAGNOSIS — E11.21 TYPE 2 DIABETES MELLITUS WITH NEPHROPATHY (HCC): ICD-10-CM

## 2022-11-14 DIAGNOSIS — I10 ESSENTIAL HYPERTENSION WITH GOAL BLOOD PRESSURE LESS THAN 140/90: ICD-10-CM

## 2022-11-14 DIAGNOSIS — K21.9 GASTROESOPHAGEAL REFLUX DISEASE WITHOUT ESOPHAGITIS: ICD-10-CM

## 2022-11-14 RX ORDER — OMEPRAZOLE 40 MG/1
CAPSULE, DELAYED RELEASE ORAL
Qty: 90 CAPSULE | Refills: 0 | Status: SHIPPED | OUTPATIENT
Start: 2022-11-14

## 2022-11-14 RX ORDER — OMEPRAZOLE 40 MG/1
CAPSULE, DELAYED RELEASE ORAL
Qty: 90 CAPSULE | Refills: 0 | Status: SHIPPED | OUTPATIENT
Start: 2022-11-14 | End: 2022-11-30 | Stop reason: SDUPTHER

## 2022-11-14 RX ORDER — DILTIAZEM HYDROCHLORIDE 180 MG/1
180 CAPSULE, COATED, EXTENDED RELEASE ORAL DAILY
Qty: 90 CAPSULE | Refills: 1 | Status: SHIPPED | OUTPATIENT
Start: 2022-11-14

## 2022-11-14 NOTE — TELEPHONE ENCOUNTER
----- Message from Elsy Jarrell sent at 11/14/2022  9:33 AM EST -----  Subject: Refill Request    QUESTIONS  Name of Medication? omeprazole (PRILOSEC) 40 mg capsule  Patient-reported dosage and instructions? 40 mg capsule 1 x day  How many days do you have left? 0  Preferred Pharmacy? [facility] phone number (if available)? [phone]  Additional Information for Provider? Pharmacy will be calling to day to   fill since out of meds  ---------------------------------------------------------------------------  --------------,  Name of Medication? dilTIAZem ER (CARDIZEM CD) 180 mg capsule  Patient-reported dosage and instructions? 180 mg ER capsule 1x day  How many days do you have left? 0  Preferred Pharmacy? [facility] phone number (if available)? [phone]  Additional Information for Provider? Pharmacy will be calling to day to   fill since out of meds- would like to know if can take this one before   dinner since out.  ---------------------------------------------------------------------------  --------------,  Name of Medication? dapagliflozin (FARXIGA) 10 mg tab tablet  Patient-reported dosage and instructions? 10 mg tablet 1x day  How many days do you have left? 0  Preferred Pharmacy? [facility] phone number (if available)? [phone]  Additional Information for Provider? Pharmacy will be calling to day to   fill since out of meds  ---------------------------------------------------------------------------  --------------  [address]  What is the best way for the office to contact you? Do not leave any   message, patient will call back for answer  Preferred Call Back Phone Number? [phone]  ---------------------------------------------------------------------------  --------------  SCRIPT ANSWERS  Relationship to Patient?  Self

## 2022-11-21 DIAGNOSIS — E11.21 TYPE 2 DIABETES MELLITUS WITH NEPHROPATHY (HCC): ICD-10-CM

## 2022-11-21 DIAGNOSIS — I10 ESSENTIAL HYPERTENSION: ICD-10-CM

## 2022-11-21 DIAGNOSIS — E78.2 MIXED HYPERLIPIDEMIA: ICD-10-CM

## 2022-11-21 DIAGNOSIS — I49.3 PVC'S (PREMATURE VENTRICULAR CONTRACTIONS): ICD-10-CM

## 2022-11-21 DIAGNOSIS — E16.2 HYPOGLYCEMIA: ICD-10-CM

## 2022-11-21 RX ORDER — ATORVASTATIN CALCIUM 10 MG/1
10 TABLET, FILM COATED ORAL DAILY
Qty: 90 TABLET | Refills: 1 | Status: SHIPPED | OUTPATIENT
Start: 2022-11-21

## 2022-11-21 RX ORDER — METFORMIN HYDROCHLORIDE 500 MG/1
TABLET, EXTENDED RELEASE ORAL
Qty: 360 TABLET | Refills: 1 | Status: SHIPPED | OUTPATIENT
Start: 2022-11-21

## 2022-11-21 RX ORDER — METOPROLOL TARTRATE 100 MG/1
TABLET ORAL
Qty: 90 TABLET | Refills: 5 | Status: SHIPPED | OUTPATIENT
Start: 2022-11-21

## 2022-11-21 RX ORDER — GLIPIZIDE 5 MG/1
5 TABLET, FILM COATED, EXTENDED RELEASE ORAL DAILY
Qty: 30 TABLET | Refills: 3 | Status: SHIPPED | OUTPATIENT
Start: 2022-11-21

## 2022-11-21 NOTE — TELEPHONE ENCOUNTER
Requested Prescriptions     Pending Prescriptions Disp Refills    metoprolol tartrate (LOPRESSOR) 100 mg IR tablet 90 Tablet 5     Sig: Take 1 1/2 tablets twice daily    metFORMIN ER (GLUCOPHAGE XR) 500 mg tablet 360 Tablet 1     Si tab po BID    atorvastatin (LIPITOR) 10 mg tablet 90 Tablet 1     Sig: Take 1 Tablet by mouth daily. glipiZIDE SR (Glucotrol XL) 5 mg CR tablet 30 Tablet 3     Sig: Take 1 Tablet by mouth daily.  Dc glipizide 5 mg BID         Adrian Medina,  [address] [id number]  Phone: [phone] Fax: [phone]       2022 is LAST OFFICE VISIT     Future Appointments   Date Time Provider Sarkis Baker   2022  9:15 AM MD BRITTANI Rowe BS AMB   2022 10:20 AM Runell Sever, MD [facility] BS AMB   3/30/2023  8:40 AM Runell Sever, MD [facility] BS AMB

## 2022-11-30 ENCOUNTER — OFFICE VISIT (OUTPATIENT)
Dept: INTERNAL MEDICINE CLINIC | Age: 87
End: 2022-11-30
Payer: MEDICARE

## 2022-11-30 VITALS
SYSTOLIC BLOOD PRESSURE: 140 MMHG | HEART RATE: 62 BPM | DIASTOLIC BLOOD PRESSURE: 80 MMHG | BODY MASS INDEX: 28.55 KG/M2 | RESPIRATION RATE: 16 BRPM | TEMPERATURE: 96.8 F | OXYGEN SATURATION: 99 % | HEIGHT: 59 IN | WEIGHT: 141.6 LBS

## 2022-11-30 DIAGNOSIS — E55.9 VITAMIN D DEFICIENCY: ICD-10-CM

## 2022-11-30 DIAGNOSIS — E78.2 MIXED HYPERLIPIDEMIA: ICD-10-CM

## 2022-11-30 DIAGNOSIS — E11.21 TYPE 2 DIABETES MELLITUS WITH NEPHROPATHY (HCC): Primary | ICD-10-CM

## 2022-11-30 DIAGNOSIS — Z23 ENCOUNTER FOR IMMUNIZATION: ICD-10-CM

## 2022-11-30 DIAGNOSIS — I10 ESSENTIAL HYPERTENSION: ICD-10-CM

## 2022-11-30 PROCEDURE — G8427 DOCREV CUR MEDS BY ELIG CLIN: HCPCS | Performed by: INTERNAL MEDICINE

## 2022-11-30 PROCEDURE — 1101F PT FALLS ASSESS-DOCD LE1/YR: CPT | Performed by: INTERNAL MEDICINE

## 2022-11-30 PROCEDURE — 1090F PRES/ABSN URINE INCON ASSESS: CPT | Performed by: INTERNAL MEDICINE

## 2022-11-30 PROCEDURE — G8536 NO DOC ELDER MAL SCRN: HCPCS | Performed by: INTERNAL MEDICINE

## 2022-11-30 PROCEDURE — G0008 ADMIN INFLUENZA VIRUS VAC: HCPCS | Performed by: INTERNAL MEDICINE

## 2022-11-30 PROCEDURE — G8510 SCR DEP NEG, NO PLAN REQD: HCPCS | Performed by: INTERNAL MEDICINE

## 2022-11-30 PROCEDURE — 99214 OFFICE O/P EST MOD 30 MIN: CPT | Performed by: INTERNAL MEDICINE

## 2022-11-30 PROCEDURE — 90662 IIV NO PRSV INCREASED AG IM: CPT | Performed by: INTERNAL MEDICINE

## 2022-11-30 PROCEDURE — G8417 CALC BMI ABV UP PARAM F/U: HCPCS | Performed by: INTERNAL MEDICINE

## 2022-11-30 NOTE — PROGRESS NOTES
Nursing staff confirmed patient with full name and . Prepared patient for visit today by obtaining vitals, verifying medication list and allergies, and briefly discussing reason for visit. Chief Complaint   Patient presents with    Follow Up Chronic Condition    Hypertension    Cholesterol Problem    Diabetes       Current Outpatient Medications   Medication Sig    metoprolol tartrate (LOPRESSOR) 100 mg IR tablet Take 1 1/2 tablets twice daily    metFORMIN ER (GLUCOPHAGE XR) 500 mg tablet 2 tab po BID    atorvastatin (LIPITOR) 10 mg tablet Take 1 Tablet by mouth daily. glipiZIDE SR (Glucotrol XL) 5 mg CR tablet Take 1 Tablet by mouth daily. Dc glipizide 5 mg BID    omeprazole (PRILOSEC) 40 mg capsule TAKE ONE CAPSULE BY MOUTH EVERY DAY BEFORE BREAKFAST    dilTIAZem ER (CARDIZEM CD) 180 mg capsule Take 1 Capsule by mouth daily. dapagliflozin (FARXIGA) 10 mg tab tablet Take 1 Tablet by mouth daily. omeprazole (PRILOSEC) 40 mg capsule TAKE ONE CAPSULE BY MOUTH EVERY DAY BEFORE BREAKFAST    ALPRAZolam (XANAX) 0.5 mg tablet TAKE ONE TABLET BY MOUTH NIGHTLY AS NEEDED FOR ANXIETY. MAX DAILY AMOUNT 1 TABLET    furosemide (LASIX) 20 mg tablet 1 tab po Monday,Wednesday and Friday    lisinopriL (PRINIVIL, ZESTRIL) 20 mg tablet Take 1 Tablet by mouth daily. DC lisinopril 10 mg    cloNIDine HCL (CATAPRES) 0.3 mg tablet 1 tab po tid    naloxone (NARCAN) 4 mg/actuation nasal spray Use 1 spray intranasally, then discard. Repeat with new spray every 2 min as needed for opioid overdose symptoms, alternating nostrils. nystatin (MYCOSTATIN) powder Apply  to affected area two (2) times a day. Leg Brace (ACE Knee Brace) mis by Does Not Apply route.  Use in both knee daily    Blood-Glucose Meter monitoring kit check BS BID    glucose blood VI test strips (True Metrix Glucose Test Strip) strip Use to check blood sugar daily and PRN    lancets misc Check BS BID    hydrocortisone (HYTONE) 2.5 % lotion Apply  to affected area two (2) times a day. use thin layer    All Day Allergy 10 mg tablet TAKE ONE TABLET BY MOUTH NIGHTLY AS NEEDED FOR ALLERGIES    loratadine (Claritin) 10 mg tablet Take 1 Tab by mouth daily as needed for Allergies. ascorbic acid (VITAMIN C PO) Take  by mouth daily. lancets misc Use to check blood sugar daily and PRN    SYSTANE, PROPYLENE GLYCOL, 0.4-0.3 % drop Administer 10 g to both eyes as needed. OTHER 15 mm Hg below knee stockings B/L    cholecalciferol (VITAMIN D3) (1000 Units /25 mcg) tablet Take  by mouth daily. Arm Brace (NEOPRENE WRIST SPLINT SUPPORT) misc On right wrist    cod liver oil cap Take 1 Cap by mouth daily. calcium-cholecalciferol, d3, 600-125 mg-unit tab Take 1 Tab by mouth daily. acetaminophen (TYLENOL) 325 mg tablet Take  by mouth every four (4) hours as needed for Pain. aspirin 81 mg chewable tablet Take 81 mg by mouth daily. No current facility-administered medications for this visit. 1. \"Have you been to the ER, urgent care clinic since your last visit? Hospitalized since your last visit? \" No    2. \"Have you seen or consulted any other health care providers outside of the [address] [place] [address] since your last visit? \" No     3. For patients aged 39-70: Has the patient had a colonoscopy / FIT/ Cologuard? NA - based on age      If the patient is female:    4. For patients aged 41-77: Has the patient had a mammogram within the past 2 years? NA - based on age or sex      11. For patients aged 21-65: Has the patient had a pap smear?  NA - based on age or sex

## 2022-11-30 NOTE — PROGRESS NOTES
HISTORY OF PRESENT ILLNESS  Skylar Villeda is a 80 y.o. female here for follow-up. She is doing well. She is diabetic, taking Glucotrol, need checkup. Need lab work. Has hypertension, compliant with medication. Denies chest pain palpitation shortness of breath. Need refill on medicine. Has elevated lipid, on statin. No myalgia. Need bone density. Need shingles vaccine. Need flu shot. Hypertension     Diabetes    Follow Up Chronic Condition    Cholesterol Problem    Review of Systems   Constitutional: Negative. HENT: Negative. Eyes: Negative. Respiratory: Negative. Cardiovascular: Negative. Gastrointestinal: Negative. Genitourinary: Negative. Neurological:  Negative for sensory change and speech change. Endo/Heme/Allergies: Negative. Physical Exam  Constitutional:       Appearance: Normal appearance. She is obese. HENT:      Head: Normocephalic. Right Ear: Tympanic membrane normal.      Left Ear: Tympanic membrane normal.      Nose: Nose normal.      Mouth/Throat:      Mouth: Mucous membranes are moist.   Cardiovascular:      Rate and Rhythm: Normal rate and regular rhythm. Pulses: Normal pulses. Heart sounds: Normal heart sounds. Pulmonary:      Effort: Pulmonary effort is normal.      Breath sounds: Normal breath sounds. Musculoskeletal:      Cervical back: Normal range of motion and neck supple. Skin:     General: Skin is warm. Neurological:      General: No focal deficit present. Mental Status: She is alert and oriented to person, place, and time. Mental status is at baseline. Cranial Nerves: No cranial nerve deficit. Sensory: No sensory deficit. Motor: No weakness. Coordination: Coordination normal.   Psychiatric:         Mood and Affect: Mood normal.         Behavior: Behavior normal.         Thought Content: Thought content normal.      Comments: Doing well.        ASSESSMENT and PLAN  Diagnoses and all orders for this visit:    1. Type 2 diabetes mellitus with nephropathy (HCC)    Taking Glucotrol XL. We will check,  -     METABOLIC PANEL, COMPREHENSIVE  -     HEMOGLOBIN A1C WITH EAG    2. Encounter for immunization    Will order,  -     varicella-zoster recombinant, PF, (SHINGRIX) 50 mcg/0.5 mL susr injection; 0.5 mL by IntraMUSCular route once for 1 dose. -     INFLUENZA VIRUS VACCINE, HIGH DOSE SEASONAL, PRESERVATIVE FREE    3. Essential hypertension    Stable blood pressure. On lisinopril, metoprolol, diltiazem and clonidine. Will check,  -     METABOLIC PANEL, COMPREHENSIVE    4. Mixed hyperlipidemia    On Lipitor. Will check,  -     METABOLIC PANEL, COMPREHENSIVE    5. Vitamin D deficiency  -     VITAMIN D, 25 HYDROXY   Discussed expected course/resolution/complications of diagnosis in detail with patient. Medication risks/benefits/costs/interactions/alternatives discussed with patient. Discussed COVID-19 infection precaution with patient. Pt was given an after visit summary which includes diagnoses, current medications & vitals. Pt expressed understanding with the diagnosis and plan.

## 2022-12-05 ENCOUNTER — OFFICE VISIT (OUTPATIENT)
Dept: CARDIOLOGY CLINIC | Age: 87
End: 2022-12-05
Payer: MEDICARE

## 2022-12-05 VITALS
DIASTOLIC BLOOD PRESSURE: 82 MMHG | BODY MASS INDEX: 28.22 KG/M2 | HEART RATE: 73 BPM | OXYGEN SATURATION: 96 % | HEIGHT: 59 IN | RESPIRATION RATE: 18 BRPM | WEIGHT: 140 LBS | SYSTOLIC BLOOD PRESSURE: 136 MMHG

## 2022-12-05 DIAGNOSIS — R60.0 BILATERAL LOWER EXTREMITY EDEMA: Primary | ICD-10-CM

## 2022-12-05 DIAGNOSIS — I10 RESISTANT HYPERTENSION: ICD-10-CM

## 2022-12-05 DIAGNOSIS — E11.8 DM TYPE 2, CONTROLLED, WITH COMPLICATION (HCC): ICD-10-CM

## 2022-12-05 DIAGNOSIS — I49.3 PVC'S (PREMATURE VENTRICULAR CONTRACTIONS): ICD-10-CM

## 2022-12-05 PROCEDURE — G8417 CALC BMI ABV UP PARAM F/U: HCPCS | Performed by: INTERNAL MEDICINE

## 2022-12-05 PROCEDURE — 1101F PT FALLS ASSESS-DOCD LE1/YR: CPT | Performed by: INTERNAL MEDICINE

## 2022-12-05 PROCEDURE — G8427 DOCREV CUR MEDS BY ELIG CLIN: HCPCS | Performed by: INTERNAL MEDICINE

## 2022-12-05 PROCEDURE — 99214 OFFICE O/P EST MOD 30 MIN: CPT | Performed by: INTERNAL MEDICINE

## 2022-12-05 PROCEDURE — 1090F PRES/ABSN URINE INCON ASSESS: CPT | Performed by: INTERNAL MEDICINE

## 2022-12-05 PROCEDURE — 3044F HG A1C LEVEL LT 7.0%: CPT | Performed by: INTERNAL MEDICINE

## 2022-12-05 PROCEDURE — G8432 DEP SCR NOT DOC, RNG: HCPCS | Performed by: INTERNAL MEDICINE

## 2022-12-05 PROCEDURE — 1123F ACP DISCUSS/DSCN MKR DOCD: CPT | Performed by: INTERNAL MEDICINE

## 2022-12-05 PROCEDURE — G8536 NO DOC ELDER MAL SCRN: HCPCS | Performed by: INTERNAL MEDICINE

## 2022-12-05 NOTE — PROGRESS NOTES
[facility] [address]: [place]  ([phone]    HPI:  Ms. Moisés Coronado is a 81 yo F with a h/o HTN, hyperlipidemia, DM2 seen in the past for palpitations. Found on holter to have benign ectopy and started on beta blocker for symptoms. Previously off higher dose HCTZ due to low Na+. BP has been resistant; BP had improved on norvasc but had worsened LE edema. U/S 11/2012 for DVT was negative. Echo 8/2012 noted normal LV systolic function with mild to moderate MR; echo 2/17/14 unchanged. Seen by vascular in past for LE edema and c/w vascular insufficiency and recommended leg elevation and compression stockings. Echo 4/2015 was normal with EF 60% and mild MR. Since her last visit, overall she has been doing okay. She denies any exertional chest pain, shortness of breath, palpitations, lightheadedness or dizziness. She does have some increased swelling in her right leg over the last few weeks. She is compensated on exam with clear lungs. She does have trace to 1+ lower extremity edema on her right versus left leg. Soc hx. Son passed MVA 5/2/22  Assessment and Plan:   1. Lower extremity edema, right greater than left. Will obtain an ultrasound for further evaluation. If this is unrevealing, would be most consistent with venous insufficiency. We talked about elevating legs at rest, minimizing salt intake and compression. She does note she already avoids salt. 2. Resistant hypertension. Blood pressure is controlled and no changes made. She does have a history of white coat hypertension. 3. Type 2 diabetes. .She  has a past medical history of Adverse effect of anesthesia, Chronic bronchitis, Diabetes ([address] Essential hypertension, Hypercholesterolemia, and Hypertension. All other systems negative except as above. PE  Vitals:    12/05/22 1102   BP: 136/82   Pulse: 73   Resp: 18   SpO2: 96%   Weight: 140 lb (63.5 kg)   Height: 4' 11\" (1.499 m)      Body mass index is 28.28 kg/m². General appearance - alert, well appearing, and in no distress  Mental status - affect appropriate to mood, pleasant   Neck - supple, no JVD, no bruits   Chest - clear to auscultation, no wheezes, rales or rhonchi  Heart - normal rate, regular rhythm, normal S1, S2, I-II/VI systolic murmur LUSB, rubs, clicks or gallops  Abdomen - soft, nontender, nondistended  Extremities - peripheral pulses normal, trace chronic edema bilateral lower extremities.      Recent Labs:  Lab Results   Component Value Date/Time    Cholesterol, total 137 03/28/2022 09:14 AM    HDL Cholesterol 41 03/28/2022 09:14 AM    LDL, calculated 70 03/28/2022 09:14 AM    LDL, calculated 67.2 02/09/2021 09:10 AM    Triglyceride 150 (H) 03/28/2022 09:14 AM    CHOL/HDL Ratio 4.1 02/09/2021 09:10 AM     Lab Results   Component Value Date/Time    Creatinine 0.76 07/28/2022 12:00 AM     Lab Results   Component Value Date/Time    BUN 9 07/28/2022 12:00 AM     Lab Results   Component Value Date/Time    Potassium 4.5 07/28/2022 12:00 AM     Lab Results   Component Value Date/Time    Hemoglobin A1c 6.7 (H) 07/28/2022 12:00 AM     Lab Results   Component Value Date/Time    HGB 13.2 06/30/2021 07:50 AM     Lab Results   Component Value Date/Time    PLATELET 626 96/43/4423 07:50 AM       Reviewed:  Past Medical History:   Diagnosis Date    Adverse effect of anesthesia     pt states she was able to feel everything during colonoscopy    Chronic bronchitis     Diabetes ([facility] [address]     Essential hypertension     Hypercholesterolemia     Hypertension      Social History     Tobacco Use   Smoking Status Never   Smokeless Tobacco Never     Social History     Substance and Sexual Activity   Alcohol Use No    Alcohol/week: 0.0 standard drinks     Allergies   Allergen Reactions    Cortisone Other (comments)     \"Make me feels weak, like I'm going to die\"      Januvia [Sitagliptin] Other (comments)     weakness       Current Outpatient Medications   Medication Sig    metoprolol tartrate (LOPRESSOR) 100 mg IR tablet Take 1 1/2 tablets twice daily    metFORMIN ER (GLUCOPHAGE XR) 500 mg tablet 2 tab po BID    atorvastatin (LIPITOR) 10 mg tablet Take 1 Tablet by mouth daily. glipiZIDE SR (Glucotrol XL) 5 mg CR tablet Take 1 Tablet by mouth daily. Dc glipizide 5 mg BID    omeprazole (PRILOSEC) 40 mg capsule TAKE ONE CAPSULE BY MOUTH EVERY DAY BEFORE BREAKFAST    dilTIAZem ER (CARDIZEM CD) 180 mg capsule Take 1 Capsule by mouth daily. dapagliflozin (FARXIGA) 10 mg tab tablet Take 1 Tablet by mouth daily. ALPRAZolam (XANAX) 0.5 mg tablet TAKE ONE TABLET BY MOUTH NIGHTLY AS NEEDED FOR ANXIETY. MAX DAILY AMOUNT 1 TABLET    furosemide (LASIX) 20 mg tablet 1 tab po Monday,Wednesday and Friday    lisinopriL (PRINIVIL, ZESTRIL) 20 mg tablet Take 1 Tablet by mouth daily. DC lisinopril 10 mg    cloNIDine HCL (CATAPRES) 0.3 mg tablet 1 tab po tid    nystatin (MYCOSTATIN) powder Apply  to affected area two (2) times a day. Blood-Glucose Meter monitoring kit check BS BID    All Day Allergy 10 mg tablet TAKE ONE TABLET BY MOUTH NIGHTLY AS NEEDED FOR ALLERGIES    loratadine (Claritin) 10 mg tablet Take 1 Tab by mouth daily as needed for Allergies. ascorbic acid (VITAMIN C PO) Take  by mouth daily. SYSTANE, PROPYLENE GLYCOL, 0.4-0.3 % drop Administer 10 g to both eyes as needed. cholecalciferol (VITAMIN D3) (1000 Units /25 mcg) tablet Take  by mouth daily. cod liver oil cap Take 1 Cap by mouth daily. calcium-cholecalciferol, d3, 600-125 mg-unit tab Take 1 Tab by mouth daily. acetaminophen (TYLENOL) 325 mg tablet Take  by mouth every four (4) hours as needed for Pain. aspirin 81 mg chewable tablet Take 81 mg by mouth daily. naloxone (NARCAN) 4 mg/actuation nasal spray Use 1 spray intranasally, then discard. Repeat with new spray every 2 min as needed for opioid overdose symptoms, alternating nostrils.  (Patient not taking: Reported on 12/5/2022)    Leg Brace (ACE Knee Brace) misc by Does Not Apply route. Use in both knee daily (Patient not taking: Reported on 12/5/2022)    glucose blood VI test strips (True Metrix Glucose Test Strip) strip Use to check blood sugar daily and PRN (Patient not taking: Reported on 12/5/2022)    lancets misc Check BS BID (Patient not taking: Reported on 12/5/2022)    hydrocortisone (HYTONE) 2.5 % lotion Apply  to affected area two (2) times a day. use thin layer (Patient not taking: Reported on 12/5/2022)    lancets misc Use to check blood sugar daily and PRN (Patient not taking: Reported on 12/5/2022)    OTHER 15 mm Hg below knee stockings B/L (Patient not taking: Reported on 12/5/2022)    Arm Brace (NEOPRENE WRIST SPLINT SUPPORT) misc On right wrist (Patient not taking: Reported on 12/5/2022)     No current facility-administered medications for this visit.        Nadia Clement MD  [facility] [place]  [address] [facility] [address]  [address]

## 2022-12-05 NOTE — LETTER
Patient:  Lorenzo Mendez   YOB: 1934  Date of Visit: 12/5/2022      Dear Nasra Michel MD  [address] [facility]  [address] [place] [id number]  Via In Basket:      Ms. Matt Daigle is a 79 yo F with a h/o HTN, hyperlipidemia, DM2 seen in the past for palpitations. Found on holter to have benign ectopy and started on beta blocker for symptoms. Previously off higher dose HCTZ due to low Na+. BP has been resistant; BP had improved on norvasc but had worsened LE edema. U/S 11/2012 for DVT was negative. Echo 8/2012 noted normal LV systolic function with mild to moderate MR; echo 2/17/14 unchanged. Seen by vascular in past for LE edema and c/w vascular insufficiency and recommended leg elevation and compression stockings. Echo 4/2015 was normal with EF 60% and mild MR. Since her last visit, overall she has been doing okay. She denies any exertional chest pain, shortness of breath, palpitations, lightheadedness or dizziness. She does have some increased swelling in her right leg over the last few weeks. She is compensated on exam with clear lungs. She does have trace to 1+ lower extremity edema on her right versus left leg. Soc hx. Son passed MVA 5/2/22  Assessment and Plan:   1. Lower extremity edema, right greater than left. Will obtain an ultrasound for further evaluation. If this is unrevealing, would be most consistent with venous insufficiency. We talked about elevating legs at rest, minimizing salt intake and compression. She does note she already avoids salt. 2. Resistant hypertension. Blood pressure is controlled and no changes made. She does have a history of white coat hypertension. 3. Type 2 diabetes. If you have questions, please do not hesitate to call me.       Sincerely,      Caridad Young MD

## 2022-12-09 ENCOUNTER — TRANSCRIBE ORDER (OUTPATIENT)
Dept: SCHEDULING | Age: 87
End: 2022-12-09

## 2022-12-09 DIAGNOSIS — Z12.31 BREAST CANCER SCREENING BY MAMMOGRAM: Primary | ICD-10-CM

## 2022-12-20 DIAGNOSIS — I10 ESSENTIAL HYPERTENSION: ICD-10-CM

## 2022-12-20 DIAGNOSIS — I49.3 PVC'S (PREMATURE VENTRICULAR CONTRACTIONS): ICD-10-CM

## 2022-12-20 DIAGNOSIS — E78.2 MIXED HYPERLIPIDEMIA: ICD-10-CM

## 2022-12-20 DIAGNOSIS — I10 PRIMARY HYPERTENSION: ICD-10-CM

## 2022-12-20 RX ORDER — LISINOPRIL 20 MG/1
20 TABLET ORAL DAILY
Qty: 90 TABLET | Refills: 1 | Status: SHIPPED | OUTPATIENT
Start: 2022-12-20

## 2022-12-20 RX ORDER — METOPROLOL TARTRATE 100 MG/1
TABLET ORAL
Qty: 90 TABLET | Refills: 5 | Status: SHIPPED | OUTPATIENT
Start: 2022-12-20

## 2022-12-20 RX ORDER — ATORVASTATIN CALCIUM 10 MG/1
10 TABLET, FILM COATED ORAL DAILY
Qty: 90 TABLET | Refills: 1 | Status: SHIPPED | OUTPATIENT
Start: 2022-12-20

## 2022-12-20 NOTE — TELEPHONE ENCOUNTER
----- Message from Chapin Polanco sent at 12/19/2022  4:29 PM EST -----  Subject: Refill Request    QUESTIONS  Name of Medication? atorvastatin (LIPITOR) 10 mg tablet  Patient-reported dosage and instructions? 1 time daily  How many days do you have left? 3  Preferred Pharmacy? [address] phone number (if available)? [phone]  ---------------------------------------------------------------------------  --------------,  Name of Medication? metoprolol tartrate (LOPRESSOR) 100 mg IR tablet  Patient-reported dosage and instructions? 1.5 pills 2 times daily  How many days do you have left? 3  Preferred Pharmacy? [address] phone number (if available)? [phone]  ---------------------------------------------------------------------------  --------------,  Name of Medication? lisinopriL (PRINIVIL, ZESTRIL) 20 mg tablet  Patient-reported dosage and instructions? 1 time daily  How many days do you have left? 2  Preferred Pharmacy? [address] phone number (if available)? [phone]  ---------------------------------------------------------------------------  --------------  CALL BACK INFO  What is the best way for the office to contact you? OK to leave message on   voicemail  Preferred Call Back Phone Number? [phone]  ---------------------------------------------------------------------------  --------------  SCRIPT ANSWERS  Relationship to Patient?  Self

## 2022-12-30 DIAGNOSIS — K21.9 GASTROESOPHAGEAL REFLUX DISEASE WITHOUT ESOPHAGITIS: ICD-10-CM

## 2022-12-30 RX ORDER — OMEPRAZOLE 40 MG/1
CAPSULE, DELAYED RELEASE ORAL
Qty: 90 CAPSULE | Refills: 1 | Status: SHIPPED | OUTPATIENT
Start: 2022-12-30

## 2022-12-30 NOTE — TELEPHONE ENCOUNTER
Requested Prescriptions     Pending Prescriptions Disp Refills    omeprazole (PRILOSEC) 40 mg capsule 90 Capsule 0     Sig: TAKE ONE CAPSULE BY MOUTH EVERY DAY BEFORE [address] [place] [address], [facility] E [facility] Professor Vincenzo Boyle [facility] 298  [address] [address]  Phone: [phone] Fax: [phone]       11/30/2022 is LAST OFFICE VISIT     Future Appointments   Date Time Provider Sarkis Baker   1/4/2023  8:10 AM JUNE [facility] 1 [address] [facility]   1/24/2023  1:00 PM ALBERTO SESAY BS AMB   3/1/2023  9:45 AM Tania Rodriguez MD [facility] BS AMB   6/5/2023 10:40 AM MD ABI Ruiz BS AMB

## 2023-01-04 ENCOUNTER — HOSPITAL ENCOUNTER (OUTPATIENT)
Dept: MAMMOGRAPHY | Age: 88
Discharge: HOME | End: 2023-01-04
Attending: INTERNAL MEDICINE
Payer: MEDICARE

## 2023-01-04 DIAGNOSIS — Z12.31 BREAST CANCER SCREENING BY MAMMOGRAM: ICD-10-CM

## 2023-01-04 PROCEDURE — 77067 SCR MAMMO BI INCL CAD: CPT

## 2023-01-05 ENCOUNTER — TELEPHONE (OUTPATIENT)
Dept: INTERNAL MEDICINE CLINIC | Age: 88
End: 2023-01-05

## 2023-01-05 DIAGNOSIS — I87.2 CHRONIC VENOUS INSUFFICIENCY: ICD-10-CM

## 2023-01-05 NOTE — TELEPHONE ENCOUNTER
Spoke with patient, patient has been receiving calls claiming that someone from our office was calling our behalf of [facility] 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

## 2023-01-05 NOTE — TELEPHONE ENCOUNTER
Left ankle swelling slightly- pt accidentally threw her support hose away.  She wants to know if she can get a new script sent to [facility]

## 2023-01-09 DIAGNOSIS — I10 ESSENTIAL HYPERTENSION WITH GOAL BLOOD PRESSURE LESS THAN 140/90: ICD-10-CM

## 2023-01-09 DIAGNOSIS — E11.21 TYPE 2 DIABETES MELLITUS WITH NEPHROPATHY (HCC): ICD-10-CM

## 2023-01-09 RX ORDER — DILTIAZEM HYDROCHLORIDE 180 MG/1
180 CAPSULE, COATED, EXTENDED RELEASE ORAL DAILY
Qty: 90 CAPSULE | Refills: 1 | Status: SHIPPED | OUTPATIENT
Start: 2023-01-09

## 2023-01-09 RX ORDER — CLONIDINE HYDROCHLORIDE 0.3 MG/1
TABLET ORAL
Qty: 90 TABLET | Refills: 5 | Status: SHIPPED | OUTPATIENT
Start: 2023-01-09

## 2023-01-09 NOTE — TELEPHONE ENCOUNTER
----- Message from Wilma Miller sent at 1/9/2023  1:15 PM EST -----  Subject: Refill Request    QUESTIONS  Name of Medication? cloNIDine HCL (CATAPRES) 0.3 mg tablet  Patient-reported dosage and instructions? 0.3  How many days do you have left? 1  Preferred Pharmacy? [facility] phone number (if available)? [phone]  Additional Information for Provider? Patient needs this script today as   she will not have any for tomorrow as prescribed  ---------------------------------------------------------------------------  --------------,  Name of Medication? dilTIAZem ER (CARDIZEM CD) 180 mg capsule  Patient-reported dosage and instructions? 180mg  How many days do you have left? 2  Preferred Pharmacy? [address] [facility] phone number (if available)? [phone]  ---------------------------------------------------------------------------  --------------,  Name of Medication? dapagliflozin (FARXIGA) 10 mg tab tablet  Patient-reported dosage and instructions? 10mg  How many days do you have left? 2  Preferred Pharmacy? [address] [facility] phone number (if available)? [phone]  ---------------------------------------------------------------------------  --------------  CALL BACK INFO  What is the best way for the office to contact you? Do not leave any   message, patient will call back for answer  Preferred Call Back Phone Number? [phone]  ---------------------------------------------------------------------------  --------------  SCRIPT ANSWERS  Relationship to Patient?  Self

## 2023-01-13 ENCOUNTER — TELEPHONE (OUTPATIENT)
Dept: INTERNAL MEDICINE CLINIC | Age: 88
End: 2023-01-13

## 2023-01-13 NOTE — TELEPHONE ENCOUNTER
Spoke with Pt regarding her call about her allergies. Pt still has all day allergy medication left so she stated she was going to take that and I informed her if she needs anything else to help to give us a call. Pt voiced understanding and appreciation.

## 2023-01-13 NOTE — TELEPHONE ENCOUNTER
----- Message from [facility] sent at 1/13/2023 10:41 AM EST -----  Subject: Message to Provider    QUESTIONS  Information for Provider? Patient calling requesting Medication for   \"Allergies\". She has been suffering with runny nose, itchy eye, and   sneezing for the last day (started yesterday in the afternoon). She does   not feel she is sick enough for an appt , she just doesn't want it to get   into her chest and cause a cough. Please call to discuss and call in meds   if able.  ---------------------------------------------------------------------------  --------------  Princess RAMON  [phone]; OK to leave message on voicemail  ---------------------------------------------------------------------------  --------------  SCRIPT ANSWERS  Relationship to Patient?  Self

## 2023-01-17 ENCOUNTER — TELEPHONE (OUTPATIENT)
Dept: INTERNAL MEDICINE CLINIC | Age: 88
End: 2023-01-17

## 2023-01-17 DIAGNOSIS — R60.0 BILATERAL LEG EDEMA: ICD-10-CM

## 2023-01-17 DIAGNOSIS — E78.2 MIXED HYPERLIPIDEMIA: ICD-10-CM

## 2023-01-17 RX ORDER — FUROSEMIDE 20 MG/1
TABLET ORAL
Qty: 30 TABLET | Refills: 2 | Status: SHIPPED | OUTPATIENT
Start: 2023-01-17

## 2023-01-17 RX ORDER — ATORVASTATIN CALCIUM 10 MG/1
10 TABLET, FILM COATED ORAL DAILY
Qty: 90 TABLET | Refills: 1 | Status: SHIPPED | OUTPATIENT
Start: 2023-01-17

## 2023-01-17 NOTE — TELEPHONE ENCOUNTER
Requested Prescriptions     Pending Prescriptions Disp Refills    atorvastatin (LIPITOR) 10 mg tablet 90 Tablet 1     Sig: Take 1 Tablet by mouth daily.     furosemide (LASIX) 20 mg tablet 30 Tablet 2     Si tab po Monday,Wednesday and Friday         [address] [place] [address] Professor Vincenzo Boyle [facility] 298  [address] [place] [address]  Phone: [phone] Fax: [phone]       2022 is LAST OFFICE VISIT     Future Appointments   Date Time Provider Sarkis Baker   2023  1:00 PM VASCULAR, ALBERTO ALEX BS AMB   3/1/2023  9:45 AM Daryle Brightly, MD [facility] BS AMB   2023 10:40 AM MD ABI Owen BS AMB   phone number (if available)? [phone]

## 2023-01-17 NOTE — TELEPHONE ENCOUNTER
ECC message:  Ele Sanders [facility]  Subject: Appointment Request     Reason for Call: Established Patient Appointment needed: Semi-Routine   Cough, Cold Symptoms     QUESTIONS     Reason for appointment request? No appointments available during search       Additional Information for Provider? Vane Hansen has a cough, runny nose/eyes. she doesn't want an appt but would like medication called in if possible.    please give her a call.   ---------------------------------------------------------------------------   --------------   Jameel BRITT   [phone]; OK to leave message on voicemail   ---------------------------------------------------------------------------   --------------   SCRIPT ANSWERS   COVID Screen: Red

## 2023-01-18 ENCOUNTER — DOCUMENTATION ONLY (OUTPATIENT)
Dept: INTERNAL MEDICINE CLINIC | Age: 88
End: 2023-01-18

## 2023-01-18 ENCOUNTER — VIRTUAL VISIT (OUTPATIENT)
Dept: INTERNAL MEDICINE CLINIC | Age: 88
End: 2023-01-18
Payer: MEDICARE

## 2023-01-18 DIAGNOSIS — J06.9 URTI (ACUTE UPPER RESPIRATORY INFECTION): Primary | ICD-10-CM

## 2023-01-18 DIAGNOSIS — R09.81 NASAL CONGESTION: ICD-10-CM

## 2023-01-18 DIAGNOSIS — E11.8 DM TYPE 2, CONTROLLED, WITH COMPLICATION (HCC): ICD-10-CM

## 2023-01-18 PROCEDURE — G8536 NO DOC ELDER MAL SCRN: HCPCS | Performed by: INTERNAL MEDICINE

## 2023-01-18 PROCEDURE — 1090F PRES/ABSN URINE INCON ASSESS: CPT | Performed by: INTERNAL MEDICINE

## 2023-01-18 PROCEDURE — G8417 CALC BMI ABV UP PARAM F/U: HCPCS | Performed by: INTERNAL MEDICINE

## 2023-01-18 PROCEDURE — G8510 SCR DEP NEG, NO PLAN REQD: HCPCS | Performed by: INTERNAL MEDICINE

## 2023-01-18 PROCEDURE — 99213 OFFICE O/P EST LOW 20 MIN: CPT | Performed by: INTERNAL MEDICINE

## 2023-01-18 PROCEDURE — 1101F PT FALLS ASSESS-DOCD LE1/YR: CPT | Performed by: INTERNAL MEDICINE

## 2023-01-18 PROCEDURE — G8427 DOCREV CUR MEDS BY ELIG CLIN: HCPCS | Performed by: INTERNAL MEDICINE

## 2023-01-18 NOTE — PROGRESS NOTES
Mignon De is a 80 y.o. female who was seen by synchronous (real-time) audio-video technology on 1/18/2023 for Cough and Cold        Assessment & Plan:   Diagnoses and all orders for this visit:    1. URTI (acute upper respiratory infection)    Probably from virus. Advised her to drink a lot of fluid and rest.  She has tried honey yesterday which helped her. Advised her to have tea with jojo lemon and honey 2-3 times a day. She can take Claritin 10 mg a day for next 3 days. If she gets worse, advised her to call back. 2. DM type 2, controlled, with complication (HCC)  A2V has improved significantly, 5.9. Continue same medication. 3. Nasal congestion  Will use Claritin for next 3 days. I spent at least 20 minutes on this visit with this established patient. Subjective:     Ms. Frank Santos is here with a complaining about nasal congestion stuffy nose and mild cough since yesterday. No fever, she is not exposed to [place]. She has tried 2 teaspoon of honey yesterday which helped her a lot. Has lot of postnasal drip. No shortness of breath or wheezing. Has diabetes, A1c has improved. Compliant with medication. Prior to Admission medications    Medication Sig Start Date End Date Taking? Authorizing Provider   atorvastatin (LIPITOR) 10 mg tablet Take 1 Tablet by mouth daily. 1/17/23  Yes Devika Arango MD   furosemide (LASIX) 20 mg tablet 1 tab po Monday,Wednesday and Friday 1/17/23  Yes Devika Arango MD   cloNIDine HCL (CATAPRES) 0.3 mg tablet 1 tab po tid 1/9/23  Yes Tia Stokes NP   dilTIAZem ER (CARDIZEM CD) 180 mg capsule Take 1 Capsule by mouth daily. 1/9/23  Yes Tia Stokes NP   dapagliflozin (FARXIGA) 10 mg tab tablet Take 1 Tablet by mouth daily.  1/9/23  Yes Tia Stokes NP   OTHER 15 mm Hg below knee stockings B/L 1/5/23  Yes Tia Stokes NP   omeprazole (PRILOSEC) 40 mg capsule TAKE ONE CAPSULE BY MOUTH EVERY DAY BEFORE BREAKFAST 12/30/22  Yes Tia Stokes NP metoprolol tartrate (LOPRESSOR) 100 mg IR tablet Take 1 1/2 tablets twice daily 12/20/22  Yes Roberto Baldwin MD   lisinopriL (PRINIVIL, ZESTRIL) 20 mg tablet Take 1 Tablet by mouth daily. DC lisinopril 10 mg 12/20/22  Yes Roberto Baldwin MD   metFORMIN ER (GLUCOPHAGE XR) 500 mg tablet 2 tab po BID 11/21/22  Yes Roberto Baldwin MD   glipiZIDE SR (Glucotrol XL) 5 mg CR tablet Take 1 Tablet by mouth daily. Dc glipizide 5 mg BID 11/21/22  Yes Roberto Baldwin MD   ALPRAZolam Janith Blue) 0.5 mg tablet TAKE ONE TABLET BY MOUTH NIGHTLY AS NEEDED FOR ANXIETY. MAX DAILY AMOUNT 1 TABLET 10/25/22  Yes Roberto Baldwin MD   naloxone [facility]) 4 mg/actuation nasal spray Use 1 spray intranasally, then discard. Repeat with new spray every 2 min as needed for opioid overdose symptoms, alternating nostrils. 5/4/22  Yes Roberto Baldwin MD   nystatin (MYCOSTATIN) powder Apply  to affected area two (2) times a day. 4/25/22  Yes Roberto Baldwin MD   Leg Brace (ACE Knee Brace) misc by Does Not Apply route. Use in both knee daily 10/21/21  Yes Roberto Baldwin MD   Blood-Glucose Meter monitoring kit check BS BID 6/28/21  Yes Roberto Baldwin MD   glucose blood VI test strips (True Metrix Glucose Test Strip) strip Use to check blood sugar daily and PRN 6/28/21  Yes Roberto Baldwin MD   lancets misc Check BS BID 6/28/21  Yes Roberto Baldwin MD   hydrocortisone (HYTONE) 2.5 % lotion Apply  to affected area two (2) times a day. use thin layer 6/28/21  Yes Roberto Baldwin MD   All Day Allergy 10 mg tablet TAKE ONE TABLET BY MOUTH NIGHTLY AS NEEDED FOR ALLERGIES 4/14/21  Yes Chaka Rajput NP   loratadine (Claritin) 10 mg tablet Take 1 Tab by mouth daily as needed for Allergies. 2/8/21  Yes Roberto Baldwin MD   ascorbic acid (VITAMIN C PO) Take  by mouth daily. Yes Provider, Historical   lancets misc Use to check blood sugar daily and PRN 3/11/20  Yes Roberto Baldwin MD   SYSTANE, PROPYLENE GLYCOL, 0.4-0.3 % drop Administer 10 g to both eyes as needed.  11/13/19  Yes Roberto Baldwin MD cholecalciferol (VITAMIN D3) (1000 Units /25 mcg) tablet Take  by mouth daily. Yes Provider, Historical   Arm Brace (NEOPRENE WRIST SPLINT SUPPORT) misc On right wrist 2/20/19  Yes Sandra Feliz MD   cod liver oil cap Take 1 Cap by mouth daily. Yes Provider, Historical   calcium-cholecalciferol, d3, 600-125 mg-unit tab Take 1 Tab by mouth daily. Yes Provider, Historical   acetaminophen (TYLENOL) 325 mg tablet Take  by mouth every four (4) hours as needed for Pain. Yes Provider, Historical   aspirin 81 mg chewable tablet Take 81 mg by mouth daily. Yes Other, MD Stacey     Past Medical History:   Diagnosis Date    Adverse effect of anesthesia     pt states she was able to feel everything during colonoscopy    Chronic bronchitis     Diabetes ([address]     Essential hypertension     Hypercholesterolemia     Hypertension        ROS significant for cough and nasal congestion. No fever. Objective:   No flowsheet data found. Constitutional: [x] Appears well-developed and well-nourished [x] No apparent distress      [] Abnormal -     Mental status: [x] Alert and awake  [x] Oriented to person/place/time [x] Able to follow commands    [] Abnormal -     Eyes:   EOM    [x]  Normal    [] Abnormal -   Sclera  [x]  Normal    [] Abnormal -          Discharge [x]  None visible   [] Abnormal -     HENT: [x] Normocephalic, atraumatic  [] Abnormal -   [x] Mouth/Throat: Mucous membranes are moist  Nasal congestion present.   External Ears [x] Normal  [] Abnormal -    Neck: [x] No visualized mass [] Abnormal -     Pulmonary/Chest: [x] Respiratory effort normal   [x] No visualized signs of difficulty breathing or respiratory distress        [] Abnormal -      Musculoskeletal:   [x] Normal gait with no signs of ataxia         [x] Normal range of motion of neck        [] Abnormal -     Neurological:        [x] No Facial Asymmetry (Cranial nerve 7 motor function) (limited exam due to video visit)          [x] No gaze palsy [] Abnormal -          Skin:        [x] No significant exanthematous lesions or discoloration noted on facial skin         [] Abnormal -            Psychiatric:       [x] Normal Affect [] Abnormal -        [x] No Hallucinations    Other pertinent observable physical exam findings:-        We discussed the expected course, resolution and complications of the diagnosis(es) in detail. Medication risks, benefits, costs, interactions, and alternatives were discussed as indicated. I advised her to contact the office if her condition worsens, changes or fails to improve as anticipated. She expressed understanding with the diagnosis(es) and plan. Josph Dakins, was evaluated through a synchronous (real-time) audio-video encounter. The patient (or guardian if applicable) is aware that this is a billable service, which includes applicable co-pays. This Virtual Visit was conducted with patient's (and/or legal guardian's) consent. The visit was conducted pursuant to the emergency declaration under the [address] [place] [address] authority and the [employer] General Act. Patient identification was verified, and a caregiver was present when appropriate. The patient was located at: Home: [address] [facility] [id number]  The provider was located at:  Facility (Appt Department): [address] [facility] [address] Mik Landers MD

## 2023-01-18 NOTE — TELEPHONE ENCOUNTER
Future Appointments   Date Time Provider Sarkis Baker   1/18/2023  1:15 PM Rogers Torres MD [facility] BS AMB   1/24/2023  1:00 PM VASCULAR, ALBERTO ALEX BS AMB   3/1/2023  9:45 AM MD BRITTANI Bowles BS AMB   6/5/2023 10:40 AM MD ABI Kinney BS AMB

## 2023-01-18 NOTE — PROGRESS NOTES
Pt states she took a couple spoon fulls of honey and drank tea yesterday  Her runny nose and runny eyes feel better today. Pt states it is not in her chest. Pt advised to call office if symptoms return.  Pt denies fever

## 2023-01-18 NOTE — TELEPHONE ENCOUNTER
Mj Mishra routed conversation to [facility] [employer] Int Nurse Pool 15 minutes ago (9:22 AM)     Alexandr Alex 15 minutes ago (9:22 AM)       Pt states she took a couple spoon fulls of honey and drank tea yesterday  Her runny nose and runny eyes feel better today. Pt states it is not in her chest. Pt advised to call office if symptoms return.  Pt denies fever

## 2023-01-24 ENCOUNTER — ANCILLARY PROCEDURE (OUTPATIENT)
Dept: CARDIOLOGY CLINIC | Age: 88
End: 2023-01-24
Payer: MEDICARE

## 2023-01-26 ENCOUNTER — TELEPHONE (OUTPATIENT)
Dept: CARDIOLOGY CLINIC | Age: 88
End: 2023-01-26

## 2023-01-26 NOTE — TELEPHONE ENCOUNTER
----- Message from Deandre Harper MD sent at 1/26/2023 11:03 AM EST -----  Please let pt know ultrasound was negative for DVT.  thx

## 2023-01-26 NOTE — TELEPHONE ENCOUNTER
Identifiers x 2. Informed of test results. Confirmed follow up. Verbalized understanding.      Future Appointments   Date Time Provider [facility] Olivia   3/1/2023  9:45 AM MD BRITTANI Walter BS AMB   6/5/2023 10:40 AM MD ABI Crespo BS AMB

## 2023-03-01 ENCOUNTER — OFFICE VISIT (OUTPATIENT)
Dept: INTERNAL MEDICINE CLINIC | Age: 88
End: 2023-03-01
Payer: MEDICARE

## 2023-03-01 VITALS
OXYGEN SATURATION: 98 % | TEMPERATURE: 97 F | HEIGHT: 59 IN | RESPIRATION RATE: 16 BRPM | SYSTOLIC BLOOD PRESSURE: 160 MMHG | DIASTOLIC BLOOD PRESSURE: 80 MMHG | BODY MASS INDEX: 29.27 KG/M2 | WEIGHT: 145.2 LBS | HEART RATE: 66 BPM

## 2023-03-01 DIAGNOSIS — I73.9 PERIPHERAL VASCULAR DISEASE (HCC): ICD-10-CM

## 2023-03-01 DIAGNOSIS — H25.9 AGE-RELATED CATARACT OF BOTH EYES, UNSPECIFIED AGE-RELATED CATARACT TYPE: ICD-10-CM

## 2023-03-01 DIAGNOSIS — Z23 ENCOUNTER FOR IMMUNIZATION: ICD-10-CM

## 2023-03-01 DIAGNOSIS — E11.8 DM TYPE 2, CONTROLLED, WITH COMPLICATION (HCC): ICD-10-CM

## 2023-03-01 DIAGNOSIS — I10 ESSENTIAL HYPERTENSION: Primary | ICD-10-CM

## 2023-03-01 DIAGNOSIS — M19.049 HAND ARTHRITIS: ICD-10-CM

## 2023-03-01 DIAGNOSIS — I87.2 CHRONIC VENOUS INSUFFICIENCY: ICD-10-CM

## 2023-03-01 DIAGNOSIS — E78.2 MIXED HYPERLIPIDEMIA: ICD-10-CM

## 2023-03-01 PROCEDURE — G8427 DOCREV CUR MEDS BY ELIG CLIN: HCPCS | Performed by: INTERNAL MEDICINE

## 2023-03-01 PROCEDURE — 1090F PRES/ABSN URINE INCON ASSESS: CPT | Performed by: INTERNAL MEDICINE

## 2023-03-01 PROCEDURE — G8417 CALC BMI ABV UP PARAM F/U: HCPCS | Performed by: INTERNAL MEDICINE

## 2023-03-01 PROCEDURE — G8510 SCR DEP NEG, NO PLAN REQD: HCPCS | Performed by: INTERNAL MEDICINE

## 2023-03-01 PROCEDURE — G8536 NO DOC ELDER MAL SCRN: HCPCS | Performed by: INTERNAL MEDICINE

## 2023-03-01 PROCEDURE — 99214 OFFICE O/P EST MOD 30 MIN: CPT | Performed by: INTERNAL MEDICINE

## 2023-03-01 PROCEDURE — 1101F PT FALLS ASSESS-DOCD LE1/YR: CPT | Performed by: INTERNAL MEDICINE

## 2023-03-01 NOTE — PROGRESS NOTES
HISTORY OF PRESENT ILLNESS  Deya Bethea is a 80 y.o. female here for for follow-up. She is doing well. She underwent 1 eye cataract surgery yesterday, next 1 will be done next week. She was not supposed to take blood pressure medication yesterday. Blood pressure noticed to be be elevated this morning. She has taken medication this morning. Denies chest pain palpitation or shortness of breath. She is diabetic, blood sugar running okay. A1c came down to 5.9. She is compliant with medication. Has a hyperlipidemia, on statin. No myalgia. Report hand pain and stiffness probably from arthritis. No fall or injury. Heartburn and acidity under control. Taking medication. Diabetes    Hypertension   Associated symptoms include anxiety. Allergies    Cholesterol Problem    Anxiety      Review of Systems   Constitutional: Negative. HENT: Negative. Eyes: Negative. Respiratory: Negative. Cardiovascular: Negative. Gastrointestinal: Negative. Genitourinary: Negative. Musculoskeletal:  Positive for joint pain. Skin: Negative. Neurological: Negative. Endo/Heme/Allergies: Negative. Psychiatric/Behavioral: Negative. Physical Exam  Constitutional:       Appearance: Normal appearance. She is obese. Cardiovascular:      Rate and Rhythm: Normal rate and regular rhythm. Pulses: Normal pulses. Heart sounds: Normal heart sounds. Pulmonary:      Effort: Pulmonary effort is normal.      Breath sounds: Normal breath sounds. Abdominal:      General: Abdomen is flat. Bowel sounds are normal.      Palpations: Abdomen is soft. Musculoskeletal:         General: Swelling and tenderness present. Cervical back: Normal range of motion and neck supple. Comments: Bilateral hands: Mild stiffness and tenderness present on the fingers,    Neurological:      General: No focal deficit present. Mental Status: She is alert and oriented to person, place, and time.  Mental status is at baseline. Psychiatric:         Mood and Affect: Mood normal.         Behavior: Behavior normal.         Thought Content: Thought content normal.       ASSESSMENT and PLAN  Diagnoses and all orders for this visit:    1. Essential hypertension    Elevated blood pressure since that she was not able to take blood pressure medication yesterday because of her cataract surgery. Normally her blood pressure remained stable with multiple medications including clonidine, Cardizem, lisinopril and metoprolol. Will not change any medicine. Advised to be on DASH diet and monitor blood pressure. 2. Encounter for immunization    Took first shot, need the second 1.  -     varicella-zoster recombinant, PF, (SHINGRIX) 50 mcg/0.5 mL susr injection; 0.5 mL by IntraMUSCular route once for 1 dose. 3. DM type 2, controlled, with complication (HCC)  U6O 5.9, on glipizide and Farxiga helping her a lot. 4. Mixed hyperlipidemia  Stable, on statin. No myalgia. 5. Chronic venous insufficiency  Doppler ultrasound was negative for blood clot, advised to do leg elevation. 6. Peripheral vascular disease ([address]    7. Hand arthritis  Advised to take Tylenol 650 mg twice a day as needed, she needs to do hand exercise. Information provided. 8. Age-related cataract of both eyes, unspecified age-related cataract type    1 eye surgery done yesterday, second 1 will be done next week. She is doing well. Discussed expected course/resolution/complications of diagnosis in detail with patient. Medication risks/benefits/costs/interactions/alternatives discussed with patient. Discussed COVID-19 infection precaution with patient. Pt was given an after visit summary which includes diagnoses, current medications & vitals. Pt expressed understanding with the diagnosis and plan.

## 2023-03-09 DIAGNOSIS — E11.21 TYPE 2 DIABETES MELLITUS WITH NEPHROPATHY (HCC): ICD-10-CM

## 2023-03-09 DIAGNOSIS — I10 ESSENTIAL HYPERTENSION WITH GOAL BLOOD PRESSURE LESS THAN 140/90: ICD-10-CM

## 2023-03-09 RX ORDER — METFORMIN HYDROCHLORIDE 500 MG/1
TABLET, EXTENDED RELEASE ORAL
Qty: 360 TABLET | Refills: 1 | Status: SHIPPED | OUTPATIENT
Start: 2023-03-09

## 2023-03-09 RX ORDER — CLONIDINE HYDROCHLORIDE 0.3 MG/1
TABLET ORAL
Qty: 90 TABLET | Refills: 5 | Status: SHIPPED | OUTPATIENT
Start: 2023-03-09

## 2023-03-09 RX ORDER — DILTIAZEM HYDROCHLORIDE 180 MG/1
180 CAPSULE, COATED, EXTENDED RELEASE ORAL DAILY
Qty: 90 CAPSULE | Refills: 1 | Status: SHIPPED | OUTPATIENT
Start: 2023-03-09

## 2023-03-20 DIAGNOSIS — E78.2 MIXED HYPERLIPIDEMIA: ICD-10-CM

## 2023-03-20 DIAGNOSIS — I10 PRIMARY HYPERTENSION: ICD-10-CM

## 2023-03-20 RX ORDER — ATORVASTATIN CALCIUM 10 MG/1
10 TABLET, FILM COATED ORAL DAILY
Qty: 90 TABLET | Refills: 1 | Status: SHIPPED | OUTPATIENT
Start: 2023-03-20

## 2023-03-20 RX ORDER — LISINOPRIL 20 MG/1
20 TABLET ORAL DAILY
Qty: 90 TABLET | Refills: 1 | Status: SHIPPED | OUTPATIENT
Start: 2023-03-20

## 2023-03-20 NOTE — TELEPHONE ENCOUNTER
Requested Prescriptions     Pending Prescriptions Disp Refills    lisinopriL (PRINIVIL, ZESTRIL) 20 mg tablet 90 Tablet 1     Sig: Take 1 Tablet by mouth daily. DC lisinopril 10 mg    atorvastatin (LIPITOR) 10 mg tablet 90 Tablet 1     Sig: Take 1 Tablet by mouth daily.          [address] [place] [address]  Phone: [phone] Fax: [phone]       3/1/2023 is LAST OFFICE VISIT     Future Appointments   Date Time Provider Sarkis Baker   4/12/2023  9:00 AM MD BRITTANI Minaya AMB   6/5/2023 10:40 AM MD ABI Trent AMB

## 2023-03-27 DIAGNOSIS — E16.2 HYPOGLYCEMIA: ICD-10-CM

## 2023-03-27 DIAGNOSIS — I10 ESSENTIAL HYPERTENSION: ICD-10-CM

## 2023-03-27 DIAGNOSIS — E11.21 TYPE 2 DIABETES MELLITUS WITH NEPHROPATHY (HCC): ICD-10-CM

## 2023-03-27 DIAGNOSIS — I49.3 PVC'S (PREMATURE VENTRICULAR CONTRACTIONS): ICD-10-CM

## 2023-03-27 RX ORDER — GLIPIZIDE 5 MG/1
5 TABLET, FILM COATED, EXTENDED RELEASE ORAL DAILY
Qty: 30 TABLET | Refills: 3 | Status: SHIPPED | OUTPATIENT
Start: 2023-03-27

## 2023-03-27 RX ORDER — METOPROLOL TARTRATE 100 MG/1
TABLET ORAL
Qty: 90 TABLET | Refills: 5 | Status: SHIPPED | OUTPATIENT
Start: 2023-03-27

## 2023-03-27 NOTE — TELEPHONE ENCOUNTER
jose (if available)? [phone]     Requested Prescriptions     Pending Prescriptions Disp Refills    glipiZIDE SR (Glucotrol XL) 5 mg CR tablet 30 Tablet 3     Sig: Take 1 Tablet by mouth daily.  Dc glipizide 5 mg BID    metoprolol tartrate (LOPRESSOR) 100 mg IR tablet 90 Tablet 5     Sig: Take 1 1/2 tablets twice daily         Welcome Alexandr Veloz - [address] [place] [address] 233 [id number]  Phone: [phone] Fax: [phone]       3/1/2023 is LAST OFFICE VISIT     Future Appointments   Date Time Provider Sarkis Cotteri   4/12/2023  9:00 AM MD BRITTANI Ceron AMB   6/5/2023 10:40 AM MD ABI Greenberg BS AMB

## 2023-04-04 RX ORDER — FUROSEMIDE 20 MG/1
TABLET ORAL
Qty: 30 TABLET | Refills: 2 | Status: SHIPPED
Start: 2023-04-04

## 2023-04-04 RX ORDER — OMEPRAZOLE 40 MG/1
CAPSULE, DELAYED RELEASE ORAL
Qty: 90 CAPSULE | Refills: 1 | Status: SHIPPED
Start: 2023-04-04

## 2023-04-04 NOTE — TELEPHONE ENCOUNTER
Requested Prescriptions     Pending Prescriptions Disp Refills    furosemide (LASIX) 20 mg tablet 30 Tablet 2     Si tab po Monday,Wednesday and Friday    omeprazole (PRILOSEC) 40 mg capsule 90 Capsule 1     Sig: TAKE ONE CAPSULE BY MOUTH EVERY DAY BEFORE [address] [place] [address] [address] [facility] Professor Vincenzo Boyle [address] [place] [address]  Phone: [phone] Fax: [phone]       3/1/2023 is LAST OFFICE VISIT     Future Appointments   Date Time Provider Sarkis Baker   2023  9:00 AM Melba Blackwood MD [facility] TIANA AMB   2023 10:40 AM MD ABI Esteves AMB

## 2023-05-01 DIAGNOSIS — I10 PRIMARY HYPERTENSION: ICD-10-CM

## 2023-05-01 RX ORDER — LANCETS 30 GAUGE
EACH MISCELLANEOUS
COMMUNITY
Start: 2021-06-28 | End: 2023-06-07

## 2023-05-01 RX ORDER — LISINOPRIL 20 MG/1
20 TABLET ORAL DAILY
Qty: 90 TABLET | Refills: 1 | Status: SHIPPED | OUTPATIENT
Start: 2023-05-01

## 2023-05-01 NOTE — TELEPHONE ENCOUNTER
Requested Prescriptions     Pending Prescriptions Disp Refills    lisinopriL (PRINIVIL, ZESTRIL) 20 mg tablet 90 Tablet 1     Sig: Take 1 Tablet by mouth daily.  MULUGETA lisinopril 10 mg         Rolm Mcburney, [place] [address]  Phone: [phone] Fax: [phone]       4/12/2023 is LAST OFFICE VISIT     Future Appointments   Date Time Provider Sarkis Cotteri   6/5/2023 10:40 AM MD ABI Cruz AMB   8/14/2023  9:00 AM Albert Anderson MD [facility] BS AMB

## 2023-05-05 ENCOUNTER — NURSE TRIAGE (OUTPATIENT)
Dept: OTHER | Facility: CLINIC | Age: 88
End: 2023-05-05

## 2023-05-08 ENCOUNTER — OFFICE VISIT (OUTPATIENT)
Age: 88
End: 2023-05-08
Payer: MEDICARE

## 2023-05-08 VITALS
SYSTOLIC BLOOD PRESSURE: 170 MMHG | WEIGHT: 149.8 LBS | HEART RATE: 72 BPM | OXYGEN SATURATION: 98 % | RESPIRATION RATE: 16 BRPM | DIASTOLIC BLOOD PRESSURE: 96 MMHG | BODY MASS INDEX: 30.2 KG/M2 | TEMPERATURE: 97.2 F | HEIGHT: 59 IN

## 2023-05-08 DIAGNOSIS — E78.00 HYPERCHOLESTEROLEMIA: ICD-10-CM

## 2023-05-08 DIAGNOSIS — E78.2 MIXED HYPERLIPIDEMIA: ICD-10-CM

## 2023-05-08 DIAGNOSIS — Z23 ENCOUNTER FOR HERPES ZOSTER VACCINATION: ICD-10-CM

## 2023-05-08 DIAGNOSIS — I10 ESSENTIAL (PRIMARY) HYPERTENSION: ICD-10-CM

## 2023-05-08 DIAGNOSIS — I10 ESSENTIAL HYPERTENSION WITH GOAL BLOOD PRESSURE LESS THAN 140/90: ICD-10-CM

## 2023-05-08 DIAGNOSIS — E11.21 TYPE 2 DIABETES MELLITUS WITH DIABETIC NEPHROPATHY, WITHOUT LONG-TERM CURRENT USE OF INSULIN (HCC): ICD-10-CM

## 2023-05-08 DIAGNOSIS — R60.0 BILATERAL LEG EDEMA: Primary | ICD-10-CM

## 2023-05-08 PROCEDURE — 1123F ACP DISCUSS/DSCN MKR DOCD: CPT | Performed by: INTERNAL MEDICINE

## 2023-05-08 PROCEDURE — G8417 CALC BMI ABV UP PARAM F/U: HCPCS | Performed by: INTERNAL MEDICINE

## 2023-05-08 PROCEDURE — 1090F PRES/ABSN URINE INCON ASSESS: CPT | Performed by: INTERNAL MEDICINE

## 2023-05-08 PROCEDURE — 99214 OFFICE O/P EST MOD 30 MIN: CPT | Performed by: INTERNAL MEDICINE

## 2023-05-08 PROCEDURE — 1036F TOBACCO NON-USER: CPT | Performed by: INTERNAL MEDICINE

## 2023-05-08 PROCEDURE — G8427 DOCREV CUR MEDS BY ELIG CLIN: HCPCS | Performed by: INTERNAL MEDICINE

## 2023-05-08 PROCEDURE — 3044F HG A1C LEVEL LT 7.0%: CPT | Performed by: INTERNAL MEDICINE

## 2023-05-08 RX ORDER — FUROSEMIDE 20 MG/1
TABLET ORAL
Qty: 60 TABLET | Refills: 1 | Status: SHIPPED | OUTPATIENT
Start: 2023-05-08

## 2023-05-08 RX ORDER — ZOSTER VACCINE RECOMBINANT, ADJUVANTED 50 MCG/0.5
0.5 KIT INTRAMUSCULAR SEE ADMIN INSTRUCTIONS
Qty: 0.5 ML | Refills: 1 | Status: SHIPPED | OUTPATIENT
Start: 2023-05-08 | End: 2023-05-09

## 2023-05-08 SDOH — ECONOMIC STABILITY: INCOME INSECURITY: HOW HARD IS IT FOR YOU TO PAY FOR THE VERY BASICS LIKE FOOD, HOUSING, MEDICAL CARE, AND HEATING?: NOT HARD AT ALL

## 2023-05-08 SDOH — ECONOMIC STABILITY: HOUSING INSECURITY
IN THE LAST 12 MONTHS, WAS THERE A TIME WHEN YOU DID NOT HAVE A STEADY PLACE TO SLEEP OR SLEPT IN A SHELTER (INCLUDING NOW)?: NO

## 2023-05-08 SDOH — ECONOMIC STABILITY: FOOD INSECURITY: WITHIN THE PAST 12 MONTHS, THE FOOD YOU BOUGHT JUST DIDN'T LAST AND YOU DIDN'T HAVE MONEY TO GET MORE.: NEVER TRUE

## 2023-05-08 SDOH — ECONOMIC STABILITY: FOOD INSECURITY: WITHIN THE PAST 12 MONTHS, YOU WORRIED THAT YOUR FOOD WOULD RUN OUT BEFORE YOU GOT MONEY TO BUY MORE.: NEVER TRUE

## 2023-05-08 ASSESSMENT — ENCOUNTER SYMPTOMS
EYES NEGATIVE: 1
GASTROINTESTINAL NEGATIVE: 1
ALLERGIC/IMMUNOLOGIC NEGATIVE: 1
RESPIRATORY NEGATIVE: 1

## 2023-05-08 NOTE — PROGRESS NOTES
Subjective:      Patient ID: Cathryn Dejesus is a 80 y.o. female here for follow-up. Noticed to have a bilateral leg swelling for last several days. No shortness of breath orthopnea. She is not elevating her legs. Taking Lasix 3 days a week, not helping much. She is diabetic, lab work done recently, A1c stable. On multiple medications including Farxiga, need refill on Farxiga. Eye checkup and foot exam up-to-date. Has hypertension, on multiple medication. Blood pressure is elevated. She is not stressed. Denies chest pain palpitation shortness of breath. Has elevated lipid, on statin. No myalgia. Mammogram up-to-date, also bone density done. Last colonoscopy done 2017. Need Shingrix vaccine. Hypertension    Diabetes      Review of Systems   Constitutional: Negative. HENT: Negative. Eyes: Negative. Respiratory: Negative. Cardiovascular:  Positive for leg swelling. Gastrointestinal: Negative. Endocrine: Negative. Genitourinary: Negative. Musculoskeletal: Negative. Allergic/Immunologic: Negative. Neurological: Negative. Hematological: Negative. Psychiatric/Behavioral: Negative. Objective:   Physical Exam  Constitutional:       Appearance: Normal appearance. She is obese. Eyes:      Extraocular Movements: Extraocular movements intact. Conjunctiva/sclera: Conjunctivae normal.      Pupils: Pupils are equal, round, and reactive to light. Cardiovascular:      Rate and Rhythm: Normal rate and regular rhythm. Pulses: Normal pulses. Heart sounds: Normal heart sounds. Pulmonary:      Effort: Pulmonary effort is normal.      Breath sounds: Normal breath sounds. Abdominal:      General: Abdomen is flat. Bowel sounds are normal.      Palpations: Abdomen is soft. Musculoskeletal:         General: Swelling present. Cervical back: Normal range of motion and neck supple. Skin:     General: Skin is warm.    Neurological:      Mental Status:

## 2023-06-07 ENCOUNTER — OFFICE VISIT (OUTPATIENT)
Age: 88
End: 2023-06-07
Payer: MEDICARE

## 2023-06-07 VITALS
DIASTOLIC BLOOD PRESSURE: 74 MMHG | OXYGEN SATURATION: 97 % | WEIGHT: 148.8 LBS | BODY MASS INDEX: 30 KG/M2 | SYSTOLIC BLOOD PRESSURE: 136 MMHG | HEART RATE: 67 BPM | HEIGHT: 59 IN

## 2023-06-07 DIAGNOSIS — I87.2 VENOUS INSUFFICIENCY (CHRONIC) (PERIPHERAL): Primary | ICD-10-CM

## 2023-06-07 DIAGNOSIS — I10 ESSENTIAL (PRIMARY) HYPERTENSION: ICD-10-CM

## 2023-06-07 DIAGNOSIS — E11.8 TYPE 2 DIABETES MELLITUS WITH UNSPECIFIED COMPLICATIONS (HCC): ICD-10-CM

## 2023-06-07 PROCEDURE — 3044F HG A1C LEVEL LT 7.0%: CPT | Performed by: INTERNAL MEDICINE

## 2023-06-07 PROCEDURE — 99214 OFFICE O/P EST MOD 30 MIN: CPT | Performed by: INTERNAL MEDICINE

## 2023-06-07 PROCEDURE — 1123F ACP DISCUSS/DSCN MKR DOCD: CPT | Performed by: INTERNAL MEDICINE

## 2023-06-07 ASSESSMENT — PATIENT HEALTH QUESTIONNAIRE - PHQ9
SUM OF ALL RESPONSES TO PHQ QUESTIONS 1-9: 0
2. FEELING DOWN, DEPRESSED OR HOPELESS: 0
SUM OF ALL RESPONSES TO PHQ9 QUESTIONS 1 & 2: 0
SUM OF ALL RESPONSES TO PHQ QUESTIONS 1-9: 0
SUM OF ALL RESPONSES TO PHQ QUESTIONS 1-9: 0
1. LITTLE INTEREST OR PLEASURE IN DOING THINGS: 0
SUM OF ALL RESPONSES TO PHQ QUESTIONS 1-9: 0

## 2023-06-07 NOTE — PROGRESS NOTES
on 6/7/2023)    naloxone 4 MG/0.1ML LIQD nasal spray Use 1 spray intranasally, then discard. Repeat with new spray every 2 min as needed for opioid overdose symptoms, alternating nostrils. (Patient not taking: Reported on 6/7/2023)    nystatin (MYCOSTATIN) 529253 UNIT/GM powder Apply topically 2 times daily (Patient not taking: Reported on 6/7/2023)    polyethyl glycol-propyl glycol 0.4-0.3 % (SYSTANE) 0.4-0.3 % ophthalmic solution Administer 10 g to both eyes as needed. (Patient not taking: Reported on 6/7/2023)     No current facility-administered medications for this visit.        Josiah Wong MD  [facility] [place]  [address] [facility] [address]  [address]

## 2023-06-07 NOTE — PATIENT INSTRUCTIONS
Follow up with Dr. Kassandra Syed in 6 months. Call [facility] stocking fitting @ [phone], Ext [phone]. There address is Kailyn Hanley [address] [place] [address].

## 2023-06-26 ENCOUNTER — TELEPHONE (OUTPATIENT)
Age: 88
End: 2023-06-26

## 2023-07-13 ENCOUNTER — TELEPHONE (OUTPATIENT)
Age: 88
End: 2023-07-13

## 2023-07-13 NOTE — TELEPHONE ENCOUNTER
Tequila Kay would like to have the nurse call her in   regards to the medication dapagliflozin (FARXIGA) 10 MG tablets, they cost   her $100 for the script and would like something else called in that does   not cost her so much. she is returning the medication to [address] [place] [address] as soon as something else is called and she can be   reached at [phone]   Patient said that she is going to take the medication back to the pharmacy since she hasn't opened it. She wants something cheaper.

## 2023-07-14 NOTE — TELEPHONE ENCOUNTER
Minoo Gonzalez MD  You 14 hours ago (4:59 PM)     SA  Her last A1c was 6. If Osiris Wilhelm is too expensive, stop taking it, will not prescribe any further medications but will repeat A1c and decide whether she needs any other medicine or not.

## 2023-07-25 NOTE — TELEPHONE ENCOUNTER
Please see message regarding sooner appointments, Thank you  Pt states she is draining down her throat at night because she sleeps on her back  Pt currently taking all day allergy medication. What can she take at night to help with the drainage?

## 2023-07-28 DIAGNOSIS — E11.9 CONTROLLED TYPE 2 DIABETES MELLITUS WITHOUT COMPLICATION, UNSPECIFIED WHETHER LONG TERM INSULIN USE (HCC): Primary | ICD-10-CM

## 2023-07-28 RX ORDER — GLIPIZIDE 5 MG/1
TABLET, FILM COATED, EXTENDED RELEASE ORAL
Qty: 30 TABLET | Refills: 0 | Status: SHIPPED | OUTPATIENT
Start: 2023-07-28

## 2023-07-28 NOTE — TELEPHONE ENCOUNTER
Requested Prescriptions     Pending Prescriptions Disp Refills    glipiZIDE (GLUCOTROL XL) 5 MG extended release tablet [Pharmacy Med Name: GLIPIZIDE ER 5 MG TABLET] 30 tablet 0     Sig: Take 1 Tablet by mouth daily.  Dc glipizide 5 mg twice a day         Tataharris Zhang, [address] [facility] [phone] Sharri Cristela [phone]  [place] [id number]  Phone: [phone] Fax: [phone]       Last appt 5/8/2023      Future Appointments   Date Time Provider [address] [place] [address]   9/7/2023  9:30 AM MD STEPHON Mckeon AMB   12/7/2023 11:20 AM MD FAVIO Canseco AMB

## 2023-08-07 ENCOUNTER — TELEPHONE (OUTPATIENT)
Age: 88
End: 2023-08-07

## 2023-08-08 NOTE — TELEPHONE ENCOUNTER
Spoke with patient       Chest pain: NO    SOB: NO    Headache: NO    Vision changes: NO    Arm Pain: NO     Patient stated \"I feel really good actually I just don't like to see my legs like that\"      Based on denial of any symptoms besides edema advised to come to apt Thursday for a follow-up with Kwabena Luevano MD

## 2023-08-08 NOTE — TELEPHONE ENCOUNTER
Future Appointments   Date Time Provider [address]   8/10/2023  2:00 PM Navi Browne MD [facility] BS AMB   9/7/2023  9:30 AM Navi Browne MD [facility] BS AMB   12/7/2023 11:20 AM MD FAVIO Wilson  AMB

## 2023-08-10 ENCOUNTER — OFFICE VISIT (OUTPATIENT)
Age: 88
End: 2023-08-10
Payer: MEDICARE

## 2023-08-10 VITALS
RESPIRATION RATE: 16 BRPM | WEIGHT: 150.1 LBS | TEMPERATURE: 97.6 F | SYSTOLIC BLOOD PRESSURE: 148 MMHG | HEIGHT: 59 IN | OXYGEN SATURATION: 97 % | HEART RATE: 72 BPM | DIASTOLIC BLOOD PRESSURE: 78 MMHG | BODY MASS INDEX: 30.26 KG/M2

## 2023-08-10 DIAGNOSIS — L85.3 DRY SKIN: ICD-10-CM

## 2023-08-10 DIAGNOSIS — E11.21 TYPE 2 DIABETES MELLITUS WITH DIABETIC NEPHROPATHY, WITHOUT LONG-TERM CURRENT USE OF INSULIN (HCC): Primary | ICD-10-CM

## 2023-08-10 DIAGNOSIS — Z23 NEED FOR VACCINATION: ICD-10-CM

## 2023-08-10 DIAGNOSIS — Z71.89 ACP (ADVANCE CARE PLANNING): ICD-10-CM

## 2023-08-10 DIAGNOSIS — Z00.00 MEDICARE ANNUAL WELLNESS VISIT, SUBSEQUENT: ICD-10-CM

## 2023-08-10 DIAGNOSIS — R60.0 BILATERAL LEG EDEMA: ICD-10-CM

## 2023-08-10 DIAGNOSIS — I10 ESSENTIAL (PRIMARY) HYPERTENSION: ICD-10-CM

## 2023-08-10 DIAGNOSIS — E78.2 MIXED HYPERLIPIDEMIA: ICD-10-CM

## 2023-08-10 PROCEDURE — 99214 OFFICE O/P EST MOD 30 MIN: CPT | Performed by: INTERNAL MEDICINE

## 2023-08-10 PROCEDURE — 3044F HG A1C LEVEL LT 7.0%: CPT | Performed by: INTERNAL MEDICINE

## 2023-08-10 PROCEDURE — 1123F ACP DISCUSS/DSCN MKR DOCD: CPT | Performed by: INTERNAL MEDICINE

## 2023-08-10 RX ORDER — POTASSIUM CHLORIDE 750 MG/1
10 TABLET, FILM COATED, EXTENDED RELEASE ORAL DAILY
Qty: 30 TABLET | Refills: 3 | Status: SHIPPED | OUTPATIENT
Start: 2023-08-10

## 2023-08-10 RX ORDER — ZOSTER VACCINE RECOMBINANT, ADJUVANTED 50 MCG/0.5
0.5 KIT INTRAMUSCULAR SEE ADMIN INSTRUCTIONS
Qty: 0.5 ML | Refills: 1 | Status: SHIPPED | OUTPATIENT
Start: 2023-08-10 | End: 2023-08-11

## 2023-08-10 RX ORDER — FUROSEMIDE 20 MG/1
TABLET ORAL
Qty: 180 TABLET | Refills: 1 | Status: SHIPPED | OUTPATIENT
Start: 2023-08-10

## 2023-08-10 ASSESSMENT — ENCOUNTER SYMPTOMS
EYES NEGATIVE: 1
RESPIRATORY NEGATIVE: 1
GASTROINTESTINAL NEGATIVE: 1
ALLERGIC/IMMUNOLOGIC NEGATIVE: 1

## 2023-08-10 ASSESSMENT — PATIENT HEALTH QUESTIONNAIRE - PHQ9
SUM OF ALL RESPONSES TO PHQ QUESTIONS 1-9: 0
SUM OF ALL RESPONSES TO PHQ9 QUESTIONS 1 & 2: 0
1. LITTLE INTEREST OR PLEASURE IN DOING THINGS: 0
SUM OF ALL RESPONSES TO PHQ QUESTIONS 1-9: 0
2. FEELING DOWN, DEPRESSED OR HOPELESS: 0

## 2023-08-10 NOTE — ACP (ADVANCE CARE PLANNING)

## 2023-08-10 NOTE — PROGRESS NOTES
Subjective:      Patient ID: Lori Hill is a 80 y.o. female here for follow-up. Reported bilateral leg edema which is bothering her a lot. No shortness of breath orthopnea. As she is dangling her feet a lot. Not watching salt. Has diabetes, A1c has been stable. On multiple medication. Need lab work. Has elevated lipid, on statin. No myalgia. Has hypertension, compliant with medication. Denies chest pain palpitation shortness of breath. She forwarded take her medication today. Report dry skin, skin get itchy a lot. Hypertension    Diabetes      Review of Systems   Constitutional: Negative. HENT: Negative. Eyes: Negative. Respiratory: Negative. Cardiovascular: Negative. Gastrointestinal: Negative. Endocrine: Negative. Genitourinary: Negative. Musculoskeletal: Negative. Skin: Negative. Allergic/Immunologic: Negative. Neurological: Negative. Hematological: Negative. Psychiatric/Behavioral: Negative. Objective:   Physical Exam  Constitutional:       Appearance: Normal appearance. She is obese. Cardiovascular:      Rate and Rhythm: Normal rate and regular rhythm. Pulses: Normal pulses. Heart sounds: Normal heart sounds. Pulmonary:      Effort: Pulmonary effort is normal.      Breath sounds: Normal breath sounds. Abdominal:      General: Abdomen is flat. Bowel sounds are normal.      Palpations: Abdomen is soft. Musculoskeletal:         General: Swelling present. Normal range of motion. Cervical back: Normal range of motion and neck supple. Comments: Bilateral 1+ leg edema present. Skin:     General: Skin is warm. Neurological:      General: No focal deficit present. Mental Status: She is alert and oriented to person, place, and time. Mental status is at baseline.        Assessment / Plan:         Diagnoses and all orders for this visit:  Type 2 diabetes mellitus with diabetic nephropathy, without long-term current use

## 2023-08-14 LAB
ALBUMIN SERPL-MCNC: 4.1 G/DL (ref 3.5–5)
ALBUMIN/GLOB SERPL: 1.2 (ref 1.1–2.2)
ALP SERPL-CCNC: 82 U/L (ref 45–117)
ALT SERPL-CCNC: 23 U/L (ref 12–78)
ANION GAP SERPL CALC-SCNC: 5 MMOL/L (ref 5–15)
AST SERPL-CCNC: 24 U/L (ref 15–37)
BILIRUB SERPL-MCNC: 0.5 MG/DL (ref 0.2–1)
BUN SERPL-MCNC: 11 MG/DL (ref 6–20)
BUN/CREAT SERPL: 14 (ref 12–20)
CALCIUM SERPL-MCNC: 9.5 MG/DL (ref 8.5–10.1)
CHLORIDE SERPL-SCNC: 106 MMOL/L (ref 97–108)
CO2 SERPL-SCNC: 29 MMOL/L (ref 21–32)
CREAT SERPL-MCNC: 0.79 MG/DL (ref 0.55–1.02)
EST. AVERAGE GLUCOSE BLD GHB EST-MCNC: 143 MG/DL
GLOBULIN SER CALC-MCNC: 3.4 G/DL (ref 2–4)
GLUCOSE SERPL-MCNC: 111 MG/DL (ref 65–100)
HBA1C MFR BLD: 6.6 % (ref 4–5.6)
POTASSIUM SERPL-SCNC: 3.8 MMOL/L (ref 3.5–5.1)
PROT SERPL-MCNC: 7.5 G/DL (ref 6.4–8.2)
SODIUM SERPL-SCNC: 140 MMOL/L (ref 136–145)

## 2023-08-16 ENCOUNTER — TELEPHONE (OUTPATIENT)
Age: 88
End: 2023-08-16

## 2023-08-17 NOTE — TELEPHONE ENCOUNTER
Spoke with Estefani Soliz, states that she just started water pill 2 times a day yesterday but thinks she could use more exercise and will start that.

## 2023-08-17 NOTE — TELEPHONE ENCOUNTER
Josee Melendez MD  You 14 hours ago (5:18 PM)     SA  Her labs are stable. If she has low energy, she can cut back on water pill which might make her tired and drained. She can take 1 tablet instead of 2 tablet. Monitor her weight and watch salt intake.

## 2023-08-30 DIAGNOSIS — E11.9 CONTROLLED TYPE 2 DIABETES MELLITUS WITHOUT COMPLICATION, UNSPECIFIED WHETHER LONG TERM INSULIN USE (HCC): ICD-10-CM

## 2023-08-30 RX ORDER — GLIPIZIDE 5 MG/1
TABLET, FILM COATED, EXTENDED RELEASE ORAL
Qty: 30 TABLET | Refills: 0 | Status: SHIPPED | OUTPATIENT
Start: 2023-08-30

## 2023-09-18 RX ORDER — CLONIDINE HYDROCHLORIDE 0.3 MG/1
TABLET ORAL
Qty: 90 TABLET | Refills: 0 | Status: SHIPPED | OUTPATIENT
Start: 2023-09-18

## 2023-09-19 NOTE — TELEPHONE ENCOUNTER
Call placed to patient and left message to call back Patient discharged to home with self care. Discharge instructions given and reviewed with patient and spouse. Dowd care  And teaching done by written instructions, demonstration and return demonstration. Dressing supplies and dowd care supplies sent with patient. All belongings packed by spouse and sent with patient. All questions answered.

## 2023-09-21 RX ORDER — DILTIAZEM HYDROCHLORIDE 180 MG/1
180 CAPSULE, COATED, EXTENDED RELEASE ORAL DAILY
Qty: 30 CAPSULE | Refills: 5 | Status: SHIPPED | OUTPATIENT
Start: 2023-09-21

## 2023-09-27 DIAGNOSIS — I10 ESSENTIAL HYPERTENSION WITH GOAL BLOOD PRESSURE LESS THAN 140/90: Primary | ICD-10-CM

## 2023-09-27 DIAGNOSIS — E11.9 CONTROLLED TYPE 2 DIABETES MELLITUS WITHOUT COMPLICATION, UNSPECIFIED WHETHER LONG TERM INSULIN USE (HCC): ICD-10-CM

## 2023-09-27 RX ORDER — LISINOPRIL 20 MG/1
TABLET ORAL
Qty: 30 TABLET | Refills: 0 | Status: SHIPPED | OUTPATIENT
Start: 2023-09-27

## 2023-09-27 RX ORDER — GLIPIZIDE 5 MG/1
TABLET, FILM COATED, EXTENDED RELEASE ORAL
Qty: 30 TABLET | Refills: 0 | Status: SHIPPED | OUTPATIENT
Start: 2023-09-27

## 2023-09-29 DIAGNOSIS — F41.9 ANXIETY: Primary | ICD-10-CM

## 2023-09-29 NOTE — TELEPHONE ENCOUNTER
Requested Prescriptions     Pending Prescriptions Disp Refills    ALPRAZolam (XANAX) 0.5 MG tablet [Pharmacy Med Name: ALPRAZOLAM 0.5 MG TABLET] 30 tablet 0     Sig: TAKE ONE TABLET BY MOUTH NIGHTLY AS NEEDED FOR ANXIETY.  MAX DAILY AMOUNT 1 TABLET         CARRILLO Hooks, [facility] [phone] Horowitz Peers [phone]  [place] [address]  Phone: [phone] Fax: [phone]       Last appt 8/10/2023      Future Appointments   Date Time Provider [address] [place] [address]   12/7/2023 11:20 AM MD FAVIO Walters AMB   12/18/2023  9:00 AM Andreea Abraham MD [facility] BS AMB

## 2023-10-03 RX ORDER — ALPRAZOLAM 0.5 MG/1
0.5 TABLET ORAL NIGHTLY PRN
Qty: 15 TABLET | Refills: 0 | Status: SHIPPED | OUTPATIENT
Start: 2023-10-03 | End: 2023-12-02

## 2023-10-13 RX ORDER — CLONIDINE HYDROCHLORIDE 0.3 MG/1
TABLET ORAL
Qty: 90 TABLET | Refills: 0 | Status: SHIPPED | OUTPATIENT
Start: 2023-10-13

## 2023-10-13 RX ORDER — METFORMIN HYDROCHLORIDE 500 MG/1
TABLET, EXTENDED RELEASE ORAL
Qty: 120 TABLET | Refills: 0 | Status: SHIPPED | OUTPATIENT
Start: 2023-10-13

## 2023-10-25 DIAGNOSIS — I10 ESSENTIAL HYPERTENSION WITH GOAL BLOOD PRESSURE LESS THAN 140/90: ICD-10-CM

## 2023-10-25 DIAGNOSIS — E11.9 CONTROLLED TYPE 2 DIABETES MELLITUS WITHOUT COMPLICATION, UNSPECIFIED WHETHER LONG TERM INSULIN USE (HCC): ICD-10-CM

## 2023-10-25 RX ORDER — LISINOPRIL 20 MG/1
TABLET ORAL
Qty: 90 TABLET | Refills: 1 | Status: SHIPPED | OUTPATIENT
Start: 2023-10-25

## 2023-10-25 RX ORDER — GLIPIZIDE 5 MG/1
TABLET, FILM COATED, EXTENDED RELEASE ORAL
Qty: 90 TABLET | Refills: 1 | Status: SHIPPED | OUTPATIENT
Start: 2023-10-25

## 2023-10-25 NOTE — TELEPHONE ENCOUNTER
Requested Prescriptions     Pending Prescriptions Disp Refills    lisinopril (PRINIVIL;ZESTRIL) 20 MG tablet [Pharmacy Med Name: LISINOPRIL 20 MG TABLET] 90 tablet 1     Sig: Take 1 Tablet by mouth daily. Stop lisinopril 10 mg    glipiZIDE (GLUCOTROL XL) 5 MG extended release tablet [Pharmacy Med Name: GLIPIZIDE ER 5 MG TABLET] 90 tablet 1     Sig: Take 1 Tablet by mouth daily.  Dc glipizide 5 mg twice a day         Britton Walker, [address] [place] [address] [phone] [facility] [phone]  [place] [address]  Phone: [phone] Fax: [phone]       Last appt 8/10/2023      Future Appointments   Date Time Provider [address] [place] [address]   12/7/2023 11:20 AM MD FAVIO Alberto   12/18/2023  9:00 AM Kathleen Tapia MD [facility] JULIETA AMB

## 2023-10-25 NOTE — MR AVS SNAPSHOT
[address] [facility] [address] 
[phone] Patient: Jessy Cardona MRN: [id number] MHN:[id number] Visit Information Date & Time Provider Department Dept. Phone Encounter #  
 4/11/2018  8:15 AM Zahraa Davenport MD [facility] [phone] Your Appointments 5/23/2018  8:15 AM  
ROUTINE CARE with Zahraa Davenport MD  
[facility] [address] [place] [address]) Appt Note: 4 mos f/u  
 [address] [place] [address] [facility] [address]  
[phone]  
  
   
 [address] [facility] [address]  
  
    
 6/8/2018  8:40 AM  
ESTABLISHED PATIENT with Alexis Arora MD  
[facility] ([address] [facility]) Appt Note: 6 mo fu appt [address] [facility] [address] [facility]  
[address] [facility] 7 [phone] Upcoming Health Maintenance Date Due DTaP/Tdap/Td series (1 - Tdap) 3/1/1955 EYE EXAM RETINAL OR DILATED Q1 4/25/2018 HEMOGLOBIN A1C Q6M 7/25/2018 FOOT EXAM Q1 9/6/2018 MICROALBUMIN Q1 9/6/2018 LIPID PANEL Q1 1/25/2019 MEDICARE YEARLY EXAM 4/12/2019 GLAUCOMA SCREENING Q2Y 4/25/2019 COLONOSCOPY 9/25/2027 Allergies as of 4/11/2018  Review Complete On: 4/11/2018 By: Zahraa Davenport MD  
  
 Severity Noted Reaction Type Reactions Cortisone  05/11/2011    Other (comments) \"Make me feels weak, like I'm going to die\" Januvia [Sitagliptin]  11/09/2016    Other (comments)  
 weakness Current Immunizations  Reviewed on 1/24/2018 Name Date Influenza High Dose Vaccine PF 10/4/2017, 10/28/2014 Influenza Vaccine 9/14/2016, 10/19/2013 Pneumococcal Conjugate (PCV-13) 9/14/2016 Tdap  Incomplete ZZZ-RETIRED (DO NOT USE) Pneumococcal Vaccine (Unspecified Type) 10/1/2008 Not reviewed this visit You Were Diagnosed With   
  
 Codes Comments Numbness of hand    -  Primary ICD-10-CM: R20.0 ICD-9-CM: 782.0 Encounter for immunization     ICD-10-CM: E42 ICD-9-CM: V03.89 Type 2 diabetes mellitus with nephropathy (HCC)     ICD-10-CM: E11.21 
ICD-9-CM: 250.40, 583.81 Essential hypertension with goal blood pressure less than 140/90     ICD-10-CM: I10 
ICD-9-CM: 401.9 Fatigue, unspecified type     ICD-10-CM: R53.83 ICD-9-CM: 780.79 Mixed hyperlipidemia     ICD-10-CM: E78.2 ICD-9-CM: 272.2 Medicare annual wellness visit, subsequent     ICD-10-CM: Z00.00 ICD-9-CM: V70.0 Vitals BP Pulse Temp Resp Height(growth percentile) Weight(growth percentile) 114/64 (BP 1 Location: Left arm, BP Patient Position: Sitting) 97 98.2 °F (36.8 °C) (Oral) 20 4' 11\" (1.499 m) 164 lb (74.4 kg) SpO2 BMI OB Status Smoking Status 95% 33.12 kg/m2 Hysterectomy Never Smoker Vitals History BMI and BSA Data Body Mass Index Body Surface Area  
 33.12 kg/m 2 1.76 m 2 Preferred Pharmacy Pharmacy Name Phone [facility] [phone] Your Updated Medication List  
  
   
This list is accurate as of 4/11/18  8:44 AM.  Always use your most recent med list.  
  
  
  
  
 ALPRAZolam 0.5 mg tablet Commonly known as:  Crispin Hernández Take 0.5 Tabs by mouth nightly as needed for Anxiety. Max Daily Amount: 0.25 mg.  
  
 artificial tears(hypromellose) 0.3 % Gel ophthalmic ointment Commonly known as:  SYSTANE GEL Administer 10 g to both eyes as needed. aspirin 81 mg chewable tablet Take 81 mg by mouth daily. atorvastatin 10 mg tablet Commonly known as:  LIPITOR Take 1 Tab by mouth daily. CALCIUM 600 + D 600-125 mg-unit Tab Generic drug:  calcium-cholecalciferol (d3) Take 1 Tab by mouth daily. cetirizine 10 mg tablet Commonly known as:  ZYRTEC  
TAKE ONE TABLET BY MOUTH NIGHTLY AS NEEDED FOR ALLERGIES  
  
 cloNIDine HCl 0.3 mg tablet Commonly known as:  CATAPRES  
TAKE ONE TABLET BY MOUTH 3 TIMES A DAY  
  
 cod liver oil Cap Take 1 Cap by mouth daily. dilTIAZem  mg ER capsule Commonly known as:  CARDIZEM CD Take 1 Cap by mouth daily. famotidine 20 mg tablet Commonly known as:  PEPCID  
TAKE ONE TABLET BY MOUTH NIGHTLY  
  
 fluticasone 50 mcg/actuation nasal spray Commonly known as:  Marine Knack 2 Sprays by Both Nostrils route daily. * furosemide 40 mg tablet Commonly known as:  LASIX TAKE ONE TABLET BY MOUTH EVERY DAY  
  
 * furosemide 40 mg tablet Commonly known as:  LASIX TAKE ONE TABLET BY MOUTH EVERY DAY  
  
 glipiZIDE 5 mg tablet Commonly known as:  GLUCOTROL  
TAKE ONE TABLET BY MOUTH 2 TIMES A DAY  
  
 latanoprost 0.005 % ophthalmic solution Commonly known as:  XALATAN  
  
 losartan 100 mg tablet Commonly known as:  COZAAR  
TAKE ONE TABLET BY MOUTH DAILY  
  
 meloxicam 7.5 mg tablet Commonly known as:  MOBIC Take 1 Tab by mouth daily. (discontinue voltaren gel)  
  
 metFORMIN  mg tablet Commonly known as:  GLUCOPHAGE XR Take 2 Tabs by mouth daily (with dinner). metoprolol tartrate 100 mg IR tablet Commonly known as:  LOPRESSOR Take 1.5 tab Po BID  
  
 nystatin powder Commonly known as:  MYCOSTATIN Apply  to affected area two (2) times a day. For 2 weeks  
  
 omeprazole 40 mg capsule Commonly known as:  PRILOSEC  
TAKE ONE CAPSULE BY MOUTH EVERY DAY BEFORE BREAKFAST  
  
 pioglitazone 15 mg tablet Commonly known as:  ACTOS Take 1 Tab by mouth nightly. potassium chloride 10 mEq tablet Commonly known as:  KLOR-CON  
TAKE TWO TABLETS BY MOUTH DAILY  
  
 TIMOPTIC-XE 0.5 % ophthalmic gel-forming Generic drug:  timolol TYLENOL 325 mg tablet Generic drug:  acetaminophen Take  by mouth every four (4) hours as needed for Pain. VITAMIN D3 1,000 unit Cap Generic drug:  cholecalciferol Take 1,000 Units by mouth daily. * Notice: This list has 2 medication(s) that are the same as other medications prescribed for you. Read the directions carefully, and ask your doctor or other care provider to review them with you. We Performed the Following CBC W/O DIFF [95141 CPT(R)] HEMOGLOBIN A1C WITH EAG [42489 CPT(R)] METABOLIC PANEL, COMPREHENSIVE [16521 CPT(R)] MICROALBUMIN, UR, RAND W/ MICROALB/CREAT RATIO [id number] CPT(R)] TETANUS, DIPHTHERIA TOXOIDS AND ACELLULAR PERTUSSIS VACCINE (TDAP), IN INDIVIDS. >=7, IM [id number] CPT(R)] TSH 3RD GENERATION [77890 CPT(R)] To-Do List   
 2018 Imaging:  XR SPINE CERV 4 OR 5 V Patient Instructions Vaccine Information Statement Tdap (Tetanus, Diphtheria, Pertussis) Vaccine: What You Need to Know Many Vaccine Information Statements are available in [place]ian and other languages. See www.immunize.org/vis. Hojas de Información Sobre Vacunas están disponibles en español y en muchos otros idiomas. Visite WorthScale.si 1. Why get vaccinated? Tetanus, diphtheria, and pertussis are very serious diseases. Tdap vaccine can protect us from these diseases. And, Tdap vaccine given to pregnant women can protect  babies against pertussis. TETANUS (Lockjaw) is rare in the [facility] today. It causes painful muscle tightening and stiffness, usually all over the body. ? It can lead to tightening of muscles in the head and neck so you cant open your mouth, swallow, or sometimes even breathe. Tetanus kills about 1 out of 10 people who are infected even after receiving the best medical care. DIPHTHERIA is also rare in the [facility] today. It can cause a thick coating to form in the back of the throat. ? It can lead to breathing problems, heart failure, paralysis, and death. PERTUSSIS (Whooping Cough) causes severe coughing spells, which can cause difficulty breathing, vomiting, and disturbed sleep. ? It can also lead to weight loss, incontinence, and rib fractures. Up to 2 in 100 adolescents and 5 in 100 adults with pertussis are hospitalized or have complications, which could include pneumonia or death. These diseases are caused by bacteria. Diphtheria and pertussis are spread from person to person through secretions from coughing or sneezing. Tetanus enters the body through cuts, scratches, or wounds. Before vaccines, as many as 200,000 cases of diphtheria, 200,000 cases of pertussis, and hundreds of cases of tetanus, were reported in the [place] each year. Since vaccination began, reports of cases for tetanus and diphtheria have dropped by about 99% and for pertussis by about 80%. 2. Tdap vaccine Tdap vaccine can protect adolescents and adults from tetanus, diphtheria, and pertussis. One dose of Tdap is routinely given at age 6 or 15. People who did not get Tdap at that age should get it as soon as possible. Tdap is especially important for health care professionals and anyone having close contact with a baby younger than 12 months. Pregnant women should get a dose of Tdap during every pregnancy, to protect the  from pertussis. Infants are most at risk for severe, life-threatening complications from pertussis. Another vaccine, called Td, protects against tetanus and diphtheria, but not pertussis. A Td booster should be given every 10 years. Tdap may be given as one of these boosters if you have never gotten Tdap before. Tdap may also be given after a severe cut or burn to prevent tetanus infection. Your doctor or the person giving you the vaccine can give you more information. Tdap may safely be given at the same time as other vaccines. 3. Some people should not get this vaccine  A person who has ever had a life-threatening allergic reaction after a previous dose of any diphtheria, tetanus or pertussis containing vaccine, OR has a severe allergy to any part of this vaccine, should not get Tdap vaccine. Tell the person giving the vaccine about any severe allergies.  Anyone who had coma or long repeated seizures within 7 days after a childhood dose of DTP or DTaP, or a previous dose of Tdap, should not get Tdap, unless a cause other than the vaccine was found. They can still get Td.  Talk to your doctor if you: 
- have seizures or another nervous system problem, 
- had severe pain or swelling after any vaccine containing diphtheria, tetanus or pertussis,  
- ever had a condition called Guillain Barré Syndrome (GBS), 
- arent feeling well on the day the shot is scheduled. 4. Risks With any medicine, including vaccines, there is a chance of side effects. These are usually mild and go away on their own. Serious reactions are also possible but are rare. Most people who get Tdap vaccine do not have any problems with it. Mild Problems following Tdap 
(Did not interfere with activities)  Pain where the shot was given (about 3 in 4 adolescents or 2 in 3 adults)  Redness or swelling where the shot was given (about 1 person in 5)  Mild fever of at least 100.4°F (up to about 1 in 25 adolescents or 1 in 100 adults)  Headache (about 3 or 4 people in 10)  Tiredness (about 1 person in 3 or 4)  Nausea, vomiting, diarrhea, stomach ache (up to 1 in 4 adolescents or 1 in 10 adults)  Chills,  sore joints (about 1 person in 10)  Body aches (about 1 person in 3 or 4)  Rash, swollen glands (uncommon) Moderate Problems following Tdap (Interfered with activities, but did not require medical attention)  Pain where the shot was given (up to 1 in 5 or 6)  Redness or swelling where the shot was given (up to about 1 in 16 adolescents or 1 in 12 adults)  Fever over 102°F (about 1 in 100 adolescents or 1 in 250 adults)  Headache (about 1 in 7 adolescents or 1 in 10 adults)  Nausea, vomiting, diarrhea, stomach ache (up to 1 or 3 people in 100)  Swelling of the entire arm where the shot was given (up to about 1 in 500). Severe Problems following Tdap 
(Unable to perform usual activities; required medical attention)  Swelling, severe pain, bleeding, and redness in the arm where the shot was given (rare). Problems that could happen after any vaccine:  People sometimes faint after a medical procedure, including vaccination. Sitting or lying down for about 15 minutes can help prevent fainting, and injuries caused by a fall. Tell your doctor if you feel dizzy, or have vision changes or ringing in the ears.  Some people get severe pain in the shoulder and have difficulty moving the arm where a shot was given. This happens very rarely.  Any medication can cause a severe allergic reaction. Such reactions from a vaccine are very rare, estimated at fewer than 1 in a million doses, and would happen within a few minutes to a few hours after the vaccination. As with any medicine, there is a very remote chance of a vaccine causing a serious injury or death. The safety of vaccines is always being monitored. For more information, visit: www.cdc.gov/vaccinesafety/ 
 
5. What if there is a serious problem? What should I look for?  Look for anything that concerns you, such as signs of a severe allergic reaction, very high fever, or unusual behavior.  Signs of a severe allergic reaction can include hives, swelling of the face and throat, difficulty breathing, a fast heartbeat, dizziness, and weakness. These would usually start a few minutes to a few hours after the vaccination. What should I do?  If you think it is a severe allergic reaction or other emergency that cant wait, call 9-1-1 or get the person to the nearest hospital. Otherwise, call your doctor.  
 
 Afterward, the reaction should be reported to the Vaccine Adverse Event Reporting System (VAERS). Your doctor might file this report, or you can do it yourself through the VAERS web site at www.vaers. [facility].gov, or by calling 5-128.138.4436. VAERS does not give medical advice. 6. The National Vaccine Injury Compensation Program 
 
The [facility] Vaccine Injury Compensation Program (VICP) is a federal program that was created to compensate people who may have been injured by certain vaccines. Persons who believe they may have been injured by a vaccine can learn about the program and about filing a claim by calling 6-871.551.9017 or visiting the [employer] website at www.[facility].gov/vaccinecompensation. There is a time limit to file a claim for compensation. 7. How can I learn more?  Ask your doctor. He or she can give you the vaccine package insert or suggest other sources of information.  Call your local or state health department.  Contact the Centers for Disease Control and Prevention (CDC): 
- Call 1-872.349.9625 (8-929-CPI-INFO) or 
- Visit CDCs website at www.cdc.gov/vaccines Vaccine Information Statement Tdap Vaccine 
(2/24/2015) 42 CHIARA Whitaker [id number] Department of Health and IdentiGEN Centers for Disease Control and Prevention Office Use Only Schedule of Personalized Health Plan (Provide Copy to Patient) The best way to stay healthy is to live a healthy lifestyle. A healthy lifestyle includes regular exercise, eating a well-balanced diet, keeping a healthy weight and not smoking. Regular physical exams and screening tests are another important way to take care of yourself. Preventive exams provided by health care providers can find health problems early when treatment works best and can keep you from getting certain diseases or illnesses. Preventive services include exams, lab tests, screenings, shots, monitoring and information to help you take care of your own health. All people over 65 should have a pneumonia shot.  Pneumonia shots are usually only needed once in a lifetime unless your doctor decides differently. All people over 65 should have a yearly flu shot. People over 65 are at medium to high risk for Hepatitis B. Three shots are needed for complete protection. In addition to your physical exam, some screening tests are recommended: 
 
Bone mass measurement (dexa scan) is recommended every two years. up to date Diabetes Mellitus screening is recommended every year. up top date Glaucoma is an eye disease caused by high pressure in the eye. An eye exam is recommended every year. Up to date Cardiovascular screening tests that check your cholesterol and other blood fat (lipid) levels are recommended every five years. Up to date Colorectal Cancer screening tests help to find pre-cancerous polyps (growths in the colon) so they can be removed before they turn into cancer. Tests ordered for screening depend on your personal and family history risk factors. Screening for Breast Cancer is recommended yearly with a mammogram.N/A Screening for Cervical Cancer is recommended every two years (annually for certain risk factors, such as previous history of STD or abnormal PAP in past 7 years), with a Pelvic Exam with PAP. N/A Here is a list of your current Health Maintenance items with a due date: 
Health Maintenance Topic Date Due  
 DTaP/Tdap/Td series (1 - Tdap) 03/01/1955  
 EYE EXAM RETINAL OR DILATED Q1  04/25/2018  HEMOGLOBIN A1C Q6M  07/25/2018  
 FOOT EXAM Q1  09/06/2018  MICROALBUMIN Q1  09/06/2018  LIPID PANEL Q1  01/25/2019  MEDICARE YEARLY EXAM  04/12/2019  GLAUCOMA SCREENING Q2Y  04/25/2019  COLONOSCOPY  09/25/2027  Bone Densitometry (Dexa) Screening  Completed  ZOSTER VACCINE AGE 60>  Completed  Pneumococcal 65+ Low/Medium Risk  Completed  Influenza Age 5 to Adult  Completed Please provide this summary of care documentation to your next provider. Your primary care clinician is listed as Ryan Valdez. If you have any questions after today's visit, please call [phone]. Low Dose Naltrexone Counseling- I discussed with the patient the potential risks and side effects of low dose naltrexone including but not limited to: more vivid dreams, headaches, nausea, vomiting, abdominal pain, fatigue, dizziness, and anxiety.

## 2023-11-10 RX ORDER — CLONIDINE HYDROCHLORIDE 0.3 MG/1
TABLET ORAL
Qty: 90 TABLET | Refills: 0 | Status: SHIPPED | OUTPATIENT
Start: 2023-11-10

## 2023-11-15 DIAGNOSIS — E11.9 CONTROLLED TYPE 2 DIABETES MELLITUS WITHOUT COMPLICATION, UNSPECIFIED WHETHER LONG TERM INSULIN USE (HCC): Primary | ICD-10-CM

## 2023-11-15 RX ORDER — METFORMIN HYDROCHLORIDE 500 MG/1
TABLET, EXTENDED RELEASE ORAL
Qty: 120 TABLET | Refills: 0 | OUTPATIENT
Start: 2023-11-15

## 2023-11-24 RX ORDER — OMEPRAZOLE 40 MG/1
CAPSULE, DELAYED RELEASE ORAL
Qty: 90 CAPSULE | Refills: 0 | Status: SHIPPED | OUTPATIENT
Start: 2023-11-24

## 2023-12-07 ENCOUNTER — OFFICE VISIT (OUTPATIENT)
Age: 88
End: 2023-12-07
Payer: MEDICARE

## 2023-12-07 VITALS
HEIGHT: 59 IN | DIASTOLIC BLOOD PRESSURE: 82 MMHG | SYSTOLIC BLOOD PRESSURE: 154 MMHG | BODY MASS INDEX: 30.36 KG/M2 | HEART RATE: 67 BPM | WEIGHT: 150.6 LBS | OXYGEN SATURATION: 96 %

## 2023-12-07 DIAGNOSIS — I87.2 VENOUS INSUFFICIENCY: ICD-10-CM

## 2023-12-07 DIAGNOSIS — I10 ESSENTIAL HYPERTENSION WITH GOAL BLOOD PRESSURE LESS THAN 140/90: Primary | ICD-10-CM

## 2023-12-07 DIAGNOSIS — E11.8 DM TYPE 2, CONTROLLED, WITH COMPLICATION (HCC): ICD-10-CM

## 2023-12-07 DIAGNOSIS — E78.2 MIXED HYPERLIPIDEMIA: ICD-10-CM

## 2023-12-07 PROCEDURE — 93005 ELECTROCARDIOGRAM TRACING: CPT | Performed by: INTERNAL MEDICINE

## 2023-12-07 PROCEDURE — 3044F HG A1C LEVEL LT 7.0%: CPT | Performed by: INTERNAL MEDICINE

## 2023-12-07 PROCEDURE — 93010 ELECTROCARDIOGRAM REPORT: CPT | Performed by: INTERNAL MEDICINE

## 2023-12-07 PROCEDURE — 99214 OFFICE O/P EST MOD 30 MIN: CPT | Performed by: INTERNAL MEDICINE

## 2023-12-07 PROCEDURE — 1123F ACP DISCUSS/DSCN MKR DOCD: CPT | Performed by: INTERNAL MEDICINE

## 2023-12-07 ASSESSMENT — PATIENT HEALTH QUESTIONNAIRE - PHQ9
SUM OF ALL RESPONSES TO PHQ QUESTIONS 1-9: 0
SUM OF ALL RESPONSES TO PHQ9 QUESTIONS 1 & 2: 0
SUM OF ALL RESPONSES TO PHQ QUESTIONS 1-9: 0
1. LITTLE INTEREST OR PLEASURE IN DOING THINGS: 0
2. FEELING DOWN, DEPRESSED OR HOPELESS: 0
SUM OF ALL RESPONSES TO PHQ QUESTIONS 1-9: 0
SUM OF ALL RESPONSES TO PHQ QUESTIONS 1-9: 0

## 2023-12-07 NOTE — PROGRESS NOTES
[address]: [place]  [phone]    HPI:  Ms. Jackquline Severs is a 79 yo F with a h/o HTN, hyperlipidemia, DM2 seen in the past for palpitations. Found on holter to have benign ectopy and started on beta blocker for symptoms. Previously off higher dose HCTZ due to low Na+. BP has been resistant; BP had improved on norvasc but had worsened LE edema. U/S 11/2012 for DVT was negative. Echo 8/2012 noted normal LV systolic function with mild to moderate MR; echo 2/17/14 unchanged. Seen by vascular in past for LE edema and c/w vascular insufficiency and recommended leg elevation and compression stockings. Echo 4/2015 was normal with EF 60% and mild MR. Since her last visit, overall she has been doing well. No exertional chest pain. Her breathing has been stable. Talked about trying to incorporate more exercise and activity. She did see her primary care earlier this year for lower extremity edema. Some of this is likely dietary indiscretion. She says she does not add salt, but that it may be in some of her foods that she eats. Before, she notes that some of this was eating chips and she says she has reduced that significantly. She is compensated on exam with clear lungs. She does have 1-2+ bilateral lower extremity edema, right greater than left, but this is overall unchanged. Her blood pressure at home has been normal.  It is mildly elevated here consistent with white coat hypertension. 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 Assessment and Plan:   1. Venous insufficiency. She has had an ultrasound in the past that demonstrated no DVT. Focus on minimizing salt intake, elevating legs at rest and compression stockings prn. She did have her Lasix adjusted up when she saw her primary care a few months ago. Will have her follow back in six months. Also encouraged increased regular exercise and activity, which might help. 2. Labile hypertension.   Blood pressure is

## 2023-12-11 DIAGNOSIS — R60.0 BILATERAL LEG EDEMA: ICD-10-CM

## 2023-12-11 RX ORDER — POTASSIUM CHLORIDE 750 MG/1
10 TABLET, FILM COATED, EXTENDED RELEASE ORAL DAILY
Qty: 30 TABLET | Refills: 0 | Status: SHIPPED | OUTPATIENT
Start: 2023-12-11

## 2023-12-18 PROBLEM — E11.22 TYPE 2 DIABETES MELLITUS WITH CHRONIC KIDNEY DISEASE (HCC): Status: ACTIVE | Noted: 2023-12-18

## 2023-12-27 ENCOUNTER — TELEPHONE (OUTPATIENT)
Age: 88
End: 2023-12-27

## 2023-12-27 ENCOUNTER — NURSE TRIAGE (OUTPATIENT)
Dept: OTHER | Facility: CLINIC | Age: 88
End: 2023-12-27

## 2023-12-27 NOTE — TELEPHONE ENCOUNTER
Patient called stating that  wanted her to get the RSV shot. She said the pharmacy wanted to charge her $300 dollars for the shot.  Please call her back at [phone]

## 2023-12-27 NOTE — TELEPHONE ENCOUNTER
[facility]    Received call from [facility] with [employer]. Subjective: Caller states \"It looks like the left one is more swollen than the other one. It is puffy. It looks like it is never flat like it is supposed to be. \"     Question if any of her medications would make her hold fluid. Current Symptoms: ankle/foot swelling     Onset: a few weeks ago;     Associated Symptoms: NA    Pain Severity: 0/10; N/A; none    Temperature: denies     What has been tried: elevation     LMP: NA Pregnant: NA    Recommended disposition: Go to Office Now - patient requesting to be seen tomorrow. Care advice provided, patient verbalizes understanding; denies any other questions or concerns; instructed to call back for any new or worsening symptoms. Patient/Caller agrees with recommended disposition; writer provided warm transfer to [facility] for appointment scheduling    Attention Provider: Thank you for allowing me to participate in the care of your patient. The patient was connected to triage in response to information provided to the ECC/PSC. Please do not respond through this encounter as the response is not directed to a shared pool.     Reason for Disposition   Thigh, calf, or ankle swelling in both legs, but one side is definitely more swollen (Exception: Longstanding difference between legs.)   [1] Thigh, calf, or ankle swelling AND [2] bilateral AND [3] 1 side is more swollen    Protocols used: Leg Swelling and Edema-ADULT-AH, Leg Swelling and Edema-ADULT-OH

## 2023-12-28 ENCOUNTER — TELEPHONE (OUTPATIENT)
Age: 88
End: 2023-12-28

## 2023-12-28 NOTE — TELEPHONE ENCOUNTER
----- Message from Mandie Gill MA sent at 12/27/2023  4:32 PM EST -----  Subject: Message to Provider    QUESTIONS  Information for Provider? Patient states that she went to her drug store   to get the shingles and RSV vaccine. She received the shingles vaccine but   held off on the RSV because it would be 300 out of pocket. Patient wants   to know what she needs to do about getting the RSV vaccine because she   cant afford that. Please advise   ---------------------------------------------------------------------------  --------------  CALL BACK INFO  [phone]; OK to leave message on voicemail  ---------------------------------------------------------------------------  --------------  SCRIPT ANSWERS  Relationship to Patient? Self

## 2023-12-28 NOTE — TELEPHONE ENCOUNTER
No further options for RSV vaccine as this is new. Kenisha Serrano was called and verbalized understanding on note below, advised to wash hands, wear masks and use URI protocol.

## 2024-01-04 ENCOUNTER — TELEPHONE (OUTPATIENT)
Age: 89
End: 2024-01-04

## 2024-01-04 NOTE — TELEPHONE ENCOUNTER
----- Message from Nicolette Dodge sent at 1/4/2024 12:56 PM EST -----  Subject: Message to Provider    QUESTIONS  Information for Provider? Patient called and stated she went to get   shingles and RSV shot she received the shingles but not the RSV due to it   being over $300.00. Patient wanted the provider to know. Thank you  ---------------------------------------------------------------------------  --------------  CALL BACK INFO  [phone]; OK to leave message on voicemail  ---------------------------------------------------------------------------  --------------  SCRIPT ANSWERS  Relationship to Patient? Self

## 2024-01-09 ENCOUNTER — TELEPHONE (OUTPATIENT)
Age: 89
End: 2024-01-09

## 2024-01-09 NOTE — TELEPHONE ENCOUNTER
----- Message from Faviola Crouch sent at 1/8/2024  4:26 PM EST -----  Subject: Message to Provider    QUESTIONS  Information for Provider? patient called stating that RSV cost $300 at   local pharmacy, patient cannot afford and would like to know if there is   anywhere else she can get shot at lower cost or is it safe not to get   vaccine all together  ---------------------------------------------------------------------------  --------------  CALL BACK INFO  [phone]; OK to leave message on voicemail  ---------------------------------------------------------------------------  --------------  SCRIPT ANSWERS  Relationship to Patient? Self   Faxed progress note from 11/3/2020 to fax number provided [phone].

## 2024-01-09 NOTE — TELEPHONE ENCOUNTER
Unfortunately there are no alternatives for RSV at this time, washing hands, wearing a mask and avoiding sick people with be best at this time.

## 2024-01-12 ENCOUNTER — TELEPHONE (OUTPATIENT)
Age: 89
End: 2024-01-12

## 2024-01-12 DIAGNOSIS — E11.21 TYPE 2 DIABETES MELLITUS WITH DIABETIC NEPHROPATHY, WITHOUT LONG-TERM CURRENT USE OF INSULIN (HCC): ICD-10-CM

## 2024-01-12 DIAGNOSIS — E11.9 CONTROLLED TYPE 2 DIABETES MELLITUS WITHOUT COMPLICATION, UNSPECIFIED WHETHER LONG TERM INSULIN USE (HCC): Primary | ICD-10-CM

## 2024-01-12 NOTE — TELEPHONE ENCOUNTER
Pt would like an alternative for the   dapagliflozin (FARXIGA) 10 MG tablet; Pt would like to find a medication more cost efficient ($91)

## 2024-01-16 RX ORDER — PIOGLITAZONEHYDROCHLORIDE 15 MG/1
15 TABLET ORAL NIGHTLY
Qty: 90 TABLET | Refills: 0 | Status: SHIPPED | OUTPATIENT
Start: 2024-01-16 | End: 2024-04-15

## 2024-01-16 NOTE — TELEPHONE ENCOUNTER
Please call in pioglitazone 15 mg 1 tablet p.o. at bedtime.  Discontinue Farxiga.       Message above sent to me by provider, RX sent in per providers orders

## 2024-01-16 NOTE — TELEPHONE ENCOUNTER
PSR Shelley called me stating that patient was calling about the status of this, advised Shelley that this was being sent in. Shelley relayed the message.

## 2024-02-26 ENCOUNTER — OFFICE VISIT (OUTPATIENT)
Age: 89
End: 2024-02-26
Payer: MEDICARE

## 2024-02-26 VITALS
HEART RATE: 78 BPM | DIASTOLIC BLOOD PRESSURE: 70 MMHG | SYSTOLIC BLOOD PRESSURE: 174 MMHG | TEMPERATURE: 98 F | OXYGEN SATURATION: 96 % | HEIGHT: 59 IN | BODY MASS INDEX: 30.84 KG/M2 | RESPIRATION RATE: 16 BRPM | WEIGHT: 153 LBS

## 2024-02-26 DIAGNOSIS — I10 ESSENTIAL (PRIMARY) HYPERTENSION: ICD-10-CM

## 2024-02-26 DIAGNOSIS — R60.0 BILATERAL LEG EDEMA: Primary | ICD-10-CM

## 2024-02-26 DIAGNOSIS — I73.9 PERIPHERAL VASCULAR DISEASE, UNSPECIFIED (HCC): ICD-10-CM

## 2024-02-26 PROCEDURE — 1123F ACP DISCUSS/DSCN MKR DOCD: CPT | Performed by: INTERNAL MEDICINE

## 2024-02-26 PROCEDURE — 99213 OFFICE O/P EST LOW 20 MIN: CPT | Performed by: INTERNAL MEDICINE

## 2024-02-26 ASSESSMENT — PATIENT HEALTH QUESTIONNAIRE - PHQ9
2. FEELING DOWN, DEPRESSED OR HOPELESS: 0
1. LITTLE INTEREST OR PLEASURE IN DOING THINGS: 0
SUM OF ALL RESPONSES TO PHQ QUESTIONS 1-9: 0
SUM OF ALL RESPONSES TO PHQ9 QUESTIONS 1 & 2: 0

## 2024-02-26 ASSESSMENT — ENCOUNTER SYMPTOMS: RESPIRATORY NEGATIVE: 1

## 2024-02-26 NOTE — PROGRESS NOTES
Subjective    Ammy Tarango is a 89 y.o. female who presents today for the following: patient was seen for a follow up regarding her swelling to her legs. Per chart this has been ongoing.     Patient with a history of PVD. Duplex of the leg was done last month and negative for DVT. ECHO has been done over the years and her EF has been 60%. Denies any SOB or cough.     Was on Lasix 40 mg and told to wear compression but stopped do this because she felt like it was not helping. Reports that she does to the bathroom but just not a lot. Is drinking \"a lot\" of water per patient.     Only a lbs change in weight since last visit 2 months ago    Chief Complaint   Patient presents with    Follow-up Chronic Condition    Diabetes    Cholesterol Problem    Discuss Medications           PMH/PSH/Allergies/Social History/medication list and most recent studies reviewed with patient.     reports that she has never smoked. She has never been exposed to tobacco smoke. She has never used smokeless tobacco.    reports no history of alcohol use.     Vitals:    02/26/24 1102   BP: (!) 174/70   Pulse:    Resp:    Temp:    SpO2:      Body mass index is 30.9 kg/m².      2/26/2024    10:21 AM 12/18/2023     8:50 AM 12/7/2023    11:18 AM 8/10/2023     1:57 PM 6/7/2023    11:46 AM 5/8/2023     2:39 PM 4/12/2023     9:11 AM   Weight Metrics   Weight 153 lb 152 lb 150 lb 9.6 oz 150 lb 1.6 oz 148 lb 12.8 oz 149 lb 12.8 oz 141 lb 9.6 oz   BMI (Calculated) 31 kg/m2 30.8 kg/m2 30.5 kg/m2 30.4 kg/m2 30.1 kg/m2 30.3 kg/m2 28.7 kg/m2       Past Medical History:   Diagnosis Date    Adverse effect of anesthesia     pt states she was able to feel everything during colonoscopy    Chronic bronchitis (HCC)     Diabetes (HCC)     Essential hypertension     Hypercholesterolemia     Hypertension        Allergies   Allergen Reactions    Cortisone Other (See Comments)     \"Make me feels weak, like I'm going to die\"    Sitagliptin Other (See Comments)

## 2024-02-26 NOTE — PROGRESS NOTES
\"Have you been to the ER, urgent care clinic since your last visit?  Hospitalized since your last visit?\"    NO    “Have you seen or consulted any other health care providers outside of [facility] since your last visit?”    NO

## 2024-03-18 ENCOUNTER — OFFICE VISIT (OUTPATIENT)
Age: 89
End: 2024-03-18
Payer: MEDICARE

## 2024-03-18 VITALS
BODY MASS INDEX: 30.44 KG/M2 | RESPIRATION RATE: 16 BRPM | HEIGHT: 59 IN | HEART RATE: 60 BPM | OXYGEN SATURATION: 98 % | DIASTOLIC BLOOD PRESSURE: 78 MMHG | WEIGHT: 151 LBS | SYSTOLIC BLOOD PRESSURE: 124 MMHG | TEMPERATURE: 97 F

## 2024-03-18 DIAGNOSIS — I10 ESSENTIAL (PRIMARY) HYPERTENSION: ICD-10-CM

## 2024-03-18 DIAGNOSIS — E78.2 MIXED HYPERLIPIDEMIA: ICD-10-CM

## 2024-03-18 DIAGNOSIS — M19.041 PRIMARY OSTEOARTHRITIS OF BOTH HANDS: ICD-10-CM

## 2024-03-18 DIAGNOSIS — M19.042 PRIMARY OSTEOARTHRITIS OF BOTH HANDS: ICD-10-CM

## 2024-03-18 DIAGNOSIS — E11.21 TYPE 2 DIABETES MELLITUS WITH DIABETIC NEPHROPATHY, WITHOUT LONG-TERM CURRENT USE OF INSULIN (HCC): Primary | ICD-10-CM

## 2024-03-18 DIAGNOSIS — Z23 NEED FOR VACCINATION: ICD-10-CM

## 2024-03-18 DIAGNOSIS — Z71.89 ACP (ADVANCE CARE PLANNING): ICD-10-CM

## 2024-03-18 DIAGNOSIS — L30.9 DERMATITIS: ICD-10-CM

## 2024-03-18 DIAGNOSIS — E11.21 TYPE 2 DIABETES MELLITUS WITH DIABETIC NEPHROPATHY, WITHOUT LONG-TERM CURRENT USE OF INSULIN (HCC): ICD-10-CM

## 2024-03-18 DIAGNOSIS — Z00.00 MEDICARE ANNUAL WELLNESS VISIT, SUBSEQUENT: ICD-10-CM

## 2024-03-18 PROCEDURE — 99497 ADVNCD CARE PLAN 30 MIN: CPT | Performed by: INTERNAL MEDICINE

## 2024-03-18 PROCEDURE — 1123F ACP DISCUSS/DSCN MKR DOCD: CPT | Performed by: INTERNAL MEDICINE

## 2024-03-18 PROCEDURE — 99214 OFFICE O/P EST MOD 30 MIN: CPT | Performed by: INTERNAL MEDICINE

## 2024-03-18 PROCEDURE — G0439 PPPS, SUBSEQ VISIT: HCPCS | Performed by: INTERNAL MEDICINE

## 2024-03-18 RX ORDER — ZOSTER VACCINE RECOMBINANT, ADJUVANTED 50 MCG/0.5
0.5 KIT INTRAMUSCULAR SEE ADMIN INSTRUCTIONS
Qty: 0.5 ML | Refills: 1 | Status: SHIPPED | OUTPATIENT
Start: 2024-03-18 | End: 2024-03-19

## 2024-03-18 RX ORDER — BETAMETHASONE DIPROPIONATE 0.05 %
OINTMENT (GRAM) TOPICAL
Qty: 30 G | Refills: 0 | Status: SHIPPED | OUTPATIENT
Start: 2024-03-18

## 2024-03-18 RX ORDER — LATANOPROST 50 UG/ML
SOLUTION/ DROPS OPHTHALMIC
COMMUNITY
Start: 2024-03-07

## 2024-03-18 ASSESSMENT — ENCOUNTER SYMPTOMS
RESPIRATORY NEGATIVE: 1
GASTROINTESTINAL NEGATIVE: 1

## 2024-03-18 ASSESSMENT — PATIENT HEALTH QUESTIONNAIRE - PHQ9
SUM OF ALL RESPONSES TO PHQ9 QUESTIONS 1 & 2: 0
SUM OF ALL RESPONSES TO PHQ QUESTIONS 1-9: 0
2. FEELING DOWN, DEPRESSED OR HOPELESS: NOT AT ALL
SUM OF ALL RESPONSES TO PHQ QUESTIONS 1-9: 0
1. LITTLE INTEREST OR PLEASURE IN DOING THINGS: NOT AT ALL
SUM OF ALL RESPONSES TO PHQ QUESTIONS 1-9: 0
SUM OF ALL RESPONSES TO PHQ QUESTIONS 1-9: 0

## 2024-03-18 ASSESSMENT — LIFESTYLE VARIABLES
HOW MANY STANDARD DRINKS CONTAINING ALCOHOL DO YOU HAVE ON A TYPICAL DAY: PATIENT DOES NOT DRINK
HOW OFTEN DO YOU HAVE A DRINK CONTAINING ALCOHOL: NEVER

## 2024-03-18 NOTE — PROGRESS NOTES
Medicare Annual Wellness Visit    Ammy Tarango is here for Medicare AWV, Hypertension, Diabetes, and Cholesterol Problem    Assessment & Plan   Type 2 diabetes mellitus with diabetic nephropathy, without long-term current use of insulin (HCC)  Essential (primary) hypertension  Mixed hyperlipidemia  Medicare annual wellness visit, subsequent  ACP (advance care planning)    Recommendations for Preventive Services Due: see orders and patient instructions/AVS.  Recommended screening schedule for the next 5-10 years is provided to the patient in written form: see Patient Instructions/AVS.     No follow-ups on file.     Subjective   Ms. Tarango is here for Medicare wellness visit.  Has no living will.  Memory seems great.  No gait or balance problem.    The following acute and/or chronic problems were also addressed today:    Patient's complete Health Risk Assessment and screening values have been reviewed and are found in Flowsheets. The following problems were reviewed today and where indicated follow up appointments were made and/or referrals ordered.    Positive Risk Factor Screenings with Interventions:    Fall Risk:  Do you feel unsteady or are you worried about falling? : (!) yes  2 or more falls in past year?: no  Fall with injury in past year?: no       Interventions:    Reviewed medications, home hazards, visual acuity, and co-morbidities that can increase risk for falls             Activity, Diet, and Weight:  On average, how many days per week do you engage in moderate to strenuous exercise (like a brisk walk)?: 3 days  On average, how many minutes do you engage in exercise at this level?: 30 min    Do you eat balanced/healthy meals regularly?: Yes    Body mass index is 30.5 kg/m². (!) Abnormal      Obesity Interventions:  Addressed weight, diet and exercise with patient. Decrease carbohydrates (white foods, sweet foods, sweet drinks and alcohol), increase green leafy vegetables and protein (lean meats

## 2024-03-18 NOTE — ACP (ADVANCE CARE PLANNING)

## 2024-03-18 NOTE — PROGRESS NOTES
Nursing staff confirmed patient with full name and . Prepared patient for visit today by obtaining vitals, verifying medication list and allergies, and briefly discussing reason for visit.       Chief Complaint   Patient presents with    Medicare AWV    Hypertension    Diabetes    Cholesterol Problem       1. \"Have you been to the ER, urgent care clinic since your last visit?  Hospitalized since your last visit?\" no    2. \"Have you seen or consulted any other health care providers outside of the [facility] since your last visit?\" no

## 2024-03-18 NOTE — PROGRESS NOTES
Subjective:      Patient ID: Ammy Tarango is a 89 y.o. female here for follow-up.  She is diabetic, on multiple medications.  Monitor blood sugar regularly.  Eye checkup up-to-date.  Has hypertension, compliant with medication.  Denies chest pain palpitation shortness of breath.  She has hyperlipidemia, statin.  No myalgia.  Has chronic leg edema, taking Lasix.  Noticed to have dry skin on the both legs.  She mention she does not drink enough water.  No reported pain in both hands and some stiffness present.  Bone density up-to-date.  Here for Medicare wellness visit.  Has no living will.  Hypertension    Diabetes    Review of Systems   Constitutional: Negative.    HENT: Negative.     Eyes: Negative.    Respiratory: Negative.     Cardiovascular: Negative.    Gastrointestinal: Negative.    Endocrine: Negative.    Genitourinary: Negative.    Musculoskeletal:  Positive for arthralgias.   Skin:  Positive for rash.   Neurological: Negative.    Hematological: Negative.    Psychiatric/Behavioral: Negative.       Objective:   Physical Exam  Constitutional:       Appearance: Normal appearance. She is obese.   Cardiovascular:      Rate and Rhythm: Normal rate and regular rhythm.      Pulses: Normal pulses.      Heart sounds: Normal heart sounds.   Pulmonary:      Effort: Pulmonary effort is normal.      Breath sounds: Normal breath sounds.   Abdominal:      General: Abdomen is flat. Bowel sounds are normal.      Palpations: Abdomen is soft.   Musculoskeletal:         General: Swelling and tenderness present. Normal range of motion.      Cervical back: Normal range of motion and neck supple.      Comments: Both hands: Heber[place] and Satya's nodes present.  Slightly swollen and hand joint.  Range of motion mildly restricted.   Skin:     General: Skin is dry.      Comments: Dry skin with patchy scaly lesion noted on the right lower extremity.   Neurological:      General: No focal deficit present.      Mental Status: She

## 2024-03-25 DIAGNOSIS — R79.89 ABNORMAL CBC: Primary | ICD-10-CM

## 2024-03-25 LAB
ALBUMIN SERPL-MCNC: 3.9 G/DL (ref 3.5–5)
ALBUMIN/GLOB SERPL: 1.2 (ref 1.1–2.2)
ALP SERPL-CCNC: 85 U/L (ref 45–117)
ALT SERPL-CCNC: 21 U/L (ref 12–78)
ANION GAP SERPL CALC-SCNC: 2 MMOL/L (ref 5–15)
AST SERPL-CCNC: 18 U/L (ref 15–37)
BASOPHILS # BLD: 0 K/UL (ref 0–0.1)
BASOPHILS NFR BLD: 1 % (ref 0–1)
BILIRUB SERPL-MCNC: 0.3 MG/DL (ref 0.2–1)
BUN SERPL-MCNC: 14 MG/DL (ref 6–20)
BUN/CREAT SERPL: 14 (ref 12–20)
CALCIUM SERPL-MCNC: 8.8 MG/DL (ref 8.5–10.1)
CHLORIDE SERPL-SCNC: 107 MMOL/L (ref 97–108)
CHOLEST SERPL-MCNC: 150 MG/DL
CO2 SERPL-SCNC: 32 MMOL/L (ref 21–32)
CREAT SERPL-MCNC: 0.98 MG/DL (ref 0.55–1.02)
DIFFERENTIAL METHOD BLD: ABNORMAL
EOSINOPHIL # BLD: 0.2 K/UL (ref 0–0.4)
EOSINOPHIL NFR BLD: 4 % (ref 0–7)
ERYTHROCYTE [DISTWIDTH] IN BLOOD BY AUTOMATED COUNT: 14.6 % (ref 11.5–14.5)
EST. AVERAGE GLUCOSE BLD GHB EST-MCNC: 137 MG/DL
GLOBULIN SER CALC-MCNC: 3.3 G/DL (ref 2–4)
GLUCOSE SERPL-MCNC: 83 MG/DL (ref 65–100)
HBA1C MFR BLD: 6.4 % (ref 4–5.6)
HCT VFR BLD AUTO: 37.3 % (ref 35–47)
HDLC SERPL-MCNC: 50 MG/DL
HDLC SERPL: 3 (ref 0–5)
HGB BLD-MCNC: 11.8 G/DL (ref 11.5–16)
IMM GRANULOCYTES # BLD AUTO: 0 K/UL (ref 0–0.04)
IMM GRANULOCYTES NFR BLD AUTO: 1 % (ref 0–0.5)
LDLC SERPL CALC-MCNC: 85.2 MG/DL (ref 0–100)
LYMPHOCYTES # BLD: 1.3 K/UL (ref 0.8–3.5)
LYMPHOCYTES NFR BLD: 31 % (ref 12–49)
MCH RBC QN AUTO: 27.7 PG (ref 26–34)
MCHC RBC AUTO-ENTMCNC: 31.6 G/DL (ref 30–36.5)
MCV RBC AUTO: 87.6 FL (ref 80–99)
MONOCYTES # BLD: 0.5 K/UL (ref 0–1)
MONOCYTES NFR BLD: 10 % (ref 5–13)
NEUTS SEG # BLD: 2.4 K/UL (ref 1.8–8)
NEUTS SEG NFR BLD: 53 % (ref 32–75)
NRBC # BLD: 0 K/UL (ref 0–0.01)
NRBC BLD-RTO: 0 PER 100 WBC
PLATELET # BLD AUTO: 210 K/UL (ref 150–400)
PMV BLD AUTO: 10.2 FL (ref 8.9–12.9)
POTASSIUM SERPL-SCNC: 4 MMOL/L (ref 3.5–5.1)
PROT SERPL-MCNC: 7.2 G/DL (ref 6.4–8.2)
RBC # BLD AUTO: 4.26 M/UL (ref 3.8–5.2)
SODIUM SERPL-SCNC: 141 MMOL/L (ref 136–145)
TRIGL SERPL-MCNC: 74 MG/DL
VLDLC SERPL CALC-MCNC: 14.8 MG/DL
WBC # BLD AUTO: 4.4 K/UL (ref 3.6–11)

## 2024-04-24 DIAGNOSIS — E11.21 TYPE 2 DIABETES MELLITUS WITH DIABETIC NEPHROPATHY, WITHOUT LONG-TERM CURRENT USE OF INSULIN (HCC): ICD-10-CM

## 2024-04-24 RX ORDER — PIOGLITAZONEHYDROCHLORIDE 15 MG/1
15 TABLET ORAL NIGHTLY
Qty: 90 TABLET | Refills: 0 | Status: SHIPPED | OUTPATIENT
Start: 2024-04-24 | End: 2024-07-23

## 2024-04-24 RX ORDER — OMEPRAZOLE 40 MG/1
CAPSULE, DELAYED RELEASE ORAL
Qty: 90 CAPSULE | Refills: 0 | Status: SHIPPED | OUTPATIENT
Start: 2024-04-24 | End: 2024-07-23

## 2024-05-06 DIAGNOSIS — E78.00 HYPERCHOLESTEROLEMIA: Primary | ICD-10-CM

## 2024-05-06 RX ORDER — ATORVASTATIN CALCIUM 10 MG/1
10 TABLET, FILM COATED ORAL DAILY
Qty: 30 TABLET | Refills: 2 | Status: SHIPPED | OUTPATIENT
Start: 2024-05-06

## 2024-05-14 ENCOUNTER — TELEPHONE (OUTPATIENT)
Age: 89
End: 2024-05-14

## 2024-05-19 NOTE — TELEPHONE ENCOUNTER
Patient called upset because her script has not been sent to the pharmacy. She would like a call back at [phone]
Pt is completely out.
PAST SURGICAL HISTORY:  No significant past surgical history

## 2024-05-25 DIAGNOSIS — R79.89 ABNORMAL CBC: ICD-10-CM

## 2024-05-28 ENCOUNTER — CLINICAL DOCUMENTATION (OUTPATIENT)
Age: 89
End: 2024-05-28

## 2024-05-28 ENCOUNTER — TELEPHONE (OUTPATIENT)
Age: 89
End: 2024-05-28

## 2024-05-28 NOTE — TELEPHONE ENCOUNTER
Patient Ammy was asked about the following potential symptoms after being transferred for triage by [facility]:    Chest Pain: NO (0-10 in severity: N/A)  Location (if Positive for Chest Pain): NONE  SOB: NO  Headache: NO  One-Sided Weakness: NO  Left Arm Pain: NO  Jaw Pain: NO  Vision Changes: NO  Speech Difficulty: NO  Facial Drooping: NO  Nausea/Vomiting: NO    Ankles are still swollen.     Future Appointments   Date Time Provider Department Center   5/29/2024  2:00 PM Faheem Echeverria DO [facility] BS AMB   7/15/2024 10:30 AM Freda Tolentino MD [facility] BS AMB

## 2024-05-28 NOTE — PROGRESS NOTES
Pt was transferred from [facility] to the office requesting to speak with Dr. Tolentino or her Nurse Leonard; Pt was told they were currently with Pt's at the moment and can't come to the phone and a message can be sent back; Pt refused leaving message with anyone else besides Dr. Tolentino or Leonard & then stated they were going to call back another time to see if they can answer the phone; Pt was told again to leave a message with the office so the Nurse can give her a callback when they aren't with a Pt and have a time to give the Pt a callback; Pt continued refusing leaving a message with anyone other than the Nurse or  & stated they will find another Provider if they can't  the phone

## 2024-05-29 ENCOUNTER — OFFICE VISIT (OUTPATIENT)
Age: 89
End: 2024-05-29
Payer: MEDICARE

## 2024-05-29 VITALS
BODY MASS INDEX: 30.04 KG/M2 | HEIGHT: 59 IN | WEIGHT: 149 LBS | SYSTOLIC BLOOD PRESSURE: 134 MMHG | OXYGEN SATURATION: 95 % | DIASTOLIC BLOOD PRESSURE: 78 MMHG | HEART RATE: 55 BPM

## 2024-05-29 DIAGNOSIS — I10 ESSENTIAL (PRIMARY) HYPERTENSION: ICD-10-CM

## 2024-05-29 DIAGNOSIS — E11.21 TYPE 2 DIABETES MELLITUS WITH DIABETIC NEPHROPATHY, WITHOUT LONG-TERM CURRENT USE OF INSULIN (HCC): ICD-10-CM

## 2024-05-29 DIAGNOSIS — I87.2 VENOUS INSUFFICIENCY (CHRONIC) (PERIPHERAL): Primary | ICD-10-CM

## 2024-05-29 DIAGNOSIS — E78.2 MIXED HYPERLIPIDEMIA: ICD-10-CM

## 2024-05-29 PROCEDURE — 99214 OFFICE O/P EST MOD 30 MIN: CPT | Performed by: INTERNAL MEDICINE

## 2024-05-29 PROCEDURE — 3044F HG A1C LEVEL LT 7.0%: CPT | Performed by: INTERNAL MEDICINE

## 2024-05-29 PROCEDURE — 1123F ACP DISCUSS/DSCN MKR DOCD: CPT | Performed by: INTERNAL MEDICINE

## 2024-05-29 SDOH — ECONOMIC STABILITY: FOOD INSECURITY: WITHIN THE PAST 12 MONTHS, YOU WORRIED THAT YOUR FOOD WOULD RUN OUT BEFORE YOU GOT MONEY TO BUY MORE.: NEVER TRUE

## 2024-05-29 SDOH — ECONOMIC STABILITY: INCOME INSECURITY: HOW HARD IS IT FOR YOU TO PAY FOR THE VERY BASICS LIKE FOOD, HOUSING, MEDICAL CARE, AND HEATING?: HARD

## 2024-05-29 SDOH — ECONOMIC STABILITY: FOOD INSECURITY: WITHIN THE PAST 12 MONTHS, THE FOOD YOU BOUGHT JUST DIDN'T LAST AND YOU DIDN'T HAVE MONEY TO GET MORE.: NEVER TRUE

## 2024-05-29 ASSESSMENT — PATIENT HEALTH QUESTIONNAIRE - PHQ9
SUM OF ALL RESPONSES TO PHQ QUESTIONS 1-9: 0
2. FEELING DOWN, DEPRESSED OR HOPELESS: NOT AT ALL
SUM OF ALL RESPONSES TO PHQ9 QUESTIONS 1 & 2: 0
SUM OF ALL RESPONSES TO PHQ QUESTIONS 1-9: 0
1. LITTLE INTEREST OR PLEASURE IN DOING THINGS: NOT AT ALL

## 2024-05-29 NOTE — PROGRESS NOTES
Chief Complaint   Patient presents with    Joint Swelling     \"Have you been to the ER, urgent care clinic since your last visit?  Hospitalized since your last visit?\"    NO    “Have you seen or consulted any other health care providers outside of [facility] since your last visit?”    NO            Click Here for Release of Records Request

## 2024-05-31 ASSESSMENT — ENCOUNTER SYMPTOMS
ABDOMINAL PAIN: 0
GASTROINTESTINAL NEGATIVE: 1
EYES NEGATIVE: 1
RESPIRATORY NEGATIVE: 1
ALLERGIC/IMMUNOLOGIC NEGATIVE: 1
SHORTNESS OF BREATH: 0

## 2024-06-01 NOTE — PROGRESS NOTES
Subjective    Ammy Tarango is a 89 y.o. female who presents today for the following:  Chief Complaint   Patient presents with    Joint Swelling       History of Present Illness  The patient is a 89-year-old female who presents for evaluation of bilateral ankle swelling.     The patient has been experiencing persistent bilateral ankle edema for over a year, which she finds distressing. Despite attempts to alleviate the swelling by elevating her legs while seated, she reports no relief. Her current medication regimen includes Lasix 20 mg two on Monday to Thursday, and one on Friday, Saturday, and Sunday. She denies experiencing any chest pain, respiratory distress, or any underlying cardiac issues. She has no history of myocardial infarction or congestive heart failure. She has no history of renal issues. Her edema intensifies towards the end of the day, particularly after prolonged periods of sitting. She expresses concern about the potential for uncontrolled edema to induce a myocardial infarction or death.    Has been followed by cardiologist/Dr. Ovalles. About 5 or 6 years ago, her blood pressure went so high that she was put in the hospital. They got it down for her.  Blood pressure is stable on current medications.  Last echocardiogram showed EF of 60 to 65%.    Diabetes is stable on current medications including metformin, glipizide and Farxiga.  Last A1c was 6.4.  Denies diabetic complications.     The patient denies smoking or alcohol intake. She lives by herself. She does drive. She goes to [employer] 3 days a week. She has children and grandchildren in town.    PMH/PSH/Allergies/Social History/medication list and most recent studies reviewed with patient.    Reports compliance with medications and diet.  Weight is down since last visit.  Trying to be active physically as tolerated.  Reports no other new c/o.     Social History     Tobacco Use   Smoking Status Never    Passive exposure: Never

## 2024-06-14 ENCOUNTER — CLINICAL DOCUMENTATION (OUTPATIENT)
Age: 89
End: 2024-06-14

## 2024-06-14 NOTE — PROGRESS NOTES
Pt having redness and pain under her breasts where her bra has been rubbing.no available appointments today. Pt does not have transportation this morning. Pt given # for [facility]

## 2024-07-03 DIAGNOSIS — E11.9 CONTROLLED TYPE 2 DIABETES MELLITUS WITHOUT COMPLICATION, UNSPECIFIED WHETHER LONG TERM INSULIN USE (HCC): ICD-10-CM

## 2024-07-03 RX ORDER — GLIPIZIDE 5 MG/1
5 TABLET, FILM COATED, EXTENDED RELEASE ORAL DAILY
Qty: 30 TABLET | Refills: 0 | Status: SHIPPED | OUTPATIENT
Start: 2024-07-03

## 2024-07-03 NOTE — TELEPHONE ENCOUNTER
Last appt 5/29/2024      Next Apt:     Future Appointments   Date Time Provider Department Center   7/15/2024 10:30 AM Freda Tolentino MD [facility] BS AMB   8/20/2024  9:40 AM Alen Ovalles MD [facility] [address] [facility] - P [phone] - F [phone] [facility] [address]  Phone: [phone] Fax: [phone]

## 2024-07-05 DIAGNOSIS — I10 ESSENTIAL HYPERTENSION WITH GOAL BLOOD PRESSURE LESS THAN 140/90: ICD-10-CM

## 2024-07-05 DIAGNOSIS — E11.9 CONTROLLED TYPE 2 DIABETES MELLITUS WITHOUT COMPLICATION, UNSPECIFIED WHETHER LONG TERM INSULIN USE (HCC): ICD-10-CM

## 2024-07-05 NOTE — TELEPHONE ENCOUNTER
Last appt 5/29/2024      Next Apt:     Future Appointments   Date Time Provider Department Center   7/15/2024 10:30 AM Freda Tolentino MD [facility] BS AMB   8/20/2024  9:40 AM Alen Ovalles MD [facility] [address] [facility] - P [phone] - F [phone] [facility] [address]  Phone: [phone] Fax: [phone]

## 2024-07-08 RX ORDER — LISINOPRIL 20 MG/1
20 TABLET ORAL DAILY
Qty: 90 TABLET | Refills: 0 | Status: SHIPPED | OUTPATIENT
Start: 2024-07-08

## 2024-07-08 RX ORDER — GLIPIZIDE 5 MG/1
5 TABLET, FILM COATED, EXTENDED RELEASE ORAL DAILY
Qty: 90 TABLET | Refills: 0 | Status: SHIPPED | OUTPATIENT
Start: 2024-07-08

## 2024-07-08 RX ORDER — DILTIAZEM HYDROCHLORIDE 180 MG/1
180 CAPSULE, COATED, EXTENDED RELEASE ORAL DAILY
Qty: 90 CAPSULE | Refills: 0 | Status: SHIPPED | OUTPATIENT
Start: 2024-07-08

## 2024-07-15 ENCOUNTER — OFFICE VISIT (OUTPATIENT)
Age: 89
End: 2024-07-15
Payer: MEDICARE

## 2024-07-15 VITALS
TEMPERATURE: 98 F | HEART RATE: 78 BPM | OXYGEN SATURATION: 98 % | RESPIRATION RATE: 16 BRPM | SYSTOLIC BLOOD PRESSURE: 160 MMHG | BODY MASS INDEX: 30.04 KG/M2 | HEIGHT: 59 IN | WEIGHT: 149 LBS | DIASTOLIC BLOOD PRESSURE: 80 MMHG

## 2024-07-15 DIAGNOSIS — I10 ESSENTIAL HYPERTENSION WITH GOAL BLOOD PRESSURE LESS THAN 140/90: ICD-10-CM

## 2024-07-15 DIAGNOSIS — E11.9 CONTROLLED TYPE 2 DIABETES MELLITUS WITHOUT COMPLICATION, UNSPECIFIED WHETHER LONG TERM INSULIN USE (HCC): ICD-10-CM

## 2024-07-15 DIAGNOSIS — L30.9 DERMATITIS: ICD-10-CM

## 2024-07-15 DIAGNOSIS — M25.361 INSTABILITY OF RIGHT KNEE JOINT: Primary | ICD-10-CM

## 2024-07-15 PROCEDURE — 99214 OFFICE O/P EST MOD 30 MIN: CPT | Performed by: INTERNAL MEDICINE

## 2024-07-15 PROCEDURE — 1123F ACP DISCUSS/DSCN MKR DOCD: CPT | Performed by: INTERNAL MEDICINE

## 2024-07-15 PROCEDURE — 3044F HG A1C LEVEL LT 7.0%: CPT | Performed by: INTERNAL MEDICINE

## 2024-07-15 RX ORDER — NYSTATIN 100000 [USP'U]/G
POWDER TOPICAL
COMMUNITY
Start: 2024-06-15

## 2024-07-15 RX ORDER — CLOTRIMAZOLE AND BETAMETHASONE DIPROPIONATE 10; .64 MG/G; MG/G
CREAM TOPICAL
COMMUNITY
Start: 2024-06-15 | End: 2024-07-15

## 2024-07-15 RX ORDER — BETAMETHASONE DIPROPIONATE 0.05 %
OINTMENT (GRAM) TOPICAL
Qty: 30 G | Refills: 1 | Status: SHIPPED | OUTPATIENT
Start: 2024-07-15

## 2024-07-15 ASSESSMENT — ENCOUNTER SYMPTOMS
EYES NEGATIVE: 1
GASTROINTESTINAL NEGATIVE: 1
RESPIRATORY NEGATIVE: 1

## 2024-07-15 NOTE — PROGRESS NOTES
pressure at home twice weekly.    3. Diabetes.  Hemoglobin A1c and CMP were ordered.    Issues reviewed with her: referral to Diabetic Education department, diabetic diet discussed in detail, written exchange diet given, home glucose monitoring emphasized, all medications, side effects and compliance discussed carefully, foot care discussed and Podiatry visits discussed, annual eye examinations at Ophthalmology discussed, long term diabetic complications discussed and labs immediately prior to next visit.    The patient (or guardian, if applicable) and other individuals in attendance with the patient were advised that Artificial Intelligence will be utilized during this visit to record and process the conversation to generate a clinical note. The patient (or guardian, if applicable) and other individuals in attendance at the appointment consented to the use of AI, including the recording.

## 2024-07-18 DIAGNOSIS — E11.9 CONTROLLED TYPE 2 DIABETES MELLITUS WITHOUT COMPLICATION, UNSPECIFIED WHETHER LONG TERM INSULIN USE (HCC): ICD-10-CM

## 2024-07-18 DIAGNOSIS — I10 ESSENTIAL HYPERTENSION WITH GOAL BLOOD PRESSURE LESS THAN 140/90: ICD-10-CM

## 2024-07-18 RX ORDER — METFORMIN HYDROCHLORIDE 500 MG/1
TABLET, EXTENDED RELEASE ORAL
Qty: 120 TABLET | Refills: 0 | Status: SHIPPED | OUTPATIENT
Start: 2024-07-18

## 2024-07-18 RX ORDER — METOPROLOL TARTRATE 100 MG/1
TABLET ORAL
Qty: 90 TABLET | Refills: 0 | Status: SHIPPED | OUTPATIENT
Start: 2024-07-18

## 2024-07-26 DIAGNOSIS — I10 ESSENTIAL HYPERTENSION WITH GOAL BLOOD PRESSURE LESS THAN 140/90: ICD-10-CM

## 2024-07-26 RX ORDER — CLONIDINE HYDROCHLORIDE 0.3 MG/1
0.3 TABLET ORAL 3 TIMES DAILY
Qty: 90 TABLET | Refills: 0 | Status: SHIPPED | OUTPATIENT
Start: 2024-07-26

## 2024-08-06 ENCOUNTER — HOSPITAL ENCOUNTER (INPATIENT)
Facility: HOSPITAL | Age: 89
LOS: 15 days | Discharge: NURSING, REHAB OR LONG-TERM CARE | DRG: 083 | End: 2024-08-21
Attending: STUDENT IN AN ORGANIZED HEALTH CARE EDUCATION/TRAINING PROGRAM | Admitting: FAMILY MEDICINE
Payer: MEDICARE

## 2024-08-06 ENCOUNTER — TELEPHONE (OUTPATIENT)
Age: 89
End: 2024-08-06

## 2024-08-06 ENCOUNTER — APPOINTMENT (OUTPATIENT)
Facility: HOSPITAL | Age: 89
DRG: 083 | End: 2024-08-06
Payer: MEDICARE

## 2024-08-06 DIAGNOSIS — I73.9 PERIPHERAL VASCULAR DISEASE (HCC): ICD-10-CM

## 2024-08-06 DIAGNOSIS — I10 ESSENTIAL HYPERTENSION WITH GOAL BLOOD PRESSURE LESS THAN 140/90: ICD-10-CM

## 2024-08-06 DIAGNOSIS — S09.90XA INJURY OF HEAD, INITIAL ENCOUNTER: ICD-10-CM

## 2024-08-06 DIAGNOSIS — S06.5XAA SUBDURAL HEMATOMA (HCC): Primary | ICD-10-CM

## 2024-08-06 LAB
ALBUMIN SERPL-MCNC: 3.5 G/DL (ref 3.5–5)
ALBUMIN/GLOB SERPL: 1 (ref 1.1–2.2)
ALP SERPL-CCNC: 80 U/L (ref 45–117)
ALT SERPL-CCNC: 16 U/L (ref 12–78)
ANION GAP SERPL CALC-SCNC: 4 MMOL/L (ref 5–15)
AST SERPL-CCNC: 17 U/L (ref 15–37)
BASOPHILS # BLD: 0 K/UL (ref 0–0.1)
BASOPHILS NFR BLD: 0 % (ref 0–1)
BILIRUB SERPL-MCNC: 0.3 MG/DL (ref 0.2–1)
BUN SERPL-MCNC: 12 MG/DL (ref 6–20)
BUN/CREAT SERPL: 14 (ref 12–20)
CALCIUM SERPL-MCNC: 9.2 MG/DL (ref 8.5–10.1)
CHLORIDE SERPL-SCNC: 106 MMOL/L (ref 97–108)
CO2 SERPL-SCNC: 30 MMOL/L (ref 21–32)
COMMENT:: NORMAL
CREAT SERPL-MCNC: 0.87 MG/DL (ref 0.55–1.02)
DIFFERENTIAL METHOD BLD: ABNORMAL
EOSINOPHIL # BLD: 0.2 K/UL (ref 0–0.4)
EOSINOPHIL NFR BLD: 4 % (ref 0–7)
ERYTHROCYTE [DISTWIDTH] IN BLOOD BY AUTOMATED COUNT: 14.1 % (ref 11.5–14.5)
GLOBULIN SER CALC-MCNC: 3.6 G/DL (ref 2–4)
GLUCOSE BLD STRIP.AUTO-MCNC: 185 MG/DL (ref 65–117)
GLUCOSE SERPL-MCNC: 115 MG/DL (ref 65–100)
HCT VFR BLD AUTO: 33.5 % (ref 35–47)
HGB BLD-MCNC: 11 G/DL (ref 11.5–16)
IMM GRANULOCYTES # BLD AUTO: 0 K/UL (ref 0–0.04)
IMM GRANULOCYTES NFR BLD AUTO: 0 % (ref 0–0.5)
INR PPP: 1 (ref 0.9–1.1)
LYMPHOCYTES # BLD: 1.1 K/UL (ref 0.8–3.5)
LYMPHOCYTES NFR BLD: 28 % (ref 12–49)
MCH RBC QN AUTO: 28.4 PG (ref 26–34)
MCHC RBC AUTO-ENTMCNC: 32.8 G/DL (ref 30–36.5)
MCV RBC AUTO: 86.3 FL (ref 80–99)
MONOCYTES # BLD: 0.3 K/UL (ref 0–1)
MONOCYTES NFR BLD: 9 % (ref 5–13)
NEUTS SEG # BLD: 2.2 K/UL (ref 1.8–8)
NEUTS SEG NFR BLD: 59 % (ref 32–75)
NRBC # BLD: 0 K/UL (ref 0–0.01)
NRBC BLD-RTO: 0 PER 100 WBC
PLATELET # BLD AUTO: 178 K/UL (ref 150–400)
PMV BLD AUTO: 9.5 FL (ref 8.9–12.9)
POTASSIUM SERPL-SCNC: 3.8 MMOL/L (ref 3.5–5.1)
PROT SERPL-MCNC: 7.1 G/DL (ref 6.4–8.2)
PROTHROMBIN TIME: 10.3 SEC (ref 9–11.1)
RBC # BLD AUTO: 3.88 M/UL (ref 3.8–5.2)
SERVICE CMNT-IMP: ABNORMAL
SODIUM SERPL-SCNC: 140 MMOL/L (ref 136–145)
SPECIMEN HOLD: NORMAL
WBC # BLD AUTO: 3.8 K/UL (ref 3.6–11)

## 2024-08-06 PROCEDURE — 85610 PROTHROMBIN TIME: CPT

## 2024-08-06 PROCEDURE — 80053 COMPREHEN METABOLIC PANEL: CPT

## 2024-08-06 PROCEDURE — 6360000002 HC RX W HCPCS: Performed by: STUDENT IN AN ORGANIZED HEALTH CARE EDUCATION/TRAINING PROGRAM

## 2024-08-06 PROCEDURE — 36415 COLL VENOUS BLD VENIPUNCTURE: CPT

## 2024-08-06 PROCEDURE — 85025 COMPLETE CBC W/AUTO DIFF WBC: CPT

## 2024-08-06 PROCEDURE — 6370000000 HC RX 637 (ALT 250 FOR IP): Performed by: FAMILY MEDICINE

## 2024-08-06 PROCEDURE — 70450 CT HEAD/BRAIN W/O DYE: CPT

## 2024-08-06 PROCEDURE — 96374 THER/PROPH/DIAG INJ IV PUSH: CPT

## 2024-08-06 PROCEDURE — 6360000002 HC RX W HCPCS: Performed by: FAMILY MEDICINE

## 2024-08-06 PROCEDURE — 82962 GLUCOSE BLOOD TEST: CPT

## 2024-08-06 PROCEDURE — 2580000003 HC RX 258: Performed by: FAMILY MEDICINE

## 2024-08-06 PROCEDURE — 72125 CT NECK SPINE W/O DYE: CPT

## 2024-08-06 PROCEDURE — 2060000000 HC ICU INTERMEDIATE R&B

## 2024-08-06 PROCEDURE — 2580000003 HC RX 258: Performed by: STUDENT IN AN ORGANIZED HEALTH CARE EDUCATION/TRAINING PROGRAM

## 2024-08-06 PROCEDURE — 99285 EMERGENCY DEPT VISIT HI MDM: CPT

## 2024-08-06 RX ORDER — POLYETHYLENE GLYCOL 3350 17 G/17G
17 POWDER, FOR SOLUTION ORAL DAILY PRN
Status: DISCONTINUED | OUTPATIENT
Start: 2024-08-06 | End: 2024-08-21 | Stop reason: HOSPADM

## 2024-08-06 RX ORDER — ONDANSETRON 2 MG/ML
4 INJECTION INTRAMUSCULAR; INTRAVENOUS EVERY 6 HOURS PRN
Status: DISCONTINUED | OUTPATIENT
Start: 2024-08-06 | End: 2024-08-21 | Stop reason: HOSPADM

## 2024-08-06 RX ORDER — SODIUM CHLORIDE 9 MG/ML
INJECTION, SOLUTION INTRAVENOUS PRN
Status: DISCONTINUED | OUTPATIENT
Start: 2024-08-06 | End: 2024-08-21 | Stop reason: HOSPADM

## 2024-08-06 RX ORDER — POTASSIUM CHLORIDE 7.45 MG/ML
10 INJECTION INTRAVENOUS PRN
Status: DISCONTINUED | OUTPATIENT
Start: 2024-08-06 | End: 2024-08-21 | Stop reason: HOSPADM

## 2024-08-06 RX ORDER — SODIUM CHLORIDE 0.9 % (FLUSH) 0.9 %
5-40 SYRINGE (ML) INJECTION EVERY 12 HOURS SCHEDULED
Status: DISCONTINUED | OUTPATIENT
Start: 2024-08-06 | End: 2024-08-21 | Stop reason: HOSPADM

## 2024-08-06 RX ORDER — MAGNESIUM SULFATE IN WATER 40 MG/ML
2000 INJECTION, SOLUTION INTRAVENOUS PRN
Status: DISCONTINUED | OUTPATIENT
Start: 2024-08-06 | End: 2024-08-21 | Stop reason: HOSPADM

## 2024-08-06 RX ORDER — PANTOPRAZOLE SODIUM 40 MG/1
40 TABLET, DELAYED RELEASE ORAL
Status: DISCONTINUED | OUTPATIENT
Start: 2024-08-07 | End: 2024-08-21 | Stop reason: HOSPADM

## 2024-08-06 RX ORDER — POTASSIUM CHLORIDE 750 MG/1
40 TABLET, FILM COATED, EXTENDED RELEASE ORAL PRN
Status: DISCONTINUED | OUTPATIENT
Start: 2024-08-06 | End: 2024-08-21 | Stop reason: HOSPADM

## 2024-08-06 RX ORDER — SODIUM CHLORIDE 0.9 % (FLUSH) 0.9 %
5-40 SYRINGE (ML) INJECTION PRN
Status: DISCONTINUED | OUTPATIENT
Start: 2024-08-06 | End: 2024-08-21 | Stop reason: HOSPADM

## 2024-08-06 RX ORDER — CETIRIZINE HYDROCHLORIDE 10 MG/1
10 TABLET ORAL DAILY
Status: DISCONTINUED | OUTPATIENT
Start: 2024-08-06 | End: 2024-08-21 | Stop reason: HOSPADM

## 2024-08-06 RX ORDER — CLONIDINE HYDROCHLORIDE 0.1 MG/1
0.3 TABLET ORAL 3 TIMES DAILY
Status: DISCONTINUED | OUTPATIENT
Start: 2024-08-06 | End: 2024-08-19

## 2024-08-06 RX ORDER — ACETAMINOPHEN 650 MG/1
650 SUPPOSITORY RECTAL EVERY 6 HOURS PRN
Status: DISCONTINUED | OUTPATIENT
Start: 2024-08-06 | End: 2024-08-21 | Stop reason: HOSPADM

## 2024-08-06 RX ORDER — ACETAMINOPHEN 325 MG/1
650 TABLET ORAL EVERY 6 HOURS PRN
Status: DISCONTINUED | OUTPATIENT
Start: 2024-08-06 | End: 2024-08-21 | Stop reason: HOSPADM

## 2024-08-06 RX ORDER — INSULIN LISPRO 100 [IU]/ML
0-4 INJECTION, SOLUTION INTRAVENOUS; SUBCUTANEOUS NIGHTLY
Status: DISCONTINUED | OUTPATIENT
Start: 2024-08-06 | End: 2024-08-21 | Stop reason: HOSPADM

## 2024-08-06 RX ORDER — ONDANSETRON 4 MG/1
4 TABLET, ORALLY DISINTEGRATING ORAL EVERY 8 HOURS PRN
Status: DISCONTINUED | OUTPATIENT
Start: 2024-08-06 | End: 2024-08-21 | Stop reason: HOSPADM

## 2024-08-06 RX ORDER — VITAMIN B COMPLEX
1000 TABLET ORAL DAILY
Status: DISCONTINUED | OUTPATIENT
Start: 2024-08-06 | End: 2024-08-21 | Stop reason: HOSPADM

## 2024-08-06 RX ORDER — OMEPRAZOLE 40 MG/1
40 CAPSULE, DELAYED RELEASE ORAL
Status: ON HOLD | COMMUNITY
End: 2024-08-21 | Stop reason: HOSPADM

## 2024-08-06 RX ORDER — LISINOPRIL 20 MG/1
20 TABLET ORAL DAILY
Status: DISCONTINUED | OUTPATIENT
Start: 2024-08-06 | End: 2024-08-07

## 2024-08-06 RX ORDER — INSULIN LISPRO 100 [IU]/ML
0-8 INJECTION, SOLUTION INTRAVENOUS; SUBCUTANEOUS
Status: DISCONTINUED | OUTPATIENT
Start: 2024-08-06 | End: 2024-08-21 | Stop reason: HOSPADM

## 2024-08-06 RX ORDER — ATORVASTATIN CALCIUM 10 MG/1
10 TABLET, FILM COATED ORAL DAILY
Status: DISCONTINUED | OUTPATIENT
Start: 2024-08-06 | End: 2024-08-21 | Stop reason: HOSPADM

## 2024-08-06 RX ADMIN — CETIRIZINE HYDROCHLORIDE 10 MG: 10 TABLET, FILM COATED ORAL at 20:59

## 2024-08-06 RX ADMIN — NICARDIPINE HYDROCHLORIDE 5 MG/HR: 2.5 INJECTION, SOLUTION INTRAVENOUS at 20:22

## 2024-08-06 RX ADMIN — NICARDIPINE HYDROCHLORIDE 5 MG/HR: 2.5 INJECTION, SOLUTION INTRAVENOUS at 14:25

## 2024-08-06 RX ADMIN — METOPROLOL TARTRATE 100 MG: 25 TABLET, FILM COATED ORAL at 20:59

## 2024-08-06 RX ADMIN — LISINOPRIL 20 MG: 20 TABLET ORAL at 20:59

## 2024-08-06 RX ADMIN — ATORVASTATIN CALCIUM 10 MG: 10 TABLET, FILM COATED ORAL at 20:59

## 2024-08-06 RX ADMIN — Medication 1 TABLET: at 20:59

## 2024-08-06 RX ADMIN — SODIUM CHLORIDE, PRESERVATIVE FREE 10 ML: 5 INJECTION INTRAVENOUS at 19:27

## 2024-08-06 RX ADMIN — CLONIDINE HYDROCHLORIDE 0.3 MG: 0.1 TABLET ORAL at 20:59

## 2024-08-06 RX ADMIN — Medication 1000 UNITS: at 20:59

## 2024-08-06 ASSESSMENT — PAIN - FUNCTIONAL ASSESSMENT
PAIN_FUNCTIONAL_ASSESSMENT: ACTIVITIES ARE NOT PREVENTED
PAIN_FUNCTIONAL_ASSESSMENT: 0-10

## 2024-08-06 ASSESSMENT — PAIN DESCRIPTION - FREQUENCY: FREQUENCY: CONTINUOUS

## 2024-08-06 ASSESSMENT — PAIN DESCRIPTION - ONSET: ONSET: SUDDEN

## 2024-08-06 ASSESSMENT — PAIN DESCRIPTION - PAIN TYPE: TYPE: ACUTE PAIN

## 2024-08-06 ASSESSMENT — PAIN SCALES - GENERAL
PAINLEVEL_OUTOF10: 7
PAINLEVEL_OUTOF10: 0

## 2024-08-06 ASSESSMENT — PAIN DESCRIPTION - DESCRIPTORS: DESCRIPTORS: ACHING;TENDER

## 2024-08-06 ASSESSMENT — PAIN DESCRIPTION - LOCATION: LOCATION: HEAD;NECK

## 2024-08-06 NOTE — TELEPHONE ENCOUNTER
An NP from [facility] ED called to confirm that patient and/or daughter followed directions of nursing staff and called and had [facility] come to house and they determined patient needed to be seen in ED.    ED found a subdural hematoma from fall and patient will be admitted.

## 2024-08-06 NOTE — ED NOTES
TRANSFER - OUT REPORT:    Verbal report given to Rylen, RN on Ammy Tarango  being transferred to [facility] for routine progression of patient care       Report consisted of patient's Situation, Background, Assessment and   Recommendations(SBAR).     Information from the following report(s) Nurse Handoff Report, ED Encounter Summary, ED SBAR, Adult Overview, MAR, Recent Results, and Neuro Assessment was reviewed with the receiving nurse.    [place] Fall Assessment:    Presents to emergency department  because of falls (Syncope, seizure, or loss of consciousness): Yes  Age > 70: Yes  Altered Mental Status, Intoxication with alcohol or substance confusion (Disorientation, impaired judgment, poor safety awaremess, or inability to follow instructions): No  Impaired Mobility: Ambulates or transfers with assistive devices or assistance; Unable to ambulate or transer.: No  Nursing Judgement: Yes          Lines:   Peripheral IV 08/06/24 Right Antecubital (Active)   Site Assessment Clean, dry & intact 08/06/24 1417   Line Status Blood return noted 08/06/24 1417   Dressing Intervention New 08/06/24 1417        Opportunity for questions and clarification was provided.      Patient transported with:  Monitor and Registered Nurse

## 2024-08-06 NOTE — ED TRIAGE NOTES
Pt reports wearing slippers and getting her foot stuck in the rug and fell in the bathroom. Pt reports hitting her head on the bathroom floor. Pt reports taking two tylenol after. Pt reports feeling a little light headed at this time. Left shoulder pain and left neck pain. Denies blood thinners. Denies LOC. Pt ambulatory without devices.

## 2024-08-06 NOTE — ED NOTES
[facility]  O- [phone]    [facility] ED Geriatric Consult Team  Laura Burgess CM    Patient Name: Ammy Tarango  YOB: 1934    Date of Initial Consult: 8/6/2024  Reason for Consult: Geriatric Medication Assessment and ACP documentation.   Requesting Provider: Dr. Anoop Dominguez      SUMMARY:   Ammy Tarango is a 89 y.o. with a past history of adverse effects of anesthesia, chronic bronchitis, Diabetes, HTN, hypercholesteremia, PVD, CKD, Dermatitis, who was arrived in the [facility] ED on 8/6/2024 from home with a diagnosis of Subdural Hematoma and injury of head.     Current medical issues leading to Geriatric Consult  involvement include:   [] SNF Transfer  [] Prior admission within 30 days  [x] Geriatric Medication Assessment- completed and consistent with PCP documentation.  -No longer using eye drops since cataract surgery removal.   [] Complex Medical Conditions  [] Complex Case Management and/or SDOH  [x] Goals of Care      GERIATRIC DIAGNOSES:   SDH, injury of head       PLAN:   Hospital admission for management of Acute SDH  [x] Initial consult note routed to primary continuity provider and/or primary health care team members  [x] Communicated plan of care with: ED and/or Hospital Health Care Team    ADVANCE CARE PLANNING / TREATMENT PREFERENCES:     GOALS OF CARE:  []-Comfort   []-Cure   []-Prolong life   [x]-Recovery from acute illness   []-Rehabilitation  []-Other:         TREATMENT PREFERENCES:     Patient and family's personal goals include: To remain independent and return home after hospitalization.     Important upcoming milestones or family events: N/A    The patient identifies the following as important for living well: Being able to care for herself and to return to daily activities.     Advance Care Planning:  [x] Geriatric Team has updated the ACP Navigator with Health Care Decision Maker and Patient Capacity    The patient has appointed the  what would be your preference regarding hospitalization?\"  The patient would prefer hospitalization.    Ventilation:  \"If you were unable to breath on your own and your chance of recovery was unlikely, what would be your preference about the use of a ventilator (breathing machine) if it was available to you?\"  The patient would desire the use of a ventilator.    Resuscitation:  \"In the event your heart stopped as a result of an underlying serious health condition, would you want attempts made to restart your heart, or would you prefer a natural death?\"  Yes, attempt to resuscitate.    treatment goals, benefit/burden of treatment options, artificial nutrition, ventilation preferences, hospitalization preferences, resuscitation preferences, end of life care preferences (vegetative state/imminent death), and hospice care    Conversation Outcomes / Follow-Up Plan:  ACP complete - no further action today  Reviewed DNR/DNI and patient elects Full Code (Attempt Resuscitation)    Length of Voluntary ACP Conversation in minutes:  16 minutes    Laura Ruvalcaba APRN - NP  3:58 PM

## 2024-08-06 NOTE — TELEPHONE ENCOUNTER
Patient would like the phone number to an in-home care agency. Her call back number is [phone]

## 2024-08-06 NOTE — ED PROVIDER NOTES
[facility] EMERGENCY DEP  EMERGENCY DEPARTMENT ENCOUNTER      Pt Name: Ammy Tarango  MRN: [id number]  Birthdate 1934  Date of evaluation: 8/6/2024  Provider: Anoop Dominguez DO    CHIEF COMPLAINT       Chief Complaint   Patient presents with    Fall         HISTORY OF PRESENT ILLNESS   (Location/Symptom, Timing/Onset, Context/Setting, Quality, Duration, Modifying Factors, Severity)  Note limiting factors.   89-year-old female presents today after mechanical ground-level fall.  She states that she was wearing slippers that caused her to trip.  She did not get dizzy prior to the fall.  No chest pain or shortness of breath.  No injury other than head and neck.  Not on blood thinners.  Feels a little \"woozy\" since this occurred.  Not on blood thinners.            Review of External Medical Records:     Nursing Notes were reviewed.    REVIEW OF SYSTEMS    (2-9 systems for level 4, 10 or more for level 5)     Review of Systems   Musculoskeletal:  Positive for neck pain.   Neurological:  Positive for headaches.       Except as noted above the remainder of the review of systems was reviewed and negative.       PAST MEDICAL HISTORY     Past Medical History:   Diagnosis Date    Adverse effect of anesthesia     pt states she was able to feel everything during colonoscopy    Chronic bronchitis (HCC)     Diabetes (HCC)     Essential hypertension     Hypercholesterolemia     Hypertension          SURGICAL HISTORY       Past Surgical History:   Procedure Laterality Date    CHOLECYSTECTOMY      COLONOSCOPY N/A 9/25/2017    COLONOSCOPY performed by Nikolay Oglesby MD at [facility] ENDOSCOPY    GYN      tubal ligation    HYSTERECTOMY (CERVIX STATUS UNKNOWN)           CURRENT MEDICATIONS       Previous Medications    ACETAMINOPHEN (TYLENOL) 325 MG TABLET    Take by mouth every 4 hours as needed    AMMONIUM LACTATE (LAC-HYDRIN) 5 % LOTN LOTION    Use BID    ASPIRIN 81 MG CHEWABLE TABLET    Take 1 tablet by mouth daily     Admit 08/06/2024 02:34:36 PM    [facility] Consult for Admission  5:53 PM    ED Room Number: ER11/11  Patient Name and age:  Ammy Tarango 89 y.o.  female  Working Diagnosis:   1. Subdural hematoma (HCC)    2. Injury of head, initial encounter        COVID-19 Suspicion: No  Sepsis present:  No  Reassessment needed: No  Code Status:  Full Code  Readmission: No  Isolation Requirements: no  Recommended Level of Care: step down  Department: [facility] Adult ED - [phone]  89 y.o. female here with GLF and small SDH. In cardene for bp control. No ICU per Dr. Jama with nsgy. No intervention.    Total critical care time spent exclusive of procedures:  53 minutes.   Due to a high probability of clinically significant, life threatening deterioration, the patient required my highest level of preparedness to intervene emergently and I personally spent this critical care time directly and personally managing the patient. This critical care time included obtaining a history; examining the patient; pulse oximetry; ordering and review of studies; arranging urgent treatment with development of a management plan; evaluation of patient's response to treatment; frequent reassessment; and, discussions with other providers.  This critical care time was performed to assess and manage the high probability of imminent, life-threatening deterioration that could result in multi-organ failure. It was exclusive of separately billable procedures and treating other patients and teaching time.        (Please note that portions of this note were completed with a voice recognition program.  Efforts were made to edit the dictations but occasionally words are mis-transcribed.)    Anoop Dominguez DO (electronically signed)  Emergency Attending Physician               Anoop Dominguez DO  08/06/24 1237

## 2024-08-06 NOTE — CARE COORDINATION
Care Management Initial Assessment       RUR: pending admission  Readmission? No  1st IM letter given? No-Pending admission  Insurance: [facility] Medicare  Transportation: son or daughter  Emergency Contacts:   1st son Justino Burgess [phone]   2nd daughter Tootie Tarango [phone]  Disposition: return home      Received consult to meet with ED patient for ground level fall.     Met with patient in ED. Patient confirmed her address, phone number, insurance, and emergency contacts. Patient lives in a 1 level home with 2 steps to enter. Patient owns 2 canes and does not use them. She has a bar to get in the shower and a bar in the shower. She owns a shower bench. She is independent with her ADLs and drives. Her son takes her to see her doctor's visit as the parking spaces are so far away. Patient uses [employer]. Patient prepares her own meals and does her own housework.     While speaking with patient, ED physician came to speak with patient. Patient with small brain bleed. Patient will be admitted for observation of the bleed. Patient asked if her son was still in lobby. CM went to ED lobby to bring son back to room. Son confirmed all of the above information.        08/06/24 1418   Service Assessment   Patient Orientation Alert and Oriented   Cognition Alert   History Provided By Patient   Primary Caregiver Self   Support Systems Children   Patient's Healthcare Decision Maker is: Legal Next of Kin  (Patient and son said she has an AMD. Asked son to bring a copy to hospital. Per patient and son: 1st mPOA son Justino Burgess, 2nd mPOA daughter Tootie Tarango.)   PCP Verified by CM Yes  (Dr. Freda Tolentino)   Last Visit to PCP Within last 3 months   Prior Functional Level Independent in ADLs/IADLs   Current Functional Level Independent in ADLs/IADLs   Can patient return to prior living arrangement Yes   Ability to make needs known: Good   Family able to assist with home care needs: Yes   Would you like for me to  discuss the discharge plan with any other family members/significant others, and if so, who? Yes  (son Justino Burgess)   Financial Resources Medicare  ([facility] Medicare Advantage)   Community Resources None   Social/Functional History   Lives With Alone   Type of Home House   Home Layout One level   Home Access Stairs to enter without rails   Entrance Stairs - Number of Steps 2   Bathroom Shower/Tub Shower chair without back   Bathroom Equipment Grab bars in shower   Home Equipment Cane   Receives Help From Family   ADL Assistance Independent   Homemaking Assistance Independent   Ambulation Assistance Independent   Transfer Assistance Independent   Active  Yes   Mode of Transportation Car   Occupation Retired   Discharge Planning   Type of Residence House   Living Arrangements Alone   Current Services Prior To Admission None   Potential Assistance Needed N/A   DME Ordered? No   Potential Assistance Purchasing Medications No   Type of Home Care Services None   Patient expects to be discharged to: House   Services At/After Discharge   Transition of Care Consult (CM Consult) N/A   Services At/After Discharge None    Resource Information Provided? No   Mode of Transport at Discharge Other (see comment)  (son or daughter)   Confirm Follow Up Transport Self  (self and children)     SHELTON COELLO

## 2024-08-06 NOTE — H&P
History and Physical    Date of Service:  8/6/2024  Primary Care Provider: Freda Tolentino MD  Source of information: The patient and Chart review    Chief Complaint: Fall      History of Presenting Illness:   Ammy Tarango is a 89 y.o. female who presents to the emergency room after she fell at home.  Patient reports that her foot got stuck in the rock and subsequently fell in the bathroom hitting her head on the floor.  Patient reports headache, left shoulder pain and left neck pain.  Patient subsequently presented to the emergency room for further evaluation.  Patient presented with fairly stable vital signs.  CT of the head was done in the ER which shows 9 mm hyperdensity in the right superior frontal extra-axial space concerning for small focus of acute hemorrhage.  CT cervical spine shows no acute fracture.  In the emergency room neurosurgery was consulted and hospitalist service requested to admit the patient for further evaluation management.    The patient denies any headache, blurry vision, sore throat, trouble swallowing, trouble with speech, chest pain, SOB, cough, fever, chills, N/V/D, abd pain, urinary symptoms, constipation, recent travels, sick contacts, focal or generalized neurological symptoms, falls, injuries, rashes, contact with COVID-19 diagnosed patients, hematemesis, melena, hemoptysis, hematuria, rashes, denies starting any new medications and denies any other concerns or problems besides as mentioned above.         REVIEW OF SYSTEMS:  A comprehensive review of systems was negative except for that written in the History of Present Illness.     Past Medical History:   Diagnosis Date    Adverse effect of anesthesia     pt states she was able to feel everything during colonoscopy    Chronic bronchitis (HCC)     Diabetes (HCC)     Essential hypertension     Hypercholesterolemia     Hypertension       Past Surgical History:   Procedure Laterality Date    CHOLECYSTECTOMY       from the emergency department. Complex decision making was performed, which includes reviewing the patient's available past medical records, laboratory results, and imaging studies.    Principal Problem:    SDH (subdural hematoma) (HCC)  Resolved Problems:    * No resolved hospital problems. *      Plan:     Intracranial hemorrhage  -Patient presented with mechanical fall and head injury, CT of the head shows 9 mm hyperdensity in the right superior frontal extra-axial space concerning for small focus of acute hemorrhage  -Will hold aspirin which patient takes at home, neurosurgery has been consulted, will monitor in neurotelemetry  -Started on Cardene drip to titrate systolic blood pressure goal of 140-160    Hypertension  -Cardene drip as above  -Resume home blood pressure medications including lisinopril, clonidine and metoprolol  -Monitor blood pressure, systolic blood pressure goal 140-160    Diabetes type 2  -Hold oral hypoglycemic agent including metformin and glipizide  -Continue Farxiga  -Insulin sliding scale coverage    Mechanical fall  -Fall precautions, PT to evaluate when stable from neurostandpoint    Estimated length of stay is 2 midnights.  Discussed plan of care with patient's family present at bedside.    DIET: ADULT DIET; Regular; 3 carb choices (45 gm/meal); Low Sodium (2 gm)   ISOLATION PRECAUTIONS: No active isolations  CODE STATUS: Full Code   Central Line:     DVT PROPHYLAXIS: SCDs  FUNCTIONAL STATUS PRIOR TO HOSPITALIZATION: Fully active and ambulatory; able to carry on all self-care without restriction.  Ambulatory status/function: By self   EARLY MOBILITY ASSESSMENT: Recommend routine ambulation while hospitalized with the assistance of nursing staff  ANTICIPATED DISCHARGE: 24-48 hours.  ANTICIPATED DISPOSITION: Home  EMERGENCY CONTACT/SURROGATE DECISION MAKER:     CRITICAL CARE WAS PERFORMED FOR THIS ENCOUNTER: NO.      Signed By: Yasir Puga MD     August 6, 2024         Please note that

## 2024-08-06 NOTE — TELEPHONE ENCOUNTER
Spoke with daughter, she explained that patient fell and needs immediate attention but that daughter was not able to because of work.    Gave number to [facility] for daughter to call today as patient would need exam to ensure no injury from fall.     Explained that HH could also be ordered but most appropriate step would first be UC or ED, or [facility] if no one is able to take patient.

## 2024-08-07 LAB
GLUCOSE BLD STRIP.AUTO-MCNC: 110 MG/DL (ref 65–117)
GLUCOSE BLD STRIP.AUTO-MCNC: 139 MG/DL (ref 65–117)
GLUCOSE BLD STRIP.AUTO-MCNC: 164 MG/DL (ref 65–117)
GLUCOSE BLD STRIP.AUTO-MCNC: 183 MG/DL (ref 65–117)
SERVICE CMNT-IMP: ABNORMAL
SERVICE CMNT-IMP: NORMAL

## 2024-08-07 PROCEDURE — 6370000000 HC RX 637 (ALT 250 FOR IP): Performed by: FAMILY MEDICINE

## 2024-08-07 PROCEDURE — 6370000000 HC RX 637 (ALT 250 FOR IP): Performed by: INTERNAL MEDICINE

## 2024-08-07 PROCEDURE — 6360000002 HC RX W HCPCS: Performed by: INTERNAL MEDICINE

## 2024-08-07 PROCEDURE — 97116 GAIT TRAINING THERAPY: CPT

## 2024-08-07 PROCEDURE — 97530 THERAPEUTIC ACTIVITIES: CPT

## 2024-08-07 PROCEDURE — 97165 OT EVAL LOW COMPLEX 30 MIN: CPT

## 2024-08-07 PROCEDURE — 97535 SELF CARE MNGMENT TRAINING: CPT

## 2024-08-07 PROCEDURE — 2580000003 HC RX 258: Performed by: FAMILY MEDICINE

## 2024-08-07 PROCEDURE — 97161 PT EVAL LOW COMPLEX 20 MIN: CPT

## 2024-08-07 PROCEDURE — 2580000003 HC RX 258: Performed by: INTERNAL MEDICINE

## 2024-08-07 PROCEDURE — 82962 GLUCOSE BLOOD TEST: CPT

## 2024-08-07 PROCEDURE — 2060000000 HC ICU INTERMEDIATE R&B

## 2024-08-07 PROCEDURE — 6360000002 HC RX W HCPCS: Performed by: FAMILY MEDICINE

## 2024-08-07 RX ORDER — AMLODIPINE BESYLATE 5 MG/1
5 TABLET ORAL DAILY
Status: DISCONTINUED | OUTPATIENT
Start: 2024-08-07 | End: 2024-08-08

## 2024-08-07 RX ORDER — LISINOPRIL 20 MG/1
40 TABLET ORAL DAILY
Status: DISCONTINUED | OUTPATIENT
Start: 2024-08-08 | End: 2024-08-08

## 2024-08-07 RX ORDER — HYDRALAZINE HYDROCHLORIDE 20 MG/ML
10 INJECTION INTRAMUSCULAR; INTRAVENOUS EVERY 6 HOURS PRN
Status: DISCONTINUED | OUTPATIENT
Start: 2024-08-07 | End: 2024-08-21 | Stop reason: HOSPADM

## 2024-08-07 RX ORDER — LANOLIN ALCOHOL/MO/W.PET/CERES
3 CREAM (GRAM) TOPICAL NIGHTLY PRN
Status: DISCONTINUED | OUTPATIENT
Start: 2024-08-07 | End: 2024-08-21 | Stop reason: HOSPADM

## 2024-08-07 RX ORDER — LISINOPRIL 20 MG/1
20 TABLET ORAL ONCE
Status: COMPLETED | OUTPATIENT
Start: 2024-08-07 | End: 2024-08-07

## 2024-08-07 RX ADMIN — NICARDIPINE HYDROCHLORIDE 5 MG/HR: 2.5 INJECTION, SOLUTION INTRAVENOUS at 02:01

## 2024-08-07 RX ADMIN — NICARDIPINE HYDROCHLORIDE 5 MG/HR: 2.5 INJECTION, SOLUTION INTRAVENOUS at 08:28

## 2024-08-07 RX ADMIN — LISINOPRIL 20 MG: 20 TABLET ORAL at 16:56

## 2024-08-07 RX ADMIN — LISINOPRIL 20 MG: 20 TABLET ORAL at 08:34

## 2024-08-07 RX ADMIN — Medication 1 TABLET: at 08:34

## 2024-08-07 RX ADMIN — METOPROLOL TARTRATE 100 MG: 25 TABLET, FILM COATED ORAL at 08:32

## 2024-08-07 RX ADMIN — HYDRALAZINE HYDROCHLORIDE 10 MG: 20 INJECTION INTRAMUSCULAR; INTRAVENOUS at 18:11

## 2024-08-07 RX ADMIN — AMLODIPINE BESYLATE 5 MG: 5 TABLET ORAL at 16:57

## 2024-08-07 RX ADMIN — HYDRALAZINE HYDROCHLORIDE 10 MG: 20 INJECTION INTRAMUSCULAR; INTRAVENOUS at 23:52

## 2024-08-07 RX ADMIN — SODIUM CHLORIDE, PRESERVATIVE FREE 10 ML: 5 INJECTION INTRAVENOUS at 21:16

## 2024-08-07 RX ADMIN — CLONIDINE HYDROCHLORIDE 0.3 MG: 0.1 TABLET ORAL at 08:45

## 2024-08-07 RX ADMIN — ATORVASTATIN CALCIUM 10 MG: 10 TABLET, FILM COATED ORAL at 08:33

## 2024-08-07 RX ADMIN — NICARDIPINE HYDROCHLORIDE 2.5 MG/HR: 2.5 INJECTION, SOLUTION INTRAVENOUS at 14:41

## 2024-08-07 RX ADMIN — Medication 1000 UNITS: at 08:33

## 2024-08-07 RX ADMIN — CLONIDINE HYDROCHLORIDE 0.3 MG: 0.1 TABLET ORAL at 21:15

## 2024-08-07 RX ADMIN — METOPROLOL TARTRATE 100 MG: 25 TABLET, FILM COATED ORAL at 21:15

## 2024-08-07 RX ADMIN — NICARDIPINE HYDROCHLORIDE 5 MG/HR: 2.5 INJECTION, SOLUTION INTRAVENOUS at 09:35

## 2024-08-07 RX ADMIN — PANTOPRAZOLE SODIUM 40 MG: 40 TABLET, DELAYED RELEASE ORAL at 06:23

## 2024-08-07 RX ADMIN — CLONIDINE HYDROCHLORIDE 0.3 MG: 0.1 TABLET ORAL at 14:07

## 2024-08-07 RX ADMIN — CETIRIZINE HYDROCHLORIDE 10 MG: 10 TABLET, FILM COATED ORAL at 08:34

## 2024-08-07 ASSESSMENT — PAIN SCALES - GENERAL
PAINLEVEL_OUTOF10: 0

## 2024-08-07 NOTE — PLAN OF CARE
past year or any fall with injury in the past year?: Yes (just prior to admission)  Receives Help From: Family  ADL Assistance: Independent  Homemaking Assistance: Independent  Ambulation Assistance: Independent  Transfer Assistance: Independent  Active : Yes  Mode of Transportation: Car  Occupation: Retired    Cognitive/Behavioral Status:  Orientation  Overall Orientation Status: Within Normal Limits  Orientation Level: Oriented X4  Cognition  Overall Cognitive Status: WNL    Skin: dry/ mild discoloration lower LEs    Edema: mild B LEs    Hearing:   Hearing  Hearing: Within functional limits    Vision/Perceptual:          Vision  Vision: Within Functional Limits  Perception  Overall Perceptual Status: WFL    Strength:    Strength: Generally decreased, functional    Tone & Sensation:   Tone: Normal  Sensation: Impaired (reports parasthesias fingertips B hands)    Coordination:  Coordination: Within functional limits    Range Of Motion:  AROM: Generally decreased, functional       Functional Mobility:  Bed Mobility:     Bed Mobility Training  Bed Mobility Training: Yes  Rolling: Supervision  Supine to Sit: Supervision;Stand-by assistance  Scooting: Supervision  Transfers:     Transfer Training  Transfer Training: Yes  Sit to Stand: Stand-by assistance;Supervision  Stand to Sit: Supervision  Bed to Chair: Supervision  Toilet Transfer: Stand-by assistance;Supervision  Balance:               Balance  Sitting: Intact  Standing: Intact  Ambulation/Gait Training:                       Gait  Gait Training: Yes  Overall Level of Assistance: Stand-by assistance;Supervision  Distance (ft): 100 Feet  Assistive Device: Gait belt  Speed/Steffanie: Pace decreased (< 100 feet/min)  Step Length: Left shortened;Right shortened  Gait Abnormalities: Decreased step clearance       Therapeutic Exercises:     EXERCISE   Sets   Reps   Active Active Assist   Passive Self ROM   Comments   Ankle Pumps   [x] [] [] []    Quad Sets   [] []  complexity level: Low

## 2024-08-07 NOTE — PROGRESS NOTES
CT scan shows less than 1 cm area of possible right frontal hemorrhage   this does not require any neurosurgical intervention  she may follow up in office after discharge

## 2024-08-07 NOTE — PLAN OF CARE
Problem: Occupational Therapy - Adult  Goal: By Discharge: Performs self-care activities at highest level of function for planned discharge setting.  See evaluation for individualized goals.  Description: FUNCTIONAL STATUS PRIOR TO ADMISSION:  Patient was ambulatory using no DME and independent for ADLs. She is driving and had adult children that live close by.      HOME SUPPORT: Patient lived alone with family to provide assistance.    Occupational Therapy Goals:  Initiated 8/7/2024  1.  Patient will perform grooming with [place] within 7 day(s).  2.  Patient will perform upper body dressing with [place] within 7 day(s).  3.  Patient will perform lower body dressing with Modified [place] within 7 day(s).  4.  Patient will perform toilet transfers with Modified [place]  within 7 day(s).  5.  Patient will perform all aspects of toileting with Modified [place] within 7 day(s).  6.  Patient will participate in upper extremity therapeutic exercise/activities with [place] for 15 minutes within 7 day(s).    7.  Patient will utilize energy conservation techniques during functional activities with verbal cues within 7 day(s).    Outcome: Progressing  OCCUPATIONAL THERAPY EVALUATION    Patient: Ammy Tarango (89 y.o. female)  Date: 8/7/2024  Primary Diagnosis: Subdural hematoma (HCC) [S06.5XAA]  SDH (subdural hematoma) (HCC) [S06.5XAA]  Injury of head, initial encounter [S09.90XA]         Precautions:                    ASSESSMENT :  The patient is limited by decreased strength, activity tolerance, balance following a fall at home resulting in small SDH without intervention recommended. The patient is Aaox4, pleasant and close to her baseline. She benefited today from SBA to Supervision due to not ambulating for a few days and unfamiliarity with the environment. The patient's BP elevated slightly with activity but stayed within parameters. The patient would benefit from some increased  years, assessed on average 14 (6.5) months post stroke, Chronic Stroke)   FMA = 5.2 points for the Upper Extremity portion of the assessment           Pain Ratin/10   Pain Intervention(s):       Activity Tolerance:   Good    After treatment:   Patient left in no apparent distress sitting up in chair, Call bell within reach, Bed/ chair alarm activated, and Caregiver / family present    COMMUNICATION/EDUCATION:   The patient's plan of care was discussed with: physical therapist and registered nurse    Patient Education  Education Given To: Patient;Family  Education Provided: Role of Therapy;Plan of Care;ADL Adaptive Strategies;Transfer Training;Energy Conservation;Fall Prevention Strategies  Education Provided Comments: ADAMA  Education Method: Demonstration  Barriers to Learning: None  Education Outcome: Verbalized understanding;Demonstrated understanding    Thank you for this referral.  Priscilla Hanson OT  Minutes: 46    Occupational Therapy Evaluation Charge Determination   History Examination Decision-Making   LOW Complexity : Brief history review  LOW Complexity: 1-3 Performance deficits relating to physical, cognitive, or psychosocial skills that result in activity limitations and/or participation restrictions LOW Complexity: No comorbidities that affect functional and  no verbal  or physical assist needed to complete eval tasks   Based on the above components, the patient evaluation is determined to be of the following complexity level: Low

## 2024-08-07 NOTE — PROGRESS NOTES
[facility]                                                                                          Hospitalist Progress Note  Maxi Lo MD  Answering service: [phone] OR [phone] from in house phone        Date of Service:  2024  NAME:  Ammy Tarango  :  1934  MRN:  [id number]       Admission Summary:   Ammy Tarango is a 89 y.o. female who presents to the emergency room after she fell at home.  Patient reports that her foot got stuck in the rock and subsequently fell in the bathroom hitting her head on the floor.  Patient reports headache, left shoulder pain and left neck pain.  Patient subsequently presented to the emergency room for further evaluation.  Patient presented with fairly stable vital signs.  CT of the head was done in the ER which shows 9 mm hyperdensity in the right superior frontal extra-axial space concerning for small focus of acute hemorrhage.  CT cervical spine shows no acute fracture.  In the emergency room neurosurgery was consulted and hospitalist service requested to admit the patient for further evaluation management.        Interval history / Subjective:   Patient seen and examined, family at the bedside.  Results of CT discussed with her.  Patient does not require any neurosurgical intervention.  Continue blood pressure management, wean off Cardene drip as able.   Discharge planning discussed with patient: patient would rather go home with home health than rehab.     Assessment & Plan:             Intracranial hemorrhage  -Patient presented with mechanical fall and head injury, CT of the head shows 9 mm hyperdensity in the right superior frontal extra-axial space concerning for small focus of acute hemorrhage  -Will hold aspirin which patient takes at home;  -Neurosurgery has been consulted: Patient does not require any neurosurgical interventions; outpatient follow-up in the office monitoring;  -Started on Cardene  Oral Daily    sodium chloride flush 0.9 % injection 5-40 mL  5-40 mL IntraVENous 2 times per day    sodium chloride flush 0.9 % injection 5-40 mL  5-40 mL IntraVENous PRN    0.9 % sodium chloride infusion   IntraVENous PRN    potassium chloride (KLOR-CON) extended release tablet 40 mEq  40 mEq Oral PRN    Or    potassium bicarb-citric acid (EFFER-K) effervescent tablet 40 mEq  40 mEq Oral PRN    Or    potassium chloride 10 mEq/100 mL IVPB (Peripheral Line)  10 mEq IntraVENous PRN    magnesium sulfate 2000 mg in 50 mL IVPB premix  2,000 mg IntraVENous PRN    ondansetron (ZOFRAN-ODT) disintegrating tablet 4 mg  4 mg Oral Q8H PRN    Or    ondansetron (ZOFRAN) injection 4 mg  4 mg IntraVENous Q6H PRN    polyethylene glycol (GLYCOLAX) packet 17 g  17 g Oral Daily PRN    acetaminophen (TYLENOL) tablet 650 mg  650 mg Oral Q6H PRN    Or    acetaminophen (TYLENOL) suppository 650 mg  650 mg Rectal Q6H PRN    insulin lispro (HUMALOG,ADMELOG) injection vial 0-8 Units  0-8 Units SubCUTAneous TID WC    insulin lispro (HUMALOG,ADMELOG) injection vial 0-4 Units  0-4 Units SubCUTAneous Nightly     ______________________________________________________________________  EXPECTED LENGTH OF STAY: Unable to retrieve estimated LOS  ACTUAL LENGTH OF STAY:          1                 Maxi Lo MD

## 2024-08-07 NOTE — PLAN OF CARE
Problem: Discharge Planning  Goal: Discharge to home or other facility with appropriate resources  Outcome: Progressing  Flowsheets (Taken 8/6/2024 2049)  Discharge to home or other facility with appropriate resources:   Identify barriers to discharge with patient and caregiver   Arrange for needed discharge resources and transportation as appropriate   Identify discharge learning needs (meds, wound care, etc)   Refer to discharge planning if patient needs post-hospital services based on physician order or complex needs related to functional status, cognitive ability or social support system     Problem: Pain  Goal: Verbalizes/displays adequate comfort level or baseline comfort level  Outcome: Progressing     Problem: Safety - Adult  Goal: Free from fall injury  Outcome: Progressing     Problem: Chronic Conditions and Co-morbidities  Goal: Patient's chronic conditions and co-morbidity symptoms are monitored and maintained or improved  Outcome: Progressing

## 2024-08-08 ENCOUNTER — TELEPHONE (OUTPATIENT)
Age: 89
End: 2024-08-08

## 2024-08-08 LAB
GLUCOSE BLD STRIP.AUTO-MCNC: 110 MG/DL (ref 65–117)
GLUCOSE BLD STRIP.AUTO-MCNC: 117 MG/DL (ref 65–117)
GLUCOSE BLD STRIP.AUTO-MCNC: 194 MG/DL (ref 65–117)
GLUCOSE BLD STRIP.AUTO-MCNC: 201 MG/DL (ref 65–117)
SERVICE CMNT-IMP: ABNORMAL
SERVICE CMNT-IMP: ABNORMAL
SERVICE CMNT-IMP: NORMAL
SERVICE CMNT-IMP: NORMAL

## 2024-08-08 PROCEDURE — 2580000003 HC RX 258: Performed by: INTERNAL MEDICINE

## 2024-08-08 PROCEDURE — 6370000000 HC RX 637 (ALT 250 FOR IP): Performed by: FAMILY MEDICINE

## 2024-08-08 PROCEDURE — 82962 GLUCOSE BLOOD TEST: CPT

## 2024-08-08 PROCEDURE — 97530 THERAPEUTIC ACTIVITIES: CPT

## 2024-08-08 PROCEDURE — 2580000003 HC RX 258: Performed by: FAMILY MEDICINE

## 2024-08-08 PROCEDURE — 2060000000 HC ICU INTERMEDIATE R&B

## 2024-08-08 PROCEDURE — 6370000000 HC RX 637 (ALT 250 FOR IP)

## 2024-08-08 PROCEDURE — 97116 GAIT TRAINING THERAPY: CPT

## 2024-08-08 PROCEDURE — 6360000002 HC RX W HCPCS: Performed by: INTERNAL MEDICINE

## 2024-08-08 PROCEDURE — 6360000002 HC RX W HCPCS: Performed by: FAMILY MEDICINE

## 2024-08-08 PROCEDURE — 6370000000 HC RX 637 (ALT 250 FOR IP): Performed by: INTERNAL MEDICINE

## 2024-08-08 RX ORDER — FUROSEMIDE 20 MG/1
20 TABLET ORAL DAILY
Status: DISCONTINUED | OUTPATIENT
Start: 2024-08-09 | End: 2024-08-09

## 2024-08-08 RX ORDER — LISINOPRIL 20 MG/1
40 TABLET ORAL DAILY
Status: DISCONTINUED | OUTPATIENT
Start: 2024-08-09 | End: 2024-08-21 | Stop reason: HOSPADM

## 2024-08-08 RX ORDER — LISINOPRIL 20 MG/1
20 TABLET ORAL DAILY
Status: COMPLETED | OUTPATIENT
Start: 2024-08-08 | End: 2024-08-08

## 2024-08-08 RX ORDER — LISINOPRIL 20 MG/1
20 TABLET ORAL DAILY
Status: DISCONTINUED | OUTPATIENT
Start: 2024-08-08 | End: 2024-08-08

## 2024-08-08 RX ORDER — HYDRALAZINE HYDROCHLORIDE 50 MG/1
50 TABLET, FILM COATED ORAL EVERY 8 HOURS SCHEDULED
Status: DISCONTINUED | OUTPATIENT
Start: 2024-08-08 | End: 2024-08-09

## 2024-08-08 RX ORDER — DILTIAZEM HYDROCHLORIDE 180 MG/1
180 CAPSULE, COATED, EXTENDED RELEASE ORAL DAILY
Status: DISCONTINUED | OUTPATIENT
Start: 2024-08-08 | End: 2024-08-09

## 2024-08-08 RX ORDER — POTASSIUM CHLORIDE 750 MG/1
10 TABLET, FILM COATED, EXTENDED RELEASE ORAL DAILY
Status: DISCONTINUED | OUTPATIENT
Start: 2024-08-09 | End: 2024-08-16

## 2024-08-08 RX ADMIN — CETIRIZINE HYDROCHLORIDE 10 MG: 10 TABLET, FILM COATED ORAL at 08:24

## 2024-08-08 RX ADMIN — POLYETHYLENE GLYCOL 3350 17 G: 17 POWDER, FOR SOLUTION ORAL at 22:31

## 2024-08-08 RX ADMIN — LISINOPRIL 20 MG: 20 TABLET ORAL at 08:24

## 2024-08-08 RX ADMIN — Medication 1000 UNITS: at 08:23

## 2024-08-08 RX ADMIN — HYDRALAZINE HYDROCHLORIDE 10 MG: 20 INJECTION INTRAMUSCULAR; INTRAVENOUS at 18:47

## 2024-08-08 RX ADMIN — CLONIDINE HYDROCHLORIDE 0.3 MG: 0.1 TABLET ORAL at 08:28

## 2024-08-08 RX ADMIN — SODIUM CHLORIDE, PRESERVATIVE FREE 10 ML: 5 INJECTION INTRAVENOUS at 08:24

## 2024-08-08 RX ADMIN — PANTOPRAZOLE SODIUM 40 MG: 40 TABLET, DELAYED RELEASE ORAL at 06:24

## 2024-08-08 RX ADMIN — METOPROLOL TARTRATE 100 MG: 25 TABLET, FILM COATED ORAL at 21:31

## 2024-08-08 RX ADMIN — Medication 1 TABLET: at 08:23

## 2024-08-08 RX ADMIN — CLONIDINE HYDROCHLORIDE 0.3 MG: 0.1 TABLET ORAL at 21:30

## 2024-08-08 RX ADMIN — METOPROLOL TARTRATE 100 MG: 25 TABLET, FILM COATED ORAL at 08:23

## 2024-08-08 RX ADMIN — SODIUM CHLORIDE, PRESERVATIVE FREE 10 ML: 5 INJECTION INTRAVENOUS at 21:34

## 2024-08-08 RX ADMIN — ATORVASTATIN CALCIUM 10 MG: 10 TABLET, FILM COATED ORAL at 08:24

## 2024-08-08 RX ADMIN — Medication 3 MG: at 00:08

## 2024-08-08 RX ADMIN — HYDRALAZINE HYDROCHLORIDE 50 MG: 50 TABLET ORAL at 16:44

## 2024-08-08 RX ADMIN — LISINOPRIL 20 MG: 20 TABLET ORAL at 16:44

## 2024-08-08 RX ADMIN — ONDANSETRON 4 MG: 2 INJECTION INTRAMUSCULAR; INTRAVENOUS at 22:54

## 2024-08-08 RX ADMIN — HYDRALAZINE HYDROCHLORIDE 10 MG: 20 INJECTION INTRAMUSCULAR; INTRAVENOUS at 11:46

## 2024-08-08 RX ADMIN — DILTIAZEM HYDROCHLORIDE 180 MG: 180 CAPSULE, EXTENDED RELEASE ORAL at 09:45

## 2024-08-08 RX ADMIN — INSULIN LISPRO 2 UNITS: 100 INJECTION, SOLUTION INTRAVENOUS; SUBCUTANEOUS at 16:13

## 2024-08-08 RX ADMIN — CLONIDINE HYDROCHLORIDE 0.3 MG: 0.1 TABLET ORAL at 14:19

## 2024-08-08 RX ADMIN — NICARDIPINE HYDROCHLORIDE 2.5 MG/HR: 2.5 INJECTION, SOLUTION INTRAVENOUS at 04:55

## 2024-08-08 ASSESSMENT — PAIN SCALES - GENERAL
PAINLEVEL_OUTOF10: 0

## 2024-08-08 NOTE — TELEPHONE ENCOUNTER
Debbie from [facility] called wanting to know if  would be willing to follow/sign orders for the patient. Please call her back at [phone]

## 2024-08-08 NOTE — CARE COORDINATION
Transition of Care Plan:      Home with home health ([facility]    Transport: Family     RUR: 11%  Prior Level of Functioning: Independent  Disposition: HH PT  Follow up appointments: PCP  DME needed: None  Transportation at discharge: Family  IM/IMM Medicare/ letter given: 8/8  Caregiver Contact: Justino Burgess (Child)  [phone] (Home Phone)   Discharge Caregiver contacted prior to discharge?   Care Conference needed? No  Barriers to discharge: Medical, dc today or tomorrow    PT recommending home desiree. CM met with Pt at bedside, she is in agreement with home health PT. No preference of provider. Referrals sent to [facility] and [facility].     [facility] has accepted; follow up info placed on AVS.    Marlen Chaney M.S (Ally).JONNATHAN.

## 2024-08-08 NOTE — PLAN OF CARE
Problem: Occupational Therapy - Adult  Goal: By Discharge: Performs self-care activities at highest level of function for planned discharge setting.  See evaluation for individualized goals.  Description: FUNCTIONAL STATUS PRIOR TO ADMISSION:  Patient was ambulatory using no DME and independent for ADLs. She is driving and had adult children that live close by.      HOME SUPPORT: Patient lived alone with family to provide assistance.    Occupational Therapy Goals:  Initiated 8/7/2024  1.  Patient will perform grooming with [place] within 7 day(s).  2.  Patient will perform upper body dressing with [place] within 7 day(s).  3.  Patient will perform lower body dressing with Modified [place] within 7 day(s).  4.  Patient will perform toilet transfers with Modified [place]  within 7 day(s).  5.  Patient will perform all aspects of toileting with Modified [place] within 7 day(s).  6.  Patient will participate in upper extremity therapeutic exercise/activities with [place] for 15 minutes within 7 day(s).    7.  Patient will utilize energy conservation techniques during functional activities with verbal cues within 7 day(s).    Outcome: Progressing   OCCUPATIONAL THERAPY TREATMENT  Patient: Ammy Tarango (89 y.o. female)  Date: 8/8/2024  Primary Diagnosis: Subdural hematoma (HCC) [S06.5XAA]  SDH (subdural hematoma) (HCC) [S06.5XAA]  Injury of head, initial encounter [S09.90XA]       Precautions: Fall Risk (-160)                Chart, occupational therapy assessment, plan of care, and goals were reviewed.    ASSESSMENT  Patient continues to benefit from skilled OT services and is progressing towards goals. Patient participated in item gathering task in hallway to simulate household distance and IADL tasks. Patient requires cueing throughout task to complete environmental scanning successfully and to sequence through task. Educated on energy conservation and seated rest breaks in

## 2024-08-08 NOTE — PLAN OF CARE
Problem: Physical Therapy - Adult  Goal: By Discharge: Performs mobility at highest level of function for planned discharge setting.  See evaluation for individualized goals.  Description: FUNCTIONAL STATUS PRIOR TO ADMISSION: Pt lives alone with son/ daughter very close by. Ambulates indep without device, but owns SPC. Uses B HR to independently negotiate 2 steps into home.     Physical Therapy Goals  Initiated 8/7/2024  1.  Patient will move from supine to sit and sit to supine in bed with independence within 7 day(s).    2.  Patient will perform sit to stand with independence within 7 day(s).  3.  Patient will transfer from bed to chair and chair to bed with independence using the least restrictive device within 7 day(s).  4.  Patient will ambulate with independence for 150 feet with the least restrictive device within 7 day(s).   5.  Patient will ascend/descend 2 stairs with B handrail(s) with supervision/set-up within 7 day(s).   Outcome: Progressing   PHYSICAL THERAPY TREATMENT    Patient: Ammy Tarango (89 y.o. female)  Date: 8/8/2024  Diagnosis: Subdural hematoma (HCC) [S06.5XAA]  SDH (subdural hematoma) (HCC) [S06.5XAA]  Injury of head, initial encounter [S09.90XA] SDH (subdural hematoma) (HCC)      Precautions: Fall Risk (-160)       ASSESSMENT:  Patient continues to benefit from skilled PT services and is progressing towards goals. Patient received sitting in chair, daughter at bedside and patient agreeable to therapy. Quickly approaching baseline and was able to participate in ambulation within [place] with dynamic search task (see OT note) without LOB. Climbed stairs with CGA and step through pattern. Educated on energy conservation and step to on stairs. Recommend HHPT for safety evaluation as she lives alone and was admitted for SDH after fall.         PLAN:  Patient continues to benefit from skilled intervention to address the above impairments.  Continue treatment per established plan of

## 2024-08-08 NOTE — PLAN OF CARE
Problem: Discharge Planning  Goal: Discharge to home or other facility with appropriate resources  Outcome: Progressing  Flowsheets (Taken 8/7/2024 0800)  Discharge to home or other facility with appropriate resources:   Identify barriers to discharge with patient and caregiver   Identify discharge learning needs (meds, wound care, etc)   Arrange for needed discharge resources and transportation as appropriate     Problem: Pain  Goal: Verbalizes/displays adequate comfort level or baseline comfort level  Outcome: Progressing     Problem: Safety - Adult  Goal: Free from fall injury  Outcome: Progressing  Flowsheets (Taken 8/7/2024 0800)  Free From Fall Injury:   Instruct family/caregiver on patient safety   Based on caregiver fall risk screen, instruct family/caregiver to ask for assistance with transferring infant if caregiver noted to have fall risk factors     Problem: Chronic Conditions and Co-morbidities  Goal: Patient's chronic conditions and co-morbidity symptoms are monitored and maintained or improved  Outcome: Progressing  Flowsheets (Taken 8/7/2024 0800)  Care Plan - Patient's Chronic Conditions and Co-Morbidity Symptoms are Monitored and Maintained or Improved:   Monitor and assess patient's chronic conditions and comorbid symptoms for stability, deterioration, or improvement   Collaborate with multidisciplinary team to address chronic and comorbid conditions and prevent exacerbation or deterioration     Problem: Occupational Therapy - Adult  Goal: By Discharge: Performs self-care activities at highest level of function for planned discharge setting.  See evaluation for individualized goals.  Description: FUNCTIONAL STATUS PRIOR TO ADMISSION:  Patient was ambulatory using no DME and independent for ADLs. She is driving and had adult children that live close by.      HOME SUPPORT: Patient lived alone with family to provide assistance.    Occupational Therapy Goals:  Initiated 8/7/2024  1.  Patient will

## 2024-08-08 NOTE — PROGRESS NOTES
[facility]  Hospitalist Group                                                                                          Hospitalist Progress Note  Maxi Lo MD  Answering service: [phone] OR [phone] from in house phone        Date of Service:  2024  NAME:  Ammy Tarango  :  1934  MRN:  [id number]       Admission Summary:   Ammy Tarango is a 89 y.o. female who presents to the emergency room after she fell at home.  Patient reports that her foot got stuck in the rock and subsequently fell in the bathroom hitting her head on the floor.  Patient reports headache, left shoulder pain and left neck pain.  Patient subsequently presented to the emergency room for further evaluation.  Patient presented with fairly stable vital signs.  CT of the head was done in the ER which shows 9 mm hyperdensity in the right superior frontal extra-axial space concerning for small focus of acute hemorrhage.  CT cervical spine shows no acute fracture.  In the emergency room neurosurgery was consulted and hospitalist service requested to admit the patient for further evaluation management.        Interval history / Subjective:   Patient seen and examined, family at the bedside.  Discussed with patient's granddaughter over the phone, Dr. Sebastian Burgess.   BP remains elevated, weaned off Cardene drip this morning, required IV hydralazine.  Patient's primary cardiologist is Dr. Ovalles, consulted for assistance in blood pressure management.  \"He is the only doctor who got my blood pressure under good control.\"  Chart was reviewed in detail.  Patient had a visit with her PCP, Dr. Tolentino, on , and BP was elevated at that time as well.  It appears that patient was on 150 mg of metoprolol twice daily, but her heart rate is in the low 60s and occasionally drops below 60, so I will continue current dose of 100 mg twice daily.    Increase dose of lisinopril to 40 mg and added hydralazine p.o.     Assessment &

## 2024-08-08 NOTE — PLAN OF CARE
Problem: Discharge Planning  Goal: Discharge to home or other facility with appropriate resources  8/8/2024 0438 by Ammy Dsouza RN  Outcome: Progressing  8/7/2024 2015 by Luck, Rylen, RN  Outcome: Progressing  Flowsheets (Taken 8/7/2024 0800)  Discharge to home or other facility with appropriate resources:   Identify barriers to discharge with patient and caregiver   Identify discharge learning needs (meds, wound care, etc)   Arrange for needed discharge resources and transportation as appropriate     Problem: Pain  Goal: Verbalizes/displays adequate comfort level or baseline comfort level  8/8/2024 0438 by Ammy Dsouza RN  Outcome: Progressing  8/7/2024 2015 by Luck, Rylen, RN  Outcome: Progressing     Problem: Chronic Conditions and Co-morbidities  Goal: Patient's chronic conditions and co-morbidity symptoms are monitored and maintained or improved  8/7/2024 2015 by Luck, Rylen, RN  Outcome: Progressing  Flowsheets (Taken 8/7/2024 0800)  Care Plan - Patient's Chronic Conditions and Co-Morbidity Symptoms are Monitored and Maintained or Improved:   Monitor and assess patient's chronic conditions and comorbid symptoms for stability, deterioration, or improvement   Collaborate with multidisciplinary team to address chronic and comorbid conditions and prevent exacerbation or deterioration     Problem: Safety - Adult  Goal: Free from fall injury  8/8/2024 0438 by Ammy Dsouza RN  Outcome: Progressing  8/7/2024 2015 by Luck, Rylen, RN  Outcome: Progressing  Flowsheets (Taken 8/7/2024 0800)  Free From Fall Injury:   Instruct family/caregiver on patient safety   Based on caregiver fall risk screen, instruct family/caregiver to ask for assistance with transferring infant if caregiver noted to have fall risk factors

## 2024-08-09 ENCOUNTER — APPOINTMENT (OUTPATIENT)
Facility: HOSPITAL | Age: 89
DRG: 083 | End: 2024-08-09
Payer: MEDICARE

## 2024-08-09 ENCOUNTER — APPOINTMENT (OUTPATIENT)
Facility: HOSPITAL | Age: 89
DRG: 083 | End: 2024-08-09
Attending: INTERNAL MEDICINE
Payer: MEDICARE

## 2024-08-09 LAB
ECHO AO ASC DIAM: 3.1 CM
ECHO AO ASCENDING AORTA INDEX: 1.89 CM/M2
ECHO AO ROOT DIAM: 3.5 CM
ECHO AO ROOT INDEX: 2.13 CM/M2
ECHO AV MEAN GRADIENT: 4 MMHG
ECHO AV MEAN VELOCITY: 1 M/S
ECHO AV PEAK GRADIENT: 9 MMHG
ECHO AV PEAK VELOCITY: 1.5 M/S
ECHO AV VELOCITY RATIO: 0.67
ECHO AV VTI: 22 CM
ECHO BSA: 1.71 M2
ECHO LA DIAMETER INDEX: 2.07 CM/M2
ECHO LA DIAMETER: 3.4 CM
ECHO LA TO AORTIC ROOT RATIO: 0.97
ECHO LA VOL A-L A2C: 34 ML (ref 22–52)
ECHO LA VOL A-L A4C: 44 ML (ref 22–52)
ECHO LA VOL MOD A2C: 32 ML (ref 22–52)
ECHO LA VOL MOD A4C: 41 ML (ref 22–52)
ECHO LA VOLUME AREA LENGTH: 40 ML
ECHO LA VOLUME INDEX A-L A2C: 21 ML/M2 (ref 16–34)
ECHO LA VOLUME INDEX A-L A4C: 27 ML/M2 (ref 16–34)
ECHO LA VOLUME INDEX AREA LENGTH: 24 ML/M2 (ref 16–34)
ECHO LA VOLUME INDEX MOD A2C: 20 ML/M2 (ref 16–34)
ECHO LA VOLUME INDEX MOD A4C: 25 ML/M2 (ref 16–34)
ECHO LV E' LATERAL VELOCITY: 4 CM/S
ECHO LV E' SEPTAL VELOCITY: 4 CM/S
ECHO LV EDV A2C: 78 ML
ECHO LV EDV A4C: 112 ML
ECHO LV EDV BP: 98 ML (ref 56–104)
ECHO LV EDV INDEX A4C: 68 ML/M2
ECHO LV EDV INDEX BP: 60 ML/M2
ECHO LV EDV NDEX A2C: 48 ML/M2
ECHO LV EJECTION FRACTION A2C: 50 %
ECHO LV EJECTION FRACTION A4C: 60 %
ECHO LV EJECTION FRACTION BIPLANE: 57 % (ref 55–100)
ECHO LV ESV A2C: 39 ML
ECHO LV ESV A4C: 44 ML
ECHO LV ESV BP: 42 ML (ref 19–49)
ECHO LV ESV INDEX A2C: 24 ML/M2
ECHO LV ESV INDEX A4C: 27 ML/M2
ECHO LV ESV INDEX BP: 26 ML/M2
ECHO LV FRACTIONAL SHORTENING: 30 % (ref 28–44)
ECHO LV INTERNAL DIMENSION DIASTOLE INDEX: 2.62 CM/M2
ECHO LV INTERNAL DIMENSION DIASTOLIC: 4.3 CM (ref 3.9–5.3)
ECHO LV INTERNAL DIMENSION SYSTOLIC INDEX: 1.83 CM/M2
ECHO LV INTERNAL DIMENSION SYSTOLIC: 3 CM
ECHO LV IVSD: 1.3 CM (ref 0.6–0.9)
ECHO LV MASS 2D: 207.8 G (ref 67–162)
ECHO LV MASS INDEX 2D: 126.7 G/M2 (ref 43–95)
ECHO LV POSTERIOR WALL DIASTOLIC: 1.3 CM (ref 0.6–0.9)
ECHO LV RELATIVE WALL THICKNESS RATIO: 0.6
ECHO LVOT AV VTI INDEX: 0.76
ECHO LVOT MEAN GRADIENT: 2 MMHG
ECHO LVOT PEAK GRADIENT: 4 MMHG
ECHO LVOT PEAK VELOCITY: 1 M/S
ECHO LVOT VTI: 16.7 CM
ECHO MV A VELOCITY: 0.96 M/S
ECHO MV AREA PHT: 8.4 CM2
ECHO MV E DECELERATION TIME (DT): 90.5 MS
ECHO MV E VELOCITY: 0.62 M/S
ECHO MV E/A RATIO: 0.65
ECHO MV E/E' LATERAL: 15.5
ECHO MV E/E' RATIO (AVERAGED): 15.5
ECHO MV E/E' SEPTAL: 15.5
ECHO MV LVOT VTI INDEX: 1.04
ECHO MV MAX VELOCITY: 1.2 M/S
ECHO MV MEAN GRADIENT: 3 MMHG
ECHO MV MEAN VELOCITY: 0.8 M/S
ECHO MV PEAK GRADIENT: 6 MMHG
ECHO MV PRESSURE HALF TIME (PHT): 26.2 MS
ECHO MV VTI: 17.4 CM
ECHO PV MAX VELOCITY: 1.3 M/S
ECHO PV PEAK GRADIENT: 7 MMHG
ECHO RV FREE WALL PEAK S': 14 CM/S
ECHO RV INTERNAL DIMENSION: 2.2 CM
ECHO RV TAPSE: 2 CM (ref 1.7–?)
ECHO TV REGURGITANT MAX VELOCITY: 2.71 M/S
ECHO TV REGURGITANT PEAK GRADIENT: 29 MMHG
GLUCOSE BLD STRIP.AUTO-MCNC: 124 MG/DL (ref 65–117)
GLUCOSE BLD STRIP.AUTO-MCNC: 136 MG/DL (ref 65–117)
GLUCOSE BLD STRIP.AUTO-MCNC: 138 MG/DL (ref 65–117)
GLUCOSE BLD STRIP.AUTO-MCNC: 158 MG/DL (ref 65–117)
SERVICE CMNT-IMP: ABNORMAL

## 2024-08-09 PROCEDURE — 94760 N-INVAS EAR/PLS OXIMETRY 1: CPT

## 2024-08-09 PROCEDURE — 6360000002 HC RX W HCPCS: Performed by: FAMILY MEDICINE

## 2024-08-09 PROCEDURE — 82962 GLUCOSE BLOOD TEST: CPT

## 2024-08-09 PROCEDURE — 6360000002 HC RX W HCPCS: Performed by: INTERNAL MEDICINE

## 2024-08-09 PROCEDURE — 70450 CT HEAD/BRAIN W/O DYE: CPT

## 2024-08-09 PROCEDURE — 6370000000 HC RX 637 (ALT 250 FOR IP): Performed by: INTERNAL MEDICINE

## 2024-08-09 PROCEDURE — 3E0G76Z INTRODUCTION OF NUTRITIONAL SUBSTANCE INTO UPPER GI, VIA NATURAL OR ARTIFICIAL OPENING: ICD-10-PCS | Performed by: FAMILY MEDICINE

## 2024-08-09 PROCEDURE — 99222 1ST HOSP IP/OBS MODERATE 55: CPT | Performed by: INTERNAL MEDICINE

## 2024-08-09 PROCEDURE — 74018 RADEX ABDOMEN 1 VIEW: CPT

## 2024-08-09 PROCEDURE — 2060000000 HC ICU INTERMEDIATE R&B

## 2024-08-09 PROCEDURE — 0D9670Z DRAINAGE OF STOMACH WITH DRAINAGE DEVICE, VIA NATURAL OR ARTIFICIAL OPENING: ICD-10-PCS | Performed by: FAMILY MEDICINE

## 2024-08-09 PROCEDURE — 6370000000 HC RX 637 (ALT 250 FOR IP): Performed by: FAMILY MEDICINE

## 2024-08-09 PROCEDURE — 93306 TTE W/DOPPLER COMPLETE: CPT

## 2024-08-09 PROCEDURE — 6370000000 HC RX 637 (ALT 250 FOR IP)

## 2024-08-09 PROCEDURE — 2580000003 HC RX 258: Performed by: FAMILY MEDICINE

## 2024-08-09 PROCEDURE — 2580000003 HC RX 258: Performed by: INTERNAL MEDICINE

## 2024-08-09 RX ORDER — LABETALOL HYDROCHLORIDE 5 MG/ML
10 INJECTION, SOLUTION INTRAVENOUS ONCE
Status: COMPLETED | OUTPATIENT
Start: 2024-08-09 | End: 2024-08-09

## 2024-08-09 RX ORDER — BISACODYL 10 MG
10 SUPPOSITORY, RECTAL RECTAL ONCE
Status: COMPLETED | OUTPATIENT
Start: 2024-08-09 | End: 2024-08-09

## 2024-08-09 RX ORDER — SPIRONOLACTONE 25 MG/1
25 TABLET ORAL DAILY
Status: DISCONTINUED | OUTPATIENT
Start: 2024-08-10 | End: 2024-08-15

## 2024-08-09 RX ORDER — PROCHLORPERAZINE EDISYLATE 5 MG/ML
5 INJECTION INTRAMUSCULAR; INTRAVENOUS EVERY 6 HOURS PRN
Status: DISCONTINUED | OUTPATIENT
Start: 2024-08-09 | End: 2024-08-21 | Stop reason: HOSPADM

## 2024-08-09 RX ORDER — HYDRALAZINE HYDROCHLORIDE 50 MG/1
100 TABLET, FILM COATED ORAL EVERY 8 HOURS SCHEDULED
Status: DISCONTINUED | OUTPATIENT
Start: 2024-08-09 | End: 2024-08-21 | Stop reason: HOSPADM

## 2024-08-09 RX ORDER — SPIRONOLACTONE 25 MG/1
25 TABLET ORAL DAILY
Status: DISCONTINUED | OUTPATIENT
Start: 2024-08-09 | End: 2024-08-09

## 2024-08-09 RX ORDER — FUROSEMIDE 10 MG/ML
20 INJECTION INTRAMUSCULAR; INTRAVENOUS ONCE
Status: COMPLETED | OUTPATIENT
Start: 2024-08-09 | End: 2024-08-09

## 2024-08-09 RX ADMIN — CLONIDINE HYDROCHLORIDE 0.3 MG: 0.1 TABLET ORAL at 22:59

## 2024-08-09 RX ADMIN — FUROSEMIDE 20 MG: 20 TABLET ORAL at 09:18

## 2024-08-09 RX ADMIN — METOPROLOL TARTRATE 100 MG: 25 TABLET, FILM COATED ORAL at 23:01

## 2024-08-09 RX ADMIN — Medication 1000 UNITS: at 09:19

## 2024-08-09 RX ADMIN — Medication 3 MG: at 23:01

## 2024-08-09 RX ADMIN — ACETAMINOPHEN 650 MG: 325 TABLET ORAL at 01:12

## 2024-08-09 RX ADMIN — POTASSIUM CHLORIDE 10 MEQ: 750 TABLET, FILM COATED, EXTENDED RELEASE ORAL at 09:23

## 2024-08-09 RX ADMIN — HYDRALAZINE HYDROCHLORIDE 10 MG: 20 INJECTION INTRAMUSCULAR; INTRAVENOUS at 09:40

## 2024-08-09 RX ADMIN — SODIUM CHLORIDE, PRESERVATIVE FREE 10 ML: 5 INJECTION INTRAVENOUS at 23:02

## 2024-08-09 RX ADMIN — CLONIDINE HYDROCHLORIDE 0.3 MG: 0.1 TABLET ORAL at 12:56

## 2024-08-09 RX ADMIN — HYDRALAZINE HYDROCHLORIDE 100 MG: 50 TABLET ORAL at 12:56

## 2024-08-09 RX ADMIN — SODIUM CHLORIDE, PRESERVATIVE FREE 10 ML: 5 INJECTION INTRAVENOUS at 09:21

## 2024-08-09 RX ADMIN — HYDRALAZINE HYDROCHLORIDE 10 MG: 20 INJECTION INTRAMUSCULAR; INTRAVENOUS at 16:03

## 2024-08-09 RX ADMIN — CLONIDINE HYDROCHLORIDE 0.3 MG: 0.1 TABLET ORAL at 09:25

## 2024-08-09 RX ADMIN — ACETAMINOPHEN 650 MG: 325 TABLET ORAL at 23:00

## 2024-08-09 RX ADMIN — CETIRIZINE HYDROCHLORIDE 10 MG: 10 TABLET, FILM COATED ORAL at 09:19

## 2024-08-09 RX ADMIN — HYDRALAZINE HYDROCHLORIDE 100 MG: 50 TABLET ORAL at 23:01

## 2024-08-09 RX ADMIN — HYDRALAZINE HYDROCHLORIDE 10 MG: 20 INJECTION INTRAMUSCULAR; INTRAVENOUS at 02:29

## 2024-08-09 RX ADMIN — ATORVASTATIN CALCIUM 10 MG: 10 TABLET, FILM COATED ORAL at 09:20

## 2024-08-09 RX ADMIN — DILTIAZEM HYDROCHLORIDE 180 MG: 180 CAPSULE, EXTENDED RELEASE ORAL at 09:19

## 2024-08-09 RX ADMIN — ONDANSETRON 4 MG: 2 INJECTION INTRAMUSCULAR; INTRAVENOUS at 22:57

## 2024-08-09 RX ADMIN — SODIUM CHLORIDE 5 MG/HR: 9 INJECTION, SOLUTION INTRAVENOUS at 16:48

## 2024-08-09 RX ADMIN — FUROSEMIDE 20 MG: 10 INJECTION, SOLUTION INTRAMUSCULAR; INTRAVENOUS at 09:16

## 2024-08-09 RX ADMIN — ONDANSETRON 4 MG: 2 INJECTION INTRAMUSCULAR; INTRAVENOUS at 09:44

## 2024-08-09 RX ADMIN — BISACODYL 10 MG: 10 SUPPOSITORY RECTAL at 18:55

## 2024-08-09 RX ADMIN — LABETALOL HYDROCHLORIDE 10 MG: 5 INJECTION INTRAVENOUS at 10:38

## 2024-08-09 RX ADMIN — HYDRALAZINE HYDROCHLORIDE 50 MG: 50 TABLET ORAL at 05:49

## 2024-08-09 RX ADMIN — PROCHLORPERAZINE EDISYLATE 5 MG: 5 INJECTION INTRAMUSCULAR; INTRAVENOUS at 11:18

## 2024-08-09 RX ADMIN — METOPROLOL TARTRATE 100 MG: 25 TABLET, FILM COATED ORAL at 09:24

## 2024-08-09 RX ADMIN — LABETALOL HYDROCHLORIDE 10 MG: 5 INJECTION INTRAVENOUS at 14:16

## 2024-08-09 RX ADMIN — LISINOPRIL 40 MG: 20 TABLET ORAL at 09:24

## 2024-08-09 RX ADMIN — PANTOPRAZOLE SODIUM 40 MG: 40 TABLET, DELAYED RELEASE ORAL at 05:49

## 2024-08-09 ASSESSMENT — PAIN SCALES - GENERAL
PAINLEVEL_OUTOF10: 0
PAINLEVEL_OUTOF10: 5

## 2024-08-09 NOTE — PLAN OF CARE
Problem: Discharge Planning  Goal: Discharge to home or other facility with appropriate resources  Outcome: Progressing  Flowsheets (Taken 8/9/2024 0800)  Discharge to home or other facility with appropriate resources:   Identify barriers to discharge with patient and caregiver   Arrange for needed discharge resources and transportation as appropriate   Identify discharge learning needs (meds, wound care, etc)     Problem: Pain  Goal: Verbalizes/displays adequate comfort level or baseline comfort level  Outcome: Progressing     Problem: Safety - Adult  Goal: Free from fall injury  Outcome: Progressing  Flowsheets (Taken 8/9/2024 1444)  Free From Fall Injury: Instruct family/caregiver on patient safety     Problem: Chronic Conditions and Co-morbidities  Goal: Patient's chronic conditions and co-morbidity symptoms are monitored and maintained or improved  Outcome: Progressing  Flowsheets (Taken 8/9/2024 0800)  Care Plan - Patient's Chronic Conditions and Co-Morbidity Symptoms are Monitored and Maintained or Improved:   Monitor and assess patient's chronic conditions and comorbid symptoms for stability, deterioration, or improvement   Collaborate with multidisciplinary team to address chronic and comorbid conditions and prevent exacerbation or deterioration   Update acute care plan with appropriate goals if chronic or comorbid symptoms are exacerbated and prevent overall improvement and discharge

## 2024-08-09 NOTE — CONSULTS
[facility]  Cardiology Care Note                  [x]Initial visit     []Established visit     Patient Name: Ammy Tarango - :1934 - MRN:[id number]  Primary Cardiologist: Alen Ovalles MD  Consulting Cardiologist: Dr Devon Ramirez      2024 10:28 AM     Reason for initial visit: Difficult to treat hypertension      Assessment/Plan/Discussion: Cardiology Attending:           ASSESSMENT    History of fall with small intracranial frontal hemorrhage  Hypertensive emergency  Bilateral pedal edema most likely due to venous stasis versus diastolic heart failure  Diabetes mellitus      PLAN       Patient presented to the hospital with history of fall with CT suggestive of small right-sided frontal intracranial hemorrhage-asymptomatic with no signs of confusion on conservative medical management as per neurosurgery  Hold aspirin  Regarding uncontrolled hypertension with hypertensive emergency due to acute right sided frontal intracranial hemorrhage would recommend a target systolic blood pressure less than 140 mmHg  Initially was on Cardene drip currently discontinued  Blood pressure still high will give one-time dose of labetalol 10 mg IV now along with Lasix 20 mg IV and see the response  Plan to increase hydralazine to 100 mg 3 times daily  Will discontinue Cardizem due to increased risk of bradycardia with metoprolol  Plan to add spironolactone 25 mg once daily in a.m.  Continue home dose of lisinopril 40 mg once daily along with metoprolol 100 mg twice daily and clonidine 0.3 mg 3 times daily  Management of diabetes as per primary team  Hyperlipidemia fairly well-controlled on Lipitor  Will get an echo to assess left-ventricular systolic and diastolic function  Will do an EKG today    High Risk Therapy requiring intensive monitoring for toxicity / side effects:  High complexity decision making was performed           Mucous membranes are moist.   Eyes:      Conjunctiva/sclera: Conjunctivae normal.   Cardiovascular:      Rate and Rhythm: Normal rate and regular rhythm.      Pulses: Normal pulses.      Heart sounds: Normal heart sounds.   Pulmonary:      Effort: Pulmonary effort is normal.      Breath sounds: Normal breath sounds.   Musculoskeletal:         General: Normal range of motion.      Cervical back: Normal range of motion.      Right lower leg: Edema present.      Left lower leg: Edema present.   Skin:     General: Skin is warm.   Neurological:      General: No focal deficit present.      Mental Status: She is alert and oriented to person, place, and time.   Psychiatric:         Mood and Affect: Mood normal.                Data Review:     Radiology:   XR Results (most recent):  CT Result (most recent):  CT CERVICAL SPINE WO CONTRAST 08/06/2024    Narrative  EXAM:  CT CERVICAL SPINE WITHOUT CONTRAST    INDICATION: Trauma. Reason for exam:->Trauma    COMPARISON: None.    CONTRAST:  None.    TECHNIQUE: Multislice helical CT of the cervical spine was performed without  intravenous contrast administration.  Sagittal and coronal reformats were  generated.  CT dose reduction was achieved through use of a standardized  protocol tailored for this examination and automatic exposure control for dose  modulation.    FINDINGS:    The alignment is within normal limits. There is no fracture or compression  deformity. The odontoid process is intact. The craniocervical junction is within  normal limits. There is loss of vertical disc height at the C5-6 and C6-7 level  with small marginal osteophytes. No spinal canal stenosis or neuroforaminal  narrowing.    The incidentally imaged soft tissues are within normal limits.    Impression  No fracture or other acute findings. Degenerative spine change.    Electronically signed by Donte Simon    Xray Result (most recent):  XR CERVICAL SPINE (4-5 VIEWS) 04/13/2018    Narrative  INDICATION:

## 2024-08-09 NOTE — PROGRESS NOTES
[facility]  Hospitalist Group                                                                                          Hospitalist Progress Note  Maxi Lo MD  Answering service: [phone] OR [phone] from in house phone        Date of Service:  2024  NAME:  Ammy Tarango  :  1934  MRN:  [id number]       Admission Summary:   Ammy Tarango is a 89 y.o. female who presents to the emergency room after she fell at home.  Patient reports that her foot got stuck in the rock and subsequently fell in the bathroom hitting her head on the floor.  Patient reports headache, left shoulder pain and left neck pain.  Patient subsequently presented to the emergency room for further evaluation.  Patient presented with fairly stable vital signs.  CT of the head was done in the ER which shows 9 mm hyperdensity in the right superior frontal extra-axial space concerning for small focus of acute hemorrhage.  CT cervical spine shows no acute fracture.  In the emergency room neurosurgery was consulted and hospitalist service requested to admit the patient for further evaluation management.        Interval history / Subjective:   Patient seen and examined this morning.   BP remains markedly elevated, was high through the night.    Appreciate input from cardiology.  The patient had an episode of emesis last night, and continues to vomit this morning.  Blood pressure responded to IV labetalol.  Discussed with neurosurgery: No need to repeat CT head unless patient has new neurological symptoms.  In view of persistent vomiting, I will repeat CT head this morning.  KUB ordered as well, as abdomen is slightly distended and patient complains of constipation.    Addendum:  CT head: No change  KUB:  Multiple dilated loops of small bowel throughout the abdomen may be  related to obstruction or ileus.  -General surgery consulted;  Hypertension: BP remains elevated, discussed with Dr. Ramirez/cardiology,  interpretation of the same.     Labs:     Recent Labs     08/06/24  1411   WBC 3.8   HGB 11.0*   HCT 33.5*          Recent Labs     08/06/24  1411      K 3.8      CO2 30   BUN 12       Recent Labs     08/06/24  1411   ALT 16   GLOB 3.6       Recent Labs     08/06/24  1411   INR 1.0        No results for input(s): \"TIBC\" in the last 72 hours.    Invalid input(s): \"FE\", \"PSAT\", \"FERR\"   No results found for: \"RBCF\"   No results for input(s): \"PH\", \"PCO2\", \"PO2\" in the last 72 hours.  No results for input(s): \"CPK\" in the last 72 hours.    Invalid input(s): \"CPKMB\", \"CKNDX\", \"TROIQ\"  Lab Results   Component Value Date/Time    CHOL 150 03/25/2024 08:19 AM    HDL 50 03/25/2024 08:19 AM    LDL 85.2 03/25/2024 08:19 AM     No results found for: \"GLUCPOC\"  [unfilled]    Notes reviewed from all clinical/nonclinical/nursing services involved in patient's clinical care. Care coordination discussions were held with appropriate clinical/nonclinical/ nursing providers based on care coordination needs.         Patients current active Medications were reviewed, considered, added and adjusted based on the clinical condition today.      Home Medications were reconciled to the best of my ability given all available resources at the time of admission. Route is PO if not otherwise noted.      Admission Status:[id number]:::1}      Medications Reviewed:     Current Facility-Administered Medications   Medication Dose Route Frequency    hydrALAZINE (APRESOLINE) tablet 100 mg  100 mg Oral 3 times per day    [START ON 8/10/2024] spironolactone (ALDACTONE) tablet 25 mg  25 mg Oral Daily    prochlorperazine (COMPAZINE) injection 5 mg  5 mg IntraVENous Q6H PRN    lisinopril (PRINIVIL;ZESTRIL) tablet 40 mg  40 mg Oral Daily    potassium chloride (KLOR-CON) extended release tablet 10 mEq  10 mEq Oral Daily    hydrALAZINE (APRESOLINE) injection 10 mg  10 mg IntraVENous Q6H PRN    melatonin tablet 3 mg  3 mg Oral Nightly PRN

## 2024-08-10 PROBLEM — R09.89 LABILE HYPERTENSION: Status: ACTIVE | Noted: 2024-08-10

## 2024-08-10 LAB
APPEARANCE UR: ABNORMAL
BACTERIA URNS QL MICRO: ABNORMAL /HPF
BILIRUB UR QL: NEGATIVE
COLOR UR: ABNORMAL
EPITH CASTS URNS QL MICRO: ABNORMAL /LPF
GLUCOSE BLD STRIP.AUTO-MCNC: 120 MG/DL (ref 65–117)
GLUCOSE BLD STRIP.AUTO-MCNC: 143 MG/DL (ref 65–117)
GLUCOSE BLD STRIP.AUTO-MCNC: 153 MG/DL (ref 65–117)
GLUCOSE BLD STRIP.AUTO-MCNC: 174 MG/DL (ref 65–117)
GLUCOSE UR STRIP.AUTO-MCNC: NEGATIVE MG/DL
HGB UR QL STRIP: NEGATIVE
KETONES UR QL STRIP.AUTO: ABNORMAL MG/DL
LEUKOCYTE ESTERASE UR QL STRIP.AUTO: ABNORMAL
NITRITE UR QL STRIP.AUTO: NEGATIVE
PH UR STRIP: 5 (ref 5–8)
PROT UR STRIP-MCNC: 30 MG/DL
RBC #/AREA URNS HPF: ABNORMAL /HPF (ref 0–5)
SERVICE CMNT-IMP: ABNORMAL
SP GR UR REFRACTOMETRY: 1.01 (ref 1–1.03)
URINE CULTURE IF INDICATED: ABNORMAL
UROBILINOGEN UR QL STRIP.AUTO: 0.2 EU/DL (ref 0.2–1)
WBC URNS QL MICRO: ABNORMAL /HPF (ref 0–4)

## 2024-08-10 PROCEDURE — 6360000002 HC RX W HCPCS: Performed by: INTERNAL MEDICINE

## 2024-08-10 PROCEDURE — 6370000000 HC RX 637 (ALT 250 FOR IP): Performed by: INTERNAL MEDICINE

## 2024-08-10 PROCEDURE — 87086 URINE CULTURE/COLONY COUNT: CPT

## 2024-08-10 PROCEDURE — 6360000002 HC RX W HCPCS: Performed by: FAMILY MEDICINE

## 2024-08-10 PROCEDURE — 2580000003 HC RX 258: Performed by: INTERNAL MEDICINE

## 2024-08-10 PROCEDURE — 87186 SC STD MICRODIL/AGAR DIL: CPT

## 2024-08-10 PROCEDURE — 99232 SBSQ HOSP IP/OBS MODERATE 35: CPT | Performed by: INTERNAL MEDICINE

## 2024-08-10 PROCEDURE — 6370000000 HC RX 637 (ALT 250 FOR IP): Performed by: FAMILY MEDICINE

## 2024-08-10 PROCEDURE — 2580000003 HC RX 258: Performed by: FAMILY MEDICINE

## 2024-08-10 PROCEDURE — 82962 GLUCOSE BLOOD TEST: CPT

## 2024-08-10 PROCEDURE — 2060000000 HC ICU INTERMEDIATE R&B

## 2024-08-10 PROCEDURE — 2500000003 HC RX 250 WO HCPCS

## 2024-08-10 PROCEDURE — 81001 URINALYSIS AUTO W/SCOPE: CPT

## 2024-08-10 PROCEDURE — 87088 URINE BACTERIA CULTURE: CPT

## 2024-08-10 RX ORDER — BISACODYL 10 MG
10 SUPPOSITORY, RECTAL RECTAL ONCE
Status: COMPLETED | OUTPATIENT
Start: 2024-08-10 | End: 2024-08-10

## 2024-08-10 RX ORDER — METOPROLOL TARTRATE 1 MG/ML
2.5 INJECTION, SOLUTION INTRAVENOUS ONCE
Status: COMPLETED | OUTPATIENT
Start: 2024-08-10 | End: 2024-08-10

## 2024-08-10 RX ADMIN — METOPROLOL TARTRATE 100 MG: 25 TABLET, FILM COATED ORAL at 10:40

## 2024-08-10 RX ADMIN — BISACODYL 10 MG: 10 SUPPOSITORY RECTAL at 16:11

## 2024-08-10 RX ADMIN — ONDANSETRON 4 MG: 2 INJECTION INTRAMUSCULAR; INTRAVENOUS at 19:49

## 2024-08-10 RX ADMIN — METOPROLOL TARTRATE 2.5 MG: 1 INJECTION, SOLUTION INTRAVENOUS at 23:43

## 2024-08-10 RX ADMIN — CLONIDINE HYDROCHLORIDE 0.3 MG: 0.1 TABLET ORAL at 10:41

## 2024-08-10 RX ADMIN — PROCHLORPERAZINE EDISYLATE 5 MG: 5 INJECTION INTRAMUSCULAR; INTRAVENOUS at 22:23

## 2024-08-10 RX ADMIN — HYDRALAZINE HYDROCHLORIDE 100 MG: 50 TABLET ORAL at 07:00

## 2024-08-10 RX ADMIN — SODIUM CHLORIDE, PRESERVATIVE FREE 10 ML: 5 INJECTION INTRAVENOUS at 22:25

## 2024-08-10 RX ADMIN — CLONIDINE HYDROCHLORIDE 0.3 MG: 0.1 TABLET ORAL at 14:31

## 2024-08-10 RX ADMIN — PANTOPRAZOLE SODIUM 40 MG: 40 TABLET, DELAYED RELEASE ORAL at 07:00

## 2024-08-10 RX ADMIN — HYDRALAZINE HYDROCHLORIDE 10 MG: 20 INJECTION INTRAMUSCULAR; INTRAVENOUS at 12:26

## 2024-08-10 RX ADMIN — SPIRONOLACTONE 25 MG: 25 TABLET ORAL at 10:43

## 2024-08-10 RX ADMIN — WATER 1000 MG: 1 INJECTION INTRAMUSCULAR; INTRAVENOUS; SUBCUTANEOUS at 14:29

## 2024-08-10 RX ADMIN — LISINOPRIL 40 MG: 20 TABLET ORAL at 10:40

## 2024-08-10 RX ADMIN — CETIRIZINE HYDROCHLORIDE 10 MG: 10 TABLET, FILM COATED ORAL at 10:42

## 2024-08-10 RX ADMIN — HYDRALAZINE HYDROCHLORIDE 100 MG: 50 TABLET ORAL at 14:32

## 2024-08-10 RX ADMIN — CLONIDINE HYDROCHLORIDE 0.3 MG: 0.1 TABLET ORAL at 22:25

## 2024-08-10 RX ADMIN — POTASSIUM CHLORIDE 10 MEQ: 750 TABLET, FILM COATED, EXTENDED RELEASE ORAL at 10:42

## 2024-08-10 RX ADMIN — SODIUM CHLORIDE, PRESERVATIVE FREE 10 ML: 5 INJECTION INTRAVENOUS at 10:44

## 2024-08-10 RX ADMIN — Medication 1 TABLET: at 10:43

## 2024-08-10 RX ADMIN — Medication 1000 UNITS: at 10:41

## 2024-08-10 RX ADMIN — POLYETHYLENE GLYCOL 3350 17 G: 17 POWDER, FOR SOLUTION ORAL at 15:51

## 2024-08-10 RX ADMIN — ATORVASTATIN CALCIUM 10 MG: 10 TABLET, FILM COATED ORAL at 10:44

## 2024-08-10 ASSESSMENT — PAIN SCALES - GENERAL: PAINLEVEL_OUTOF10: 0

## 2024-08-10 NOTE — PROGRESS NOTES
results for input(s): \"ALT\", \"TP\", \"GLOB\", \"GGT\" in the last 72 hours.    Invalid input(s): \"SGOT\", \"GPT\", \"AP\", \"TBIL\", \"TBILI\", \"ALB\", \"AML\", \"AMYP\", \"LPSE\", \"HLPSE\"  No results for input(s): \"INR\", \"APTT\" in the last 72 hours.    Invalid input(s): \"PTP\"   No results for input(s): \"TIBC\" in the last 72 hours.    Invalid input(s): \"FE\", \"PSAT\", \"FERR\"   No results found for: \"RBCF\"   No results for input(s): \"PH\", \"PCO2\", \"PO2\" in the last 72 hours.  No results for input(s): \"CPK\" in the last 72 hours.    Invalid input(s): \"CPKMB\", \"CKNDX\", \"TROIQ\"  Lab Results   Component Value Date/Time    CHOL 150 03/25/2024 08:19 AM    HDL 50 03/25/2024 08:19 AM    LDL 85.2 03/25/2024 08:19 AM     No results found for: \"GLUCPOC\"  [unfilled]    Notes reviewed from all clinical/nonclinical/nursing services involved in patient's clinical care. Care coordination discussions were held with appropriate clinical/nonclinical/ nursing providers based on care coordination needs.         Patients current active Medications were reviewed, considered, added and adjusted based on the clinical condition today.      Home Medications were reconciled to the best of my ability given all available resources at the time of admission. Route is PO if not otherwise noted.      Admission Status:[id number]:::1}      Medications Reviewed:     Current Facility-Administered Medications   Medication Dose Route Frequency    cefTRIAXone (ROCEPHIN) 1,000 mg in sterile water 10 mL IV syringe  1,000 mg IntraVENous Q24H    hydrALAZINE (APRESOLINE) tablet 100 mg  100 mg Oral 3 times per day    spironolactone (ALDACTONE) tablet 25 mg  25 mg Oral Daily    prochlorperazine (COMPAZINE) injection 5 mg  5 mg IntraVENous Q6H PRN    [Held by provider] niCARdipine (CARDENE) 25 mg in sodium chloride 0.9 % 250 mL infusion ([id number])  2.5-15 mg/hr IntraVENous Continuous    lisinopril (PRINIVIL;ZESTRIL) tablet 40 mg  40 mg Oral Daily    potassium chloride (KLOR-CON) extended  release tablet 10 mEq  10 mEq Oral Daily    hydrALAZINE (APRESOLINE) injection 10 mg  10 mg IntraVENous Q6H PRN    melatonin tablet 3 mg  3 mg Oral Nightly PRN    atorvastatin (LIPITOR) tablet 10 mg  10 mg Oral Daily    oyster shell calcium w/D 500-5 MG-MCG tablet 1 tablet  1 tablet Oral Daily    cloNIDine (CATAPRES) tablet 0.3 mg  0.3 mg Oral TID    cetirizine (ZYRTEC) tablet 10 mg  10 mg Oral Daily    metoprolol tartrate (LOPRESSOR) tablet 100 mg  100 mg Oral BID    pantoprazole (PROTONIX) tablet 40 mg  40 mg Oral QAM AC    Vitamin D (CHOLECALCIFEROL) tablet 1,000 Units  1,000 Units Oral Daily    sodium chloride flush 0.9 % injection 5-40 mL  5-40 mL IntraVENous 2 times per day    sodium chloride flush 0.9 % injection 5-40 mL  5-40 mL IntraVENous PRN    0.9 % sodium chloride infusion   IntraVENous PRN    potassium chloride (KLOR-CON) extended release tablet 40 mEq  40 mEq Oral PRN    Or    potassium bicarb-citric acid (EFFER-K) effervescent tablet 40 mEq  40 mEq Oral PRN    Or    potassium chloride 10 mEq/100 mL IVPB (Peripheral Line)  10 mEq IntraVENous PRN    magnesium sulfate 2000 mg in 50 mL IVPB premix  2,000 mg IntraVENous PRN    ondansetron (ZOFRAN-ODT) disintegrating tablet 4 mg  4 mg Oral Q8H PRN    Or    ondansetron (ZOFRAN) injection 4 mg  4 mg IntraVENous Q6H PRN    polyethylene glycol (GLYCOLAX) packet 17 g  17 g Oral Daily PRN    acetaminophen (TYLENOL) tablet 650 mg  650 mg Oral Q6H PRN    Or    acetaminophen (TYLENOL) suppository 650 mg  650 mg Rectal Q6H PRN    insulin lispro (HUMALOG,ADMELOG) injection vial 0-8 Units  0-8 Units SubCUTAneous TID WC    insulin lispro (HUMALOG,ADMELOG) injection vial 0-4 Units  0-4 Units SubCUTAneous Nightly     ______________________________________________________________________  EXPECTED LENGTH OF STAY: Unable to retrieve estimated LOS  ACTUAL LENGTH OF STAY:          4                 Maxi Lo MD

## 2024-08-10 NOTE — PLAN OF CARE
Problem: Discharge Planning  Goal: Discharge to home or other facility with appropriate resources  8/10/2024 0207 by Alexia Herzog, RN  Outcome: Progressing     Problem: Pain  Goal: Verbalizes/displays adequate comfort level or baseline comfort level  8/10/2024 0207 by Alexia Herzog, RN  Outcome: Progressing  Flowsheets (Taken 8/10/2024 0207)  Verbalizes/displays adequate comfort level or baseline comfort level:   Encourage patient to monitor pain and request assistance   Assess pain using appropriate pain scale     Problem: Safety - Adult  Goal: Free from fall injury  8/10/2024 0207 by Alexia Herzog, RN  Outcome: Progressing     Problem: Chronic Conditions and Co-morbidities  Goal: Patient's chronic conditions and co-morbidity symptoms are monitored and maintained or improved  8/10/2024 0207 by Alexia Herzog, RN  Outcome: Progressing     Problem: ABCDS Injury Assessment  Goal: Absence of physical injury  Outcome: Progressing  Flowsheets (Taken 8/10/2024 0207)  Absence of Physical Injury: Implement safety measures based on patient assessment

## 2024-08-10 NOTE — PLAN OF CARE
Problem: Discharge Planning  Goal: Discharge to home or other facility with appropriate resources  Outcome: Progressing  Flowsheets (Taken 8/10/2024 1145)  Discharge to home or other facility with appropriate resources: Identify barriers to discharge with patient and caregiver     Problem: Pain  Goal: Verbalizes/displays adequate comfort level or baseline comfort level  Outcome: Progressing  Flowsheets (Taken 8/10/2024 0900)  Verbalizes/displays adequate comfort level or baseline comfort level: Encourage patient to monitor pain and request assistance     Problem: Safety - Adult  Goal: Free from fall injury  Outcome: Progressing     Problem: Chronic Conditions and Co-morbidities  Goal: Patient's chronic conditions and co-morbidity symptoms are monitored and maintained or improved  Outcome: Progressing  Flowsheets (Taken 8/10/2024 1145)  Care Plan - Patient's Chronic Conditions and Co-Morbidity Symptoms are Monitored and Maintained or Improved:   Monitor and assess patient's chronic conditions and comorbid symptoms for stability, deterioration, or improvement   Collaborate with multidisciplinary team to address chronic and comorbid conditions and prevent exacerbation or deterioration   Update acute care plan with appropriate goals if chronic or comorbid symptoms are exacerbated and prevent overall improvement and discharge     Problem: ABCDS Injury Assessment  Goal: Absence of physical injury  Outcome: Progressing

## 2024-08-10 NOTE — CONSULTS
[facility] CARDIOLOGY  Cardiology Care Note                  []Initial visit     [x]Established visit     Patient Name: Ammy Tarango - :1934 - MRN:[id number]  Primary Cardiologist: Alen Ovalles MD        8/10/2024 9:38 AM     Reason for initial visit: Difficult to treat hypertension      HPI:      Ammy is a 89 y.o. female with a history of difficult to treat hypertension on multiple antihypertensive, venous insufficiency, hyperlipidemia and diabetes mellitus was following Dr. Edward Ovalles presented to the hospital with history of fall with injury to head with subsequent CT finding of 9 mm hypodensity in the right superior frontal extra axial space suggestive of small focus of intracranial hemorrhage  Patient also has uncontrolled hypertension on admission  The patient presented to the hospital following a slip in their bedroom, resulting in a head injury. They reported taking two Tylenol and resting post-incident, with no immediate chest pain, shortness of breath, confusion, or other symptoms noted at the time.    INTERVAL HISTORY :    Reviewed due to overnight events.  Her blood pressure is overall controlled after recent adjustments.  She denies any chest pain.  Breathing has been stable.  No significant arrhythmia on telemetry      Assessment/Plan/Discussion: Cardiology Attending:           ASSESSMENT    History of fall with small intracranial frontal hemorrhage  Hypertensive emergency  Bilateral pedal edema most likely due to venous stasis versus diastolic heart failure  Diabetes mellitus      PLAN   Patient presented to the hospital with history of fall with CT suggestive of small right-sided frontal intracranial hemorrhage-asymptomatic with no signs of confusion on conservative medical management as per neurosurgery  Hold aspirin  Blood pressure better controlled now.  Would continue current regimen.  Goal systolic blood  pressure <140 given acute right sided frontal intracranial hemorrhage. No longer on Cardene drip.  I did let her know her medication for blood pressure and dosages will change compared to what she was on previously so she needs to pay close attention when she is discharged.  Okay cardiac wise for hospital discharge when otherwise okay.  Will set up outpatient follow-up within the next few weeks.  Hyperlipidemia fairly well-controlled on Lipitor  Her echo yesterday overall was normal and we reviewed the results      We discussed the expected course, resolution and complications of the diagnosis(es) in detail.  Medication risks, benefits, costs, interactions, and alternatives were discussed as indicated.                  ___________________________________________________________      Cardiac testing  Echo done in 2018  Left ventricle: Systolic function was normal. Ejection fraction was  estimated in the range of 60 % to 65 %. There were no regional wall motion  abnormalities.     Mitral valve: There was mild regurgitation.     EC normal sinus rhythm with LVH    Review of Systems:    [x]All other systems reviewed and all negative except as written in HPI    [] Patient unable to provide secondary to condition    Past Medical History:   Diagnosis Date    Adverse effect of anesthesia     pt states she was able to feel everything during colonoscopy    Chronic bronchitis (HCC)     Diabetes (HCC)     Essential hypertension     Hypercholesterolemia     Hypertension      Past Surgical History:   Procedure Laterality Date    CHOLECYSTECTOMY      COLONOSCOPY N/A 2017    COLONOSCOPY performed by Nikolay Oglesby MD at [facility] ENDOSCOPY    GYN      tubal ligation    HYSTERECTOMY (CERVIX STATUS UNKNOWN)       Social Hx:  reports that she has never smoked. She has never been exposed to tobacco smoke. She has never used smokeless tobacco. She reports that she does not drink alcohol and does not use drugs.  Family Hx:

## 2024-08-10 NOTE — CONSULTS
Subjective:     Patient is a 89 y.o. recently admitted after a fall sustaining a small ICH.  Since admission she has had issues with high BP and started vomiting yesterday.  Her abdomen is slightly distended however, she currently denies any abdominal pain, and has not vomited overnight.  She states she is passing small amount of flatus.  Nurse reports last BM was a couple of days ago.  She was not having any GI issues prior to the vomiting.         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    Peripheral vascular disease (HCC) 10/09/2019    Class 1 obesity due to excess calories with serious comorbidity and body mass index (BMI) of 33.0 to 33.9 in adult 12/26/2018    Radiculopathy, cervical 07/16/2018    Type 2 diabetes mellitus with nephropathy (HCC) 01/24/2018    Essential hypertension with goal blood pressure less than 140/90 09/28/2016    Advance directive discussed with patient 06/23/2016    Cortical senile cataract 03/22/2016    Ptosis of eyelid 03/22/2016    Chronic nasal congestion 02/11/2016    Controlled type 2 diabetes mellitus without complication (HCC) 11/12/2015    Mixed hyperlipidemia 10/22/2015    Encounter for long-term (current) use of other medications 10/15/2014    Stasis edema 07/02/2014    PVC's (premature ventricular contractions) 02/28/2012    Hypercholesterolemia      Past Medical History:   Diagnosis Date    Adverse effect of anesthesia     pt states she was able to feel everything during colonoscopy    Chronic bronchitis (HCC)     Diabetes (HCC)     Essential hypertension     Hypercholesterolemia     Hypertension       Past Surgical History:   Procedure Laterality Date    CHOLECYSTECTOMY      COLONOSCOPY N/A 9/25/2017    COLONOSCOPY performed by Nikolay Oglesby MD at [facility] ENDOSCOPY    GYN      tubal ligation    HYSTERECTOMY (CERVIX STATUS UNKNOWN)        Medications  Take 1 capsule by mouth daily (Patient not taking: Reported on 8/6/2024)  loratadine (CLARITIN) 10 MG tablet, Take 1 tablet by mouth nightly  Allergies   Allergen Reactions    Cortisone Other (See Comments)     \"Make me feels weak, like I'm going to die\"    Sitagliptin Other (See Comments)     weakness      Social History     Tobacco Use    Smoking status: Never     Passive exposure: Never    Smokeless tobacco: Never   Substance Use Topics    Alcohol use: No     Alcohol/week: 0.0 standard drinks of alcohol      Family History   Problem Relation Age of Onset    No Known Problems Daughter     No Known Problems Daughter     No Known Problems Mother     No Known Problems Son     No Known Problems Father     No Known Problems Daughter     No Known Problems Son     No Known Problems Son       Review of Systems  Pertinent items are noted in HPI.    Objective:     Patient Vitals for the past 8 hrs:   BP Temp Temp src Pulse Resp SpO2 Weight   08/10/24 0700 132/88 -- -- -- -- -- --   08/10/24 0630 (!) 129/49 -- -- 63 18 92 % --   08/10/24 0605 -- 97.9 °F (36.6 °C) Oral -- -- -- --   08/10/24 0530 (!) 110/57 -- -- 58 16 92 % --   08/10/24 0445 (!) 125/53 -- -- 57 -- -- --   08/10/24 0400 (!) 111/47 -- -- 60 24 -- --   08/10/24 0330 97/61 -- -- 64 23 95 % --   08/10/24 0226 (!) 105/52 98.5 °F (36.9 °C) Axillary 63 -- 94 % 69.1 kg (152 lb 6.4 oz)   08/10/24 0157 -- -- -- 69 -- -- --   08/10/24 0130 (!) 96/53 -- -- 68 19 92 % --   08/10/24 0100 (!) 132/51 -- -- 72 28 96 % --   08/10/24 0030 (!) 130/56 -- -- 65 19 95 % --   08/10/24 0000 (!) 128/56 -- -- 64 21 -- --     No intake/output data recorded.  No intake/output data recorded.    Physical Exam  Constitutional:       General: She is not in acute distress.     Appearance: She is not ill-appearing or toxic-appearing.   HENT:      Head: Normocephalic and atraumatic.      Mouth/Throat:      Mouth: Mucous membranes are moist.      Pharynx: Oropharynx is clear.   Eyes:

## 2024-08-11 ENCOUNTER — APPOINTMENT (OUTPATIENT)
Facility: HOSPITAL | Age: 89
DRG: 083 | End: 2024-08-11
Payer: MEDICARE

## 2024-08-11 LAB
ANION GAP SERPL CALC-SCNC: 4 MMOL/L (ref 5–15)
BUN SERPL-MCNC: 18 MG/DL (ref 6–20)
BUN/CREAT SERPL: 21 (ref 12–20)
CALCIUM SERPL-MCNC: 9.8 MG/DL (ref 8.5–10.1)
CHLORIDE SERPL-SCNC: 100 MMOL/L (ref 97–108)
CO2 SERPL-SCNC: 30 MMOL/L (ref 21–32)
CREAT SERPL-MCNC: 0.85 MG/DL (ref 0.55–1.02)
ERYTHROCYTE [DISTWIDTH] IN BLOOD BY AUTOMATED COUNT: 14.2 % (ref 11.5–14.5)
GLUCOSE BLD STRIP.AUTO-MCNC: 129 MG/DL (ref 65–117)
GLUCOSE BLD STRIP.AUTO-MCNC: 133 MG/DL (ref 65–117)
GLUCOSE BLD STRIP.AUTO-MCNC: 140 MG/DL (ref 65–117)
GLUCOSE BLD STRIP.AUTO-MCNC: 143 MG/DL (ref 65–117)
GLUCOSE SERPL-MCNC: 147 MG/DL (ref 65–100)
HCT VFR BLD AUTO: 37.8 % (ref 35–47)
HGB BLD-MCNC: 12.3 G/DL (ref 11.5–16)
MAGNESIUM SERPL-MCNC: 1.8 MG/DL (ref 1.6–2.4)
MCH RBC QN AUTO: 28 PG (ref 26–34)
MCHC RBC AUTO-ENTMCNC: 32.5 G/DL (ref 30–36.5)
MCV RBC AUTO: 86.1 FL (ref 80–99)
NRBC # BLD: 0 K/UL (ref 0–0.01)
NRBC BLD-RTO: 0 PER 100 WBC
PHOSPHATE SERPL-MCNC: 3.5 MG/DL (ref 2.6–4.7)
PLATELET # BLD AUTO: 223 K/UL (ref 150–400)
PMV BLD AUTO: 9.7 FL (ref 8.9–12.9)
POTASSIUM SERPL-SCNC: 3.9 MMOL/L (ref 3.5–5.1)
RBC # BLD AUTO: 4.39 M/UL (ref 3.8–5.2)
SERVICE CMNT-IMP: ABNORMAL
SODIUM SERPL-SCNC: 134 MMOL/L (ref 136–145)
WBC # BLD AUTO: 5.3 K/UL (ref 3.6–11)

## 2024-08-11 PROCEDURE — 85027 COMPLETE CBC AUTOMATED: CPT

## 2024-08-11 PROCEDURE — 74018 RADEX ABDOMEN 1 VIEW: CPT

## 2024-08-11 PROCEDURE — 82962 GLUCOSE BLOOD TEST: CPT

## 2024-08-11 PROCEDURE — 99232 SBSQ HOSP IP/OBS MODERATE 35: CPT | Performed by: INTERNAL MEDICINE

## 2024-08-11 PROCEDURE — 6360000002 HC RX W HCPCS

## 2024-08-11 PROCEDURE — 2580000003 HC RX 258: Performed by: FAMILY MEDICINE

## 2024-08-11 PROCEDURE — 6370000000 HC RX 637 (ALT 250 FOR IP)

## 2024-08-11 PROCEDURE — 84100 ASSAY OF PHOSPHORUS: CPT

## 2024-08-11 PROCEDURE — 80048 BASIC METABOLIC PNL TOTAL CA: CPT

## 2024-08-11 PROCEDURE — 2580000003 HC RX 258: Performed by: INTERNAL MEDICINE

## 2024-08-11 PROCEDURE — 94760 N-INVAS EAR/PLS OXIMETRY 1: CPT

## 2024-08-11 PROCEDURE — 6360000002 HC RX W HCPCS: Performed by: FAMILY MEDICINE

## 2024-08-11 PROCEDURE — 6370000000 HC RX 637 (ALT 250 FOR IP): Performed by: FAMILY MEDICINE

## 2024-08-11 PROCEDURE — 6360000002 HC RX W HCPCS: Performed by: INTERNAL MEDICINE

## 2024-08-11 PROCEDURE — 6370000000 HC RX 637 (ALT 250 FOR IP): Performed by: INTERNAL MEDICINE

## 2024-08-11 PROCEDURE — 2580000003 HC RX 258

## 2024-08-11 PROCEDURE — 83735 ASSAY OF MAGNESIUM: CPT

## 2024-08-11 PROCEDURE — 51798 US URINE CAPACITY MEASURE: CPT

## 2024-08-11 PROCEDURE — 2060000000 HC ICU INTERMEDIATE R&B

## 2024-08-11 RX ORDER — SODIUM CHLORIDE 9 MG/ML
INJECTION, SOLUTION INTRAVENOUS CONTINUOUS
Status: DISCONTINUED | OUTPATIENT
Start: 2024-08-11 | End: 2024-08-16

## 2024-08-11 RX ORDER — BISACODYL 10 MG
10 SUPPOSITORY, RECTAL RECTAL 2 TIMES DAILY
Status: DISCONTINUED | OUTPATIENT
Start: 2024-08-11 | End: 2024-08-21 | Stop reason: HOSPADM

## 2024-08-11 RX ADMIN — Medication 1 TABLET: at 10:33

## 2024-08-11 RX ADMIN — SODIUM CHLORIDE, PRESERVATIVE FREE 10 ML: 5 INJECTION INTRAVENOUS at 10:40

## 2024-08-11 RX ADMIN — ONDANSETRON 4 MG: 2 INJECTION INTRAMUSCULAR; INTRAVENOUS at 01:16

## 2024-08-11 RX ADMIN — BISACODYL 10 MG: 10 SUPPOSITORY RECTAL at 13:04

## 2024-08-11 RX ADMIN — NICARDIPINE HYDROCHLORIDE 5 MG/HR: 2.5 INJECTION, SOLUTION INTRAVENOUS at 19:14

## 2024-08-11 RX ADMIN — NICARDIPINE HYDROCHLORIDE 5 MG/HR: 2.5 INJECTION, SOLUTION INTRAVENOUS at 04:48

## 2024-08-11 RX ADMIN — CLONIDINE HYDROCHLORIDE 0.3 MG: 0.1 TABLET ORAL at 14:54

## 2024-08-11 RX ADMIN — CETIRIZINE HYDROCHLORIDE 10 MG: 10 TABLET, FILM COATED ORAL at 10:34

## 2024-08-11 RX ADMIN — MAGNESIUM HYDROXIDE 30 ML: 400 SUSPENSION ORAL at 13:04

## 2024-08-11 RX ADMIN — METOPROLOL TARTRATE 100 MG: 25 TABLET, FILM COATED ORAL at 22:09

## 2024-08-11 RX ADMIN — CLONIDINE HYDROCHLORIDE 0.3 MG: 0.1 TABLET ORAL at 22:10

## 2024-08-11 RX ADMIN — PANTOPRAZOLE SODIUM 40 MG: 40 TABLET, DELAYED RELEASE ORAL at 06:11

## 2024-08-11 RX ADMIN — HYDRALAZINE HYDROCHLORIDE 10 MG: 20 INJECTION INTRAMUSCULAR; INTRAVENOUS at 13:07

## 2024-08-11 RX ADMIN — ATORVASTATIN CALCIUM 10 MG: 10 TABLET, FILM COATED ORAL at 10:35

## 2024-08-11 RX ADMIN — WATER 1000 MG: 1 INJECTION INTRAMUSCULAR; INTRAVENOUS; SUBCUTANEOUS at 14:50

## 2024-08-11 RX ADMIN — LISINOPRIL 40 MG: 20 TABLET ORAL at 10:33

## 2024-08-11 RX ADMIN — METOPROLOL TARTRATE 100 MG: 25 TABLET, FILM COATED ORAL at 10:36

## 2024-08-11 RX ADMIN — HYDRALAZINE HYDROCHLORIDE 100 MG: 50 TABLET ORAL at 06:11

## 2024-08-11 RX ADMIN — Medication 1000 UNITS: at 10:34

## 2024-08-11 RX ADMIN — HYDRALAZINE HYDROCHLORIDE 10 MG: 20 INJECTION INTRAMUSCULAR; INTRAVENOUS at 00:44

## 2024-08-11 RX ADMIN — HYDRALAZINE HYDROCHLORIDE 100 MG: 50 TABLET ORAL at 14:54

## 2024-08-11 RX ADMIN — SODIUM CHLORIDE: 9 INJECTION, SOLUTION INTRAVENOUS at 14:56

## 2024-08-11 RX ADMIN — HYDRALAZINE HYDROCHLORIDE 100 MG: 50 TABLET ORAL at 22:09

## 2024-08-11 RX ADMIN — CLONIDINE HYDROCHLORIDE 0.3 MG: 0.1 TABLET ORAL at 10:35

## 2024-08-11 RX ADMIN — ACETAMINOPHEN 650 MG: 325 TABLET ORAL at 06:11

## 2024-08-11 RX ADMIN — POTASSIUM CHLORIDE 10 MEQ: 750 TABLET, FILM COATED, EXTENDED RELEASE ORAL at 10:33

## 2024-08-11 RX ADMIN — BISACODYL 10 MG: 10 SUPPOSITORY RECTAL at 18:12

## 2024-08-11 RX ADMIN — ACETAMINOPHEN 650 MG: 325 TABLET ORAL at 19:41

## 2024-08-11 RX ADMIN — SPIRONOLACTONE 25 MG: 25 TABLET ORAL at 10:34

## 2024-08-11 ASSESSMENT — PAIN DESCRIPTION - LOCATION: LOCATION: ABDOMEN

## 2024-08-11 ASSESSMENT — PAIN SCALES - GENERAL: PAINLEVEL_OUTOF10: 8

## 2024-08-11 NOTE — PLAN OF CARE
Problem: Discharge Planning  Goal: Discharge to home or other facility with appropriate resources  8/11/2024 0500 by Alexia Herzog, RN  Outcome: Progressing    Problem: Pain  Goal: Verbalizes/displays adequate comfort level or baseline comfort level  8/11/2024 0500 by Alexia Herzog, RN  Outcome: Progressing    Problem: Safety - Adult  Goal: Free from fall injury  8/11/2024 0500 by Alexia Herzog, RN  Outcome: Progressing     Problem: Chronic Conditions and Co-morbidities  Goal: Patient's chronic conditions and co-morbidity symptoms are monitored and maintained or improved  8/11/2024 0500 by Alexia Herzog, RN  Outcome: Progressing     Problem: ABCDS Injury Assessment  Goal: Absence of physical injury  8/11/2024 0500 by Alexia Herzog, RN  Outcome: Progressing

## 2024-08-11 NOTE — PROGRESS NOTES
including clinical events, labs, images, vital signs, I/O's, and examined patient.  I have discussed the case and the plan and management of the patient's care with the consulting services, the bedside nurses and necessary ancillary providers.       NOTE OF PERSONAL INVOLVEMENT IN CARE   This patient has a high probability of imminent, clinically significant deterioration, which requires the highest level of preparedness to intervene urgently. I participated in the decision-making and personally managed or directed the management of the following life and organ supporting interventions that required my frequent assessment to treat or prevent imminent deterioration.     I personally spent 45 minutes of critical care time.  This is time spent at this critically ill patient's bedside actively involved in patient care as well as the coordination of care and discussions with the patient's family.  This does not include any procedural time which has been billed separately.       Principal Problem:    Subdural hematoma (HCC)  Active Problems:    Labile hypertension  Resolved Problems:    * No resolved hospital problems. *         Social Determinants of Health     Tobacco Use: Low Risk  (8/6/2024)    Patient History     Smoking Tobacco Use: Never     Smokeless Tobacco Use: Never     Passive Exposure: Never   Alcohol Use: Not At Risk (3/18/2024)    AUDIT-C     Frequency of Alcohol Consumption: Never     Average Number of Drinks: Patient does not drink     Frequency of Binge Drinking: Never   Financial Resource Strain: High Risk (5/29/2024)    Overall Financial Resource Strain (CARDIA)     Difficulty of Paying Living Expenses: Hard   Food Insecurity: No Food Insecurity (5/29/2024)    Hunger Vital Sign     Worried About Running Out of Food in the Last Year: Never true     Ran Out of Food in the Last Year: Never true   Transportation Needs: Unknown (5/29/2024)    PRAPARE - Transportation     Lack of Transportation (Medical):  sodium chloride 0.9 % 250 mL infusion ([id number])  2.5-15 mg/hr IntraVENous Continuous    bisacodyl (DULCOLAX) suppository 10 mg  10 mg Rectal BID    magnesium hydroxide (MILK OF MAGNESIA) 400 MG/5ML suspension 30 mL  30 mL Oral Daily    cefTRIAXone (ROCEPHIN) 1,000 mg in sterile water 10 mL IV syringe  1,000 mg IntraVENous Q24H    hydrALAZINE (APRESOLINE) tablet 100 mg  100 mg Oral 3 times per day    spironolactone (ALDACTONE) tablet 25 mg  25 mg Oral Daily    prochlorperazine (COMPAZINE) injection 5 mg  5 mg IntraVENous Q6H PRN    lisinopril (PRINIVIL;ZESTRIL) tablet 40 mg  40 mg Oral Daily    potassium chloride (KLOR-CON) extended release tablet 10 mEq  10 mEq Oral Daily    hydrALAZINE (APRESOLINE) injection 10 mg  10 mg IntraVENous Q6H PRN    melatonin tablet 3 mg  3 mg Oral Nightly PRN    atorvastatin (LIPITOR) tablet 10 mg  10 mg Oral Daily    oyster shell calcium w/D 500-5 MG-MCG tablet 1 tablet  1 tablet Oral Daily    cloNIDine (CATAPRES) tablet 0.3 mg  0.3 mg Oral TID    cetirizine (ZYRTEC) tablet 10 mg  10 mg Oral Daily    metoprolol tartrate (LOPRESSOR) tablet 100 mg  100 mg Oral BID    pantoprazole (PROTONIX) tablet 40 mg  40 mg Oral QAM AC    Vitamin D (CHOLECALCIFEROL) tablet 1,000 Units  1,000 Units Oral Daily    sodium chloride flush 0.9 % injection 5-40 mL  5-40 mL IntraVENous 2 times per day    sodium chloride flush 0.9 % injection 5-40 mL  5-40 mL IntraVENous PRN    0.9 % sodium chloride infusion   IntraVENous PRN    potassium chloride (KLOR-CON) extended release tablet 40 mEq  40 mEq Oral PRN    Or    potassium bicarb-citric acid (EFFER-K) effervescent tablet 40 mEq  40 mEq Oral PRN    Or    potassium chloride 10 mEq/100 mL IVPB (Peripheral Line)  10 mEq IntraVENous PRN    magnesium sulfate 2000 mg in 50 mL IVPB premix  2,000 mg IntraVENous PRN    ondansetron (ZOFRAN-ODT) disintegrating tablet 4 mg  4 mg Oral Q8H PRN    Or    ondansetron (ZOFRAN) injection 4 mg  4 mg IntraVENous Q6H PRN

## 2024-08-11 NOTE — PROGRESS NOTES
Admit Date: 8/6/2024  Hosp    Subjective:     Patient had more vomiting yesterday requiring NGT placement     Scheduled Meds:   cefTRIAXone (ROCEPHIN) IV  1,000 mg IntraVENous Q24H    hydrALAZINE  100 mg Oral 3 times per day    spironolactone  25 mg Oral Daily    lisinopril  40 mg Oral Daily    potassium chloride  10 mEq Oral Daily    atorvastatin  10 mg Oral Daily    oyster shell calcium w/D  1 tablet Oral Daily    cloNIDine  0.3 mg Oral TID    cetirizine  10 mg Oral Daily    metoprolol  100 mg Oral BID    pantoprazole  40 mg Oral QAM AC    Vitamin D  1,000 Units Oral Daily    sodium chloride flush  5-40 mL IntraVENous 2 times per day    insulin lispro  0-8 Units SubCUTAneous TID WC    insulin lispro  0-4 Units SubCUTAneous Nightly     Continuous Infusions:   [Held by provider] niCARdipine Stopped (08/11/24 0625)    sodium chloride       PRN Meds:benzocaine, prochlorperazine, hydrALAZINE, melatonin, sodium chloride flush, sodium chloride, potassium chloride **OR** potassium alternative oral replacement **OR** potassium chloride, magnesium sulfate, ondansetron **OR** ondansetron, polyethylene glycol, acetaminophen **OR** acetaminophen        Objective:     Patient Vitals for the past 8 hrs:   BP Temp Temp src Pulse Resp SpO2   08/11/24 0700 (!) 156/72 -- -- 89 18 97 %   08/11/24 0645 -- -- -- -- -- 97 %   08/11/24 0630 134/65 -- -- 86 17 --   08/11/24 0625 138/73 -- -- 91 -- --   08/11/24 0615 (!) 159/76 98.6 °F (37 °C) Oral 94 22 97 %   08/11/24 0550 -- -- -- 94 -- --   08/11/24 0535 (!) 156/67 -- -- 88 20 --   08/11/24 0500 (!) 140/72 -- -- 90 18 91 %   08/11/24 0415 (!) 158/78 -- -- 93 20 92 %   08/11/24 0400 (!) 140/72 -- -- 91 17 92 %   08/11/24 0300 (!) 149/83 -- -- 91 16 95 %   08/11/24 0200 (!) 163/88 98.9 °F (37.2 °C) Oral (!) 101 24 94 %   08/11/24 0115 (!) 164/78 -- -- (!) 105 -- --   08/11/24 0045 (!) 183/92 -- -- (!) 106 -- --     No intake/output data recorded.  No intake/output data recorded.    Gen:

## 2024-08-11 NOTE — PROGRESS NOTES
1930- Pt vomited. Per shift report pt vomited twice during previous shift.     2340- Unable to tolerate oral medication. Vomited clonidine. BP remains elevated at 164/93 . IV metoprolol ordered. Administered soap suds enema, no BM.   NG tube placed.     0045- /92, prn hydralazine given.     0200- /88. Cardene gtt re-ordered.     0611- Tolerating oral medications. Gave PO hydralazine, stopped cardene gtt. /73.   O2 91% on RA. Placed on 2L NC.     0640- Called pt's son, Justino to give update. No answer at this time, left voicemail requesting call back.

## 2024-08-11 NOTE — CONSULTS
[facility] CARDIOLOGY  Cardiology Care Note                  []Initial visit     [x]Established visit     Patient Name: Ammy Tarango - :1934 - MRN:[id number]  Primary Cardiologist: Alen Ovalles MD        2024 9:44 AM     Reason for initial visit: Difficult to treat hypertension      HPI:      Ammy is a 89 y.o. female with a history of difficult to treat hypertension on multiple antihypertensive, venous insufficiency, hyperlipidemia and diabetes mellitus was following Dr. Edward Ovalles presented to the hospital with history of fall with injury to head with subsequent CT finding of 9 mm hypodensity in the right superior frontal extra axial space suggestive of small focus of intracranial hemorrhage  Patient also has uncontrolled hypertension on admission  The patient presented to the hospital following a slip in their bedroom, resulting in a head injury. They reported taking two Tylenol and resting post-incident, with no immediate chest pain, shortness of breath, confusion, or other symptoms noted at the time.    INTERVAL HISTORY :    Reviewed overnight events.  NG tube was placed secondary to possible ileus.  This am she was resting comfortably.  No reports of chest pain and her breathing has been stable.  No significant arrhythmia on telemetry.  Blood pressure was labile yesterday when she was having difficulty taking po meds      Assessment/Plan/Discussion: Cardiology Attending:           ASSESSMENT    History of fall with small intracranial frontal hemorrhage  Hypertensive emergency  Bilateral pedal edema most likely due to venous stasis versus diastolic heart failure  Diabetes mellitus      PLAN   Patient presented to the hospital with history of fall with CT suggestive of small right-sided frontal intracranial hemorrhage-asymptomatic with no signs of confusion on conservative medical management as per neurosurgery  Hold

## 2024-08-12 ENCOUNTER — APPOINTMENT (OUTPATIENT)
Facility: HOSPITAL | Age: 89
DRG: 083 | End: 2024-08-12
Payer: MEDICARE

## 2024-08-12 LAB
ANION GAP SERPL CALC-SCNC: 7 MMOL/L (ref 5–15)
BACTERIA SPEC CULT: ABNORMAL
BACTERIA SPEC CULT: ABNORMAL
BUN SERPL-MCNC: 24 MG/DL (ref 6–20)
BUN/CREAT SERPL: 33 (ref 12–20)
CALCIUM SERPL-MCNC: 8.7 MG/DL (ref 8.5–10.1)
CC UR VC: ABNORMAL
CHLORIDE SERPL-SCNC: 103 MMOL/L (ref 97–108)
CO2 SERPL-SCNC: 26 MMOL/L (ref 21–32)
CREAT SERPL-MCNC: 0.73 MG/DL (ref 0.55–1.02)
ERYTHROCYTE [DISTWIDTH] IN BLOOD BY AUTOMATED COUNT: 14.3 % (ref 11.5–14.5)
GLUCOSE BLD STRIP.AUTO-MCNC: 121 MG/DL (ref 65–117)
GLUCOSE BLD STRIP.AUTO-MCNC: 125 MG/DL (ref 65–117)
GLUCOSE BLD STRIP.AUTO-MCNC: 126 MG/DL (ref 65–117)
GLUCOSE BLD STRIP.AUTO-MCNC: 133 MG/DL (ref 65–117)
GLUCOSE SERPL-MCNC: 129 MG/DL (ref 65–100)
HCT VFR BLD AUTO: 33.5 % (ref 35–47)
HGB BLD-MCNC: 10.7 G/DL (ref 11.5–16)
MAGNESIUM SERPL-MCNC: 2.1 MG/DL (ref 1.6–2.4)
MCH RBC QN AUTO: 28.2 PG (ref 26–34)
MCHC RBC AUTO-ENTMCNC: 31.9 G/DL (ref 30–36.5)
MCV RBC AUTO: 88.4 FL (ref 80–99)
NRBC # BLD: 0 K/UL (ref 0–0.01)
NRBC BLD-RTO: 0 PER 100 WBC
PHOSPHATE SERPL-MCNC: 3.2 MG/DL (ref 2.6–4.7)
PLATELET # BLD AUTO: 212 K/UL (ref 150–400)
PMV BLD AUTO: 10.1 FL (ref 8.9–12.9)
POTASSIUM SERPL-SCNC: 3.8 MMOL/L (ref 3.5–5.1)
RBC # BLD AUTO: 3.79 M/UL (ref 3.8–5.2)
SERVICE CMNT-IMP: ABNORMAL
SODIUM SERPL-SCNC: 136 MMOL/L (ref 136–145)
WBC # BLD AUTO: 4.2 K/UL (ref 3.6–11)

## 2024-08-12 PROCEDURE — 6370000000 HC RX 637 (ALT 250 FOR IP): Performed by: FAMILY MEDICINE

## 2024-08-12 PROCEDURE — 6370000000 HC RX 637 (ALT 250 FOR IP): Performed by: INTERNAL MEDICINE

## 2024-08-12 PROCEDURE — 51798 US URINE CAPACITY MEASURE: CPT

## 2024-08-12 PROCEDURE — 84100 ASSAY OF PHOSPHORUS: CPT

## 2024-08-12 PROCEDURE — 85027 COMPLETE CBC AUTOMATED: CPT

## 2024-08-12 PROCEDURE — 2580000003 HC RX 258: Performed by: FAMILY MEDICINE

## 2024-08-12 PROCEDURE — 83735 ASSAY OF MAGNESIUM: CPT

## 2024-08-12 PROCEDURE — 6370000000 HC RX 637 (ALT 250 FOR IP)

## 2024-08-12 PROCEDURE — 2060000000 HC ICU INTERMEDIATE R&B

## 2024-08-12 PROCEDURE — 6360000002 HC RX W HCPCS: Performed by: INTERNAL MEDICINE

## 2024-08-12 PROCEDURE — 99232 SBSQ HOSP IP/OBS MODERATE 35: CPT | Performed by: INTERNAL MEDICINE

## 2024-08-12 PROCEDURE — 6360000004 HC RX CONTRAST MEDICATION: Performed by: RADIOLOGY

## 2024-08-12 PROCEDURE — 82962 GLUCOSE BLOOD TEST: CPT

## 2024-08-12 PROCEDURE — 80048 BASIC METABOLIC PNL TOTAL CA: CPT

## 2024-08-12 PROCEDURE — 74177 CT ABD & PELVIS W/CONTRAST: CPT

## 2024-08-12 PROCEDURE — 6360000002 HC RX W HCPCS: Performed by: FAMILY MEDICINE

## 2024-08-12 PROCEDURE — 6360000002 HC RX W HCPCS: Performed by: SURGERY

## 2024-08-12 PROCEDURE — 2580000003 HC RX 258: Performed by: INTERNAL MEDICINE

## 2024-08-12 PROCEDURE — 36415 COLL VENOUS BLD VENIPUNCTURE: CPT

## 2024-08-12 PROCEDURE — 51701 INSERT BLADDER CATHETER: CPT

## 2024-08-12 RX ORDER — METOCLOPRAMIDE HYDROCHLORIDE 5 MG/ML
5 INJECTION INTRAMUSCULAR; INTRAVENOUS EVERY 6 HOURS
Status: DISCONTINUED | OUTPATIENT
Start: 2024-08-12 | End: 2024-08-21 | Stop reason: HOSPADM

## 2024-08-12 RX ADMIN — CLONIDINE HYDROCHLORIDE 0.3 MG: 0.1 TABLET ORAL at 21:19

## 2024-08-12 RX ADMIN — Medication 3 MG: at 03:58

## 2024-08-12 RX ADMIN — MAGNESIUM HYDROXIDE 30 ML: 400 SUSPENSION ORAL at 09:42

## 2024-08-12 RX ADMIN — CLONIDINE HYDROCHLORIDE 0.3 MG: 0.1 TABLET ORAL at 09:50

## 2024-08-12 RX ADMIN — HYDRALAZINE HYDROCHLORIDE 100 MG: 50 TABLET ORAL at 06:04

## 2024-08-12 RX ADMIN — METOPROLOL TARTRATE 100 MG: 25 TABLET, FILM COATED ORAL at 09:43

## 2024-08-12 RX ADMIN — ACETAMINOPHEN 650 MG: 325 TABLET ORAL at 21:19

## 2024-08-12 RX ADMIN — BISACODYL 10 MG: 10 SUPPOSITORY RECTAL at 09:56

## 2024-08-12 RX ADMIN — IOHEXOL 50 ML: 240 INJECTION, SOLUTION INTRATHECAL; INTRAVASCULAR; INTRAVENOUS; ORAL at 17:29

## 2024-08-12 RX ADMIN — METOCLOPRAMIDE 5 MG: 5 INJECTION, SOLUTION INTRAMUSCULAR; INTRAVENOUS at 18:10

## 2024-08-12 RX ADMIN — METOCLOPRAMIDE 5 MG: 5 INJECTION, SOLUTION INTRAMUSCULAR; INTRAVENOUS at 21:35

## 2024-08-12 RX ADMIN — NICARDIPINE HYDROCHLORIDE 5 MG/HR: 2.5 INJECTION, SOLUTION INTRAVENOUS at 14:30

## 2024-08-12 RX ADMIN — NICARDIPINE HYDROCHLORIDE 7.5 MG/HR: 2.5 INJECTION, SOLUTION INTRAVENOUS at 09:54

## 2024-08-12 RX ADMIN — CLONIDINE HYDROCHLORIDE 0.3 MG: 0.1 TABLET ORAL at 15:03

## 2024-08-12 RX ADMIN — CETIRIZINE HYDROCHLORIDE 10 MG: 10 TABLET, FILM COATED ORAL at 09:49

## 2024-08-12 RX ADMIN — WATER 1000 MG: 1 INJECTION INTRAMUSCULAR; INTRAVENOUS; SUBCUTANEOUS at 14:25

## 2024-08-12 RX ADMIN — NICARDIPINE HYDROCHLORIDE 7.5 MG/HR: 2.5 INJECTION, SOLUTION INTRAVENOUS at 20:03

## 2024-08-12 RX ADMIN — SODIUM CHLORIDE, PRESERVATIVE FREE 10 ML: 5 INJECTION INTRAVENOUS at 21:19

## 2024-08-12 RX ADMIN — Medication 1 TABLET: at 09:49

## 2024-08-12 RX ADMIN — HYDRALAZINE HYDROCHLORIDE 100 MG: 50 TABLET ORAL at 21:19

## 2024-08-12 RX ADMIN — SPIRONOLACTONE 25 MG: 25 TABLET ORAL at 09:49

## 2024-08-12 RX ADMIN — IOPAMIDOL 100 ML: 755 INJECTION, SOLUTION INTRAVENOUS at 17:29

## 2024-08-12 RX ADMIN — SODIUM CHLORIDE: 9 INJECTION, SOLUTION INTRAVENOUS at 15:49

## 2024-08-12 RX ADMIN — METOCLOPRAMIDE 5 MG: 5 INJECTION, SOLUTION INTRAMUSCULAR; INTRAVENOUS at 12:26

## 2024-08-12 RX ADMIN — METOPROLOL TARTRATE 100 MG: 25 TABLET, FILM COATED ORAL at 21:19

## 2024-08-12 RX ADMIN — NICARDIPINE HYDROCHLORIDE 7.5 MG/HR: 2.5 INJECTION, SOLUTION INTRAVENOUS at 06:56

## 2024-08-12 RX ADMIN — ATORVASTATIN CALCIUM 10 MG: 10 TABLET, FILM COATED ORAL at 09:48

## 2024-08-12 RX ADMIN — NICARDIPINE HYDROCHLORIDE 7.5 MG/HR: 2.5 INJECTION, SOLUTION INTRAVENOUS at 03:14

## 2024-08-12 RX ADMIN — ONDANSETRON 4 MG: 2 INJECTION INTRAMUSCULAR; INTRAVENOUS at 21:16

## 2024-08-12 RX ADMIN — LISINOPRIL 40 MG: 20 TABLET ORAL at 09:50

## 2024-08-12 RX ADMIN — HYDRALAZINE HYDROCHLORIDE 100 MG: 50 TABLET ORAL at 14:29

## 2024-08-12 RX ADMIN — ACETAMINOPHEN 650 MG: 325 TABLET ORAL at 06:03

## 2024-08-12 RX ADMIN — Medication 1000 UNITS: at 09:43

## 2024-08-12 RX ADMIN — Medication 3 MG: at 21:19

## 2024-08-12 RX ADMIN — BISACODYL 10 MG: 10 SUPPOSITORY RECTAL at 18:11

## 2024-08-12 ASSESSMENT — PAIN SCALES - GENERAL: PAINLEVEL_OUTOF10: 9

## 2024-08-12 ASSESSMENT — PAIN DESCRIPTION - LOCATION: LOCATION: BACK

## 2024-08-12 ASSESSMENT — PAIN DESCRIPTION - ORIENTATION: ORIENTATION: LEFT

## 2024-08-12 ASSESSMENT — PAIN DESCRIPTION - DESCRIPTORS: DESCRIPTORS: ACHING

## 2024-08-12 NOTE — PLAN OF CARE
Problem: Discharge Planning  Goal: Discharge to home or other facility with appropriate resources  Outcome: Progressing  Flowsheets (Taken 8/12/2024 0800)  Discharge to home or other facility with appropriate resources:   Identify barriers to discharge with patient and caregiver   Arrange for needed discharge resources and transportation as appropriate   Identify discharge learning needs (meds, wound care, etc)     Problem: Pain  Goal: Verbalizes/displays adequate comfort level or baseline comfort level  Outcome: Progressing     Problem: Safety - Adult  Goal: Free from fall injury  Outcome: Progressing  Flowsheets (Taken 8/12/2024 0800)  Free From Fall Injury:   Instruct family/caregiver on patient safety   Based on caregiver fall risk screen, instruct family/caregiver to ask for assistance with transferring infant if caregiver noted to have fall risk factors     Problem: Chronic Conditions and Co-morbidities  Goal: Patient's chronic conditions and co-morbidity symptoms are monitored and maintained or improved  Outcome: Progressing  Flowsheets (Taken 8/12/2024 0800)  Care Plan - Patient's Chronic Conditions and Co-Morbidity Symptoms are Monitored and Maintained or Improved:   Monitor and assess patient's chronic conditions and comorbid symptoms for stability, deterioration, or improvement   Collaborate with multidisciplinary team to address chronic and comorbid conditions and prevent exacerbation or deterioration     Problem: ABCDS Injury Assessment  Goal: Absence of physical injury  Outcome: Progressing

## 2024-08-12 NOTE — CONSULTS
[facility] CARDIOLOGY  Cardiology Care Note                  []Initial visit     [x]Established visit     Patient Name: Ammy Tarango - :1934 - MRN:[id number]  Primary Cardiologist: Alen Ovalles MD        2024 8:13 AM     Reason for initial visit: Difficult to treat hypertension      Subjective:  NG tube remains in place. Pt denies nausea. Reports she believes she has passed some flatus, no &V.  She is receiving her meds crushed through the NG tube. Net -800 mls in urine.    BP is improved but labile. Remains on cardene gtt at 7.5 mg/hr. She denies any chest pain or SOB.               ASSESSMENT    History of fall with small intracranial frontal hemorrhage  Hypertensive emergency  Bilateral pedal edema most likely due to venous stasis versus diastolic heart failure   Diabetes mellitus  Ileus      PLAN   Patient presented to the hospital with history of fall with CT suggestive of small right-sided frontal intracranial hemorrhage-asymptomatic with no signs of confusion on conservative medical management as per neurosurgery  Aspirin remains on hold.   Blood pressure is improved but labile.  Since pt is still npo with NG tube, continue cardene. ?Questionable absorption of her po medications. Continue po meds (metoprolol, hydralazine, clonidine, lisinopril, aldactone) as able given ileus.    Goal systolic blood pressure <140 given acute right sided frontal intracranial hemorrhage.   Hyperlipidemia fairly well-controlled on Lipitor  Echo 2024 EF 60-65%, mod LVH, NWMA, +Diastolic dysfunction.     Addendum:    I have personally reviewed the documentation and have seen the patient.  I agree with the plan.  Additionally, I have included further information about my assessment and plan below:    Reviewed overnight events.  Blood pressure labile yesterday, but has been controlled overnight.  NG tube remains in place for ileus.  No  0.9 % injection 5-40 mL, 5-40 mL, IntraVENous, PRN, Yasir Puga MD    0.9 % sodium chloride infusion, , IntraVENous, PRN, Yasir Puga MD    potassium chloride (KLOR-CON) extended release tablet 40 mEq, 40 mEq, Oral, PRN **OR** potassium bicarb-citric acid (EFFER-K) effervescent tablet 40 mEq, 40 mEq, Oral, PRN **OR** potassium chloride 10 mEq/100 mL IVPB (Peripheral Line), 10 mEq, IntraVENous, PRN, Yasir Puga MD    magnesium sulfate 2000 mg in 50 mL IVPB premix, 2,000 mg, IntraVENous, PRN, Yasir Puga MD    ondansetron (ZOFRAN-ODT) disintegrating tablet 4 mg, 4 mg, Oral, Q8H PRN **OR** ondansetron (ZOFRAN) injection 4 mg, 4 mg, IntraVENous, Q6H PRN, Yasir Puga MD, 4 mg at 08/11/24 0116    polyethylene glycol (GLYCOLAX) packet 17 g, 17 g, Oral, Daily PRN, Yasir Puga MD, 17 g at 08/10/24 1551    acetaminophen (TYLENOL) tablet 650 mg, 650 mg, Oral, Q6H PRN, 650 mg at 08/12/24 0603 **OR** acetaminophen (TYLENOL) suppository 650 mg, 650 mg, Rectal, Q6H PRN, Yasir Puga MD    insulin lispro (HUMALOG,ADMELOG) injection vial 0-8 Units, 0-8 Units, SubCUTAneous, TID WC, Yasir Puga MD, 2 Units at 08/08/24 1613    insulin lispro (HUMALOG,ADMELOG) injection vial 0-4 Units, 0-4 Units, SubCUTAneous, Nightly, Yasir Puga MD Lisa Zimmer, APRN - NP    [facility] Cardiology  Call center: (P) [phone]  (F) [phone]

## 2024-08-12 NOTE — PROGRESS NOTES
General Surgery Progress Note    Small bowel obstruction  Date:2024       Room:[facility]  Patient Name:Ammy Tarango     YOB: 1934     Age:89 y.o.    Subjective     No acute surgical issues.  Pt denied any abdominal pain.  Minimal bilious output from NG tube.  No flatus or BM yet.     Medications   Scheduled Meds:    bisacodyl  10 mg Rectal BID    magnesium hydroxide  30 mL Oral Daily    cefTRIAXone (ROCEPHIN) IV  1,000 mg IntraVENous Q24H    hydrALAZINE  100 mg Oral 3 times per day    spironolactone  25 mg Oral Daily    lisinopril  40 mg Oral Daily    potassium chloride  10 mEq Oral Daily    atorvastatin  10 mg Oral Daily    oyster shell calcium w/D  1 tablet Oral Daily    cloNIDine  0.3 mg Oral TID    cetirizine  10 mg Oral Daily    metoprolol  100 mg Oral BID    pantoprazole  40 mg Oral QAM AC    Vitamin D  1,000 Units Oral Daily    sodium chloride flush  5-40 mL IntraVENous 2 times per day    insulin lispro  0-8 Units SubCUTAneous TID WC    insulin lispro  0-4 Units SubCUTAneous Nightly     Continuous Infusions:    sodium chloride 75 mL/hr at 24 1456    niCARdipine 5 mg/hr (24 1100)    sodium chloride       PRN Meds: prochlorperazine, hydrALAZINE, melatonin, sodium chloride flush, sodium chloride, potassium chloride **OR** potassium alternative oral replacement **OR** potassium chloride, magnesium sulfate, ondansetron **OR** ondansetron, polyethylene glycol, acetaminophen **OR** acetaminophen        Physical Examination      Vitals:  BP (!) 140/52   Pulse 86   Temp 98.4 °F (36.9 °C) (Oral)   Resp 21   Ht 1.524 m (5')   Wt 69.1 kg (152 lb 6.4 oz)   SpO2 97%   BMI 29.76 kg/m²   Temp (24hrs), Av.4 °F (36.9 °C), Min:98 °F (36.7 °C), Max:98.8 °F (37.1 °C)      Physical Exam:    Gen:  No apparent distress  Neuro:  Somnolent but arouasable  Pulm:  Unlabored  Abd:  Soft/mildly distended/no tenderness to palpation without guarding or rebound  Ext:  No cyanosis, clubbing or  edema    I/O (24Hr):    Intake/Output Summary (Last 24 hours) at 8/12/2024 1100  Last data filed at 8/11/2024 1614  Gross per 24 hour   Intake --   Output 800 ml   Net -800 ml         Labs/Imaging/Diagnostics   Labs:  CBC:  Recent Labs     08/11/24  0538 08/12/24  0653   WBC 5.3 4.2   RBC 4.39 3.79*   HGB 12.3 10.7*   HCT 37.8 33.5*   MCV 86.1 88.4   RDW 14.2 14.3    212     CHEMISTRIES:  Recent Labs     08/11/24  0538 08/12/24  0653   * 136   K 3.9 3.8    103   CO2 30 26   BUN 18 24*   CREATININE 0.85 0.73   GLUCOSE 147* 129*   PHOS 3.5 3.2   MG 1.8 2.1     PT/INR:No results for input(s): \"PROTIME\", \"INR\" in the last 72 hours.  APTT:No results for input(s): \"APTT\" in the last 72 hours.  LIVER PROFILE:No results for input(s): \"AST\", \"ALT\", \"BILIDIR\", \"BILITOT\", \"ALKPHOS\" in the last 72 hours.    Imaging Last 24 Hours:  XR ABDOMEN (KUB) (SINGLE AP VIEW)    Result Date: 8/11/2024  EXAM:  XR ABDOMEN (KUB) (SINGLE AP VIEW) INDICATION: 89-year-old female status post NG tube placement. COMPARISON: 8/9/2024 radiograph and 8/11/2011 CT. TECHNIQUE: Supine frontal abdomen (KUB). FINDINGS: Partially imaged nasogastric/orogastric tube with sideport and tip projecting below the diaphragm in the left upper abdominal quadrant with the tip about the region of esophagogastric junction after looping upon itself within the stomach, new since prior study. Partially imaged mildly dilated small bowel loops measuring up to 3.3 cm in diameter with right lateral and inferior abdomen outside field-of-view on the provided image. Cholecystectomy clips in place. No pathologic calcification. Osseous structures are age appropriate.     NG tube sideport and tip below the diaphragm in the left upper abdominal quadrant with the tip folding backwards to project about the esophagogastric junction. Partially imaged mildly dilated small bowel loops. Electronically signed by [facility]

## 2024-08-12 NOTE — PLAN OF CARE
Problem: Discharge Planning  Goal: Discharge to home or other facility with appropriate resources  Outcome: Progressing  Flowsheets (Taken 8/11/2024 0800)  Discharge to home or other facility with appropriate resources: Identify barriers to discharge with patient and caregiver     Problem: Pain  Goal: Verbalizes/displays adequate comfort level or baseline comfort level  Outcome: Progressing  Flowsheets (Taken 8/11/2024 0800)  Verbalizes/displays adequate comfort level or baseline comfort level: Encourage patient to monitor pain and request assistance     Problem: Safety - Adult  Goal: Free from fall injury  Outcome: Progressing     Problem: Chronic Conditions and Co-morbidities  Goal: Patient's chronic conditions and co-morbidity symptoms are monitored and maintained or improved  Outcome: Progressing  Flowsheets (Taken 8/11/2024 0800)  Care Plan - Patient's Chronic Conditions and Co-Morbidity Symptoms are Monitored and Maintained or Improved:   Monitor and assess patient's chronic conditions and comorbid symptoms for stability, deterioration, or improvement   Collaborate with multidisciplinary team to address chronic and comorbid conditions and prevent exacerbation or deterioration   Update acute care plan with appropriate goals if chronic or comorbid symptoms are exacerbated and prevent overall improvement and discharge     Problem: ABCDS Injury Assessment  Goal: Absence of physical injury  Outcome: Progressing

## 2024-08-12 NOTE — CARE COORDINATION
Transition of Care Plan:      Home with home health ([facility] the [facility])    Transport: Family     RUR: 12%  Prior Level of Functioning: Independent  Disposition: HH PT  Follow up appointments: PCP  DME needed: None  Transportation at discharge: Family  IM/IMM Medicare/ letter given: 8/8  Caregiver Contact: Justino Burgess (Child)  [phone] (Home Phone)   Discharge Caregiver contacted prior to discharge?   Care Conference needed? No  Barriers to discharge: Medical, >48 hours, General surgery -> NGT    PT/OT to see, continue to eval for dispo, home health arranged for now. CM will follow.    DONELL Limon (Ally).

## 2024-08-12 NOTE — PROGRESS NOTES
[facility] Adult  Hospitalist Group                                                                                          Hospitalist Progress Note  Maxi Lo MD  Answering service: [phone] OR [phone] from in house phone        Date of Service:  2024  NAME:  Ammy Tarango  :  1934  MRN:  [id number]       Admission Summary:   Ammy Tarango is a 89 y.o. female who presents to the emergency room after she fell at home.  Patient reports that her foot got stuck in the rock and subsequently fell in the bathroom hitting her head on the floor.  Patient reports headache, left shoulder pain and left neck pain.  Patient subsequently presented to the emergency room for further evaluation.  Patient presented with fairly stable vital signs.  CT of the head was done in the ER which shows 9 mm hyperdensity in the right superior frontal extra-axial space concerning for small focus of acute hemorrhage.  CT cervical spine shows no acute fracture.  In the emergency room neurosurgery was consulted and hospitalist service requested to admit the patient for further evaluation management.        Interval history / Subjective:   Patient seen and examined.  She feels tired, \"they are doing all those things to me\".  CT scan of the abdomen with PO contrast ordered for today.      Assessment & Plan:             Intracranial hemorrhage  -Patient presented with mechanical fall and head injury, CT of the head shows 9 mm hyperdensity in the right superior frontal extra-axial space concerning for small focus of acute hemorrhage  -Will hold aspirin which patient takes at home;  -Neurosurgery has been consulted: Patient does not require any neurosurgical interventions; outpatient follow-up in the office monitoring;  -Started on Cardene drip, titrate for systolic blood pressure <140 mmHg;   -: Weaned off Cardene drip, continue to adjust antihypertensives to optimize blood pressure control;  -:

## 2024-08-12 NOTE — PROGRESS NOTES
Physical Therapy  8/12/2024    15:30-- Follow up with patient who politely deferred OOB to chair due to fatigue. Difficulty maintain alert state with PT. Education provided on importance of mobilization OOB in setting of ileus. Will follow up tomorrow.     14:15-- Followed up with patient-- nursing at bedside for bladder scan and contrast administration.    Chart reviewed. Patient with new onset of ileus and now with NG tube decompression. Attempted to see patient this AM to assist with updated d/c recommendations however her daughter was present at bedside and requested PT allow her to sleep at this time. Will follow up later today as time allows. Thank you.    Zamzam Wilson, PT, DPT, CCS

## 2024-08-13 LAB
ANION GAP SERPL CALC-SCNC: 12 MMOL/L (ref 5–15)
BUN SERPL-MCNC: 22 MG/DL (ref 6–20)
BUN/CREAT SERPL: 37 (ref 12–20)
CALCIUM SERPL-MCNC: 8.3 MG/DL (ref 8.5–10.1)
CHLORIDE SERPL-SCNC: 105 MMOL/L (ref 97–108)
CO2 SERPL-SCNC: 21 MMOL/L (ref 21–32)
CREAT SERPL-MCNC: 0.6 MG/DL (ref 0.55–1.02)
ERYTHROCYTE [DISTWIDTH] IN BLOOD BY AUTOMATED COUNT: 14.5 % (ref 11.5–14.5)
GLUCOSE BLD STRIP.AUTO-MCNC: 111 MG/DL (ref 65–117)
GLUCOSE BLD STRIP.AUTO-MCNC: 118 MG/DL (ref 65–117)
GLUCOSE BLD STRIP.AUTO-MCNC: 129 MG/DL (ref 65–117)
GLUCOSE BLD STRIP.AUTO-MCNC: 186 MG/DL (ref 65–117)
GLUCOSE SERPL-MCNC: 120 MG/DL (ref 65–100)
HCT VFR BLD AUTO: 34.7 % (ref 35–47)
HGB BLD-MCNC: 11.1 G/DL (ref 11.5–16)
MAGNESIUM SERPL-MCNC: 2.5 MG/DL (ref 1.6–2.4)
MCH RBC QN AUTO: 28.5 PG (ref 26–34)
MCHC RBC AUTO-ENTMCNC: 32 G/DL (ref 30–36.5)
MCV RBC AUTO: 89 FL (ref 80–99)
NRBC # BLD: 0 K/UL (ref 0–0.01)
NRBC BLD-RTO: 0 PER 100 WBC
PHOSPHATE SERPL-MCNC: 2.8 MG/DL (ref 2.6–4.7)
PLATELET # BLD AUTO: 217 K/UL (ref 150–400)
PMV BLD AUTO: 9.7 FL (ref 8.9–12.9)
POTASSIUM SERPL-SCNC: 3.7 MMOL/L (ref 3.5–5.1)
RBC # BLD AUTO: 3.9 M/UL (ref 3.8–5.2)
SERVICE CMNT-IMP: ABNORMAL
SERVICE CMNT-IMP: NORMAL
SODIUM SERPL-SCNC: 138 MMOL/L (ref 136–145)
WBC # BLD AUTO: 5.7 K/UL (ref 3.6–11)

## 2024-08-13 PROCEDURE — 51798 US URINE CAPACITY MEASURE: CPT

## 2024-08-13 PROCEDURE — 97116 GAIT TRAINING THERAPY: CPT

## 2024-08-13 PROCEDURE — 99232 SBSQ HOSP IP/OBS MODERATE 35: CPT | Performed by: INTERNAL MEDICINE

## 2024-08-13 PROCEDURE — 6360000002 HC RX W HCPCS: Performed by: INTERNAL MEDICINE

## 2024-08-13 PROCEDURE — 94760 N-INVAS EAR/PLS OXIMETRY 1: CPT

## 2024-08-13 PROCEDURE — 6370000000 HC RX 637 (ALT 250 FOR IP): Performed by: INTERNAL MEDICINE

## 2024-08-13 PROCEDURE — 97535 SELF CARE MNGMENT TRAINING: CPT | Performed by: OCCUPATIONAL THERAPIST

## 2024-08-13 PROCEDURE — 83735 ASSAY OF MAGNESIUM: CPT

## 2024-08-13 PROCEDURE — 2700000000 HC OXYGEN THERAPY PER DAY

## 2024-08-13 PROCEDURE — 6360000002 HC RX W HCPCS: Performed by: SURGERY

## 2024-08-13 PROCEDURE — 80048 BASIC METABOLIC PNL TOTAL CA: CPT

## 2024-08-13 PROCEDURE — 6370000000 HC RX 637 (ALT 250 FOR IP)

## 2024-08-13 PROCEDURE — 2580000003 HC RX 258: Performed by: INTERNAL MEDICINE

## 2024-08-13 PROCEDURE — 84100 ASSAY OF PHOSPHORUS: CPT

## 2024-08-13 PROCEDURE — 2060000000 HC ICU INTERMEDIATE R&B

## 2024-08-13 PROCEDURE — 51701 INSERT BLADDER CATHETER: CPT

## 2024-08-13 PROCEDURE — 6370000000 HC RX 637 (ALT 250 FOR IP): Performed by: FAMILY MEDICINE

## 2024-08-13 PROCEDURE — 85027 COMPLETE CBC AUTOMATED: CPT

## 2024-08-13 PROCEDURE — 82962 GLUCOSE BLOOD TEST: CPT

## 2024-08-13 PROCEDURE — 97110 THERAPEUTIC EXERCISES: CPT | Performed by: OCCUPATIONAL THERAPIST

## 2024-08-13 RX ADMIN — HYDRALAZINE HYDROCHLORIDE 100 MG: 50 TABLET ORAL at 13:44

## 2024-08-13 RX ADMIN — CLONIDINE HYDROCHLORIDE 0.3 MG: 0.1 TABLET ORAL at 13:44

## 2024-08-13 RX ADMIN — NICARDIPINE HYDROCHLORIDE 10 MG/HR: 2.5 INJECTION, SOLUTION INTRAVENOUS at 22:28

## 2024-08-13 RX ADMIN — METOPROLOL TARTRATE 100 MG: 25 TABLET, FILM COATED ORAL at 08:47

## 2024-08-13 RX ADMIN — Medication 1000 UNITS: at 08:48

## 2024-08-13 RX ADMIN — METOCLOPRAMIDE 5 MG: 5 INJECTION, SOLUTION INTRAMUSCULAR; INTRAVENOUS at 11:40

## 2024-08-13 RX ADMIN — Medication 1 TABLET: at 08:48

## 2024-08-13 RX ADMIN — METOCLOPRAMIDE 5 MG: 5 INJECTION, SOLUTION INTRAMUSCULAR; INTRAVENOUS at 16:52

## 2024-08-13 RX ADMIN — CETIRIZINE HYDROCHLORIDE 10 MG: 10 TABLET, FILM COATED ORAL at 08:47

## 2024-08-13 RX ADMIN — METOCLOPRAMIDE 5 MG: 5 INJECTION, SOLUTION INTRAMUSCULAR; INTRAVENOUS at 05:27

## 2024-08-13 RX ADMIN — NICARDIPINE HYDROCHLORIDE 5 MG/HR: 2.5 INJECTION, SOLUTION INTRAVENOUS at 07:28

## 2024-08-13 RX ADMIN — LISINOPRIL 40 MG: 20 TABLET ORAL at 08:48

## 2024-08-13 RX ADMIN — BISACODYL 10 MG: 10 SUPPOSITORY RECTAL at 08:48

## 2024-08-13 RX ADMIN — ATORVASTATIN CALCIUM 10 MG: 10 TABLET, FILM COATED ORAL at 08:47

## 2024-08-13 RX ADMIN — NICARDIPINE HYDROCHLORIDE 5 MG/HR: 2.5 INJECTION, SOLUTION INTRAVENOUS at 12:50

## 2024-08-13 RX ADMIN — METOCLOPRAMIDE 5 MG: 5 INJECTION, SOLUTION INTRAMUSCULAR; INTRAVENOUS at 22:59

## 2024-08-13 RX ADMIN — CLONIDINE HYDROCHLORIDE 0.3 MG: 0.1 TABLET ORAL at 22:28

## 2024-08-13 RX ADMIN — HYDRALAZINE HYDROCHLORIDE 10 MG: 20 INJECTION INTRAMUSCULAR; INTRAVENOUS at 18:37

## 2024-08-13 RX ADMIN — MAGNESIUM HYDROXIDE 30 ML: 400 SUSPENSION ORAL at 08:47

## 2024-08-13 RX ADMIN — HYDRALAZINE HYDROCHLORIDE 100 MG: 50 TABLET ORAL at 22:27

## 2024-08-13 RX ADMIN — NICARDIPINE HYDROCHLORIDE 12.5 MG/HR: 2.5 INJECTION, SOLUTION INTRAVENOUS at 19:26

## 2024-08-13 RX ADMIN — POTASSIUM CHLORIDE 10 MEQ: 750 TABLET, FILM COATED, EXTENDED RELEASE ORAL at 08:48

## 2024-08-13 RX ADMIN — Medication 3 MG: at 22:28

## 2024-08-13 RX ADMIN — SPIRONOLACTONE 25 MG: 25 TABLET ORAL at 08:47

## 2024-08-13 RX ADMIN — CLONIDINE HYDROCHLORIDE 0.3 MG: 0.1 TABLET ORAL at 08:55

## 2024-08-13 RX ADMIN — HYDRALAZINE HYDROCHLORIDE 100 MG: 50 TABLET ORAL at 05:26

## 2024-08-13 RX ADMIN — WATER 1000 MG: 1 INJECTION INTRAMUSCULAR; INTRAVENOUS; SUBCUTANEOUS at 13:43

## 2024-08-13 RX ADMIN — METOPROLOL TARTRATE 100 MG: 25 TABLET, FILM COATED ORAL at 22:27

## 2024-08-13 RX ADMIN — PROCHLORPERAZINE EDISYLATE 5 MG: 5 INJECTION INTRAMUSCULAR; INTRAVENOUS at 22:28

## 2024-08-13 RX ADMIN — NICARDIPINE HYDROCHLORIDE 2.5 MG/HR: 2.5 INJECTION, SOLUTION INTRAVENOUS at 00:00

## 2024-08-13 NOTE — PROGRESS NOTES
General Surgery Progress Note    Small bowel obstruction  Date:2024       Room:[facility]  Patient Name:Ammy Tarango     YOB: 1934     Age:89 y.o.    Subjective     No acute surgical issues.  Pt denied any abdominal pain.  She is up and ambulating.  CT scan showed partial small bowel obstruction.  She reportedly had BM last night.     Medications   Scheduled Meds:    metoclopramide  5 mg IntraVENous Q6H    bisacodyl  10 mg Rectal BID    magnesium hydroxide  30 mL Oral Daily    cefTRIAXone (ROCEPHIN) IV  1,000 mg IntraVENous Q24H    hydrALAZINE  100 mg Oral 3 times per day    spironolactone  25 mg Oral Daily    lisinopril  40 mg Oral Daily    potassium chloride  10 mEq Oral Daily    atorvastatin  10 mg Oral Daily    oyster shell calcium w/D  1 tablet Oral Daily    cloNIDine  0.3 mg Oral TID    cetirizine  10 mg Oral Daily    metoprolol  100 mg Oral BID    pantoprazole  40 mg Oral QAM AC    Vitamin D  1,000 Units Oral Daily    sodium chloride flush  5-40 mL IntraVENous 2 times per day    insulin lispro  0-8 Units SubCUTAneous TID WC    insulin lispro  0-4 Units SubCUTAneous Nightly     Continuous Infusions:    sodium chloride 75 mL/hr at 24 1549    niCARdipine 7.5 mg/hr (24 0905)    sodium chloride       PRN Meds: prochlorperazine, hydrALAZINE, melatonin, sodium chloride flush, sodium chloride, potassium chloride **OR** potassium alternative oral replacement **OR** potassium chloride, magnesium sulfate, ondansetron **OR** ondansetron, polyethylene glycol, acetaminophen **OR** acetaminophen        Physical Examination      Vitals:  /60   Pulse 69   Temp 98 °F (36.7 °C) (Oral)   Resp 19   Ht 1.524 m (5')   Wt 70.4 kg (155 lb 1.6 oz)   SpO2 91%   BMI 30.29 kg/m²   Temp (24hrs), Av °F (36.7 °C), Min:97.6 °F (36.4 °C), Max:98.5 °F (36.9 °C)      Physical Exam:    Gen:  No apparent distress  Neuro:  Somnolent but arouasable  Pulm:  Unlabored  Abd:  Soft/mildly distended/no

## 2024-08-13 NOTE — PROGRESS NOTES
[facility] Adult  Hospitalist Group                                                                                          Hospitalist Progress Note  Maxi Lo MD  Answering service: [phone] OR [phone] from in house phone        Date of Service:  2024  NAME:  Ammy Tarango  :  1934  MRN:  [id number]       Admission Summary:   Ammy Tarango is a 89 y.o. female who presents to the emergency room after she fell at home.  Patient reports that her foot got stuck in the rock and subsequently fell in the bathroom hitting her head on the floor.  Patient reports headache, left shoulder pain and left neck pain.  Patient subsequently presented to the emergency room for further evaluation.  Patient presented with fairly stable vital signs.  CT of the head was done in the ER which shows 9 mm hyperdensity in the right superior frontal extra-axial space concerning for small focus of acute hemorrhage.  CT cervical spine shows no acute fracture.  In the emergency room neurosurgery was consulted and hospitalist service requested to admit the patient for further evaluation management.        Interval history / Subjective:   Patient seen and examined, discussed with her, family member at the bedside, and RN.  It looks like patient had a small bowel movement last night.  She is currently sitting on the commode and trying to have another one.  She would like to try some warm liquids, she thinks it will not work.  She was placed back on Cardene drip last night due to persistently uncontrolled blood pressure.  She wants to go home.     Assessment & Plan:             Intracranial hemorrhage  -Patient presented with mechanical fall and head injury, CT of the head shows 9 mm hyperdensity in the right superior frontal extra-axial space concerning for small focus of acute hemorrhage  -Will hold aspirin which patient takes at home;  -Neurosurgery has been consulted: Patient does not require any  all clinical/nonclinical/nursing services involved in patient's clinical care. Care coordination discussions were held with appropriate clinical/nonclinical/ nursing providers based on care coordination needs.         Patients current active Medications were reviewed, considered, added and adjusted based on the clinical condition today.      Home Medications were reconciled to the best of my ability given all available resources at the time of admission. Route is PO if not otherwise noted.      Admission Status:[id number]:::1}      Medications Reviewed:     Current Facility-Administered Medications   Medication Dose Route Frequency    metoclopramide (REGLAN) injection 5 mg  5 mg IntraVENous Q6H    bisacodyl (DULCOLAX) suppository 10 mg  10 mg Rectal BID    magnesium hydroxide (MILK OF MAGNESIA) 400 MG/5ML suspension 30 mL  30 mL Oral Daily    0.9 % sodium chloride infusion   IntraVENous Continuous    niCARdipine (CARDENE) 25 mg in sodium chloride 0.9 % 250 mL infusion ([id number])  2.5-15 mg/hr IntraVENous Continuous    cefTRIAXone (ROCEPHIN) 1,000 mg in sterile water 10 mL IV syringe  1,000 mg IntraVENous Q24H    hydrALAZINE (APRESOLINE) tablet 100 mg  100 mg Oral 3 times per day    spironolactone (ALDACTONE) tablet 25 mg  25 mg Oral Daily    prochlorperazine (COMPAZINE) injection 5 mg  5 mg IntraVENous Q6H PRN    lisinopril (PRINIVIL;ZESTRIL) tablet 40 mg  40 mg Oral Daily    potassium chloride (KLOR-CON) extended release tablet 10 mEq  10 mEq Oral Daily    hydrALAZINE (APRESOLINE) injection 10 mg  10 mg IntraVENous Q6H PRN    melatonin tablet 3 mg  3 mg Oral Nightly PRN    atorvastatin (LIPITOR) tablet 10 mg  10 mg Oral Daily    oyster shell calcium w/D 500-5 MG-MCG tablet 1 tablet  1 tablet Oral Daily    cloNIDine (CATAPRES) tablet 0.3 mg  0.3 mg Oral TID    cetirizine (ZYRTEC) tablet 10 mg  10 mg Oral Daily    metoprolol tartrate (LOPRESSOR) tablet 100 mg  100 mg Oral BID    pantoprazole (PROTONIX) tablet 40 mg  40

## 2024-08-13 NOTE — PLAN OF CARE
Problem: Discharge Planning  Goal: Discharge to home or other facility with appropriate resources  8/13/2024 0351 by Alexia Herzog, RN  Outcome: Progressing  Discharge to home or other facility with appropriate resources:   Identify barriers to discharge with patient and caregiver   Arrange for needed discharge resources and transportation as appropriate   Identify discharge learning needs (meds, wound care, etc)     Problem: Pain  Goal: Verbalizes/displays adequate comfort level or baseline comfort level  8/13/2024 0351 by Alexia Herzog, RN  Outcome: Progressing     Problem: Safety - Adult  Goal: Free from fall injury  8/13/2024 0351 by Alexia Herzog, RN  Outcome: Progressing     Problem: Chronic Conditions and Co-morbidities  Goal: Patient's chronic conditions and co-morbidity symptoms are monitored and maintained or improved  8/13/2024 0351 by Alexia Herzog, RN  Outcome: Progressing     Problem: ABCDS Injury Assessment  Goal: Absence of physical injury  8/13/2024 0351 by Alexia Herzog, RN  Outcome: Progressing

## 2024-08-13 NOTE — PLAN OF CARE
Problem: Discharge Planning  Goal: Discharge to home or other facility with appropriate resources  8/13/2024 0937 by Sonia Escamilla RN  Outcome: Progressing  Flowsheets (Taken 8/13/2024 0800)  Discharge to home or other facility with appropriate resources:   Identify barriers to discharge with patient and caregiver   Arrange for needed discharge resources and transportation as appropriate   Identify discharge learning needs (meds, wound care, etc)  8/13/2024 0351 by Alexia Herzog, RN  Outcome: Progressing     Problem: Pain  Goal: Verbalizes/displays adequate comfort level or baseline comfort level  8/13/2024 0937 by Sonia Escamilla RN  Outcome: Progressing  8/13/2024 0351 by Alexia Herzog, RN  Outcome: Progressing     Problem: Safety - Adult  Goal: Free from fall injury  8/13/2024 0937 by Sonia Escamilla RN  Outcome: Progressing  Flowsheets (Taken 8/13/2024 0800)  Free From Fall Injury: Instruct family/caregiver on patient safety  8/13/2024 0351 by Alexia Herzog, RN  Outcome: Progressing     Problem: Chronic Conditions and Co-morbidities  Goal: Patient's chronic conditions and co-morbidity symptoms are monitored and maintained or improved  8/13/2024 0937 by Sonia Escamilla RN  Outcome: Progressing  Flowsheets (Taken 8/13/2024 0800)  Care Plan - Patient's Chronic Conditions and Co-Morbidity Symptoms are Monitored and Maintained or Improved:   Monitor and assess patient's chronic conditions and comorbid symptoms for stability, deterioration, or improvement   Collaborate with multidisciplinary team to address chronic and comorbid conditions and prevent exacerbation or deterioration   Update acute care plan with appropriate goals if chronic or comorbid symptoms are exacerbated and prevent overall improvement and discharge  8/13/2024 0351 by Alexia Herzog, RN  Outcome: Progressing     Problem: ABCDS Injury Assessment  Goal: Absence of physical injury  8/13/2024 0937 by Sonia Escamilla  RN  Outcome: Progressing  8/13/2024 0351 by Alexia Herzog, RN  Outcome: Progressing     Problem: Skin/Tissue Integrity  Goal: Absence of new skin breakdown  Description: 1.  Monitor for areas of redness and/or skin breakdown  2.  Assess vascular access sites hourly  3.  Every 4-6 hours minimum:  Change oxygen saturation probe site  4.  Every 4-6 hours:  If on nasal continuous positive airway pressure, respiratory therapy assess nares and determine need for appliance change or resting period.  Outcome: Progressing

## 2024-08-13 NOTE — CARE COORDINATION
Transition of Care Plan:      Home with home health ([facility])  - DME: RW ordered for bedside delivery    Transport: Family     RUR: 13%  Prior Level of Functioning: Independent  Disposition: HH PT  Follow up appointments: PCP  DME needed: None  Transportation at discharge: Family  IM/IMM Medicare/ letter given: 8/8  Caregiver Contact: Justino Burgess (Child)  [phone] (Home Phone)   Discharge Caregiver contacted prior to discharge?   Care Conference needed? No  Barriers to discharge: Medical, >48 hours, General surgery -> NGT    CM received consult for RW order. Referral sent to [employer] via [place].    PT/OT continuing to recommending home health with family assist. CM will follow.     MARIA G Limon (Ally)S.JONNATHAN.

## 2024-08-13 NOTE — PLAN OF CARE
Problem: Physical Therapy - Adult  Goal: By Discharge: Performs mobility at highest level of function for planned discharge setting.  See evaluation for individualized goals.  Description: FUNCTIONAL STATUS PRIOR TO ADMISSION: Pt lives alone with son/ daughter very close by. Ambulates indep without device, but owns SPC. Uses B HR to independently negotiate 2 steps into home.     Physical Therapy Goals  Initiated 8/7/2024  1.  Patient will move from supine to sit and sit to supine in bed with independence within 7 day(s).    2.  Patient will perform sit to stand with independence within 7 day(s).  3.  Patient will transfer from bed to chair and chair to bed with independence using the least restrictive device within 7 day(s).  4.  Patient will ambulate with independence for 150 feet with the least restrictive device within 7 day(s).   5.  Patient will ascend/descend 2 stairs with B handrail(s) with supervision/set-up within 7 day(s).   Outcome: Progressing   PHYSICAL THERAPY TREATMENT    Patient: Ammy Tarango (89 y.o. female)  Date: 8/13/2024  Diagnosis: Subdural hematoma (HCC) [S06.5XAA]  SDH (subdural hematoma) (HCC) [S06.5XAA]  Injury of head, initial encounter [S09.90XA] Subdural hematoma (HCC)      Precautions: Fall Risk (-160)                      ASSESSMENT:  Patient continues to benefit from skilled PT services and is progressing towards goals. Patient received in chair with sister and daughter present. Pt required encouragement for ambulation as pt reporting discomfort in abdomen but agreeable. Pt performed 2 trials of ambulation with ability to increase distance with 2nd trial. Recommend pt to continue ambulating and sitting in chair to prevent deconditioning.          PLAN:  Patient continues to benefit from skilled intervention to address the above impairments.  Continue treatment per established plan of care.    Recommend with staff: therapy recommendations for staff: Recommend mobility

## 2024-08-13 NOTE — PLAN OF CARE
Problem: Occupational Therapy - Adult  Goal: By Discharge: Performs self-care activities at highest level of function for planned discharge setting.  See evaluation for individualized goals.  Description: FUNCTIONAL STATUS PRIOR TO ADMISSION:  Patient was ambulatory using no DME and independent for ADLs. She is driving and had adult children that live close by.      HOME SUPPORT: Patient lived alone with family to provide assistance.    Occupational Therapy Goals:  Initiated 8/7/2024  1.  Patient will perform grooming with Love within 7 day(s).  2.  Patient will perform upper body dressing with Love within 7 day(s).  3.  Patient will perform lower body dressing with Modified Love within 7 day(s).  4.  Patient will perform toilet transfers with Modified Love  within 7 day(s).  5.  Patient will perform all aspects of toileting with Modified Love within 7 day(s).  6.  Patient will participate in upper extremity therapeutic exercise/activities with Love for 15 minutes within 7 day(s).    7.  Patient will utilize energy conservation techniques during functional activities with verbal cues within 7 day(s).    Outcome: Progressing     OCCUPATIONAL THERAPY TREATMENT  Patient: Ammy Tarango (89 y.o. female)  Date: 8/13/2024  Primary Diagnosis: Subdural hematoma (HCC) [S06.5XAA]  SDH (subdural hematoma) (HCC) [S06.5XAA]  Injury of head, initial encounter [S09.90XA]       Precautions: Fall Risk (-160)                Chart, occupational therapy assessment, plan of care, and goals were reviewed.    ASSESSMENT  Patient continues to benefit from skilled OT services and is slowly progressing towards goals. Pt with decline in function from last session with decreased endurance and activity tolerance, at an overall CGA for functional mobility near bedside using RW and mod A for LE ADLs.  Pt fatigues quickly and requires frequent rt breaks.  Initiated BUE AROM HEP and pt

## 2024-08-13 NOTE — PROGRESS NOTES
[facility] CARDIOLOGY  Cardiology Care Note                  []Initial visit     [x]Established visit     Patient Name: Ammy Tarango - :1934 - MRN:[id number]  Primary Cardiologist: Alen Ovalles MD        2024 8:56 AM     Reason for initial visit: Difficult to treat hypertension      Subjective:  NG tube remains in place. Pt denies nausea. States she feels hungry.  Had BM last night.  C/o upper and lower back pain which she attributes to laying in bed.  She had urinary retention overnight  per pt report.  Net -  1050 mls/24 hours.   BP is improved but labile. Remains on cardene gtt , now reduced to 5 mg/hr.  She denies any chest pain or SOB.   CT abdomen shows evidence of partial SBO per radiology read.               ASSESSMENT    History of fall with small intracranial frontal hemorrhage  Resistant hypertension : BP now improved.  Bilateral pedal edema most likely due to venous stasis versus diastolic heart failure   Diabetes mellitus  Ileus vs partial SBO- CT abdomen suggests partial SBO per radiology read      PLAN   Patient presented to the hospital with history of fall with CT suggestive of small right-sided frontal intracranial hemorrhage-asymptomatic with no signs of confusion on conservative medical management as per neurosurgery  Aspirin remains on hold.   Blood pressure is improved overal at or near goal of <140.    Since pt is still npo with NG tube and absorption may be questionable, , continue cardene and titrate as needed. Continue po meds (metoprolol, hydralazine, clonidine, lisinopril, aldactone) as able given ileus. Will transition off cardene after pt taking po.      Hyperlipidemia fairly well-controlled on Lipitor  Echo 2024 EF 60-65%, mod LVH, NWMA, +Diastolic dysfunction.   Encourage mobilization.     Addendum:    I have personally reviewed the documentation and have seen the patient.  I agree with

## 2024-08-14 PROBLEM — K56.609 SBO (SMALL BOWEL OBSTRUCTION) (HCC): Status: ACTIVE | Noted: 2024-08-14

## 2024-08-14 LAB
GLUCOSE BLD STRIP.AUTO-MCNC: 154 MG/DL (ref 65–117)
GLUCOSE BLD STRIP.AUTO-MCNC: 163 MG/DL (ref 65–117)
GLUCOSE BLD STRIP.AUTO-MCNC: 168 MG/DL (ref 65–117)
GLUCOSE BLD STRIP.AUTO-MCNC: 232 MG/DL (ref 65–117)
SERVICE CMNT-IMP: ABNORMAL

## 2024-08-14 PROCEDURE — 6370000000 HC RX 637 (ALT 250 FOR IP)

## 2024-08-14 PROCEDURE — 2580000003 HC RX 258: Performed by: INTERNAL MEDICINE

## 2024-08-14 PROCEDURE — 97116 GAIT TRAINING THERAPY: CPT

## 2024-08-14 PROCEDURE — 2060000000 HC ICU INTERMEDIATE R&B

## 2024-08-14 PROCEDURE — 6370000000 HC RX 637 (ALT 250 FOR IP): Performed by: FAMILY MEDICINE

## 2024-08-14 PROCEDURE — 6360000002 HC RX W HCPCS: Performed by: INTERNAL MEDICINE

## 2024-08-14 PROCEDURE — 94760 N-INVAS EAR/PLS OXIMETRY 1: CPT

## 2024-08-14 PROCEDURE — 2700000000 HC OXYGEN THERAPY PER DAY

## 2024-08-14 PROCEDURE — 99222 1ST HOSP IP/OBS MODERATE 55: CPT | Performed by: INTERNAL MEDICINE

## 2024-08-14 PROCEDURE — 97530 THERAPEUTIC ACTIVITIES: CPT

## 2024-08-14 PROCEDURE — 6370000000 HC RX 637 (ALT 250 FOR IP): Performed by: INTERNAL MEDICINE

## 2024-08-14 PROCEDURE — 82962 GLUCOSE BLOOD TEST: CPT

## 2024-08-14 PROCEDURE — 99231 SBSQ HOSP IP/OBS SF/LOW 25: CPT | Performed by: NURSE PRACTITIONER

## 2024-08-14 PROCEDURE — 6360000002 HC RX W HCPCS: Performed by: SURGERY

## 2024-08-14 PROCEDURE — 99232 SBSQ HOSP IP/OBS MODERATE 35: CPT | Performed by: INTERNAL MEDICINE

## 2024-08-14 RX ORDER — ACETAMINOPHEN 500 MG
1000 TABLET ORAL ONCE
Status: COMPLETED | OUTPATIENT
Start: 2024-08-14 | End: 2024-08-14

## 2024-08-14 RX ORDER — ENOXAPARIN SODIUM 100 MG/ML
40 INJECTION SUBCUTANEOUS DAILY
Status: DISCONTINUED | OUTPATIENT
Start: 2024-08-14 | End: 2024-08-21 | Stop reason: HOSPADM

## 2024-08-14 RX ADMIN — HYDRALAZINE HYDROCHLORIDE 10 MG: 20 INJECTION INTRAMUSCULAR; INTRAVENOUS at 19:15

## 2024-08-14 RX ADMIN — SPIRONOLACTONE 25 MG: 25 TABLET ORAL at 09:29

## 2024-08-14 RX ADMIN — METOCLOPRAMIDE 5 MG: 5 INJECTION, SOLUTION INTRAMUSCULAR; INTRAVENOUS at 17:18

## 2024-08-14 RX ADMIN — NICARDIPINE HYDROCHLORIDE 7.5 MG/HR: 2.5 INJECTION, SOLUTION INTRAVENOUS at 09:50

## 2024-08-14 RX ADMIN — HYDRALAZINE HYDROCHLORIDE 100 MG: 50 TABLET ORAL at 05:55

## 2024-08-14 RX ADMIN — POTASSIUM CHLORIDE 10 MEQ: 750 TABLET, FILM COATED, EXTENDED RELEASE ORAL at 09:29

## 2024-08-14 RX ADMIN — ATORVASTATIN CALCIUM 10 MG: 10 TABLET, FILM COATED ORAL at 09:30

## 2024-08-14 RX ADMIN — ACETAMINOPHEN 1000 MG: 500 TABLET ORAL at 09:29

## 2024-08-14 RX ADMIN — NICARDIPINE HYDROCHLORIDE 7.5 MG/HR: 2.5 INJECTION, SOLUTION INTRAVENOUS at 15:23

## 2024-08-14 RX ADMIN — NICARDIPINE HYDROCHLORIDE 7.5 MG/HR: 2.5 INJECTION, SOLUTION INTRAVENOUS at 01:56

## 2024-08-14 RX ADMIN — Medication 3 MG: at 21:25

## 2024-08-14 RX ADMIN — Medication 1 TABLET: at 09:29

## 2024-08-14 RX ADMIN — METOCLOPRAMIDE 5 MG: 5 INJECTION, SOLUTION INTRAMUSCULAR; INTRAVENOUS at 05:55

## 2024-08-14 RX ADMIN — HYDRALAZINE HYDROCHLORIDE 100 MG: 50 TABLET ORAL at 14:09

## 2024-08-14 RX ADMIN — MAGNESIUM HYDROXIDE 30 ML: 400 SUSPENSION ORAL at 09:29

## 2024-08-14 RX ADMIN — INSULIN LISPRO 2 UNITS: 100 INJECTION, SOLUTION INTRAVENOUS; SUBCUTANEOUS at 11:26

## 2024-08-14 RX ADMIN — CLONIDINE HYDROCHLORIDE 0.3 MG: 0.1 TABLET ORAL at 09:29

## 2024-08-14 RX ADMIN — PANTOPRAZOLE SODIUM 40 MG: 40 TABLET, DELAYED RELEASE ORAL at 05:55

## 2024-08-14 RX ADMIN — METOCLOPRAMIDE 5 MG: 5 INJECTION, SOLUTION INTRAMUSCULAR; INTRAVENOUS at 11:27

## 2024-08-14 RX ADMIN — Medication 1000 UNITS: at 09:29

## 2024-08-14 RX ADMIN — NICARDIPINE HYDROCHLORIDE 10 MG/HR: 2.5 INJECTION, SOLUTION INTRAVENOUS at 19:31

## 2024-08-14 RX ADMIN — CLONIDINE HYDROCHLORIDE 0.3 MG: 0.1 TABLET ORAL at 14:09

## 2024-08-14 RX ADMIN — CLONIDINE HYDROCHLORIDE 0.3 MG: 0.1 TABLET ORAL at 21:26

## 2024-08-14 RX ADMIN — METOPROLOL TARTRATE 100 MG: 25 TABLET, FILM COATED ORAL at 09:29

## 2024-08-14 RX ADMIN — NICARDIPINE HYDROCHLORIDE 7.5 MG/HR: 2.5 INJECTION, SOLUTION INTRAVENOUS at 06:05

## 2024-08-14 RX ADMIN — HYDRALAZINE HYDROCHLORIDE 100 MG: 50 TABLET ORAL at 21:25

## 2024-08-14 RX ADMIN — METOPROLOL TARTRATE 100 MG: 25 TABLET, FILM COATED ORAL at 21:25

## 2024-08-14 RX ADMIN — ENOXAPARIN SODIUM 40 MG: 100 INJECTION SUBCUTANEOUS at 09:29

## 2024-08-14 RX ADMIN — CETIRIZINE HYDROCHLORIDE 10 MG: 10 TABLET, FILM COATED ORAL at 09:29

## 2024-08-14 RX ADMIN — LISINOPRIL 40 MG: 20 TABLET ORAL at 09:29

## 2024-08-14 NOTE — PROGRESS NOTES
[facility]   Daily Progress Note    Admit Date: 2024  Post-Operative Day: * No surgery found * from * No surgery found *     Subjective:     Last 24 hrs: pt is up in the chair; ng has been clamped all day and she is tolerating clears; had a BM       Objective:     Blood pressure 139/61, pulse 92, temperature 97.9 °F (36.6 °C), temperature source Oral, resp. rate 22, height 1.524 m (5'), weight 70.4 kg (155 lb 1.6 oz), SpO2 97%.  Temp (24hrs), Av.1 °F (36.7 °C), Min:97.9 °F (36.6 °C), Max:98.3 °F (36.8 °C)      _____________________  Physical Exam:     Alert and Oriented, x3, in no acute distress.  Cardiovascular: RRR, no peripheral edema  Abdomen: soft, NT, +BS      Assessment:   Principal Problem:    Subdural hematoma (HCC)  Active Problems:    Labile hypertension  Resolved Problems:    * No resolved hospital problems. *          Plan:     May remove ngt  Clear liquids  Cont OOB/PT  Adv diet as tolerated  Reglan  PPI    Data Review:    Recent Labs     24  0653 24  0639   WBC 4.2 5.7   HGB 10.7* 11.1*   HCT 33.5* 34.7*    217     Recent Labs     24  0653 24  0639    138   K 3.8 3.7    105   CO2 26 21   BUN 24* 22*   MG 2.1 2.5*   PHOS 3.2 2.8     Invalid input(s): \"AML\", \"LPSE\"        ______________________  Medications:    Current Facility-Administered Medications   Medication Dose Route Frequency    enoxaparin (LOVENOX) injection 40 mg  40 mg SubCUTAneous Daily    metoclopramide (REGLAN) injection 5 mg  5 mg IntraVENous Q6H    bisacodyl (DULCOLAX) suppository 10 mg  10 mg Rectal BID    magnesium hydroxide (MILK OF MAGNESIA) 400 MG/5ML suspension 30 mL  30 mL Oral Daily    0.9 % sodium chloride infusion   IntraVENous Continuous    niCARdipine (CARDENE) 25 mg in sodium chloride 0.9 % 250 mL infusion ([id number])  2.5-15 mg/hr IntraVENous Continuous    hydrALAZINE (APRESOLINE) tablet 100 mg  100 mg Oral 3 times per day    spironolactone (ALDACTONE) tablet 25

## 2024-08-14 NOTE — PLAN OF CARE
Problem: Discharge Planning  Goal: Discharge to home or other facility with appropriate resources  Outcome: Progressing  Flowsheets (Taken 8/14/2024 0800)  Discharge to home or other facility with appropriate resources:   Identify barriers to discharge with patient and caregiver   Arrange for needed discharge resources and transportation as appropriate   Identify discharge learning needs (meds, wound care, etc)     Problem: Pain  Goal: Verbalizes/displays adequate comfort level or baseline comfort level  Outcome: Progressing     Problem: Safety - Adult  Goal: Free from fall injury  Outcome: Progressing  Flowsheets (Taken 8/14/2024 0800)  Free From Fall Injury: Instruct family/caregiver on patient safety     Problem: Chronic Conditions and Co-morbidities  Goal: Patient's chronic conditions and co-morbidity symptoms are monitored and maintained or improved  Outcome: Progressing  Flowsheets (Taken 8/14/2024 0800)  Care Plan - Patient's Chronic Conditions and Co-Morbidity Symptoms are Monitored and Maintained or Improved:   Monitor and assess patient's chronic conditions and comorbid symptoms for stability, deterioration, or improvement   Collaborate with multidisciplinary team to address chronic and comorbid conditions and prevent exacerbation or deterioration   Update acute care plan with appropriate goals if chronic or comorbid symptoms are exacerbated and prevent overall improvement and discharge     Problem: ABCDS Injury Assessment  Goal: Absence of physical injury  Outcome: Progressing     Problem: Skin/Tissue Integrity  Goal: Absence of new skin breakdown  Description: 1.  Monitor for areas of redness and/or skin breakdown  2.  Assess vascular access sites hourly  3.  Every 4-6 hours minimum:  Change oxygen saturation probe site  4.  Every 4-6 hours:  If on nasal continuous positive airway pressure, respiratory therapy assess nares and determine need for appliance change or resting period.  Outcome:  Progressing

## 2024-08-14 NOTE — PROGRESS NOTES
NUTRITION  Reason for Assessment: LOS      Recommendations/Interventions/Plan:   Continue to advance diet as tolerated    Will rescreen per policy       Past Medical History:   Diagnosis Date    Adverse effect of anesthesia     pt states she was able to feel everything during colonoscopy    Chronic bronchitis (HCC)     Diabetes (HCC)     Essential hypertension     Hypercholesterolemia     Hypertension          Pt screened for LOS.  Chart/labs/meds reviewed.  Admitted with Subdural hematoma (HCC) [S06.5XAA]  SDH (subdural hematoma) (HCC) [S06.5XAA]  Injury of head, initial encounter [S09.90XA]  Pt visited for LOS; however, noted pt with SBO being followed by surgery. She has been NPO for 2+ days, advanced to clears late yesterday. Pt resting, family at bedside reporting pt tolerated clear liquids well and had 2 bowel movements this morning. Pt eating well PTA and prior to SBO. Weight stable. No overt/acute malnutrition concerns, assuming diet able to be advanced over next 1-2 days.    Nutrition Related Findings:   Edema: Right lower extremity, Left lower extremity            RLE Edema: +1, Pitting  LLE Edema: +1, Pitting      Last BM: 08/13/24      Skin: Intact      Current Nutrition Therapies:  Diet: Clear Liquid  Supplements: None ordered  Meal Intake:   Patient Vitals for the past 168 hrs:   PO Meals Eaten (%)   08/10/24 1640 1 - 25%   08/10/24 1305 1 - 25%   08/10/24 0915 1 - 25%     Supplement Intake:  No data found.      Weight Hx:  Wt Readings from Last 10 Encounters:   08/13/24 70.4 kg (155 lb 1.6 oz)   07/15/24 67.6 kg (149 lb)   05/29/24 67.6 kg (149 lb)   03/18/24 68.5 kg (151 lb)   02/26/24 69.4 kg (153 lb)   12/18/23 68.9 kg (152 lb)   12/07/23 68.3 kg (150 lb 9.6 oz)   08/10/23 68.1 kg (150 lb 1.6 oz)   06/07/23 67.5 kg (148 lb 12.8 oz)   05/08/23 67.9 kg (149 lb 12.8 oz)         Estimated Nutrition Needs:   Energy Requirements Based On: Formula  Weight Used for Energy Requirements: Current  Energy

## 2024-08-14 NOTE — PLAN OF CARE
Problem: Occupational Therapy - Adult  Goal: By Discharge: Performs self-care activities at highest level of function for planned discharge setting.  See evaluation for individualized goals.  Description: FUNCTIONAL STATUS PRIOR TO ADMISSION:  Patient was ambulatory using no DME and independent for ADLs. She is driving and had adult children that live close by.    HOME SUPPORT: Patient lived alone with family to provide assistance.    Occupational Therapy Goals:  Initiated 8/7/2024, Weekly re-assessment 8/14/2024 (goals remain appropriate)  1.  Patient will perform grooming with [place] within 7 day(s).  2.  Patient will perform upper body dressing with [place] within 7 day(s).  3.  Patient will perform lower body dressing with Modified [place] within 7 day(s).  4.  Patient will perform toilet transfers with Modified [place]  within 7 day(s).  5.  Patient will perform all aspects of toileting with Modified [place] within 7 day(s).  6.  Patient will participate in upper extremity therapeutic exercise/activities with [place] for 15 minutes within 7 day(s).    7.  Patient will utilize energy conservation techniques during functional activities with verbal cues within 7 day(s).    Outcome: Progressing  OCCUPATIONAL THERAPY TREATMENT: WEEKLY REASSESSMENT    Patient: Ammy Tarango (89 y.o. female)  Date: 8/14/2024  Primary Diagnosis: Subdural hematoma (HCC) [S06.5XAA]  SDH (subdural hematoma) (HCC) [S06.5XAA]  Injury of head, initial encounter [S09.90XA]       Precautions: Fall Risk (-160)                Chart, occupational therapy assessment, plan of care, and goals were reviewed.    ASSESSMENT  Pt rec'd sitting up in chair, agreeable to OT weekly re-assessment. Patient continues to benefit from skilled OT services and is slowly progressing towards goals. Pt fatigues easily and demo's SOB with functional mobility. However, O2 saturation >90% on RA. Session limited by fatigue and  chair at end of session)  Scooting: Stand-by assistance (Fwd in chair)     Transfers:   Transfer Training  Transfer Training: Yes  Interventions: Safety awareness training;Tactile cues;Verbal cues  Sit to Stand: Contact-guard assistance;Adaptive equipment (Standing with RW)  Stand to Sit: Contact-guard assistance;Adaptive equipment  Stand Pivot Transfers: Contact-guard assistance;Adaptive equipment    Balance:     Balance  Sitting: Intact  Standing: Impaired  Standing - Static: Constant support;Good  Standing - Dynamic: Constant support;Fair;Good    ADL Intervention:  Pt declined ADLs due to fatigue  Pain Ratin/10   Pain Intervention(s):       Activity Tolerance:   requires rest breaks, observed shortness of breath on exertion, and SpO2 stable on room air  Please refer to the flowsheet for vital signs taken during this treatment.    After treatment:   Patient left in no apparent distress sitting up in chair, Call bell within reach, Bed/ chair alarm activated, Caregiver / family present, and Heels elevated for pressure relief    COMMUNICATION/EDUCATION:   The patient's plan of care was discussed with: physical therapist and registered nurse    Patient Education  Education Given To: Patient;Family  Education Provided: Role of Therapy;Transfer Training;Plan of Care;Precautions;Mobility Training;Fall Prevention Strategies;Energy Conservation  Education Method: Demonstration;Verbal  Barriers to Learning: None  Education Outcome: Verbalized understanding;Demonstrated understanding    Thank you for this referral.  Tess Hernandez OT  Minutes: 17

## 2024-08-14 NOTE — PROGRESS NOTES
[facility] CARDIOLOGY  Cardiology Care Note                  []Initial visit     [x]Established visit     Patient Name: Ammy Tarango - :1934 - MRN:[id number]  Primary Cardiologist: Alen Ovalles MD        2024 1:36 PM     Reason for initial visit: Difficult to treat hypertension      Subjective:  NG tube remains in place. Pt denies nausea. Had another 2 BM per pt report.  Denies chest pain. Reports some mild SOB. She is mostly complaining of  inability to get comfortable in bed or in chair. C/o back pain (upper and lower).  Net -1150 mls/24 hours.  BP is elevated this a.m. before AM meds. Overall a little labile.  Remains on cardene gtt , now increased to 7.5 mg /hr. .   She is being started on po liquids but NG remains in place this a.m.              ASSESSMENT    History of fall with small intracranial frontal hemorrhage  Resistant hypertension : BP now improved.  Bilateral pedal edema most likely due to venous stasis versus diastolic heart failure   Diabetes mellitus  Ileus vs partial SBO-      PLAN   Patient presented to the hospital with history of fall with CT suggestive of small right-sided frontal intracranial hemorrhage-asymptomatic with no signs of confusion on conservative medical management as per neurosurgery    Aspirin remains on hold.   Blood pressure elevated.  Goal is <140.    Since pt is still npo with NG tube and absorption may be questionable, , continue cardene and titrate as needed. Continue po meds (metoprolol, hydralazine, clonidine, lisinopril, aldactone) as able given ileus. Will transition off cardene after pt taking po.      Hyperlipidemia fairly well-controlled on Lipitor  Echo 2024 EF 60-65%, mod LVH, NWMA, +Diastolic dysfunction.   Encourage mobilization.   Add incentive spirometry.   Recommend DVT ppx, lovenox vs heparin- d/w Dr. Lo.     Addendum:    I have personally reviewed the

## 2024-08-14 NOTE — PROGRESS NOTES
[facility] CARDIOLOGY  Cardiology Care Note                  []Initial visit     [x]Established visit     Patient Name: Ammy Tarango - :1934 - MRN:[id number]  Primary Cardiologist: Alen Ovalles MD        2024 1:31 PM     Reason for initial visit: Difficult to treat hypertension      Subjective:  NG tube remains in place. Pt denies nausea. Had another 2 BM per pt report.  Denies chest pain. Reports some mild SOB. She is mostly complaining of  inability to get comfortable in bed or in chair. C/o back pain (upper and lower).  Net -1150 mls/24 hours.  BP is elevated this a.m. before AM meds. Overall a little labile.  Remains on cardene gtt , now increased to 7.5 mg /hr. .   She is being started on po liquids but NG remains in place this a.m.              ASSESSMENT    History of fall with small intracranial frontal hemorrhage  Resistant hypertension : BP now improved.  Bilateral pedal edema most likely due to venous stasis versus diastolic heart failure   Diabetes mellitus  Ileus vs partial SBO-      PLAN   Patient presented to the hospital with history of fall with CT suggestive of small right-sided frontal intracranial hemorrhage-asymptomatic with no signs of confusion on conservative medical management as per neurosurgery    Aspirin remains on hold.   Blood pressure elevated.  Goal is <140.    Since pt is still npo with NG tube and absorption may be questionable, , continue cardene and titrate as needed. Continue po meds (metoprolol, hydralazine, clonidine, lisinopril, aldactone) as able given ileus. Will transition off cardene after pt taking po.      Hyperlipidemia fairly well-controlled on Lipitor  Echo 2024 EF 60-65%, mod LVH, NWMA, +Diastolic dysfunction.   Encourage mobilization.   Add incentive spirometry.   Recommend DVT ppx, lovenox vs heparin- d/w Dr. Lo.               ___________________________________________________________  HPI:      Ammy is a 89 y.o. female with a history of difficult to treat hypertension on multiple antihypertensive, venous insufficiency, hyperlipidemia and diabetes mellitus was following Dr. Edward Ovalles presented to the hospital with history of fall with injury to head with subsequent CT finding of 9 mm hypodensity in the right superior frontal extra axial space suggestive of small focus of intracranial hemorrhage  Patient also has uncontrolled hypertension on admission  The patient presented to the hospital following a slip in their bedroom, resulting in a head injury. They reported taking two Tylenol and resting post-incident, with no immediate chest pain, shortness of breath, confusion, or other symptoms noted at the time.      Cardiac testing  24    ECHO (TTE) COMPLETE (PRN CONTRAST/BUBBLE/STRAIN/3D) 2024  4:51 PM (Final)    Interpretation Summary    Left Ventricle: Normal left ventricular systolic function with a visually estimated EF of 60 - 65%. Left ventricle size is normal. Moderately increased wall thickness. Normal wall motion. Diastolic dysfunction present with normal LV EF.    Aortic Valve: Trileaflet valve.    Image quality is adequate.  Signed by: Rufino Mccormick MD on 2024  4:51 PM    Echo done in 2018  Left ventricle: Systolic function was normal. Ejection fraction was  estimated in the range of 60 % to 65 %. There were no regional wall motion  abnormalities.     Mitral valve: There was mild regurgitation.     EC normal sinus rhythm with LVH    Review of Systems:    [x]All other systems reviewed and all negative except as written in HPI    [] Patient unable to provide secondary to condition    Past Medical History:   Diagnosis Date    Adverse effect of anesthesia     pt states she was able to feel everything during colonoscopy    Chronic bronchitis (HCC)     Diabetes (HCC)     Essential hypertension

## 2024-08-14 NOTE — CONSULTS
pitting edema b/l LEs  Neuro: Alert, oriented to time, place and situation, moves all extremities to commands, verbal   Skin: no rash  Psych: good affect, good eye contact, non tearful     Central Line:        Labs:   Recent Results (from the past 24 hour(s))   POCT Glucose    Collection Time: 08/13/24 11:30 AM   Result Value Ref Range    POC Glucose 118 (H) 65 - 117 mg/dL    Performed by: Chen Cheryle CON    POCT Glucose    Collection Time: 08/13/24  5:01 PM   Result Value Ref Range    POC Glucose 129 (H) 65 - 117 mg/dL    Performed by: Chen Cheryle CON    POCT Glucose    Collection Time: 08/13/24  8:33 PM   Result Value Ref Range    POC Glucose 186 (H) 65 - 117 mg/dL    Performed by: [employer] Susi  PCT    POCT Glucose    Collection Time: 08/14/24  6:15 AM   Result Value Ref Range    POC Glucose 163 (H) 65 - 117 mg/dL    Performed by: FOTSO Susi  PCT        Microbiology Data:       Blood: NGTD      Urine: E coli and Aerococcus urinae pan-sensitive    Imaging: CT head with ICH ~1cm, CT A/P with ileus    Assessment / Plan:       89 y/o female with mechanical traumatic fall with intracranial hemorrhage, ileus, evaluated for possible UTI.    Seems to be asymptomatic bactiuria/mild pyuria given fall did not coincide with urinary symptoms.    Stopped Ceftriaxone and monitor off antibiotics.    Will sign off, please call with questions.       Thank you Dr. Lo for the opportunity to participate in the care of this patient. Please contact with questions or concerns.     A total time of 60 minutes was spent on today's encounter.  Greater than 50% of the time was spent on the following:  Preparing for visit and chart review.  Obtaining and/or reviewing separately obtained history  Performing a medically appropriate exam and/or evaluation  Counseling and educating a patient/family/caregiver as noted above  Placing relevant orders  Referring and communicating with other professionals (not separately  reported)  Independently interpreting results (not separately reported) and communicating results to the patient/family/caregiver  Care coordination (not separately reported) as noted above  Documenting clinical information in the electronic health records (e.g. problem list, visit note) on the day of the encounter

## 2024-08-14 NOTE — PLAN OF CARE
Problem: Physical Therapy - Adult  Goal: By Discharge: Performs mobility at highest level of function for planned discharge setting.  See evaluation for individualized goals.  Description: FUNCTIONAL STATUS PRIOR TO ADMISSION: Pt lives alone with son/ daughter very close by. Ambulates indep without device, but owns SPC. Uses B HR to independently negotiate 2 steps into home.     Physical Therapy Goals  Initiated 8/14/2024  1.  Patient will move from supine to sit and sit to supine, scoot up and down, and roll side to side in bed with modified independence within 7 day(s).    2.  Patient will perform sit to stand with modified independence within 7 day(s).  3.  Patient will transfer from bed to chair and chair to bed with supervision/set-up using the least restrictive device within 7 day(s).  4.  Patient will ambulate with supervision/set-up for 150 feet with the least restrictive device within 7 day(s).   5.  Patient will ascend/descend 2 stairs with bilateral handrail(s) with contact guard assist within 7 day(s).    Initiated 8/7/2024  1.  Patient will move from supine to sit and sit to supine in bed with independence within 7 day(s).    2.  Patient will perform sit to stand with independence within 7 day(s).  3.  Patient will transfer from bed to chair and chair to bed with independence using the least restrictive device within 7 day(s).  4.  Patient will ambulate with independence for 150 feet with the least restrictive device within 7 day(s).   5.  Patient will ascend/descend 2 stairs with B handrail(s) with supervision/set-up within 7 day(s).   Outcome: Progressing   PHYSICAL THERAPY TREATMENT: WEEKLY REASSESSMENT    Patient: Ammy Tarango (89 y.o. female)  Date: 8/14/2024  Primary Diagnosis: Subdural hematoma (HCC) [S06.5XAA]  SDH (subdural hematoma) (HCC) [S06.5XAA]  Injury of head, initial encounter [S09.90XA]       Precautions: Fall Risk SBP<140      ASSESSMENT :  Patient continues to benefit from  skilled PT services and is slowly progressing towards goals. Patient received sitting in chair, daughter present, and patient cleared for PT by RN. Remains on cardene gtt for BP control. Participated in standing and ambulating with RW-- cues for safe RW in tight spaces and with turns. Fatigues quickly and has declined since last weekly reassessment given her GI issues and prolonged hospital stay. Anticipate she could still return home but may need one of her children to stay with her and assist more than usual. Highly recommend HHPT.     Assisted earlier in the day with transfer out of chair to [facility], with episode of bowel incontinence, and ultimately back to bed. Required cues for hand placement with all transfers and sequencing.     Patient's progression toward goals since last assessment: progress towards goals remains slow and patient has had a decline given medical complexities since last assessment         PLAN :  Goals have been updated based on progression since last assessment.  Patient continues to benefit from skilled intervention to address the above impairments.    Recommendations and Planned Interventions:   bed mobility training, transfer training, gait training, therapeutic exercises, neuromuscular re-education, patient and family training/education, and therapeutic activities    Frequency/Duration: Patient will be followed by physical therapy to address goals, PT Plan of Care: 5 times/week  per day/week to address goals.    Recommend with staff: therapy recommendations for staff: Recommend mobility with staff assist x1 using gait belt and rolling walker.    Recommend for next PT session: sit to stand transfers and further progression of gait with existing device    Recommendation for discharge: (in order for the patient to meet his/her long term goals): Therapy 2x a week in the home, however, may require supervision, cognitive and/or physical assistance due to the following concerns listed

## 2024-08-14 NOTE — PROGRESS NOTES
[facility] Adult  Hospitalist Group                                                                                          Hospitalist Progress Note  Maxi Lo MD  Answering service: [phone] OR [phone] from in house phone        Date of Service:  2024  NAME:  Ammy Tarango  :  1934  MRN:  [id number]       Admission Summary:   Ammy Tarango is a 89 y.o. female who presents to the emergency room after she fell at home.  Patient reports that her foot got stuck in the rock and subsequently fell in the bathroom hitting her head on the floor.  Patient reports headache, left shoulder pain and left neck pain.  Patient subsequently presented to the emergency room for further evaluation.  Patient presented with fairly stable vital signs.  CT of the head was done in the ER which shows 9 mm hyperdensity in the right superior frontal extra-axial space concerning for small focus of acute hemorrhage.  CT cervical spine shows no acute fracture.  In the emergency room neurosurgery was consulted and hospitalist service requested to admit the patient for further evaluation management.        Interval history / Subjective:   Patient seen and examined, discussed with her, family member at the bedside, and RN.  Patient is sitting up in a chair this morning.  She had another small bowel movement last night.  She denies any new complaints.  She is hopeful that of the ileus/SBO has resolved and that the NG tube can come out.  BP much better controlled, weaning Cardene drip.     Assessment & Plan:             Intracranial hemorrhage  -Patient presented with mechanical fall and head injury, CT of the head shows 9 mm hyperdensity in the right superior frontal extra-axial space concerning for small focus of acute hemorrhage  -Will hold aspirin which patient takes at home;  -Neurosurgery has been consulted: Patient does not require any neurosurgical interventions; outpatient follow-up in the office

## 2024-08-15 ENCOUNTER — APPOINTMENT (OUTPATIENT)
Facility: HOSPITAL | Age: 89
DRG: 083 | End: 2024-08-15
Payer: MEDICARE

## 2024-08-15 LAB
ANION GAP SERPL CALC-SCNC: 5 MMOL/L (ref 5–15)
BUN SERPL-MCNC: 14 MG/DL (ref 6–20)
BUN/CREAT SERPL: 23 (ref 12–20)
CALCIUM SERPL-MCNC: 8 MG/DL (ref 8.5–10.1)
CHLORIDE SERPL-SCNC: 113 MMOL/L (ref 97–108)
CO2 SERPL-SCNC: 24 MMOL/L (ref 21–32)
CREAT SERPL-MCNC: 0.62 MG/DL (ref 0.55–1.02)
ERYTHROCYTE [DISTWIDTH] IN BLOOD BY AUTOMATED COUNT: 14.7 % (ref 11.5–14.5)
GLUCOSE BLD STRIP.AUTO-MCNC: 112 MG/DL (ref 65–117)
GLUCOSE BLD STRIP.AUTO-MCNC: 158 MG/DL (ref 65–117)
GLUCOSE BLD STRIP.AUTO-MCNC: 198 MG/DL (ref 65–117)
GLUCOSE BLD STRIP.AUTO-MCNC: 199 MG/DL (ref 65–117)
GLUCOSE SERPL-MCNC: 154 MG/DL (ref 65–100)
HCT VFR BLD AUTO: 31.5 % (ref 35–47)
HGB BLD-MCNC: 10.1 G/DL (ref 11.5–16)
MAGNESIUM SERPL-MCNC: 2.4 MG/DL (ref 1.6–2.4)
MCH RBC QN AUTO: 28.2 PG (ref 26–34)
MCHC RBC AUTO-ENTMCNC: 32.1 G/DL (ref 30–36.5)
MCV RBC AUTO: 88 FL (ref 80–99)
NRBC # BLD: 0 K/UL (ref 0–0.01)
NRBC BLD-RTO: 0 PER 100 WBC
PHOSPHATE SERPL-MCNC: 1.7 MG/DL (ref 2.6–4.7)
PLATELET # BLD AUTO: 189 K/UL (ref 150–400)
PMV BLD AUTO: 9.9 FL (ref 8.9–12.9)
POTASSIUM SERPL-SCNC: 3.6 MMOL/L (ref 3.5–5.1)
RBC # BLD AUTO: 3.58 M/UL (ref 3.8–5.2)
SERVICE CMNT-IMP: ABNORMAL
SERVICE CMNT-IMP: NORMAL
SODIUM SERPL-SCNC: 142 MMOL/L (ref 136–145)
WBC # BLD AUTO: 5.5 K/UL (ref 3.6–11)

## 2024-08-15 PROCEDURE — 6370000000 HC RX 637 (ALT 250 FOR IP)

## 2024-08-15 PROCEDURE — 2060000000 HC ICU INTERMEDIATE R&B

## 2024-08-15 PROCEDURE — 83735 ASSAY OF MAGNESIUM: CPT

## 2024-08-15 PROCEDURE — 6370000000 HC RX 637 (ALT 250 FOR IP): Performed by: INTERNAL MEDICINE

## 2024-08-15 PROCEDURE — 6370000000 HC RX 637 (ALT 250 FOR IP): Performed by: HOSPITALIST

## 2024-08-15 PROCEDURE — 82962 GLUCOSE BLOOD TEST: CPT

## 2024-08-15 PROCEDURE — 6360000002 HC RX W HCPCS: Performed by: SURGERY

## 2024-08-15 PROCEDURE — 2580000003 HC RX 258: Performed by: FAMILY MEDICINE

## 2024-08-15 PROCEDURE — 2580000003 HC RX 258: Performed by: INTERNAL MEDICINE

## 2024-08-15 PROCEDURE — 74018 RADEX ABDOMEN 1 VIEW: CPT

## 2024-08-15 PROCEDURE — 6360000002 HC RX W HCPCS: Performed by: INTERNAL MEDICINE

## 2024-08-15 PROCEDURE — 84100 ASSAY OF PHOSPHORUS: CPT

## 2024-08-15 PROCEDURE — 36415 COLL VENOUS BLD VENIPUNCTURE: CPT

## 2024-08-15 PROCEDURE — 80048 BASIC METABOLIC PNL TOTAL CA: CPT

## 2024-08-15 PROCEDURE — 99233 SBSQ HOSP IP/OBS HIGH 50: CPT | Performed by: SPECIALIST

## 2024-08-15 PROCEDURE — 6370000000 HC RX 637 (ALT 250 FOR IP): Performed by: FAMILY MEDICINE

## 2024-08-15 PROCEDURE — 85027 COMPLETE CBC AUTOMATED: CPT

## 2024-08-15 RX ORDER — SPIRONOLACTONE 25 MG/1
12.5 TABLET ORAL DAILY
Status: DISCONTINUED | OUTPATIENT
Start: 2024-08-16 | End: 2024-08-15

## 2024-08-15 RX ORDER — SPIRONOLACTONE 25 MG/1
25 TABLET ORAL DAILY
Status: DISCONTINUED | OUTPATIENT
Start: 2024-08-16 | End: 2024-08-16

## 2024-08-15 RX ORDER — CASTOR OIL AND BALSAM, PERU 788; 87 MG/G; MG/G
OINTMENT TOPICAL 2 TIMES DAILY
Status: DISCONTINUED | OUTPATIENT
Start: 2024-08-15 | End: 2024-08-21 | Stop reason: HOSPADM

## 2024-08-15 RX ORDER — TRAZODONE HYDROCHLORIDE 50 MG/1
50 TABLET ORAL NIGHTLY PRN
Status: DISCONTINUED | OUTPATIENT
Start: 2024-08-15 | End: 2024-08-21 | Stop reason: HOSPADM

## 2024-08-15 RX ADMIN — PANTOPRAZOLE SODIUM 40 MG: 40 TABLET, DELAYED RELEASE ORAL at 06:58

## 2024-08-15 RX ADMIN — BISACODYL 10 MG: 10 SUPPOSITORY RECTAL at 08:51

## 2024-08-15 RX ADMIN — Medication: at 14:30

## 2024-08-15 RX ADMIN — POTASSIUM CHLORIDE 10 MEQ: 750 TABLET, FILM COATED, EXTENDED RELEASE ORAL at 08:52

## 2024-08-15 RX ADMIN — METOCLOPRAMIDE 5 MG: 5 INJECTION, SOLUTION INTRAMUSCULAR; INTRAVENOUS at 12:13

## 2024-08-15 RX ADMIN — BISACODYL 10 MG: 10 SUPPOSITORY RECTAL at 16:19

## 2024-08-15 RX ADMIN — CLONIDINE HYDROCHLORIDE 0.3 MG: 0.1 TABLET ORAL at 10:34

## 2024-08-15 RX ADMIN — SODIUM CHLORIDE: 9 INJECTION, SOLUTION INTRAVENOUS at 04:17

## 2024-08-15 RX ADMIN — METOPROLOL TARTRATE 100 MG: 25 TABLET, FILM COATED ORAL at 21:09

## 2024-08-15 RX ADMIN — ENOXAPARIN SODIUM 40 MG: 100 INJECTION SUBCUTANEOUS at 08:51

## 2024-08-15 RX ADMIN — ATORVASTATIN CALCIUM 10 MG: 10 TABLET, FILM COATED ORAL at 08:51

## 2024-08-15 RX ADMIN — SPIRONOLACTONE 25 MG: 25 TABLET ORAL at 08:52

## 2024-08-15 RX ADMIN — MAGNESIUM HYDROXIDE 30 ML: 400 SUSPENSION ORAL at 08:52

## 2024-08-15 RX ADMIN — HYDRALAZINE HYDROCHLORIDE 100 MG: 50 TABLET ORAL at 21:09

## 2024-08-15 RX ADMIN — SODIUM CHLORIDE: 9 INJECTION, SOLUTION INTRAVENOUS at 19:28

## 2024-08-15 RX ADMIN — METOCLOPRAMIDE 5 MG: 5 INJECTION, SOLUTION INTRAMUSCULAR; INTRAVENOUS at 00:27

## 2024-08-15 RX ADMIN — CLONIDINE HYDROCHLORIDE 0.3 MG: 0.1 TABLET ORAL at 14:03

## 2024-08-15 RX ADMIN — SODIUM CHLORIDE, PRESERVATIVE FREE 10 ML: 5 INJECTION INTRAVENOUS at 21:10

## 2024-08-15 RX ADMIN — HYDRALAZINE HYDROCHLORIDE 10 MG: 20 INJECTION INTRAMUSCULAR; INTRAVENOUS at 14:48

## 2024-08-15 RX ADMIN — HYDRALAZINE HYDROCHLORIDE 10 MG: 20 INJECTION INTRAMUSCULAR; INTRAVENOUS at 18:29

## 2024-08-15 RX ADMIN — HYDRALAZINE HYDROCHLORIDE 100 MG: 50 TABLET ORAL at 04:45

## 2024-08-15 RX ADMIN — Medication 1 TABLET: at 08:52

## 2024-08-15 RX ADMIN — METOCLOPRAMIDE 5 MG: 5 INJECTION, SOLUTION INTRAMUSCULAR; INTRAVENOUS at 22:29

## 2024-08-15 RX ADMIN — METOCLOPRAMIDE 5 MG: 5 INJECTION, SOLUTION INTRAMUSCULAR; INTRAVENOUS at 04:42

## 2024-08-15 RX ADMIN — CLONIDINE HYDROCHLORIDE 0.3 MG: 0.1 TABLET ORAL at 21:09

## 2024-08-15 RX ADMIN — SODIUM CHLORIDE, PRESERVATIVE FREE 10 ML: 5 INJECTION INTRAVENOUS at 08:55

## 2024-08-15 RX ADMIN — Medication 1000 UNITS: at 08:51

## 2024-08-15 RX ADMIN — CETIRIZINE HYDROCHLORIDE 10 MG: 10 TABLET, FILM COATED ORAL at 08:51

## 2024-08-15 RX ADMIN — METOPROLOL TARTRATE 100 MG: 25 TABLET, FILM COATED ORAL at 08:52

## 2024-08-15 RX ADMIN — LISINOPRIL 40 MG: 20 TABLET ORAL at 08:52

## 2024-08-15 RX ADMIN — TRAZODONE HYDROCHLORIDE 50 MG: 50 TABLET ORAL at 21:10

## 2024-08-15 RX ADMIN — Medication: at 21:10

## 2024-08-15 RX ADMIN — METOCLOPRAMIDE 5 MG: 5 INJECTION, SOLUTION INTRAMUSCULAR; INTRAVENOUS at 18:29

## 2024-08-15 RX ADMIN — HYDRALAZINE HYDROCHLORIDE 100 MG: 50 TABLET ORAL at 14:03

## 2024-08-15 RX ADMIN — NICARDIPINE HYDROCHLORIDE 2.5 MG/HR: 2.5 INJECTION, SOLUTION INTRAVENOUS at 00:34

## 2024-08-15 NOTE — PROGRESS NOTES
[facility]  Cardiology Care Note                  []Initial visit     [x]Established visit     Patient Name: Ammy Tarango - :1934 - MRN:[id number]  Primary Cardiologist: Alen Ovalles MD   Rounding cardiology: Dr. Aniceto Steen MD  Reason for initial visit: Difficult to treat hypertension         Assessment/Plan/Discussion:Cardiology Attending:     Patient seen on the day of progress note and examined  reviewed  with Advance Practice Provider (LD, NP,PA)       A/P/ Medical Decision Making:  Bp control variable, was on cardene  130s-140s this am, single value now at 172, will watch for trend  Htn longstanding  Fall ICH-no surgery needed  LVH with normal EF  Continued to be euvolemic  Ileus with ng tube in place  On BB, ACE, MRA, clonidine and afterload reduction  Lab Results   Component Value Date    CREATININE 0.62 08/15/2024        Hypertension Medications       ACE Inhibitors       lisinopril (PRINIVIL;ZESTRIL) tablet 40 mg 40 mg, Oral, DAILY                     Potassium Sparing Diuretics       spironolactone (ALDACTONE) tablet 25 mg 25 mg, Oral, DAILY       Antiadrenergic Antihypertensives       cloNIDine (CATAPRES) tablet 0.3 mg 0.3 mg, Oral, 3 TIMES DAILY       Vasodilators       hydrALAZINE (APRESOLINE) tablet 100 mg 100 mg, Oral, EVERY 8 HOURS SCHEDULED     hydrALAZINE (APRESOLINE) injection 10 mg 10 mg, IntraVENous, EVERY 6 HOURS PRN       Beta Blockers Cardio-Selective       metoprolol tartrate (LOPRESSOR) tablet 100 mg 100 mg, Oral, 2 TIMES DAILY             Ammy Tarango is a 89 y.o. female   S:   Comfortable   No complaints  Denies chest pain, heart palpitations , increasing edema, pre-syncope or shortness of breath at rest   Cardene adjusted overnight, bp to 95 so stopped  rhythm and hemodynamics stable -see vitals below     O:BP (!) 172/69   Pulse 99   Temp 98.3 °F (36.8 °C)   Resp 23   Ht  MD KIMBERLYN    ondansetron (ZOFRAN-ODT) disintegrating tablet 4 mg, 4 mg, Oral, Q8H PRN **OR** ondansetron (ZOFRAN) injection 4 mg, 4 mg, IntraVENous, Q6H PRN, Yasir Puga MD, 4 mg at 08/12/24 2116    polyethylene glycol (GLYCOLAX) packet 17 g, 17 g, Oral, Daily PRN, Yasir Puga MD, 17 g at 08/10/24 1551    acetaminophen (TYLENOL) tablet 650 mg, 650 mg, Oral, Q6H PRN, 650 mg at 08/12/24 2119 **OR** acetaminophen (TYLENOL) suppository 650 mg, 650 mg, Rectal, Q6H PRN, Yasir Puga MD    insulin lispro (HUMALOG,ADMELOG) injection vial 0-8 Units, 0-8 Units, SubCUTAneous, TID WC, Yasir Puga MD, 2 Units at 08/14/24 1126    insulin lispro (HUMALOG,ADMELOG) injection vial 0-4 Units, 0-4 Units, SubCUTAneous, Nightly, Yasir Puga MD Lisa Zimmer, APRN - NP    [facility]  Call center: (P) [phone]  (F) [phone]

## 2024-08-15 NOTE — CARE COORDINATION
Transition of Care Plan:      Home with family support  - home health ([facility])  - DME: RW delivered     Transport: Family     RUR: 13%  Prior Level of Functioning: Independent  Disposition: HH PT  Follow up appointments: PCP  DME needed: None  Transportation at discharge: Family  IM/IMM Medicare/ letter given: 8/8  Caregiver Contact: Justino Burgess (Child)  [phone] (Home Phone)   Discharge Caregiver contacted prior to discharge?   Care Conference needed? No  Barriers to discharge: Medical, 24-48 hours, cardio following    RW has been delivered to bedside. CM will continue to follow.    DONELL Limon (Ally).

## 2024-08-15 NOTE — PROGRESS NOTES
hours.    Invalid input(s): \"SGOT\", \"GPT\", \"AP\", \"TBIL\", \"TBILI\", \"ALB\", \"AML\", \"AMYP\", \"LPSE\", \"HLPSE\"  No results for input(s): \"INR\", \"APTT\" in the last 72 hours.    Invalid input(s): \"PTP\"   No results for input(s): \"TIBC\" in the last 72 hours.    Invalid input(s): \"FE\", \"PSAT\", \"FERR\"   No results found for: \"RBCF\"   No results for input(s): \"PH\", \"PCO2\", \"PO2\" in the last 72 hours.  No results for input(s): \"CPK\" in the last 72 hours.    Invalid input(s): \"CPKMB\", \"CKNDX\", \"TROIQ\"  Lab Results   Component Value Date/Time    CHOL 150 03/25/2024 08:19 AM    HDL 50 03/25/2024 08:19 AM    LDL 85.2 03/25/2024 08:19 AM     No results found for: \"GLUCPOC\"  [unfilled]    Notes reviewed from all clinical/nonclinical/nursing services involved in patient's clinical care. Care coordination discussions were held with appropriate clinical/nonclinical/ nursing providers based on care coordination needs.         Patients current active Medications were reviewed, considered, added and adjusted based on the clinical condition today.      Home Medications were reconciled to the best of my ability given all available resources at the time of admission. Route is PO if not otherwise noted.      Admission Status:[id number]:::1}      Medications Reviewed:     Current Facility-Administered Medications   Medication Dose Route Frequency    balsum peru-castor oil (VENELEX) ointment   Topical BID    enoxaparin (LOVENOX) injection 40 mg  40 mg SubCUTAneous Daily    metoclopramide (REGLAN) injection 5 mg  5 mg IntraVENous Q6H    bisacodyl (DULCOLAX) suppository 10 mg  10 mg Rectal BID    magnesium hydroxide (MILK OF MAGNESIA) 400 MG/5ML suspension 30 mL  30 mL Oral Daily    0.9 % sodium chloride infusion   IntraVENous Continuous    niCARdipine (CARDENE) 25 mg in sodium chloride 0.9 % 250 mL infusion ([id number])  2.5-15 mg/hr IntraVENous Continuous    hydrALAZINE (APRESOLINE) tablet 100 mg  100 mg Oral 3 times per day    spironolactone (ALDACTONE)      ______________________________________________________________________  EXPECTED LENGTH OF STAY: Unable to retrieve estimated LOS  ACTUAL LENGTH OF STAY:          9                 Luisana Echavarria MD

## 2024-08-15 NOTE — WOUND CARE
WOCN Note:     New consult for sacrum.   Seen in 668/01 with her daughter.    89 y.o. y/o female admitted on 8/6/2024   Admitted for SDH   History of DM  Lab Results   Component Value Date/Time    WBC 5.5 08/15/2024 12:06 AM    LABA1C 6.4 (H) 03/25/2024 08:19 AM    POCGLU 199 (H) 08/15/2024 12:02 PM    POCGLU 112 08/15/2024 06:27 AM    HGB 10.1 (L) 08/15/2024 12:06 AM    HCT 31.5 (L) 08/15/2024 12:06 AM     08/15/2024 12:06 AM     No indication for wound culture  Diet: ADULT DIET; Clear Liquid; Please send hot/warm liquids           Assessment:   Sleepy but communicative  Reports no pain.   Requires full assists in repositioning  Has a Riddle  Surface: Delivered gel mattress - called for blower to be delivered today    Bilateral heels intact and without erythema.  Heels offloaded with pillows.    Sacrum intact without erythema; sacral foam in use.  Coccyx and buttocks with ~6 scattered areas of erosion with pink bases.  Barrier cream and foam re-applied.      Recommend Lorenzo Protocol:    Turn/reposition approximately every 2 hours  Offload heels with heels hanging off end of pillow at all times while in bed.  Sacral Foam dressing: lift to assess regularly; change as needed. Discontinue if incontinence is frequently soiling dressing.     Venelex to buttocks and sacrum twice daily and as needed with incontinence care  Low Air Loss mattress: Use only flat sheet and one incontinence pad.     No concerns to relay to provider.    Transition of Care: Plan to follow weekly and as needed while admitted to hospital.     Maggy Lyn, JULIETAN, RN, CWOCN  Certified Wound, Ostomy, Continence Nurse  office [phone]  Available via [employer]

## 2024-08-15 NOTE — PLAN OF CARE
Problem: Skin/Tissue Integrity  Goal: Absence of new skin breakdown  Description: 1.  Monitor for areas of redness and/or skin breakdown  2.  Assess vascular access sites hourly  3.  Every 4-6 hours minimum:  Change oxygen saturation probe site  4.  Every 4-6 hours:  If on nasal continuous positive airway pressure, respiratory therapy assess nares and determine need for appliance change or resting period.  Outcome: Not Progressing     Problem: Discharge Planning  Goal: Discharge to home or other facility with appropriate resources  Outcome: Progressing  Flowsheets (Taken 8/15/2024 0800)  Discharge to home or other facility with appropriate resources:   Identify barriers to discharge with patient and caregiver   Arrange for needed discharge resources and transportation as appropriate   Identify discharge learning needs (meds, wound care, etc)   Arrange for interpreters to assist at discharge as needed   Refer to discharge planning if patient needs post-hospital services based on physician order or complex needs related to functional status, cognitive ability or social support system     Problem: Pain  Goal: Verbalizes/displays adequate comfort level or baseline comfort level  Outcome: Progressing     Problem: Safety - Adult  Goal: Free from fall injury  Outcome: Progressing  Flowsheets (Taken 8/15/2024 0800)  Free From Fall Injury:   Based on caregiver fall risk screen, instruct family/caregiver to ask for assistance with transferring infant if caregiver noted to have fall risk factors   Instruct family/caregiver on patient safety     Problem: Chronic Conditions and Co-morbidities  Goal: Patient's chronic conditions and co-morbidity symptoms are monitored and maintained or improved  Outcome: Progressing  Flowsheets (Taken 8/15/2024 0800)  Care Plan - Patient's Chronic Conditions and Co-Morbidity Symptoms are Monitored and Maintained or Improved:   Collaborate with multidisciplinary team to address chronic and  comorbid conditions and prevent exacerbation or deterioration   Update acute care plan with appropriate goals if chronic or comorbid symptoms are exacerbated and prevent overall improvement and discharge   Monitor and assess patient's chronic conditions and comorbid symptoms for stability, deterioration, or improvement     Problem: ABCDS Injury Assessment  Goal: Absence of physical injury  Outcome: Progressing     Problem: Skin/Tissue Integrity  Goal: Absence of new skin breakdown  Description: 1.  Monitor for areas of redness and/or skin breakdown  2.  Assess vascular access sites hourly  3.  Every 4-6 hours minimum:  Change oxygen saturation probe site  4.  Every 4-6 hours:  If on nasal continuous positive airway pressure, respiratory therapy assess nares and determine need for appliance change or resting period.  Outcome: Not Progressing

## 2024-08-16 LAB
ALBUMIN SERPL-MCNC: 2.6 G/DL (ref 3.5–5)
ALBUMIN/GLOB SERPL: 0.9 (ref 1.1–2.2)
ALP SERPL-CCNC: 75 U/L (ref 45–117)
ALT SERPL-CCNC: 19 U/L (ref 12–78)
ANION GAP SERPL CALC-SCNC: 6 MMOL/L (ref 5–15)
AST SERPL-CCNC: 23 U/L (ref 15–37)
BILIRUB SERPL-MCNC: 0.4 MG/DL (ref 0.2–1)
BUN SERPL-MCNC: 7 MG/DL (ref 6–20)
BUN/CREAT SERPL: 12 (ref 12–20)
CALCIUM SERPL-MCNC: 7.9 MG/DL (ref 8.5–10.1)
CHLORIDE SERPL-SCNC: 110 MMOL/L (ref 97–108)
CO2 SERPL-SCNC: 24 MMOL/L (ref 21–32)
CREAT SERPL-MCNC: 0.58 MG/DL (ref 0.55–1.02)
ERYTHROCYTE [DISTWIDTH] IN BLOOD BY AUTOMATED COUNT: 14.6 % (ref 11.5–14.5)
GLOBULIN SER CALC-MCNC: 2.9 G/DL (ref 2–4)
GLUCOSE BLD STRIP.AUTO-MCNC: 122 MG/DL (ref 65–117)
GLUCOSE BLD STRIP.AUTO-MCNC: 146 MG/DL (ref 65–117)
GLUCOSE BLD STRIP.AUTO-MCNC: 179 MG/DL (ref 65–117)
GLUCOSE BLD STRIP.AUTO-MCNC: 184 MG/DL (ref 65–117)
GLUCOSE SERPL-MCNC: 133 MG/DL (ref 65–100)
HCT VFR BLD AUTO: 33.6 % (ref 35–47)
HGB BLD-MCNC: 10.9 G/DL (ref 11.5–16)
MAGNESIUM SERPL-MCNC: 2.2 MG/DL (ref 1.6–2.4)
MCH RBC QN AUTO: 28.1 PG (ref 26–34)
MCHC RBC AUTO-ENTMCNC: 32.4 G/DL (ref 30–36.5)
MCV RBC AUTO: 86.6 FL (ref 80–99)
NRBC # BLD: 0 K/UL (ref 0–0.01)
NRBC BLD-RTO: 0 PER 100 WBC
PHOSPHATE SERPL-MCNC: 1.5 MG/DL (ref 2.6–4.7)
PLATELET # BLD AUTO: 176 K/UL (ref 150–400)
PMV BLD AUTO: 9.5 FL (ref 8.9–12.9)
POTASSIUM SERPL-SCNC: 3.2 MMOL/L (ref 3.5–5.1)
PROT SERPL-MCNC: 5.5 G/DL (ref 6.4–8.2)
RBC # BLD AUTO: 3.88 M/UL (ref 3.8–5.2)
SERVICE CMNT-IMP: ABNORMAL
SODIUM SERPL-SCNC: 140 MMOL/L (ref 136–145)
WBC # BLD AUTO: 5.4 K/UL (ref 3.6–11)

## 2024-08-16 PROCEDURE — 6370000000 HC RX 637 (ALT 250 FOR IP)

## 2024-08-16 PROCEDURE — 85027 COMPLETE CBC AUTOMATED: CPT

## 2024-08-16 PROCEDURE — 82962 GLUCOSE BLOOD TEST: CPT

## 2024-08-16 PROCEDURE — 80053 COMPREHEN METABOLIC PANEL: CPT

## 2024-08-16 PROCEDURE — 94760 N-INVAS EAR/PLS OXIMETRY 1: CPT

## 2024-08-16 PROCEDURE — 6370000000 HC RX 637 (ALT 250 FOR IP): Performed by: HOSPITALIST

## 2024-08-16 PROCEDURE — 6360000002 HC RX W HCPCS: Performed by: SURGERY

## 2024-08-16 PROCEDURE — 84100 ASSAY OF PHOSPHORUS: CPT

## 2024-08-16 PROCEDURE — 83735 ASSAY OF MAGNESIUM: CPT

## 2024-08-16 PROCEDURE — 6360000002 HC RX W HCPCS: Performed by: INTERNAL MEDICINE

## 2024-08-16 PROCEDURE — 6370000000 HC RX 637 (ALT 250 FOR IP): Performed by: INTERNAL MEDICINE

## 2024-08-16 PROCEDURE — 99232 SBSQ HOSP IP/OBS MODERATE 35: CPT | Performed by: SURGERY

## 2024-08-16 PROCEDURE — 6360000002 HC RX W HCPCS: Performed by: NURSE PRACTITIONER

## 2024-08-16 PROCEDURE — 6370000000 HC RX 637 (ALT 250 FOR IP): Performed by: NURSE PRACTITIONER

## 2024-08-16 PROCEDURE — 97530 THERAPEUTIC ACTIVITIES: CPT

## 2024-08-16 PROCEDURE — 99233 SBSQ HOSP IP/OBS HIGH 50: CPT | Performed by: SPECIALIST

## 2024-08-16 PROCEDURE — 2580000003 HC RX 258: Performed by: FAMILY MEDICINE

## 2024-08-16 PROCEDURE — 6370000000 HC RX 637 (ALT 250 FOR IP): Performed by: FAMILY MEDICINE

## 2024-08-16 PROCEDURE — 97116 GAIT TRAINING THERAPY: CPT

## 2024-08-16 PROCEDURE — 2060000000 HC ICU INTERMEDIATE R&B

## 2024-08-16 PROCEDURE — 2580000003 HC RX 258: Performed by: HOSPITALIST

## 2024-08-16 RX ORDER — HYDRALAZINE HYDROCHLORIDE 20 MG/ML
10 INJECTION INTRAMUSCULAR; INTRAVENOUS ONCE
Status: COMPLETED | OUTPATIENT
Start: 2024-08-16 | End: 2024-08-16

## 2024-08-16 RX ORDER — FUROSEMIDE 40 MG/1
40 TABLET ORAL DAILY
Status: DISCONTINUED | OUTPATIENT
Start: 2024-08-16 | End: 2024-08-21 | Stop reason: HOSPADM

## 2024-08-16 RX ORDER — POTASSIUM CHLORIDE 750 MG/1
40 TABLET, FILM COATED, EXTENDED RELEASE ORAL EVERY 4 HOURS
Status: DISCONTINUED | OUTPATIENT
Start: 2024-08-16 | End: 2024-08-16

## 2024-08-16 RX ORDER — SODIUM CHLORIDE 9 MG/ML
INJECTION, SOLUTION INTRAVENOUS CONTINUOUS
Status: DISPENSED | OUTPATIENT
Start: 2024-08-16 | End: 2024-08-17

## 2024-08-16 RX ORDER — SPIRONOLACTONE 25 MG/1
50 TABLET ORAL DAILY
Status: DISCONTINUED | OUTPATIENT
Start: 2024-08-17 | End: 2024-08-21 | Stop reason: HOSPADM

## 2024-08-16 RX ORDER — METOPROLOL TARTRATE 50 MG/1
150 TABLET, FILM COATED ORAL 2 TIMES DAILY
Status: DISCONTINUED | OUTPATIENT
Start: 2024-08-16 | End: 2024-08-21 | Stop reason: HOSPADM

## 2024-08-16 RX ADMIN — POTASSIUM BICARBONATE 40 MEQ: 782 TABLET, EFFERVESCENT ORAL at 16:55

## 2024-08-16 RX ADMIN — HYDRALAZINE HYDROCHLORIDE 10 MG: 20 INJECTION INTRAMUSCULAR; INTRAVENOUS at 19:02

## 2024-08-16 RX ADMIN — TRAZODONE HYDROCHLORIDE 50 MG: 50 TABLET ORAL at 21:47

## 2024-08-16 RX ADMIN — HYDRALAZINE HYDROCHLORIDE 100 MG: 50 TABLET ORAL at 14:11

## 2024-08-16 RX ADMIN — METOCLOPRAMIDE 5 MG: 5 INJECTION, SOLUTION INTRAMUSCULAR; INTRAVENOUS at 11:11

## 2024-08-16 RX ADMIN — METOCLOPRAMIDE 5 MG: 5 INJECTION, SOLUTION INTRAMUSCULAR; INTRAVENOUS at 16:55

## 2024-08-16 RX ADMIN — Medication: at 21:48

## 2024-08-16 RX ADMIN — METOPROLOL TARTRATE 100 MG: 25 TABLET, FILM COATED ORAL at 08:29

## 2024-08-16 RX ADMIN — HYDRALAZINE HYDROCHLORIDE 10 MG: 20 INJECTION INTRAMUSCULAR; INTRAVENOUS at 03:27

## 2024-08-16 RX ADMIN — LISINOPRIL 40 MG: 20 TABLET ORAL at 08:29

## 2024-08-16 RX ADMIN — METOCLOPRAMIDE 5 MG: 5 INJECTION, SOLUTION INTRAMUSCULAR; INTRAVENOUS at 21:48

## 2024-08-16 RX ADMIN — Medication: at 08:30

## 2024-08-16 RX ADMIN — CLONIDINE HYDROCHLORIDE 0.3 MG: 0.1 TABLET ORAL at 21:48

## 2024-08-16 RX ADMIN — SODIUM CHLORIDE, PRESERVATIVE FREE 10 ML: 5 INJECTION INTRAVENOUS at 21:48

## 2024-08-16 RX ADMIN — HYDRALAZINE HYDROCHLORIDE 100 MG: 50 TABLET ORAL at 21:47

## 2024-08-16 RX ADMIN — BISACODYL 10 MG: 10 SUPPOSITORY RECTAL at 08:30

## 2024-08-16 RX ADMIN — CETIRIZINE HYDROCHLORIDE 10 MG: 10 TABLET, FILM COATED ORAL at 08:29

## 2024-08-16 RX ADMIN — FUROSEMIDE 40 MG: 40 TABLET ORAL at 14:11

## 2024-08-16 RX ADMIN — Medication 1000 UNITS: at 08:30

## 2024-08-16 RX ADMIN — POTASSIUM CHLORIDE 40 MEQ: 750 TABLET, FILM COATED, EXTENDED RELEASE ORAL at 12:43

## 2024-08-16 RX ADMIN — POTASSIUM CHLORIDE 10 MEQ: 750 TABLET, FILM COATED, EXTENDED RELEASE ORAL at 08:29

## 2024-08-16 RX ADMIN — SPIRONOLACTONE 25 MG: 25 TABLET ORAL at 08:30

## 2024-08-16 RX ADMIN — ATORVASTATIN CALCIUM 10 MG: 10 TABLET, FILM COATED ORAL at 08:30

## 2024-08-16 RX ADMIN — MAGNESIUM HYDROXIDE 30 ML: 400 SUSPENSION ORAL at 08:29

## 2024-08-16 RX ADMIN — PANTOPRAZOLE SODIUM 40 MG: 40 TABLET, DELAYED RELEASE ORAL at 06:26

## 2024-08-16 RX ADMIN — SODIUM CHLORIDE: 9 INJECTION, SOLUTION INTRAVENOUS at 08:46

## 2024-08-16 RX ADMIN — HYDRALAZINE HYDROCHLORIDE 10 MG: 20 INJECTION INTRAMUSCULAR; INTRAVENOUS at 11:11

## 2024-08-16 RX ADMIN — ENOXAPARIN SODIUM 40 MG: 100 INJECTION SUBCUTANEOUS at 08:30

## 2024-08-16 RX ADMIN — METOCLOPRAMIDE 5 MG: 5 INJECTION, SOLUTION INTRAMUSCULAR; INTRAVENOUS at 05:22

## 2024-08-16 RX ADMIN — SODIUM CHLORIDE: 9 INJECTION, SOLUTION INTRAVENOUS at 20:31

## 2024-08-16 RX ADMIN — CLONIDINE HYDROCHLORIDE 0.3 MG: 0.1 TABLET ORAL at 14:11

## 2024-08-16 RX ADMIN — HYDRALAZINE HYDROCHLORIDE 10 MG: 20 INJECTION INTRAMUSCULAR; INTRAVENOUS at 05:22

## 2024-08-16 RX ADMIN — HYDRALAZINE HYDROCHLORIDE 100 MG: 50 TABLET ORAL at 06:26

## 2024-08-16 RX ADMIN — CLONIDINE HYDROCHLORIDE 0.3 MG: 0.1 TABLET ORAL at 08:29

## 2024-08-16 RX ADMIN — METOPROLOL TARTRATE 150 MG: 50 TABLET, FILM COATED ORAL at 21:47

## 2024-08-16 RX ADMIN — Medication 1 TABLET: at 08:30

## 2024-08-16 NOTE — CARE COORDINATION
Transition of Care Plan:      IPR - Referral sent to [facility]; will require insurance auth through [facility]  - PM&R consulted    Transport: BLS     RUR: 13%  Prior Level of Functioning: Independent  Disposition: IPR  If SNF or IPR: Date FOC offered: 8/16  Date FOC received: 8/16  Accepting facility: Pending  Date authorization started with reference number:   Date authorization received and expires:   Follow up appointments: PCP  DME needed: None  Transportation at discharge: Family  IM/IMM Medicare/ letter given: 8/8  Caregiver Contact: Justino Burgess (Child)  [phone] (Home Phone)   Discharge Caregiver contacted prior to discharge?   Care Conference needed? No  Barriers to discharge: Medical, 24-48 hours, cardio following; placement/auth    PT/OT and Dr. Echavarria now recommending IPR for Pt at discharge. CM met with Pt and family at bedside to discuss change in recommendation. They are all in agreement for Pt to go to [facility] and prefer [facility]. Referral has been sent with request to initiate auth if accepted. PM&R consulted.    DONELL Limon (Ally).

## 2024-08-16 NOTE — PLAN OF CARE
Problem: Discharge Planning  Goal: Discharge to home or other facility with appropriate resources  8/16/2024 0123 by Cynthia Fraser RN  Outcome: Progressing  Flowsheets (Taken 8/15/2024 2000)  Discharge to home or other facility with appropriate resources: Identify barriers to discharge with patient and caregiver  8/15/2024 1642 by Daysi Dodge RN  Outcome: Progressing  Flowsheets (Taken 8/15/2024 0800)  Discharge to home or other facility with appropriate resources:   Identify barriers to discharge with patient and caregiver   Arrange for needed discharge resources and transportation as appropriate   Identify discharge learning needs (meds, wound care, etc)   Arrange for interpreters to assist at discharge as needed   Refer to discharge planning if patient needs post-hospital services based on physician order or complex needs related to functional status, cognitive ability or social support system     Problem: Pain  Goal: Verbalizes/displays adequate comfort level or baseline comfort level  8/16/2024 0123 by Cynthia Fraser RN  Outcome: Progressing  8/15/2024 1642 by Daysi Dodge RN  Outcome: Progressing     Problem: Safety - Adult  Goal: Free from fall injury  8/16/2024 0123 by Cynthia Fraser RN  Outcome: Progressing  Flowsheets (Taken 8/16/2024 0123)  Free From Fall Injury:   Instruct family/caregiver on patient safety   Based on caregiver fall risk screen, instruct family/caregiver to ask for assistance with transferring infant if caregiver noted to have fall risk factors  8/15/2024 1642 by Daysi Dodge RN  Outcome: Progressing  Flowsheets (Taken 8/15/2024 0800)  Free From Fall Injury:   Based on caregiver fall risk screen, instruct family/caregiver to ask for assistance with transferring infant if caregiver noted to have fall risk factors   Instruct family/caregiver on patient safety     Problem: Chronic Conditions and Co-morbidities  Goal: Patient's chronic conditions and

## 2024-08-16 NOTE — PLAN OF CARE
Problem: Discharge Planning  Goal: Discharge to home or other facility with appropriate resources  8/16/2024 1353 by Daysi Dodge RN  Outcome: Progressing  Flowsheets (Taken 8/16/2024 0800)  Discharge to home or other facility with appropriate resources:   Identify barriers to discharge with patient and caregiver   Arrange for needed discharge resources and transportation as appropriate   Identify discharge learning needs (meds, wound care, etc)   Arrange for interpreters to assist at discharge as needed   Refer to discharge planning if patient needs post-hospital services based on physician order or complex needs related to functional status, cognitive ability or social support system  8/16/2024 0123 by Cynthia Fraser RN  Outcome: Progressing  Flowsheets (Taken 8/15/2024 2000)  Discharge to home or other facility with appropriate resources: Identify barriers to discharge with patient and caregiver     Problem: Pain  Goal: Verbalizes/displays adequate comfort level or baseline comfort level  8/16/2024 1353 by Daysi Dodge RN  Outcome: Progressing  8/16/2024 0123 by Cynthia Fraser RN  Outcome: Progressing     Problem: Safety - Adult  Goal: Free from fall injury  8/16/2024 1353 by Daysi Dodge RN  Outcome: Progressing  8/16/2024 0123 by Cynthia Fraser RN  Outcome: Progressing  Flowsheets (Taken 8/16/2024 0123)  Free From Fall Injury:   Instruct family/caregiver on patient safety   Based on caregiver fall risk screen, instruct family/caregiver to ask for assistance with transferring infant if caregiver noted to have fall risk factors     Problem: Chronic Conditions and Co-morbidities  Goal: Patient's chronic conditions and co-morbidity symptoms are monitored and maintained or improved  8/16/2024 1353 by Daysi Dodge RN  Outcome: Progressing  Flowsheets (Taken 8/16/2024 0800)  Care Plan - Patient's Chronic Conditions and Co-Morbidity Symptoms are Monitored and Maintained or

## 2024-08-16 NOTE — PROGRESS NOTES
Progress Note  Date:2024       Room:[facility]  Patient Name:Ammy Tarango     YOB: 1934     Age:89 y.o.        Subjective    Subjective   Review of Systems  Denies any nausea or vomiting.  Just had a bowel movement.  Has been on pills but not eating a lot.  Objective         Vitals Last 24 Hours:  TEMPERATURE:  Temp  Av.5 °F (36.9 °C)  Min: 97.9 °F (36.6 °C)  Max: 99.6 °F (37.6 °C)  RESPIRATIONS RANGE: Resp  Av.8  Min: 18  Max: 26  PULSE OXIMETRY RANGE: SpO2  Av.8 %  Min: 93 %  Max: 94 %  PULSE RANGE: Pulse  Av.8  Min: 65  Max: 92  BLOOD PRESSURE RANGE: Systolic (24hrs), Av , Min:113 , Max:191   ; Diastolic (24hrs), Av, Min:54, Max:74    I/O (24Hr):    Intake/Output Summary (Last 24 hours) at 2024 1126  Last data filed at 2024 1116  Gross per 24 hour   Intake --   Output 1650 ml   Net -1650 ml     Objective:  Vital signs: (most recent): Blood pressure (!) 166/64, pulse 68, temperature 98.4 °F (36.9 °C), temperature source Oral, resp. rate 26, height 1.524 m (5'), weight 70.4 kg (155 lb 1.6 oz), SpO2 94%.    General alert no acute distress  Abdomen soft nontender nondistended  Labs/Imaging/Diagnostics    Labs:  CBC:  Recent Labs     08/15/24  0006 24  0558   WBC 5.5 5.4   RBC 3.58* 3.88   HGB 10.1* 10.9*   HCT 31.5* 33.6*   MCV 88.0 86.6   RDW 14.7* 14.6*    176     CHEMISTRIES:  Recent Labs     08/15/24  0006 24  0558    140   K 3.6 3.2*   * 110*   CO2  24   BUN 14 7   CREATININE 0.62 0.58   GLUCOSE 154* 133*   PHOS 1.7* 1.5*   MG 2.4 2.2     PT/INR:No results for input(s): \"PROTIME\", \"INR\" in the last 72 hours.  APTT:No results for input(s): \"APTT\" in the last 72 hours.  LIVER PROFILE:  Recent Labs     24  0558   AST 23   ALT 19   BILITOT 0.4   ALKPHOS 75       Imaging Last 24 Hours:  XR ABDOMEN (KUB) (SINGLE AP VIEW)    Result Date: 2024  EXAM:  XR ABDOMEN (KUB) (SINGLE AP VIEW) INDICATION: Follow-up ileus.

## 2024-08-16 NOTE — PLAN OF CARE
Problem: Physical Therapy - Adult  Goal: By Discharge: Performs mobility at highest level of function for planned discharge setting.  See evaluation for individualized goals.  Description: FUNCTIONAL STATUS PRIOR TO ADMISSION: Pt lives alone with son/ daughter very close by. Ambulates indep without device, but owns SPC. Uses B HR to independently negotiate 2 steps into home.     Physical Therapy Goals  Initiated 8/14/2024  1.  Patient will move from supine to sit and sit to supine, scoot up and down, and roll side to side in bed with modified independence within 7 day(s).    2.  Patient will perform sit to stand with modified independence within 7 day(s).  3.  Patient will transfer from bed to chair and chair to bed with supervision/set-up using the least restrictive device within 7 day(s).  4.  Patient will ambulate with supervision/set-up for 150 feet with the least restrictive device within 7 day(s).   5.  Patient will ascend/descend 2 stairs with bilateral handrail(s) with contact guard assist within 7 day(s).    Initiated 8/7/2024  1.  Patient will move from supine to sit and sit to supine in bed with independence within 7 day(s).    2.  Patient will perform sit to stand with independence within 7 day(s).  3.  Patient will transfer from bed to chair and chair to bed with independence using the least restrictive device within 7 day(s).  4.  Patient will ambulate with independence for 150 feet with the least restrictive device within 7 day(s).   5.  Patient will ascend/descend 2 stairs with B handrail(s) with supervision/set-up within 7 day(s).   Outcome: Progressing  PHYSICAL THERAPY TREATMENT    Patient: Ammy Tarango (89 y.o. female)  Date: 8/16/2024  Diagnosis: Subdural hematoma (HCC) [S06.5XAA]  SDH (subdural hematoma) (HCC) [S06.5XAA]  Injury of head, initial encounter [S09.90XA] Subdural hematoma (HCC)      Precautions: Fall Risk (-160)                      ASSESSMENT:  Patient continues to  Training: Yes  Sit to Stand: Contact-guard assistance  Stand to Sit: Contact-guard assistance  Balance:  Balance  Sitting: Impaired  Sitting - Static: Good (unsupported)  Sitting - Dynamic: Good (unsupported);Fair (occasional)  Standing: Impaired;With support  Standing - Static: Good  Standing - Dynamic: Good;Fair   Ambulation/Gait Training:     Gait  Gait Training: Yes  Overall Level of Assistance: Minimum assistance  Distance (ft): 20 Feet  Assistive Device: Gait belt;Walker, rolling  Base of Support: Widened  Speed/Steffanie: Pace decreased (< 100 feet/min)  Step Length: Right shortened;Left shortened  Gait Abnormalities: Decreased step clearance;Trunk sway increased      Pain Ratin/10   Pain Intervention(s):       Activity Tolerance:   Fair     After treatment:   Patient left in no apparent distress sitting up in chair, Call bell within reach, Bed/ chair alarm activated, and Caregiver / family present      COMMUNICATION/EDUCATION:   The patient's plan of care was discussed with: occupational therapist and registered nurse      Susie Gomez PT, DPT  Minutes: 18

## 2024-08-16 NOTE — PROGRESS NOTES
[facility]                                                                                          Hospitalist Progress Note  Luisana Echavarria MD  Answering service: [phone] OR [phone] from in house phone        Date of Service:  2024  NAME:  Ammy Tarango  :  1934  MRN:  [id number]       Admission Summary:     \"Ammy Tarango is a 89 y.o. female who presents to the emergency room after she fell at home.  Patient reports that her foot got stuck in the rock and subsequently fell in the bathroom hitting her head on the floor.  Patient reports headache, left shoulder pain and left neck pain.  Patient subsequently presented to the emergency room for further evaluation.  Patient presented with fairly stable vital signs.  CT of the head was done in the ER which shows 9 mm hyperdensity in the right superior frontal extra-axial space concerning for small focus of acute hemorrhage.  CT cervical spine shows no acute fracture.  In the emergency room neurosurgery was consulted and hospitalist service requested to admit the patient for further evaluation management.\"        Interval history / Subjective:     Patient seen and examined at the bedside. She was sitting on the chair next to bed. She said she had multiple bowel movement last night and this afternoon also. No abdominal pain, chest pain or difficulty of breathing     Assessment & Plan:     Intracranial hemorrhage secondary to fall  -Patient presented with mechanical fall and head injury,   -CT of the head shows 9 mm hyperdensity in the right superior frontal extra-axial space concerning for small focus of acute hemorrhage  -hold aspirin which patient takes at home;  -Neurosurgery has been consulted: Patient does not require any neurosurgical interventions; outpatient follow-up in the office monitoring;  -Started on Cardene drip, titrate for systolic blood pressure <140 mmHg;   -Cardene drip weaned off  MAGNESIA) 400 MG/5ML suspension 30 mL  30 mL Oral Daily    hydrALAZINE (APRESOLINE) tablet 100 mg  100 mg Oral 3 times per day    prochlorperazine (COMPAZINE) injection 5 mg  5 mg IntraVENous Q6H PRN    lisinopril (PRINIVIL;ZESTRIL) tablet 40 mg  40 mg Oral Daily    hydrALAZINE (APRESOLINE) injection 10 mg  10 mg IntraVENous Q6H PRN    melatonin tablet 3 mg  3 mg Oral Nightly PRN    atorvastatin (LIPITOR) tablet 10 mg  10 mg Oral Daily    oyster shell calcium w/D 500-5 MG-MCG tablet 1 tablet  1 tablet Oral Daily    cloNIDine (CATAPRES) tablet 0.3 mg  0.3 mg Oral TID    cetirizine (ZYRTEC) tablet 10 mg  10 mg Oral Daily    pantoprazole (PROTONIX) tablet 40 mg  40 mg Oral QAM AC    Vitamin D (CHOLECALCIFEROL) tablet 1,000 Units  1,000 Units Oral Daily    sodium chloride flush 0.9 % injection 5-40 mL  5-40 mL IntraVENous 2 times per day    sodium chloride flush 0.9 % injection 5-40 mL  5-40 mL IntraVENous PRN    0.9 % sodium chloride infusion   IntraVENous PRN    potassium chloride (KLOR-CON) extended release tablet 40 mEq  40 mEq Oral PRN    Or    potassium bicarb-citric acid (EFFER-K) effervescent tablet 40 mEq  40 mEq Oral PRN    Or    potassium chloride 10 mEq/100 mL IVPB (Peripheral Line)  10 mEq IntraVENous PRN    magnesium sulfate 2000 mg in 50 mL IVPB premix  2,000 mg IntraVENous PRN    ondansetron (ZOFRAN-ODT) disintegrating tablet 4 mg  4 mg Oral Q8H PRN    Or    ondansetron (ZOFRAN) injection 4 mg  4 mg IntraVENous Q6H PRN    polyethylene glycol (GLYCOLAX) packet 17 g  17 g Oral Daily PRN    acetaminophen (TYLENOL) tablet 650 mg  650 mg Oral Q6H PRN    Or    acetaminophen (TYLENOL) suppository 650 mg  650 mg Rectal Q6H PRN    insulin lispro (HUMALOG,ADMELOG) injection vial 0-8 Units  0-8 Units SubCUTAneous TID WC    insulin lispro (HUMALOG,ADMELOG) injection vial 0-4 Units  0-4 Units SubCUTAneous Nightly     ______________________________________________________________________  EXPECTED LENGTH OF STAY:

## 2024-08-16 NOTE — PROGRESS NOTES
Value Date    CREATININE 0.58 08/16/2024      Lab Results   Component Value Date    HGB 10.9 (L) 08/16/2024 08/06/24    ECHO (TTE) COMPLETE (PRN CONTRAST/BUBBLE/STRAIN/3D) 08/09/2024  4:51 PM (Final)    Interpretation Summary    Left Ventricle: Normal left ventricular systolic function with a visually estimated EF of 60 - 65%. Left ventricle size is normal. Moderately increased wall thickness. Normal wall motion. Diastolic dysfunction present with normal LV EF.    Aortic Valve: Trileaflet valve.    Image quality is adequate.    Signed by: Rufino Mccormick MD on 8/9/2024  4:51 PM        Aniceto Steen MD          8/16/2024 12:15 PM Subjective:  NG tube is now out. Pt is sleeping this a.m. on rounds. Family at bedside states she only slept off and on last night as she had several bowel movements.  No SOB reported. She is laying nearly flat. BP is labile overall but elevated this a.m.  She received 1 dose of IV hydralazine.           ASSESSMENT    History of fall with small intracranial frontal hemorrhage  Resistant hypertension : BP now improved. Off cardene  Bilateral pedal edema most likely due to venous stasis versus diastolic heart failure:at most trace now.   Diabetes mellitus  Ileus vs partial SBO-much improved.   Hypokalemia      PLAN   Patient presented to the hospital with history of fall with CT suggestive of small right-sided frontal intracranial hemorrhage-asymptomatic with no signs of confusion on conservative medical management as per neurosurgery. Cardiology following for difficult to control HTN.     Resistent HTN:  BP labile overall.  Elevated this a.m. before meds.  Goal from neuro standpoint is SBP <140.   Off cardene. Increase metoprolol to home dose of 150 mg BID. Continue Hydralazine 100 mg q8 Hrs, clonidine 0.3 mg TID, lisinopril 40 mg daily, Aldactone 25 mg daily. Restart cardene if BP remains elevated    Echo 8/9/2024 EF 60-65%, mod LVH, NWMA, +Diastolic dysfunction. Diuretics are  189 176       Creatinine (MG/DL)   Date Value   08/16/2024 0.58   08/15/2024 0.62   08/13/2024 0.60   08/12/2024 0.73   08/11/2024 0.85       Current meds:    Current Facility-Administered Medications:     0.9 % sodium chloride infusion, , IntraVENous, Continuous, Luisana Echavarria MD, Last Rate: 50 mL/hr at 08/16/24 0846, New Bag at 08/16/24 0846    metoprolol tartrate (LOPRESSOR) tablet 150 mg, 150 mg, Oral, BID, Gisela Marcial APRN - NP    [START ON 8/17/2024] spironolactone (ALDACTONE) tablet 50 mg, 50 mg, Oral, Daily, Luisana Echavarria MD    potassium chloride (KLOR-CON) extended release tablet 40 mEq, 40 mEq, Oral, Q4H, Luisana Echavarria MD    balsum peru-castor oil (VENELEX) ointment, , Topical, BID, Luisana Echavarria MD, Given at 08/16/24 0830    traZODone (DESYREL) tablet 50 mg, 50 mg, Oral, Nightly PRN, Luisana Echavarria MD, 50 mg at 08/15/24 2110    enoxaparin (LOVENOX) injection 40 mg, 40 mg, SubCUTAneous, Daily, Maxi Lo MD, 40 mg at 08/16/24 0830    metoclopramide (REGLAN) injection 5 mg, 5 mg, IntraVENous, Q6H, Salomon Lockett MD, 5 mg at 08/16/24 1111    bisacodyl (DULCOLAX) suppository 10 mg, 10 mg, Rectal, BID, Maxi Lo MD, 10 mg at 08/16/24 0830    magnesium hydroxide (MILK OF MAGNESIA) 400 MG/5ML suspension 30 mL, 30 mL, Oral, Daily, Maxi Lo MD, 30 mL at 08/16/24 0829    hydrALAZINE (APRESOLINE) tablet 100 mg, 100 mg, Oral, 3 times per day, Gracie Guillermo APRN - NP, 100 mg at 08/16/24 0626    prochlorperazine (COMPAZINE) injection 5 mg, 5 mg, IntraVENous, Q6H PRN, Maxi Lo MD, 5 mg at 08/13/24 2228    lisinopril (PRINIVIL;ZESTRIL) tablet 40 mg, 40 mg, Oral, Daily, Gracie Guillermo, APRN - NP, 40 mg at 08/16/24 0829    potassium chloride (KLOR-CON) extended release tablet 10 mEq, 10 mEq, Oral, Daily, Maxi Lo MD, 10 mEq at 08/16/24 0829    hydrALAZINE (APRESOLINE) injection 10 mg, 10 mg, IntraVENous, Q6H PRN, Maxi Lo MD, 10 mg at

## 2024-08-16 NOTE — PLAN OF CARE
Problem: Occupational Therapy - Adult  Goal: By Discharge: Performs self-care activities at highest level of function for planned discharge setting.  See evaluation for individualized goals.  Description: FUNCTIONAL STATUS PRIOR TO ADMISSION:  Patient was ambulatory using no DME and independent for ADLs. She is driving and had adult children that live close by.    HOME SUPPORT: Patient lived alone with family to provide assistance.    Occupational Therapy Goals:  Initiated 8/7/2024, Weekly re-assessment 8/14/2024 (goals remain appropriate)  1.  Patient will perform grooming with [place] within 7 day(s).  2.  Patient will perform upper body dressing with [place] within 7 day(s).  3.  Patient will perform lower body dressing with Modified [place] within 7 day(s).  4.  Patient will perform toilet transfers with Modified [place]  within 7 day(s).  5.  Patient will perform all aspects of toileting with Modified [place] within 7 day(s).  6.  Patient will participate in upper extremity therapeutic exercise/activities with [place] for 15 minutes within 7 day(s).    7.  Patient will utilize energy conservation techniques during functional activities with verbal cues within 7 day(s).    Outcome: Progressing  OCCUPATIONAL THERAPY TREATMENT  Patient: Ammy Tarango (89 y.o. female)  Date: 8/16/2024  Primary Diagnosis: Subdural hematoma (HCC) [S06.5XAA]  SDH (subdural hematoma) (HCC) [S06.5XAA]  Injury of head, initial encounter [S09.90XA]       Precautions: Fall Risk (-160)                Chart, occupational therapy assessment, plan of care, and goals were reviewed.    ASSESSMENT  Pt rec'd sitting up in chair, agreeable to OT tx. Patient continues to benefit from skilled OT services and is progressing towards goals. Pt reported feeling tired due to multiple bowel movements overnight. Pt declined ADLs and toileting at this time, but agreeable to functional mobility in hallway. Pt

## 2024-08-17 LAB
ALBUMIN SERPL-MCNC: 2.6 G/DL (ref 3.5–5)
ALBUMIN/GLOB SERPL: 0.7 (ref 1.1–2.2)
ALP SERPL-CCNC: 77 U/L (ref 45–117)
ALT SERPL-CCNC: 20 U/L (ref 12–78)
ANION GAP SERPL CALC-SCNC: 6 MMOL/L (ref 5–15)
AST SERPL-CCNC: 21 U/L (ref 15–37)
BILIRUB SERPL-MCNC: 0.4 MG/DL (ref 0.2–1)
BUN SERPL-MCNC: 6 MG/DL (ref 6–20)
BUN/CREAT SERPL: 9 (ref 12–20)
CALCIUM SERPL-MCNC: 8.3 MG/DL (ref 8.5–10.1)
CHLORIDE SERPL-SCNC: 106 MMOL/L (ref 97–108)
CO2 SERPL-SCNC: 28 MMOL/L (ref 21–32)
CREAT SERPL-MCNC: 0.67 MG/DL (ref 0.55–1.02)
ERYTHROCYTE [DISTWIDTH] IN BLOOD BY AUTOMATED COUNT: 14.6 % (ref 11.5–14.5)
GLOBULIN SER CALC-MCNC: 3.5 G/DL (ref 2–4)
GLUCOSE BLD STRIP.AUTO-MCNC: 115 MG/DL (ref 65–117)
GLUCOSE BLD STRIP.AUTO-MCNC: 151 MG/DL (ref 65–117)
GLUCOSE BLD STRIP.AUTO-MCNC: 161 MG/DL (ref 65–117)
GLUCOSE BLD STRIP.AUTO-MCNC: 174 MG/DL (ref 65–117)
GLUCOSE SERPL-MCNC: 140 MG/DL (ref 65–100)
HCT VFR BLD AUTO: 33.1 % (ref 35–47)
HGB BLD-MCNC: 10.7 G/DL (ref 11.5–16)
MAGNESIUM SERPL-MCNC: 2.2 MG/DL (ref 1.6–2.4)
MCH RBC QN AUTO: 28.2 PG (ref 26–34)
MCHC RBC AUTO-ENTMCNC: 32.3 G/DL (ref 30–36.5)
MCV RBC AUTO: 87.1 FL (ref 80–99)
NRBC # BLD: 0 K/UL (ref 0–0.01)
NRBC BLD-RTO: 0 PER 100 WBC
PHOSPHATE SERPL-MCNC: 1.7 MG/DL (ref 2.6–4.7)
PLATELET # BLD AUTO: 159 K/UL (ref 150–400)
PMV BLD AUTO: 9.4 FL (ref 8.9–12.9)
POTASSIUM SERPL-SCNC: 3.6 MMOL/L (ref 3.5–5.1)
PROT SERPL-MCNC: 6.1 G/DL (ref 6.4–8.2)
RBC # BLD AUTO: 3.8 M/UL (ref 3.8–5.2)
SERVICE CMNT-IMP: ABNORMAL
SERVICE CMNT-IMP: NORMAL
SODIUM SERPL-SCNC: 140 MMOL/L (ref 136–145)
WBC # BLD AUTO: 6.5 K/UL (ref 3.6–11)

## 2024-08-17 PROCEDURE — 6370000000 HC RX 637 (ALT 250 FOR IP)

## 2024-08-17 PROCEDURE — 6370000000 HC RX 637 (ALT 250 FOR IP): Performed by: NURSE PRACTITIONER

## 2024-08-17 PROCEDURE — 6360000002 HC RX W HCPCS: Performed by: SURGERY

## 2024-08-17 PROCEDURE — 82962 GLUCOSE BLOOD TEST: CPT

## 2024-08-17 PROCEDURE — 6370000000 HC RX 637 (ALT 250 FOR IP): Performed by: HOSPITALIST

## 2024-08-17 PROCEDURE — 84100 ASSAY OF PHOSPHORUS: CPT

## 2024-08-17 PROCEDURE — 83735 ASSAY OF MAGNESIUM: CPT

## 2024-08-17 PROCEDURE — 6360000002 HC RX W HCPCS: Performed by: INTERNAL MEDICINE

## 2024-08-17 PROCEDURE — 99231 SBSQ HOSP IP/OBS SF/LOW 25: CPT | Performed by: SURGERY

## 2024-08-17 PROCEDURE — 2580000003 HC RX 258: Performed by: FAMILY MEDICINE

## 2024-08-17 PROCEDURE — 80053 COMPREHEN METABOLIC PANEL: CPT

## 2024-08-17 PROCEDURE — 2060000000 HC ICU INTERMEDIATE R&B

## 2024-08-17 PROCEDURE — 6370000000 HC RX 637 (ALT 250 FOR IP): Performed by: FAMILY MEDICINE

## 2024-08-17 PROCEDURE — 6370000000 HC RX 637 (ALT 250 FOR IP): Performed by: INTERNAL MEDICINE

## 2024-08-17 PROCEDURE — 85027 COMPLETE CBC AUTOMATED: CPT

## 2024-08-17 RX ORDER — SODIUM CHLORIDE 9 MG/ML
INJECTION, SOLUTION INTRAVENOUS CONTINUOUS
Status: DISPENSED | OUTPATIENT
Start: 2024-08-17 | End: 2024-08-18

## 2024-08-17 RX ADMIN — Medication: at 10:29

## 2024-08-17 RX ADMIN — METOCLOPRAMIDE 5 MG: 5 INJECTION, SOLUTION INTRAMUSCULAR; INTRAVENOUS at 18:40

## 2024-08-17 RX ADMIN — CETIRIZINE HYDROCHLORIDE 10 MG: 10 TABLET, FILM COATED ORAL at 10:14

## 2024-08-17 RX ADMIN — HYDRALAZINE HYDROCHLORIDE 10 MG: 20 INJECTION INTRAMUSCULAR; INTRAVENOUS at 10:26

## 2024-08-17 RX ADMIN — ACETAMINOPHEN 650 MG: 325 TABLET ORAL at 16:12

## 2024-08-17 RX ADMIN — SPIRONOLACTONE 50 MG: 25 TABLET ORAL at 10:13

## 2024-08-17 RX ADMIN — METOCLOPRAMIDE 5 MG: 5 INJECTION, SOLUTION INTRAMUSCULAR; INTRAVENOUS at 07:19

## 2024-08-17 RX ADMIN — HYDRALAZINE HYDROCHLORIDE 100 MG: 50 TABLET ORAL at 21:19

## 2024-08-17 RX ADMIN — ENOXAPARIN SODIUM 40 MG: 100 INJECTION SUBCUTANEOUS at 10:14

## 2024-08-17 RX ADMIN — LISINOPRIL 40 MG: 20 TABLET ORAL at 10:13

## 2024-08-17 RX ADMIN — METOPROLOL TARTRATE 150 MG: 50 TABLET, FILM COATED ORAL at 21:19

## 2024-08-17 RX ADMIN — ACETAMINOPHEN 650 MG: 325 TABLET ORAL at 03:37

## 2024-08-17 RX ADMIN — CLONIDINE HYDROCHLORIDE 0.3 MG: 0.1 TABLET ORAL at 21:19

## 2024-08-17 RX ADMIN — HYDRALAZINE HYDROCHLORIDE 100 MG: 50 TABLET ORAL at 16:06

## 2024-08-17 RX ADMIN — HYDRALAZINE HYDROCHLORIDE 100 MG: 50 TABLET ORAL at 07:20

## 2024-08-17 RX ADMIN — CLONIDINE HYDROCHLORIDE 0.3 MG: 0.1 TABLET ORAL at 16:06

## 2024-08-17 RX ADMIN — FUROSEMIDE 40 MG: 40 TABLET ORAL at 10:13

## 2024-08-17 RX ADMIN — Medication 1000 UNITS: at 10:13

## 2024-08-17 RX ADMIN — MAGNESIUM HYDROXIDE 30 ML: 400 SUSPENSION ORAL at 10:14

## 2024-08-17 RX ADMIN — CLONIDINE HYDROCHLORIDE 0.3 MG: 0.1 TABLET ORAL at 10:17

## 2024-08-17 RX ADMIN — Medication: at 21:21

## 2024-08-17 RX ADMIN — Medication 1 TABLET: at 10:13

## 2024-08-17 RX ADMIN — PANTOPRAZOLE SODIUM 40 MG: 40 TABLET, DELAYED RELEASE ORAL at 07:24

## 2024-08-17 RX ADMIN — BISACODYL 10 MG: 10 SUPPOSITORY RECTAL at 10:14

## 2024-08-17 RX ADMIN — ATORVASTATIN CALCIUM 10 MG: 10 TABLET, FILM COATED ORAL at 10:14

## 2024-08-17 RX ADMIN — SODIUM CHLORIDE, PRESERVATIVE FREE 10 ML: 5 INJECTION INTRAVENOUS at 21:19

## 2024-08-17 RX ADMIN — METOCLOPRAMIDE 5 MG: 5 INJECTION, SOLUTION INTRAMUSCULAR; INTRAVENOUS at 12:28

## 2024-08-17 RX ADMIN — HYDRALAZINE HYDROCHLORIDE 10 MG: 20 INJECTION INTRAMUSCULAR; INTRAVENOUS at 03:20

## 2024-08-17 RX ADMIN — METOPROLOL TARTRATE 150 MG: 50 TABLET, FILM COATED ORAL at 10:13

## 2024-08-17 ASSESSMENT — PAIN DESCRIPTION - DESCRIPTORS: DESCRIPTORS: ACHING

## 2024-08-17 ASSESSMENT — PAIN SCALES - GENERAL
PAINLEVEL_OUTOF10: 0
PAINLEVEL_OUTOF10: 6
PAINLEVEL_OUTOF10: 0
PAINLEVEL_OUTOF10: 0

## 2024-08-17 ASSESSMENT — PAIN DESCRIPTION - LOCATION: LOCATION: NECK

## 2024-08-17 ASSESSMENT — PAIN DESCRIPTION - ORIENTATION: ORIENTATION: POSTERIOR

## 2024-08-17 NOTE — CONSULTS
Consult Note            Date:8/17/2024        Patient Name:Ammy Tarango     YOB: 1934     Age:89 y.o.    Consults Physical Medicine and Rehabilitation    Chief Complaint     Chief Complaint   Patient presents with    Fall          History Obtained From   patient, electronic medical record    History of Present Illness   Ms. Tarango is a 89-year-old female with a history of hypertension and type 2 diabetes who presented to the emergency room after she suffered a fall at home in the bathroom hitting her head on the floor.  Patient reported a headache, left shoulder pain and left neck pain.  CT of the head revealed a 9 mm hyperdensity in the right superior frontal extra-axial space concerning for a small focus of acute hemorrhage.  CT of the cervical spine showed no fracture.  Neurosurgery was consulted and the patient was admitted for further evaluation.  Neurosurgery did not recommend operative intervention.  She was placed on the Cardene drip to titrate for systolic blood pressure less than 140.  Repeat CT head on 8/9/2024 showed no change.  Cardene drip has been subsequently discontinued and antihypertensives titrated to goal.  Hemoglobin A1c noted to be 6.4%.  Her hospital stay has been complicated by nausea and vomiting with KUB showing multiple dilated loops of small bowel throughout the abdomen that may represent obstruction or ileus.  Patient has been made n.p.o. and treated with IV fluids.  Further investigation with CT abdomen and pelvis showed possible partial small bowel obstruction with transition point in the right mid abdomen with associated small ascites.  Incidental findings included colonic diverticulosis and a right pleural effusion.  On 8/11/2020 for an NG tube was inserted due to recurrent vomiting and she was continued on a bowel regimen to include enema and suppositories.  NG tube was clamped and removed on 8/15/2024.  Repeat KUB on 8/15/2024 showed interval improvement in the  female who previously independent and suffered a fall at home resulting in a right subdural hematoma with mild left-sided weakness and new oxygen requirement who is below her functional baseline necessitating 3 hours of therapy, 5 days a week with 24/7 nursing care and physician oversight and release 3 days/week.  Physician to titrate antihypertensives, wean supplemental oxygen, monitor for signs and symptoms of aspiration and DVT and help lead and interdisciplinary team to help the patient return to her highest level of function.  She is expected to make reasonable gains.    Thank you for this consultation  Please feel free to contact me directly with any questions or concerns.  Nimisha Noyola DO  [phone]      Electronically signed by Nimisha Noyola DO on 8/17/24 at 6:00 AM EDT

## 2024-08-17 NOTE — PROGRESS NOTES
Patient sleeping comfortably.  Discussed condition with patient's daughter.  Patient has been tolerating regular diet and had a bowel movement and had no reports of nausea or vomiting.  SBO improved  Okay to continue regular diet  Disposition per medical service  Will sign off

## 2024-08-17 NOTE — PLAN OF CARE

## 2024-08-17 NOTE — PROGRESS NOTES
[facility]                                                                                          Hospitalist Progress Note  Luisana Echavarria MD  Answering service: [phone] OR [phone] from in house phone        Date of Service:  2024  NAME:  Ammy Tarango  :  1934  MRN:  [id number]       Admission Summary:     \"Ammy Tarango is a 89 y.o. female who presents to the emergency room after she fell at home.  Patient reports that her foot got stuck in the rock and subsequently fell in the bathroom hitting her head on the floor.  Patient reports headache, left shoulder pain and left neck pain.  Patient subsequently presented to the emergency room for further evaluation.  Patient presented with fairly stable vital signs.  CT of the head was done in the ER which shows 9 mm hyperdensity in the right superior frontal extra-axial space concerning for small focus of acute hemorrhage.  CT cervical spine shows no acute fracture.  In the emergency room neurosurgery was consulted and hospitalist service requested to admit the patient for further evaluation management.\"      Interval history / Subjective:     Patient seen and examined at the bedside. She was  lying on the bed and was sleepy, but wakeful, answered questions appropriately. She denied dizziness, chest pain or difficulty of breathing.   He daughter was at the bedside.      Assessment & Plan:     Intracranial hemorrhage secondary to fall  -Patient presented with mechanical fall and head injury,   -CT of the head shows 9 mm hyperdensity in the right superior frontal extra-axial space concerning for small focus of acute hemorrhage  -hold aspirin which patient takes at home;  -Neurosurgery has been consulted: Patient does not require any neurosurgical interventions; outpatient follow-up in the office monitoring;  -Started on Cardene drip, titrate for systolic blood pressure <140 mmHg;   -Cardene drip

## 2024-08-17 NOTE — PROGRESS NOTES
0730: Bedside shift change report given to Marlen  (oncoming nurse) by RN  (offgoing nurse). Report included the following information Nurse Handoff Report, Index, Intake/Output, and MAR.     1026: RN took pts blood pressure before giving a.m. medications. Pt's blood pressure 202/76. RN gave PRN hydralazine and will reassess. Spoke with Dr. Echavarria regarding patients blood pressure parameters. Per Dr. Echavarria, keep SBP less than 160     1046: Pt's /64 after 10mg hydralazine. RN to continue to monitor.       1930: Bedside shift change report given to Arianna  (oncoming nurse) by Marlen  (offgoing nurse). Report included the following information Nurse Handoff Report, Index, Intake/Output, and MAR.

## 2024-08-18 LAB
ALBUMIN SERPL-MCNC: 2.4 G/DL (ref 3.5–5)
ALBUMIN/GLOB SERPL: 0.8 (ref 1.1–2.2)
ALP SERPL-CCNC: 72 U/L (ref 45–117)
ALT SERPL-CCNC: 20 U/L (ref 12–78)
ANION GAP SERPL CALC-SCNC: 3 MMOL/L (ref 5–15)
AST SERPL-CCNC: 21 U/L (ref 15–37)
BILIRUB SERPL-MCNC: 0.3 MG/DL (ref 0.2–1)
BUN SERPL-MCNC: 7 MG/DL (ref 6–20)
BUN/CREAT SERPL: 10 (ref 12–20)
CALCIUM SERPL-MCNC: 8 MG/DL (ref 8.5–10.1)
CHLORIDE SERPL-SCNC: 102 MMOL/L (ref 97–108)
CO2 SERPL-SCNC: 33 MMOL/L (ref 21–32)
CREAT SERPL-MCNC: 0.69 MG/DL (ref 0.55–1.02)
ERYTHROCYTE [DISTWIDTH] IN BLOOD BY AUTOMATED COUNT: 14.7 % (ref 11.5–14.5)
GLOBULIN SER CALC-MCNC: 2.9 G/DL (ref 2–4)
GLUCOSE BLD STRIP.AUTO-MCNC: 126 MG/DL (ref 65–117)
GLUCOSE BLD STRIP.AUTO-MCNC: 183 MG/DL (ref 65–117)
GLUCOSE BLD STRIP.AUTO-MCNC: 184 MG/DL (ref 65–117)
GLUCOSE BLD STRIP.AUTO-MCNC: 203 MG/DL (ref 65–117)
GLUCOSE SERPL-MCNC: 142 MG/DL (ref 65–100)
HCT VFR BLD AUTO: 30.5 % (ref 35–47)
HGB BLD-MCNC: 9.7 G/DL (ref 11.5–16)
MCH RBC QN AUTO: 28 PG (ref 26–34)
MCHC RBC AUTO-ENTMCNC: 31.8 G/DL (ref 30–36.5)
MCV RBC AUTO: 87.9 FL (ref 80–99)
NRBC # BLD: 0 K/UL (ref 0–0.01)
NRBC BLD-RTO: 0 PER 100 WBC
PLATELET # BLD AUTO: 148 K/UL (ref 150–400)
PMV BLD AUTO: 10.1 FL (ref 8.9–12.9)
POTASSIUM SERPL-SCNC: 3.5 MMOL/L (ref 3.5–5.1)
PROT SERPL-MCNC: 5.3 G/DL (ref 6.4–8.2)
RBC # BLD AUTO: 3.47 M/UL (ref 3.8–5.2)
SERVICE CMNT-IMP: ABNORMAL
SODIUM SERPL-SCNC: 138 MMOL/L (ref 136–145)
WBC # BLD AUTO: 6.2 K/UL (ref 3.6–11)

## 2024-08-18 PROCEDURE — 6360000002 HC RX W HCPCS: Performed by: SURGERY

## 2024-08-18 PROCEDURE — 82962 GLUCOSE BLOOD TEST: CPT

## 2024-08-18 PROCEDURE — 6370000000 HC RX 637 (ALT 250 FOR IP): Performed by: INTERNAL MEDICINE

## 2024-08-18 PROCEDURE — 6370000000 HC RX 637 (ALT 250 FOR IP)

## 2024-08-18 PROCEDURE — 6370000000 HC RX 637 (ALT 250 FOR IP): Performed by: NURSE PRACTITIONER

## 2024-08-18 PROCEDURE — 36415 COLL VENOUS BLD VENIPUNCTURE: CPT

## 2024-08-18 PROCEDURE — 2580000003 HC RX 258: Performed by: FAMILY MEDICINE

## 2024-08-18 PROCEDURE — 6370000000 HC RX 637 (ALT 250 FOR IP): Performed by: FAMILY MEDICINE

## 2024-08-18 PROCEDURE — 2060000000 HC ICU INTERMEDIATE R&B

## 2024-08-18 PROCEDURE — 85027 COMPLETE CBC AUTOMATED: CPT

## 2024-08-18 PROCEDURE — 6370000000 HC RX 637 (ALT 250 FOR IP): Performed by: HOSPITALIST

## 2024-08-18 PROCEDURE — 94760 N-INVAS EAR/PLS OXIMETRY 1: CPT

## 2024-08-18 PROCEDURE — 80053 COMPREHEN METABOLIC PANEL: CPT

## 2024-08-18 PROCEDURE — 6360000002 HC RX W HCPCS: Performed by: INTERNAL MEDICINE

## 2024-08-18 RX ADMIN — BISACODYL 10 MG: 10 SUPPOSITORY RECTAL at 10:16

## 2024-08-18 RX ADMIN — PANTOPRAZOLE SODIUM 40 MG: 40 TABLET, DELAYED RELEASE ORAL at 07:12

## 2024-08-18 RX ADMIN — CLONIDINE HYDROCHLORIDE 0.3 MG: 0.1 TABLET ORAL at 22:55

## 2024-08-18 RX ADMIN — SODIUM CHLORIDE, PRESERVATIVE FREE 10 ML: 5 INJECTION INTRAVENOUS at 22:55

## 2024-08-18 RX ADMIN — METOCLOPRAMIDE 5 MG: 5 INJECTION, SOLUTION INTRAMUSCULAR; INTRAVENOUS at 14:24

## 2024-08-18 RX ADMIN — ACETAMINOPHEN 650 MG: 325 TABLET ORAL at 00:48

## 2024-08-18 RX ADMIN — METOCLOPRAMIDE 5 MG: 5 INJECTION, SOLUTION INTRAMUSCULAR; INTRAVENOUS at 07:12

## 2024-08-18 RX ADMIN — CETIRIZINE HYDROCHLORIDE 10 MG: 10 TABLET, FILM COATED ORAL at 10:16

## 2024-08-18 RX ADMIN — LISINOPRIL 40 MG: 20 TABLET ORAL at 10:16

## 2024-08-18 RX ADMIN — HYDRALAZINE HYDROCHLORIDE 100 MG: 50 TABLET ORAL at 14:24

## 2024-08-18 RX ADMIN — ENOXAPARIN SODIUM 40 MG: 100 INJECTION SUBCUTANEOUS at 10:15

## 2024-08-18 RX ADMIN — Medication: at 23:00

## 2024-08-18 RX ADMIN — CLONIDINE HYDROCHLORIDE 0.3 MG: 0.1 TABLET ORAL at 10:15

## 2024-08-18 RX ADMIN — METOCLOPRAMIDE 5 MG: 5 INJECTION, SOLUTION INTRAMUSCULAR; INTRAVENOUS at 18:48

## 2024-08-18 RX ADMIN — Medication: at 10:15

## 2024-08-18 RX ADMIN — ATORVASTATIN CALCIUM 10 MG: 10 TABLET, FILM COATED ORAL at 10:16

## 2024-08-18 RX ADMIN — METOCLOPRAMIDE 5 MG: 5 INJECTION, SOLUTION INTRAMUSCULAR; INTRAVENOUS at 00:48

## 2024-08-18 RX ADMIN — CLONIDINE HYDROCHLORIDE 0.3 MG: 0.1 TABLET ORAL at 14:24

## 2024-08-18 RX ADMIN — HYDRALAZINE HYDROCHLORIDE 100 MG: 50 TABLET ORAL at 22:55

## 2024-08-18 RX ADMIN — TRAZODONE HYDROCHLORIDE 50 MG: 50 TABLET ORAL at 22:55

## 2024-08-18 RX ADMIN — FUROSEMIDE 40 MG: 40 TABLET ORAL at 10:16

## 2024-08-18 RX ADMIN — HYDRALAZINE HYDROCHLORIDE 10 MG: 20 INJECTION INTRAMUSCULAR; INTRAVENOUS at 18:48

## 2024-08-18 RX ADMIN — SPIRONOLACTONE 50 MG: 25 TABLET ORAL at 10:16

## 2024-08-18 RX ADMIN — Medication 1 TABLET: at 10:16

## 2024-08-18 RX ADMIN — MAGNESIUM HYDROXIDE 30 ML: 400 SUSPENSION ORAL at 10:17

## 2024-08-18 RX ADMIN — BISACODYL 10 MG: 10 SUPPOSITORY RECTAL at 14:24

## 2024-08-18 RX ADMIN — ACETAMINOPHEN 650 MG: 325 TABLET ORAL at 23:00

## 2024-08-18 RX ADMIN — Medication 1000 UNITS: at 10:15

## 2024-08-18 RX ADMIN — HYDRALAZINE HYDROCHLORIDE 100 MG: 50 TABLET ORAL at 07:12

## 2024-08-18 RX ADMIN — METOPROLOL TARTRATE 150 MG: 50 TABLET, FILM COATED ORAL at 22:55

## 2024-08-18 RX ADMIN — METOPROLOL TARTRATE 150 MG: 50 TABLET, FILM COATED ORAL at 10:15

## 2024-08-18 ASSESSMENT — PAIN DESCRIPTION - DESCRIPTORS
DESCRIPTORS: ACHING
DESCRIPTORS: ACHING

## 2024-08-18 ASSESSMENT — PAIN DESCRIPTION - LOCATION
LOCATION: NECK
LOCATION: FOOT

## 2024-08-18 ASSESSMENT — PAIN SCALES - GENERAL
PAINLEVEL_OUTOF10: 0
PAINLEVEL_OUTOF10: 5
PAINLEVEL_OUTOF10: 6
PAINLEVEL_OUTOF10: 0

## 2024-08-18 ASSESSMENT — PAIN DESCRIPTION - ORIENTATION: ORIENTATION: RIGHT;LEFT

## 2024-08-18 NOTE — PLAN OF CARE
Problem: Discharge Planning  Goal: Discharge to home or other facility with appropriate resources  8/18/2024 0836 by Marlen Morin RN  Outcome: Progressing  8/17/2024 1900 by Marlen Morin RN  Outcome: Progressing     Problem: Pain  Goal: Verbalizes/displays adequate comfort level or baseline comfort level  8/18/2024 0836 by Marlen Morin RN  Outcome: Progressing  8/17/2024 1900 by Marlen Morin RN  Outcome: Progressing     Problem: Safety - Adult  Goal: Free from fall injury  8/18/2024 0836 by Marlen Morin RN  Outcome: Progressing  8/17/2024 1900 by Marlen Morin RN  Outcome: Progressing     Problem: Chronic Conditions and Co-morbidities  Goal: Patient's chronic conditions and co-morbidity symptoms are monitored and maintained or improved  8/18/2024 0836 by Marlen Morin RN  Outcome: Progressing  8/17/2024 1900 by Marlen Morin RN  Outcome: Progressing     Problem: ABCDS Injury Assessment  Goal: Absence of physical injury  8/18/2024 0836 by Marlen Morin RN  Outcome: Progressing  8/17/2024 1900 by Marlen Morin RN  Outcome: Progressing     Problem: Skin/Tissue Integrity  Goal: Absence of new skin breakdown  Description: 1.  Monitor for areas of redness and/or skin breakdown  2.  Assess vascular access sites hourly  3.  Every 4-6 hours minimum:  Change oxygen saturation probe site  4.  Every 4-6 hours:  If on nasal continuous positive airway pressure, respiratory therapy assess nares and determine need for appliance change or resting period.  8/18/2024 0836 by Marlen Morin RN  Outcome: Progressing  8/17/2024 1900 by Marlen Morin RN  Outcome: Progressing     Problem: Nutrition Deficit:  Goal: Optimize nutritional status  8/18/2024 0836 by Marlen Morin RN  Outcome: Progressing  8/17/2024 1900 by Marlen Morin RN  Outcome: Progressing

## 2024-08-18 NOTE — PROGRESS NOTES
0730: Bedside shift change report given to Marlen  (oncoming nurse) by Arianna (offgoing nurse). Report included the following information Nurse Handoff Report, Index, Intake/Output, and MAR.     1840: Riddle removed per MD order.     1845: Pt's SBP elevated 194/70. PRN hydralazine given.       1930: Bedside shift change report given to Arianna (oncoming nurse) by Marlen  (offgoing nurse). Report included the following information Nurse Handoff Report, Index, Intake/Output, and MAR.

## 2024-08-18 NOTE — PROGRESS NOTES
[facility]                                                                                          Hospitalist Progress Note  Luisana Echavarria MD  Answering service: [phone] OR [phone] from in house phone        Date of Service:  2024  NAME:  Ammy Tarango  :  1934  MRN:  [id number]       Admission Summary:     \"Ammy Tarango is a 89 y.o. female who presents to the emergency room after she fell at home.  Patient reports that her foot got stuck in the rock and subsequently fell in the bathroom hitting her head on the floor.  Patient reports headache, left shoulder pain and left neck pain.  Patient subsequently presented to the emergency room for further evaluation.  Patient presented with fairly stable vital signs.  CT of the head was done in the ER which shows 9 mm hyperdensity in the right superior frontal extra-axial space concerning for small focus of acute hemorrhage.  CT cervical spine shows no acute fracture.  In the emergency room neurosurgery was consulted and hospitalist service requested to admit the patient for further evaluation management.\"      Interval history / Subjective:     Patient seen and examined at the bedside. She was able to get up and sit to bedside chair with some support and walker. She said she feels better. No abdominal pain, headache or chest pain.   He daughter was at the bedside.      Assessment & Plan:     Intracranial hemorrhage secondary to fall  -Patient presented with mechanical fall and head injury,   -CT of the head shows 9 mm hyperdensity in the right superior frontal extra-axial space concerning for small focus of acute hemorrhage  -hold aspirin which patient takes at home;  -Neurosurgery has been consulted: Patient does not require any neurosurgical interventions; outpatient follow-up in the office monitoring;  -off Cardene drip on ,   -continue to adjust antihypertensives to optimize blood pressure  Oral Daily    hydrALAZINE (APRESOLINE) injection 10 mg  10 mg IntraVENous Q6H PRN    melatonin tablet 3 mg  3 mg Oral Nightly PRN    atorvastatin (LIPITOR) tablet 10 mg  10 mg Oral Daily    oyster shell calcium w/D 500-5 MG-MCG tablet 1 tablet  1 tablet Oral Daily    cloNIDine (CATAPRES) tablet 0.3 mg  0.3 mg Oral TID    cetirizine (ZYRTEC) tablet 10 mg  10 mg Oral Daily    pantoprazole (PROTONIX) tablet 40 mg  40 mg Oral QAM AC    Vitamin D (CHOLECALCIFEROL) tablet 1,000 Units  1,000 Units Oral Daily    sodium chloride flush 0.9 % injection 5-40 mL  5-40 mL IntraVENous 2 times per day    sodium chloride flush 0.9 % injection 5-40 mL  5-40 mL IntraVENous PRN    0.9 % sodium chloride infusion   IntraVENous PRN    potassium chloride (KLOR-CON) extended release tablet 40 mEq  40 mEq Oral PRN    Or    potassium bicarb-citric acid (EFFER-K) effervescent tablet 40 mEq  40 mEq Oral PRN    Or    potassium chloride 10 mEq/100 mL IVPB (Peripheral Line)  10 mEq IntraVENous PRN    magnesium sulfate 2000 mg in 50 mL IVPB premix  2,000 mg IntraVENous PRN    ondansetron (ZOFRAN-ODT) disintegrating tablet 4 mg  4 mg Oral Q8H PRN    Or    ondansetron (ZOFRAN) injection 4 mg  4 mg IntraVENous Q6H PRN    polyethylene glycol (GLYCOLAX) packet 17 g  17 g Oral Daily PRN    acetaminophen (TYLENOL) tablet 650 mg  650 mg Oral Q6H PRN    Or    acetaminophen (TYLENOL) suppository 650 mg  650 mg Rectal Q6H PRN    insulin lispro (HUMALOG,ADMELOG) injection vial 0-8 Units  0-8 Units SubCUTAneous TID WC    insulin lispro (HUMALOG,ADMELOG) injection vial 0-4 Units  0-4 Units SubCUTAneous Nightly     ______________________________________________________________________  EXPECTED LENGTH OF STAY: Unable to retrieve estimated LOS  ACTUAL LENGTH OF STAY:          12                 Luisana Echavarria MD

## 2024-08-19 LAB
ALBUMIN SERPL-MCNC: 2.5 G/DL (ref 3.5–5)
ALBUMIN/GLOB SERPL: 0.7 (ref 1.1–2.2)
ALP SERPL-CCNC: 87 U/L (ref 45–117)
ALT SERPL-CCNC: 24 U/L (ref 12–78)
ANION GAP SERPL CALC-SCNC: 6 MMOL/L (ref 5–15)
AST SERPL-CCNC: 21 U/L (ref 15–37)
BILIRUB SERPL-MCNC: 0.4 MG/DL (ref 0.2–1)
BUN SERPL-MCNC: 8 MG/DL (ref 6–20)
BUN/CREAT SERPL: 11 (ref 12–20)
CALCIUM SERPL-MCNC: 8.8 MG/DL (ref 8.5–10.1)
CHLORIDE SERPL-SCNC: 100 MMOL/L (ref 97–108)
CO2 SERPL-SCNC: 33 MMOL/L (ref 21–32)
CREAT SERPL-MCNC: 0.71 MG/DL (ref 0.55–1.02)
EKG ATRIAL RATE: 83 BPM
EKG DIAGNOSIS: NORMAL
EKG P AXIS: 67 DEGREES
EKG P-R INTERVAL: 168 MS
EKG Q-T INTERVAL: 418 MS
EKG QRS DURATION: 124 MS
EKG QTC CALCULATION (BAZETT): 491 MS
EKG R AXIS: -62 DEGREES
EKG T AXIS: 102 DEGREES
EKG VENTRICULAR RATE: 83 BPM
ERYTHROCYTE [DISTWIDTH] IN BLOOD BY AUTOMATED COUNT: 14.4 % (ref 11.5–14.5)
GLOBULIN SER CALC-MCNC: 3.4 G/DL (ref 2–4)
GLUCOSE BLD STRIP.AUTO-MCNC: 154 MG/DL (ref 65–117)
GLUCOSE BLD STRIP.AUTO-MCNC: 164 MG/DL (ref 65–117)
GLUCOSE BLD STRIP.AUTO-MCNC: 164 MG/DL (ref 65–117)
GLUCOSE BLD STRIP.AUTO-MCNC: 217 MG/DL (ref 65–117)
GLUCOSE SERPL-MCNC: 154 MG/DL (ref 65–100)
HCT VFR BLD AUTO: 32.6 % (ref 35–47)
HGB BLD-MCNC: 10.5 G/DL (ref 11.5–16)
MCH RBC QN AUTO: 28.2 PG (ref 26–34)
MCHC RBC AUTO-ENTMCNC: 32.2 G/DL (ref 30–36.5)
MCV RBC AUTO: 87.4 FL (ref 80–99)
NRBC # BLD: 0 K/UL (ref 0–0.01)
NRBC BLD-RTO: 0 PER 100 WBC
PLATELET # BLD AUTO: 172 K/UL (ref 150–400)
PMV BLD AUTO: 10.1 FL (ref 8.9–12.9)
POTASSIUM SERPL-SCNC: 3.5 MMOL/L (ref 3.5–5.1)
PROT SERPL-MCNC: 5.9 G/DL (ref 6.4–8.2)
RBC # BLD AUTO: 3.73 M/UL (ref 3.8–5.2)
SERVICE CMNT-IMP: ABNORMAL
SODIUM SERPL-SCNC: 139 MMOL/L (ref 136–145)
WBC # BLD AUTO: 6.4 K/UL (ref 3.6–11)

## 2024-08-19 PROCEDURE — 36415 COLL VENOUS BLD VENIPUNCTURE: CPT

## 2024-08-19 PROCEDURE — 6370000000 HC RX 637 (ALT 250 FOR IP): Performed by: NURSE PRACTITIONER

## 2024-08-19 PROCEDURE — 6370000000 HC RX 637 (ALT 250 FOR IP)

## 2024-08-19 PROCEDURE — 6370000000 HC RX 637 (ALT 250 FOR IP): Performed by: HOSPITALIST

## 2024-08-19 PROCEDURE — 2580000003 HC RX 258: Performed by: FAMILY MEDICINE

## 2024-08-19 PROCEDURE — 82962 GLUCOSE BLOOD TEST: CPT

## 2024-08-19 PROCEDURE — 6360000002 HC RX W HCPCS: Performed by: SURGERY

## 2024-08-19 PROCEDURE — 97530 THERAPEUTIC ACTIVITIES: CPT

## 2024-08-19 PROCEDURE — 97116 GAIT TRAINING THERAPY: CPT

## 2024-08-19 PROCEDURE — 6360000002 HC RX W HCPCS: Performed by: INTERNAL MEDICINE

## 2024-08-19 PROCEDURE — 6370000000 HC RX 637 (ALT 250 FOR IP): Performed by: FAMILY MEDICINE

## 2024-08-19 PROCEDURE — 6370000000 HC RX 637 (ALT 250 FOR IP): Performed by: INTERNAL MEDICINE

## 2024-08-19 PROCEDURE — 97535 SELF CARE MNGMENT TRAINING: CPT

## 2024-08-19 PROCEDURE — 2060000000 HC ICU INTERMEDIATE R&B

## 2024-08-19 PROCEDURE — 80053 COMPREHEN METABOLIC PANEL: CPT

## 2024-08-19 PROCEDURE — 99233 SBSQ HOSP IP/OBS HIGH 50: CPT | Performed by: SPECIALIST

## 2024-08-19 PROCEDURE — 85027 COMPLETE CBC AUTOMATED: CPT

## 2024-08-19 RX ORDER — AMLODIPINE BESYLATE 5 MG/1
5 TABLET ORAL NIGHTLY
Status: DISCONTINUED | OUTPATIENT
Start: 2024-08-19 | End: 2024-08-21 | Stop reason: HOSPADM

## 2024-08-19 RX ORDER — CLONIDINE 0.3 MG/24H
1 PATCH, EXTENDED RELEASE TRANSDERMAL WEEKLY
Status: DISCONTINUED | OUTPATIENT
Start: 2024-08-19 | End: 2024-08-21 | Stop reason: HOSPADM

## 2024-08-19 RX ADMIN — CETIRIZINE HYDROCHLORIDE 10 MG: 10 TABLET, FILM COATED ORAL at 09:42

## 2024-08-19 RX ADMIN — METOCLOPRAMIDE 5 MG: 5 INJECTION, SOLUTION INTRAMUSCULAR; INTRAVENOUS at 06:55

## 2024-08-19 RX ADMIN — INSULIN LISPRO 2 UNITS: 100 INJECTION, SOLUTION INTRAVENOUS; SUBCUTANEOUS at 11:47

## 2024-08-19 RX ADMIN — SODIUM CHLORIDE, PRESERVATIVE FREE 10 ML: 5 INJECTION INTRAVENOUS at 20:55

## 2024-08-19 RX ADMIN — SPIRONOLACTONE 50 MG: 25 TABLET ORAL at 09:41

## 2024-08-19 RX ADMIN — SODIUM CHLORIDE, PRESERVATIVE FREE 10 ML: 5 INJECTION INTRAVENOUS at 09:44

## 2024-08-19 RX ADMIN — MAGNESIUM HYDROXIDE 30 ML: 400 SUSPENSION ORAL at 09:43

## 2024-08-19 RX ADMIN — AMLODIPINE BESYLATE 5 MG: 5 TABLET ORAL at 20:54

## 2024-08-19 RX ADMIN — LISINOPRIL 40 MG: 20 TABLET ORAL at 09:42

## 2024-08-19 RX ADMIN — Medication: at 20:54

## 2024-08-19 RX ADMIN — Medication 1 TABLET: at 09:42

## 2024-08-19 RX ADMIN — METOPROLOL TARTRATE 150 MG: 50 TABLET, FILM COATED ORAL at 09:41

## 2024-08-19 RX ADMIN — HYDRALAZINE HYDROCHLORIDE 100 MG: 50 TABLET ORAL at 14:26

## 2024-08-19 RX ADMIN — METOCLOPRAMIDE 5 MG: 5 INJECTION, SOLUTION INTRAMUSCULAR; INTRAVENOUS at 15:37

## 2024-08-19 RX ADMIN — Medication: at 09:44

## 2024-08-19 RX ADMIN — HYDRALAZINE HYDROCHLORIDE 10 MG: 20 INJECTION INTRAMUSCULAR; INTRAVENOUS at 19:10

## 2024-08-19 RX ADMIN — HYDRALAZINE HYDROCHLORIDE 100 MG: 50 TABLET ORAL at 20:54

## 2024-08-19 RX ADMIN — HYDRALAZINE HYDROCHLORIDE 100 MG: 50 TABLET ORAL at 06:54

## 2024-08-19 RX ADMIN — CLONIDINE HYDROCHLORIDE 0.3 MG: 0.1 TABLET ORAL at 09:48

## 2024-08-19 RX ADMIN — METOCLOPRAMIDE 5 MG: 5 INJECTION, SOLUTION INTRAMUSCULAR; INTRAVENOUS at 19:09

## 2024-08-19 RX ADMIN — ENOXAPARIN SODIUM 40 MG: 100 INJECTION SUBCUTANEOUS at 09:42

## 2024-08-19 RX ADMIN — FUROSEMIDE 40 MG: 40 TABLET ORAL at 09:41

## 2024-08-19 RX ADMIN — ATORVASTATIN CALCIUM 10 MG: 10 TABLET, FILM COATED ORAL at 09:42

## 2024-08-19 RX ADMIN — METOCLOPRAMIDE 5 MG: 5 INJECTION, SOLUTION INTRAMUSCULAR; INTRAVENOUS at 01:21

## 2024-08-19 RX ADMIN — METOPROLOL TARTRATE 150 MG: 50 TABLET, FILM COATED ORAL at 20:54

## 2024-08-19 RX ADMIN — Medication 3 MG: at 20:54

## 2024-08-19 RX ADMIN — PANTOPRAZOLE SODIUM 40 MG: 40 TABLET, DELAYED RELEASE ORAL at 06:54

## 2024-08-19 RX ADMIN — Medication 1000 UNITS: at 09:42

## 2024-08-19 ASSESSMENT — PAIN SCALES - GENERAL: PAINLEVEL_OUTOF10: 0

## 2024-08-19 NOTE — PROGRESS NOTES
Spiritual Health Assessment/Progress Note  [facility]    Initial Encounter,  ,  ,      Name: Ammy Tarango MRN: [id number]    Age: 89 y.o.     Sex: female   Language: English   Church: [employer]   Subdural hematoma (HCC)     Date: 8/19/2024            Total Time Calculated: 30 min              Spiritual Assessment began in [facility] 6S NEURO-SCI TELE            Encounter Overview/Reason: Initial Encounter  Service Provided For: Patient    Sindy, Belief, Meaning:   Patient is connected with a sindy tradition or spiritual practice and has beliefs or practices that help with coping during difficult times  Family/Friends No family/friends present      Importance and Influence:  Patient has spiritual/personal beliefs that influence decisions regarding their health  Family/Friends no family/friends present    Community:  Patient is connected with a spiritual community and feels well-supported. Support system includes: Children  Family/Friends Other: NA    Assessment and Plan of Care:     Patient Interventions include: Facilitated expression of thoughts and feelings, Explored spiritual coping/struggle/distress and theological reflection, and Affirmed coping skills/support systems  Family/Friends Interventions include: Other: NA    Patient Plan of Care: Spiritual Care available upon further referral  Family/Friends Plan of Care: Spiritual Care available upon further referral    Narrative: [facility] are a spiritual resource that bring Ms. Tarango claribel and connection.      Electronically signed by Claribel Ba, Chaplain Resident on 8/19/2024 at 1:18 PM

## 2024-08-19 NOTE — PLAN OF CARE
Problem: Physical Therapy - Adult  Goal: By Discharge: Performs mobility at highest level of function for planned discharge setting.  See evaluation for individualized goals.  Description: FUNCTIONAL STATUS PRIOR TO ADMISSION: Pt lives alone with son/ daughter very close by. Ambulates indep without device, but owns SPC. Uses B HR to independently negotiate 2 steps into home.     Physical Therapy Goals  Initiated 8/14/2024  1.  Patient will move from supine to sit and sit to supine, scoot up and down, and roll side to side in bed with modified independence within 7 day(s).    2.  Patient will perform sit to stand with modified independence within 7 day(s).  3.  Patient will transfer from bed to chair and chair to bed with supervision/set-up using the least restrictive device within 7 day(s).  4.  Patient will ambulate with supervision/set-up for 150 feet with the least restrictive device within 7 day(s).   5.  Patient will ascend/descend 2 stairs with bilateral handrail(s) with contact guard assist within 7 day(s).    Initiated 8/7/2024  1.  Patient will move from supine to sit and sit to supine in bed with independence within 7 day(s).    2.  Patient will perform sit to stand with independence within 7 day(s).  3.  Patient will transfer from bed to chair and chair to bed with independence using the least restrictive device within 7 day(s).  4.  Patient will ambulate with independence for 150 feet with the least restrictive device within 7 day(s).   5.  Patient will ascend/descend 2 stairs with B handrail(s) with supervision/set-up within 7 day(s).   Outcome: Progressing     PHYSICAL THERAPY TREATMENT    Patient: Ammy Tarango (89 y.o. female)  Date: 8/19/2024  Diagnosis: Subdural hematoma (HCC) [S06.5XAA]  SDH (subdural hematoma) (HCC) [S06.5XAA]  Injury of head, initial encounter [S09.90XA] Subdural hematoma (HCC)      Precautions: Fall Risk (-160)                      ASSESSMENT:  Patient continues to

## 2024-08-19 NOTE — PLAN OF CARE
Problem: Discharge Planning  Goal: Discharge to home or other facility with appropriate resources  8/19/2024 1048 by Katt Headley RN  Outcome: Progressing  Flowsheets (Taken 8/19/2024 0800)  Discharge to home or other facility with appropriate resources: Identify barriers to discharge with patient and caregiver     Problem: Pain  Goal: Verbalizes/displays adequate comfort level or baseline comfort level  8/19/2024 1048 by Katt Headley RN  Outcome: Progressing     Problem: Safety - Adult  Goal: Free from fall injury  8/19/2024 1048 by Katt Headley RN  Outcome: Progressing  Flowsheets (Taken 8/19/2024 0800)  Free From Fall Injury:   Instruct family/caregiver on patient safety   Based on caregiver fall risk screen, instruct family/caregiver to ask for assistance with transferring infant if caregiver noted to have fall risk factors     Problem: Chronic Conditions and Co-morbidities  Goal: Patient's chronic conditions and co-morbidity symptoms are monitored and maintained or improved  8/19/2024 1048 by Katt Headley RN  Outcome: Progressing  Flowsheets (Taken 8/19/2024 0800)  Care Plan - Patient's Chronic Conditions and Co-Morbidity Symptoms are Monitored and Maintained or Improved:   Monitor and assess patient's chronic conditions and comorbid symptoms for stability, deterioration, or improvement   Collaborate with multidisciplinary team to address chronic and comorbid conditions and prevent exacerbation or deterioration     Problem: Occupational Therapy - Adult  Goal: By Discharge: Performs self-care activities at highest level of function for planned discharge setting.  See evaluation for individualized goals.  Description: FUNCTIONAL STATUS PRIOR TO ADMISSION:  Patient was ambulatory using no DME and independent for ADLs. She is driving and had adult children that live close by.    HOME SUPPORT: Patient lived alone with family to provide assistance.    Occupational Therapy Goals:  Initiated 8/7/2024, Weekly

## 2024-08-19 NOTE — PROGRESS NOTES
[facility]                                                                                          Hospitalist Progress Note  Christin Ramos MD  Answering service: [phone] OR [phone] from in house phone        Date of Service:  2024  NAME:  Ammy Tarango  :  1934  MRN:  [id number]       Admission Summary:     \"Ammy Tarango is a 89 y.o. female who presents to the emergency room after she fell at home.  Patient reports that her foot got stuck in the rock and subsequently fell in the bathroom hitting her head on the floor.  Patient reports headache, left shoulder pain and left neck pain.  Patient subsequently presented to the emergency room for further evaluation.  Patient presented with fairly stable vital signs.  CT of the head was done in the ER which shows 9 mm hyperdensity in the right superior frontal extra-axial space concerning for small focus of acute hemorrhage.  CT cervical spine shows no acute fracture.  In the emergency room neurosurgery was consulted and hospitalist service requested to admit the patient for further evaluation management.\"      Interval history / Subjective:     Patient seen and examined at the bedside. She said she feels better. No abdominal pain, headache or chest pain.   He daughter was at the bedside. Awaiting IPR , medically stable     Assessment & Plan:     Intracranial hemorrhage secondary to fall  -Patient presented with mechanical fall and head injury,   -CT of the head shows 9 mm hyperdensity in the right superior frontal extra-axial space concerning for small focus of acute hemorrhage  -hold aspirin which patient takes at home;  -Neurosurgery has been consulted: Patient does not require any neurosurgical interventions; outpatient follow-up in the office monitoring;  -off Cardene drip on ,   -continue to adjust antihypertensives to optimize blood pressure control;  -: Repeat CT head: No change;  -Continue

## 2024-08-19 NOTE — PLAN OF CARE
Problem: Discharge Planning  Goal: Discharge to home or other facility with appropriate resources  Outcome: Progressing  Flowsheets (Taken 8/19/2024 0800)  Discharge to home or other facility with appropriate resources: Identify barriers to discharge with patient and caregiver     Problem: Pain  Goal: Verbalizes/displays adequate comfort level or baseline comfort level  Outcome: Progressing     Problem: Safety - Adult  Goal: Free from fall injury  Outcome: Progressing  Flowsheets (Taken 8/19/2024 0800)  Free From Fall Injury:   Instruct family/caregiver on patient safety   Based on caregiver fall risk screen, instruct family/caregiver to ask for assistance with transferring infant if caregiver noted to have fall risk factors     Problem: Chronic Conditions and Co-morbidities  Goal: Patient's chronic conditions and co-morbidity symptoms are monitored and maintained or improved  Outcome: Progressing  Flowsheets (Taken 8/19/2024 0800)  Care Plan - Patient's Chronic Conditions and Co-Morbidity Symptoms are Monitored and Maintained or Improved:   Monitor and assess patient's chronic conditions and comorbid symptoms for stability, deterioration, or improvement   Collaborate with multidisciplinary team to address chronic and comorbid conditions and prevent exacerbation or deterioration     Problem: ABCDS Injury Assessment  Goal: Absence of physical injury  Outcome: Progressing  Flowsheets (Taken 8/19/2024 0800)  Absence of Physical Injury: Implement safety measures based on patient assessment     Problem: Skin/Tissue Integrity  Goal: Absence of new skin breakdown  Description: 1.  Monitor for areas of redness and/or skin breakdown  2.  Assess vascular access sites hourly  3.  Every 4-6 hours minimum:  Change oxygen saturation probe site  4.  Every 4-6 hours:  If on nasal continuous positive airway pressure, respiratory therapy assess nares and determine need for appliance change or resting period.  Outcome: Progressing

## 2024-08-19 NOTE — PROGRESS NOTES
[facility]  Cardiology Care Note                  []Initial visit     [x]Established visit     Patient Name: Ammy Tarango - :1934 - MRN:[id number]  Primary Cardiologist: Alen Ovalles MD   Rounding cardiology: Dr. Aniceto Steen MD  Reason for initial visit: Difficult to treat hypertension       Assessment/Plan/Discussion:Cardiology Attending:     Patient seen on the day of progress note and examined  reviewed  with Advance Practice Provider (LD, NP,PA)       A/P/ Medical Decision Making:  A: Malignant hypertension with HCVD and LVH  P: Add amlodipine, change clonidine to patch, continue 5 antihypertensive meds watching for side effects    MDM: Hypertension remains labile but is trending to a more moderate range.  Will add amlodipine in the evenings.  She will continue on 5 meds otherwise as outlined with change of clonidine to patch she appears euvolemic.  Her significant GI upset from last week has resolved.  We will see back PRN  As outpatient follow-up obviously will not make the appointment tomorrow but will reschedule her for later  Future Appointments   Date Time Provider Department Center   2024  9:40 AM Alen Ovalles MD [facility]   2024  9:00 AM Freda Tolentino MD [facility] DE[facility]      Cardiac Medications       ACE Inhibitors       lisinopril (PRINIVIL;ZESTRIL) tablet 40 mg 40 mg, Oral, DAILY       Calcium Channel Blockers       amLODIPine (NORVASC) tablet 5 mg 5 mg, Oral, NIGHTLY, Hold for SBP less than 120       Potassium Sparing Diuretics       spironolactone (ALDACTONE) tablet 50 mg 50 mg, Oral, DAILY       Antiadrenergic Antihypertensives       cloNIDine (CATAPRES) 0.3 MG/24HR 1 patch 1 patch, TransDERmal, WEEKLY, Apply to a clean, hairless area of the upper outer arm or chest. Rotate patch site weekly.<BR>Apply protective overwrap/barrier/occlusive dressing supplied with the clonidine  at 08/19/24 0942    cetirizine (ZYRTEC) tablet 10 mg, 10 mg, Oral, Daily, Yasir Puga MD, 10 mg at 08/19/24 0942    pantoprazole (PROTONIX) tablet 40 mg, 40 mg, Oral, QAM AC, Yasir Puga MD, 40 mg at 08/19/24 0654    Vitamin D (CHOLECALCIFEROL) tablet 1,000 Units, 1,000 Units, Oral, Daily, Yasir Puga MD, 1,000 Units at 08/19/24 0942    sodium chloride flush 0.9 % injection 5-40 mL, 5-40 mL, IntraVENous, 2 times per day, Yasir Puga MD, 10 mL at 08/19/24 0944    sodium chloride flush 0.9 % injection 5-40 mL, 5-40 mL, IntraVENous, PRN, Yasir Puga MD    0.9 % sodium chloride infusion, , IntraVENous, PRN, Yasir Puga MD    potassium chloride (KLOR-CON) extended release tablet 40 mEq, 40 mEq, Oral, PRN **OR** potassium bicarb-citric acid (EFFER-K) effervescent tablet 40 mEq, 40 mEq, Oral, PRN **OR** potassium chloride 10 mEq/100 mL IVPB (Peripheral Line), 10 mEq, IntraVENous, PRN, Yasir Puga MD    magnesium sulfate 2000 mg in 50 mL IVPB premix, 2,000 mg, IntraVENous, PRN, Yasir Puga MD    ondansetron (ZOFRAN-ODT) disintegrating tablet 4 mg, 4 mg, Oral, Q8H PRN **OR** ondansetron (ZOFRAN) injection 4 mg, 4 mg, IntraVENous, Q6H PRN, Yasir Puga MD, 4 mg at 08/12/24 2116    polyethylene glycol (GLYCOLAX) packet 17 g, 17 g, Oral, Daily PRN, Yasir Puga MD, 17 g at 08/10/24 1551    acetaminophen (TYLENOL) tablet 650 mg, 650 mg, Oral, Q6H PRN, 650 mg at 08/18/24 2300 **OR** acetaminophen (TYLENOL) suppository 650 mg, 650 mg, Rectal, Q6H PRN, Yasir Puga MD    insulin lispro (HUMALOG,ADMELOG) injection vial 0-8 Units, 0-8 Units, SubCUTAneous, TID WC, Yasir Puga MD, 2 Units at 08/19/24 1147    insulin lispro (HUMALOG,ADMELOG) injection vial 0-4 Units, 0-4 Units, SubCUTAneous, Nightly, Yasir Puga MD Lisa Zimmer, DAVID - NP    [facility]  Call center: (P) [phone]  (F) [phone]

## 2024-08-19 NOTE — PROGRESS NOTES
NUTRITION  Reason for Assessment: LOS      Recommendations/Interventions/Plan:   Consider removing low fiber dietary restriction, continue regular textured diet as pt tolerated   Glucerna 1x/day     Will rescreen per policy       Past Medical History:   Diagnosis Date    Adverse effect of anesthesia     pt states she was able to feel everything during colonoscopy    Chronic bronchitis (HCC)     Diabetes (HCC)     Essential hypertension     Hypercholesterolemia     Hypertension          Pt screened for LOS.  Chart/labs/meds reviewed.  Admitted with Subdural hematoma (HCC) [S06.5XAA]  SDH (subdural hematoma) (HCC) [S06.5XAA]  Injury of head, initial encounter [S09.90XA]    (8/19) Follow-up for diet advancement/PO intakes. Per chart review, pt medically stable, pending IPR. Diet advanced from clears to fulls on 8/15; advanced from fills to regular, low fiber 8/16. Per chart review, pt tolerating regular diet, general surgery signed off. Good intakes documented yesterday, >/=76%. RD visited pt at bedside, ~50% intake of breakfast tray per RD observation. Pt states that she is tolerating current diet well. Pt amenable to try ONS, will add to diet order. Continue current diet.     (8/14) Pt visited for LOS; however, noted pt with SBO being followed by surgery. She has been NPO for 2+ days, advanced to clears late yesterday. Pt resting, family at bedside reporting pt tolerated clear liquids well and had 2 bowel movements this morning. Pt eating well PTA and prior to SBO. Weight stable. No overt/acute malnutrition concerns, assuming diet able to be advanced over next 1-2 days.    Nutrition Related Findings:   Edema: Right upper extremity, Left upper extremity, Right lower extremity, Left lower extremity      RUE Edema: +1, Pitting  LUE Edema: +1, Pitting  RLE Edema: +2, Pitting  LLE Edema: +2, Pitting      Last BM: 08/19/24      Skin: Intact      Current Nutrition Therapies:  Diet: Regular, low fiber   Supplements: None

## 2024-08-19 NOTE — CARE COORDINATION
Transition of Care Plan:      [facility] - [facility]; will require insurance auth through [facility] Medicare to be started today once PT/OT see Pt  - PM&R consulted    Transport: BLS     RUR: 16%  Prior Level of Functioning: Independent  Disposition: IPR  If SNF or IPR: Date FOC offered: 8/16  Date FOC received: 8/16  Accepting facility: [facility]  Date authorization started with reference number:   Date authorization received and expires:   Follow up appointments: PCP  DME needed: None  Transportation at discharge: Family  IM/IMM Medicare/ letter given: 8/8  Caregiver Contact: Justino Burgess (Child)  [phone] (Home Phone)   Discharge Caregiver contacted prior to discharge?   Care Conference needed? No  Barriers to discharge: Auth - 8/19    [facility]; they did not start auth over the weekend but do plan to today after Pt is seen by therapy.    CM provided update to Pt's daughter Tootie Tarango [phone].    Marlen Chaney M.S (Ally).JONNATHAN.

## 2024-08-19 NOTE — PLAN OF CARE
Problem: Occupational Therapy - Adult  Goal: By Discharge: Performs self-care activities at highest level of function for planned discharge setting.  See evaluation for individualized goals.  Description: FUNCTIONAL STATUS PRIOR TO ADMISSION:  Patient was ambulatory using no DME and independent for ADLs. She is driving and had adult children that live close by.    HOME SUPPORT: Patient lived alone with family to provide assistance.    Occupational Therapy Goals:  Initiated 8/7/2024, Weekly re-assessment 8/14/2024 (goals remain appropriate)  1.  Patient will perform grooming with [place] within 7 day(s).  2.  Patient will perform upper body dressing with [place] within 7 day(s).  3.  Patient will perform lower body dressing with Modified [place] within 7 day(s).  4.  Patient will perform toilet transfers with Modified [place]  within 7 day(s).  5.  Patient will perform all aspects of toileting with Modified [place] within 7 day(s).  6.  Patient will participate in upper extremity therapeutic exercise/activities with [place] for 15 minutes within 7 day(s).    7.  Patient will utilize energy conservation techniques during functional activities with verbal cues within 7 day(s).    Outcome: Progressing   OCCUPATIONAL THERAPY TREATMENT  Patient: Ammy Tarango (89 y.o. female)  Date: 8/19/2024  Primary Diagnosis: Subdural hematoma (HCC) [S06.5XAA]  SDH (subdural hematoma) (HCC) [S06.5XAA]  Injury of head, initial encounter [S09.90XA]       Precautions: Fall Risk (-160)                Chart, occupational therapy assessment, plan of care, and goals were reviewed.    ASSESSMENT  Pt rec'd sitting up in chair, agreeable to OT tx. Pt extremely lethargic, requiring max VCs to open eyes and keep eyes open at beginning of session. Pt completed grooming tasks standing at sink with CGA to min A for balance and inc'd time due to fatigue. Pt's BP soft at beginning of session (asymptomatic),

## 2024-08-20 ENCOUNTER — TELEPHONE (OUTPATIENT)
Age: 89
End: 2024-08-20

## 2024-08-20 LAB
ALBUMIN SERPL-MCNC: 2.4 G/DL (ref 3.5–5)
ALBUMIN/GLOB SERPL: 0.8 (ref 1.1–2.2)
ALP SERPL-CCNC: 88 U/L (ref 45–117)
ALT SERPL-CCNC: 21 U/L (ref 12–78)
ANION GAP SERPL CALC-SCNC: 1 MMOL/L (ref 5–15)
APPEARANCE UR: CLEAR
AST SERPL-CCNC: 18 U/L (ref 15–37)
BACTERIA URNS QL MICRO: ABNORMAL /HPF
BILIRUB SERPL-MCNC: 0.3 MG/DL (ref 0.2–1)
BILIRUB UR QL: NEGATIVE
BUN SERPL-MCNC: 8 MG/DL (ref 6–20)
BUN/CREAT SERPL: 13 (ref 12–20)
CALCIUM SERPL-MCNC: 8.3 MG/DL (ref 8.5–10.1)
CHLORIDE SERPL-SCNC: 99 MMOL/L (ref 97–108)
CO2 SERPL-SCNC: 37 MMOL/L (ref 21–32)
COLOR UR: ABNORMAL
CREAT SERPL-MCNC: 0.63 MG/DL (ref 0.55–1.02)
EPITH CASTS URNS QL MICRO: ABNORMAL /LPF
ERYTHROCYTE [DISTWIDTH] IN BLOOD BY AUTOMATED COUNT: 14.3 % (ref 11.5–14.5)
GLOBULIN SER CALC-MCNC: 2.9 G/DL (ref 2–4)
GLUCOSE BLD STRIP.AUTO-MCNC: 142 MG/DL (ref 65–117)
GLUCOSE BLD STRIP.AUTO-MCNC: 163 MG/DL (ref 65–117)
GLUCOSE BLD STRIP.AUTO-MCNC: 212 MG/DL (ref 65–117)
GLUCOSE BLD STRIP.AUTO-MCNC: 244 MG/DL (ref 65–117)
GLUCOSE SERPL-MCNC: 147 MG/DL (ref 65–100)
GLUCOSE UR STRIP.AUTO-MCNC: NEGATIVE MG/DL
HCT VFR BLD AUTO: 30.8 % (ref 35–47)
HGB BLD-MCNC: 9.8 G/DL (ref 11.5–16)
HGB UR QL STRIP: NEGATIVE
HYALINE CASTS URNS QL MICRO: ABNORMAL /LPF (ref 0–5)
KETONES UR QL STRIP.AUTO: NEGATIVE MG/DL
LEUKOCYTE ESTERASE UR QL STRIP.AUTO: ABNORMAL
MCH RBC QN AUTO: 28.1 PG (ref 26–34)
MCHC RBC AUTO-ENTMCNC: 31.8 G/DL (ref 30–36.5)
MCV RBC AUTO: 88.3 FL (ref 80–99)
NITRITE UR QL STRIP.AUTO: NEGATIVE
NRBC # BLD: 0 K/UL (ref 0–0.01)
NRBC BLD-RTO: 0 PER 100 WBC
PH UR STRIP: 7.5 (ref 5–8)
PLATELET # BLD AUTO: 168 K/UL (ref 150–400)
PMV BLD AUTO: 9.6 FL (ref 8.9–12.9)
POTASSIUM SERPL-SCNC: 3.4 MMOL/L (ref 3.5–5.1)
PROT SERPL-MCNC: 5.3 G/DL (ref 6.4–8.2)
PROT UR STRIP-MCNC: ABNORMAL MG/DL
RBC # BLD AUTO: 3.49 M/UL (ref 3.8–5.2)
RBC #/AREA URNS HPF: ABNORMAL /HPF (ref 0–5)
SERVICE CMNT-IMP: ABNORMAL
SODIUM SERPL-SCNC: 137 MMOL/L (ref 136–145)
SP GR UR REFRACTOMETRY: 1.01 (ref 1–1.03)
URINE CULTURE IF INDICATED: ABNORMAL
UROBILINOGEN UR QL STRIP.AUTO: 0.2 EU/DL (ref 0.2–1)
WBC # BLD AUTO: 6.8 K/UL (ref 3.6–11)
WBC URNS QL MICRO: ABNORMAL /HPF (ref 0–4)

## 2024-08-20 PROCEDURE — 97530 THERAPEUTIC ACTIVITIES: CPT

## 2024-08-20 PROCEDURE — 2580000003 HC RX 258: Performed by: FAMILY MEDICINE

## 2024-08-20 PROCEDURE — 97535 SELF CARE MNGMENT TRAINING: CPT | Performed by: OCCUPATIONAL THERAPIST

## 2024-08-20 PROCEDURE — 6360000002 HC RX W HCPCS: Performed by: INTERNAL MEDICINE

## 2024-08-20 PROCEDURE — 6370000000 HC RX 637 (ALT 250 FOR IP): Performed by: HOSPITALIST

## 2024-08-20 PROCEDURE — 2060000000 HC ICU INTERMEDIATE R&B

## 2024-08-20 PROCEDURE — 82962 GLUCOSE BLOOD TEST: CPT

## 2024-08-20 PROCEDURE — 6370000000 HC RX 637 (ALT 250 FOR IP)

## 2024-08-20 PROCEDURE — 6360000002 HC RX W HCPCS: Performed by: SURGERY

## 2024-08-20 PROCEDURE — 97116 GAIT TRAINING THERAPY: CPT

## 2024-08-20 PROCEDURE — 6370000000 HC RX 637 (ALT 250 FOR IP): Performed by: FAMILY MEDICINE

## 2024-08-20 PROCEDURE — 80053 COMPREHEN METABOLIC PANEL: CPT

## 2024-08-20 PROCEDURE — 81001 URINALYSIS AUTO W/SCOPE: CPT

## 2024-08-20 PROCEDURE — 85027 COMPLETE CBC AUTOMATED: CPT

## 2024-08-20 PROCEDURE — 6370000000 HC RX 637 (ALT 250 FOR IP): Performed by: NURSE PRACTITIONER

## 2024-08-20 PROCEDURE — 97530 THERAPEUTIC ACTIVITIES: CPT | Performed by: OCCUPATIONAL THERAPIST

## 2024-08-20 PROCEDURE — 36415 COLL VENOUS BLD VENIPUNCTURE: CPT

## 2024-08-20 RX ADMIN — Medication 1000 UNITS: at 08:58

## 2024-08-20 RX ADMIN — HYDRALAZINE HYDROCHLORIDE 10 MG: 20 INJECTION INTRAMUSCULAR; INTRAVENOUS at 05:55

## 2024-08-20 RX ADMIN — SODIUM CHLORIDE, PRESERVATIVE FREE 10 ML: 5 INJECTION INTRAVENOUS at 09:01

## 2024-08-20 RX ADMIN — ATORVASTATIN CALCIUM 10 MG: 10 TABLET, FILM COATED ORAL at 08:58

## 2024-08-20 RX ADMIN — CETIRIZINE HYDROCHLORIDE 10 MG: 10 TABLET, FILM COATED ORAL at 08:57

## 2024-08-20 RX ADMIN — METOPROLOL TARTRATE 150 MG: 50 TABLET, FILM COATED ORAL at 08:57

## 2024-08-20 RX ADMIN — LISINOPRIL 40 MG: 20 TABLET ORAL at 08:58

## 2024-08-20 RX ADMIN — HYDRALAZINE HYDROCHLORIDE 100 MG: 50 TABLET ORAL at 05:50

## 2024-08-20 RX ADMIN — Medication: at 22:29

## 2024-08-20 RX ADMIN — PANTOPRAZOLE SODIUM 40 MG: 40 TABLET, DELAYED RELEASE ORAL at 05:50

## 2024-08-20 RX ADMIN — HYDRALAZINE HYDROCHLORIDE 10 MG: 20 INJECTION INTRAMUSCULAR; INTRAVENOUS at 18:13

## 2024-08-20 RX ADMIN — METOPROLOL TARTRATE 150 MG: 50 TABLET, FILM COATED ORAL at 22:30

## 2024-08-20 RX ADMIN — HYDRALAZINE HYDROCHLORIDE 100 MG: 50 TABLET ORAL at 22:29

## 2024-08-20 RX ADMIN — SODIUM CHLORIDE, PRESERVATIVE FREE 10 ML: 5 INJECTION INTRAVENOUS at 22:48

## 2024-08-20 RX ADMIN — INSULIN LISPRO 2 UNITS: 100 INJECTION, SOLUTION INTRAVENOUS; SUBCUTANEOUS at 16:43

## 2024-08-20 RX ADMIN — SPIRONOLACTONE 50 MG: 25 TABLET ORAL at 08:58

## 2024-08-20 RX ADMIN — FUROSEMIDE 40 MG: 40 TABLET ORAL at 08:58

## 2024-08-20 RX ADMIN — METOCLOPRAMIDE 5 MG: 5 INJECTION, SOLUTION INTRAMUSCULAR; INTRAVENOUS at 01:36

## 2024-08-20 RX ADMIN — TRAZODONE HYDROCHLORIDE 50 MG: 50 TABLET ORAL at 02:37

## 2024-08-20 RX ADMIN — ENOXAPARIN SODIUM 40 MG: 100 INJECTION SUBCUTANEOUS at 09:00

## 2024-08-20 RX ADMIN — HYDRALAZINE HYDROCHLORIDE 100 MG: 50 TABLET ORAL at 13:15

## 2024-08-20 RX ADMIN — METOCLOPRAMIDE 5 MG: 5 INJECTION, SOLUTION INTRAMUSCULAR; INTRAVENOUS at 13:16

## 2024-08-20 RX ADMIN — Medication: at 09:00

## 2024-08-20 RX ADMIN — AMLODIPINE BESYLATE 5 MG: 5 TABLET ORAL at 22:29

## 2024-08-20 RX ADMIN — METOCLOPRAMIDE 5 MG: 5 INJECTION, SOLUTION INTRAMUSCULAR; INTRAVENOUS at 18:14

## 2024-08-20 RX ADMIN — Medication 1 TABLET: at 08:58

## 2024-08-20 RX ADMIN — METOCLOPRAMIDE 5 MG: 5 INJECTION, SOLUTION INTRAMUSCULAR; INTRAVENOUS at 05:51

## 2024-08-20 ASSESSMENT — PAIN SCALES - GENERAL
PAINLEVEL_OUTOF10: 0

## 2024-08-20 NOTE — PLAN OF CARE
Problem: Occupational Therapy - Adult  Goal: By Discharge: Performs self-care activities at highest level of function for planned discharge setting.  See evaluation for individualized goals.  Description: FUNCTIONAL STATUS PRIOR TO ADMISSION:  Patient was ambulatory using no DME and independent for ADLs. She is driving and had adult children that live close by.    HOME SUPPORT: Patient lived alone with family to provide assistance.    Occupational Therapy Goals:  Initiated 8/7/2024, Weekly re-assessment 8/14/2024 (goals remain appropriate)  1.  Patient will perform grooming with [place] within 7 day(s).  2.  Patient will perform upper body dressing with [place] within 7 day(s).  3.  Patient will perform lower body dressing with Modified [place] within 7 day(s).  4.  Patient will perform toilet transfers with Modified [place]  within 7 day(s).  5.  Patient will perform all aspects of toileting with Modified [place] within 7 day(s).  6.  Patient will participate in upper extremity therapeutic exercise/activities with [place] for 15 minutes within 7 day(s).    7.  Patient will utilize energy conservation techniques during functional activities with verbal cues within 7 day(s).    8/20/2024 1601 by Gisela Fonseca, OTR/L  Outcome: Progressing     Problem: Physical Therapy - Adult  Goal: By Discharge: Performs mobility at highest level of function for planned discharge setting.  See evaluation for individualized goals.  Description: FUNCTIONAL STATUS PRIOR TO ADMISSION: Pt lives alone with son/ daughter very close by. Ambulates indep without device, but owns SPC. Uses B HR to independently negotiate 2 steps into home.     Physical Therapy Goals  Initiated 8/14/2024  1.  Patient will move from supine to sit and sit to supine, scoot up and down, and roll side to side in bed with modified independence within 7 day(s).    2.  Patient will perform sit to stand with modified independence within 7

## 2024-08-20 NOTE — PLAN OF CARE
Problem: Occupational Therapy - Adult  Goal: By Discharge: Performs self-care activities at highest level of function for planned discharge setting.  See evaluation for individualized goals.  Description: FUNCTIONAL STATUS PRIOR TO ADMISSION:  Patient was ambulatory using no DME and independent for ADLs. She is driving and had adult children that live close by.    HOME SUPPORT: Patient lived alone with family to provide assistance.    Occupational Therapy Goals:  Initiated 8/7/2024, Weekly re-assessment 8/14/2024 (goals remain appropriate)  1.  Patient will perform grooming with [place] within 7 day(s).  2.  Patient will perform upper body dressing with [place] within 7 day(s).  3.  Patient will perform lower body dressing with Modified [place] within 7 day(s).  4.  Patient will perform toilet transfers with Modified [place]  within 7 day(s).  5.  Patient will perform all aspects of toileting with Modified [place] within 7 day(s).  6.  Patient will participate in upper extremity therapeutic exercise/activities with [place] for 15 minutes within 7 day(s).    7.  Patient will utilize energy conservation techniques during functional activities with verbal cues within 7 day(s).    Outcome: Progressing    OCCUPATIONAL THERAPY TREATMENT  Patient: Ammy Tarango (89 y.o. female)  Date: 8/20/2024  Primary Diagnosis: Subdural hematoma (HCC) [S06.5XAA]  SDH (subdural hematoma) (HCC) [S06.5XAA]  Injury of head, initial encounter [S09.90XA]       Precautions: Fall Risk (-160)                Chart, occupational therapy assessment, plan of care, and goals were reviewed.    ASSESSMENT  Patient continues to benefit from skilled OT services and is progressing towards goals. Patient very motivated and receptive to increased activity, overall CGA with mobility with cues for safe maneuvering of rolling walker in bathroom. Maintained 93% or > on room air with activity. Patient incontinent of

## 2024-08-20 NOTE — PLAN OF CARE
Problem: Physical Therapy - Adult  Goal: By Discharge: Performs mobility at highest level of function for planned discharge setting.  See evaluation for individualized goals.  Description: FUNCTIONAL STATUS PRIOR TO ADMISSION: Pt lives alone with son/ daughter very close by. Ambulates indep without device, but owns SPC. Uses B HR to independently negotiate 2 steps into home.     Physical Therapy Goals  Initiated 8/14/2024  1.  Patient will move from supine to sit and sit to supine, scoot up and down, and roll side to side in bed with modified independence within 7 day(s).    2.  Patient will perform sit to stand with modified independence within 7 day(s).  3.  Patient will transfer from bed to chair and chair to bed with supervision/set-up using the least restrictive device within 7 day(s).  4.  Patient will ambulate with supervision/set-up for 150 feet with the least restrictive device within 7 day(s).   5.  Patient will ascend/descend 2 stairs with bilateral handrail(s) with contact guard assist within 7 day(s).    Initiated 8/7/2024  1.  Patient will move from supine to sit and sit to supine in bed with independence within 7 day(s).    2.  Patient will perform sit to stand with independence within 7 day(s).  3.  Patient will transfer from bed to chair and chair to bed with independence using the least restrictive device within 7 day(s).  4.  Patient will ambulate with independence for 150 feet with the least restrictive device within 7 day(s).   5.  Patient will ascend/descend 2 stairs with B handrail(s) with supervision/set-up within 7 day(s).   Outcome: Progressing     PHYSICAL THERAPY TREATMENT    Patient: Ammy Tarango (89 y.o. female)  Date: 8/20/2024  Diagnosis: Subdural hematoma (HCC) [S06.5XAA]  SDH (subdural hematoma) (HCC) [S06.5XAA]  Injury of head, initial encounter [S09.90XA] Subdural hematoma (HCC)      Precautions: Fall Risk (-160)                      ASSESSMENT:  Patient continues to

## 2024-08-20 NOTE — PROGRESS NOTES
[facility]                                                                                          Hospitalist Progress Note  Christin Ramos MD  Answering service: [phone] OR [phone] from in house phone        Date of Service:  2024  NAME:  Ammy Tarango  :  1934  MRN:  [id number]       Admission Summary:     \"Ammy Tarango is a 89 y.o. female who presents to the emergency room after she fell at home.  Patient reports that her foot got stuck in the rock and subsequently fell in the bathroom hitting her head on the floor.  Patient reports headache, left shoulder pain and left neck pain.  Patient subsequently presented to the emergency room for further evaluation.  Patient presented with fairly stable vital signs.  CT of the head was done in the ER which shows 9 mm hyperdensity in the right superior frontal extra-axial space concerning for small focus of acute hemorrhage.  CT cervical spine shows no acute fracture.  In the emergency room neurosurgery was consulted and hospitalist service requested to admit the patient for further evaluation management.\"      Interval history / Subjective:     Patient seen and examined at the bedside. She said she feels better. No abdominal pain, headache or chest pain.   He daughter was at the bedside. Awaiting IPR , medically stable AFVSS   Cardiology adjusted BP meds     Assessment & Plan:     Intracranial hemorrhage secondary to fall  -Patient presented with mechanical fall and head injury,   -CT of the head shows 9 mm hyperdensity in the right superior frontal extra-axial space concerning for small focus of acute hemorrhage  -hold aspirin which patient takes at home;  -Neurosurgery has been consulted: Patient does not require any neurosurgical interventions; outpatient follow-up in the office monitoring;  -off Cardene drip on ,   -continue to adjust antihypertensives to optimize blood pressure control;  -:  results for input(s): \"CPK\" in the last 72 hours.    Invalid input(s): \"CPKMB\", \"CKNDX\", \"TROIQ\"  Lab Results   Component Value Date/Time    CHOL 150 03/25/2024 08:19 AM    HDL 50 03/25/2024 08:19 AM    LDL 85.2 03/25/2024 08:19 AM     No results found for: \"GLUCPOC\"  [unfilled]    Notes reviewed from all clinical/nonclinical/nursing services involved in patient's clinical care. Care coordination discussions were held with appropriate clinical/nonclinical/ nursing providers based on care coordination needs.         Patients current active Medications were reviewed, considered, added and adjusted based on the clinical condition today.      Home Medications were reconciled to the best of my ability given all available resources at the time of admission. Route is PO if not otherwise noted.      Admission Status:[id number]:::1}      Medications Reviewed:     Current Facility-Administered Medications   Medication Dose Route Frequency    cloNIDine (CATAPRES) 0.3 MG/24HR 1 patch  1 patch TransDERmal Weekly    amLODIPine (NORVASC) tablet 5 mg  5 mg Oral Nightly    metoprolol tartrate (LOPRESSOR) tablet 150 mg  150 mg Oral BID    spironolactone (ALDACTONE) tablet 50 mg  50 mg Oral Daily    furosemide (LASIX) tablet 40 mg  40 mg Oral Daily    balsum peru-castor oil (VENELEX) ointment   Topical BID    traZODone (DESYREL) tablet 50 mg  50 mg Oral Nightly PRN    enoxaparin (LOVENOX) injection 40 mg  40 mg SubCUTAneous Daily    metoclopramide (REGLAN) injection 5 mg  5 mg IntraVENous Q6H    bisacodyl (DULCOLAX) suppository 10 mg  10 mg Rectal BID    magnesium hydroxide (MILK OF MAGNESIA) 400 MG/5ML suspension 30 mL  30 mL Oral Daily    hydrALAZINE (APRESOLINE) tablet 100 mg  100 mg Oral 3 times per day    prochlorperazine (COMPAZINE) injection 5 mg  5 mg IntraVENous Q6H PRN    lisinopril (PRINIVIL;ZESTRIL) tablet 40 mg  40 mg Oral Daily    hydrALAZINE (APRESOLINE) injection 10 mg  10 mg IntraVENous Q6H PRN    melatonin tablet 3 mg  3

## 2024-08-20 NOTE — CARE COORDINATION
Transition of Care Plan:      IPR - [facility]; pending insurance auth through [facility] Medicare started 8/19  - PM&R consulted    Transport: BLS     RUR: 16%  Prior Level of Functioning: Independent  Disposition: IPR  If SNF or IPR: Date FOC offered: 8/16  Date FOC received: 8/16  Accepting facility: [facility]  Date authorization started with reference number: 8/19  Date authorization received and expires:   Follow up appointments: PCP  DME needed: None  Transportation at discharge: Family  IM/IMM Medicare/ letter given: 8/8  Caregiver Contact: Justino Burgess (Child)  [phone] (Home Phone)   Discharge Caregiver contacted prior to discharge?   Care Conference needed? No  Barriers to discharge: Auth - 8/19    Pt is still pending insurance auth through [facility] for IPR at [facility].    CM provided update to Pt's daughter Tootie Tarango [phone].    DONELL Limon (Ally).

## 2024-08-21 VITALS
TEMPERATURE: 98.2 F | WEIGHT: 163 LBS | SYSTOLIC BLOOD PRESSURE: 188 MMHG | BODY MASS INDEX: 32 KG/M2 | HEART RATE: 74 BPM | HEIGHT: 60 IN | DIASTOLIC BLOOD PRESSURE: 74 MMHG | RESPIRATION RATE: 24 BRPM | OXYGEN SATURATION: 97 %

## 2024-08-21 LAB
ALBUMIN SERPL-MCNC: 2.7 G/DL (ref 3.5–5)
ALBUMIN/GLOB SERPL: 0.9 (ref 1.1–2.2)
ALP SERPL-CCNC: 104 U/L (ref 45–117)
ALT SERPL-CCNC: 23 U/L (ref 12–78)
ANION GAP SERPL CALC-SCNC: 3 MMOL/L (ref 5–15)
AST SERPL-CCNC: 19 U/L (ref 15–37)
BILIRUB SERPL-MCNC: 0.4 MG/DL (ref 0.2–1)
BUN SERPL-MCNC: 7 MG/DL (ref 6–20)
BUN/CREAT SERPL: 10 (ref 12–20)
CALCIUM SERPL-MCNC: 8.7 MG/DL (ref 8.5–10.1)
CHLORIDE SERPL-SCNC: 97 MMOL/L (ref 97–108)
CO2 SERPL-SCNC: 36 MMOL/L (ref 21–32)
CREAT SERPL-MCNC: 0.7 MG/DL (ref 0.55–1.02)
ERYTHROCYTE [DISTWIDTH] IN BLOOD BY AUTOMATED COUNT: 14 % (ref 11.5–14.5)
GLOBULIN SER CALC-MCNC: 3.1 G/DL (ref 2–4)
GLUCOSE BLD STRIP.AUTO-MCNC: 157 MG/DL (ref 65–117)
GLUCOSE BLD STRIP.AUTO-MCNC: 266 MG/DL (ref 65–117)
GLUCOSE SERPL-MCNC: 178 MG/DL (ref 65–100)
HCT VFR BLD AUTO: 32.6 % (ref 35–47)
HGB BLD-MCNC: 10.3 G/DL (ref 11.5–16)
MCH RBC QN AUTO: 27.9 PG (ref 26–34)
MCHC RBC AUTO-ENTMCNC: 31.6 G/DL (ref 30–36.5)
MCV RBC AUTO: 88.3 FL (ref 80–99)
NRBC # BLD: 0 K/UL (ref 0–0.01)
NRBC BLD-RTO: 0 PER 100 WBC
PLATELET # BLD AUTO: 192 K/UL (ref 150–400)
PMV BLD AUTO: 10.3 FL (ref 8.9–12.9)
POTASSIUM SERPL-SCNC: 3.2 MMOL/L (ref 3.5–5.1)
PROT SERPL-MCNC: 5.8 G/DL (ref 6.4–8.2)
RBC # BLD AUTO: 3.69 M/UL (ref 3.8–5.2)
SERVICE CMNT-IMP: ABNORMAL
SERVICE CMNT-IMP: ABNORMAL
SODIUM SERPL-SCNC: 136 MMOL/L (ref 136–145)
WBC # BLD AUTO: 5.9 K/UL (ref 3.6–11)

## 2024-08-21 PROCEDURE — 82962 GLUCOSE BLOOD TEST: CPT

## 2024-08-21 PROCEDURE — 6370000000 HC RX 637 (ALT 250 FOR IP): Performed by: NURSE PRACTITIONER

## 2024-08-21 PROCEDURE — 6370000000 HC RX 637 (ALT 250 FOR IP): Performed by: FAMILY MEDICINE

## 2024-08-21 PROCEDURE — 6370000000 HC RX 637 (ALT 250 FOR IP)

## 2024-08-21 PROCEDURE — 6370000000 HC RX 637 (ALT 250 FOR IP): Performed by: INTERNAL MEDICINE

## 2024-08-21 PROCEDURE — 85027 COMPLETE CBC AUTOMATED: CPT

## 2024-08-21 PROCEDURE — 6360000002 HC RX W HCPCS: Performed by: SURGERY

## 2024-08-21 PROCEDURE — 6370000000 HC RX 637 (ALT 250 FOR IP): Performed by: HOSPITALIST

## 2024-08-21 PROCEDURE — 2580000003 HC RX 258: Performed by: FAMILY MEDICINE

## 2024-08-21 PROCEDURE — 80053 COMPREHEN METABOLIC PANEL: CPT

## 2024-08-21 PROCEDURE — 6360000002 HC RX W HCPCS: Performed by: INTERNAL MEDICINE

## 2024-08-21 RX ORDER — SPIRONOLACTONE 50 MG/1
50 TABLET, FILM COATED ORAL DAILY
Qty: 30 TABLET | Refills: 3 | Status: SHIPPED | OUTPATIENT
Start: 2024-08-22

## 2024-08-21 RX ORDER — CIPROFLOXACIN 500 MG/1
500 TABLET, FILM COATED ORAL 2 TIMES DAILY
Qty: 14 TABLET | Refills: 0 | Status: SHIPPED | OUTPATIENT
Start: 2024-08-21 | End: 2024-08-21 | Stop reason: HOSPADM

## 2024-08-21 RX ORDER — FUROSEMIDE 40 MG/1
40 TABLET ORAL DAILY
Qty: 60 TABLET | Refills: 3 | Status: SHIPPED | OUTPATIENT
Start: 2024-08-22

## 2024-08-21 RX ORDER — AMLODIPINE BESYLATE 5 MG/1
5 TABLET ORAL NIGHTLY
Qty: 30 TABLET | Refills: 3 | Status: SHIPPED | OUTPATIENT
Start: 2024-08-21

## 2024-08-21 RX ORDER — CLONIDINE 0.3 MG/24H
1 PATCH, EXTENDED RELEASE TRANSDERMAL WEEKLY
Qty: 4 PATCH | Refills: 0 | Status: SHIPPED | OUTPATIENT
Start: 2024-08-26 | End: 2024-09-25

## 2024-08-21 RX ORDER — LISINOPRIL 40 MG/1
40 TABLET ORAL DAILY
Qty: 30 TABLET | Refills: 3 | Status: SHIPPED | OUTPATIENT
Start: 2024-08-22

## 2024-08-21 RX ORDER — BISACODYL 10 MG
10 SUPPOSITORY, RECTAL RECTAL 2 TIMES DAILY
Qty: 60 SUPPOSITORY | Refills: 0 | Status: SHIPPED | OUTPATIENT
Start: 2024-08-21 | End: 2024-09-20

## 2024-08-21 RX ORDER — HYDRALAZINE HYDROCHLORIDE 100 MG/1
100 TABLET, FILM COATED ORAL EVERY 8 HOURS SCHEDULED
Qty: 90 TABLET | Refills: 3 | Status: SHIPPED | OUTPATIENT
Start: 2024-08-21

## 2024-08-21 RX ORDER — METOPROLOL TARTRATE 75 MG/1
150 TABLET, FILM COATED ORAL 2 TIMES DAILY
Qty: 60 TABLET | Refills: 3 | Status: SHIPPED | OUTPATIENT
Start: 2024-08-21

## 2024-08-21 RX ORDER — PANTOPRAZOLE SODIUM 40 MG/1
40 TABLET, DELAYED RELEASE ORAL
Qty: 30 TABLET | Refills: 3 | Status: SHIPPED | OUTPATIENT
Start: 2024-08-22

## 2024-08-21 RX ADMIN — Medication 1000 UNITS: at 09:34

## 2024-08-21 RX ADMIN — LISINOPRIL 40 MG: 20 TABLET ORAL at 07:16

## 2024-08-21 RX ADMIN — ACETAMINOPHEN 650 MG: 325 TABLET ORAL at 03:33

## 2024-08-21 RX ADMIN — ATORVASTATIN CALCIUM 10 MG: 10 TABLET, FILM COATED ORAL at 09:37

## 2024-08-21 RX ADMIN — HYDRALAZINE HYDROCHLORIDE 10 MG: 20 INJECTION INTRAMUSCULAR; INTRAVENOUS at 00:32

## 2024-08-21 RX ADMIN — METOCLOPRAMIDE 5 MG: 5 INJECTION, SOLUTION INTRAMUSCULAR; INTRAVENOUS at 07:27

## 2024-08-21 RX ADMIN — SPIRONOLACTONE 50 MG: 25 TABLET ORAL at 09:35

## 2024-08-21 RX ADMIN — INSULIN LISPRO 4 UNITS: 100 INJECTION, SOLUTION INTRAVENOUS; SUBCUTANEOUS at 12:07

## 2024-08-21 RX ADMIN — FUROSEMIDE 40 MG: 40 TABLET ORAL at 09:37

## 2024-08-21 RX ADMIN — Medication 1 TABLET: at 09:35

## 2024-08-21 RX ADMIN — MAGNESIUM HYDROXIDE 30 ML: 400 SUSPENSION ORAL at 09:37

## 2024-08-21 RX ADMIN — METOPROLOL TARTRATE 150 MG: 50 TABLET, FILM COATED ORAL at 09:35

## 2024-08-21 RX ADMIN — Medication: at 09:39

## 2024-08-21 RX ADMIN — HYDRALAZINE HYDROCHLORIDE 100 MG: 50 TABLET ORAL at 07:15

## 2024-08-21 RX ADMIN — METOCLOPRAMIDE 5 MG: 5 INJECTION, SOLUTION INTRAMUSCULAR; INTRAVENOUS at 12:08

## 2024-08-21 RX ADMIN — METOCLOPRAMIDE 5 MG: 5 INJECTION, SOLUTION INTRAMUSCULAR; INTRAVENOUS at 00:38

## 2024-08-21 RX ADMIN — PANTOPRAZOLE SODIUM 40 MG: 40 TABLET, DELAYED RELEASE ORAL at 07:26

## 2024-08-21 RX ADMIN — POTASSIUM BICARBONATE 40 MEQ: 782 TABLET, EFFERVESCENT ORAL at 11:51

## 2024-08-21 RX ADMIN — HYDRALAZINE HYDROCHLORIDE 100 MG: 50 TABLET ORAL at 13:54

## 2024-08-21 RX ADMIN — SODIUM CHLORIDE, PRESERVATIVE FREE 10 ML: 5 INJECTION INTRAVENOUS at 09:32

## 2024-08-21 RX ADMIN — ENOXAPARIN SODIUM 40 MG: 100 INJECTION SUBCUTANEOUS at 09:38

## 2024-08-21 RX ADMIN — CETIRIZINE HYDROCHLORIDE 10 MG: 10 TABLET, FILM COATED ORAL at 09:34

## 2024-08-21 RX ADMIN — BISACODYL 10 MG: 10 SUPPOSITORY RECTAL at 09:38

## 2024-08-21 RX ADMIN — TRAZODONE HYDROCHLORIDE 50 MG: 50 TABLET ORAL at 00:28

## 2024-08-21 RX ADMIN — HYDRALAZINE HYDROCHLORIDE 10 MG: 20 INJECTION INTRAMUSCULAR; INTRAVENOUS at 11:49

## 2024-08-21 ASSESSMENT — PAIN SCALES - GENERAL
PAINLEVEL_OUTOF10: 0
PAINLEVEL_OUTOF10: 0

## 2024-08-21 NOTE — PLAN OF CARE
Problem: Discharge Planning  Goal: Discharge to home or other facility with appropriate resources  Outcome: Progressing  Flowsheets  Taken 8/21/2024 0100 by Dave Falcon RN (Aika)  Discharge to home or other facility with appropriate resources:   Identify barriers to discharge with patient and caregiver   Identify discharge learning needs (meds, wound care, etc)  Taken 8/20/2024 2000 by Severson, Constance M, RN  Discharge to home or other facility with appropriate resources: Identify barriers to discharge with patient and caregiver     Problem: Pain  Goal: Verbalizes/displays adequate comfort level or baseline comfort level  Outcome: Progressing     Problem: Safety - Adult  Goal: Free from fall injury  Outcome: Progressing     Problem: Chronic Conditions and Co-morbidities  Goal: Patient's chronic conditions and co-morbidity symptoms are monitored and maintained or improved  Outcome: Progressing  Flowsheets  Taken 8/21/2024 0100 by Dave Falcon RN (Aika)  Care Plan - Patient's Chronic Conditions and Co-Morbidity Symptoms are Monitored and Maintained or Improved:   Monitor and assess patient's chronic conditions and comorbid symptoms for stability, deterioration, or improvement   Collaborate with multidisciplinary team to address chronic and comorbid conditions and prevent exacerbation or deterioration   Update acute care plan with appropriate goals if chronic or comorbid symptoms are exacerbated and prevent overall improvement and discharge  Taken 8/20/2024 2000 by Severson, Constance M, RN  Care Plan - Patient's Chronic Conditions and Co-Morbidity Symptoms are Monitored and Maintained or Improved: Monitor and assess patient's chronic conditions and comorbid symptoms for stability, deterioration, or improvement     Problem: ABCDS Injury Assessment  Goal: Absence of physical injury  Outcome: Progressing     Problem: Skin/Tissue Integrity  Goal: Absence of new skin breakdown  Description: 1.  Monitor for areas of

## 2024-08-21 NOTE — DISCHARGE INSTRUCTIONS
Discharge Instructions       PATIENT ID: Ammy Tarango  MRN: [id number]   YOB: 1934    DATE OF ADMISSION: 8/6/2024   DATE OF DISCHARGE: 8/21/2024    PRIMARY CARE PROVIDER: Freda Tolentino     ATTENDING PHYSICIAN: Christin Ramos MD   DISCHARGING PROVIDER: Christin Ramos MD    To contact this individual call [phone] and ask the  to page.   If unavailable ask to be transferred the Adult Hospitalist Department.    DISCHARGE DIAGNOSES ***    CONSULTATIONS: [unfilled]    PROCEDURES/SURGERIES: * No surgery found *    PENDING TEST RESULTS:   At the time of discharge the following test results are still pending: ***    FOLLOW UP APPOINTMENTS:   @[facility]@     ADDITIONAL CARE RECOMMENDATIONS: ***    DIET: {diet:05163}  Oral Nutritional Supplements: {RDSUPPLEMENTLIST:74550} {RDSUPPFREQUENCY:52749}     ACTIVITY: {discharge activity:37437}    WOUND CARE: ***    EQUIPMENT needed: ***      DISCHARGE MEDICATIONS:   See Medication Reconciliation Form    It is important that you take the medication exactly as they are prescribed.   Keep your medication in the bottles provided by the pharmacist and keep a list of the medication names, dosages, and times to be taken in your wallet.   Do not take other medications without consulting your doctor.       NOTIFY YOUR PHYSICIAN FOR ANY OF THE FOLLOWING:   Fever over 101 degrees for 24 hours.   Chest pain, shortness of breath, fever, chills, nausea, vomiting, diarrhea, change in mentation, falling, weakness, bleeding. Severe pain or pain not relieved by medications.  Or, any other signs or symptoms that you may have questions about.      DISPOSITION:    Home With:   OT  PT  HH  RN       SNF/Inpatient Rehab/LTAC    Independent/assisted living    Hospice    Other:     CDMP Checked:   Yes ***     PROBLEM LIST Updated:  Yes ***       Signed:   Christin Ramos MD  8/21/2024  10:11 AM

## 2024-08-21 NOTE — DISCHARGE SUMMARY
Discharge Summary       PATIENT ID: Ammy Tarango  MRN: [id number]   YOB: 1934    DATE OF ADMISSION: 8/6/2024 12:09 PM    DATE OF DISCHARGE: 8/21/24  PRIMARY CARE PROVIDER: Freda Tolentino MD     ATTENDING PHYSICIAN: christin ramos  DISCHARGING PROVIDER: Christin Ramos MD    To contact this individual call [phone] and ask the  to page.  If unavailable ask to be transferred the Adult Hospitalist Department.    CONSULTATIONS: IP CONSULT TO CASE MANAGEMENT  IP CONSULT TO NEUROSURGERY  IP CONSULT TO HOSPITALIST  IP CONSULT TO CASE MANAGEMENT  IP CONSULT TO CARDIOLOGY  IP CONSULT TO CASE MANAGEMENT  IP CONSULT TO GENERAL SURGERY  IP CONSULT TO CASE MANAGEMENT  IP CONSULT TO INFECTIOUS DISEASES  IP WOUND CARE NURSE CONSULT TO EVAL  IP CONSULT TO PHYSICAL MEDICINE REHAB    PROCEDURES/SURGERIES: * No surgery found *    ADMITTING DIAGNOSES & HOSPITAL COURSE:     Ammy Tarango is a 89 y.o. female who presents to the emergency room after she fell at home.  Patient reports that her foot got stuck in the rock and subsequently fell in the bathroom hitting her head on the floor.  Patient reports headache, left shoulder pain and left neck pain.  Patient subsequently presented to the emergency room for further evaluation.  Patient presented with fairly stable vital signs.  CT of the head was done in the ER which shows 9 mm hyperdensity in the right superior frontal extra-axial space concerning for small focus of acute hemorrhage.  CT cervical spine shows no acute fracture.  In the emergency room neurosurgery was consulted and hospitalist service requested to admit the patient for further evaluation management.\"      Intracranial hemorrhage secondary to fall  -Patient presented with mechanical fall and head injury,   -Neurosurgery has been consulted: Patient does not require any neurosurgical interventions; outpatient follow-up in the office monitoring;     HTN,   - on lisinopril 40 mg daily,

## 2024-08-21 NOTE — PLAN OF CARE
Problem: Discharge Planning  Goal: Discharge to home or other facility with appropriate resources  8/21/2024 1351 by Renee Clemens RN  Outcome: Adequate for Discharge  8/21/2024 1331 by Renee Clemens RN  Outcome: Progressing  Flowsheets (Taken 8/21/2024 0800)  Discharge to home or other facility with appropriate resources:   Identify barriers to discharge with patient and caregiver   Arrange for needed discharge resources and transportation as appropriate   Identify discharge learning needs (meds, wound care, etc)   Arrange for interpreters to assist at discharge as needed   Refer to discharge planning if patient needs post-hospital services based on physician order or complex needs related to functional status, cognitive ability or social support system  8/21/2024 0611 by Dave Falcon RN (Aika)  Outcome: Progressing  Flowsheets  Taken 8/21/2024 0100 by Dave Falcon RN (Aika)  Discharge to home or other facility with appropriate resources:   Identify barriers to discharge with patient and caregiver   Identify discharge learning needs (meds, wound care, etc)  Taken 8/20/2024 2000 by Severson, Constance M, RN  Discharge to home or other facility with appropriate resources: Identify barriers to discharge with patient and caregiver     Problem: Pain  Goal: Verbalizes/displays adequate comfort level or baseline comfort level  8/21/2024 1351 by Renee Clemens RN  Outcome: Adequate for Discharge  8/21/2024 1331 by Renee Clemens RN  Outcome: Progressing  Flowsheets (Taken 8/21/2024 1100)  Verbalizes/displays adequate comfort level or baseline comfort level:   Encourage patient to monitor pain and request assistance   Assess pain using appropriate pain scale   Administer analgesics based on type and severity of pain and evaluate response   Implement non-pharmacological measures as appropriate and evaluate response   Consider cultural and social influences on pain and pain management   Notify Licensed

## 2024-08-21 NOTE — PLAN OF CARE
Problem: Discharge Planning  Goal: Discharge to home or other facility with appropriate resources  8/21/2024 1331 by Renee Clemens, RN  Outcome: Progressing  Flowsheets (Taken 8/21/2024 0800)  Discharge to home or other facility with appropriate resources:   Identify barriers to discharge with patient and caregiver   Arrange for needed discharge resources and transportation as appropriate   Identify discharge learning needs (meds, wound care, etc)   Arrange for interpreters to assist at discharge as needed   Refer to discharge planning if patient needs post-hospital services based on physician order or complex needs related to functional status, cognitive ability or social support system  8/21/2024 0611 by Dave Falcon RN (Aika)  Outcome: Progressing  Flowsheets  Taken 8/21/2024 0100 by Dave Falcon RN (Aika)  Discharge to home or other facility with appropriate resources:   Identify barriers to discharge with patient and caregiver   Identify discharge learning needs (meds, wound care, etc)  Taken 8/20/2024 2000 by Severson, Constance M, RN  Discharge to home or other facility with appropriate resources: Identify barriers to discharge with patient and caregiver     Problem: Pain  Goal: Verbalizes/displays adequate comfort level or baseline comfort level  8/21/2024 1331 by Renee Clemens, RN  Outcome: Progressing  Flowsheets (Taken 8/21/2024 1100)  Verbalizes/displays adequate comfort level or baseline comfort level:   Encourage patient to monitor pain and request assistance   Assess pain using appropriate pain scale   Administer analgesics based on type and severity of pain and evaluate response   Implement non-pharmacological measures as appropriate and evaluate response   Consider cultural and social influences on pain and pain management   Notify Licensed Independent Practitioner if interventions unsuccessful or patient reports new pain  8/21/2024 0611 by Dave Falcon RN (Aika)  Outcome: Progressing    Progressing  8/21/2024 0611 by Dave Falcon RN (Aika)  Outcome: Progressing     Problem: Skin/Tissue Integrity  Goal: Absence of new skin breakdown  Description: 1.  Monitor for areas of redness and/or skin breakdown  2.  Assess vascular access sites hourly  3.  Every 4-6 hours minimum:  Change oxygen saturation probe site  4.  Every 4-6 hours:  If on nasal continuous positive airway pressure, respiratory therapy assess nares and determine need for appliance change or resting period.  8/21/2024 1331 by Renee Clemens, RN  Outcome: Progressing  8/21/2024 0611 by Dave Falcon RN (Aika)  Outcome: Progressing     Problem: Nutrition Deficit:  Goal: Optimize nutritional status  8/21/2024 1331 by Renee Clemens RN  Outcome: Progressing  8/21/2024 0611 by Dave Falcon RN (Aika)  Outcome: Progressing

## 2024-08-21 NOTE — CARE COORDINATION
Transition of Care Plan:      IPR - [facility]; auth received/DC today  RN to call report to [phone]    Transport: BLS     RUR: 16%  Prior Level of Functioning: Independent  Disposition: IPR  If SNF or IPR: Date FOC offered: 8/16  Date FOC received: 8/16  Accepting facility: [facility]  Date authorization started with reference number: 8/19  Date authorization received and expires: 8/21  Follow up appointments: PCP  DME needed: None  Transportation at discharge: Family  IM/IMM Medicare/ letter given: 8/8  Caregiver Contact: Justino Burgess (Child)  [phone] (Home Phone)   Discharge Caregiver contacted prior to discharge?   Care Conference needed? No  Barriers to discharge: None    Pt has received insurance auth for IPR at [facility]. They will have a bed after 2pm today. [facility] requested for 2pm.    Pt and family in agreement with discharge plan.    DONELL Limon (Ally).

## 2024-08-22 NOTE — PROGRESS NOTES
Physician Progress Note      PATIENT:               ARLETTE KELLY  [facility] #:                  [id number]  :                       1934  ADMIT DATE:       2024 12:09 PM  DISCH DATE:        2024 2:57 PM  RESPONDING  PROVIDER #:        Maxi Lo MD        QUERY TEXT:    Stage of Chronic Kidney Disease: Please provide further specificity, if known.    Clinical indicators include: chronic kidney disease  Options provided:  -- Chronic kidney disease stage 1  -- Chronic kidney disease stage 2  -- Chronic kidney disease stage 3  -- Chronic kidney disease stage 3a  -- Chronic kidney disease stage 3b  -- Chronic kidney disease stage 4  -- Chronic kidney disease stage 5  -- Chronic kidney disease stage 5, requiring dialysis  -- End stage renal disease  -- Other - I will add my own diagnosis  -- Disagree - Not applicable / Not valid  -- Disagree - Clinically Unable to determine / Unknown        PROVIDER RESPONSE TEXT:    Provider dismissed this query because it was not applicable to the patient or   not a valid query.  CKD ruled out      Electronically signed by:  Maxi Lo MD 2024 4:28 PM

## 2024-08-26 ENCOUNTER — TELEPHONE (OUTPATIENT)
Age: 89
End: 2024-08-26

## 2024-08-26 NOTE — TELEPHONE ENCOUNTER
Spoke with Edilma from , gave verbal orders from Freda Tolentino MD for below request for        9/5/2024  1:45 PM Freda Tolentino MD STEPHON

## 2024-08-31 ENCOUNTER — APPOINTMENT (OUTPATIENT)
Facility: HOSPITAL | Age: 89
End: 2024-08-31
Payer: MEDICARE

## 2024-08-31 ENCOUNTER — HOSPITAL ENCOUNTER (EMERGENCY)
Facility: HOSPITAL | Age: 89
Discharge: HOME | End: 2024-08-31
Attending: STUDENT IN AN ORGANIZED HEALTH CARE EDUCATION/TRAINING PROGRAM
Payer: MEDICARE

## 2024-08-31 DIAGNOSIS — R06.02 SHORTNESS OF BREATH: Primary | ICD-10-CM

## 2024-08-31 DIAGNOSIS — F41.1 ANXIETY STATE: ICD-10-CM

## 2024-08-31 LAB
ALBUMIN SERPL-MCNC: 3.5 G/DL (ref 3.5–5)
ALBUMIN/GLOB SERPL: 1 (ref 1.1–2.2)
ALP SERPL-CCNC: 151 U/L (ref 45–117)
ALT SERPL-CCNC: 55 U/L (ref 12–78)
ANION GAP SERPL CALC-SCNC: 4 MMOL/L (ref 5–15)
AST SERPL-CCNC: 40 U/L (ref 15–37)
BASOPHILS # BLD: 0 K/UL (ref 0–0.1)
BASOPHILS NFR BLD: 0 % (ref 0–1)
BILIRUB SERPL-MCNC: 0.3 MG/DL (ref 0.2–1)
BUN SERPL-MCNC: 16 MG/DL (ref 6–20)
BUN/CREAT SERPL: 16 (ref 12–20)
CALCIUM SERPL-MCNC: 9.1 MG/DL (ref 8.5–10.1)
CHLORIDE SERPL-SCNC: 105 MMOL/L (ref 97–108)
CO2 SERPL-SCNC: 30 MMOL/L (ref 21–32)
COMMENT:: NORMAL
CREAT SERPL-MCNC: 1.01 MG/DL (ref 0.55–1.02)
DIFFERENTIAL METHOD BLD: ABNORMAL
EOSINOPHIL # BLD: 0.2 K/UL (ref 0–0.4)
EOSINOPHIL NFR BLD: 4 % (ref 0–7)
ERYTHROCYTE [DISTWIDTH] IN BLOOD BY AUTOMATED COUNT: 14.6 % (ref 11.5–14.5)
FLUAV RNA SPEC QL NAA+PROBE: NOT DETECTED
FLUBV RNA SPEC QL NAA+PROBE: NOT DETECTED
GLOBULIN SER CALC-MCNC: 3.6 G/DL (ref 2–4)
GLUCOSE SERPL-MCNC: 167 MG/DL (ref 65–100)
HCT VFR BLD AUTO: 30.7 % (ref 35–47)
HGB BLD-MCNC: 9.9 G/DL (ref 11.5–16)
IMM GRANULOCYTES # BLD AUTO: 0 K/UL (ref 0–0.04)
IMM GRANULOCYTES NFR BLD AUTO: 0 % (ref 0–0.5)
LYMPHOCYTES # BLD: 1.1 K/UL (ref 0.8–3.5)
LYMPHOCYTES NFR BLD: 24 % (ref 12–49)
MAGNESIUM SERPL-MCNC: 2.2 MG/DL (ref 1.6–2.4)
MCH RBC QN AUTO: 28.4 PG (ref 26–34)
MCHC RBC AUTO-ENTMCNC: 32.2 G/DL (ref 30–36.5)
MCV RBC AUTO: 88 FL (ref 80–99)
MONOCYTES # BLD: 0.4 K/UL (ref 0–1)
MONOCYTES NFR BLD: 8 % (ref 5–13)
NEUTS SEG # BLD: 2.9 K/UL (ref 1.8–8)
NEUTS SEG NFR BLD: 64 % (ref 32–75)
NRBC # BLD: 0 K/UL (ref 0–0.01)
NRBC BLD-RTO: 0 PER 100 WBC
NT PRO BNP: 96 PG/ML
PLATELET # BLD AUTO: 205 K/UL (ref 150–400)
PMV BLD AUTO: 9.5 FL (ref 8.9–12.9)
POTASSIUM SERPL-SCNC: 3.9 MMOL/L (ref 3.5–5.1)
PROT SERPL-MCNC: 7.1 G/DL (ref 6.4–8.2)
RBC # BLD AUTO: 3.49 M/UL (ref 3.8–5.2)
SARS-COV-2 RNA RESP QL NAA+PROBE: NOT DETECTED
SODIUM SERPL-SCNC: 139 MMOL/L (ref 136–145)
SOURCE: NORMAL
SPECIMEN HOLD: NORMAL
TROPONIN I SERPL HS-MCNC: 8 NG/L (ref 0–51)
WBC # BLD AUTO: 4.7 K/UL (ref 3.6–11)

## 2024-08-31 PROCEDURE — 80053 COMPREHEN METABOLIC PANEL: CPT

## 2024-08-31 PROCEDURE — 83880 ASSAY OF NATRIURETIC PEPTIDE: CPT

## 2024-08-31 PROCEDURE — 71045 X-RAY EXAM CHEST 1 VIEW: CPT

## 2024-08-31 PROCEDURE — 84484 ASSAY OF TROPONIN QUANT: CPT

## 2024-08-31 PROCEDURE — 87636 SARSCOV2 & INF A&B AMP PRB: CPT

## 2024-08-31 PROCEDURE — 85025 COMPLETE CBC W/AUTO DIFF WBC: CPT

## 2024-08-31 PROCEDURE — 99285 EMERGENCY DEPT VISIT HI MDM: CPT

## 2024-08-31 PROCEDURE — 93005 ELECTROCARDIOGRAM TRACING: CPT | Performed by: STUDENT IN AN ORGANIZED HEALTH CARE EDUCATION/TRAINING PROGRAM

## 2024-08-31 PROCEDURE — 36415 COLL VENOUS BLD VENIPUNCTURE: CPT

## 2024-08-31 PROCEDURE — 6360000004 HC RX CONTRAST MEDICATION: Performed by: RADIOLOGY

## 2024-08-31 PROCEDURE — 71275 CT ANGIOGRAPHY CHEST: CPT

## 2024-08-31 PROCEDURE — 6370000000 HC RX 637 (ALT 250 FOR IP): Performed by: STUDENT IN AN ORGANIZED HEALTH CARE EDUCATION/TRAINING PROGRAM

## 2024-08-31 PROCEDURE — 83735 ASSAY OF MAGNESIUM: CPT

## 2024-08-31 RX ORDER — ACETAMINOPHEN 500 MG
1000 TABLET ORAL ONCE
Status: COMPLETED | OUTPATIENT
Start: 2024-08-31 | End: 2024-08-31

## 2024-08-31 RX ORDER — IOPAMIDOL 755 MG/ML
100 INJECTION, SOLUTION INTRAVASCULAR
Status: COMPLETED | OUTPATIENT
Start: 2024-08-31 | End: 2024-08-31

## 2024-08-31 RX ADMIN — ACETAMINOPHEN 1000 MG: 500 TABLET ORAL at 22:30

## 2024-08-31 RX ADMIN — IOPAMIDOL 80 ML: 755 INJECTION, SOLUTION INTRAVENOUS at 22:23

## 2024-08-31 ASSESSMENT — PAIN - FUNCTIONAL ASSESSMENT
PAIN_FUNCTIONAL_ASSESSMENT: NONE - DENIES PAIN
PAIN_FUNCTIONAL_ASSESSMENT: ACTIVITIES ARE NOT PREVENTED
PAIN_FUNCTIONAL_ASSESSMENT: 0-10
PAIN_FUNCTIONAL_ASSESSMENT: ACTIVITIES ARE NOT PREVENTED

## 2024-08-31 ASSESSMENT — PAIN DESCRIPTION - ORIENTATION
ORIENTATION: RIGHT;LEFT
ORIENTATION: RIGHT;LEFT

## 2024-08-31 ASSESSMENT — PAIN DESCRIPTION - DESCRIPTORS
DESCRIPTORS: ACHING;DISCOMFORT
DESCRIPTORS: ACHING

## 2024-08-31 ASSESSMENT — PAIN SCALES - GENERAL
PAINLEVEL_OUTOF10: 0
PAINLEVEL_OUTOF10: 5
PAINLEVEL_OUTOF10: 5

## 2024-08-31 ASSESSMENT — PAIN DESCRIPTION - LOCATION
LOCATION: HEAD
LOCATION: HEAD

## 2024-08-31 ASSESSMENT — PAIN DESCRIPTION - FREQUENCY: FREQUENCY: CONTINUOUS

## 2024-08-31 ASSESSMENT — PAIN DESCRIPTION - PAIN TYPE
TYPE: ACUTE PAIN
TYPE: ACUTE PAIN

## 2024-08-31 ASSESSMENT — PAIN DESCRIPTION - ONSET: ONSET: SUDDEN

## 2024-09-01 VITALS
BODY MASS INDEX: 31.08 KG/M2 | HEIGHT: 60 IN | WEIGHT: 158.29 LBS | RESPIRATION RATE: 21 BRPM | OXYGEN SATURATION: 96 % | SYSTOLIC BLOOD PRESSURE: 151 MMHG | HEART RATE: 76 BPM | DIASTOLIC BLOOD PRESSURE: 62 MMHG | TEMPERATURE: 98.5 F

## 2024-09-01 LAB
EKG ATRIAL RATE: 79 BPM
EKG DIAGNOSIS: NORMAL
EKG P AXIS: 63 DEGREES
EKG P-R INTERVAL: 160 MS
EKG Q-T INTERVAL: 400 MS
EKG QRS DURATION: 120 MS
EKG QTC CALCULATION (BAZETT): 458 MS
EKG R AXIS: -68 DEGREES
EKG T AXIS: 38 DEGREES
EKG VENTRICULAR RATE: 79 BPM

## 2024-09-01 PROCEDURE — 93010 ELECTROCARDIOGRAM REPORT: CPT | Performed by: INTERNAL MEDICINE

## 2024-09-01 NOTE — ED TRIAGE NOTES
Patient arrive by EMS for cc SOB and headache. Patient state SOB started around 2-3pm. SpO2 92% RA. Per EMS - .    Denies CP, N/V/D    Hx DM and HTN

## 2024-09-01 NOTE — ED PROVIDER NOTES
Vital Sign     Unstable Housing in the Last Year: No       PHYSICAL EXAM    (up to 7 for level 4, 8 or more for level 5)     ED Triage Vitals [08/31/24 2025]   BP Systolic BP Percentile Diastolic BP Percentile Temp Temp Source Pulse Respirations SpO2   137/67 -- -- 99.3 °F (37.4 °C) Oral 79 16 92 %      Height Weight - Scale         1.524 m (5') 71.8 kg (158 lb 4.6 oz)             Body mass index is 30.91 kg/m².    Physical Exam    See [facility]    DIAGNOSTIC RESULTS     EKG: All EKG's are interpreted by the Emergency Department Physician who either signs or Co-signs this chart in the absence of a cardiologist.        RADIOLOGY:   Non-plain film images such as CT, Ultrasound and MRI are read by the radiologist.    Interpretation per the Radiologist below, if available at the time of this note:    XR CHEST PORTABLE   Final Result      No acute process on portable chest.         Electronically signed by Coreen Moreland      CTA CHEST W WO CONTRAST    (Results Pending)        LABS:  Labs Reviewed   CBC WITH AUTO DIFFERENTIAL - Abnormal; Notable for the following components:       Result Value    RBC 3.49 (*)     Hemoglobin 9.9 (*)     Hematocrit 30.7 (*)     RDW 14.6 (*)     All other components within normal limits   COMPREHENSIVE METABOLIC PANEL - Abnormal; Notable for the following components:    Anion Gap 4 (*)     Glucose 167 (*)     Est, Glom Filt Rate 53 (*)     AST 40 (*)     Alk Phosphatase 151 (*)     Albumin/Globulin Ratio 1.0 (*)     All other components within normal limits   COVID-19 & INFLUENZA COMBO   BRAIN NATRIURETIC PEPTIDE   TROPONIN   MAGNESIUM   EXTRA TUBES HOLD       All other labs were within normal range or not returned as of this dictation.        PROCEDURES:  Unless otherwise noted below, none     Procedures    See MDM for documentation of critical care time.       FINAL IMPRESSION    No diagnosis found.      DISPOSITION/PLAN   DISPOSITION        PATIENT REFERRED TO:  No follow-up provider

## 2024-09-01 NOTE — ED NOTES
Pt, along with family, given DC instructions and verbalized understanding. No further questions. Pt left with family via wheelchair.

## 2024-09-01 NOTE — DISCHARGE INSTR - COC
Continuity of Care Form    Patient Name: Ammy Tarango   :  1934  MRN:  [id number]    Admit date:  2024  Discharge date:  ***    Code Status Order: Prior   Advance Directives:   Advance Care Flowsheet Documentation             Admitting Physician:  No admitting provider for patient encounter.  PCP: Freda Tolentino MD    Discharging Nurse: ***  Discharging Hospital Unit/Room#: ER11/11  Discharging Unit Phone Number: ***    Emergency Contact:   Extended Emergency Contact Information  Primary Emergency Contact: Justino Burgess   [facility]  Home Phone: [phone]  Relation: Child    Past Surgical History:  Past Surgical History:   Procedure Laterality Date    CHOLECYSTECTOMY      COLONOSCOPY N/A 2017    COLONOSCOPY performed by Nikolay Oglesby MD at [facility] ENDOSCOPY    GYN      tubal ligation    HYSTERECTOMY (CERVIX STATUS UNKNOWN)         Immunization History:   Immunization History   Administered Date(s) Administered    COVID-19, MODERNA BLUE border, Primary or Immunocompromised, (age 12y+), IM, 100 mcg/0.5mL 02/15/2021, 03/15/2021, 11/15/2021    Influenza Trivalent 2016    Influenza Virus Vaccine 10/19/2013    Influenza, AFLURIA, FLUZONE, (age4 y+), IM, Trivalent MDV, 0.5mL 2016    Influenza, FLUAD, (age 65 y+), IM, Quadv, 0.5mL 10/05/2020, 10/21/2021, 2023    Influenza, FLUAD, (age 65 y+), IM, Trivalent PF, 0.5mL 10/10/2018, 10/09/2019    Influenza, FLUZONE High Dose, (age 65 y+), IM, Trivalent PF, 0.5mL 10/28/2014, 10/08/2015, 10/04/2017, 10/21/2021, 2022    Pneumococcal Vaccine 10/01/2008    Pneumococcal, PCV-13, PREVNAR 13, (age 6w+), IM, 0.5mL 2016    Pneumococcal, PPSV23, PNEUMOVAX 23, (age 2y+), SC/IM, 0.5mL 2020, 2022    TDaP, ADACEL (age 10y-64y), BOOSTRIX (age 10y+), IM, 0.5mL 2018    Zoster Recombinant (Shingrix) 2022       Active Problems:  Patient Active Problem List   Diagnosis Code    Advance directive discussed with

## 2024-09-05 ENCOUNTER — OFFICE VISIT (OUTPATIENT)
Age: 89
End: 2024-09-05
Payer: MEDICARE

## 2024-09-05 VITALS
RESPIRATION RATE: 16 BRPM | OXYGEN SATURATION: 98 % | WEIGHT: 146 LBS | BODY MASS INDEX: 28.66 KG/M2 | HEIGHT: 60 IN | SYSTOLIC BLOOD PRESSURE: 110 MMHG | HEART RATE: 74 BPM | TEMPERATURE: 98.8 F | DIASTOLIC BLOOD PRESSURE: 76 MMHG

## 2024-09-05 DIAGNOSIS — E78.2 MIXED HYPERLIPIDEMIA: ICD-10-CM

## 2024-09-05 DIAGNOSIS — R68.89 COLD FEELING: ICD-10-CM

## 2024-09-05 DIAGNOSIS — I10 ESSENTIAL HYPERTENSION WITH GOAL BLOOD PRESSURE LESS THAN 140/90: ICD-10-CM

## 2024-09-05 DIAGNOSIS — S06.5XAA SUBDURAL HEMATOMA (HCC): ICD-10-CM

## 2024-09-05 DIAGNOSIS — G47.09 OTHER INSOMNIA: ICD-10-CM

## 2024-09-05 DIAGNOSIS — E11.9 CONTROLLED TYPE 2 DIABETES MELLITUS WITHOUT COMPLICATION, UNSPECIFIED WHETHER LONG TERM INSULIN USE (HCC): Primary | ICD-10-CM

## 2024-09-05 PROCEDURE — G8417 CALC BMI ABV UP PARAM F/U: HCPCS | Performed by: INTERNAL MEDICINE

## 2024-09-05 PROCEDURE — 99214 OFFICE O/P EST MOD 30 MIN: CPT | Performed by: INTERNAL MEDICINE

## 2024-09-05 PROCEDURE — 1111F DSCHRG MED/CURRENT MED MERGE: CPT | Performed by: INTERNAL MEDICINE

## 2024-09-05 PROCEDURE — 1090F PRES/ABSN URINE INCON ASSESS: CPT | Performed by: INTERNAL MEDICINE

## 2024-09-05 PROCEDURE — 3044F HG A1C LEVEL LT 7.0%: CPT | Performed by: INTERNAL MEDICINE

## 2024-09-05 PROCEDURE — 1123F ACP DISCUSS/DSCN MKR DOCD: CPT | Performed by: INTERNAL MEDICINE

## 2024-09-05 PROCEDURE — G8427 DOCREV CUR MEDS BY ELIG CLIN: HCPCS | Performed by: INTERNAL MEDICINE

## 2024-09-05 PROCEDURE — 1036F TOBACCO NON-USER: CPT | Performed by: INTERNAL MEDICINE

## 2024-09-05 RX ORDER — AMLODIPINE BESYLATE 10 MG/1
TABLET ORAL
COMMUNITY
Start: 2024-08-29

## 2024-09-05 RX ORDER — OXYCODONE HYDROCHLORIDE 5 MG/1
TABLET ORAL
COMMUNITY
Start: 2024-08-31 | End: 2024-09-05

## 2024-09-05 RX ORDER — TAMSULOSIN HYDROCHLORIDE 0.4 MG/1
CAPSULE ORAL
COMMUNITY
Start: 2024-08-29

## 2024-09-05 RX ORDER — CHLORTHALIDONE 25 MG/1
TABLET ORAL
COMMUNITY
Start: 2024-08-29

## 2024-09-05 RX ORDER — MIRTAZAPINE 15 MG/1
TABLET, FILM COATED ORAL
COMMUNITY
Start: 2024-08-29

## 2024-09-05 ASSESSMENT — ENCOUNTER SYMPTOMS
BACK PAIN: 1
RESPIRATORY NEGATIVE: 1
GASTROINTESTINAL NEGATIVE: 1
EYES NEGATIVE: 1

## 2024-09-05 NOTE — PROGRESS NOTES
limit.  Psychiatric:         Mood and Affect: Mood and affect normal.       Assessment and plan:         Ammy was seen today for follow-up from hospital, hypertension, diabetes, subdural hematoma and fall.    Diagnoses and all orders for this visit:    Controlled type 2 diabetes mellitus without complication, unspecified whether long term insulin use (HCC)  -     Comprehensive Metabolic Panel  -     Hemoglobin A1C    Essential hypertension with goal blood pressure less than 140/90  -     Comprehensive Metabolic Panel    Mixed hyperlipidemia  -     Comprehensive Metabolic Panel    Subdural hematoma (HCC)    Cold feeling  Her blood pressure improved but now blood pressure is elevated.  Probably continue to feel cold.  Advised him to use oxygen full-time\".  Other insomnia  Since patient had recent fall and subdural hematoma.  Please advise except melatonin.  She may take 5 mg of melatonin at night      Assessment & Plan  1. Subdural Hematoma.  She experienced a fall at home, resulting in a right-sided subdural hematoma. No surgery was required. A CT head confirmed the hematoma. She was hospitalized for 3 weeks due to unstable blood pressure and was subsequently discharged with multiple medications.    2. Hypertension.  Her blood pressure was unstable during her hospital stay. She is currently on lisinopril 40 mg, clonidine patch (changed weekly), hydralazine 100 mg three times a day, metoprolol 150 mg twice a day, and spironolactone 50 mg. Blood pressure is now well-controlled. She should continue her current medication regimen.    3. Diabetes Mellitus.  She is currently on Farxiga for diabetes management. Metformin and glipizide were previously prescribed but are currently on hold pending lab results. An A1c test will be conducted today to determine the need for additional diabetes medications.    4. Shortness of Breath.  She experienced shortness of breath, which was attributed to anxiety and partial small bowel

## 2024-09-06 DIAGNOSIS — E11.21 TYPE 2 DIABETES MELLITUS WITH NEPHROPATHY (HCC): Primary | ICD-10-CM

## 2024-09-06 LAB
ALBUMIN SERPL-MCNC: 4.1 G/DL (ref 3.6–4.6)
ALP SERPL-CCNC: 113 IU/L (ref 44–121)
ALT SERPL-CCNC: 18 IU/L (ref 0–32)
AST SERPL-CCNC: 15 IU/L (ref 0–40)
BILIRUB SERPL-MCNC: 0.2 MG/DL (ref 0–1.2)
BUN SERPL-MCNC: 17 MG/DL (ref 10–36)
BUN/CREAT SERPL: 12 (ref 12–28)
CALCIUM SERPL-MCNC: 9.8 MG/DL (ref 8.7–10.3)
CHLORIDE SERPL-SCNC: 97 MMOL/L (ref 96–106)
CO2 SERPL-SCNC: 28 MMOL/L (ref 20–29)
CREAT SERPL-MCNC: 1.4 MG/DL (ref 0.57–1)
EGFRCR SERPLBLD CKD-EPI 2021: 36 ML/MIN/1.73
GLOBULIN SER CALC-MCNC: 2.9 G/DL (ref 1.5–4.5)
GLUCOSE SERPL-MCNC: 214 MG/DL (ref 70–99)
HBA1C MFR BLD: 7.1 % (ref 4.8–5.6)
Lab: NORMAL
POTASSIUM SERPL-SCNC: 4.7 MMOL/L (ref 3.5–5.2)
PROT SERPL-MCNC: 7 G/DL (ref 6–8.5)
REPORT: NORMAL
SODIUM SERPL-SCNC: 139 MMOL/L (ref 134–144)

## 2024-09-10 ENCOUNTER — OFFICE VISIT (OUTPATIENT)
Age: 89
End: 2024-09-10
Payer: MEDICARE

## 2024-09-10 VITALS
WEIGHT: 145 LBS | SYSTOLIC BLOOD PRESSURE: 142 MMHG | HEART RATE: 72 BPM | BODY MASS INDEX: 28.47 KG/M2 | HEIGHT: 60 IN | OXYGEN SATURATION: 95 % | DIASTOLIC BLOOD PRESSURE: 70 MMHG

## 2024-09-10 DIAGNOSIS — I10 ESSENTIAL HYPERTENSION WITH GOAL BLOOD PRESSURE LESS THAN 140/90: Primary | ICD-10-CM

## 2024-09-10 DIAGNOSIS — I87.2 VENOUS INSUFFICIENCY (CHRONIC) (PERIPHERAL): ICD-10-CM

## 2024-09-10 DIAGNOSIS — E11.8 DM TYPE 2, CONTROLLED, WITH COMPLICATION (HCC): ICD-10-CM

## 2024-09-10 PROCEDURE — 99214 OFFICE O/P EST MOD 30 MIN: CPT | Performed by: INTERNAL MEDICINE

## 2024-09-10 ASSESSMENT — PATIENT HEALTH QUESTIONNAIRE - PHQ9
SUM OF ALL RESPONSES TO PHQ QUESTIONS 1-9: 0
1. LITTLE INTEREST OR PLEASURE IN DOING THINGS: NOT AT ALL
2. FEELING DOWN, DEPRESSED OR HOPELESS: NOT AT ALL
SUM OF ALL RESPONSES TO PHQ9 QUESTIONS 1 & 2: 0
SUM OF ALL RESPONSES TO PHQ QUESTIONS 1-9: 0

## 2024-09-13 RX ORDER — OMEPRAZOLE 40 MG/1
CAPSULE, DELAYED RELEASE ORAL
Qty: 90 CAPSULE | Refills: 0 | Status: SHIPPED | OUTPATIENT
Start: 2024-09-13

## 2024-09-17 ENCOUNTER — TELEPHONE (OUTPATIENT)
Age: 89
End: 2024-09-17

## 2024-09-17 DIAGNOSIS — F41.9 ANXIETY: Primary | ICD-10-CM

## 2024-09-17 DIAGNOSIS — F51.01 PRIMARY INSOMNIA: ICD-10-CM

## 2024-09-17 RX ORDER — HYDROXYZINE HYDROCHLORIDE 10 MG/1
10 TABLET, FILM COATED ORAL NIGHTLY PRN
Qty: 30 TABLET | Refills: 0 | Status: SHIPPED | OUTPATIENT
Start: 2024-09-17 | End: 2024-10-17

## 2024-09-24 DIAGNOSIS — F51.01 PRIMARY INSOMNIA: ICD-10-CM

## 2024-09-24 DIAGNOSIS — I10 ESSENTIAL HYPERTENSION WITH GOAL BLOOD PRESSURE LESS THAN 140/90: Primary | ICD-10-CM

## 2024-09-24 DIAGNOSIS — E11.21 TYPE 2 DIABETES MELLITUS WITH DIABETIC NEPHROPATHY, WITHOUT LONG-TERM CURRENT USE OF INSULIN (HCC): ICD-10-CM

## 2024-09-24 DIAGNOSIS — T78.40XS ALLERGY, SEQUELA: ICD-10-CM

## 2024-09-24 RX ORDER — DAPAGLIFLOZIN 10 MG/1
10 TABLET, FILM COATED ORAL DAILY
Qty: 90 TABLET | Refills: 1 | Status: SHIPPED | OUTPATIENT
Start: 2024-09-24

## 2024-09-24 RX ORDER — MIRTAZAPINE 15 MG/1
15 TABLET, FILM COATED ORAL NIGHTLY
Qty: 90 TABLET | Refills: 1 | Status: SHIPPED | OUTPATIENT
Start: 2024-09-24

## 2024-09-24 RX ORDER — AMLODIPINE BESYLATE 10 MG/1
10 TABLET ORAL DAILY
Qty: 90 TABLET | Refills: 1 | Status: SHIPPED | OUTPATIENT
Start: 2024-09-24

## 2024-09-24 RX ORDER — CHLORTHALIDONE 25 MG/1
25 TABLET ORAL DAILY
Qty: 90 TABLET | Refills: 1 | Status: SHIPPED | OUTPATIENT
Start: 2024-09-24

## 2024-09-24 RX ORDER — CLONIDINE 0.3 MG/24H
1 PATCH, EXTENDED RELEASE TRANSDERMAL WEEKLY
Qty: 4 PATCH | Refills: 0 | Status: SHIPPED | OUTPATIENT
Start: 2024-09-24 | End: 2024-10-24

## 2024-09-24 RX ORDER — TAMSULOSIN HYDROCHLORIDE 0.4 MG/1
0.4 CAPSULE ORAL DAILY
Qty: 90 CAPSULE | Refills: 1 | Status: SHIPPED | OUTPATIENT
Start: 2024-09-24

## 2024-09-24 RX ORDER — LORATADINE 10 MG/1
10 TABLET ORAL
Qty: 90 TABLET | Refills: 1 | Status: SHIPPED | OUTPATIENT
Start: 2024-09-24

## 2024-10-06 DIAGNOSIS — E11.21 TYPE 2 DIABETES MELLITUS WITH NEPHROPATHY (HCC): ICD-10-CM

## 2024-10-17 DIAGNOSIS — E78.00 HYPERCHOLESTEROLEMIA: ICD-10-CM

## 2024-10-17 NOTE — TELEPHONE ENCOUNTER
LOV: 09/05/2024  NOV: 11/18/2024    Medication: atorvastatin (LIPITOR) 10 MG tablet   Quantity: ?    Pharmacy: [facility] - [place], [place]  [address] [facility] -  [phone] [facility] [phone] [[id number]]

## 2024-10-18 ENCOUNTER — TELEPHONE (OUTPATIENT)
Age: 89
End: 2024-10-18

## 2024-10-18 DIAGNOSIS — I10 ESSENTIAL HYPERTENSION WITH GOAL BLOOD PRESSURE LESS THAN 140/90: ICD-10-CM

## 2024-10-18 RX ORDER — ATORVASTATIN CALCIUM 10 MG/1
10 TABLET, FILM COATED ORAL DAILY
Qty: 30 TABLET | Refills: 2 | Status: SHIPPED | OUTPATIENT
Start: 2024-10-18

## 2024-10-18 RX ORDER — CLONIDINE 0.3 MG/24H
1 PATCH, EXTENDED RELEASE TRANSDERMAL WEEKLY
Qty: 4 PATCH | Refills: 0 | Status: SHIPPED | OUTPATIENT
Start: 2024-10-18 | End: 2024-11-17

## 2024-10-18 NOTE — TELEPHONE ENCOUNTER
[facility]  [place] [place] - [address] [facility] - P [phone] - F [phone]     cloNIDine (CATAPRES) 0.3 MG/24HR PTWK     09/05/2024 11/18/2024

## 2024-10-28 ENCOUNTER — TELEPHONE (OUTPATIENT)
Age: 89
End: 2024-10-28

## 2024-10-28 NOTE — TELEPHONE ENCOUNTER
Patient called to schedule an appointment because she was not feeling well. She stated that she was only able to come in on 10/29/2024 between 9 and 10 with  only. I explained to the patient that  did not have an appointment for tomorrow and that if something comes available I would give her a call.

## 2024-11-15 ENCOUNTER — TELEPHONE (OUTPATIENT)
Age: 89
End: 2024-11-15

## 2024-11-18 ENCOUNTER — OFFICE VISIT (OUTPATIENT)
Age: 89
End: 2024-11-18
Payer: MEDICARE

## 2024-11-18 VITALS
HEART RATE: 68 BPM | HEIGHT: 60 IN | WEIGHT: 153.2 LBS | SYSTOLIC BLOOD PRESSURE: 108 MMHG | BODY MASS INDEX: 30.08 KG/M2 | RESPIRATION RATE: 16 BRPM | TEMPERATURE: 97.7 F | OXYGEN SATURATION: 98 % | DIASTOLIC BLOOD PRESSURE: 66 MMHG

## 2024-11-18 DIAGNOSIS — N28.9 RENAL INSUFFICIENCY: Primary | ICD-10-CM

## 2024-11-18 DIAGNOSIS — I73.9 PERIPHERAL VASCULAR DISEASE (HCC): ICD-10-CM

## 2024-11-18 DIAGNOSIS — E11.21 TYPE 2 DIABETES MELLITUS WITH DIABETIC NEPHROPATHY, WITHOUT LONG-TERM CURRENT USE OF INSULIN (HCC): ICD-10-CM

## 2024-11-18 DIAGNOSIS — N18.32 CHRONIC KIDNEY DISEASE, STAGE 3B (HCC): ICD-10-CM

## 2024-11-18 DIAGNOSIS — I10 ESSENTIAL HYPERTENSION WITH GOAL BLOOD PRESSURE LESS THAN 140/90: ICD-10-CM

## 2024-11-18 DIAGNOSIS — D64.9 ANEMIA, UNSPECIFIED TYPE: ICD-10-CM

## 2024-11-18 DIAGNOSIS — Z23 NEED FOR VACCINATION: ICD-10-CM

## 2024-11-18 PROCEDURE — 99214 OFFICE O/P EST MOD 30 MIN: CPT | Performed by: INTERNAL MEDICINE

## 2024-11-18 PROCEDURE — 90653 IIV ADJUVANT VACCINE IM: CPT | Performed by: INTERNAL MEDICINE

## 2024-11-18 PROCEDURE — G8482 FLU IMMUNIZE ORDER/ADMIN: HCPCS | Performed by: INTERNAL MEDICINE

## 2024-11-18 PROCEDURE — 1159F MED LIST DOCD IN RCRD: CPT | Performed by: INTERNAL MEDICINE

## 2024-11-18 PROCEDURE — 1123F ACP DISCUSS/DSCN MKR DOCD: CPT | Performed by: INTERNAL MEDICINE

## 2024-11-18 PROCEDURE — G8427 DOCREV CUR MEDS BY ELIG CLIN: HCPCS | Performed by: INTERNAL MEDICINE

## 2024-11-18 PROCEDURE — 1036F TOBACCO NON-USER: CPT | Performed by: INTERNAL MEDICINE

## 2024-11-18 PROCEDURE — G8417 CALC BMI ABV UP PARAM F/U: HCPCS | Performed by: INTERNAL MEDICINE

## 2024-11-18 PROCEDURE — 1090F PRES/ABSN URINE INCON ASSESS: CPT | Performed by: INTERNAL MEDICINE

## 2024-11-18 PROCEDURE — PBSHW INFLUENZA, FLUAD TRIVALENT, (AGE 65 Y+), IM, PRESERVATIVE FREE, 0.5ML: Performed by: INTERNAL MEDICINE

## 2024-11-18 PROCEDURE — 1160F RVW MEDS BY RX/DR IN RCRD: CPT | Performed by: INTERNAL MEDICINE

## 2024-11-18 PROCEDURE — 3051F HG A1C>EQUAL 7.0%<8.0%: CPT | Performed by: INTERNAL MEDICINE

## 2024-11-18 PROCEDURE — 1126F AMNT PAIN NOTED NONE PRSNT: CPT | Performed by: INTERNAL MEDICINE

## 2024-11-18 ASSESSMENT — ENCOUNTER SYMPTOMS
EYES NEGATIVE: 1
GASTROINTESTINAL NEGATIVE: 1
RESPIRATORY NEGATIVE: 1

## 2024-11-18 NOTE — PROGRESS NOTES
Chief Complaint   Patient presents with    Peripheral vascular disease    Cholesterol Problem    Hypertension    Follow-up Chronic Condition    Diabetes    Chronic Kidney Disease           History of Present Illness  The patient presents for evaluation of multiple medical concerns.    Medication Regimen  She is currently on a regimen of metoprolol, taking two tablets twice daily.    Foot Swelling  She reports persistent swelling in her foot, which she attributes to prolonged sitting.  - Onset: Persistent.  - Location: Foot.  - Character: Swelling.  - Alleviating/Aggravating Factors: Attributed to prolonged sitting.  Lab work reviewed, had low hemoglobin and also had elevated creatinine.  She denies any presence of blood in her stool.  Annual lab work.    Diabetes seems stable.  Compliant with medication.    Past Medical History:   Diagnosis Date    Adverse effect of anesthesia     pt states she was able to feel everything during colonoscopy    Chronic bronchitis (HCC)     Diabetes (HCC)     Essential hypertension     Hypercholesterolemia     Hypertension      Review of Systems   Constitutional: Negative.    HENT: Negative.     Eyes: Negative.    Respiratory: Negative.     Cardiovascular:  Positive for leg swelling.   Gastrointestinal: Negative.    Endocrine: Negative.    Genitourinary: Negative.    Musculoskeletal: Negative.    Skin: Negative.    Neurological: Negative.    Hematological: Negative.    Psychiatric/Behavioral: Negative.         Vitals:    11/18/24 0855   BP: 108/66   Pulse: 68   Resp: 16   Temp: 97.7 °F (36.5 °C)   SpO2: 98%     Physical Exam  Vital Signs  Vitals show a blood pressure of 108/66, pulse rate of 68, and oxygen level at 98 percent. Weight is recorded at 153 pounds.      Physical Exam  Vitals and nursing note reviewed.   Constitutional:       General: She is not in acute distress.     Appearance: Normal appearance.well-developed,not diaphoretic.   HENT:       Neck: Supple, no JVP or carotid

## 2024-11-18 NOTE — PROGRESS NOTES
Chief Complaint   Patient presents with    Peripheral vascular disease    Cholesterol Problem    Hypertension    Follow-up Chronic Condition    Diabetes    Chronic Kidney Disease     \"Have you been to the ER, urgent care clinic since your last visit?  Hospitalized since your last visit?\"    NO    “Have you seen or consulted any other health care providers outside our system since your last visit?”    NO

## 2024-11-18 NOTE — PROGRESS NOTES
After obtaining consent, and per orders of Dr. Tolentino, injection of Flu vaccine given in Left arm by Leonard Saini MA.

## 2024-11-22 DIAGNOSIS — I10 ESSENTIAL HYPERTENSION WITH GOAL BLOOD PRESSURE LESS THAN 140/90: ICD-10-CM

## 2024-11-22 NOTE — TELEPHONE ENCOUNTER
Last appt 11/18/2024      Next Apt:     Future Appointments   Date Time Provider Department Center   3/17/2025 10:15 AM Freda Tolentino MD [facility]   3/18/2025  1:20 PM Alen Ovalles MD [facility] [place], [place] - [address] [facility] - P [phone] - F [phone] [address]  [facility] [address]  Phone: [phone] Fax: [phone]

## 2024-11-25 RX ORDER — CLONIDINE 0.3 MG/24H
1 PATCH, EXTENDED RELEASE TRANSDERMAL WEEKLY
Qty: 4 PATCH | Refills: 0 | Status: SHIPPED | OUTPATIENT
Start: 2024-11-25 | End: 2024-12-25

## 2024-12-09 ENCOUNTER — TELEPHONE (OUTPATIENT)
Age: 88
End: 2024-12-09

## 2024-12-09 RX ORDER — OMEPRAZOLE 40 MG/1
CAPSULE, DELAYED RELEASE ORAL
Qty: 90 CAPSULE | Refills: 0 | Status: SHIPPED | OUTPATIENT
Start: 2024-12-09

## 2024-12-09 NOTE — TELEPHONE ENCOUNTER
Pt is experiencing a real bad cold and would like something called in to Pharmacy    Pharmacy: [facility] - [place], [place] - [address] [facility] - P [phone] - F [phone] [[id number]]

## 2024-12-10 ENCOUNTER — OFFICE VISIT (OUTPATIENT)
Age: 88
End: 2024-12-10
Payer: MEDICARE

## 2024-12-10 VITALS
HEART RATE: 72 BPM | RESPIRATION RATE: 16 BRPM | SYSTOLIC BLOOD PRESSURE: 98 MMHG | WEIGHT: 150 LBS | DIASTOLIC BLOOD PRESSURE: 66 MMHG | BODY MASS INDEX: 29.45 KG/M2 | OXYGEN SATURATION: 97 % | TEMPERATURE: 98.3 F | HEIGHT: 60 IN

## 2024-12-10 DIAGNOSIS — J06.9 URTI (ACUTE UPPER RESPIRATORY INFECTION): Primary | ICD-10-CM

## 2024-12-10 DIAGNOSIS — I10 ESSENTIAL HYPERTENSION WITH GOAL BLOOD PRESSURE LESS THAN 140/90: ICD-10-CM

## 2024-12-10 DIAGNOSIS — E11.21 TYPE 2 DIABETES MELLITUS WITH DIABETIC NEPHROPATHY, WITHOUT LONG-TERM CURRENT USE OF INSULIN (HCC): ICD-10-CM

## 2024-12-10 PROCEDURE — G8427 DOCREV CUR MEDS BY ELIG CLIN: HCPCS | Performed by: INTERNAL MEDICINE

## 2024-12-10 PROCEDURE — 99213 OFFICE O/P EST LOW 20 MIN: CPT | Performed by: INTERNAL MEDICINE

## 2024-12-10 PROCEDURE — 1090F PRES/ABSN URINE INCON ASSESS: CPT | Performed by: INTERNAL MEDICINE

## 2024-12-10 PROCEDURE — 3051F HG A1C>EQUAL 7.0%<8.0%: CPT | Performed by: INTERNAL MEDICINE

## 2024-12-10 PROCEDURE — G8482 FLU IMMUNIZE ORDER/ADMIN: HCPCS | Performed by: INTERNAL MEDICINE

## 2024-12-10 PROCEDURE — 1036F TOBACCO NON-USER: CPT | Performed by: INTERNAL MEDICINE

## 2024-12-10 PROCEDURE — G8417 CALC BMI ABV UP PARAM F/U: HCPCS | Performed by: INTERNAL MEDICINE

## 2024-12-10 PROCEDURE — 1159F MED LIST DOCD IN RCRD: CPT | Performed by: INTERNAL MEDICINE

## 2024-12-10 PROCEDURE — 1123F ACP DISCUSS/DSCN MKR DOCD: CPT | Performed by: INTERNAL MEDICINE

## 2024-12-10 PROCEDURE — 1126F AMNT PAIN NOTED NONE PRSNT: CPT | Performed by: INTERNAL MEDICINE

## 2024-12-10 RX ORDER — DOXYCYCLINE HYCLATE 100 MG
100 TABLET ORAL 2 TIMES DAILY
Qty: 14 TABLET | Refills: 0 | Status: SHIPPED | OUTPATIENT
Start: 2024-12-10 | End: 2024-12-17

## 2024-12-10 ASSESSMENT — ENCOUNTER SYMPTOMS
WHEEZING: 1
GASTROINTESTINAL NEGATIVE: 1
COUGH: 1
EYES NEGATIVE: 1

## 2024-12-10 NOTE — PROGRESS NOTES
Chief Complaint   Patient presents with    Cold Symptoms     \"Have you been to the ER, urgent care clinic since your last visit?  Hospitalized since your last visit?\"    NO    “Have you seen or consulted any other health care providers outside our system since your last visit?”    NO

## 2024-12-10 NOTE — PROGRESS NOTES
Chief Complaint   Patient presents with    Cold Symptoms    Cough           History of Present Illness  The patient presents for evaluation of a cough.    Productive Cough  She has been experiencing a productive cough for approximately one week, with the expectorated sputum being dark green in color. She reports no associated sinus pressure or chest discomfort. Additionally, she reports no febrile episodes. This is her first day out of the house in over a week. She has been using cough drops to manage her symptoms and procured a cough syrup from the pharmacy yesterday.  - Onset: Approximately one week ago.  - Duration: One week.  - Character: Productive cough with dark green sputum.  - Alleviating Factors: Using cough drops and cough syrup.  - Timing: First day out of the house in over a week.    Past Medical History:   Diagnosis Date    Adverse effect of anesthesia     pt states she was able to feel everything during colonoscopy    Chronic bronchitis (HCC)     Diabetes (HCC)     Essential hypertension     Hypercholesterolemia     Hypertension      Review of Systems   Constitutional: Negative.    HENT:  Positive for congestion and postnasal drip.    Eyes: Negative.    Respiratory:  Positive for cough and wheezing.    Cardiovascular: Negative.    Gastrointestinal: Negative.    Endocrine: Negative.    Genitourinary: Negative.    All other systems reviewed and are negative.      Vitals:    12/10/24 1106   BP: 98/66   Pulse: 72   Resp: 16   Temp: 98.3 °F (36.8 °C)   SpO2: 97%     Physical Exam  Oral exam was performed. There is some drainage and nasal congestion.  Wheezing noted in the lungs.    Vital Signs  Temperature, blood pressure, and pulse are normal.      Physical Exam  Vitals and nursing note reviewed.   Constitutional:       General: She is not in acute distress.     Appearance: Normal appearance.well-developed,not diaphoretic.   HENT:       Neck: Supple, no JVP or carotid bruit. No cervical adenopathy.

## 2024-12-13 ENCOUNTER — TELEPHONE (OUTPATIENT)
Age: 88
End: 2024-12-13

## 2024-12-13 RX ORDER — METOPROLOL TARTRATE 75 MG/1
150 TABLET ORAL 2 TIMES DAILY
Qty: 180 TABLET | Refills: 0 | Status: SHIPPED | OUTPATIENT
Start: 2024-12-13 | End: 2025-03-13

## 2024-12-20 DIAGNOSIS — R09.89 LABILE HYPERTENSION: Primary | ICD-10-CM

## 2024-12-20 RX ORDER — HYDRALAZINE HYDROCHLORIDE 100 MG/1
100 TABLET, FILM COATED ORAL EVERY 8 HOURS SCHEDULED
Qty: 270 TABLET | Refills: 0 | Status: SHIPPED | OUTPATIENT
Start: 2024-12-20

## 2024-12-30 ENCOUNTER — TELEPHONE (OUTPATIENT)
Age: 88
End: 2024-12-30

## 2024-12-30 NOTE — TELEPHONE ENCOUNTER
Lov: 12/10/2024  Nov: 03/17/2025    Pt called in about her medications. Both have been sent to the pharmacy.     Metoprolol & Hydralazine

## 2024-12-31 ENCOUNTER — TELEPHONE (OUTPATIENT)
Age: 88
End: 2024-12-31

## 2024-12-31 DIAGNOSIS — I10 ESSENTIAL HYPERTENSION WITH GOAL BLOOD PRESSURE LESS THAN 140/90: ICD-10-CM

## 2024-12-31 RX ORDER — AMLODIPINE BESYLATE 10 MG/1
10 TABLET ORAL DAILY
Qty: 90 TABLET | Refills: 1 | Status: SHIPPED | OUTPATIENT
Start: 2024-12-31

## 2024-12-31 RX ORDER — CLONIDINE 0.3 MG/24H
1 PATCH, EXTENDED RELEASE TRANSDERMAL WEEKLY
Qty: 4 PATCH | Refills: 2 | Status: SHIPPED | OUTPATIENT
Start: 2024-12-31 | End: 2025-03-31

## 2024-12-31 NOTE — TELEPHONE ENCOUNTER
[facility]  [place] - [address] [facility] - P [phone] - F [phone]     lisinopril (PRINIVIL;ZESTRIL) 40 MG tablet   spironolactone (ALDACTONE) 50 MG tablet     Lov: 12/10/2024  Nov: 03/17/2025

## 2025-01-01 RX ORDER — LISINOPRIL 40 MG/1
40 TABLET ORAL DAILY
Qty: 30 TABLET | Refills: 3 | Status: SHIPPED | OUTPATIENT
Start: 2025-01-01

## 2025-01-01 RX ORDER — SPIRONOLACTONE 50 MG/1
50 TABLET, FILM COATED ORAL DAILY
Qty: 30 TABLET | Refills: 3 | Status: SHIPPED | OUTPATIENT
Start: 2025-01-01

## 2025-01-06 RX ORDER — METOPROLOL TARTRATE 75 MG/1
TABLET ORAL
Qty: 180 TABLET | Refills: 0 | Status: SHIPPED | OUTPATIENT
Start: 2025-01-06

## 2025-01-06 NOTE — TELEPHONE ENCOUNTER
Last appt 12/10/2024      Next Apt:     Future Appointments   Date Time Provider Department Center   3/17/2025 10:15 AM Freda Tolentino MD [facility]   3/18/2025  1:20 PM Alen Ovalles MD [facility] [place], [place] - [address] [facility] - P [phone] - F [phone] [address]  [facility] [address]  Phone: [phone] Fax: [phone]

## 2025-01-09 ENCOUNTER — TELEPHONE (OUTPATIENT)
Age: 89
End: 2025-01-09

## 2025-01-10 NOTE — TELEPHONE ENCOUNTER
Ammy Tarango was called with no answer, message on  left to call back regarding note below.     Freda Tolentino MD  You17 hours ago (4:14 PM)     SA  If she is constipated, she needs to take a MiraLAX.  Otherwise she can see a GI

## 2025-01-15 ENCOUNTER — OFFICE VISIT (OUTPATIENT)
Age: 89
End: 2025-01-15
Payer: MEDICARE

## 2025-01-15 VITALS
HEART RATE: 68 BPM | DIASTOLIC BLOOD PRESSURE: 74 MMHG | SYSTOLIC BLOOD PRESSURE: 114 MMHG | BODY MASS INDEX: 29.9 KG/M2 | WEIGHT: 152.3 LBS | OXYGEN SATURATION: 97 % | TEMPERATURE: 97.9 F | HEIGHT: 60 IN | RESPIRATION RATE: 16 BRPM

## 2025-01-15 DIAGNOSIS — Z00.00 MEDICARE ANNUAL WELLNESS VISIT, SUBSEQUENT: ICD-10-CM

## 2025-01-15 DIAGNOSIS — Z71.89 ACP (ADVANCE CARE PLANNING): ICD-10-CM

## 2025-01-15 DIAGNOSIS — N18.32 CHRONIC KIDNEY DISEASE, STAGE 3B (HCC): ICD-10-CM

## 2025-01-15 DIAGNOSIS — I10 ESSENTIAL HYPERTENSION WITH GOAL BLOOD PRESSURE LESS THAN 140/90: ICD-10-CM

## 2025-01-15 DIAGNOSIS — D64.9 ANEMIA, UNSPECIFIED TYPE: ICD-10-CM

## 2025-01-15 DIAGNOSIS — E11.21 TYPE 2 DIABETES MELLITUS WITH DIABETIC NEPHROPATHY, WITHOUT LONG-TERM CURRENT USE OF INSULIN (HCC): Primary | ICD-10-CM

## 2025-01-15 DIAGNOSIS — E78.00 HYPERCHOLESTEROLEMIA: ICD-10-CM

## 2025-01-15 LAB
BASOPHILS # BLD AUTO: 0 X10E3/UL (ref 0–0.2)
BASOPHILS NFR BLD AUTO: 1 %
EOSINOPHIL # BLD AUTO: 0.1 X10E3/UL (ref 0–0.4)
EOSINOPHIL NFR BLD AUTO: 3 %
ERYTHROCYTE [DISTWIDTH] IN BLOOD BY AUTOMATED COUNT: 13.2 % (ref 11.7–15.4)
HCT VFR BLD AUTO: 34.6 % (ref 34–46.6)
HGB BLD-MCNC: 11 G/DL (ref 11.1–15.9)
IMM GRANULOCYTES # BLD AUTO: 0 X10E3/UL (ref 0–0.1)
IMM GRANULOCYTES NFR BLD AUTO: 1 %
LYMPHOCYTES # BLD AUTO: 0.9 X10E3/UL (ref 0.7–3.1)
LYMPHOCYTES NFR BLD AUTO: 26 %
MCH RBC QN AUTO: 28 PG (ref 26.6–33)
MCHC RBC AUTO-ENTMCNC: 31.8 G/DL (ref 31.5–35.7)
MCV RBC AUTO: 88 FL (ref 79–97)
MONOCYTES # BLD AUTO: 0.4 X10E3/UL (ref 0.1–0.9)
MONOCYTES NFR BLD AUTO: 12 %
NEUTROPHILS # BLD AUTO: 2.1 X10E3/UL (ref 1.4–7)
NEUTROPHILS NFR BLD AUTO: 57 %
PLATELET # BLD AUTO: 173 X10E3/UL (ref 150–450)
RBC # BLD AUTO: 3.93 X10E6/UL (ref 3.77–5.28)
WBC # BLD AUTO: 3.6 X10E3/UL (ref 3.4–10.8)

## 2025-01-15 PROCEDURE — 99497 ADVNCD CARE PLAN 30 MIN: CPT | Performed by: INTERNAL MEDICINE

## 2025-01-15 PROCEDURE — 99214 OFFICE O/P EST MOD 30 MIN: CPT | Performed by: INTERNAL MEDICINE

## 2025-01-15 SDOH — ECONOMIC STABILITY: FOOD INSECURITY: WITHIN THE PAST 12 MONTHS, THE FOOD YOU BOUGHT JUST DIDN'T LAST AND YOU DIDN'T HAVE MONEY TO GET MORE.: NEVER TRUE

## 2025-01-15 SDOH — ECONOMIC STABILITY: FOOD INSECURITY: WITHIN THE PAST 12 MONTHS, YOU WORRIED THAT YOUR FOOD WOULD RUN OUT BEFORE YOU GOT MONEY TO BUY MORE.: NEVER TRUE

## 2025-01-15 ASSESSMENT — PATIENT HEALTH QUESTIONNAIRE - PHQ9
SUM OF ALL RESPONSES TO PHQ QUESTIONS 1-9: 2
SUM OF ALL RESPONSES TO PHQ9 QUESTIONS 1 & 2: 2
SUM OF ALL RESPONSES TO PHQ QUESTIONS 1-9: 2
1. LITTLE INTEREST OR PLEASURE IN DOING THINGS: SEVERAL DAYS
2. FEELING DOWN, DEPRESSED OR HOPELESS: SEVERAL DAYS

## 2025-01-15 ASSESSMENT — ENCOUNTER SYMPTOMS
GASTROINTESTINAL NEGATIVE: 1
RESPIRATORY NEGATIVE: 1
EYES NEGATIVE: 1
BACK PAIN: 1

## 2025-01-15 ASSESSMENT — LIFESTYLE VARIABLES
HOW OFTEN DO YOU HAVE A DRINK CONTAINING ALCOHOL: NEVER
HOW MANY STANDARD DRINKS CONTAINING ALCOHOL DO YOU HAVE ON A TYPICAL DAY: PATIENT DOES NOT DRINK

## 2025-01-15 NOTE — ACP (ADVANCE CARE PLANNING)

## 2025-01-15 NOTE — PROGRESS NOTES
Chief Complaint   Patient presents with    Hypertension    Diabetes    Chronic Kidney Disease    Cholesterol Problem    Medicare AWV     \"Have you been to the ER, urgent care clinic since your last visit?  Hospitalized since your last visit?\"    NO    “Have you seen or consulted any other health care providers outside our system since your last visit?”    NO           
have a Living Will?: (!) No    Intervention:  see ACP note                     Objective   Vitals:    01/15/25 1043   BP: 114/74   Site: Left Upper Arm   Position: Sitting   Cuff Size: Medium Adult   Pulse: 68   Resp: 16   Temp: 97.9 °F (36.6 °C)   TempSrc: Oral   SpO2: 97%   Weight: 69.1 kg (152 lb 4.8 oz)   Height: 1.524 m (5')      Body mass index is 29.74 kg/m².                    Allergies   Allergen Reactions    Cortisone Other (See Comments)     \"Make me feels weak, like I'm going to die\"    Sitagliptin Other (See Comments)     weakness     Prior to Visit Medications    Medication Sig Taking? Authorizing Provider   Metoprolol Tartrate 75 MG TABS TAKE TWO TABLETS BY MOUTH 2 TIMES A DAY Yes Shanique Ruiz APRN - CNP   lisinopril (PRINIVIL;ZESTRIL) 40 MG tablet Take 1 tablet by mouth daily Yes Shaylee Branham APRN - NP   spironolactone (ALDACTONE) 50 MG tablet Take 1 tablet by mouth daily Yes Shaylee Branham APRN - NP   amLODIPine (NORVASC) 10 MG tablet Take 1 tablet by mouth daily Yes Shanique Ruiz APRN - CNP   cloNIDine (CATAPRES) 0.3 MG/24HR PTWK Place 1 patch onto the skin once a week Yes Shanique Ruiz APRN - CNP   hydrALAZINE (APRESOLINE) 100 MG tablet Take 1 tablet by mouth every 8 hours Yes Shaylee Branham APRN - NP   omeprazole (PRILOSEC) 40 MG delayed release capsule TAKE 1 CAPSULE BY MOUTH ONCE DAILY BEFORE BREAKFAST Yes Freda Tolentino MD   atorvastatin (LIPITOR) 10 MG tablet Take 1 tablet by mouth daily Yes Freda Tolentino MD   chlorthalidone (HYGROTON) 25 MG tablet Take 1 tablet by mouth daily Yes Shanique Ruiz APRN - CNP   dapagliflozin (FARXIGA) 10 MG tablet Take 1 tablet by mouth daily Yes Shanique Ruiz APRN - CNP   loratadine (CLARITIN) 10 MG tablet Take 1 tablet by mouth nightly Yes Shanique Ruiz APRN - CNP   tamsulosin (FLOMAX) 0.4 MG capsule Take 1 capsule by mouth daily Yes Shanique Ruiz APRN - CNP   mirtazapine (REMERON) 15 MG tablet Take 1 tablet by mouth 
Comprehensive blood work panel ordered today.    Discussed expected course/resolution/complications of diagnosis in detail with patient.   Medication risks/benefits/costs/interactions/alternatives discussed with patient.   Pt was given an after visit summary which includes diagnoses, current medications & vitals.   Pt expressed understanding with the diagnosis and plan.     The patient (or guardian, if applicable) and other individuals in attendance with the patient were advised that Artificial Intelligence will be utilized during this visit to record and process the conversation to generate a clinical note. The patient (or guardian, if applicable) and other individuals in attendance at the appointment consented to the use of AI, including the recording.

## 2025-01-16 LAB
ALBUMIN SERPL-MCNC: 4.5 G/DL (ref 3.6–4.6)
ALP SERPL-CCNC: 98 IU/L (ref 44–121)
ALT SERPL-CCNC: 10 IU/L (ref 0–32)
AST SERPL-CCNC: 15 IU/L (ref 0–40)
BILIRUB SERPL-MCNC: 0.3 MG/DL (ref 0–1.2)
BUN SERPL-MCNC: 24 MG/DL (ref 10–36)
BUN/CREAT SERPL: 12 (ref 12–28)
CALCIUM SERPL-MCNC: 9.3 MG/DL (ref 8.7–10.3)
CHLORIDE SERPL-SCNC: 99 MMOL/L (ref 96–106)
CO2 SERPL-SCNC: 21 MMOL/L (ref 20–29)
CREAT SERPL-MCNC: 2.06 MG/DL (ref 0.57–1)
EGFRCR SERPLBLD CKD-EPI 2021: 22 ML/MIN/1.73
FERRITIN SERPL-MCNC: 137 NG/ML (ref 15–150)
GLOBULIN SER CALC-MCNC: 2.8 G/DL (ref 1.5–4.5)
GLUCOSE SERPL-MCNC: 268 MG/DL (ref 70–99)
HBA1C MFR BLD: 12.5 % (ref 4.8–5.6)
IRON SATN MFR SERPL: 21 % (ref 15–55)
IRON SERPL-MCNC: 73 UG/DL (ref 27–139)
Lab: NORMAL
POTASSIUM SERPL-SCNC: 6.2 MMOL/L (ref 3.5–5.2)
PROT SERPL-MCNC: 7.3 G/DL (ref 6–8.5)
REPORT: NORMAL
SODIUM SERPL-SCNC: 133 MMOL/L (ref 134–144)
TIBC SERPL-MCNC: 353 UG/DL (ref 250–450)
UIBC SERPL-MCNC: 280 UG/DL (ref 118–369)

## 2025-01-16 RX ORDER — HYDROXYZINE HYDROCHLORIDE 10 MG/1
10 TABLET, FILM COATED ORAL NIGHTLY
Qty: 90 TABLET | Refills: 0 | Status: SHIPPED | OUTPATIENT
Start: 2025-01-16 | End: 2025-04-16

## 2025-01-16 NOTE — TELEPHONE ENCOUNTER
LOV: 01/15/2025  NOV: 05/15/2025    Medication: hydrOXYzine HCl (ATARAX) 10 MG tablet   Quantity: 30    Pharmacy: [facility] - [place], [place] - [address] [facility] [phone] [facility] [phone] [[id number]]

## 2025-01-17 DIAGNOSIS — E78.00 HYPERCHOLESTEROLEMIA: ICD-10-CM

## 2025-01-17 NOTE — TELEPHONE ENCOUNTER
Last appt 1/15/2025      Next Apt:     Future Appointments   Date Time Provider Department Center   3/18/2025  1:20 PM Alen Ovalles MD [facility]   5/15/2025 10:15 AM Freda Tolentino MD [place], [place] - [address] - P [phone] - F [phone] [address]  [facility] [address]  Phone: [phone] Fax: [phone]

## 2025-01-20 RX ORDER — ATORVASTATIN CALCIUM 10 MG/1
10 TABLET, FILM COATED ORAL DAILY
Qty: 90 TABLET | Refills: 1 | Status: SHIPPED | OUTPATIENT
Start: 2025-01-20

## 2025-01-23 ENCOUNTER — TELEPHONE (OUTPATIENT)
Age: 89
End: 2025-01-23

## 2025-01-23 NOTE — TELEPHONE ENCOUNTER
Pt confused about why her medication cost so much  Spoke to pharmacist- pt has to meet her deductible before full coverage  Message given to patient and advised for her to contact insurance company for detailed info  Pt voiced understanding

## 2025-01-27 ENCOUNTER — TELEPHONE (OUTPATIENT)
Age: 89
End: 2025-01-27

## 2025-01-27 DIAGNOSIS — N18.32 CHRONIC KIDNEY DISEASE, STAGE 3B (HCC): ICD-10-CM

## 2025-01-27 DIAGNOSIS — E11.21 TYPE 2 DIABETES MELLITUS WITH NEPHROPATHY (HCC): ICD-10-CM

## 2025-01-27 DIAGNOSIS — R09.89 LABILE HYPERTENSION: ICD-10-CM

## 2025-01-27 DIAGNOSIS — E87.5 HYPERKALEMIA: Primary | ICD-10-CM

## 2025-01-27 NOTE — TELEPHONE ENCOUNTER
Patient called back and I went over her lab results with her, she vocalized understanding and was agreeable to starting medications per providers orders. RX sent to [facility] and also educated patient on diet. She thanked me for the call and had no other questions at this time.

## 2025-01-27 NOTE — TELEPHONE ENCOUNTER
----- Message from Dr. Freda Tolentino MD sent at 1/20/2025  8:31 AM EST -----  Uncontrolled diabetes.  She is taking her metformin, glipizide and Farxiga for diabetes control?  If she is, please add Tradjenta 5 mg p.o. daily.  She needs to be referred to diabetic education.  Follow-up with blood sugar log but surely in a month.  Kidney function test reduced because of uncontrolled diabetes.  Need to drink more fluid.  Please refer patient to nephrology.  Potassium very high.  Please call in Kayexalate 30 g p.o. daily for 2 days.  Repeat potassium level in 1 week.  Avoid potassium rich diet and supplement.  Iron level low because of reduced kidney function.

## 2025-01-29 ENCOUNTER — TELEPHONE (OUTPATIENT)
Age: 89
End: 2025-01-29

## 2025-01-29 NOTE — TELEPHONE ENCOUNTER
Ammy Tarango called in and verbalized understanding on note below.     Per Dr. Freda Tolentino MD in the AM is preferred.

## 2025-01-29 NOTE — TELEPHONE ENCOUNTER
Pt is confused as to when to take her sodium polystyrene  Before or after her morning medication?  Please call

## 2025-01-30 ENCOUNTER — TELEPHONE (OUTPATIENT)
Age: 89
End: 2025-01-30

## 2025-01-30 NOTE — TELEPHONE ENCOUNTER
Pt stated her insurance didn't cover one of her medications.  She's not sure which medication it was when asked.     Please return call per request.   Phone: [phone]

## 2025-01-30 NOTE — TELEPHONE ENCOUNTER
Patient called stating that the pharmacy did not fill the script for tradjenta. She said that they told her that they were waiting on her insurance company.

## 2025-02-03 DIAGNOSIS — E87.5 HYPERKALEMIA: ICD-10-CM

## 2025-02-04 DIAGNOSIS — R05.1 ACUTE COUGH: Primary | ICD-10-CM

## 2025-02-04 RX ORDER — GUAIFENESIN/DEXTROMETHORPHAN 100-10MG/5
5 SYRUP ORAL 2 TIMES DAILY
Qty: 30 ML | Refills: 0 | Status: SHIPPED | OUTPATIENT
Start: 2025-02-04 | End: 2025-02-07

## 2025-02-04 NOTE — TELEPHONE ENCOUNTER
Freda Tolentino MD   to Me       2/4/25 12:49 PM  Rest and fluids, she can take Robitussin DM 1 teaspoon twice a day as needed for 3 days.

## 2025-02-04 NOTE — TELEPHONE ENCOUNTER
Lov:01/15/2025  Nov:05/15/2025    [facility] [facility]       Pt called in about wanting a prescription for coughing and mucus. Symptoms started yesterday.

## 2025-02-05 LAB
ALBUMIN SERPL-MCNC: 3.8 G/DL (ref 3.5–5)
ALBUMIN/GLOB SERPL: 1.2 (ref 1.1–2.2)
ALP SERPL-CCNC: 78 U/L (ref 45–117)
ALT SERPL-CCNC: 14 U/L (ref 12–78)
ANION GAP SERPL CALC-SCNC: 8 MMOL/L (ref 2–12)
AST SERPL-CCNC: 13 U/L (ref 15–37)
BILIRUB SERPL-MCNC: 0.3 MG/DL (ref 0.2–1)
BUN SERPL-MCNC: 36 MG/DL (ref 6–20)
BUN/CREAT SERPL: 15 (ref 12–20)
CALCIUM SERPL-MCNC: 9.2 MG/DL (ref 8.5–10.1)
CHLORIDE SERPL-SCNC: 95 MMOL/L (ref 97–108)
CO2 SERPL-SCNC: 23 MMOL/L (ref 21–32)
CREAT SERPL-MCNC: 2.39 MG/DL (ref 0.55–1.02)
GLOBULIN SER CALC-MCNC: 3.3 G/DL (ref 2–4)
GLUCOSE SERPL-MCNC: 302 MG/DL (ref 65–100)
POTASSIUM SERPL-SCNC: 5.3 MMOL/L (ref 3.5–5.1)
PROT SERPL-MCNC: 7.1 G/DL (ref 6.4–8.2)
SODIUM SERPL-SCNC: 126 MMOL/L (ref 136–145)

## 2025-02-10 DIAGNOSIS — N18.32 STAGE 3B CHRONIC KIDNEY DISEASE (HCC): Primary | ICD-10-CM

## 2025-02-10 DIAGNOSIS — R05.1 ACUTE COUGH: Primary | ICD-10-CM

## 2025-02-10 RX ORDER — DEXTROMETHORPHAN HYDROBROMIDE AND PROMETHAZINE HYDROCHLORIDE 15; 6.25 MG/5ML; MG/5ML
5 SYRUP ORAL 4 TIMES DAILY PRN
Qty: 118 ML | Refills: 0 | Status: ON HOLD | OUTPATIENT
Start: 2025-02-10 | End: 2025-02-17

## 2025-02-10 NOTE — TELEPHONE ENCOUNTER
Freda Tolentino MD  [facility] now (12:31 PM)       Phenergan DM 1 teaspoon every 12 hours as needed for 7 days.

## 2025-02-10 NOTE — TELEPHONE ENCOUNTER
Pt wanted to know if something could be called in since she's still coughing up phlegm. Pt stated she doesn't feel bad enough to make another appointment.     [facility] - [place], [place] - [address] [facility] -  [phone] - F [phone] [[id number]]     Phone: [phone]

## 2025-02-12 ENCOUNTER — HOSPITAL ENCOUNTER (INPATIENT)
Facility: HOSPITAL | Age: 89
LOS: 6 days | Discharge: NURSING, REHAB OR LONG-TERM CARE | DRG: 641 | End: 2025-02-18
Attending: EMERGENCY MEDICINE | Admitting: FAMILY MEDICINE
Payer: MEDICARE

## 2025-02-12 ENCOUNTER — APPOINTMENT (OUTPATIENT)
Facility: HOSPITAL | Age: 89
DRG: 641 | End: 2025-02-12
Payer: MEDICARE

## 2025-02-12 DIAGNOSIS — E87.1 HYPONATREMIA: ICD-10-CM

## 2025-02-12 DIAGNOSIS — F51.01 PRIMARY INSOMNIA: ICD-10-CM

## 2025-02-12 DIAGNOSIS — E87.5 HYPERKALEMIA: ICD-10-CM

## 2025-02-12 DIAGNOSIS — R53.1 GENERALIZED WEAKNESS: Primary | ICD-10-CM

## 2025-02-12 LAB
ALBUMIN SERPL-MCNC: 3.8 G/DL (ref 3.5–5)
ALBUMIN/GLOB SERPL: 1 (ref 1.1–2.2)
ALP SERPL-CCNC: 88 U/L (ref 45–117)
ALT SERPL-CCNC: 13 U/L (ref 12–78)
ANION GAP SERPL CALC-SCNC: 12 MMOL/L (ref 2–12)
ANION GAP SERPL CALC-SCNC: 5 MMOL/L (ref 2–12)
ANION GAP SERPL CALC-SCNC: 6 MMOL/L (ref 2–12)
ANION GAP SERPL CALC-SCNC: 7 MMOL/L (ref 2–12)
APPEARANCE UR: CLEAR
AST SERPL-CCNC: 11 U/L (ref 15–37)
BACTERIA URNS QL MICRO: ABNORMAL /HPF
BASOPHILS # BLD: 0.04 K/UL (ref 0–0.1)
BASOPHILS NFR BLD: 1 % (ref 0–1)
BILIRUB SERPL-MCNC: 0.4 MG/DL (ref 0.2–1)
BILIRUB UR QL: NEGATIVE
BUN SERPL-MCNC: 33 MG/DL (ref 6–20)
BUN SERPL-MCNC: 34 MG/DL (ref 6–20)
BUN SERPL-MCNC: 36 MG/DL (ref 6–20)
BUN SERPL-MCNC: 40 MG/DL (ref 6–20)
BUN/CREAT SERPL: 16 (ref 12–20)
BUN/CREAT SERPL: 17 (ref 12–20)
CALCIUM SERPL-MCNC: 8.9 MG/DL (ref 8.5–10.1)
CALCIUM SERPL-MCNC: 9.2 MG/DL (ref 8.5–10.1)
CALCIUM SERPL-MCNC: 9.2 MG/DL (ref 8.5–10.1)
CALCIUM SERPL-MCNC: 9.8 MG/DL (ref 8.5–10.1)
CHLORIDE SERPL-SCNC: 91 MMOL/L (ref 97–108)
CHLORIDE SERPL-SCNC: 96 MMOL/L (ref 97–108)
CHLORIDE SERPL-SCNC: 96 MMOL/L (ref 97–108)
CHLORIDE SERPL-SCNC: 97 MMOL/L (ref 97–108)
CO2 SERPL-SCNC: 15 MMOL/L (ref 21–32)
CO2 SERPL-SCNC: 20 MMOL/L (ref 21–32)
CO2 SERPL-SCNC: 23 MMOL/L (ref 21–32)
CO2 SERPL-SCNC: 23 MMOL/L (ref 21–32)
COLOR UR: ABNORMAL
COMMENT:: NORMAL
CREAT SERPL-MCNC: 1.91 MG/DL (ref 0.55–1.02)
CREAT SERPL-MCNC: 2.06 MG/DL (ref 0.55–1.02)
CREAT SERPL-MCNC: 2.17 MG/DL (ref 0.55–1.02)
CREAT SERPL-MCNC: 2.5 MG/DL (ref 0.55–1.02)
CREAT UR-MCNC: 20.7 MG/DL
DIFFERENTIAL METHOD BLD: ABNORMAL
EKG ATRIAL RATE: 58 BPM
EKG DIAGNOSIS: NORMAL
EKG P AXIS: 82 DEGREES
EKG P-R INTERVAL: 224 MS
EKG Q-T INTERVAL: 448 MS
EKG QRS DURATION: 138 MS
EKG QTC CALCULATION (BAZETT): 439 MS
EKG R AXIS: -84 DEGREES
EKG T AXIS: -4 DEGREES
EKG VENTRICULAR RATE: 58 BPM
EOSINOPHIL # BLD: 0.17 K/UL (ref 0–0.4)
EOSINOPHIL NFR BLD: 4 % (ref 0–7)
EPITH CASTS URNS QL MICRO: ABNORMAL /LPF
ERYTHROCYTE [DISTWIDTH] IN BLOOD BY AUTOMATED COUNT: 13.2 % (ref 11.5–14.5)
FLUAV RNA SPEC QL NAA+PROBE: NOT DETECTED
FLUBV RNA SPEC QL NAA+PROBE: NOT DETECTED
GLOBULIN SER CALC-MCNC: 4 G/DL (ref 2–4)
GLUCOSE BLD STRIP.AUTO-MCNC: 156 MG/DL (ref 65–117)
GLUCOSE BLD STRIP.AUTO-MCNC: 206 MG/DL (ref 65–117)
GLUCOSE BLD STRIP.AUTO-MCNC: 222 MG/DL (ref 65–117)
GLUCOSE BLD STRIP.AUTO-MCNC: 256 MG/DL (ref 65–117)
GLUCOSE BLD STRIP.AUTO-MCNC: 333 MG/DL (ref 65–117)
GLUCOSE SERPL-MCNC: 164 MG/DL (ref 65–100)
GLUCOSE SERPL-MCNC: 193 MG/DL (ref 65–100)
GLUCOSE SERPL-MCNC: 221 MG/DL (ref 65–100)
GLUCOSE SERPL-MCNC: 264 MG/DL (ref 65–100)
GLUCOSE UR STRIP.AUTO-MCNC: 500 MG/DL
HCT VFR BLD AUTO: 32.2 % (ref 35–47)
HGB BLD-MCNC: 10.9 G/DL (ref 11.5–16)
HGB UR QL STRIP: NEGATIVE
HYALINE CASTS URNS QL MICRO: ABNORMAL /LPF (ref 0–5)
IMM GRANULOCYTES # BLD AUTO: 0 K/UL
IMM GRANULOCYTES NFR BLD AUTO: 0 %
KETONES UR QL STRIP.AUTO: NEGATIVE MG/DL
LEUKOCYTE ESTERASE UR QL STRIP.AUTO: ABNORMAL
LYMPHOCYTES # BLD: 0.6 K/UL (ref 0.8–3.5)
LYMPHOCYTES NFR BLD: 14 % (ref 12–49)
MCH RBC QN AUTO: 28.6 PG (ref 26–34)
MCHC RBC AUTO-ENTMCNC: 33.9 G/DL (ref 30–36.5)
MCV RBC AUTO: 84.5 FL (ref 80–99)
MONOCYTES # BLD: 0.34 K/UL (ref 0–1)
MONOCYTES NFR BLD: 8 % (ref 5–13)
NEUTS SEG # BLD: 3.15 K/UL (ref 1.8–8)
NEUTS SEG NFR BLD: 73 % (ref 32–75)
NITRITE UR QL STRIP.AUTO: POSITIVE
NRBC # BLD: 0 K/UL (ref 0–0.01)
NRBC BLD-RTO: 0 PER 100 WBC
OSMOLALITY UR: 329 MOSM/KG H2O
PH UR STRIP: 8.5 (ref 5–8)
PLATELET # BLD AUTO: 199 K/UL (ref 150–400)
PMV BLD AUTO: 9.5 FL (ref 8.9–12.9)
POTASSIUM SERPL-SCNC: 5 MMOL/L (ref 3.5–5.1)
POTASSIUM SERPL-SCNC: 5.5 MMOL/L (ref 3.5–5.1)
POTASSIUM SERPL-SCNC: 5.7 MMOL/L (ref 3.5–5.1)
POTASSIUM SERPL-SCNC: 6.6 MMOL/L (ref 3.5–5.1)
PROT SERPL-MCNC: 7.8 G/DL (ref 6.4–8.2)
PROT UR STRIP-MCNC: ABNORMAL MG/DL
RBC # BLD AUTO: 3.81 M/UL (ref 3.8–5.2)
RBC #/AREA URNS HPF: ABNORMAL /HPF (ref 0–5)
RBC MORPH BLD: ABNORMAL
SARS-COV-2 RNA RESP QL NAA+PROBE: NOT DETECTED
SERVICE CMNT-IMP: ABNORMAL
SODIUM SERPL-SCNC: 118 MMOL/L (ref 136–145)
SODIUM SERPL-SCNC: 124 MMOL/L (ref 136–145)
SODIUM SERPL-SCNC: 124 MMOL/L (ref 136–145)
SODIUM SERPL-SCNC: 125 MMOL/L (ref 136–145)
SODIUM UR-SCNC: 53 MMOL/L
SOURCE: NORMAL
SP GR UR REFRACTOMETRY: 1.01 (ref 1–1.03)
SPECIMEN HOLD: NORMAL
TROPONIN I SERPL HS-MCNC: 6 NG/L (ref 0–51)
UROBILINOGEN UR QL STRIP.AUTO: 0.2 EU/DL (ref 0.2–1)
WBC # BLD AUTO: 4.3 K/UL (ref 3.6–11)
WBC URNS QL MICRO: ABNORMAL /HPF (ref 0–4)

## 2025-02-12 PROCEDURE — 6370000000 HC RX 637 (ALT 250 FOR IP): Performed by: NURSE PRACTITIONER

## 2025-02-12 PROCEDURE — 81001 URINALYSIS AUTO W/SCOPE: CPT

## 2025-02-12 PROCEDURE — 1200000000 HC SEMI PRIVATE

## 2025-02-12 PROCEDURE — 96374 THER/PROPH/DIAG INJ IV PUSH: CPT

## 2025-02-12 PROCEDURE — 80053 COMPREHEN METABOLIC PANEL: CPT

## 2025-02-12 PROCEDURE — 36415 COLL VENOUS BLD VENIPUNCTURE: CPT

## 2025-02-12 PROCEDURE — 99285 EMERGENCY DEPT VISIT HI MDM: CPT

## 2025-02-12 PROCEDURE — 71045 X-RAY EXAM CHEST 1 VIEW: CPT

## 2025-02-12 PROCEDURE — 97110 THERAPEUTIC EXERCISES: CPT

## 2025-02-12 PROCEDURE — 87086 URINE CULTURE/COLONY COUNT: CPT

## 2025-02-12 PROCEDURE — 84300 ASSAY OF URINE SODIUM: CPT

## 2025-02-12 PROCEDURE — 85025 COMPLETE CBC W/AUTO DIFF WBC: CPT

## 2025-02-12 PROCEDURE — 97161 PT EVAL LOW COMPLEX 20 MIN: CPT

## 2025-02-12 PROCEDURE — 83935 ASSAY OF URINE OSMOLALITY: CPT

## 2025-02-12 PROCEDURE — 6370000000 HC RX 637 (ALT 250 FOR IP): Performed by: INTERNAL MEDICINE

## 2025-02-12 PROCEDURE — 87088 URINE BACTERIA CULTURE: CPT

## 2025-02-12 PROCEDURE — 84484 ASSAY OF TROPONIN QUANT: CPT

## 2025-02-12 PROCEDURE — 2500000003 HC RX 250 WO HCPCS: Performed by: INTERNAL MEDICINE

## 2025-02-12 PROCEDURE — 97165 OT EVAL LOW COMPLEX 30 MIN: CPT

## 2025-02-12 PROCEDURE — 97535 SELF CARE MNGMENT TRAINING: CPT

## 2025-02-12 PROCEDURE — 96375 TX/PRO/DX INJ NEW DRUG ADDON: CPT

## 2025-02-12 PROCEDURE — 6370000000 HC RX 637 (ALT 250 FOR IP): Performed by: EMERGENCY MEDICINE

## 2025-02-12 PROCEDURE — 6360000002 HC RX W HCPCS: Performed by: NURSE PRACTITIONER

## 2025-02-12 PROCEDURE — 87186 SC STD MICRODIL/AGAR DIL: CPT

## 2025-02-12 PROCEDURE — 6360000002 HC RX W HCPCS: Performed by: EMERGENCY MEDICINE

## 2025-02-12 PROCEDURE — 2580000003 HC RX 258: Performed by: NURSE PRACTITIONER

## 2025-02-12 PROCEDURE — 80048 BASIC METABOLIC PNL TOTAL CA: CPT

## 2025-02-12 PROCEDURE — 2580000003 HC RX 258: Performed by: INTERNAL MEDICINE

## 2025-02-12 PROCEDURE — 82570 ASSAY OF URINE CREATININE: CPT

## 2025-02-12 PROCEDURE — 93005 ELECTROCARDIOGRAM TRACING: CPT | Performed by: EMERGENCY MEDICINE

## 2025-02-12 PROCEDURE — 2580000003 HC RX 258: Performed by: EMERGENCY MEDICINE

## 2025-02-12 PROCEDURE — 87636 SARSCOV2 & INF A&B AMP PRB: CPT

## 2025-02-12 PROCEDURE — 82962 GLUCOSE BLOOD TEST: CPT

## 2025-02-12 PROCEDURE — 2500000003 HC RX 250 WO HCPCS: Performed by: NURSE PRACTITIONER

## 2025-02-12 RX ORDER — DEXTROSE MONOHYDRATE 100 MG/ML
INJECTION, SOLUTION INTRAVENOUS CONTINUOUS PRN
Status: DISCONTINUED | OUTPATIENT
Start: 2025-02-12 | End: 2025-02-18 | Stop reason: HOSPADM

## 2025-02-12 RX ORDER — CALCIUM GLUCONATE 20 MG/ML
1000 INJECTION, SOLUTION INTRAVENOUS ONCE
Status: COMPLETED | OUTPATIENT
Start: 2025-02-12 | End: 2025-02-12

## 2025-02-12 RX ORDER — SODIUM CHLORIDE 0.9 % (FLUSH) 0.9 %
5-40 SYRINGE (ML) INJECTION PRN
Status: DISCONTINUED | OUTPATIENT
Start: 2025-02-12 | End: 2025-02-18 | Stop reason: HOSPADM

## 2025-02-12 RX ORDER — PANTOPRAZOLE SODIUM 40 MG/1
40 TABLET, DELAYED RELEASE ORAL
Status: DISCONTINUED | OUTPATIENT
Start: 2025-02-13 | End: 2025-02-18 | Stop reason: HOSPADM

## 2025-02-12 RX ORDER — HEPARIN SODIUM 5000 [USP'U]/ML
5000 INJECTION, SOLUTION INTRAVENOUS; SUBCUTANEOUS EVERY 8 HOURS SCHEDULED
Status: DISCONTINUED | OUTPATIENT
Start: 2025-02-12 | End: 2025-02-18 | Stop reason: HOSPADM

## 2025-02-12 RX ORDER — ONDANSETRON 2 MG/ML
4 INJECTION INTRAMUSCULAR; INTRAVENOUS EVERY 6 HOURS PRN
Status: DISCONTINUED | OUTPATIENT
Start: 2025-02-12 | End: 2025-02-18 | Stop reason: HOSPADM

## 2025-02-12 RX ORDER — SODIUM CHLORIDE 9 MG/ML
INJECTION, SOLUTION INTRAVENOUS ONCE
Status: COMPLETED | OUTPATIENT
Start: 2025-02-12 | End: 2025-02-12

## 2025-02-12 RX ORDER — ACETAMINOPHEN 650 MG/1
650 SUPPOSITORY RECTAL EVERY 6 HOURS PRN
Status: DISCONTINUED | OUTPATIENT
Start: 2025-02-12 | End: 2025-02-18 | Stop reason: HOSPADM

## 2025-02-12 RX ORDER — MIRTAZAPINE 15 MG/1
15 TABLET, FILM COATED ORAL NIGHTLY
Status: DISCONTINUED | OUTPATIENT
Start: 2025-02-12 | End: 2025-02-18 | Stop reason: HOSPADM

## 2025-02-12 RX ORDER — ATORVASTATIN CALCIUM 10 MG/1
10 TABLET, FILM COATED ORAL DAILY
Status: DISCONTINUED | OUTPATIENT
Start: 2025-02-12 | End: 2025-02-18 | Stop reason: HOSPADM

## 2025-02-12 RX ORDER — INSULIN LISPRO 100 [IU]/ML
0-8 INJECTION, SOLUTION INTRAVENOUS; SUBCUTANEOUS
Status: DISCONTINUED | OUTPATIENT
Start: 2025-02-12 | End: 2025-02-18 | Stop reason: HOSPADM

## 2025-02-12 RX ORDER — SODIUM CHLORIDE 0.9 % (FLUSH) 0.9 %
5-40 SYRINGE (ML) INJECTION EVERY 12 HOURS SCHEDULED
Status: DISCONTINUED | OUTPATIENT
Start: 2025-02-12 | End: 2025-02-18 | Stop reason: HOSPADM

## 2025-02-12 RX ORDER — CETIRIZINE HYDROCHLORIDE 10 MG/1
5 TABLET ORAL DAILY
Status: DISCONTINUED | OUTPATIENT
Start: 2025-02-12 | End: 2025-02-18 | Stop reason: HOSPADM

## 2025-02-12 RX ORDER — SODIUM CHLORIDE 9 MG/ML
INJECTION, SOLUTION INTRAVENOUS CONTINUOUS
Status: DISCONTINUED | OUTPATIENT
Start: 2025-02-12 | End: 2025-02-12

## 2025-02-12 RX ORDER — POLYETHYLENE GLYCOL 3350 17 G/17G
17 POWDER, FOR SOLUTION ORAL DAILY PRN
Status: DISCONTINUED | OUTPATIENT
Start: 2025-02-12 | End: 2025-02-18 | Stop reason: HOSPADM

## 2025-02-12 RX ORDER — SODIUM CHLORIDE 9 MG/ML
INJECTION, SOLUTION INTRAVENOUS PRN
Status: DISCONTINUED | OUTPATIENT
Start: 2025-02-12 | End: 2025-02-18 | Stop reason: HOSPADM

## 2025-02-12 RX ORDER — ONDANSETRON 4 MG/1
4 TABLET, ORALLY DISINTEGRATING ORAL EVERY 8 HOURS PRN
Status: DISCONTINUED | OUTPATIENT
Start: 2025-02-12 | End: 2025-02-18 | Stop reason: HOSPADM

## 2025-02-12 RX ORDER — ACETAMINOPHEN 325 MG/1
650 TABLET ORAL EVERY 6 HOURS PRN
Status: DISCONTINUED | OUTPATIENT
Start: 2025-02-12 | End: 2025-02-18 | Stop reason: HOSPADM

## 2025-02-12 RX ADMIN — CALCIUM GLUCONATE 1000 MG: 20 INJECTION, SOLUTION INTRAVENOUS at 08:08

## 2025-02-12 RX ADMIN — HEPARIN SODIUM 5000 UNITS: 5000 INJECTION INTRAVENOUS; SUBCUTANEOUS at 21:48

## 2025-02-12 RX ADMIN — SODIUM CHLORIDE: 0.9 INJECTION, SOLUTION INTRAVENOUS at 09:28

## 2025-02-12 RX ADMIN — HEPARIN SODIUM 5000 UNITS: 5000 INJECTION INTRAVENOUS; SUBCUTANEOUS at 14:42

## 2025-02-12 RX ADMIN — SODIUM BICARBONATE: 84 INJECTION, SOLUTION INTRAVENOUS at 16:25

## 2025-02-12 RX ADMIN — SODIUM ZIRCONIUM CYCLOSILICATE 10 G: 10 POWDER, FOR SUSPENSION ORAL at 14:42

## 2025-02-12 RX ADMIN — ATORVASTATIN CALCIUM 10 MG: 20 TABLET, FILM COATED ORAL at 09:31

## 2025-02-12 RX ADMIN — SODIUM CHLORIDE: 0.9 INJECTION, SOLUTION INTRAVENOUS at 06:33

## 2025-02-12 RX ADMIN — INSULIN LISPRO 4 UNITS: 100 INJECTION, SOLUTION INTRAVENOUS; SUBCUTANEOUS at 21:48

## 2025-02-12 RX ADMIN — HEPARIN SODIUM 5000 UNITS: 5000 INJECTION INTRAVENOUS; SUBCUTANEOUS at 09:25

## 2025-02-12 RX ADMIN — INSULIN HUMAN 10 UNITS: 100 INJECTION, SOLUTION PARENTERAL at 08:11

## 2025-02-12 RX ADMIN — SODIUM CHLORIDE, PRESERVATIVE FREE 10 ML: 5 INJECTION INTRAVENOUS at 09:27

## 2025-02-12 RX ADMIN — ACETAMINOPHEN 650 MG: 325 TABLET ORAL at 09:25

## 2025-02-12 RX ADMIN — CETIRIZINE HYDROCHLORIDE 5 MG: 10 TABLET ORAL at 09:25

## 2025-02-12 RX ADMIN — MIRTAZAPINE 15 MG: 15 TABLET, FILM COATED ORAL at 21:48

## 2025-02-12 RX ADMIN — DEXTROSE MONOHYDRATE 250 ML: 100 INJECTION, SOLUTION INTRAVENOUS at 08:06

## 2025-02-12 RX ADMIN — INSULIN LISPRO 2 UNITS: 100 INJECTION, SOLUTION INTRAVENOUS; SUBCUTANEOUS at 11:47

## 2025-02-12 ASSESSMENT — PAIN DESCRIPTION - DESCRIPTORS: DESCRIPTORS: DISCOMFORT

## 2025-02-12 ASSESSMENT — PAIN SCALES - GENERAL
PAINLEVEL_OUTOF10: 0
PAINLEVEL_OUTOF10: 5

## 2025-02-12 ASSESSMENT — PAIN DESCRIPTION - LOCATION: LOCATION: LEG

## 2025-02-12 ASSESSMENT — PAIN DESCRIPTION - ORIENTATION: ORIENTATION: LEFT;RIGHT

## 2025-02-12 ASSESSMENT — PAIN - FUNCTIONAL ASSESSMENT
PAIN_FUNCTIONAL_ASSESSMENT: NONE - DENIES PAIN
PAIN_FUNCTIONAL_ASSESSMENT: ACTIVITIES ARE NOT PREVENTED

## 2025-02-12 NOTE — ACP (ADVANCE CARE PLANNING)
Advance Care Planning     Advance Care Planning (ACP) Physician/NP/PA Conversation    Date of Conversation: 2/12/2025  Conducted with: Patient with Decision Making Capacity  Other persons present: None    Healthcare Decision Maker:   Primary Decision Maker: Justino Burgess - [place] - [phone]         Care Preferences:    Hospitalization:  \"If your health worsens and it becomes clear that your chance of recovery is unlikely, what would be your preference regarding hospitalization?\"  The patient would prefer hospitalization.    Ventilation:  \"If you were unable to breath on your own and your chance of recovery was unlikely, what would be your preference about the use of a ventilator (breathing machine) if it was available to you?\"  The patient would desire the use of a ventilator.    Resuscitation:  \"In the event your heart stopped as a result of an underlying serious health condition, would you want attempts made to restart your heart, or would you prefer a natural death?\"  Yes, attempt to resuscitate.    ventilation preferences, hospitalization preferences, and resuscitation preferences    Conversation Outcomes / Follow-Up Plan:  ACP complete - no further action today  Reviewed DNR/DNI and patient elects Full Code (Attempt Resuscitation)    Length of Voluntary ACP Conversation in minutes:  16 minutes    DAVID Jorgensen - NP

## 2025-02-12 NOTE — ED NOTES
Bladder scan showed >619ml of urine in bladder. Per order, place lamar for urine retention >300ml.    normal mood with appropriate affect  , good eye contact, speech clear

## 2025-02-12 NOTE — ED NOTES
Patient readjusted in bed. Patient asked if she would like to change into gown, she would like to stay in her clothes for now.

## 2025-02-12 NOTE — ED NOTES
ED TO INPATIENT SBAR HANDOFF    Patient Name: Ammy Tarango   :  1934  89 y.o.   MRN:  [id number]  ED Room #:  ER05/05  Family/Caregiver Present no   Restraints no   Sitter no   Sepsis Risk Score      Situation  Code Status: Full Code     Allergies: Cortisone and Sitagliptin  Weight: Patient Vitals for the past 96 hrs (Last 3 readings):   Weight   25 0800 73.2 kg (161 lb 6 oz)   25 0317 70.6 kg (155 lb 10.3 oz)     Arrived from: home  Chief Complaint:   Chief Complaint   Patient presents with    Fatigue     Generalized weakness     Hospital Problem/Diagnosis:  Principal Problem:    Hyponatremia  Resolved Problems:    * No resolved hospital problems. *    Imaging:   XR CHEST PORTABLE   Final Result      No acute process on portable chest.         Electronically signed by GIUSEPPE MONSON        Abnormal labs:   Abnormal Labs Reviewed   CBC WITH AUTO DIFFERENTIAL - Abnormal; Notable for the following components:       Result Value    Hemoglobin 10.9 (*)     Hematocrit 32.2 (*)     Lymphocytes Absolute 0.60 (*)     All other components within normal limits   COMPREHENSIVE METABOLIC PANEL - Abnormal; Notable for the following components:    Sodium 118 (*)     Potassium 6.6 (*)     Chloride 91 (*)     CO2 20 (*)     Glucose 221 (*)     BUN 40 (*)     Creatinine 2.50 (*)     Est, Glom Filt Rate 18 (*)     AST 11 (*)     Albumin/Globulin Ratio 1.0 (*)     All other components within normal limits   BASIC METABOLIC PANEL - Abnormal; Notable for the following components:    Sodium 124 (*)     Potassium 5.5 (*)     CO2 15 (*)     Glucose 193 (*)     BUN 36 (*)     Creatinine 2.17 (*)     Est, Glom Filt Rate 21 (*)     All other components within normal limits   URINALYSIS WITH MICROSCOPIC - Abnormal; Notable for the following components:    pH, Urine 8.5 (*)     Protein, UA TRACE (*)     Glucose, Ur 500 (*)     Nitrite, Urine Positive (*)     Leukocyte Esterase, Urine MODERATE (*)     BACTERIA, URINE 2+ (*)      All other components within normal limits   POCT GLUCOSE - Abnormal; Notable for the following components:    POC Glucose 333 (*)     All other components within normal limits   POCT GLUCOSE - Abnormal; Notable for the following components:    POC Glucose 206 (*)     All other components within normal limits   POCT GLUCOSE - Abnormal; Notable for the following components:    POC Glucose 222 (*)     All other components within normal limits         Abnormal Assessment Findings: hypertension, fatigue, hyperkalemia, hyponatremia, urinary retention    Background  History:   Past Medical History:   Diagnosis Date    Adverse effect of anesthesia     pt states she was able to feel everything during colonoscopy    Chronic bronchitis (HCC)     Diabetes (HCC)     Essential hypertension     Hypercholesterolemia     Hypertension        Assessment    Vitals/MEWS: MEWS Score: 1  Level of Consciousness: Alert (0)   Vitals:    02/12/25 1515 02/12/25 1530 02/12/25 1545 02/12/25 1600   BP: (!) 149/49 135/72 124/62 (!) 137/48   Pulse: 55 58 61 54   Resp: 13 18 22 13   Temp:    97.8 °F (36.6 °C)   TempSrc:    Oral   SpO2: 94% 95% 96% 96%   Weight:       Height:         DI:   Predictive Model Details          39 (Caution)  Factor Value    Calculated 2/12/2025 16:28 35% Age 89 years old    Deterioration Index Model 16% Respiratory rate 13     15% Potassium abnormal (5.5 mmol/L)     14% Sodium abnormal (124 mmol/L)     10% Cardiac rhythm Sinus jareth     3% Systolic 137     2% Pulse 54     2% BUN abnormal (36 MG/DL)     1% Hematocrit abnormal (32.2 %)     0% WBC count 4.3 K/uL     0% Temperature 97.8 °F (36.6 °C)     0% Pulse oximetry 96 %       FiO2 (%):   O2 Flow Rate: O2 Device: None (Room air)    Cardiac Rhythm: Cardiac Rhythm: Sinus jareth  Pain Scale: Pain Assessment  Pain Assessment: 0-10  Pain Level: 0  Patient's Stated Pain Goal: 0 - No pain  Pain Location: Leg  Pain Orientation: Left, Right  Pain Descriptors:

## 2025-02-12 NOTE — PLAN OF CARE
Problem: Physical Therapy - Adult  Goal: By Discharge: Performs mobility at highest level of function for planned discharge setting.  See evaluation for individualized goals.  Description: FUNCTIONAL STATUS PRIOR TO ADMISSION: Patient was independent and active with use of rolling walker or cane intermittently. Pt reported she leaves the home ~ 1 x/ week    HOME SUPPORT PRIOR TO ADMISSION: The patient lived alone with son that lives locally to provide assistance as needed.    Physical Therapy Goals  Initiated 2/12/2025  1.  Patient will move from supine to sit and sit to supine and roll side to side in bed with modified independence within 7 day(s).    2.  Patient will perform sit to stand with modified independence within 7 day(s).  3.  Patient will transfer from bed to chair and chair to bed with modified independence using the least restrictive device within 7 day(s).  4.  Patient will ambulate with modified independence for 50 feet with the least restrictive device within 7 day(s).   5.  Patient will ascend/descend 5 stairs with single handrail(s) with supervision/set-up within 7 day(s).    Outcome: Progressing   PHYSICAL THERAPY EVALUATION    Patient: Ammy Tarango (89 y.o. female)  Date: 2/12/2025  Primary Diagnosis: Hyponatremia [E87.1]       Precautions: Restrictions/Precautions: Fall Risk                      ASSESSMENT :   DEFICITS/IMPAIRMENTS:   The patient is limited by decreased functional mobility, strength, activity tolerance, s/p admission on 2/12 with reports of generalized weakness and fatigue. Pt with hyponatremia.       Based on the impairments listed above pt able to roll and reposition with supervision in the ER stretcher. Pt agreeable to in bed therex due to pt reporting she did not feel up to getting out of bed after she was just up in the chair (OT assisted up to chair earlier) and was too cold. Educated pt on the importance of frequent mobilization to prevent deconditioning during

## 2025-02-12 NOTE — PLAN OF CARE
Problem: Occupational Therapy - Adult  Goal: By Discharge: Performs self-care activities at highest level of function for planned discharge setting.  See evaluation for individualized goals.  Description: FUNCTIONAL STATUS PRIOR TO ADMISSION:  Patient was ambulatory using no AD inside, SPC outside. She has rollator but doesn't use it. She is I to mod I for simple ADL and I-ADL. Son does assist to take out trash, drive her to appointments, and get groceries since patient hasn't driven since her fall recently.   ,  ,  ,  ,  ,  ,  ,  ,  , Prior Level of Assist for Transfers: Independent, Active : No     HOME SUPPORT: Patient lived alone with son to provide assistance for some I-ADL.    Occupational Therapy Goals:  Initiated 2/12/2025  1.  Patient will perform grooming standing at sink with no s/s with [place] within 7 day(s).  2.  Patient will perform bathing with Modified [place] within 7 day(s).  3.  Patient will perform gathering all items from high/low in prep fordressing with Modified [place] within 7 day(s).  4.  Patient will perform toilet transfers with Modified [place]  within 7 day(s).  5.  Patient will perform all aspects of toileting with Modified [place] within 7 day(s).  6.  Patient will perform light I-ADL tasks (make bed, open/close blinds, light cleaning/wiping countertops, etc) in room with mod I to I.   Outcome: Progressing    OCCUPATIONAL THERAPY EVALUATION    Patient: Ammy Tarango (89 y.o. female)  Date: 2/12/2025  Primary Diagnosis: Hyponatremia [E87.1]         Precautions: Fall Risk                  ASSESSMENT :  The patient is limited by decreased functional mobility, independence in ADLs, high-level IADLs, activity tolerance, endurance, balance, c/o malaise/fatigue and being cold. She was admitted with c/o lightheadedness and dizziness last evening .    Based on the impairments listed above this patient is currently CGA to I for simple ADL with D on kari

## 2025-02-12 NOTE — ED PROVIDER NOTES
[facility] EMERGENCY DEPARTMENT  EMERGENCY DEPARTMENT ENCOUNTER      Pt Name: Ammy Tarango  MRN: [id number]  Birthdate 1934  Date of evaluation: 2/12/2025  Provider: Mylene Sorto MD    CHIEF COMPLAINT       Chief Complaint   Patient presents with    Fatigue     Generalized weakness         HISTORY OF PRESENT ILLNESS   (Location/Symptom, Timing/Onset, Context/Setting, Quality, Duration, Modifying Factors, Severity)  Note limiting factors.   Pt is a 89 yo F w/hx of hypertension, diabetes, CKD, HLD who presents with generalized weakness.  Patient reports that she has a decreased appetite, but has felt very weak today.  Denies any room spinning sensation, reports that she has not had any sick contacts.  Denies any pain, laying supine in no respiratory distress.  Denies fevers, chills, nausea, vomiting at home.          Nursing Notes were reviewed.    REVIEW OF SYSTEMS    Not Required     Review of Systems    PAST MEDICAL HISTORY     Past Medical History:   Diagnosis Date    Adverse effect of anesthesia     pt states she was able to feel everything during colonoscopy    Chronic bronchitis (HCC)     Diabetes (HCC)     Essential hypertension     Hypercholesterolemia     Hypertension        SURGICAL HISTORY       Past Surgical History:   Procedure Laterality Date    CHOLECYSTECTOMY      COLONOSCOPY N/A 9/25/2017    COLONOSCOPY performed by Nikolay Oglesby MD at [facility] ENDOSCOPY    GYN      tubal ligation    HYSTERECTOMY (CERVIX STATUS UNKNOWN)         CURRENT MEDICATIONS       Previous Medications    ACETAMINOPHEN (TYLENOL) 325 MG TABLET    Take by mouth every 4 hours as needed    AMLODIPINE (NORVASC) 10 MG TABLET    Take 1 tablet by mouth daily    AMMONIUM LACTATE (LAC-HYDRIN) 5 % LOTN LOTION    Use BID    ATORVASTATIN (LIPITOR) 10 MG TABLET    Take 1 tablet by mouth daily    CALCIUM CARBONATE-VITAMIN D 600-3.125 MG-MCG TABS    Take 1 tablet by mouth daily    CHLORTHALIDONE (HYGROTON) 25 MG TABLET    Take

## 2025-02-12 NOTE — PROGRESS NOTES
4 Eyes Skin Assessment     NAME:  Ammy Tarango  YOB: 1934  MEDICAL RECORD NUMBER:  [id number]    The patient is being assessed for  Admission    I agree that at least one RN has performed a thorough Head to Toe Skin Assessment on the patient. ALL assessment sites listed below have been assessed.      Areas assessed by both nurses:    Head, Face, Ears, Shoulders, Back, Chest, Arms, Elbows, Hands, Sacrum. Buttock, Coccyx, Ischium, Legs. Feet and Heels, and Under Medical Devices         Does the Patient have a Wound? No noted wound(s)       Lorenzo Prevention initiated by RN: Yes  Wound Care Orders initiated by RN: No    Pressure Injury (Stage 3,4, Unstageable, DTI, NWPT, and Complex wounds) if present, place Wound referral order by RN under : No    New Ostomies, if present place, Ostomy referral order under : No     Nurse 1 eSignature: Electronically signed by Anupama Jay RN on 2/12/25 at 6:26 PM EST    **SHARE this note so that the co-signing nurse can place an eSignature**    Nurse 2 eSignature: Electronically signed by Frannie Agosto RN on 2/12/25 at 7:10 PM EST

## 2025-02-12 NOTE — CONSULTS
Hypercholesterolemia     Hypertension       PSH:  Past Surgical History:   Procedure Laterality Date    CHOLECYSTECTOMY      COLONOSCOPY N/A 2017    COLONOSCOPY performed by Nikolay Oglesby MD at [facility] ENDOSCOPY    GYN      tubal ligation    HYSTERECTOMY (CERVIX STATUS UNKNOWN)          Social history:   Social History       Tobacco History       Smoking Status  Never      Passive Exposure  Never      Smokeless Tobacco Use  Never              Alcohol History       Alcohol Use Status  No              Drug Use       Drug Use Status  No              Sexual Activity       Sexually Active  Not Currently Partners  Male Comment   had 6 grown children (2 )                     Family history:  No history of CKD or ESRD in the family.    Allergies   Allergen Reactions    Cortisone Other (See Comments)     \"Make me feels weak, like I'm going to die\"    Sitagliptin Other (See Comments)     weakness       Current Facility-Administered Medications   Medication Dose Route Frequency Provider Last Rate Last Admin    glucose chewable tablet 16 g  4 tablet Oral PRN Mylene Sorto MD        dextrose bolus 10% 125 mL  125 mL IntraVENous PRN Mylene Sorto MD        Or    dextrose bolus 10% 250 mL  250 mL IntraVENous PRN Mylene Sorto MD        glucagon injection 1 mg  1 mg SubCUTAneous PRN Mylene Sorto MD        dextrose 10 % infusion   IntraVENous Continuous PRN Mylene Sorto MD        sodium chloride flush 0.9 % injection 5-40 mL  5-40 mL IntraVENous 2 times per day Austin Cueto APRN - NP   10 mL at 25 0927    sodium chloride flush 0.9 % injection 5-40 mL  5-40 mL IntraVENous PRN Austin Cueto APRN - NP        0.9 % sodium chloride infusion   IntraVENous PRN Austin Cueto APRN - NP        heparin (porcine) injection 5,000 Units  5,000 Units SubCUTAneous 3 times per day Austin Cueto APRN - NP   5,000 Units at 25 1442    ondansetron (ZOFRAN-ODT) disintegrating tablet 4 mg  4 mg  daily 90 tablet 0    atorvastatin (LIPITOR) 10 MG tablet Take 1 tablet by mouth daily 90 tablet 1    hydrOXYzine HCl (ATARAX) 10 MG tablet Take 1 tablet by mouth at bedtime 90 tablet 0    Metoprolol Tartrate 75 MG TABS TAKE TWO TABLETS BY MOUTH 2 TIMES A  tablet 0    lisinopril (PRINIVIL;ZESTRIL) 40 MG tablet Take 1 tablet by mouth daily 30 tablet 3    spironolactone (ALDACTONE) 50 MG tablet Take 1 tablet by mouth daily 30 tablet 3    amLODIPine (NORVASC) 10 MG tablet Take 1 tablet by mouth daily 90 tablet 1    cloNIDine (CATAPRES) 0.3 MG/24HR PTWK Place 1 patch onto the skin once a week 4 patch 2    hydrALAZINE (APRESOLINE) 100 MG tablet Take 1 tablet by mouth every 8 hours 270 tablet 0    omeprazole (PRILOSEC) 40 MG delayed release capsule TAKE 1 CAPSULE BY MOUTH ONCE DAILY BEFORE BREAKFAST 90 capsule 0    chlorthalidone (HYGROTON) 25 MG tablet Take 1 tablet by mouth daily 90 tablet 1    dapagliflozin (FARXIGA) 10 MG tablet Take 1 tablet by mouth daily 90 tablet 1    loratadine (CLARITIN) 10 MG tablet Take 1 tablet by mouth nightly 90 tablet 1    tamsulosin (FLOMAX) 0.4 MG capsule Take 1 capsule by mouth daily 90 capsule 1    mirtazapine (REMERON) 15 MG tablet Take 1 tablet by mouth nightly 90 tablet 1    pantoprazole (PROTONIX) 40 MG tablet Take 1 tablet by mouth every morning (before breakfast) 30 tablet 3    metFORMIN (GLUCOPHAGE-XR) 500 MG extended release tablet TAKE TWO TABLETS BY MOUTH 2 TIMES ADAY 120 tablet 0    Elastic Bandages & Supports (KNEE BRACE) MISC Use for right knee 1 each 0    glipiZIDE (GLUCOTROL XL) 5 MG extended release tablet TAKE ONE TABLET BY MOUTH EVERY DAY 90 tablet 0    ammonium lactate (LAC-HYDRIN) 5 % LOTN lotion Use BID 1 each 3    acetaminophen (TYLENOL) 325 MG tablet Take by mouth every 4 hours as needed      Calcium Carbonate-Vitamin D 600-3.125 MG-MCG TABS Take 1 tablet by mouth daily      vitamin D (CHOLECALCIFEROL) 25 MCG (1000 UT) TABS tablet Take by mouth daily

## 2025-02-12 NOTE — H&P
History and Physical    Date of Service:  2/12/2025  Primary Care Provider: Freda Tolentino MD  Source of information: patient and electronic medical record    Chief Complaint: Fatigue (Generalized weakness)      History of Presenting Illness:   Ammy Tarango is a 89 y.o. female with a past medical history significant for hypertension, type 2 diabetes, CKD, hyperlipidemia as well as hypertension.  She presents to the emergency department at our facility with complaints of dizziness, generalized weakness.  Workup in the emergency department revealed sodium of 118, potassium of 6.6 which was treated with insulin and D50.  Creatinine noted at 2.5.  EKG was unremarkable.  Vital signs stable, chest radiograph with no acute process.  She was referred to the hospitalist service for further management of acute kidney injury, hyperkalemia, hyponatremia.  At the moment of the initial interview she was cooperative with the exam.  She denies fever, chills, nausea, vomiting, diarrhea.  She also denies pain however does endorse general malaise    The patient denies any headache, blurry vision, sore throat, trouble swallowing, trouble with speech, chest pain, SOB, cough, fever, chills, N/V/D, abd pain, urinary symptoms, constipation, recent travels, sick contacts, focal or generalized neurological symptoms, falls, injuries, rashes, contact with COVID-19 diagnosed patients, hematemesis, melena, hemoptysis, hematuria, rashes, denies starting any new medications and denies any other concerns or problems besides as mentioned above.         REVIEW OF SYSTEMS:  A comprehensive review of systems was negative except for that written in the History of Present Illness.     Past Medical History:   Diagnosis Date    Adverse effect of anesthesia     pt states she was able to feel everything during colonoscopy    Chronic bronchitis (HCC)     Diabetes (HCC)     Essential hypertension     Hypercholesterolemia     Hypertension

## 2025-02-12 NOTE — ED TRIAGE NOTES
Pt BIBA from home for co worsening lightheadedness and dizziness since yesterday evening. Pt is pleasant and cooperative. Pt is in NAD.

## 2025-02-13 LAB
ANION GAP SERPL CALC-SCNC: 6 MMOL/L (ref 2–12)
ANION GAP SERPL CALC-SCNC: 7 MMOL/L (ref 2–12)
BUN SERPL-MCNC: 28 MG/DL (ref 6–20)
BUN SERPL-MCNC: 30 MG/DL (ref 6–20)
BUN/CREAT SERPL: 17 (ref 12–20)
BUN/CREAT SERPL: 18 (ref 12–20)
CALCIUM SERPL-MCNC: 8.8 MG/DL (ref 8.5–10.1)
CALCIUM SERPL-MCNC: 9.3 MG/DL (ref 8.5–10.1)
CHLORIDE SERPL-SCNC: 92 MMOL/L (ref 97–108)
CHLORIDE SERPL-SCNC: 96 MMOL/L (ref 97–108)
CO2 SERPL-SCNC: 24 MMOL/L (ref 21–32)
CO2 SERPL-SCNC: 25 MMOL/L (ref 21–32)
CREAT SERPL-MCNC: 1.63 MG/DL (ref 0.55–1.02)
CREAT SERPL-MCNC: 1.65 MG/DL (ref 0.55–1.02)
EST. AVERAGE GLUCOSE BLD GHB EST-MCNC: 272 MG/DL
GLUCOSE BLD STRIP.AUTO-MCNC: 184 MG/DL (ref 65–117)
GLUCOSE BLD STRIP.AUTO-MCNC: 201 MG/DL (ref 65–117)
GLUCOSE BLD STRIP.AUTO-MCNC: 281 MG/DL (ref 65–117)
GLUCOSE BLD STRIP.AUTO-MCNC: 289 MG/DL (ref 65–117)
GLUCOSE SERPL-MCNC: 156 MG/DL (ref 65–100)
GLUCOSE SERPL-MCNC: 278 MG/DL (ref 65–100)
HBA1C MFR BLD: 11.1 % (ref 4–5.6)
POTASSIUM SERPL-SCNC: 4.9 MMOL/L (ref 3.5–5.1)
POTASSIUM SERPL-SCNC: 4.9 MMOL/L (ref 3.5–5.1)
SERVICE CMNT-IMP: ABNORMAL
SODIUM SERPL-SCNC: 123 MMOL/L (ref 136–145)
SODIUM SERPL-SCNC: 127 MMOL/L (ref 136–145)

## 2025-02-13 PROCEDURE — 1200000000 HC SEMI PRIVATE

## 2025-02-13 PROCEDURE — 82962 GLUCOSE BLOOD TEST: CPT

## 2025-02-13 PROCEDURE — 80048 BASIC METABOLIC PNL TOTAL CA: CPT

## 2025-02-13 PROCEDURE — 2500000003 HC RX 250 WO HCPCS: Performed by: INTERNAL MEDICINE

## 2025-02-13 PROCEDURE — 6370000000 HC RX 637 (ALT 250 FOR IP)

## 2025-02-13 PROCEDURE — 6370000000 HC RX 637 (ALT 250 FOR IP): Performed by: NURSE PRACTITIONER

## 2025-02-13 PROCEDURE — 83036 HEMOGLOBIN GLYCOSYLATED A1C: CPT

## 2025-02-13 PROCEDURE — 6360000002 HC RX W HCPCS: Performed by: INTERNAL MEDICINE

## 2025-02-13 PROCEDURE — 6360000002 HC RX W HCPCS: Performed by: NURSE PRACTITIONER

## 2025-02-13 RX ADMIN — HEPARIN SODIUM 5000 UNITS: 5000 INJECTION INTRAVENOUS; SUBCUTANEOUS at 14:05

## 2025-02-13 RX ADMIN — INSULIN LISPRO 2 UNITS: 100 INJECTION, SOLUTION INTRAVENOUS; SUBCUTANEOUS at 17:31

## 2025-02-13 RX ADMIN — Medication 3 MG: at 22:17

## 2025-02-13 RX ADMIN — INSULIN LISPRO 4 UNITS: 100 INJECTION, SOLUTION INTRAVENOUS; SUBCUTANEOUS at 21:43

## 2025-02-13 RX ADMIN — ATORVASTATIN CALCIUM 10 MG: 20 TABLET, FILM COATED ORAL at 08:39

## 2025-02-13 RX ADMIN — HEPARIN SODIUM 5000 UNITS: 5000 INJECTION INTRAVENOUS; SUBCUTANEOUS at 21:42

## 2025-02-13 RX ADMIN — INSULIN LISPRO 2 UNITS: 100 INJECTION, SOLUTION INTRAVENOUS; SUBCUTANEOUS at 08:40

## 2025-02-13 RX ADMIN — PANTOPRAZOLE SODIUM 40 MG: 40 TABLET, DELAYED RELEASE ORAL at 07:20

## 2025-02-13 RX ADMIN — HEPARIN SODIUM 5000 UNITS: 5000 INJECTION INTRAVENOUS; SUBCUTANEOUS at 07:20

## 2025-02-13 RX ADMIN — WATER 1000 MG: 1 INJECTION INTRAMUSCULAR; INTRAVENOUS; SUBCUTANEOUS at 12:25

## 2025-02-13 RX ADMIN — MIRTAZAPINE 15 MG: 15 TABLET, FILM COATED ORAL at 21:42

## 2025-02-13 RX ADMIN — INSULIN LISPRO 4 UNITS: 100 INJECTION, SOLUTION INTRAVENOUS; SUBCUTANEOUS at 12:26

## 2025-02-13 RX ADMIN — CETIRIZINE HYDROCHLORIDE 5 MG: 10 TABLET ORAL at 08:39

## 2025-02-13 NOTE — PROGRESS NOTES
Occupational Therapy   02.13.2025    Chart reviewed in prep for OT, patient received in bed with daughter bedside. Patient politely declining as she was just finishing breakfast and concerned about ordering lunch. Assisted with lunch order. Will follow up as able. Thank you.     Magdalena Piña MS, OTR/L

## 2025-02-13 NOTE — PROGRESS NOTES
Name: Ammy Tarango   MRN: [id number]  : 1934    Nephrology Progress Note    Assessment:  VILMA: Serum Cr 2.5mg/dl on admission. Serum Cr improving to 1.6mg/dl with IVF. Suspect relative pre-renal state + RASi/MRA. +urinary retention     Hyperkalemia: severe. Improving. VILMA/RASi/MRA etc     Hyponatremia: symptomatic. Admission Na 118-> 124 to 127s/p IVF. +Hypovolemic hyponatremia     Metabolic acidosis:+NAGMA-> improving with IV Bicarb     UTI: Cx growing GNR     HTN: stable     DM2: poorly controlled as evidenced by HgbA1c 11.1    Plan/Recommendations:  Hold IVF to help ease Na correction  Repeat BMP at noon  Start IV Rocephin  Control bld sugars  Low K diet  Strict I/Os  Avoid nephrotoxins  Am labs      Subjective:  Feeling better. Daughter at bedside.     ROS:   No nausea, no vomiting  No chest pain, no shortness of breath    Exam:  /60   Pulse 78   Temp 98.1 °F (36.7 °C) (Oral)   Resp 16   Ht 1.575 m (5' 2\")   Wt 73.2 kg (161 lb 6 oz)   SpO2 98%   BMI 29.52 kg/m²     Gen: NAD/Elderly  HEENT: AT/NC  Neck: No JVD  Lungs/Chest wall: Clear. Equal BS. No respiratory distress  Cardiovascular: Regular rate, normal rhythm.   Abdomen/: Soft, NT,  +Riddle  Ext: No peripheral edema  CNS: alert and awake.     Current Facility-Administered Medications   Medication Dose Route Frequency Provider Last Rate Last Admin    glucose chewable tablet 16 g  4 tablet Oral PRN Mylene Sorto MD        dextrose bolus 10% 125 mL  125 mL IntraVENous PRN Mylene Sorto MD        Or    dextrose bolus 10% 250 mL  250 mL IntraVENous PRN Mylene Sorto MD        glucagon injection 1 mg  1 mg SubCUTAneous PRN Mylene Sorto MD        dextrose 10 % infusion   IntraVENous Continuous PRN Mylene Sorto MD        sodium chloride flush 0.9 % injection 5-40 mL  5-40 mL IntraVENous

## 2025-02-13 NOTE — PROGRESS NOTES
Comprehensive Nutrition Assessment    Type and Reason for Visit: Initial, Positive nutrition screen    Nutrition Recommendations/Plan:   Adjusted diet to Regular, 4CHO, VISH, low potassium    Honor pt meal preferences and assist with ordering meals prn   Glucerna BID  Adjust insulin regimen to promote euglycemia, consider DM management team consult to assist with BG control prn  Please document % intake of meals in flowsheets   Please obtain an updated standing scaled wt as able       Malnutrition Assessment:  Malnutrition Status:  At risk for malnutrition (02/13/25 1255)    Context:  Acute Illness     Findings of the 6 clinical characteristics of malnutrition:  Energy Intake:  75% or less of estimated energy requirements for 7 or more days  Weight Loss:  No weight loss (per EMR)     Body Fat Loss:  No body fat loss     Muscle Mass Loss:  No muscle mass loss (mild temporal indentation, likely age related)    Fluid Accumulation:  No fluid accumulation     Strength:  Not Performed       Nutrition Assessment:    PMH of HTN, T2DM, CKD, HLD, NKFA.     Presents with c/o dizziness/generalized weakness, admitted for hyponatremia. Pt also with hyperkalemia. Nephrology following, working to correct Na, K+ now WNL.     Chart reviewed for MST 2. RD visited pt and family at bedside. Pt endorses a poor appetite/intake ongoing over the past 3 months. Normally consumes a small amount of egg whites/toast for breakfast and a small lunch/dinner. Pt states that she has lost ~40-45# over the past 3-4 months. Reported wt loss not consistent with EMR, shows ~6% wt gain x 1 month and ~11% wt gain x 5 months. Possibly fluid related, continue to trend weights. Meal preferences obtained/recorded. Pt amenable to try ONS to better meet estimated needs. Adjusted diet to regular, 4CHO/meal, VISH, low potassium. Mild temporal wasting noted via NFPE however, likely age-related rather than nutrition related.       Nutritionally Significant

## 2025-02-14 ENCOUNTER — TELEPHONE (OUTPATIENT)
Age: 89
End: 2025-02-14

## 2025-02-14 DIAGNOSIS — M25.361 INSTABILITY OF RIGHT KNEE JOINT: ICD-10-CM

## 2025-02-14 DIAGNOSIS — N18.32 TYPE 2 DIABETES MELLITUS WITH STAGE 3B CHRONIC KIDNEY DISEASE, WITHOUT LONG-TERM CURRENT USE OF INSULIN (HCC): ICD-10-CM

## 2025-02-14 DIAGNOSIS — E11.22 TYPE 2 DIABETES MELLITUS WITH STAGE 3B CHRONIC KIDNEY DISEASE, WITHOUT LONG-TERM CURRENT USE OF INSULIN (HCC): ICD-10-CM

## 2025-02-14 DIAGNOSIS — I73.9 PERIPHERAL VASCULAR DISEASE (HCC): Primary | ICD-10-CM

## 2025-02-14 DIAGNOSIS — M19.041 PRIMARY OSTEOARTHRITIS OF BOTH HANDS: ICD-10-CM

## 2025-02-14 DIAGNOSIS — R09.89 LABILE HYPERTENSION: ICD-10-CM

## 2025-02-14 DIAGNOSIS — M19.042 PRIMARY OSTEOARTHRITIS OF BOTH HANDS: ICD-10-CM

## 2025-02-14 LAB
ALBUMIN SERPL-MCNC: 3.2 G/DL (ref 3.5–5)
ALBUMIN/GLOB SERPL: 0.8 (ref 1.1–2.2)
ALP SERPL-CCNC: 70 U/L (ref 45–117)
ALT SERPL-CCNC: 12 U/L (ref 12–78)
ANION GAP SERPL CALC-SCNC: 8 MMOL/L (ref 2–12)
AST SERPL-CCNC: 16 U/L (ref 15–37)
BACTERIA SPEC CULT: ABNORMAL
BILIRUB SERPL-MCNC: 0.4 MG/DL (ref 0.2–1)
BUN SERPL-MCNC: 28 MG/DL (ref 6–20)
BUN/CREAT SERPL: 21 (ref 12–20)
CALCIUM SERPL-MCNC: 9 MG/DL (ref 8.5–10.1)
CC UR VC: ABNORMAL
CHLORIDE SERPL-SCNC: 93 MMOL/L (ref 97–108)
CO2 SERPL-SCNC: 25 MMOL/L (ref 21–32)
CREAT SERPL-MCNC: 1.36 MG/DL (ref 0.55–1.02)
ERYTHROCYTE [DISTWIDTH] IN BLOOD BY AUTOMATED COUNT: 13.2 % (ref 11.5–14.5)
GLOBULIN SER CALC-MCNC: 3.8 G/DL (ref 2–4)
GLUCOSE BLD STRIP.AUTO-MCNC: 174 MG/DL (ref 65–117)
GLUCOSE BLD STRIP.AUTO-MCNC: 232 MG/DL (ref 65–117)
GLUCOSE BLD STRIP.AUTO-MCNC: 264 MG/DL (ref 65–117)
GLUCOSE BLD STRIP.AUTO-MCNC: 299 MG/DL (ref 65–117)
GLUCOSE SERPL-MCNC: 157 MG/DL (ref 65–100)
HCT VFR BLD AUTO: 33.1 % (ref 35–47)
HGB BLD-MCNC: 10.8 G/DL (ref 11.5–16)
MAGNESIUM SERPL-MCNC: 2.2 MG/DL (ref 1.6–2.4)
MCH RBC QN AUTO: 27.6 PG (ref 26–34)
MCHC RBC AUTO-ENTMCNC: 32.6 G/DL (ref 30–36.5)
MCV RBC AUTO: 84.7 FL (ref 80–99)
NRBC # BLD: 0 K/UL (ref 0–0.01)
NRBC BLD-RTO: 0 PER 100 WBC
PHOSPHATE SERPL-MCNC: 3.3 MG/DL (ref 2.6–4.7)
PLATELET # BLD AUTO: 213 K/UL (ref 150–400)
PMV BLD AUTO: 9.8 FL (ref 8.9–12.9)
POTASSIUM SERPL-SCNC: 4.6 MMOL/L (ref 3.5–5.1)
PROT SERPL-MCNC: 7 G/DL (ref 6.4–8.2)
RBC # BLD AUTO: 3.91 M/UL (ref 3.8–5.2)
SERVICE CMNT-IMP: ABNORMAL
SODIUM SERPL-SCNC: 126 MMOL/L (ref 136–145)
WBC # BLD AUTO: 5 K/UL (ref 3.6–11)

## 2025-02-14 PROCEDURE — 2500000003 HC RX 250 WO HCPCS: Performed by: NURSE PRACTITIONER

## 2025-02-14 PROCEDURE — 80053 COMPREHEN METABOLIC PANEL: CPT

## 2025-02-14 PROCEDURE — 84100 ASSAY OF PHOSPHORUS: CPT

## 2025-02-14 PROCEDURE — 2500000003 HC RX 250 WO HCPCS: Performed by: INTERNAL MEDICINE

## 2025-02-14 PROCEDURE — 85027 COMPLETE CBC AUTOMATED: CPT

## 2025-02-14 PROCEDURE — 6360000002 HC RX W HCPCS: Performed by: NURSE PRACTITIONER

## 2025-02-14 PROCEDURE — 6370000000 HC RX 637 (ALT 250 FOR IP): Performed by: NURSE PRACTITIONER

## 2025-02-14 PROCEDURE — 6360000002 HC RX W HCPCS: Performed by: INTERNAL MEDICINE

## 2025-02-14 PROCEDURE — 82962 GLUCOSE BLOOD TEST: CPT

## 2025-02-14 PROCEDURE — 83735 ASSAY OF MAGNESIUM: CPT

## 2025-02-14 PROCEDURE — 2580000003 HC RX 258: Performed by: INTERNAL MEDICINE

## 2025-02-14 PROCEDURE — 1200000000 HC SEMI PRIVATE

## 2025-02-14 RX ORDER — SODIUM CHLORIDE 9 MG/ML
INJECTION, SOLUTION INTRAVENOUS CONTINUOUS
Status: DISCONTINUED | OUTPATIENT
Start: 2025-02-14 | End: 2025-02-16

## 2025-02-14 RX ADMIN — SODIUM CHLORIDE: 9 INJECTION, SOLUTION INTRAVENOUS at 06:47

## 2025-02-14 RX ADMIN — SODIUM CHLORIDE, PRESERVATIVE FREE 10 ML: 5 INJECTION INTRAVENOUS at 09:45

## 2025-02-14 RX ADMIN — HEPARIN SODIUM 5000 UNITS: 5000 INJECTION INTRAVENOUS; SUBCUTANEOUS at 06:44

## 2025-02-14 RX ADMIN — PANTOPRAZOLE SODIUM 40 MG: 40 TABLET, DELAYED RELEASE ORAL at 06:44

## 2025-02-14 RX ADMIN — INSULIN LISPRO 2 UNITS: 100 INJECTION, SOLUTION INTRAVENOUS; SUBCUTANEOUS at 17:43

## 2025-02-14 RX ADMIN — HEPARIN SODIUM 5000 UNITS: 5000 INJECTION INTRAVENOUS; SUBCUTANEOUS at 13:23

## 2025-02-14 RX ADMIN — HEPARIN SODIUM 5000 UNITS: 5000 INJECTION INTRAVENOUS; SUBCUTANEOUS at 21:51

## 2025-02-14 RX ADMIN — INSULIN LISPRO 4 UNITS: 100 INJECTION, SOLUTION INTRAVENOUS; SUBCUTANEOUS at 13:23

## 2025-02-14 RX ADMIN — MIRTAZAPINE 15 MG: 15 TABLET, FILM COATED ORAL at 21:51

## 2025-02-14 RX ADMIN — CETIRIZINE HYDROCHLORIDE 5 MG: 10 TABLET ORAL at 09:45

## 2025-02-14 RX ADMIN — WATER 1000 MG: 1 INJECTION INTRAMUSCULAR; INTRAVENOUS; SUBCUTANEOUS at 13:23

## 2025-02-14 RX ADMIN — INSULIN LISPRO 4 UNITS: 100 INJECTION, SOLUTION INTRAVENOUS; SUBCUTANEOUS at 21:51

## 2025-02-14 RX ADMIN — ATORVASTATIN CALCIUM 10 MG: 20 TABLET, FILM COATED ORAL at 09:45

## 2025-02-14 ASSESSMENT — PAIN DESCRIPTION - LOCATION: LOCATION: BACK

## 2025-02-14 ASSESSMENT — PAIN DESCRIPTION - ORIENTATION: ORIENTATION: POSTERIOR

## 2025-02-14 ASSESSMENT — PAIN DESCRIPTION - DESCRIPTORS: DESCRIPTORS: ACHING

## 2025-02-14 ASSESSMENT — PAIN SCALES - GENERAL: PAINLEVEL_OUTOF10: 3

## 2025-02-14 NOTE — PROGRESS NOTES
Pt noted to be heavily asleep, not rousing well. Will continue to follow.     Brandee Ashton, DPT, PT

## 2025-02-14 NOTE — PROGRESS NOTES
Name: Ammy Tarango   MRN: [id number]  : 1934    Nephrology Progress Note    Assessment:  VILMA: Serum Cr 2.5mg/dl on admission. Serum Cr improving to 1.3mg/dl with IVF. Suspect relative pre-renal state + RASi/MRA. +urinary retention     Hyperkalemia: severe. Improving. VILMA/RASi/MRA etc     Hyponatremia: symptomatic. Admission Na 118-> 124 to 127 s/p IVF. +Hypovolemic hyponatremia. Had to slow down correction some on  by holding IVF.      Metabolic acidosis:+NAGMA-> improving with IV Bicarb     Proteus UTI:      HTN: stable     DM2: poorly controlled as evidenced by HgbA1c 11.1      Avoided overcorrection of serum Na by holding IVF yesterday. Na stable at 126 today.     Plan/Recommendations:  Resume IV NS at 50cc/hr  Should hopefully see ongoing improvement in Na and Cr  IV Rocephin  Control bld sugars  Low K diet -> liberate if K continues to improve  Strict I/Os  Avoid nephrotoxins  Am labs      Subjective:  Feeling better. Excellent UOP >3L yesterday.  RN at bedside.     ROS:   No nausea, no vomiting  No chest pain, no shortness of breath    Exam:  /60   Pulse 71   Temp 97.5 °F (36.4 °C) (Oral)   Resp 18   Ht 1.575 m (5' 2.01\")   Wt 73.2 kg (161 lb 6 oz)   SpO2 97%   BMI 29.51 kg/m²     Gen: NAD/Elderly  HEENT: AT/NC  Lungs/Chest wall: Clear. Equal BS. No respiratory distress  Cardiovascular: Regular rate, normal rhythm.   Abdomen/: Soft, NT,  +Riddle  Ext: No peripheral edema  CNS: alert and awake.     Current Facility-Administered Medications   Medication Dose Route Frequency Provider Last Rate Last Admin    0.9 % sodium chloride infusion   IntraVENous Continuous Sebastian Ramirez MD 50 mL/hr at 25 0647 New Bag at 25 0647    cefTRIAXone (ROCEPHIN) 1,000 mg in sterile water 10 mL IV syringe  1,000 mg IntraVENous Q24H Sebastian Ramirez MD

## 2025-02-14 NOTE — PROGRESS NOTES
A Spiritual Care Partner Volunteer visited Ammy Tarango at [facility] in [facility] 5E1 SURGICAL UNIT on 2/14/2025     Documented by: Chaplain Morelia Vee

## 2025-02-14 NOTE — PROGRESS NOTES
[facility] Adult  Hospitalist Group                                                                                          Hospitalist Progress Note  Mj Owens MD  Office Phone: [phone]        Date of Service:  2025  NAME:  Ammy Tarango  :  1934  MRN:  [id number]       Admission Summary:   Ammy Tarango is a 89 y.o. female with a past medical history significant for hypertension, type 2 diabetes, CKD, hyperlipidemia as well as hypertension.  She presents to the emergency department at our facility with complaints of dizziness, generalized weakness.  Workup in the emergency department revealed sodium of 118, potassium of 6.6 which was treated with insulin and D50.  Creatinine noted at 2.5.  EKG was unremarkable.  Vital signs stable, chest radiograph with no acute process.  She was referred to the hospitalist service for further management of acute kidney injury, hyperkalemia, hyponatremia.  At the moment of the initial interview she was cooperative with the exam.  She denies fever, chills, nausea, vomiting, diarrhea.  She also denies pain however does endorse general malaise          Interval history / Subjective:   Follow up hyponatremia   Feeling better  No new issus  Assessment & Plan:     Hyponatremia--continues to improve  -Continue to hold spironolactone and hydrochlorothiazide  -IV NS at 50 cc  -Appreciate Nephrology     Severe hyperkalemia--improving  Potassium 6.6 at presentation which was treated with insulin and D50  No EKG changes  Likely due to severe dehydration  -Monitor     Acute kidney injury--continues to improve  NAGMA--improving  Early in  had normal creatinine.  Creatinine this morning 2.5  Continue IV hydration  Avoiding nephrotoxins  Appreciate nephrology    Proteus UTI  -urine culture Proteus  -On ceftriaxone --     Hypertension: stable off antihypertensives for now  Type 2 diabetes: on SSI    PT/OT Home health       Code status: FULL

## 2025-02-15 LAB
ALBUMIN SERPL-MCNC: 3 G/DL (ref 3.5–5)
ALBUMIN/GLOB SERPL: 0.8 (ref 1.1–2.2)
ALP SERPL-CCNC: 67 U/L (ref 45–117)
ALT SERPL-CCNC: 16 U/L (ref 12–78)
ANION GAP SERPL CALC-SCNC: 10 MMOL/L (ref 2–12)
AST SERPL-CCNC: 16 U/L (ref 15–37)
BILIRUB SERPL-MCNC: 0.3 MG/DL (ref 0.2–1)
BUN SERPL-MCNC: 24 MG/DL (ref 6–20)
BUN/CREAT SERPL: 18 (ref 12–20)
CALCIUM SERPL-MCNC: 9.1 MG/DL (ref 8.5–10.1)
CHLORIDE SERPL-SCNC: 98 MMOL/L (ref 97–108)
CO2 SERPL-SCNC: 24 MMOL/L (ref 21–32)
CREAT SERPL-MCNC: 1.32 MG/DL (ref 0.55–1.02)
ERYTHROCYTE [DISTWIDTH] IN BLOOD BY AUTOMATED COUNT: 13.5 % (ref 11.5–14.5)
GLOBULIN SER CALC-MCNC: 3.7 G/DL (ref 2–4)
GLUCOSE BLD STRIP.AUTO-MCNC: 164 MG/DL (ref 65–117)
GLUCOSE BLD STRIP.AUTO-MCNC: 236 MG/DL (ref 65–117)
GLUCOSE BLD STRIP.AUTO-MCNC: 278 MG/DL (ref 65–117)
GLUCOSE BLD STRIP.AUTO-MCNC: 311 MG/DL (ref 65–117)
GLUCOSE BLD STRIP.AUTO-MCNC: 312 MG/DL (ref 65–117)
GLUCOSE SERPL-MCNC: 145 MG/DL (ref 65–100)
HCT VFR BLD AUTO: 32.1 % (ref 35–47)
HGB BLD-MCNC: 10.4 G/DL (ref 11.5–16)
MAGNESIUM SERPL-MCNC: 2.1 MG/DL (ref 1.6–2.4)
MCH RBC QN AUTO: 27.7 PG (ref 26–34)
MCHC RBC AUTO-ENTMCNC: 32.4 G/DL (ref 30–36.5)
MCV RBC AUTO: 85.4 FL (ref 80–99)
NRBC # BLD: 0 K/UL (ref 0–0.01)
NRBC BLD-RTO: 0 PER 100 WBC
PHOSPHATE SERPL-MCNC: 3.3 MG/DL (ref 2.6–4.7)
PLATELET # BLD AUTO: 190 K/UL (ref 150–400)
PMV BLD AUTO: 9.9 FL (ref 8.9–12.9)
POTASSIUM SERPL-SCNC: 4.6 MMOL/L (ref 3.5–5.1)
PROT SERPL-MCNC: 6.7 G/DL (ref 6.4–8.2)
RBC # BLD AUTO: 3.76 M/UL (ref 3.8–5.2)
SERVICE CMNT-IMP: ABNORMAL
SODIUM SERPL-SCNC: 132 MMOL/L (ref 136–145)
WBC # BLD AUTO: 4.1 K/UL (ref 3.6–11)

## 2025-02-15 PROCEDURE — 6360000002 HC RX W HCPCS: Performed by: INTERNAL MEDICINE

## 2025-02-15 PROCEDURE — 6370000000 HC RX 637 (ALT 250 FOR IP): Performed by: NURSE PRACTITIONER

## 2025-02-15 PROCEDURE — 85027 COMPLETE CBC AUTOMATED: CPT

## 2025-02-15 PROCEDURE — 2500000003 HC RX 250 WO HCPCS: Performed by: INTERNAL MEDICINE

## 2025-02-15 PROCEDURE — 2500000003 HC RX 250 WO HCPCS: Performed by: NURSE PRACTITIONER

## 2025-02-15 PROCEDURE — 6360000002 HC RX W HCPCS: Performed by: NURSE PRACTITIONER

## 2025-02-15 PROCEDURE — 80053 COMPREHEN METABOLIC PANEL: CPT

## 2025-02-15 PROCEDURE — 51798 US URINE CAPACITY MEASURE: CPT

## 2025-02-15 PROCEDURE — 83735 ASSAY OF MAGNESIUM: CPT

## 2025-02-15 PROCEDURE — 84100 ASSAY OF PHOSPHORUS: CPT

## 2025-02-15 PROCEDURE — 6370000000 HC RX 637 (ALT 250 FOR IP)

## 2025-02-15 PROCEDURE — 2580000003 HC RX 258: Performed by: INTERNAL MEDICINE

## 2025-02-15 PROCEDURE — 82962 GLUCOSE BLOOD TEST: CPT

## 2025-02-15 PROCEDURE — 1200000000 HC SEMI PRIVATE

## 2025-02-15 RX ADMIN — HEPARIN SODIUM 5000 UNITS: 5000 INJECTION INTRAVENOUS; SUBCUTANEOUS at 15:02

## 2025-02-15 RX ADMIN — HEPARIN SODIUM 5000 UNITS: 5000 INJECTION INTRAVENOUS; SUBCUTANEOUS at 22:21

## 2025-02-15 RX ADMIN — MIRTAZAPINE 15 MG: 15 TABLET, FILM COATED ORAL at 22:20

## 2025-02-15 RX ADMIN — INSULIN LISPRO 2 UNITS: 100 INJECTION, SOLUTION INTRAVENOUS; SUBCUTANEOUS at 17:20

## 2025-02-15 RX ADMIN — CETIRIZINE HYDROCHLORIDE 5 MG: 10 TABLET ORAL at 08:58

## 2025-02-15 RX ADMIN — INSULIN LISPRO 6 UNITS: 100 INJECTION, SOLUTION INTRAVENOUS; SUBCUTANEOUS at 22:25

## 2025-02-15 RX ADMIN — HEPARIN SODIUM 5000 UNITS: 5000 INJECTION INTRAVENOUS; SUBCUTANEOUS at 06:14

## 2025-02-15 RX ADMIN — SODIUM CHLORIDE, PRESERVATIVE FREE 10 ML: 5 INJECTION INTRAVENOUS at 22:26

## 2025-02-15 RX ADMIN — ATORVASTATIN CALCIUM 10 MG: 20 TABLET, FILM COATED ORAL at 08:59

## 2025-02-15 RX ADMIN — INSULIN LISPRO 4 UNITS: 100 INJECTION, SOLUTION INTRAVENOUS; SUBCUTANEOUS at 12:22

## 2025-02-15 RX ADMIN — SODIUM CHLORIDE: 9 INJECTION, SOLUTION INTRAVENOUS at 05:00

## 2025-02-15 RX ADMIN — Medication 3 MG: at 00:41

## 2025-02-15 RX ADMIN — WATER 1000 MG: 1 INJECTION INTRAMUSCULAR; INTRAVENOUS; SUBCUTANEOUS at 12:22

## 2025-02-15 RX ADMIN — PANTOPRAZOLE SODIUM 40 MG: 40 TABLET, DELAYED RELEASE ORAL at 06:21

## 2025-02-15 ASSESSMENT — PAIN SCALES - GENERAL: PAINLEVEL_OUTOF10: 0

## 2025-02-15 NOTE — PROGRESS NOTES
Name: Ammy Tarango   MRN: [id number]  : 1934    Nephrology Progress Note    Assessment:  VILMA: Serum Cr 2.5mg/dl on admission. Serum Cr improving to 1.3mg/dl with IVF. Suspect relative pre-renal state + RASi/MRA. +urinary retention     Hyperkalemia: severe.2nd to VLIMA/RASi/MRA etc>>RESOLVED     Hyponatremia: symptomatic. Admission Na 118-> 124 to 127 s/p IVF. +Hypovolemic hyponatremia. Had to slow down correction some on  by holding IVF.      Metabolic acidosis:+NAGMA-> improving with IV Bicarb     Proteus UTI:      HTN: stable     DM2: poorly controlled as evidenced by HgbA1c 11.1      Avoided overcorrection of serum Na by holding IVF yesterday. Na improved to 132 today    Plan/Recommendations:  Ct  IV NS at 50cc/hr- d/c by tomm if oral intake is adequate  IV Rocephin  Control bld sugars  Can liberate diet a bit, as she not eating much with her current diet order  Avoid nephrotoxins  Am labs      Subjective:  Resting.  Feels okay.  Does not like the food  Appetite is okay  No nausea or vomiting    Exam:  /70   Pulse 84   Temp 97.5 °F (36.4 °C) (Oral)   Resp 18   Ht 1.575 m (5' 2.01\")   Wt 73.2 kg (161 lb 6 oz)   SpO2 93%   BMI 29.51 kg/m²     Gen: NAD/Elderly  HEENT: AT/NC  Ext: No peripheral edema  CNS: alert and awake.     Current Facility-Administered Medications   Medication Dose Route Frequency Provider Last Rate Last Admin    [Held by provider] 0.9 % sodium chloride infusion   IntraVENous Continuous Sebastian Ramirez MD 50 mL/hr at 02/15/25 0500 New Bag at 02/15/25 0500    melatonin tablet 3 mg  3 mg Oral Nightly PRN Gracie Guillermo APRN - NP   3 mg at 02/15/25 0041    glucose chewable tablet 16 g  4 tablet Oral PRN Mylene Sorto MD        dextrose bolus 10% 125 mL  125 mL IntraVENous PRN Mylene Sorto MD        Or    dextrose bolus 10%  250 mL  250 mL IntraVENous PRN Mylene Sorto MD        glucagon injection 1 mg  1 mg SubCUTAneous PRN Mylene Sorto MD        dextrose 10 % infusion   IntraVENous Continuous PRN Mylene Sorto MD        sodium chloride flush 0.9 % injection 5-40 mL  5-40 mL IntraVENous 2 times per day Austin Cueto APRN - NP   10 mL at 02/14/25 0945    sodium chloride flush 0.9 % injection 5-40 mL  5-40 mL IntraVENous PRN Austin Cueto APRN - NP        0.9 % sodium chloride infusion   IntraVENous PRN Austin Cueto APRN - NP        heparin (porcine) injection 5,000 Units  5,000 Units SubCUTAneous 3 times per day Austin Cueto APRN - NP   5,000 Units at 02/15/25 0614    ondansetron (ZOFRAN-ODT) disintegrating tablet 4 mg  4 mg Oral Q8H PRN Austin Cueto APRN - NP        Or    ondansetron (ZOFRAN) injection 4 mg  4 mg IntraVENous Q6H PRN Austin Cueto APRN - NP        polyethylene glycol (GLYCOLAX) packet 17 g  17 g Oral Daily PRN Austin Cueto APRN - NP        acetaminophen (TYLENOL) tablet 650 mg  650 mg Oral Q6H PRN Austin Cueto APRN - NP   650 mg at 02/12/25 0925    Or    acetaminophen (TYLENOL) suppository 650 mg  650 mg Rectal Q6H PRN Austin Cueto APRN - JESSI        pantoprazole (PROTONIX) tablet 40 mg  40 mg Oral QAM AC Austin Cueto APRN - NP   40 mg at 02/15/25 0621    cetirizine (ZYRTEC) tablet 5 mg  5 mg Oral Daily Austin Cueto APRN - NP   5 mg at 02/15/25 0858    mirtazapine (REMERON) tablet 15 mg  15 mg Oral Nightly Austin Cueto APRN - NP   15 mg at 02/14/25 2151    atorvastatin (LIPITOR) tablet 10 mg  10 mg Oral Daily Austin Cueto APRN - NP   10 mg at 02/15/25 0859    glucose chewable tablet 16 g  4 tablet Oral PRN Austin Cueto APRN - NP        dextrose bolus 10% 125 mL  125 mL IntraVENous PRN Austin Cueto APRN - NP        Or    dextrose bolus 10% 250 mL  250 mL IntraVENous PRN Austin Cueto APRN - NP        glucagon injection 1 mg  1 mg SubCUTAneous PRN [place],

## 2025-02-15 NOTE — PROGRESS NOTES
found for: \"RBCF\"   No results for input(s): \"PH\", \"PCO2\", \"PO2\" in the last 72 hours.  No results for input(s): \"CPK\" in the last 72 hours.    Invalid input(s): \"CPKMB\", \"CKNDX\", \"TROIQ\"  Lab Results   Component Value Date/Time    CHOL 150 03/25/2024 08:19 AM    HDL 50 03/25/2024 08:19 AM    LDL 85.2 03/25/2024 08:19 AM     No results found for: \"GLUCPOC\"  [unfilled]    Notes reviewed from all clinical/nonclinical/nursing services involved in patient's clinical care. Care coordination discussions were held with appropriate clinical/nonclinical/ nursing providers based on care coordination needs.         Patients current active Medications were reviewed, considered, added and adjusted based on the clinical condition today.      Home Medications were reconciled to the best of my ability given all available resources at the time of admission. Route is PO if not otherwise noted.      Admission Status:[id number]:::1}      Medications Reviewed:     Current Facility-Administered Medications   Medication Dose Route Frequency    [Held by provider] 0.9 % sodium chloride infusion   IntraVENous Continuous    melatonin tablet 3 mg  3 mg Oral Nightly PRN    glucose chewable tablet 16 g  4 tablet Oral PRN    dextrose bolus 10% 125 mL  125 mL IntraVENous PRN    Or    dextrose bolus 10% 250 mL  250 mL IntraVENous PRN    glucagon injection 1 mg  1 mg SubCUTAneous PRN    dextrose 10 % infusion   IntraVENous Continuous PRN    sodium chloride flush 0.9 % injection 5-40 mL  5-40 mL IntraVENous 2 times per day    sodium chloride flush 0.9 % injection 5-40 mL  5-40 mL IntraVENous PRN    0.9 % sodium chloride infusion   IntraVENous PRN    heparin (porcine) injection 5,000 Units  5,000 Units SubCUTAneous 3 times per day    ondansetron (ZOFRAN-ODT) disintegrating tablet 4 mg  4 mg Oral Q8H PRN    Or    ondansetron (ZOFRAN) injection 4 mg  4 mg IntraVENous Q6H PRN    polyethylene glycol (GLYCOLAX) packet 17 g  17 g Oral Daily PRN    acetaminophen

## 2025-02-16 LAB
ALBUMIN SERPL-MCNC: 3.2 G/DL (ref 3.5–5)
ALBUMIN/GLOB SERPL: 0.8 (ref 1.1–2.2)
ALP SERPL-CCNC: 71 U/L (ref 45–117)
ALT SERPL-CCNC: 19 U/L (ref 12–78)
ANION GAP SERPL CALC-SCNC: 7 MMOL/L (ref 2–12)
AST SERPL-CCNC: 13 U/L (ref 15–37)
BILIRUB SERPL-MCNC: 0.2 MG/DL (ref 0.2–1)
BUN SERPL-MCNC: 25 MG/DL (ref 6–20)
BUN/CREAT SERPL: 18 (ref 12–20)
CALCIUM SERPL-MCNC: 9 MG/DL (ref 8.5–10.1)
CHLORIDE SERPL-SCNC: 101 MMOL/L (ref 97–108)
CO2 SERPL-SCNC: 25 MMOL/L (ref 21–32)
CREAT SERPL-MCNC: 1.39 MG/DL (ref 0.55–1.02)
ERYTHROCYTE [DISTWIDTH] IN BLOOD BY AUTOMATED COUNT: 13.6 % (ref 11.5–14.5)
GLOBULIN SER CALC-MCNC: 3.9 G/DL (ref 2–4)
GLUCOSE BLD STRIP.AUTO-MCNC: 185 MG/DL (ref 65–117)
GLUCOSE BLD STRIP.AUTO-MCNC: 245 MG/DL (ref 65–117)
GLUCOSE BLD STRIP.AUTO-MCNC: 276 MG/DL (ref 65–117)
GLUCOSE BLD STRIP.AUTO-MCNC: 294 MG/DL (ref 65–117)
GLUCOSE SERPL-MCNC: 220 MG/DL (ref 65–100)
HCT VFR BLD AUTO: 32.7 % (ref 35–47)
HGB BLD-MCNC: 10.4 G/DL (ref 11.5–16)
MAGNESIUM SERPL-MCNC: 2 MG/DL (ref 1.6–2.4)
MCH RBC QN AUTO: 27.4 PG (ref 26–34)
MCHC RBC AUTO-ENTMCNC: 31.8 G/DL (ref 30–36.5)
MCV RBC AUTO: 86.1 FL (ref 80–99)
NRBC # BLD: 0 K/UL (ref 0–0.01)
NRBC BLD-RTO: 0 PER 100 WBC
PHOSPHATE SERPL-MCNC: 2.7 MG/DL (ref 2.6–4.7)
PLATELET # BLD AUTO: 208 K/UL (ref 150–400)
PMV BLD AUTO: 9.7 FL (ref 8.9–12.9)
POTASSIUM SERPL-SCNC: 4.6 MMOL/L (ref 3.5–5.1)
PROT SERPL-MCNC: 7.1 G/DL (ref 6.4–8.2)
RBC # BLD AUTO: 3.8 M/UL (ref 3.8–5.2)
SERVICE CMNT-IMP: ABNORMAL
SODIUM SERPL-SCNC: 133 MMOL/L (ref 136–145)
WBC # BLD AUTO: 5 K/UL (ref 3.6–11)

## 2025-02-16 PROCEDURE — 6370000000 HC RX 637 (ALT 250 FOR IP): Performed by: NURSE PRACTITIONER

## 2025-02-16 PROCEDURE — 6360000002 HC RX W HCPCS: Performed by: NURSE PRACTITIONER

## 2025-02-16 PROCEDURE — 85027 COMPLETE CBC AUTOMATED: CPT

## 2025-02-16 PROCEDURE — 84100 ASSAY OF PHOSPHORUS: CPT

## 2025-02-16 PROCEDURE — 51798 US URINE CAPACITY MEASURE: CPT

## 2025-02-16 PROCEDURE — 51701 INSERT BLADDER CATHETER: CPT

## 2025-02-16 PROCEDURE — 82962 GLUCOSE BLOOD TEST: CPT

## 2025-02-16 PROCEDURE — 1200000000 HC SEMI PRIVATE

## 2025-02-16 PROCEDURE — 83735 ASSAY OF MAGNESIUM: CPT

## 2025-02-16 PROCEDURE — 80053 COMPREHEN METABOLIC PANEL: CPT

## 2025-02-16 PROCEDURE — 51702 INSERT TEMP BLADDER CATH: CPT

## 2025-02-16 RX ORDER — GLIPIZIDE 5 MG/1
5 TABLET ORAL
Status: DISCONTINUED | OUTPATIENT
Start: 2025-02-16 | End: 2025-02-18 | Stop reason: HOSPADM

## 2025-02-16 RX ADMIN — INSULIN LISPRO 2 UNITS: 100 INJECTION, SOLUTION INTRAVENOUS; SUBCUTANEOUS at 12:16

## 2025-02-16 RX ADMIN — HEPARIN SODIUM 5000 UNITS: 5000 INJECTION INTRAVENOUS; SUBCUTANEOUS at 06:23

## 2025-02-16 RX ADMIN — PANTOPRAZOLE SODIUM 40 MG: 40 TABLET, DELAYED RELEASE ORAL at 06:23

## 2025-02-16 RX ADMIN — INSULIN LISPRO 2 UNITS: 100 INJECTION, SOLUTION INTRAVENOUS; SUBCUTANEOUS at 06:27

## 2025-02-16 RX ADMIN — HEPARIN SODIUM 5000 UNITS: 5000 INJECTION INTRAVENOUS; SUBCUTANEOUS at 14:55

## 2025-02-16 RX ADMIN — INSULIN LISPRO 4 UNITS: 100 INJECTION, SOLUTION INTRAVENOUS; SUBCUTANEOUS at 21:19

## 2025-02-16 RX ADMIN — CETIRIZINE HYDROCHLORIDE 5 MG: 10 TABLET ORAL at 09:02

## 2025-02-16 RX ADMIN — MIRTAZAPINE 15 MG: 15 TABLET, FILM COATED ORAL at 21:19

## 2025-02-16 RX ADMIN — HEPARIN SODIUM 5000 UNITS: 5000 INJECTION INTRAVENOUS; SUBCUTANEOUS at 21:18

## 2025-02-16 RX ADMIN — INSULIN LISPRO 2 UNITS: 100 INJECTION, SOLUTION INTRAVENOUS; SUBCUTANEOUS at 18:31

## 2025-02-16 RX ADMIN — ATORVASTATIN CALCIUM 10 MG: 20 TABLET, FILM COATED ORAL at 09:02

## 2025-02-16 NOTE — PROGRESS NOTES
[facility]                                                                                          Hospitalist Progress Note  Shayla Moreland MD  Office Phone: [phone]        Date of Service:  2025  NAME:  Ammy Tarango  :  1934  MRN:  [id number]       Admission Summary:   Ammy Tarango is a 89 y.o. female with a past medical history significant for hypertension, type 2 diabetes, CKD, hyperlipidemia as well as hypertension.  She presents to the emergency department at our facility with complaints of dizziness, generalized weakness.  Workup in the emergency department revealed sodium of 118, potassium of 6.6 which was treated with insulin and D50.  Creatinine noted at 2.5.  EKG was unremarkable.  Vital signs stable, chest radiograph with no acute process.  She was referred to the hospitalist service for further management of acute kidney injury, hyperkalemia, hyponatremia.  At the moment of the initial interview she was cooperative with the exam.  She denies fever, chills, nausea, vomiting, diarrhea.  She also denies pain however does endorse general malaise          Interval history / Subjective:   Follow up hyponatremia   Tells me still feel weak and having problems with urination required straight cath after Riddle was removed   Assessment & Plan:     Hyponatremia--continues to improve  -Continue to hold spironolactone and hydrochlorothiazide  -IV fluids held for last 24 hours hyponatremia resolving DC IV fluids   -Appreciate Nephrology  - Urinary retention:  need Riddle Bladder 2 scan twice  > 350 . Bladder scan now 604 will reinsert Riddle  denies constipation       Severe hyperkalemia--improving  Potassium 6.6 at presentation which was treated with insulin and D50  No EKG changes  Resolved      Acute kidney injury--continues to improve  NAGMA--improving  Early in  had normal creatinine.  Creatinine this morning 1.39        Proteus UTI  -urine

## 2025-02-17 PROBLEM — R53.1 GENERALIZED WEAKNESS: Status: ACTIVE | Noted: 2025-02-17

## 2025-02-17 PROBLEM — E11.65 UNCONTROLLED TYPE 2 DIABETES MELLITUS WITH HYPERGLYCEMIA (HCC): Status: ACTIVE | Noted: 2025-02-17

## 2025-02-17 LAB
ANION GAP SERPL CALC-SCNC: 7 MMOL/L (ref 2–12)
BASOPHILS # BLD: 0 K/UL (ref 0–0.1)
BASOPHILS NFR BLD: 0 % (ref 0–1)
BUN SERPL-MCNC: 24 MG/DL (ref 6–20)
BUN/CREAT SERPL: 20 (ref 12–20)
CALCIUM SERPL-MCNC: 8.9 MG/DL (ref 8.5–10.1)
CHLORIDE SERPL-SCNC: 102 MMOL/L (ref 97–108)
CO2 SERPL-SCNC: 25 MMOL/L (ref 21–32)
CREAT SERPL-MCNC: 1.22 MG/DL (ref 0.55–1.02)
DIFFERENTIAL METHOD BLD: ABNORMAL
EOSINOPHIL # BLD: 0.2 K/UL (ref 0–0.4)
EOSINOPHIL NFR BLD: 4 % (ref 0–7)
ERYTHROCYTE [DISTWIDTH] IN BLOOD BY AUTOMATED COUNT: 13.9 % (ref 11.5–14.5)
EST. AVERAGE GLUCOSE BLD GHB EST-MCNC: 275 MG/DL
GLUCOSE BLD STRIP.AUTO-MCNC: 181 MG/DL (ref 65–117)
GLUCOSE BLD STRIP.AUTO-MCNC: 183 MG/DL (ref 65–117)
GLUCOSE BLD STRIP.AUTO-MCNC: 277 MG/DL (ref 65–117)
GLUCOSE BLD STRIP.AUTO-MCNC: 340 MG/DL (ref 65–117)
GLUCOSE SERPL-MCNC: 126 MG/DL (ref 65–100)
HBA1C MFR BLD: 11.2 % (ref 4–5.6)
HCT VFR BLD AUTO: 32.3 % (ref 35–47)
HGB BLD-MCNC: 10.5 G/DL (ref 11.5–16)
IMM GRANULOCYTES # BLD AUTO: 0 K/UL
IMM GRANULOCYTES NFR BLD AUTO: 0 %
LYMPHOCYTES # BLD: 1.23 K/UL (ref 0.8–3.5)
LYMPHOCYTES NFR BLD: 25 % (ref 12–49)
MCH RBC QN AUTO: 27.9 PG (ref 26–34)
MCHC RBC AUTO-ENTMCNC: 32.5 G/DL (ref 30–36.5)
MCV RBC AUTO: 85.7 FL (ref 80–99)
MONOCYTES # BLD: 0.34 K/UL (ref 0–1)
MONOCYTES NFR BLD: 7 % (ref 5–13)
MYELOCYTES NFR BLD MANUAL: 1 %
NEUTS BAND NFR BLD MANUAL: 2 % (ref 0–6)
NEUTS SEG # BLD: 3.09 K/UL (ref 1.8–8)
NEUTS SEG NFR BLD: 61 % (ref 32–75)
NRBC # BLD: 0 K/UL (ref 0–0.01)
NRBC BLD-RTO: 0 PER 100 WBC
PLATELET # BLD AUTO: 188 K/UL (ref 150–400)
PMV BLD AUTO: 9.4 FL (ref 8.9–12.9)
POTASSIUM SERPL-SCNC: 4.4 MMOL/L (ref 3.5–5.1)
RBC # BLD AUTO: 3.77 M/UL (ref 3.8–5.2)
RBC MORPH BLD: ABNORMAL
SERVICE CMNT-IMP: ABNORMAL
SODIUM SERPL-SCNC: 134 MMOL/L (ref 136–145)
WBC # BLD AUTO: 4.9 K/UL (ref 3.6–11)

## 2025-02-17 PROCEDURE — 1200000000 HC SEMI PRIVATE

## 2025-02-17 PROCEDURE — 6370000000 HC RX 637 (ALT 250 FOR IP): Performed by: CLINICAL NURSE SPECIALIST

## 2025-02-17 PROCEDURE — 6370000000 HC RX 637 (ALT 250 FOR IP): Performed by: NURSE PRACTITIONER

## 2025-02-17 PROCEDURE — 6360000002 HC RX W HCPCS: Performed by: NURSE PRACTITIONER

## 2025-02-17 PROCEDURE — 97535 SELF CARE MNGMENT TRAINING: CPT

## 2025-02-17 PROCEDURE — 6370000000 HC RX 637 (ALT 250 FOR IP): Performed by: HOSPITALIST

## 2025-02-17 PROCEDURE — 6370000000 HC RX 637 (ALT 250 FOR IP)

## 2025-02-17 PROCEDURE — 80048 BASIC METABOLIC PNL TOTAL CA: CPT

## 2025-02-17 PROCEDURE — 82962 GLUCOSE BLOOD TEST: CPT

## 2025-02-17 PROCEDURE — 85025 COMPLETE CBC W/AUTO DIFF WBC: CPT

## 2025-02-17 PROCEDURE — 2500000003 HC RX 250 WO HCPCS: Performed by: NURSE PRACTITIONER

## 2025-02-17 PROCEDURE — 83036 HEMOGLOBIN GLYCOSYLATED A1C: CPT

## 2025-02-17 PROCEDURE — 99221 1ST HOSP IP/OBS SF/LOW 40: CPT | Performed by: CLINICAL NURSE SPECIALIST

## 2025-02-17 RX ORDER — SENNA AND DOCUSATE SODIUM 50; 8.6 MG/1; MG/1
2 TABLET, FILM COATED ORAL DAILY PRN
Status: DISCONTINUED | OUTPATIENT
Start: 2025-02-17 | End: 2025-02-17

## 2025-02-17 RX ORDER — INSULIN GLARGINE 100 [IU]/ML
12 INJECTION, SOLUTION SUBCUTANEOUS NIGHTLY
Status: DISCONTINUED | OUTPATIENT
Start: 2025-02-17 | End: 2025-02-18 | Stop reason: HOSPADM

## 2025-02-17 RX ORDER — DEXTROSE MONOHYDRATE 100 MG/ML
INJECTION, SOLUTION INTRAVENOUS CONTINUOUS PRN
Status: DISCONTINUED | OUTPATIENT
Start: 2025-02-17 | End: 2025-02-18 | Stop reason: HOSPADM

## 2025-02-17 RX ORDER — SENNA AND DOCUSATE SODIUM 50; 8.6 MG/1; MG/1
2 TABLET, FILM COATED ORAL DAILY
Status: DISCONTINUED | OUTPATIENT
Start: 2025-02-17 | End: 2025-02-18 | Stop reason: HOSPADM

## 2025-02-17 RX ADMIN — INSULIN GLARGINE 12 UNITS: 100 INJECTION, SOLUTION SUBCUTANEOUS at 21:08

## 2025-02-17 RX ADMIN — ATORVASTATIN CALCIUM 10 MG: 20 TABLET, FILM COATED ORAL at 08:52

## 2025-02-17 RX ADMIN — MIRTAZAPINE 15 MG: 15 TABLET, FILM COATED ORAL at 21:08

## 2025-02-17 RX ADMIN — GLIPIZIDE 5 MG: 5 TABLET ORAL at 06:32

## 2025-02-17 RX ADMIN — HEPARIN SODIUM 5000 UNITS: 5000 INJECTION INTRAVENOUS; SUBCUTANEOUS at 06:32

## 2025-02-17 RX ADMIN — CETIRIZINE HYDROCHLORIDE 5 MG: 10 TABLET ORAL at 08:52

## 2025-02-17 RX ADMIN — HEPARIN SODIUM 5000 UNITS: 5000 INJECTION INTRAVENOUS; SUBCUTANEOUS at 21:07

## 2025-02-17 RX ADMIN — HEPARIN SODIUM 5000 UNITS: 5000 INJECTION INTRAVENOUS; SUBCUTANEOUS at 15:23

## 2025-02-17 RX ADMIN — SODIUM CHLORIDE, PRESERVATIVE FREE 10 ML: 5 INJECTION INTRAVENOUS at 21:08

## 2025-02-17 RX ADMIN — INSULIN LISPRO 6 UNITS: 100 INJECTION, SOLUTION INTRAVENOUS; SUBCUTANEOUS at 12:30

## 2025-02-17 RX ADMIN — INSULIN LISPRO 4 UNITS: 100 INJECTION, SOLUTION INTRAVENOUS; SUBCUTANEOUS at 21:45

## 2025-02-17 RX ADMIN — PANTOPRAZOLE SODIUM 40 MG: 40 TABLET, DELAYED RELEASE ORAL at 06:32

## 2025-02-17 RX ADMIN — Medication 3 MG: at 21:45

## 2025-02-17 RX ADMIN — SENNOSIDES AND DOCUSATE SODIUM 2 TABLET: 50; 8.6 TABLET ORAL at 11:49

## 2025-02-17 RX ADMIN — POLYETHYLENE GLYCOL 3350 17 G: 17 POWDER, FOR SOLUTION ORAL at 11:49

## 2025-02-17 RX ADMIN — INSULIN LISPRO 2 UNITS: 100 INJECTION, SOLUTION INTRAVENOUS; SUBCUTANEOUS at 17:27

## 2025-02-17 NOTE — PROGRESS NOTES
[facility]                                                                                          Hospitalist Progress Note  Mj Owens MD  Office Phone: [phone]        Date of Service:  2025  NAME:  Ammy Tarango  :  1934  MRN:  [id number]       Admission Summary:   Ammy Tarango is a 89 y.o. female with a past medical history significant for hypertension, type 2 diabetes, CKD, hyperlipidemia as well as hypertension.  She presents to the emergency department at our facility with complaints of dizziness, generalized weakness.  Workup in the emergency department revealed sodium of 118, potassium of 6.6 which was treated with insulin and D50.  Creatinine noted at 2.5.  EKG was unremarkable.  Vital signs stable, chest radiograph with no acute process.  She was referred to the hospitalist service for further management of acute kidney injury, hyperkalemia, hyponatremia.  At the moment of the initial interview she was cooperative with the exam.  She denies fever, chills, nausea, vomiting, diarrhea.  She also denies pain however does endorse general malaise          Interval history / Subjective:   Follow up hyponatremia   Says \"nothing is going right\"  Says she has not had a bowel movement since last one week  Assessment & Plan:     Hyponatremia--continues to improve  -Continue to hold spironolactone and hydrochlorothiazide  -IV fluids held for last 24 hours hyponatremia resolving DC IV fluids   -Appreciate Nephrology  - Urinary retention:  on lamar, outpatient follow up with Urology       Severe hyperkalemia--improving  Potassium 6.6 at presentation which was treated with insulin and D50  No EKG changes  Resolved      Acute kidney injury--continues to improve  NAGMA--improving  Proteus UTI: Finished  ceftriaxone course  Hypertension: stable off antihypertensives for now  Type 2 diabetes: on SSI  Constipation: Miralax prn          Code status: FULL  utilities: No       Review of Systems:   Pertinent items are noted in HPI.       Vital Signs:    Last 24hrs VS reviewed since prior progress note. Most recent are:  Vitals:    02/17/25 1000   BP:    Pulse: 82   Resp:    Temp:    SpO2:          Intake/Output Summary (Last 24 hours) at 2/17/2025 1057  Last data filed at 2/17/2025 0852  Gross per 24 hour   Intake --   Output 1500 ml   Net -1500 ml        Physical Examination:     I had a face to face encounter with this patient and independently examined them on 2/17/2025 as outlined below:          General : alert x 3, awake, no acute distress,   HEENT: PEERL, EOMI, moist mucus membrane, TM clear  Neck: supple, no JVD, no meningeal signs  Chest: Clear to auscultation bilaterally   CVS: S1 S2 heard, Capillary refill less than 2 seconds  Abd: soft/ non tender, non distended, BS physiological,   Ext: no clubbing, no cyanosis, no edema, brisk 2+ DP pulses  Neuro/Psych: pleasant mood and affect, CN 2-12 grossly intact, sensory grossly within normal limit, Strength 5/5 in all extremities, DTR 1+ x 4  Skin: warm     Data Review:    Review and/or order of clinical lab test      I have personally and independently reviewed all pertinent labs, diagnostic studies, imaging, and have provided independent interpretation of the same.     Labs:     Recent Labs     02/16/25 0246 02/17/25 0139   WBC 5.0 4.9   HGB 10.4* 10.5*   HCT 32.7* 32.3*    188     Recent Labs     02/15/25  0145 02/16/25  0246 02/17/25  0139   * 133* 134*   K 4.6 4.6 4.4   CL 98 101 102   CO2 24 25 25   BUN 24* 25* 24*   MG 2.1 2.0  --    PHOS 3.3 2.7  --      Recent Labs     02/15/25  0145 02/16/25  0246   ALT 16 19   GLOB 3.7 3.9     No results for input(s): \"INR\", \"APTT\" in the last 72 hours.    Invalid input(s): \"PTP\"   No results for input(s): \"TIBC\" in the last 72 hours.    Invalid input(s): \"FE\", \"PSAT\", \"FERR\"   No results found for: \"RBCF\"   No results for input(s): \"PH\", \"PCO2\", \"PO2\" in

## 2025-02-17 NOTE — CARE COORDINATION
Transition of Care Plan:    RUR: 14% Low   Prior Level of Functioning: Modified independent   Disposition: SNF - [facility] pending insurance auth   EUGENE: Tues. 2/18  If SNF or IPR: Date FOC offered: 2/17  Date FOC received: 2/17  Accepting facility: [facility] pending insurance auth   Date authorization started with reference number: Will require auth   Date authorization received and expires:   Follow up appointments: PCP  DME needed: Defer to SNF   Transportation at discharge: Son vs. WC van   IM/IMM Medicare/ letter given: 1st IM:   Is patient a [place] and connected with VA? NA  Caregiver Contact: SonJustino, [phone]  Discharge Caregiver contacted prior to discharge? Yes  Care Conference needed? No  Barriers to discharge: Placement & insurance auth     CM reviewed chart. CM noted SNF recommendations; attending in agreement. CM discussed with patient at bedside and son via phone. Per son's request SNF referrals sent to the following in order of preference via [employer]: [facility], [facility], [facility] and [facility]; awaiting response. Patient will require insurance authorization which will be initiated once PT is able to work with patient. PT attempted today; however, patient declined.     [facility] accepted pending insurance auth. CM  made aware.     SHELTON Wise   [phone]

## 2025-02-17 NOTE — CONSULTS
[facility]  [facility]  DIABETES MANAGEMENT CONSULT    Consulted by Provider Caryl Alexandra advanced nursing evaluation and care for inpatient blood glucose management.    Evaluation and Action Plan   Ammy Tarango is a 89 y.o. female 2022 generalized weakness.  She currently lives with type 2 diabetes, uncontrolled with an A1c of 11.1%.  She also has known hypertension, and CKD.  Initial workup on admission revealed a urinary tract infection with a total due to severe dehydration.  IV fluids improved creatinine.  Also had hyperkalemia which also improved with hydration.  She has had complications of urinary retention having urinary cathReported wt loss not consistent with EMR, shows ~6% wt gain x 1 month and ~11% wt gain x 5 months today.  She has required multiple doses of corrective insulin with no resolution.  Initiated low-dose, conservative, insulin tonight along with glipizide 3 times daily with meals.    Regarding her diabetes, per chart review she is on a regimen of metformin, glipizide, and Farxiga.  However, suspect she is not taking medications consistently given her current elevated A1C.  Appears her A1c has jumped from 7% to 11% since 2024.    Blood glucose pattern    Significant diabetes-related events over the past 24-72 hours  A1C 11.1%  Fasting Bs  Pre-prandial: 340  Basal: Lantus QHS  Bolus: Glipizide daily  Correction: ACHS, medium sensitivity        Management Rationale Action Plan   Medication   Basal needs Using 0.15 units/kg/D based on age/ BMI START Lantus 12 units QHS   Nutritional needs Covers carb load in meals Glipizide 5mg daily   Corrective insulin Using medium sensitivity  ACHS   Additional orders  Carb consistent diet (60g CHO/meal)       Diabetes Discharge Plan-SNF at discharge   Medication  A1c 11.1%, Discharge on hospital dose of basal insulin and corrective insulin  RESUME Glipizide,  per PTA dosing   REDUCE Metformin XR 5000mg     Infection UTI    Kidney function VILMA, resolved    Liver function WNL          Assessment and Nursing Intervention   Nursing Diagnosis Risk for unstable blood glucose pattern   Nursing Intervention Domain 5250 Decision-making Support   Nursing Interventions Examined current inpatient diabetes/blood glucose control   Explored factors facilitating and impeding inpatient management  Explored corrective strategies with patient and responsible inpatient provider   Informed patient of rational for insulin strategy while hospitalized     Nursing Diagnosis 08418 Ineffective Health Management   Nursing Intervention Domain 5250 Decision-making Support   Nursing Interventions Identified diabetes self-management practices impeding diabetes control  Discussed diabetes survival skills related to  Healthy Plate eating plan; given handouts  Role of physical activity in improving insulin sensitivity and action  Procedure for blood glucose monitoring & options for low-cost products  Medications plan at discharge     Billing Code(s)   [x] 76838 IP initial hospital care - 40 minutes     Before making these care recommendations, I personally reviewed the hospitalization record, including notes, laboratory & diagnostic data and current medications, and examined the patient at the bedside.  Total minutes: 45    DAVID SANCHEZ - CNS   Diabetes Clinical Nurse Specialist   [facility]  Access via [employer]

## 2025-02-17 NOTE — PROGRESS NOTES
Visit Charted 2/17/2025    Spiritual Care Partner Volunteer visited patient at [facility] in [facility] 5E1 SURGICAL UNIT on 2/16/25     Documented by: Adilia Larkinlain Resident

## 2025-02-17 NOTE — PROGRESS NOTES
Pt noted to be on BSC upon start of session, feet hanging and c/o inability to pass bowels. Placed feet on blanket and left for patient to attempt. When returned, pt in bed after passing with PCT assisting in transfer. Pt refusing at this time. Will continue to follow.     Brandee Ashton, ARIAT, PT

## 2025-02-17 NOTE — PROGRESS NOTES
[facility]NAL  PROGRESS NOTE        Subjective:   resting  Objective:   VITALS SIGNS:    /63   Pulse 91   Temp 98.4 °F (36.9 °C) (Oral)   Resp 16   Ht 1.575 m (5' 2.01\")   Wt 73.2 kg (161 lb 6 oz)   SpO2 95%   BMI 29.51 kg/m²             Temp (24hrs), Av.5 °F (36.9 °C), Min:98.2 °F (36.8 °C), Max:98.8 °F (37.1 °C)         PHYSICAL EXAM:  NAD    DATA REVIEW:     INTAKE / OUTPUT:   Last shift:      No intake/output data recorded.  Last 3 shifts: 02/15 1901 -  0700  In: -   Out: 2474 [Urine:2474]    Intake/Output Summary (Last 24 hours) at 2025 1404  Last data filed at 2025 0852  Gross per 24 hour   Intake --   Output 975 ml   Net -975 ml         LABS:   LABS:  Recent Labs     02/17/25  0139 02/16/25  0246 02/15/25  0145 25  0155   * 133* 132* 126*   K 4.4 4.6 4.6 4.6    101 98 93*   CO2 25 25 24 25   BUN 24* 25* 24* 28*   CREATININE 1.22* 1.39* 1.32* 1.36*   CALCIUM 8.9 9.0 9.1 9.0   PHOS  --  2.7 3.3 3.3   MG  --  2.0 2.1 2.2     Recent Labs     256 02/15/25  0145   WBC 4.9 5.0 4.1   HGB 10.5* 10.4* 10.4*   HCT 32.3* 32.7* 32.1*    208 190     No results for input(s): \"KU\", \"CLU\" in the last 720 hours.    Invalid input(s): \"JULIANNA\", \"CREAU\", \"PROU\"      Assessment:   Assessment:  VILMA: Serum Cr 2.5mg/dl on admission. Serum Cr improving to 1.3mg/dl with IVF. Suspect relative pre-renal state + RASi/MRA. +urinary retention     Hyperkalemia: severe.2nd to VILMA/RASi/MRA etc>>RESOLVED     Hyponatremia: symptomatic. Admission Na 118-> 124 to 127 s/p IVF. +Hypovolemic hyponatremia. Had to slow down correction some on  by holding IVF.      Metabolic acidosis:+NAGMA-> improving with IV Bicarb     Proteus UTI:      HTN: stable     DM2: poorly controlled as evidenced by HgbA1c 11.1      Plan/Recommendations:    Creat, sodium better  Will follow  Jose Castillo MD

## 2025-02-17 NOTE — CONSULTS
Requesting Provider: Mj Owens MD              Patient: Ammy Tarango MRN: [id number]  SSN: [id number]    YOB: 1934  Age: 89 y.o.  Sex: female     Location: 506/02       Code Status: Full Code   PCP: Freda Tolentino MD  - [phone]   Emergency Contact:  Primary Emergency Contact: Justino Burgess, Chad Phone: [phone]   Race/Amish/Language: Black /  / Episcopalian / Speaks English   Payor: Payor: [facility] MEDICARE / Plan: [facility] MEDICARE COMPLETE / Product Type: *No Product type* /    Prior Admission Data: 8/31/24 [facility] EMERGENCY DEPT     Hospitalized:  Hospital Day: 6 - Admitted 2/12/2025  2:58 AM     CONSULTANTS  IP CONSULT TO HOSPITALIST  IP CONSULT TO NEPHROLOGY  IP CONSULT TO CASE MANAGEMENT  IP CONSULT TO UROLOGY   ADMISSION DIAGNOSES  [unfilled]      Assessment/Plan:       89 yo F with PMH of uncontrolled DM II who is admitted for hyponatremia, hyperkalemia with acute urinary retention, constipation     - Continue lamar for ~1 week with outpatient follow up for voiding trial.   - Needs strict management of DM to optimize bladder function, timing of symptoms correlate with recent rise in HgbA1c. Consult Diabetes management.   - Senokot for constipation.   - Our office will arrange follow up. Please call for questions.      CC: [unfilled]   HPI: She is a 89 y.o. female with a past medical history significant for hypertension, uncontrolled type 2 diabetes (HgbA1c 11.1), CKD, hyperlipidemia as well as hypertension. She presented with complaints of dizziness, generalized weakness and is admitted for hyponatremia (), hyperkalemia, and VILMA. Urology is consulted for urinary retention. She is a new patient to . Prior to admission she reports having the sensation to void, however, has only been able to urinate small amounts. This has been ongoing for about 2 months. No associated abdominal pain. Last BM 3-4 days ago.   She was found to be retaining 619 cc on 2/12/25 for which a lamar was

## 2025-02-17 NOTE — PLAN OF CARE
Problem: Occupational Therapy - Adult  Goal: By Discharge: Performs self-care activities at highest level of function for planned discharge setting.  See evaluation for individualized goals.  Description: FUNCTIONAL STATUS PRIOR TO ADMISSION:  Patient was ambulatory using no AD inside, SPC outside. She has rollator but doesn't use it. She is I to mod I for simple ADL and I-ADL. Son does assist to take out trash, drive her to appointments, and get groceries since patient hasn't driven since her fall recently.   ,  ,  ,  ,  ,  ,  ,  ,  , Prior Level of Assist for Transfers: Independent, Active : No     HOME SUPPORT: Patient lived alone with son to provide assistance for some I-ADL.    Occupational Therapy Goals:  Initiated 2/12/2025  1.  Patient will perform grooming standing at sink with no s/s with [place] within 7 day(s).  2.  Patient will perform bathing with Modified [place] within 7 day(s).  3.  Patient will perform gathering all items from high/low in prep fordressing with Modified [place] within 7 day(s).  4.  Patient will perform toilet transfers with Modified [place]  within 7 day(s).  5.  Patient will perform all aspects of toileting with Modified [place] within 7 day(s).  6.  Patient will perform light I-ADL tasks (make bed, open/close blinds, light cleaning/wiping countertops, etc) in room with mod I to I.   Outcome: Progressing    OCCUPATIONAL THERAPY TREATMENT  Patient: Ammy Tarango (89 y.o. female)  Date: 2/17/2025  Primary Diagnosis: Hyperkalemia [E87.5]  Hyponatremia [E87.1]  Generalized weakness [R53.1]       Precautions: Fall Risk                Chart, occupational therapy assessment, plan of care, and goals were reviewed.    ASSESSMENT  Patient continues to benefit from skilled OT services and is progressing towards goals. Pt presents with risk for and fear of falling, impaired standing balance, decreased functional mobility and decline in functional status      Grooming: Contact guard assistance   Grooming Skilled Clinical Factors: standing at sink to perform task    UE Bathing: Minimal assistance  UE Bathing Skilled Clinical Factors: seated for back    LE Bathing: Minimal assistance       UE Dressing: Setup               Toileting: Minimal assistance  Toileting Skilled Clinical Factors: lamar in place           Pain Ratin/10   Pain Intervention(s):         Activity Tolerance:   Good  Please refer to the flowsheet for vital signs taken during this treatment.    After treatment:   Patient left in no apparent distress sitting up in chair, Call bell within reach, Bed/ chair alarm activated, and Caregiver / family present    COMMUNICATION/EDUCATION:   The patient's plan of care was discussed with: physical therapist, registered nurse, and     Patient Education  Education Given To: Patient  Education Provided: Role of Therapy  Education Method: Verbal  Barriers to Learning: None  Education Outcome: Verbalized understanding    Thank you for this referral.  Sasha Sylvester OT  Minutes: 29

## 2025-02-18 VITALS
OXYGEN SATURATION: 99 % | HEART RATE: 90 BPM | RESPIRATION RATE: 16 BRPM | WEIGHT: 161.38 LBS | BODY MASS INDEX: 29.7 KG/M2 | SYSTOLIC BLOOD PRESSURE: 129 MMHG | HEIGHT: 62 IN | TEMPERATURE: 97.5 F | DIASTOLIC BLOOD PRESSURE: 71 MMHG

## 2025-02-18 LAB
GLUCOSE BLD STRIP.AUTO-MCNC: 153 MG/DL (ref 65–117)
GLUCOSE BLD STRIP.AUTO-MCNC: 276 MG/DL (ref 65–117)
SERVICE CMNT-IMP: ABNORMAL
SERVICE CMNT-IMP: ABNORMAL

## 2025-02-18 PROCEDURE — 97530 THERAPEUTIC ACTIVITIES: CPT

## 2025-02-18 PROCEDURE — 99231 SBSQ HOSP IP/OBS SF/LOW 25: CPT | Performed by: CLINICAL NURSE SPECIALIST

## 2025-02-18 PROCEDURE — 6360000002 HC RX W HCPCS: Performed by: NURSE PRACTITIONER

## 2025-02-18 PROCEDURE — 82962 GLUCOSE BLOOD TEST: CPT

## 2025-02-18 PROCEDURE — 6370000000 HC RX 637 (ALT 250 FOR IP): Performed by: NURSE PRACTITIONER

## 2025-02-18 PROCEDURE — 97116 GAIT TRAINING THERAPY: CPT

## 2025-02-18 PROCEDURE — 6370000000 HC RX 637 (ALT 250 FOR IP): Performed by: HOSPITALIST

## 2025-02-18 PROCEDURE — 97535 SELF CARE MNGMENT TRAINING: CPT

## 2025-02-18 RX ORDER — GLIPIZIDE 5 MG/1
5 TABLET ORAL
Qty: 60 TABLET | Refills: 3 | Status: SHIPPED | OUTPATIENT
Start: 2025-02-19

## 2025-02-18 RX ORDER — MIRTAZAPINE 15 MG/1
15 TABLET, FILM COATED ORAL NIGHTLY
Qty: 90 TABLET | Refills: 1 | Status: SHIPPED | OUTPATIENT
Start: 2025-02-18

## 2025-02-18 RX ADMIN — GLIPIZIDE 5 MG: 5 TABLET ORAL at 06:43

## 2025-02-18 RX ADMIN — PANTOPRAZOLE SODIUM 40 MG: 40 TABLET, DELAYED RELEASE ORAL at 06:43

## 2025-02-18 RX ADMIN — SENNOSIDES AND DOCUSATE SODIUM 2 TABLET: 50; 8.6 TABLET ORAL at 08:33

## 2025-02-18 RX ADMIN — CETIRIZINE HYDROCHLORIDE 5 MG: 10 TABLET ORAL at 08:33

## 2025-02-18 RX ADMIN — HEPARIN SODIUM 5000 UNITS: 5000 INJECTION INTRAVENOUS; SUBCUTANEOUS at 06:42

## 2025-02-18 RX ADMIN — INSULIN LISPRO 4 UNITS: 100 INJECTION, SOLUTION INTRAVENOUS; SUBCUTANEOUS at 12:40

## 2025-02-18 RX ADMIN — ATORVASTATIN CALCIUM 10 MG: 20 TABLET, FILM COATED ORAL at 08:34

## 2025-02-18 NOTE — DISCHARGE SUMMARY
Discharge Summary       PATIENT ID: Ammy Tarango  MRN: [id number]   YOB: 1934    DATE OF ADMISSION: 2/12/2025  2:58 AM    DATE OF DISCHARGE: 2/18/2025   PRIMARY CARE PROVIDER: Freda Tolentino MD     ATTENDING PHYSICIAN: Dr Mj Owens  DISCHARGING PROVIDER: Mj Owens MD    To contact this individual call [phone] and ask the  to page.  If unavailable ask to be transferred the Adult Hospitalist Department.    CONSULTATIONS: IP CONSULT TO HOSPITALIST  IP CONSULT TO NEPHROLOGY  IP CONSULT TO CASE MANAGEMENT  IP CONSULT TO UROLOGY  IP CONSULT TO DIABETES MANAGEMENT  IP CONSULT TO DIABETES MANAGEMENT    PROCEDURES/SURGERIES: * No surgery found *    ADMITTING DIAGNOSES & HOSPITAL COURSE:   Hyponatremia--continues to improve  -Continue to hold spironolactone and hydrochlorothiazide  -IV fluids held for last 24 hours hyponatremia resolving DC IV fluids   -Appreciate Nephrology  - Urinary retention:  on lamar, outpatient follow up with Urology       Severe hyperkalemia--improving  Potassium 6.6 at presentation which was treated with insulin and D50  No EKG changes  Resolved      Acute kidney injury--continues to improve  NAGMA--improving  Proteus UTI: Finished  ceftriaxone course  Hypertension: stable off antihypertensives for now  Type 2 diabetes: on SSI  Constipation: Miralax prn       PENDING TEST RESULTS:   At the time of discharge the following test results are still pending: none    FOLLOW UP APPOINTMENTS:    PCP       ADDITIONAL CARE RECOMMENDATIONS: as above    DIET: diabetic diet    ACTIVITY: activity as tolerated      DISCHARGE MEDICATIONS:   See Medication Reconciliation Form      NOTIFY YOUR PHYSICIAN FOR ANY OF THE FOLLOWING:   Fever over 101 degrees for 24 hours.   Chest pain, shortness of breath, fever, chills, nausea, vomiting, diarrhea, change in mentation, falling, weakness, bleeding. Severe pain or pain not relieved by medications.  Or, any other signs or symptoms that you

## 2025-02-18 NOTE — PROGRESS NOTES
[facility]                                                                                          Hospitalist Progress Note  Mj Owens MD  Office Phone: [phone]        Date of Service:  2025  NAME:  Ammy Tarango  :  1934  MRN:  [id number]       Admission Summary:   Ammy Tarango is a 89 y.o. female with a past medical history significant for hypertension, type 2 diabetes, CKD, hyperlipidemia as well as hypertension.  She presents to the emergency department at our facility with complaints of dizziness, generalized weakness.  Workup in the emergency department revealed sodium of 118, potassium of 6.6 which was treated with insulin and D50.  Creatinine noted at 2.5.  EKG was unremarkable.  Vital signs stable, chest radiograph with no acute process.  She was referred to the hospitalist service for further management of acute kidney injury, hyperkalemia, hyponatremia.  At the moment of the initial interview she was cooperative with the exam.  She denies fever, chills, nausea, vomiting, diarrhea.  She also denies pain however does endorse general malaise          Interval history / Subjective:   Follow up hyponatremia   Good urine output  Sitting in chair  Assessment & Plan:     Hyponatremia--continues to improve  -Continue to hold spironolactone and hydrochlorothiazide  -IV fluids held for last 24 hours hyponatremia resolving DC IV fluids   -Appreciate Nephrology  - Urinary retention:  on lamar, outpatient follow up with Urology       Severe hyperkalemia--improving  Potassium 6.6 at presentation which was treated with insulin and D50  No EKG changes  Resolved      Acute kidney injury--continues to improve  NAGMA--improving  Proteus UTI: Finished  ceftriaxone course  Hypertension: stable off antihypertensives for now  Type 2 diabetes: on SSI  Constipation: Miralax prn          Code status: FULL CODE  Prophylaxis: heparin    Plan: Discharge to SNF  \"TROIQ\"  Lab Results   Component Value Date/Time    CHOL 150 03/25/2024 08:19 AM    HDL 50 03/25/2024 08:19 AM    LDL 85.2 03/25/2024 08:19 AM     No results found for: \"GLUCPOC\"  [unfilled]    Notes reviewed from all clinical/nonclinical/nursing services involved in patient's clinical care. Care coordination discussions were held with appropriate clinical/nonclinical/ nursing providers based on care coordination needs.         Patients current active Medications were reviewed, considered, added and adjusted based on the clinical condition today.      Home Medications were reconciled to the best of my ability given all available resources at the time of admission. Route is PO if not otherwise noted.      Admission Status:[id number]:::1}      Medications Reviewed:     Current Facility-Administered Medications   Medication Dose Route Frequency    sennosides-docusate sodium (SENOKOT-S) 8.6-50 MG tablet 2 tablet  2 tablet Oral Daily    glucose chewable tablet 16 g  4 tablet Oral PRN    dextrose bolus 10% 125 mL  125 mL IntraVENous PRN    Or    dextrose bolus 10% 250 mL  250 mL IntraVENous PRN    dextrose 10 % infusion   IntraVENous Continuous PRN    insulin glargine (LANTUS) injection vial 12 Units  12 Units SubCUTAneous Nightly    glipiZIDE (GLUCOTROL) tablet 5 mg  5 mg Oral QAM AC    melatonin tablet 3 mg  3 mg Oral Nightly PRN    glucose chewable tablet 16 g  4 tablet Oral PRN    dextrose bolus 10% 125 mL  125 mL IntraVENous PRN    Or    dextrose bolus 10% 250 mL  250 mL IntraVENous PRN    glucagon injection 1 mg  1 mg SubCUTAneous PRN    dextrose 10 % infusion   IntraVENous Continuous PRN    sodium chloride flush 0.9 % injection 5-40 mL  5-40 mL IntraVENous 2 times per day    sodium chloride flush 0.9 % injection 5-40 mL  5-40 mL IntraVENous PRN    0.9 % sodium chloride infusion   IntraVENous PRN    heparin (porcine) injection 5,000 Units  5,000 Units SubCUTAneous 3 times per day    ondansetron (ZOFRAN-ODT)

## 2025-02-18 NOTE — PROGRESS NOTES
1515- Report called to [facility]. Transport set up with H2 @4:30.

## 2025-02-18 NOTE — PLAN OF CARE
Problem: Occupational Therapy - Adult  Goal: By Discharge: Performs self-care activities at highest level of function for planned discharge setting.  See evaluation for individualized goals.  Description: FUNCTIONAL STATUS PRIOR TO ADMISSION:  Patient was ambulatory using no AD inside, SPC outside. She has rollator but doesn't use it. She is I to mod I for simple ADL and I-ADL. Son does assist to take out trash, drive her to appointments, and get groceries since patient hasn't driven since her fall recently.   ,  ,  ,  ,  ,  ,  ,  ,  , Prior Level of Assist for Transfers: Independent, Active : No     HOME SUPPORT: Patient lived alone with son to provide assistance for some I-ADL.    Occupational Therapy Goals:  Initiated 2/12/2025  1.  Patient will perform grooming standing at sink with no s/s with [place] within 7 day(s).  2.  Patient will perform bathing with Modified [place] within 7 day(s).  3.  Patient will perform gathering all items from high/low in prep fordressing with Modified [place] within 7 day(s).  4.  Patient will perform toilet transfers with Modified [place]  within 7 day(s).  5.  Patient will perform all aspects of toileting with Modified [place] within 7 day(s).  6.  Patient will perform light I-ADL tasks (make bed, open/close blinds, light cleaning/wiping countertops, etc) in room with mod I to I.   Outcome: Progressing    OCCUPATIONAL THERAPY TREATMENT  Patient: Ammy Tarango (89 y.o. female)  Date: 2/18/2025  Primary Diagnosis: Hyperkalemia [E87.5]  Hyponatremia [E87.1]  Generalized weakness [R53.1]       Precautions: Fall Risk                Chart, occupational therapy assessment, plan of care, and goals were reviewed.    ASSESSMENT  Patient continues to benefit from skilled OT services and is progressing towards goals. AAOx4, pleasant and cooperative. The patient is overall min A for functional mobility with RW and LB dressing/bathing. Set/up for seated UB  Attending Attestation (For Attendings USE Only)...

## 2025-02-18 NOTE — CARE COORDINATION
Transition of Care Plan:     RUR: 12% Low   Prior Level of Functioning: Modified independent   Disposition: SNF - [facility] (p: [phone])  [place]: Tues. 2/18  If SNF or IPR: Date FOC offered: 2/17  Date FOC received: 2/17  Accepting facility: [facility] pending insurance auth   Date authorization started with reference number: 2/18; auth ref. #: [id number]   Date authorization received and expires: Approved today, 2/18; expires 2/20  Follow up appointments: PCP  DME needed: Defer to SNF   Transportation at discharge: Hospital to Home WC van transport today, Tues. 2/18 @ 4:30PM   IM/IMM Medicare/ letter given: 1st IM: 2/12  Is patient a  and connected with [facility]? NA  Caregiver Contact: SonJustino, [phone]  Discharge Caregiver contacted prior to discharge? Yes  Care Conference needed? No  Barriers to discharge: None     10:50AM - CM  initiated insurance authorization today, Tues. 2/18 with [facility] (p: [phone]). Auth reference #: [id number]. CM provided update to patient at bedside and son via phone; both remain in agreement. Attending and nursing made aware. CM provided update [facility] via [facility].     2:00PM -  Insurance auth approved. Hospital to Home WC van transport scheduled for today, Tues. 2/18 @ 4:30PM. [facility]acity [facility]. Billed to [facility]. Attending made aware. CM provided update to patient and son via phone; both remain in agreement. CM faxed DC summary and hard scripts to [facility] (f: [phone]). Patient will admit to room # 603. Nursing to call report to [phone], ask for [facility]. Discharge packet placed on patient's hard chart. No further CM needs.     Transition of Care Plan to SNF/Rehab    Communication to Patient/Family:  Met with patient and family and they are agreeable to the transition plan. The Plan for Transition of Care is related to the

## 2025-02-18 NOTE — CARE COORDINATION
CM Lead Note:    Update 1:59 PM: CM received call from Jasvir with [facility] transitions. AUTH has been approved for SNF, plan AUTH ID has not yet been generated, auth reference#[id number]. Start date is today 2/18, next review date is 2/20. Care coordinator is Trista Brooks, fax clinicals for continued stay to #[phone].    Updated unit CM of the above.      CM called [facility] #[phone] and spoke with Libby to initiate AUTH for [facility]. AUTH reference#[id number]. CM faxed clinicals to F#[phone].    The following information was submitted to [facility] for initial authorization:  Face Sheet/Demographics    (Include: Usual living situation)  History and Physical  Last 24 hours of MD and RN notes   (Include: Current Level of Functioning; cognitive status)  PT/OT/ST Evaluations and/or notes within the last 48 hours  MAR  MD orders  (Include: Attending or ordering MD’s name and phone #; skilled nursing needs;   Wound care/etc)  Projected Date of Transfer to SNF  Requested SNF  SNF Point of Contact and Phone #  CM Point of Contact and Phone #  NPI # for SNF     ANGELIA GRULLON/WESLY

## 2025-02-18 NOTE — DISCHARGE INSTRUCTIONS
Discharge SNF/Rehab Instructions/LTAC       PATIENT ID: Ammy Tarango  MRN: [id number]   YOB: 1934    DATE OF ADMISSION: [unfilled]    DATE OF DISCHARGE: 2/18/2025    PRIMARY CARE PROVIDER: @PCP@       ATTENDING PHYSICIAN: [unfilled]  DISCHARGING PROVIDER: Mj Owens MD     To contact this individual call [phone] and ask the  to page.   If unavailable ask to be transferred the Adult Hospitalist Department.    CONSULTATIONS: [unfilled]    PROCEDURES/SURGERIES: * No surgery found *    ADMITTING DIAGNOSES & HOSPITAL COURSE:   Hyponatremia--continues to improve  -Continue to hold spironolactone and hydrochlorothiazide  -IV fluids held for last 24 hours hyponatremia resolving DC IV fluids   -Appreciate Nephrology  - Urinary retention:  on lamar, outpatient follow up with Urology       Severe hyperkalemia--improving  Potassium 6.6 at presentation which was treated with insulin and D50  No EKG changes  Resolved      Acute kidney injury--continues to improve  NAGMA--improving  Proteus UTI: Finished  ceftriaxone course  Hypertension: stable off antihypertensives for now  Type 2 diabetes: on SSI  Constipation: Miralax prn       PENDING TEST RESULTS:   At the time of discharge the following test results are still pending: none    FOLLOW UP APPOINTMENTS:    PCP       ADDITIONAL CARE RECOMMENDATIONS: as above    DIET: diabetic diet    ACTIVITY: activity as tolerated      DISCHARGE MEDICATIONS:   See Medication Reconciliation Form      NOTIFY YOUR PHYSICIAN FOR ANY OF THE FOLLOWING:   Fever over 101 degrees for 24 hours.   Chest pain, shortness of breath, fever, chills, nausea, vomiting, diarrhea, change in mentation, falling, weakness, bleeding. Severe pain or pain not relieved by medications.  Or, any other signs or symptoms that you may have questions about.    DISPOSITION:    Home With:   OT  PT  HH  RN      x SNF/Inpatient Rehab/LTAC    Independent/assisted living    Hospice    Other:

## 2025-02-18 NOTE — CONSULTS
[facility]  DIABETES MANAGEMENT CONSULT    Consulted by Provider Caryl Alexandra advanced nursing evaluation and care for inpatient blood glucose management.    Evaluation and Action Plan   Ammy Tarango is a 89 y.o. female 2/12/2022 generalized weakness.  She currently lives with type 2 diabetes, uncontrolled with an A1c of 11.1%.  She also has known hypertension, and CKD.  Initial workup on admission revealed a urinary tract infection with a total due to severe dehydration.  IV fluids improved creatinine.  Also had hyperkalemia which also improved with hydration.  She has had complications of urinary retention having urinary cathReported wt loss not consistent with EMR, shows ~6% wt gain x 1 month and ~11% wt gain x 5 months today.  She has required multiple doses of corrective insulin with no resolution.  Initiated low-dose, conservative, insulin tonight along with glipizide 3 times daily with meals.    Regarding her diabetes, per chart review she is on a regimen of metformin, glipizide, and Farxiga.  However, suspect she is not taking medications consistently given her current elevated A1C.  Appears her A1c has jumped from 7% to 11% since September 2024.    2/18: Noted discharge summary written by attending provider.  Authorization is pending for SNF.  Noted provider discontinue metformin, Tradjenta, and Farxiga at hospital discharge.  She will remain on daily glipizide at discharge.  Given advanced age would benefit from a simplified diabetes plan.  However A1c is elevated greater than 11% this admission, and she would benefit from once daily basal insulin while at nursing facility to keep her blood sugars at or under 200.  Attempted to discuss patient's prior to admission diabetes management practices however she is a poor historian and could not recall specific medicines that that she took.  Will confer with attending hospitalist regarding continuing basal insulin at

## 2025-02-18 NOTE — PLAN OF CARE
Problem: Physical Therapy - Adult  Goal: By Discharge: Performs mobility at highest level of function for planned discharge setting.  See evaluation for individualized goals.  Description: FUNCTIONAL STATUS PRIOR TO ADMISSION: Patient was independent and active with use of rolling walker or cane intermittently. Pt reported she leaves the home ~ 1 x/ week    HOME SUPPORT PRIOR TO ADMISSION: The patient lived alone with son that lives locally to provide assistance as needed.    Physical Therapy Goals  Initiated 2/12/2025  1.  Patient will move from supine to sit and sit to supine and roll side to side in bed with modified independence within 7 day(s).    2.  Patient will perform sit to stand with modified independence within 7 day(s).  3.  Patient will transfer from bed to chair and chair to bed with modified independence using the least restrictive device within 7 day(s).  4.  Patient will ambulate with modified independence for 50 feet with the least restrictive device within 7 day(s).   5.  Patient will ascend/descend 5 stairs with single handrail(s) with supervision/set-up within 7 day(s).    Outcome: Progressing     PHYSICAL THERAPY TREATMENT    Patient: Ammy Tarango (89 y.o. female)  Date: 2/18/2025  Diagnosis: Hyperkalemia [E87.5]  Hyponatremia [E87.1]  Generalized weakness [R53.1] Hyponatremia      Precautions: Fall Risk                      ASSESSMENT:  Patient continues to benefit from skilled PT services and is progressing towards goals. Pt tolerates supine to sit with supervision and cues for bringing legs to EOB. Pt able to tolerate gait in the room to bathroom and perform standing hygiene at the seat. Pt tolerates gait back to chair and made comfortable. Pt demos slow progression and increased tolerance, recommend short term rehab to return to [facility]         PLAN:  Patient continues to benefit from skilled intervention to address the above impairments.  Continue treatment per established plan of

## 2025-02-26 ENCOUNTER — OFFICE VISIT (OUTPATIENT)
Age: 89
End: 2025-02-26

## 2025-02-26 DIAGNOSIS — E11.21 TYPE 2 DIABETES MELLITUS WITH DIABETIC NEPHROPATHY, WITHOUT LONG-TERM CURRENT USE OF INSULIN (HCC): Primary | ICD-10-CM

## 2025-02-26 NOTE — PROGRESS NOTES
[facility]  Diabetes Self-Management Education & Support Program    Reason for Referral: DSMES Initial yr  Referral Source: Freda Tolentino MD  Services requested: DSMES       ASSESSMENT    From my perspective, the participant would benefit from DSMES specifically related to reducing risks, healthy eating, monitoring, taking medications, physical activity, healthy coping, and problem solving. Will adapt DSMES program to build on participant's skills score, confidence score, and preparedness score as noted in the Diabetes Skills, Confidence, and Preparedness Index.    During the program, we will focus on providing DSMES that specifically addresses participant's interest in healthy eating, as shown by their reported readiness to change.    NOTE:  Blood sugars= patient declined to check her blood sugars. Doesn't want to use glucometer. Will think about a sensor. Information given.     Transportation= patient have some issues with transportation to come to the classes. Please see note below.     The participant would be best served by attending weekly individual sessions.    Diabetes Self-Management Education Follow-up Visit: 3/6/25, 10:45 AM       Clinical Presentation  Ammy Tarango is a 89 y.o.  female referred for diabetes self-management education. Participant has Type 2 DM not on insulin for <1 year. Family history negative ( states she is not sure , since her family doesn't tell her) for diabetes. Patient reports not receiving DSMES services in the past. Most recent A1c value:   Lab Results   Component Value Date/Time    LABA1C 11.2 02/17/2025 12:27 PM       Diabetes-related medical history:  Microvascular disease  nephropathy    Diabetes-related medications:  Current dosing: glipiZIDE - 5 MG 1 tab q AM     Blood Pressure Management  This patient does not have an active medication from one of the medication groupers.      Lipid Management  atorvastatin - 10

## 2025-02-28 LAB — HBA1C MFR BLD HPLC: 10.9 %

## 2025-03-05 ENCOUNTER — OFFICE VISIT (OUTPATIENT)
Age: 89
End: 2025-03-05
Payer: MEDICARE

## 2025-03-05 ENCOUNTER — TELEPHONE (OUTPATIENT)
Age: 89
End: 2025-03-05

## 2025-03-05 VITALS
DIASTOLIC BLOOD PRESSURE: 76 MMHG | TEMPERATURE: 98.3 F | RESPIRATION RATE: 16 BRPM | HEIGHT: 62 IN | WEIGHT: 153 LBS | OXYGEN SATURATION: 96 % | BODY MASS INDEX: 28.16 KG/M2 | SYSTOLIC BLOOD PRESSURE: 106 MMHG | HEART RATE: 80 BPM

## 2025-03-05 DIAGNOSIS — N18.32 CHRONIC KIDNEY DISEASE, STAGE 3B (HCC): ICD-10-CM

## 2025-03-05 DIAGNOSIS — E11.649 UNCONTROLLED TYPE 2 DIABETES MELLITUS WITH HYPOGLYCEMIA WITHOUT COMA (HCC): Primary | ICD-10-CM

## 2025-03-05 DIAGNOSIS — J30.1 SEASONAL ALLERGIC RHINITIS DUE TO POLLEN: ICD-10-CM

## 2025-03-05 DIAGNOSIS — T78.40XS ALLERGY, SEQUELA: ICD-10-CM

## 2025-03-05 PROCEDURE — 99214 OFFICE O/P EST MOD 30 MIN: CPT | Performed by: INTERNAL MEDICINE

## 2025-03-05 RX ORDER — HYDROCHLOROTHIAZIDE 12.5 MG/1
CAPSULE ORAL
Qty: 2 EACH | Refills: 11 | Status: SHIPPED | OUTPATIENT
Start: 2025-03-05

## 2025-03-05 RX ORDER — HYDROCHLOROTHIAZIDE 12.5 MG/1
CAPSULE ORAL
Qty: 1 EACH | Refills: 0 | Status: SHIPPED | OUTPATIENT
Start: 2025-03-05

## 2025-03-05 RX ORDER — GLIPIZIDE 5 MG/1
5 TABLET ORAL
Qty: 60 TABLET | Refills: 3 | Status: SHIPPED | OUTPATIENT
Start: 2025-03-05

## 2025-03-05 RX ORDER — LORATADINE 10 MG/1
10 TABLET ORAL
Qty: 90 TABLET | Refills: 1 | Status: SHIPPED | OUTPATIENT
Start: 2025-03-05

## 2025-03-05 RX ORDER — FLUTICASONE PROPIONATE 50 MCG
1 SPRAY, SUSPENSION (ML) NASAL DAILY
Qty: 32 G | Refills: 1 | Status: SHIPPED | OUTPATIENT
Start: 2025-03-05

## 2025-03-05 ASSESSMENT — ENCOUNTER SYMPTOMS
EYES NEGATIVE: 1
RESPIRATORY NEGATIVE: 1
GASTROINTESTINAL NEGATIVE: 1

## 2025-03-05 NOTE — PROGRESS NOTES
Chief Complaint   Patient presents with    Urinary Retention    Leg Pain     \"Have you been to the ER, urgent care clinic since your last visit?  Hospitalized since your last visit?\"    NO    “Have you seen or consulted any other health care providers outside our system since your last visit?”    NO

## 2025-03-05 NOTE — PROGRESS NOTES
Chief Complaint   Patient presents with    Urinary Retention    Leg Pain           History of Present Illness  The patient presents for evaluation of diabetes, acute kidney injury, cough, and neuropathy.    Acute Kidney Injury  - Discharged from the hospital on 02/12/2025 following a diagnosis of acute kidney injury, which necessitated the placement of a catheter.  - The catheter was removed on Monday, and she was sent home.  - She has been maintaining adequate hydration.  - Her potassium levels were elevated during her hospital stay, requiring treatment.  - She is currently not on any insulin therapy.    Diabetes  - Managed with Tradjenta prior to her hospitalization, but this medication was discontinued upon admission.  - Prescribed glipizide, which she continues to take.  - Does not monitor her blood glucose levels at home.  - Reports consuming ice cream and chocolate packs, despite being advised against it.  - Reports a lack of urinary urgency.    Cough  - Reports a severe cough.    Neuropathy  - Describes a sensation of having rocks in her hands, accompanied by pain at night.    Supplemental information: She has carpal tunnel syndrome in her hands.    ALLERGIES  - Allergic to SITAGLIPTIN    MEDICATIONS  - Current:    - Tylenol    - Atorvastatin    - Tamsulosin    - Loratadine    - Omeprazole    - Mirtazapine    - Glipizide  - Discontinued:    - Linagliptin    - Lisinopril    - Metformin    Past Medical History:   Diagnosis Date    Adverse effect of anesthesia     pt states she was able to feel everything during colonoscopy    Chronic bronchitis (HCC)     Diabetes (HCC)     Essential hypertension     Hypercholesterolemia     Hypertension      Review of Systems   Constitutional: Negative.    HENT: Negative.     Eyes: Negative.    Respiratory: Negative.     Cardiovascular: Negative.    Gastrointestinal: Negative.    Endocrine: Negative.    Genitourinary: Negative.    Musculoskeletal: Negative.    Skin: Negative.

## 2025-03-07 DIAGNOSIS — E11.21 TYPE 2 DIABETES MELLITUS WITH NEPHROPATHY (HCC): Primary | ICD-10-CM

## 2025-03-09 DIAGNOSIS — I10 ESSENTIAL HYPERTENSION WITH GOAL BLOOD PRESSURE LESS THAN 140/90: ICD-10-CM

## 2025-03-10 RX ORDER — CHLORTHALIDONE 25 MG/1
25 TABLET ORAL DAILY
Qty: 90 TABLET | Refills: 0 | Status: SHIPPED | OUTPATIENT
Start: 2025-03-10

## 2025-03-17 ENCOUNTER — APPOINTMENT (OUTPATIENT)
Facility: HOSPITAL | Age: 89
End: 2025-03-17
Payer: MEDICARE

## 2025-03-17 ENCOUNTER — TELEPHONE (OUTPATIENT)
Age: 89
End: 2025-03-17

## 2025-03-17 ENCOUNTER — HOSPITAL ENCOUNTER (EMERGENCY)
Facility: HOSPITAL | Age: 89
Discharge: HOME | End: 2025-03-18
Attending: EMERGENCY MEDICINE
Payer: MEDICARE

## 2025-03-17 DIAGNOSIS — R42 LIGHTHEADEDNESS: Primary | ICD-10-CM

## 2025-03-17 DIAGNOSIS — E86.0 DEHYDRATION: ICD-10-CM

## 2025-03-17 LAB
ALBUMIN SERPL-MCNC: 3.9 G/DL (ref 3.5–5)
ALBUMIN/GLOB SERPL: 0.9 (ref 1.1–2.2)
ALP SERPL-CCNC: 81 U/L (ref 45–117)
ALT SERPL-CCNC: 24 U/L (ref 12–78)
ANION GAP SERPL CALC-SCNC: 8 MMOL/L (ref 2–12)
AST SERPL-CCNC: 12 U/L (ref 15–37)
BASOPHILS # BLD: 0.04 K/UL (ref 0–0.1)
BASOPHILS NFR BLD: 0.8 % (ref 0–1)
BILIRUB SERPL-MCNC: 0.2 MG/DL (ref 0.2–1)
BUN SERPL-MCNC: 30 MG/DL (ref 6–20)
BUN/CREAT SERPL: 16 (ref 12–20)
CALCIUM SERPL-MCNC: 9.5 MG/DL (ref 8.5–10.1)
CHLORIDE SERPL-SCNC: 101 MMOL/L (ref 97–108)
CO2 SERPL-SCNC: 24 MMOL/L (ref 21–32)
COMMENT:: NORMAL
CREAT SERPL-MCNC: 1.86 MG/DL (ref 0.55–1.02)
DIFFERENTIAL METHOD BLD: ABNORMAL
EOSINOPHIL # BLD: 0.11 K/UL (ref 0–0.4)
EOSINOPHIL NFR BLD: 2.2 % (ref 0–7)
ERYTHROCYTE [DISTWIDTH] IN BLOOD BY AUTOMATED COUNT: 14 % (ref 11.5–14.5)
FLUAV RNA SPEC QL NAA+PROBE: NOT DETECTED
FLUBV RNA SPEC QL NAA+PROBE: NOT DETECTED
GLOBULIN SER CALC-MCNC: 4.3 G/DL (ref 2–4)
GLUCOSE SERPL-MCNC: 174 MG/DL (ref 65–100)
HCT VFR BLD AUTO: 35.1 % (ref 35–47)
HGB BLD-MCNC: 11.1 G/DL (ref 11.5–16)
IMM GRANULOCYTES # BLD AUTO: 0.08 K/UL (ref 0–0.04)
IMM GRANULOCYTES NFR BLD AUTO: 1.6 % (ref 0–0.5)
LYMPHOCYTES # BLD: 1.04 K/UL (ref 0.8–3.5)
LYMPHOCYTES NFR BLD: 20.5 % (ref 12–49)
MAGNESIUM SERPL-MCNC: 3.2 MG/DL (ref 1.6–2.4)
MCH RBC QN AUTO: 27.8 PG (ref 26–34)
MCHC RBC AUTO-ENTMCNC: 31.6 G/DL (ref 30–36.5)
MCV RBC AUTO: 87.8 FL (ref 80–99)
MONOCYTES # BLD: 0.58 K/UL (ref 0–1)
MONOCYTES NFR BLD: 11.4 % (ref 5–13)
NEUTS SEG # BLD: 3.23 K/UL (ref 1.8–8)
NEUTS SEG NFR BLD: 63.5 % (ref 32–75)
NRBC # BLD: 0 K/UL (ref 0–0.01)
NRBC BLD-RTO: 0 PER 100 WBC
PLATELET # BLD AUTO: 245 K/UL (ref 150–400)
PMV BLD AUTO: 9.6 FL (ref 8.9–12.9)
POTASSIUM SERPL-SCNC: 4.7 MMOL/L (ref 3.5–5.1)
PROT SERPL-MCNC: 8.2 G/DL (ref 6.4–8.2)
RBC # BLD AUTO: 4 M/UL (ref 3.8–5.2)
SARS-COV-2 RNA RESP QL NAA+PROBE: NOT DETECTED
SODIUM SERPL-SCNC: 133 MMOL/L (ref 136–145)
SOURCE: NORMAL
SPECIMEN HOLD: NORMAL
TROPONIN I SERPL HS-MCNC: 7 NG/L (ref 0–51)
WBC # BLD AUTO: 5.1 K/UL (ref 3.6–11)

## 2025-03-17 PROCEDURE — 87636 SARSCOV2 & INF A&B AMP PRB: CPT

## 2025-03-17 PROCEDURE — 71045 X-RAY EXAM CHEST 1 VIEW: CPT

## 2025-03-17 PROCEDURE — 93005 ELECTROCARDIOGRAM TRACING: CPT | Performed by: EMERGENCY MEDICINE

## 2025-03-17 PROCEDURE — 96360 HYDRATION IV INFUSION INIT: CPT

## 2025-03-17 PROCEDURE — 99285 EMERGENCY DEPT VISIT HI MDM: CPT

## 2025-03-17 PROCEDURE — 85025 COMPLETE CBC W/AUTO DIFF WBC: CPT

## 2025-03-17 PROCEDURE — 2580000003 HC RX 258: Performed by: EMERGENCY MEDICINE

## 2025-03-17 PROCEDURE — 83735 ASSAY OF MAGNESIUM: CPT

## 2025-03-17 PROCEDURE — 80053 COMPREHEN METABOLIC PANEL: CPT

## 2025-03-17 PROCEDURE — 84484 ASSAY OF TROPONIN QUANT: CPT

## 2025-03-17 RX ORDER — 0.9 % SODIUM CHLORIDE 0.9 %
500 INTRAVENOUS SOLUTION INTRAVENOUS ONCE
Status: COMPLETED | OUTPATIENT
Start: 2025-03-17 | End: 2025-03-17

## 2025-03-17 RX ADMIN — SODIUM CHLORIDE 500 ML: 0.9 INJECTION, SOLUTION INTRAVENOUS at 22:45

## 2025-03-17 ASSESSMENT — PAIN - FUNCTIONAL ASSESSMENT: PAIN_FUNCTIONAL_ASSESSMENT: NONE - DENIES PAIN

## 2025-03-17 NOTE — TELEPHONE ENCOUNTER
Pt stated she's missing some medications and wanted to review current medications to confirm.     Please return call to review.   Phone: [phone]

## 2025-03-18 ENCOUNTER — TELEPHONE (OUTPATIENT)
Age: 89
End: 2025-03-18

## 2025-03-18 ENCOUNTER — OFFICE VISIT (OUTPATIENT)
Age: 89
End: 2025-03-18
Payer: MEDICARE

## 2025-03-18 VITALS
DIASTOLIC BLOOD PRESSURE: 54 MMHG | WEIGHT: 153.88 LBS | HEIGHT: 60 IN | OXYGEN SATURATION: 97 % | HEART RATE: 68 BPM | RESPIRATION RATE: 20 BRPM | SYSTOLIC BLOOD PRESSURE: 121 MMHG | TEMPERATURE: 97.7 F | BODY MASS INDEX: 30.21 KG/M2

## 2025-03-18 VITALS
HEIGHT: 60 IN | OXYGEN SATURATION: 99 % | BODY MASS INDEX: 30.23 KG/M2 | SYSTOLIC BLOOD PRESSURE: 138 MMHG | WEIGHT: 154 LBS | HEART RATE: 70 BPM | DIASTOLIC BLOOD PRESSURE: 66 MMHG

## 2025-03-18 DIAGNOSIS — E11.9 CONTROLLED TYPE 2 DIABETES MELLITUS WITHOUT COMPLICATION, WITHOUT LONG-TERM CURRENT USE OF INSULIN: ICD-10-CM

## 2025-03-18 DIAGNOSIS — I10 ESSENTIAL HYPERTENSION WITH GOAL BLOOD PRESSURE LESS THAN 140/90: Primary | ICD-10-CM

## 2025-03-18 DIAGNOSIS — F51.01 PRIMARY INSOMNIA: ICD-10-CM

## 2025-03-18 DIAGNOSIS — Z91.81 HISTORY OF FALL: ICD-10-CM

## 2025-03-18 DIAGNOSIS — I87.2 VENOUS INSUFFICIENCY: ICD-10-CM

## 2025-03-18 LAB
EKG ATRIAL RATE: 70 BPM
EKG DIAGNOSIS: NORMAL
EKG P AXIS: 64 DEGREES
EKG P-R INTERVAL: 194 MS
EKG Q-T INTERVAL: 400 MS
EKG QRS DURATION: 104 MS
EKG QTC CALCULATION (BAZETT): 432 MS
EKG R AXIS: -68 DEGREES
EKG T AXIS: 35 DEGREES
EKG VENTRICULAR RATE: 70 BPM

## 2025-03-18 PROCEDURE — 1126F AMNT PAIN NOTED NONE PRSNT: CPT | Performed by: INTERNAL MEDICINE

## 2025-03-18 PROCEDURE — 3046F HEMOGLOBIN A1C LEVEL >9.0%: CPT | Performed by: INTERNAL MEDICINE

## 2025-03-18 PROCEDURE — G8417 CALC BMI ABV UP PARAM F/U: HCPCS | Performed by: INTERNAL MEDICINE

## 2025-03-18 PROCEDURE — 1111F DSCHRG MED/CURRENT MED MERGE: CPT | Performed by: INTERNAL MEDICINE

## 2025-03-18 PROCEDURE — 99214 OFFICE O/P EST MOD 30 MIN: CPT | Performed by: INTERNAL MEDICINE

## 2025-03-18 PROCEDURE — 1090F PRES/ABSN URINE INCON ASSESS: CPT | Performed by: INTERNAL MEDICINE

## 2025-03-18 PROCEDURE — G8427 DOCREV CUR MEDS BY ELIG CLIN: HCPCS | Performed by: INTERNAL MEDICINE

## 2025-03-18 PROCEDURE — 1036F TOBACCO NON-USER: CPT | Performed by: INTERNAL MEDICINE

## 2025-03-18 PROCEDURE — 93010 ELECTROCARDIOGRAM REPORT: CPT | Performed by: INTERNAL MEDICINE

## 2025-03-18 PROCEDURE — G2211 COMPLEX E/M VISIT ADD ON: HCPCS | Performed by: INTERNAL MEDICINE

## 2025-03-18 PROCEDURE — 1123F ACP DISCUSS/DSCN MKR DOCD: CPT | Performed by: INTERNAL MEDICINE

## 2025-03-18 ASSESSMENT — PATIENT HEALTH QUESTIONNAIRE - PHQ9
SUM OF ALL RESPONSES TO PHQ QUESTIONS 1-9: 0
1. LITTLE INTEREST OR PLEASURE IN DOING THINGS: NOT AT ALL
2. FEELING DOWN, DEPRESSED OR HOPELESS: NOT AT ALL

## 2025-03-18 ASSESSMENT — PAIN - FUNCTIONAL ASSESSMENT: PAIN_FUNCTIONAL_ASSESSMENT: NONE - DENIES PAIN

## 2025-03-18 NOTE — ED TRIAGE NOTES
Patient arrives to ED reports lightheaded starting this evening. Reports \"feeling nervous\" and cough X 2 weeks

## 2025-03-18 NOTE — PROGRESS NOTES
1. Have you been to the ER, urgent care clinic since your last visit?  Hospitalized since your last visit? YES, 3/17/25, [facility]     2. Have you seen or consulted any other health care providers outside of the [facility] since your last visit?  Include any pap smears or colon screening. No    
  Smoking Status Never    Passive exposure: Never   Smokeless Tobacco Never     Social History     Substance and Sexual Activity   Alcohol Use No    Alcohol/week: 0.0 standard drinks of alcohol     Allergies   Allergen Reactions    Cortisone Other (See Comments)     \"Make me feels weak, like I'm going to die\"    Sitagliptin Other (See Comments)     weakness       Current Outpatient Medications   Medication Sig    chlorthalidone (HYGROTON) 25 MG tablet Take 1 tablet by mouth daily    empagliflozin (JARDIANCE) 10 MG tablet Take 1 tablet by mouth daily    Continuous Glucose Sensor (FREESTYLE ALVARO 3 PLUS SENSOR) MISC Check BS daily    Continuous Glucose Sensor (FREESTYLE ALVARO 3 PLUS SENSOR) MISC Check BS Qd    glipiZIDE (GLUCOTROL) 5 MG tablet Take 1 tablet by mouth 2 times daily (before meals)    loratadine (CLARITIN) 10 MG tablet Take 1 tablet by mouth nightly    fluticasone (FLONASE) 50 MCG/ACT nasal spray 1 spray by Each Nostril route daily    mirtazapine (REMERON) 15 MG tablet Take 1 tablet by mouth nightly    atorvastatin (LIPITOR) 10 MG tablet Take 1 tablet by mouth daily    omeprazole (PRILOSEC) 40 MG delayed release capsule TAKE 1 CAPSULE BY MOUTH ONCE DAILY BEFORE BREAKFAST    tamsulosin (FLOMAX) 0.4 MG capsule Take 1 capsule by mouth daily    Elastic Bandages & Supports (KNEE BRACE) MISC Use for right knee    acetaminophen (TYLENOL) 325 MG tablet Take by mouth every 4 hours as needed    Calcium Carbonate-Vitamin D 600-3.125 MG-MCG TABS Take 1 tablet by mouth daily     No current facility-administered medications for this visit.       Alen Ovalles MD  [facility]  [address] [place] [address], [address]  [place] [address]

## 2025-03-18 NOTE — ED PROVIDER NOTES
General: No focal deficit present.      Mental Status: She is alert and oriented to person, place, and time.      Comments: Intact sensation, 5/5 strength in all 4 extremities, intact finger to nose, neg pronator drift, fluent speech, CN intact.                EMERGENCY DEPARTMENT COURSE and DIFFERENTIAL DIAGNOSIS/MDM:   Vitals:    Vitals:    03/17/25 2242 03/17/25 2257 03/17/25 2312 03/18/25 0015   BP: (!) 138/58 (!) 152/57 (!) 141/62 (!) 121/54   Pulse: 67 67 71 68   Resp: 19 17 19 20   Temp:       TempSrc:       SpO2: 98% 98% 98% 97%   Weight:       Height:             Medical Decision Making  Amount and/or Complexity of Data Reviewed  Labs: ordered.  Radiology: ordered.  ECG/medicine tests: ordered.    Risk  Prescription drug management.      89-year-old female presents to the ED with lightheadedness and feeling nervous this evening.  She has had a cough for the last couple of weeks.  She is afebrile with vital signs stable and neuro intact in no distress on examSatting 97% on room air.    Labs returned reassuringly showing only slightly elevated creatinine at 1.86, elevated from prior measure last month 1.2.  She was given IV fluid bolus and had resolution of her symptoms.  Rapid COVID and influenza swabs negative.    CXR viewed by myself and read by radiology showing no acute abnormalities.  Suspect some mild dehydration as etiology for her symptoms.  She was reassessed after ED treatment and found to have improvement in her symptoms.  Recommended increase p.o. fluid intake and recommended PCP follow-up for further evaluation and return precautions were given for worsening or concerns.  This was discussed with the patient and family member at the bedside and they stated both understanding and agreement.      REASSESSMENT     ED Course as of 03/18/25 0349   Mon Mar 17, 2025   2206 EKG shows normal sinus rhythm with a rate of 70.  No arrhythmias noted.  No STEMI.  QTc 432 [MM]      ED Course User Index  [MM]

## 2025-03-18 NOTE — TELEPHONE ENCOUNTER
Pt would like a callback from Nurse/PCP going over if Pt should still be taking:    - dapagliflozin (FARXIGA) 10 MG tablet   - hydrALAZINE (APRESOLINE) 100 MG tablet   - lisinopril (PRINIVIL;ZESTRIL) 40 MG tablet   - Metoprolol Tartrate 75 MG TABS   - spironolactone (ALDACTONE) 50 MG tablet

## 2025-03-18 NOTE — TELEPHONE ENCOUNTER
LOV: 03/05/2025  NOV: 05/15/2025    Medication: mirtazapine (REMERON) 15 MG tablet   Quantity: ?    Medication: tamsulosin (FLOMAX) 0.4 MG capsule   Quantity: ?    Pharmacy: [facility] - [place], [place] - [address] [phone] -  [phone] [[id number]]

## 2025-03-19 RX ORDER — TAMSULOSIN HYDROCHLORIDE 0.4 MG/1
0.4 CAPSULE ORAL DAILY
Qty: 90 CAPSULE | Refills: 1 | Status: SHIPPED | OUTPATIENT
Start: 2025-03-19

## 2025-03-19 RX ORDER — MIRTAZAPINE 15 MG/1
15 TABLET, FILM COATED ORAL NIGHTLY
Qty: 90 TABLET | Refills: 1 | Status: SHIPPED | OUTPATIENT
Start: 2025-03-19

## 2025-03-20 ENCOUNTER — OFFICE VISIT (OUTPATIENT)
Age: 89
End: 2025-03-20
Payer: MEDICARE

## 2025-03-20 VITALS
HEIGHT: 60 IN | DIASTOLIC BLOOD PRESSURE: 72 MMHG | WEIGHT: 152 LBS | TEMPERATURE: 98.5 F | HEART RATE: 66 BPM | OXYGEN SATURATION: 95 % | BODY MASS INDEX: 29.84 KG/M2 | RESPIRATION RATE: 18 BRPM | SYSTOLIC BLOOD PRESSURE: 138 MMHG

## 2025-03-20 DIAGNOSIS — R09.89 LABILE HYPERTENSION: ICD-10-CM

## 2025-03-20 DIAGNOSIS — E11.21 TYPE 2 DIABETES MELLITUS WITH NEPHROPATHY (HCC): Primary | ICD-10-CM

## 2025-03-20 DIAGNOSIS — E11.22 TYPE 2 DIABETES MELLITUS WITH STAGE 4 CHRONIC KIDNEY DISEASE, WITHOUT LONG-TERM CURRENT USE OF INSULIN (HCC): ICD-10-CM

## 2025-03-20 DIAGNOSIS — I10 ESSENTIAL HYPERTENSION WITH GOAL BLOOD PRESSURE LESS THAN 140/90: ICD-10-CM

## 2025-03-20 DIAGNOSIS — F51.01 PRIMARY INSOMNIA: ICD-10-CM

## 2025-03-20 DIAGNOSIS — N18.4 TYPE 2 DIABETES MELLITUS WITH STAGE 4 CHRONIC KIDNEY DISEASE, WITHOUT LONG-TERM CURRENT USE OF INSULIN (HCC): ICD-10-CM

## 2025-03-20 DIAGNOSIS — R09.81 CHRONIC NASAL CONGESTION: ICD-10-CM

## 2025-03-20 PROCEDURE — 1123F ACP DISCUSS/DSCN MKR DOCD: CPT | Performed by: INTERNAL MEDICINE

## 2025-03-20 PROCEDURE — G8427 DOCREV CUR MEDS BY ELIG CLIN: HCPCS | Performed by: INTERNAL MEDICINE

## 2025-03-20 PROCEDURE — 1126F AMNT PAIN NOTED NONE PRSNT: CPT | Performed by: INTERNAL MEDICINE

## 2025-03-20 PROCEDURE — 1090F PRES/ABSN URINE INCON ASSESS: CPT | Performed by: INTERNAL MEDICINE

## 2025-03-20 PROCEDURE — 99214 OFFICE O/P EST MOD 30 MIN: CPT | Performed by: INTERNAL MEDICINE

## 2025-03-20 PROCEDURE — 1159F MED LIST DOCD IN RCRD: CPT | Performed by: INTERNAL MEDICINE

## 2025-03-20 PROCEDURE — 3046F HEMOGLOBIN A1C LEVEL >9.0%: CPT | Performed by: INTERNAL MEDICINE

## 2025-03-20 PROCEDURE — G8417 CALC BMI ABV UP PARAM F/U: HCPCS | Performed by: INTERNAL MEDICINE

## 2025-03-20 PROCEDURE — 1036F TOBACCO NON-USER: CPT | Performed by: INTERNAL MEDICINE

## 2025-03-20 PROCEDURE — 1111F DSCHRG MED/CURRENT MED MERGE: CPT | Performed by: INTERNAL MEDICINE

## 2025-03-20 ASSESSMENT — ENCOUNTER SYMPTOMS
EYES NEGATIVE: 1
GASTROINTESTINAL NEGATIVE: 1
RESPIRATORY NEGATIVE: 1

## 2025-03-24 LAB — HBA1C MFR BLD: 9.2 % (ref 4.8–5.6)

## 2025-03-25 ENCOUNTER — RESULTS FOLLOW-UP (OUTPATIENT)
Age: 89
End: 2025-03-25

## 2025-03-25 DIAGNOSIS — E11.22 TYPE 2 DIABETES MELLITUS WITH STAGE 3B CHRONIC KIDNEY DISEASE, WITHOUT LONG-TERM CURRENT USE OF INSULIN (HCC): ICD-10-CM

## 2025-03-25 DIAGNOSIS — N18.32 TYPE 2 DIABETES MELLITUS WITH STAGE 3B CHRONIC KIDNEY DISEASE, WITHOUT LONG-TERM CURRENT USE OF INSULIN (HCC): ICD-10-CM

## 2025-03-25 DIAGNOSIS — N18.32 CHRONIC KIDNEY DISEASE, STAGE 3B (HCC): ICD-10-CM

## 2025-03-25 DIAGNOSIS — E11.21 TYPE 2 DIABETES MELLITUS WITH NEPHROPATHY (HCC): Primary | ICD-10-CM

## 2025-03-25 LAB
ALBUMIN SERPL-MCNC: 4.5 G/DL (ref 3.6–4.6)
ALP SERPL-CCNC: 85 IU/L (ref 44–121)
ALT SERPL-CCNC: 15 IU/L (ref 0–32)
AST SERPL-CCNC: 21 IU/L (ref 0–40)
BILIRUB SERPL-MCNC: 0.3 MG/DL (ref 0–1.2)
BUN SERPL-MCNC: 25 MG/DL (ref 10–36)
BUN/CREAT SERPL: 18 (ref 12–28)
CALCIUM SERPL-MCNC: 9.2 MG/DL (ref 8.7–10.3)
CHLORIDE SERPL-SCNC: 100 MMOL/L (ref 96–106)
CO2 SERPL-SCNC: 21 MMOL/L (ref 20–29)
CREAT SERPL-MCNC: 1.4 MG/DL (ref 0.57–1)
EGFRCR SERPLBLD CKD-EPI 2021: 36 ML/MIN/1.73
GLOBULIN SER CALC-MCNC: 2.9 G/DL (ref 1.5–4.5)
GLUCOSE SERPL-MCNC: 133 MG/DL (ref 70–99)
Lab: NORMAL
POTASSIUM SERPL-SCNC: 5 MMOL/L (ref 3.5–5.2)
PROT SERPL-MCNC: 7.4 G/DL (ref 6–8.5)
REPORT: NORMAL
SODIUM SERPL-SCNC: 135 MMOL/L (ref 134–144)

## 2025-03-28 ENCOUNTER — OFFICE VISIT (OUTPATIENT)
Age: 89
End: 2025-03-28
Payer: MEDICARE

## 2025-03-28 DIAGNOSIS — E11.21 TYPE 2 DIABETES MELLITUS WITH DIABETIC NEPHROPATHY, WITHOUT LONG-TERM CURRENT USE OF INSULIN (HCC): Primary | ICD-10-CM

## 2025-03-28 PROCEDURE — G0108 DIAB MANAGE TRN  PER INDIV: HCPCS

## 2025-03-28 NOTE — PROGRESS NOTES
Pamella Garg RN on 3/28/2025 at 1:52 PM    I have personally reviewed the health record, including provider notes, laboratory data and current medications before making these care and education recommendations. The time spent in this effort is included in the total time.  Total minutes: 30    Ammy Tarango, was evaluated in person at office visit. Pt came with her Son , Justino .   Son able to count CHO , good feedback. He lives [place] from his mother(patient).      Pamella Garg RN [facility]  Certified Diabetes and    [facility]

## 2025-03-30 DIAGNOSIS — E78.00 HYPERCHOLESTEROLEMIA: ICD-10-CM

## 2025-03-31 RX ORDER — METOPROLOL TARTRATE 75 MG/1
TABLET ORAL
Qty: 180 TABLET | Refills: 0 | Status: SHIPPED | OUTPATIENT
Start: 2025-03-31

## 2025-03-31 RX ORDER — ATORVASTATIN CALCIUM 10 MG/1
10 TABLET, FILM COATED ORAL DAILY
Qty: 90 TABLET | Refills: 1 | Status: SHIPPED | OUTPATIENT
Start: 2025-03-31

## 2025-04-01 DIAGNOSIS — F41.9 ANXIETY: Primary | ICD-10-CM

## 2025-04-01 NOTE — TELEPHONE ENCOUNTER
Pt feels nervous and jittery/ she states she has a nervous energy when she sits alone -usually middle of the day  Pt wants to know if something can be sent to her pharmacy? Or something she can try to help with this?  No open appointments this week  Please advise

## 2025-04-02 RX ORDER — BUSPIRONE HYDROCHLORIDE 5 MG/1
5 TABLET ORAL 2 TIMES DAILY PRN
Qty: 180 TABLET | Refills: 0 | Status: SHIPPED | OUTPATIENT
Start: 2025-04-02 | End: 2025-07-01

## 2025-04-02 NOTE — TELEPHONE ENCOUNTER
Freda Tolentino MD  You1 minute ago (3:03 PM)       She can take BuSpar 5 mg 1 tablet twice a day as needed for anxiety.

## 2025-04-16 ENCOUNTER — OFFICE VISIT (OUTPATIENT)
Age: 89
End: 2025-04-16
Payer: MEDICARE

## 2025-04-16 DIAGNOSIS — E11.21 TYPE 2 DIABETES MELLITUS WITH DIABETIC NEPHROPATHY, WITHOUT LONG-TERM CURRENT USE OF INSULIN (HCC): Primary | ICD-10-CM

## 2025-04-16 PROCEDURE — G0108 DIAB MANAGE TRN  PER INDIV: HCPCS

## 2025-04-16 NOTE — PROGRESS NOTES
[facility]  Diabetes Self-Management Education & Support Program  Encounter Note      SUMMARY  Diabetes self-care management training was completed related to healthy eating. he participant will return on April 24 to continue DSMES related to reducing risks. The participant did identify SMART Goal(s) and will practice knowledge and skills related to healthy eating to improve diabetes self-management.        EVALUATION:  Blood sugars= patient declined to check her blood sugars. States checked with insurance about sensor and is not covered. She doesn't want to try a CGM sample.     Healthy eating = states she it's been doing changes on her eating habits since last class.      RECOMMENDATIONS:  - continue with lifestyle changes  - practice counting CHO      TOPICS DISCUSSED TODAY:  HOW DO I STAY HEALTHY? 30      Next provider visit is scheduled for 5/15/25, Dr Tolentino        SMART GOAL(S)   Practice counting CHO with each meal.   ACHIEVEMENT OF GOAL(S) : 0-24%           DATE DSMES TOPIC EVALUATION     4/16/2025 WHAT CAN I EAT?     Nutritional terms & tools   Healthy Plate method - reviewed  Carbohydrate Counting   Reading food labels   Free apps        The participant   Uses Healthy Plate principles in constructing meals: Yes  Reads food labels in choosing acceptable foods: Yes    The participant needs to address : practice counting CHO and reading food labels.     Patient states she use margarine- she doesn't use oil to cook.        Pamella Garg RN on 4/16/2025 at 8:29 AM    I have personally reviewed the health record, including provider notes, laboratory data and current medications before making these care and education recommendations. The time spent in this effort is included in the total time.  Total minutes: 30      Ammy Tarango, was evaluated in person at office visit.     Pamella Garg RN [facility]  Certified Diabetes and    [facility]

## 2025-04-21 ENCOUNTER — OFFICE VISIT (OUTPATIENT)
Age: 89
End: 2025-04-21
Payer: MEDICARE

## 2025-04-21 VITALS
WEIGHT: 159 LBS | BODY MASS INDEX: 31.22 KG/M2 | HEART RATE: 76 BPM | DIASTOLIC BLOOD PRESSURE: 80 MMHG | OXYGEN SATURATION: 98 % | RESPIRATION RATE: 16 BRPM | TEMPERATURE: 98 F | SYSTOLIC BLOOD PRESSURE: 134 MMHG | HEIGHT: 60 IN

## 2025-04-21 DIAGNOSIS — E11.21 TYPE 2 DIABETES MELLITUS WITH NEPHROPATHY (HCC): ICD-10-CM

## 2025-04-21 DIAGNOSIS — R22.31 MASS OF RIGHT AXILLA: ICD-10-CM

## 2025-04-21 DIAGNOSIS — E11.649 UNCONTROLLED TYPE 2 DIABETES MELLITUS WITH HYPOGLYCEMIA WITHOUT COMA (HCC): ICD-10-CM

## 2025-04-21 DIAGNOSIS — I10 ESSENTIAL HYPERTENSION WITH GOAL BLOOD PRESSURE LESS THAN 140/90: Primary | ICD-10-CM

## 2025-04-21 DIAGNOSIS — S89.91XA INJURY OF RIGHT KNEE, INITIAL ENCOUNTER: ICD-10-CM

## 2025-04-21 PROCEDURE — 1036F TOBACCO NON-USER: CPT | Performed by: INTERNAL MEDICINE

## 2025-04-21 PROCEDURE — G8417 CALC BMI ABV UP PARAM F/U: HCPCS | Performed by: INTERNAL MEDICINE

## 2025-04-21 PROCEDURE — 99214 OFFICE O/P EST MOD 30 MIN: CPT | Performed by: INTERNAL MEDICINE

## 2025-04-21 PROCEDURE — 1123F ACP DISCUSS/DSCN MKR DOCD: CPT | Performed by: INTERNAL MEDICINE

## 2025-04-21 PROCEDURE — 1090F PRES/ABSN URINE INCON ASSESS: CPT | Performed by: INTERNAL MEDICINE

## 2025-04-21 PROCEDURE — 1125F AMNT PAIN NOTED PAIN PRSNT: CPT | Performed by: INTERNAL MEDICINE

## 2025-04-21 PROCEDURE — 3046F HEMOGLOBIN A1C LEVEL >9.0%: CPT | Performed by: INTERNAL MEDICINE

## 2025-04-21 PROCEDURE — 1160F RVW MEDS BY RX/DR IN RCRD: CPT | Performed by: INTERNAL MEDICINE

## 2025-04-21 PROCEDURE — 1159F MED LIST DOCD IN RCRD: CPT | Performed by: INTERNAL MEDICINE

## 2025-04-21 PROCEDURE — G8427 DOCREV CUR MEDS BY ELIG CLIN: HCPCS | Performed by: INTERNAL MEDICINE

## 2025-04-21 RX ORDER — AMLODIPINE BESYLATE 10 MG/1
TABLET ORAL
COMMUNITY
Start: 2025-04-13

## 2025-04-21 RX ORDER — LATANOPROST 50 UG/ML
SOLUTION/ DROPS OPHTHALMIC
COMMUNITY
Start: 2025-04-10

## 2025-04-21 RX ORDER — GLIPIZIDE 5 MG/1
5 TABLET ORAL
Qty: 180 TABLET | Refills: 1 | Status: SHIPPED | OUTPATIENT
Start: 2025-04-21

## 2025-04-21 ASSESSMENT — ENCOUNTER SYMPTOMS
RESPIRATORY NEGATIVE: 1
EYES NEGATIVE: 1
GASTROINTESTINAL NEGATIVE: 1

## 2025-04-21 NOTE — PROGRESS NOTES
Chief Complaint   Patient presents with    Fall    Leg Pain           History of Present Illness  The patient presents for evaluation of a knee injury, diabetes mellitus, and left axillary lymphadenopathy.    Knee Injury  - A fall occurred yesterday when stepping on a stool to get into bed, resulting in a knee injury.  - Significant impairment in mobility is reported, with tenderness in the affected area.  - Pain management includes the use of Tylenol and diclofenac gel, which provides relief.    Diabetes Mellitus  - Diabetes management includes Jardiance and glipizide.  - Blood sugar levels remain high, with a recent measurement of 9.2.  - She is under the care of a dietitian for dietary guidance.    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    Left Axillary Lymphadenopathy  - A left axillary lymph node has been noted, with no reported abnormalities in the breast.  - Referral to a surgeon for further evaluation and potential biopsy is planned.    Past Medical History:   Diagnosis Date    Adverse effect of anesthesia     pt states she was able to feel everything during colonoscopy    Chronic bronchitis (HCC)     Diabetes (HCC)     Essential hypertension     Hypercholesterolemia     Hypertension      Review of Systems   Constitutional: Negative.    HENT: Negative.     Eyes: Negative.    Respiratory: Negative.     Cardiovascular: Negative.    Gastrointestinal: Negative.    Endocrine: Negative.    Genitourinary: Negative.    Musculoskeletal:  Positive for arthralgias.   Skin: Negative.         Axillary lump on the right side.   Neurological: Negative.    Hematological: Negative.    Psychiatric/Behavioral: Negative.         Vitals:    04/21/25 1423   BP: 134/80   Pulse: 76   Resp: 16   Temp: 98 °F (36.7 °C)   SpO2: 98%     Physical Exam    Physical Exam  Vitals and nursing note reviewed.   Constitutional:

## 2025-04-21 NOTE — PROGRESS NOTES
Chief Complaint   Patient presents with    Fall    Leg Pain     \"Have you been to the ER, urgent care clinic since your last visit?  Hospitalized since your last visit?\"    NO    “Have you seen or consulted any other health care providers outside our system since your last visit?”    NO

## 2025-04-22 ENCOUNTER — TELEPHONE (OUTPATIENT)
Age: 89
End: 2025-04-22

## 2025-04-22 NOTE — TELEPHONE ENCOUNTER
Pt is requesting a list of all of the medications she's currently on. Pt stated she's \"confused\" on what she should be taking.     Pt is requesting a call to review medications and print out as well.     Phone: [phone]

## 2025-04-22 NOTE — TELEPHONE ENCOUNTER
MyChart message sent, letter also sent with list. Patient also given AVS with info yesterday at visit.

## 2025-04-23 ENCOUNTER — TELEPHONE (OUTPATIENT)
Age: 89
End: 2025-04-23

## 2025-04-23 NOTE — TELEPHONE ENCOUNTER
Please call pt regarding previous encounter request.     Pt also has questions about the cost of   Semaglutide,0.25 or 0.5MG/DOS, 2 MG/3ML SOPN     Please call number below/ do not send message via [employer]    Phone: [phone]

## 2025-04-23 NOTE — TELEPHONE ENCOUNTER
Spoke with son, patient has med list now, he provided her with one from Dr. Freda Tolentino MD.     Son says patient should be able to afford this medication.       He will check MyChart as well.

## 2025-04-24 ENCOUNTER — OFFICE VISIT (OUTPATIENT)
Age: 89
End: 2025-04-24
Payer: MEDICARE

## 2025-04-24 DIAGNOSIS — E11.21 TYPE 2 DIABETES MELLITUS WITH DIABETIC NEPHROPATHY, WITHOUT LONG-TERM CURRENT USE OF INSULIN (HCC): Primary | ICD-10-CM

## 2025-04-24 PROCEDURE — G0108 DIAB MANAGE TRN  PER INDIV: HCPCS

## 2025-04-24 NOTE — PROGRESS NOTES
[facility]  Diabetes Self-Management Education & Support Program  Encounter Note      SUMMARY  Diabetes self-care management training was completed related to monitoring. he participant will return on May 01 to continue DSMES related to reducing risks. The participant did identify SMART Goal(s) and will practice knowledge and skills related to healthy eating to improve diabetes self-management.        EVALUATION:  Blood sugars = pt declined to check bs or use sensor. States she is doing some changes and hope she is going to have a better Hgb A1c next time.     Healthy eating = not eating enough CHO for lunch and dinner , 3 for Breakfast, one for lunch and dinner.   Food log  per patient :   Breakfast= 1 cup of cheerios, 1/2 of banana  Snack = 11 am - grapes or wheat crackers with butter.  Lunch = broccoli, chicken, 1 slice of bread   Dinner= string beans, pc of chicken, 1 slice of bread, decaf coffee and water    Chew gum= states she have to have chew gum, and sugar free give her diarrhea.   Will use then regular chew gum- 1/2 of one , twice per week.     RECOMMENDATIONS:  - continue with lifestyle changes  - will add 1 CHO to lunch and dinner      TOPICS DISCUSSED TODAY:  HOW CAN BLOOD GLUCOSE MONITORING HELP ME? 60      Next provider visit is scheduled for 5/15/25, with Dr Tolentino        SMART GOAL(S)   Will use regular gum , only twice a week .  ACHIEVEMENT OF GOAL(S) : 0-24%    2.    Will add one CHO to her lunch and dinner .  ACHIEVEMENT OF GOAL(S) :  0-24%       DATE DSMES TOPIC EVALUATION     4/24/2025 HOW CAN BLOOD GLUCOSE MONITORING HELP ME?   Value of blood glucose monitoring   Realistic expectations   Blood glucose monitoring targets   Target adjustments   Setting a1c & blood glucose targets with provider   Meter selection    Technique for obtaining blood droplet   Blood glucose testing sites   Determining best times to test   Pregnancy recommendations   Data sharing with provider

## 2025-04-30 ENCOUNTER — TELEPHONE (OUTPATIENT)
Age: 89
End: 2025-04-30

## 2025-04-30 DIAGNOSIS — R26.9 GAIT ABNORMALITY: Primary | ICD-10-CM

## 2025-04-30 NOTE — TELEPHONE ENCOUNTER
Pt's EC is requesting a referral to [facility] for a Walk In Shower for the Pt due to multiple falls; No further info was given; Please Advise

## 2025-04-30 NOTE — TELEPHONE ENCOUNTER
Found and sent referral to info found online:    Primary practice address  [address] [address]   Phone: [phone]  Fax: [phone]  Website:

## 2025-05-01 ENCOUNTER — OFFICE VISIT (OUTPATIENT)
Age: 89
End: 2025-05-01
Payer: MEDICARE

## 2025-05-01 DIAGNOSIS — E11.21 TYPE 2 DIABETES MELLITUS WITH DIABETIC NEPHROPATHY, WITHOUT LONG-TERM CURRENT USE OF INSULIN (HCC): Primary | ICD-10-CM

## 2025-05-01 PROCEDURE — G0108 DIAB MANAGE TRN  PER INDIV: HCPCS

## 2025-05-01 NOTE — PROGRESS NOTES
60    Ammy Tarango, was evaluated in person at office visit.   Patient came with son, Mr Badillo.       Pamella Garg RN [facility]  Certified Diabetes and    [facility]

## 2025-05-05 ENCOUNTER — TELEPHONE (OUTPATIENT)
Age: 89
End: 2025-05-05

## 2025-05-05 RX ORDER — AMLODIPINE BESYLATE 10 MG/1
10 TABLET ORAL DAILY
Qty: 90 TABLET | Refills: 0 | Status: SHIPPED | OUTPATIENT
Start: 2025-05-05

## 2025-05-05 NOTE — TELEPHONE ENCOUNTER
Jardiance cost patient $60 out of pocket. She wants to know if there is an alternative she can try that may be cheaper ?

## 2025-05-07 ENCOUNTER — OFFICE VISIT (OUTPATIENT)
Age: 89
End: 2025-05-07
Payer: MEDICARE

## 2025-05-07 DIAGNOSIS — E11.21 TYPE 2 DIABETES MELLITUS WITH DIABETIC NEPHROPATHY, WITHOUT LONG-TERM CURRENT USE OF INSULIN (HCC): Primary | ICD-10-CM

## 2025-05-07 PROCEDURE — G0108 DIAB MANAGE TRN  PER INDIV: HCPCS

## 2025-05-07 NOTE — PROGRESS NOTES
[facility]  Diabetes Self-Management Education & Support Program  Encounter Note      SUMMARY  Diabetes self-care management training was completed related to reducing risks. he participant will return on May 14 to continue DSMES related to taking medications. The participant did not identify SMART Goal(s) .    EVALUATION:  - Blood Sugars= pt declined to check bs or use CGM, however states she is doing lifestyle changes.   - Stress = states she doesn't have much stress, for stress she watch Tv show that relaxes  her, she likes to organize her house , drawers.   - sleep - states she doesn't get enough sleep and she feels sleepy during the day. No other sx . States she doesn't take naps during the day.   - Blood Pressure= last BP per chart on 4/21/25 - 134/80 . States she takes her BP meds as ordered.   - Cholesterol= unable to find a recent labs . Advised to discuss cholesterol with her provider on next appointment.   - Healthy eating = states following the Plate Method  - safety= lives alone , family close by.  Fell and was on rehab. Now she have a phone in her bathroom, carry her cell phone on her pocket, have krish that remind her of taking her medication and other tasks.      RECOMMENDATIONS:  - continue with lifestyle changes  - Check blood sugars- but patient declined     TOPICS DISCUSSED TODAY:  HOW DO I STAY HEALTHY? 60      Next provider visit is scheduled for 5/15/25 , Dr Tolentino       SMART GOAL(S)   Patient doesn't have a smart goal at this time.          DATE DSMES TOPIC EVALUATION     5/7/2025 HOW DO I STAY HEALTHY?     -Diabetic complications' prevention   Dental health   Heart health   Kidney Health   Nerve health   Sleep health      The participant has a personal diabetes care record to keep abreast of diabetes health Yes     The participant needs to address : will start filling the information.         Pamella Garg RN on 5/7/2025 at 10:39 AM    I have personally reviewed the

## 2025-05-13 ENCOUNTER — OFFICE VISIT (OUTPATIENT)
Age: 89
End: 2025-05-13
Payer: MEDICARE

## 2025-05-13 VITALS
WEIGHT: 163.4 LBS | DIASTOLIC BLOOD PRESSURE: 75 MMHG | HEART RATE: 76 BPM | OXYGEN SATURATION: 98 % | RESPIRATION RATE: 18 BRPM | SYSTOLIC BLOOD PRESSURE: 133 MMHG | HEIGHT: 60 IN | TEMPERATURE: 98.4 F | BODY MASS INDEX: 32.08 KG/M2

## 2025-05-13 DIAGNOSIS — L72.0 EPIDERMAL CYST: Primary | ICD-10-CM

## 2025-05-13 PROCEDURE — 1126F AMNT PAIN NOTED NONE PRSNT: CPT | Performed by: SURGERY

## 2025-05-13 PROCEDURE — 1036F TOBACCO NON-USER: CPT | Performed by: SURGERY

## 2025-05-13 PROCEDURE — 1090F PRES/ABSN URINE INCON ASSESS: CPT | Performed by: SURGERY

## 2025-05-13 PROCEDURE — G8417 CALC BMI ABV UP PARAM F/U: HCPCS | Performed by: SURGERY

## 2025-05-13 PROCEDURE — 1123F ACP DISCUSS/DSCN MKR DOCD: CPT | Performed by: SURGERY

## 2025-05-13 PROCEDURE — 99203 OFFICE O/P NEW LOW 30 MIN: CPT | Performed by: SURGERY

## 2025-05-13 PROCEDURE — G8427 DOCREV CUR MEDS BY ELIG CLIN: HCPCS | Performed by: SURGERY

## 2025-05-13 PROCEDURE — 1159F MED LIST DOCD IN RCRD: CPT | Performed by: SURGERY

## 2025-05-13 ASSESSMENT — ENCOUNTER SYMPTOMS
NAUSEA: 0
DIARRHEA: 0
VOMITING: 0
CONSTIPATION: 0
ABDOMINAL PAIN: 0
BLOOD IN STOOL: 0
ABDOMINAL DISTENTION: 0
SHORTNESS OF BREATH: 0

## 2025-05-13 ASSESSMENT — PATIENT HEALTH QUESTIONNAIRE - PHQ9
2. FEELING DOWN, DEPRESSED OR HOPELESS: NOT AT ALL
SUM OF ALL RESPONSES TO PHQ QUESTIONS 1-9: 0
SUM OF ALL RESPONSES TO PHQ QUESTIONS 1-9: 0
1. LITTLE INTEREST OR PLEASURE IN DOING THINGS: NOT AT ALL
SUM OF ALL RESPONSES TO PHQ QUESTIONS 1-9: 0
SUM OF ALL RESPONSES TO PHQ QUESTIONS 1-9: 0

## 2025-05-13 NOTE — ASSESSMENT & PLAN NOTE
Epidermal cyst of axilla no obvious lymphadenopathy.  Will get US to be safe.  Will aslo set up for resection.  I discussed the intended procedure as well as alternative treatments including doing nothing.  I discussed the risks of the procedure including but not limited to bleeding, infection and damage to surrounding structures or recurrence. I answered all questions related to the procedure.  After questions were answered, understanding was verbalized and we will proceed as planned.

## 2025-05-13 NOTE — PROGRESS NOTES
Identified pt with two pt identifiers (name and ). Reviewed chart in preparation for visit and have obtained necessary documentation.    Ammy Tarango is a 89 y.o. female New Patient (Mass under right arm)  .    Vitals:    25 0931   BP: 133/75   BP Site: Left Upper Arm   Patient Position: Sitting   BP Cuff Size: Large Adult   Pulse: 76   Resp: 18   Temp: 98.4 °F (36.9 °C)   TempSrc: Oral   SpO2: 98%   Weight: 74.1 kg (163 lb 6.4 oz)   Height: 1.524 m (5')          1. Have you been to the ER, urgent care clinic since your last visit?  Hospitalized since your last visit?  no     2. Have you seen or consulted any other health care providers outside of the [facility] since your last visit?  Include any pap smears or colon screening.  no  
Negative for chest pain and palpitations.   Gastrointestinal:  Negative for abdominal distention, abdominal pain, blood in stool, constipation, diarrhea, nausea and vomiting.   Hematological:  Does not bruise/bleed easily.   Psychiatric/Behavioral:  Negative for suicidal ideas.          Objective    Vitals:    05/13/25 0931   BP: 133/75   Pulse: 76   Resp: 18   Temp: 98.4 °F (36.9 °C)   SpO2: 98%      Physical Exam  Vitals and nursing note reviewed.   Constitutional:       Appearance: Normal appearance.   HENT:      Head: Normocephalic and atraumatic.   Cardiovascular:      Rate and Rhythm: Normal rate.   Pulmonary:      Effort: Pulmonary effort is normal. No respiratory distress.   Abdominal:      Palpations: Abdomen is soft.   Skin:     General: Skin is warm and dry.      Comments: What appears to be an epidermal cyst right axilla, no obvious lymphadenopathy.     Neurological:      General: No focal deficit present.      Mental Status: She is alert and oriented to person, place, and time.   Psychiatric:         Mood and Affect: Mood normal.         Behavior: Behavior normal.         Thought Content: Thought content normal.         Judgment: Judgment normal.           Problem List Items Addressed This Visit          Musculoskeletal and Integument    Epidermal cyst - Primary    Epidermal cyst of axilla no obvious lymphadenopathy.  Will get US to be safe.  Will aslo set up for resection.  I discussed the intended procedure as well as alternative treatments including doing nothing.  I discussed the risks of the procedure including but not limited to bleeding, infection and damage to surrounding structures or recurrence. I answered all questions related to the procedure.  After questions were answered, understanding was verbalized and we will proceed as planned.                  I, Tyrone Chester Jr., MD personally performed the services described in this document.   This documentation was facilitated by voice

## 2025-05-14 ENCOUNTER — OFFICE VISIT (OUTPATIENT)
Age: 89
End: 2025-05-14
Payer: MEDICARE

## 2025-05-14 DIAGNOSIS — E11.21 TYPE 2 DIABETES MELLITUS WITH DIABETIC NEPHROPATHY, WITHOUT LONG-TERM CURRENT USE OF INSULIN (HCC): Primary | ICD-10-CM

## 2025-05-14 PROCEDURE — G0108 DIAB MANAGE TRN  PER INDIV: HCPCS

## 2025-05-14 NOTE — PROGRESS NOTES
[facility]  Diabetes Self-Management Education & Support Program  Encounter Note      SUMMARY  Diabetes self-care management training was completed related to taking medications. he participant will return on May 28 to continue DSMES related to physical activity. The participant did not identify SMART Goal(s)      EVALUATION:  -Blood sugars= pt declined to check blood sugars or use sensor   - Medications = she is taking her medications as ordered.  She use a pill box.   - Healthy eating = following the plate method.     RECOMMENDATIONS:  - continue with lifestyle changes     TOPICS DISCUSSED TODAY:  HOW DO MY DIABETES MEDICATIONS WORK? 60    Next provider visit is scheduled for 5/15/25, with Dr Tolentino       SMART GOAL(S)   Patient doesn't have a smart goal at this time.          DATE DSMES TOPIC EVALUATION     5/14/2025 HOW DO MY DIABETES MEDICATIONS WORK?   Type 1 medications & methods   Insulin injections   Injection sites   Type 2 medications   Oral agents   GLP-1 agonists   Hypoglycemia symptoms & treatment   Glucagon emergency kits   General guidance regarding insulin use whether Type 1, 2 or gestational diabetes   Storage of insulin   Disposal    Traveling with medications   Barriers to medication adherence      The participant   Can describe the expected action & side effects of prescribed diabetes medications: Yes  Can demonstrate injection technique (if applicable): N/A    The participant needs to address : check blood sugars to evaluate how the medication is working, but pt declined to check bs.      Pamella aGrg RN on 5/14/2025 at 3:08 PM    I have personally reviewed the health record, including provider notes, laboratory data and current medications before making these care and education recommendations. The time spent in this effort is included in the total time.  Total minutes: 60    Patient came with her son, Mr Badillo.     Ammy Tarango, was evaluated at office visit.

## 2025-05-15 ENCOUNTER — OFFICE VISIT (OUTPATIENT)
Age: 89
End: 2025-05-15
Payer: MEDICARE

## 2025-05-15 VITALS
SYSTOLIC BLOOD PRESSURE: 130 MMHG | RESPIRATION RATE: 16 BRPM | OXYGEN SATURATION: 94 % | TEMPERATURE: 98 F | BODY MASS INDEX: 32 KG/M2 | DIASTOLIC BLOOD PRESSURE: 72 MMHG | HEART RATE: 69 BPM | HEIGHT: 60 IN | WEIGHT: 163 LBS

## 2025-05-15 DIAGNOSIS — E11.21 TYPE 2 DIABETES MELLITUS WITH NEPHROPATHY (HCC): Primary | ICD-10-CM

## 2025-05-15 DIAGNOSIS — E11.21 TYPE 2 DIABETES MELLITUS WITH NEPHROPATHY (HCC): ICD-10-CM

## 2025-05-15 DIAGNOSIS — E11.649 UNCONTROLLED TYPE 2 DIABETES MELLITUS WITH HYPOGLYCEMIA WITHOUT COMA (HCC): ICD-10-CM

## 2025-05-15 DIAGNOSIS — I10 ESSENTIAL HYPERTENSION WITH GOAL BLOOD PRESSURE LESS THAN 140/90: ICD-10-CM

## 2025-05-15 DIAGNOSIS — E78.00 HYPERCHOLESTEROLEMIA: ICD-10-CM

## 2025-05-15 DIAGNOSIS — G89.29 CHRONIC PAIN OF RIGHT KNEE: ICD-10-CM

## 2025-05-15 DIAGNOSIS — M25.561 CHRONIC PAIN OF RIGHT KNEE: ICD-10-CM

## 2025-05-15 PROCEDURE — 3046F HEMOGLOBIN A1C LEVEL >9.0%: CPT | Performed by: INTERNAL MEDICINE

## 2025-05-15 PROCEDURE — G8417 CALC BMI ABV UP PARAM F/U: HCPCS | Performed by: INTERNAL MEDICINE

## 2025-05-15 PROCEDURE — 1159F MED LIST DOCD IN RCRD: CPT | Performed by: INTERNAL MEDICINE

## 2025-05-15 PROCEDURE — 1126F AMNT PAIN NOTED NONE PRSNT: CPT | Performed by: INTERNAL MEDICINE

## 2025-05-15 PROCEDURE — 1036F TOBACCO NON-USER: CPT | Performed by: INTERNAL MEDICINE

## 2025-05-15 PROCEDURE — 99214 OFFICE O/P EST MOD 30 MIN: CPT | Performed by: INTERNAL MEDICINE

## 2025-05-15 PROCEDURE — 1123F ACP DISCUSS/DSCN MKR DOCD: CPT | Performed by: INTERNAL MEDICINE

## 2025-05-15 PROCEDURE — G8427 DOCREV CUR MEDS BY ELIG CLIN: HCPCS | Performed by: INTERNAL MEDICINE

## 2025-05-15 PROCEDURE — 1090F PRES/ABSN URINE INCON ASSESS: CPT | Performed by: INTERNAL MEDICINE

## 2025-05-15 PROCEDURE — 1160F RVW MEDS BY RX/DR IN RCRD: CPT | Performed by: INTERNAL MEDICINE

## 2025-05-15 ASSESSMENT — ENCOUNTER SYMPTOMS
GASTROINTESTINAL NEGATIVE: 1
EYES NEGATIVE: 1
RESPIRATORY NEGATIVE: 1

## 2025-05-15 NOTE — PROGRESS NOTES
Chief Complaint   Patient presents with    Hypertension    Diabetes    Chronic Kidney Disease    Subdural hematoma (HCC)     From fall last year     Cholesterol Problem    Follow-up Chronic Condition     Have you been to the ER, urgent care clinic since your last visit?  Hospitalized since your last visit?   NO    Have you seen or consulted any other health care providers outside our system since your last visit?   NO

## 2025-05-15 NOTE — PROGRESS NOTES
Chief Complaint   Patient presents with    Hypertension    Diabetes    Chronic Kidney Disease    Subdural hematoma (HCC)     From fall last year     Cholesterol Problem    Follow-up Chronic Condition           History of Present Illness  The patient presents for evaluation of right knee pain, diabetes mellitus, neuropathy, and watery eyes.    Right Knee Pain  - She reports experiencing discomfort in her right knee, which she manages with intermittent use of Tylenol Arthritis taken every morning.    Diabetes Mellitus  - She has been prescribed semaglutide 0.5 mg but has not initiated the treatment due to a preference against self-injection.  - She is currently on glipizide and Jardiance for her diabetes.  - Her recent A1c was 9.2, down from a previous 11.2, indicating persistent high blood sugar levels.  - She is aware that her next A1c test is due in July.    Neuropathy  - She describes a sensation akin to having rocks in her hands, accompanied by numbness.  - This has impacted her ability to perform tasks such as sewing, as she is unable to feel the needle in her hand.    Watery Eyes  - She reports excessive tearing from her eyes, although she does not experience any associated itching.    Past Medical History:   Diagnosis Date    Adverse effect of anesthesia     pt states she was able to feel everything during colonoscopy    Chronic bronchitis (HCC)     Diabetes (HCC)     Essential hypertension     Hypercholesterolemia     Hypertension      Review of Systems   Constitutional: Negative.    HENT: Negative.     Eyes: Negative.    Respiratory: Negative.     Cardiovascular: Negative.    Gastrointestinal: Negative.    Endocrine: Negative.    Genitourinary: Negative.    Musculoskeletal:  Positive for arthralgias.   Skin: Negative.    Neurological: Negative.    Hematological: Negative.    Psychiatric/Behavioral: Negative.         Vitals:    05/15/25 1017   BP: 130/72   Pulse: 69   Resp: 16   Temp: 98 °F (36.7 °C)   SpO2:

## 2025-05-16 ENCOUNTER — TELEPHONE (OUTPATIENT)
Age: 89
End: 2025-05-16

## 2025-05-16 DIAGNOSIS — L72.0 EPIDERMAL CYST: Primary | ICD-10-CM

## 2025-05-16 DIAGNOSIS — N63.31 MASS OF AXILLARY TAIL OF RIGHT BREAST: ICD-10-CM

## 2025-05-16 NOTE — TELEPHONE ENCOUNTER
Marni from [facility] radiology scheduling called and stated Since the patient has not had a mammogram in the past 2 years they need an additional order for a bilateral diagnostic

## 2025-05-20 ENCOUNTER — TELEPHONE (OUTPATIENT)
Age: 89
End: 2025-05-20

## 2025-05-21 ENCOUNTER — PREP FOR PROCEDURE (OUTPATIENT)
Age: 89
End: 2025-05-21

## 2025-05-21 NOTE — TELEPHONE ENCOUNTER
Called and spoke to PT, dicussed getting scheduled for surgery with Dr. Chester. PT asked if I could call her son, Justino. Called and spoke with HIPAA son Justino, offered 8/7/25 for surgery with Dr. Chester and HIPAA son accepted. Notified HIPAA son of arrival time and HIPAA son confirmed PT address. Surgical letter will be sent to PT address on file and uploaded to [email or web address].

## 2025-05-28 ENCOUNTER — OFFICE VISIT (OUTPATIENT)
Age: 89
End: 2025-05-28
Payer: MEDICARE

## 2025-05-28 DIAGNOSIS — E11.21 TYPE 2 DIABETES MELLITUS WITH DIABETIC NEPHROPATHY, WITHOUT LONG-TERM CURRENT USE OF INSULIN (HCC): Primary | ICD-10-CM

## 2025-05-28 PROCEDURE — G0108 DIAB MANAGE TRN  PER INDIV: HCPCS

## 2025-05-28 NOTE — PROGRESS NOTES
[facility]  Diabetes Self-Management Education & Support Program  Encounter Note      SUMMARY  Diabetes self-care management training was completed related to physical activity. he participant will return on June 02 to continue DSMES related to healthy coping. The participant did identify SMART Goal(s) and will practice knowledge and skills related to reducing risks and medications to improve diabetes self-management.        EVALUATION:  - Blood sugars= patient decline to check bs or use sensor  - Weight = advised to weight herself once a week.  Patient started ozempic.  - Healthy eating = states she do have her appetite, eating B-L-D.  Following the healthy plate.   - Exercise= states she is very active at home, she also goes to the grocery store and walk with the grocery cart.   - Medications= started ozempic 2 weeks ago. Denies any side effects.     RECOMMENDATIONS:  - continue with lifestyle changes  - report any ozempic side effects  -      TOPICS DISCUSSED TODAY:  HOW DOES PHYSICAL ACTIVITY AFFECT MY DIABETES? 60      Next provider visit is scheduled for 8/19/2025, Dr Rajan CERRATO GOAL(S)   Report any side effects of ozempic.   ACHIEVEMENT OF GOAL(S) : 0-24%    2.    Will weight herself once a week and keep a record.   ACHIEVEMENT OF GOAL(S) :  0-24%       DATE DSMES TOPIC EVALUATION     5/28/2025 HOW DOES PHYSICAL ACTIVITY AFFECT MY DIABETES?   Benefits of physical activity   Beginning a program of physical activity   Walking   Pedometers   Goal setting   Structured physical activity program   Aerobic activity   Resistance   Flexibility   Balance   Physical activity program progression   Safety issues   Barriers to physical activity   Facilitators of physical activity        The participant has established a regular physical activity plan: No; patient states she is very active in her house. She walks when goes to the grocery.        The participant needs to address :

## 2025-05-30 RX ORDER — METOPROLOL TARTRATE 75 MG/1
TABLET ORAL
Qty: 180 TABLET | Refills: 0 | Status: SHIPPED | OUTPATIENT
Start: 2025-05-30

## 2025-06-02 ENCOUNTER — OFFICE VISIT (OUTPATIENT)
Age: 89
End: 2025-06-02
Payer: MEDICARE

## 2025-06-02 ENCOUNTER — TELEPHONE (OUTPATIENT)
Age: 89
End: 2025-06-02

## 2025-06-02 DIAGNOSIS — E11.21 TYPE 2 DIABETES MELLITUS WITH DIABETIC NEPHROPATHY, WITHOUT LONG-TERM CURRENT USE OF INSULIN (HCC): Primary | ICD-10-CM

## 2025-06-02 DIAGNOSIS — E11.21 TYPE 2 DIABETES MELLITUS WITH NEPHROPATHY (HCC): Primary | ICD-10-CM

## 2025-06-02 PROCEDURE — G0108 DIAB MANAGE TRN  PER INDIV: HCPCS

## 2025-06-02 RX ORDER — DAPAGLIFLOZIN 10 MG/1
10 TABLET, FILM COATED ORAL EVERY MORNING
Qty: 30 TABLET | Refills: 3 | Status: SHIPPED | OUTPATIENT
Start: 2025-06-02

## 2025-06-02 NOTE — PROGRESS NOTES
[facility]  Diabetes Self-Management Education & Support Program  Encounter Note      SUMMARY  Diabetes self-care management training was completed related to healthy coping. he participant will return on June 19 to continue DSMES related to problem solving. The participant did not identify SMART Goal(s) .     EVALUATION:  Blood sugars= patient declined to check blood sugars or use CGM .  Stress= states she doesn't have much stress, she do watch her programs on TV that relax her, organizing ( like organize a drawer), book word= word search. She was going to the [employer] in [place], recently stop.  Sleep = states her bedtime is 11-12 MN, states if she goes earlier to bed she stays awake. She gets up at 7-8 AM .   Healthy eating= explained she had made some changes in diet, portions.     RECOMMENDATIONS:  - continue with lifestyle changes  - check blood sugars     TOPICS DISCUSSED TODAY:  HOW DO I FIND SUPPORT TO TACKLE THIS CONDITION? 60      Next provider visit is scheduled for Dr Tolentino , 8/19/25 .       SMART GOAL(S)   Patient doesn't have a smart goal at this time.          DATE DSMES TOPIC EVALUATION     6/2/2025 HOW DO I FIND SUPPORT TO TACKLE THIS CONDITION?   Normal responses to diabetes diagnosis or complication   Shock   Anger & resentment   Guilt/self-blame   Sadness & worry   Depression    Anxiety   Pregnancy   Constructive strategies to normal responses    Exploring feelings & attitudes   Motivation: Cost versus benefits analysis   Problem-solving: Chain analysis   Obtaining support: Communicating   Family & friends   Health team   iii. Community   Stress   Symptoms   Managing stress   Fill your tool kit        The participant can identify people that support them in caring for their diabetes health: \"none\", son bring her to her appointments.       The participant would like to pursue the following in keeping themselves healthy after completing the program:  Healthy

## 2025-06-02 NOTE — TELEPHONE ENCOUNTER
Jardiance cost went up to 100  Pt wants to know if there is an alternative medication that might not cost as much?  Could script for farxiga be put in per conversation on 5/5/25?

## 2025-06-11 ENCOUNTER — TELEPHONE (OUTPATIENT)
Age: 89
End: 2025-06-11

## 2025-06-11 NOTE — TELEPHONE ENCOUNTER
Ordered:       Leonard Saini (You)   [facility]  just now  submitted to [facility]  ABBY HERNANDEZ (You)   just now  approved order  Leonard Saini (You)   [facility]  2m ago  created order

## 2025-06-12 DIAGNOSIS — I10 ESSENTIAL HYPERTENSION WITH GOAL BLOOD PRESSURE LESS THAN 140/90: ICD-10-CM

## 2025-06-12 RX ORDER — CHLORTHALIDONE 25 MG/1
25 TABLET ORAL DAILY
Qty: 90 TABLET | Refills: 0 | Status: SHIPPED | OUTPATIENT
Start: 2025-06-12

## 2025-06-12 NOTE — TELEPHONE ENCOUNTER
Last appt 5/15/2025      Next Apt:     Future Appointments   Date Time Provider Department Center   6/19/2025  9:30 AM [facility]   8/19/2025 10:00 AM Freda Tolentino MD [facility]   8/20/2025  8:00 AM Breonna Montana APRN - NP [facility]   9/30/2025  1:20 PM Alen Ovalles MD [facility]   12/11/2025 11:10 AM Katt Self MD [facility] [place] - [address] - P [phone] - F [phone]  [address] [address]  Phone: [phone] Fax: [phone]

## 2025-06-19 ENCOUNTER — OFFICE VISIT (OUTPATIENT)
Age: 89
End: 2025-06-19
Payer: MEDICARE

## 2025-06-19 DIAGNOSIS — E11.21 TYPE 2 DIABETES MELLITUS WITH DIABETIC NEPHROPATHY, WITHOUT LONG-TERM CURRENT USE OF INSULIN (HCC): Primary | ICD-10-CM

## 2025-06-19 PROCEDURE — G0108 DIAB MANAGE TRN  PER INDIV: HCPCS

## 2025-06-19 NOTE — PROGRESS NOTES
[facility]  Diabetes Self-Management Education & Support Program  Encounter Note      SUMMARY  Diabetes self-care management training was completed related to problem solving. The participant will return on July 24 for the 6 weeks follow up. The participant did identify SMART Goal(s) and will practice knowledge and skills related to medications and problem solving to improve diabetes self-management.        EVALUATION:  Blood sugars= patient doesn't want to check her blood sugars or use a CGM.  Healthy eating = states she is following the plate method.     Medications= states she have flame on her throat and use regular lozenges because sugar free lozenges make her sick. Takes about 4 lozenges per day.  Advised to consult with her provider regarding her sx.     Hypoglycemia Treatment = patient states she will purchase the glucose tablets to have available in case of low blood sugars.     RECOMMENDATIONS:  - continue with lifestyle changes  - check blood sugars     TOPICS DISCUSSED TODAY:  HOW DO I FIGURE OUT WHAT'S INFLUENCING MY BLOOD GLUCOSES? 60      Next provider visit is scheduled for Dr Tolentino, 8/19/2025       SMART GOAL(S)   Will consult with her provider regarding sx of flame in her throat and taking regular lozenges.   ACHIEVEMENT OF GOAL(S) : 0-24%    2.   Will purchase glucose tablets.   ACHIEVEMENT OF GOAL(S) :  0-24%       DATE DSMES TOPIC EVALUATION     6/19/2025 HOW DO I FIGURE OUT WHAT'S INFLUENCING MY BLOOD GLUCOSES?   Problem solving using SOAR   Set goals   Sort options   Arrive at a plan   Reaffirm plan   Common problems in diabetes self-care   Hypoglycemia   Hyperglycemia   Sick days   Pattern management   Disaster preparedness plan        The participant has an action plan for   Hypoglycemia: Yes  Hyperglycemia: Yes  Sick days: Yes  During disasters: Yes    The participant needs to address : start checking blood sugars.     Pamella Garg RN on 6/19/2025 at 10:24 AM    I

## 2025-06-26 ENCOUNTER — TELEPHONE (OUTPATIENT)
Age: 89
End: 2025-06-26

## 2025-06-26 NOTE — TELEPHONE ENCOUNTER
Pt's daughter called inquiring on the status of Rollator Order; Informed Daughter that order was placed & submitted/approved on 06/11/2025; Also informed Pt that \"Rollator Order was completed, per company patient's family was not responding to contact, order was canceled\"; Pt's daughter requested to take the Pt's number off the chart & replace it with her's since Pt does not answer the phone; Please call Pt's Daughter back regarding Order status & Turnaround time    New Number [phone]

## 2025-06-27 ENCOUNTER — TELEPHONE (OUTPATIENT)
Age: 89
End: 2025-06-27

## 2025-06-27 NOTE — TELEPHONE ENCOUNTER
Spoke with daughter relayed message:    GENESIS Nguyen  [facility]  6/26 ? 2:14PM     patient received a front wheeled walker last year ad insurance will only cover one walker every 5 years. We can offer them private pay of $195

## 2025-06-27 NOTE — TELEPHONE ENCOUNTER
The patient's daughter called about getting the Rollator Order taking care of. She would like a call back.

## 2025-07-01 ENCOUNTER — TELEPHONE (OUTPATIENT)
Age: 89
End: 2025-07-01

## 2025-07-01 NOTE — TELEPHONE ENCOUNTER
Daughter of Ammy Tarango was called and verbalized understanding on note below.       [facility]  [facility]  Critical access hospital, please reach out to our patient support team. They can be reached Monday through Friday 8:30am - 5pm at [phone]. Thank you!

## 2025-07-01 NOTE — TELEPHONE ENCOUNTER
Pt's EC called inquiring on the status of Rollator Order for Pt; Pt's EC would like a callback regarding information of the name of the place & phone number to call them regarding Order

## 2025-07-07 ENCOUNTER — TELEPHONE (OUTPATIENT)
Age: 89
End: 2025-07-07

## 2025-07-07 NOTE — TELEPHONE ENCOUNTER
Pt is requesting a call back to discuss if there is anything that can be called in or recommended for energy levels.     Please return call per request at   [phone]

## 2025-07-14 DIAGNOSIS — E11.649 UNCONTROLLED TYPE 2 DIABETES MELLITUS WITH HYPOGLYCEMIA WITHOUT COMA (HCC): ICD-10-CM

## 2025-07-14 LAB
BASOPHILS # BLD AUTO: 0 X10E3/UL (ref 0–0.2)
BASOPHILS NFR BLD AUTO: 1 %
EOSINOPHIL # BLD AUTO: 0.1 X10E3/UL (ref 0–0.4)
EOSINOPHIL NFR BLD AUTO: 4 %
ERYTHROCYTE [DISTWIDTH] IN BLOOD BY AUTOMATED COUNT: 15.1 % (ref 11.7–15.4)
HBA1C MFR BLD: 7.8 % (ref 4.8–5.6)
HCT VFR BLD AUTO: 37.3 % (ref 34–46.6)
HGB BLD-MCNC: 12 G/DL (ref 11.1–15.9)
IMM GRANULOCYTES # BLD AUTO: 0 X10E3/UL (ref 0–0.1)
IMM GRANULOCYTES NFR BLD AUTO: 0 %
LYMPHOCYTES # BLD AUTO: 0.9 X10E3/UL (ref 0.7–3.1)
LYMPHOCYTES NFR BLD AUTO: 24 %
MCH RBC QN AUTO: 25.8 PG (ref 26.6–33)
MCHC RBC AUTO-ENTMCNC: 32.2 G/DL (ref 31.5–35.7)
MCV RBC AUTO: 80 FL (ref 79–97)
MONOCYTES # BLD AUTO: 0.5 X10E3/UL (ref 0.1–0.9)
MONOCYTES NFR BLD AUTO: 12 %
NEUTROPHILS # BLD AUTO: 2.2 X10E3/UL (ref 1.4–7)
NEUTROPHILS NFR BLD AUTO: 58 %
PLATELET # BLD AUTO: 203 X10E3/UL (ref 150–450)
RBC # BLD AUTO: 4.65 X10E6/UL (ref 3.77–5.28)
WBC # BLD AUTO: 3.8 X10E3/UL (ref 3.4–10.8)

## 2025-07-15 LAB
ALBUMIN SERPL-MCNC: 4.2 G/DL (ref 3.6–4.6)
ALP SERPL-CCNC: 97 IU/L (ref 44–121)
ALT SERPL-CCNC: 23 IU/L (ref 0–32)
AST SERPL-CCNC: 24 IU/L (ref 0–40)
BILIRUB SERPL-MCNC: 0.3 MG/DL (ref 0–1.2)
BUN SERPL-MCNC: 18 MG/DL (ref 10–36)
BUN/CREAT SERPL: 15 (ref 12–28)
CALCIUM SERPL-MCNC: 9.7 MG/DL (ref 8.7–10.3)
CHLORIDE SERPL-SCNC: 92 MMOL/L (ref 96–106)
CO2 SERPL-SCNC: 24 MMOL/L (ref 20–29)
CREAT SERPL-MCNC: 1.24 MG/DL (ref 0.57–1)
EGFRCR SERPLBLD CKD-EPI 2021: 41 ML/MIN/1.73
GLOBULIN SER CALC-MCNC: 3.1 G/DL (ref 1.5–4.5)
GLUCOSE SERPL-MCNC: 140 MG/DL (ref 70–99)
Lab: NORMAL
POTASSIUM SERPL-SCNC: 3.6 MMOL/L (ref 3.5–5.2)
PROT SERPL-MCNC: 7.3 G/DL (ref 6–8.5)
REPORT: NORMAL
SODIUM SERPL-SCNC: 134 MMOL/L (ref 134–144)

## 2025-07-15 RX ORDER — SEMAGLUTIDE 0.68 MG/ML
0.5 INJECTION, SOLUTION SUBCUTANEOUS
Qty: 3 ML | Refills: 0 | Status: SHIPPED | OUTPATIENT
Start: 2025-07-15

## 2025-07-18 ENCOUNTER — TELEMEDICINE (OUTPATIENT)
Age: 89
End: 2025-07-18
Payer: MEDICARE

## 2025-07-18 DIAGNOSIS — I10 ESSENTIAL HYPERTENSION WITH GOAL BLOOD PRESSURE LESS THAN 140/90: ICD-10-CM

## 2025-07-18 DIAGNOSIS — E11.21 TYPE 2 DIABETES MELLITUS WITH NEPHROPATHY (HCC): ICD-10-CM

## 2025-07-18 DIAGNOSIS — R53.83 OTHER FATIGUE: Primary | ICD-10-CM

## 2025-07-18 PROCEDURE — 1123F ACP DISCUSS/DSCN MKR DOCD: CPT | Performed by: INTERNAL MEDICINE

## 2025-07-18 PROCEDURE — 1160F RVW MEDS BY RX/DR IN RCRD: CPT | Performed by: INTERNAL MEDICINE

## 2025-07-18 PROCEDURE — 1036F TOBACCO NON-USER: CPT | Performed by: INTERNAL MEDICINE

## 2025-07-18 PROCEDURE — G8417 CALC BMI ABV UP PARAM F/U: HCPCS | Performed by: INTERNAL MEDICINE

## 2025-07-18 PROCEDURE — G8427 DOCREV CUR MEDS BY ELIG CLIN: HCPCS | Performed by: INTERNAL MEDICINE

## 2025-07-18 PROCEDURE — 1159F MED LIST DOCD IN RCRD: CPT | Performed by: INTERNAL MEDICINE

## 2025-07-18 PROCEDURE — 99213 OFFICE O/P EST LOW 20 MIN: CPT | Performed by: INTERNAL MEDICINE

## 2025-07-18 PROCEDURE — 3051F HG A1C>EQUAL 7.0%<8.0%: CPT | Performed by: INTERNAL MEDICINE

## 2025-07-18 PROCEDURE — 1090F PRES/ABSN URINE INCON ASSESS: CPT | Performed by: INTERNAL MEDICINE

## 2025-07-18 RX ORDER — EMPAGLIFLOZIN 10 MG/1
TABLET, FILM COATED ORAL
COMMUNITY
Start: 2025-07-01

## 2025-07-18 ASSESSMENT — PATIENT HEALTH QUESTIONNAIRE - PHQ9
2. FEELING DOWN, DEPRESSED OR HOPELESS: NOT AT ALL
1. LITTLE INTEREST OR PLEASURE IN DOING THINGS: NOT AT ALL
SUM OF ALL RESPONSES TO PHQ QUESTIONS 1-9: 0

## 2025-07-18 ASSESSMENT — ENCOUNTER SYMPTOMS
GASTROINTESTINAL NEGATIVE: 1
RESPIRATORY NEGATIVE: 1
EYES NEGATIVE: 1

## 2025-07-18 NOTE — PROGRESS NOTES
Chief Complaint   Patient presents with    Fatigue    Malaise     Have you been to the ER, urgent care clinic since your last visit?  Hospitalized since your last visit?   NO    Have you seen or consulted any other health care providers outside our system since your last visit?   NO

## 2025-07-18 NOTE — PROGRESS NOTES
Ammy Tarango, was evaluated through a synchronous (real-time) audio-video encounter. The patient (or guardian if applicable) is aware that this is a billable service, which includes applicable co-pays. This Virtual Visit was conducted with patient's (and/or legal guardian's) consent. Patient identification was verified, and a caregiver was present when appropriate.   The patient was located at Home: [address] [address]  Provider was located at Facility (Appt Dept): [address]  [place] [address]  Confirm you are appropriately licensed, registered, or certified to deliver care in the state where the patient is located as indicated above. If you are not or unsure, please re-schedule the visit: Yes, I confirm.     Ammy Tarango (:  3/1/1934) is a Established patient, presenting virtually for evaluation of the following:      Below is the assessment and plan developed based on review of pertinent history, physical exam, labs, studies, and medications.     Assessment & Plan  Other fatigue     She has been feeling fatigued, recent lab work showed all labs are stable.  Given her age and diabetes her kidney function test has improved.  Advised her to make sure that she sleeps well at night and drink a lot of fluid and do exercise.  She feels that she is probably lazy and that is making her more tired.       Type 2 diabetes mellitus with nephropathy (HCC)     Below significantly.  Continue all medications and diet.       Essential hypertension with goal blood pressure less than 140/90     Doing well.  Stable blood pressure.          Assessment & Plan         No follow-ups on file.       Subjective   History of Present Illness  Ammy is here for follow-up.  She has been feeling fatigue lately.  She is not feeling like getting up from the bed and do anything.  Upon asking she mentioned that she is sleeping well at night.  And completed medication.  She just had lab work

## 2025-07-18 NOTE — ASSESSMENT & PLAN NOTE
Below significantly.  Continue all medications and diet.       
   Doing well.  Stable blood pressure.       
Opt out

## 2025-07-20 ENCOUNTER — HOSPITAL ENCOUNTER (EMERGENCY)
Facility: HOSPITAL | Age: 89
Discharge: HOME | End: 2025-07-21
Attending: STUDENT IN AN ORGANIZED HEALTH CARE EDUCATION/TRAINING PROGRAM
Payer: MEDICARE

## 2025-07-20 DIAGNOSIS — N30.01 ACUTE CYSTITIS WITH HEMATURIA: Primary | ICD-10-CM

## 2025-07-20 LAB
ALBUMIN SERPL-MCNC: 4 G/DL (ref 3.5–5)
ALBUMIN/GLOB SERPL: 0.9 (ref 1.1–2.2)
ALP SERPL-CCNC: 94 U/L (ref 45–117)
ALT SERPL-CCNC: 35 U/L (ref 12–78)
ANION GAP SERPL CALC-SCNC: 5 MMOL/L (ref 2–12)
APPEARANCE UR: ABNORMAL
AST SERPL-CCNC: 25 U/L (ref 15–37)
BACTERIA URNS QL MICRO: ABNORMAL /HPF
BASOPHILS # BLD: 0.02 K/UL (ref 0–0.1)
BASOPHILS NFR BLD: 0.5 % (ref 0–1)
BILIRUB SERPL-MCNC: 0.3 MG/DL (ref 0.2–1)
BILIRUB UR QL: NEGATIVE
BUN SERPL-MCNC: 18 MG/DL (ref 6–20)
BUN/CREAT SERPL: 14 (ref 12–20)
CALCIUM SERPL-MCNC: 9.9 MG/DL (ref 8.5–10.1)
CHLORIDE SERPL-SCNC: 95 MMOL/L (ref 97–108)
CO2 SERPL-SCNC: 32 MMOL/L (ref 21–32)
COLOR UR: ABNORMAL
COMMENT:: NORMAL
CREAT SERPL-MCNC: 1.32 MG/DL (ref 0.55–1.02)
DIFFERENTIAL METHOD BLD: ABNORMAL
EOSINOPHIL # BLD: 0.16 K/UL (ref 0–0.4)
EOSINOPHIL NFR BLD: 3.6 % (ref 0–7)
EPITH CASTS URNS QL MICRO: ABNORMAL /LPF
ERYTHROCYTE [DISTWIDTH] IN BLOOD BY AUTOMATED COUNT: 15 % (ref 11.5–14.5)
FLUAV RNA SPEC QL NAA+PROBE: NOT DETECTED
FLUBV RNA SPEC QL NAA+PROBE: NOT DETECTED
GLOBULIN SER CALC-MCNC: 4.5 G/DL (ref 2–4)
GLUCOSE SERPL-MCNC: 144 MG/DL (ref 65–100)
GLUCOSE UR STRIP.AUTO-MCNC: >1000 MG/DL
HCT VFR BLD AUTO: 38 % (ref 35–47)
HGB BLD-MCNC: 12.1 G/DL (ref 11.5–16)
HGB UR QL STRIP: ABNORMAL
HYALINE CASTS URNS QL MICRO: ABNORMAL /LPF (ref 0–5)
IMM GRANULOCYTES # BLD AUTO: 0.03 K/UL (ref 0–0.04)
IMM GRANULOCYTES NFR BLD AUTO: 0.7 % (ref 0–0.5)
KETONES UR QL STRIP.AUTO: NEGATIVE MG/DL
LEUKOCYTE ESTERASE UR QL STRIP.AUTO: ABNORMAL
LIPASE SERPL-CCNC: 108 U/L (ref 13–75)
LYMPHOCYTES # BLD: 1.07 K/UL (ref 0.8–3.5)
LYMPHOCYTES NFR BLD: 24.2 % (ref 12–49)
MCH RBC QN AUTO: 25.4 PG (ref 26–34)
MCHC RBC AUTO-ENTMCNC: 31.8 G/DL (ref 30–36.5)
MCV RBC AUTO: 79.8 FL (ref 80–99)
MONOCYTES # BLD: 0.47 K/UL (ref 0–1)
MONOCYTES NFR BLD: 10.6 % (ref 5–13)
NEUTS SEG # BLD: 2.68 K/UL (ref 1.8–8)
NEUTS SEG NFR BLD: 60.4 % (ref 32–75)
NITRITE UR QL STRIP.AUTO: POSITIVE
NRBC # BLD: 0 K/UL (ref 0–0.01)
NRBC BLD-RTO: 0 PER 100 WBC
PH UR STRIP: 5.5 (ref 5–8)
PLATELET # BLD AUTO: 208 K/UL (ref 150–400)
PMV BLD AUTO: 9.4 FL (ref 8.9–12.9)
POTASSIUM SERPL-SCNC: 3.1 MMOL/L (ref 3.5–5.1)
PROT SERPL-MCNC: 8.5 G/DL (ref 6.4–8.2)
PROT UR STRIP-MCNC: ABNORMAL MG/DL
RBC # BLD AUTO: 4.76 M/UL (ref 3.8–5.2)
RBC #/AREA URNS HPF: ABNORMAL /HPF (ref 0–5)
S PYO DNA THROAT QL NAA+PROBE: NOT DETECTED
SARS-COV-2 RNA RESP QL NAA+PROBE: NOT DETECTED
SODIUM SERPL-SCNC: 132 MMOL/L (ref 136–145)
SOURCE: NORMAL
SP GR UR REFRACTOMETRY: 1.02 (ref 1–1.03)
SPECIMEN HOLD: NORMAL
SPECIMEN HOLD: NORMAL
UROBILINOGEN UR QL STRIP.AUTO: 0.2 EU/DL (ref 0.2–1)
WBC # BLD AUTO: 4.4 K/UL (ref 3.6–11)
WBC URNS QL MICRO: ABNORMAL /HPF (ref 0–4)

## 2025-07-20 PROCEDURE — 87088 URINE BACTERIA CULTURE: CPT

## 2025-07-20 PROCEDURE — 81001 URINALYSIS AUTO W/SCOPE: CPT

## 2025-07-20 PROCEDURE — 87086 URINE CULTURE/COLONY COUNT: CPT

## 2025-07-20 PROCEDURE — 87636 SARSCOV2 & INF A&B AMP PRB: CPT

## 2025-07-20 PROCEDURE — 87186 SC STD MICRODIL/AGAR DIL: CPT

## 2025-07-20 PROCEDURE — 85025 COMPLETE CBC W/AUTO DIFF WBC: CPT

## 2025-07-20 PROCEDURE — 87651 STREP A DNA AMP PROBE: CPT

## 2025-07-20 PROCEDURE — 96374 THER/PROPH/DIAG INJ IV PUSH: CPT

## 2025-07-20 PROCEDURE — 80053 COMPREHEN METABOLIC PANEL: CPT

## 2025-07-20 PROCEDURE — 99284 EMERGENCY DEPT VISIT MOD MDM: CPT

## 2025-07-20 PROCEDURE — 83690 ASSAY OF LIPASE: CPT

## 2025-07-20 ASSESSMENT — PAIN SCALES - GENERAL: PAINLEVEL_OUTOF10: 0

## 2025-07-20 ASSESSMENT — PAIN - FUNCTIONAL ASSESSMENT: PAIN_FUNCTIONAL_ASSESSMENT: 0-10

## 2025-07-21 VITALS
WEIGHT: 171.74 LBS | HEIGHT: 60 IN | DIASTOLIC BLOOD PRESSURE: 88 MMHG | TEMPERATURE: 98.6 F | BODY MASS INDEX: 33.72 KG/M2 | SYSTOLIC BLOOD PRESSURE: 189 MMHG | OXYGEN SATURATION: 98 % | RESPIRATION RATE: 23 BRPM | HEART RATE: 77 BPM

## 2025-07-21 PROCEDURE — 2580000003 HC RX 258: Performed by: STUDENT IN AN ORGANIZED HEALTH CARE EDUCATION/TRAINING PROGRAM

## 2025-07-21 PROCEDURE — 6370000000 HC RX 637 (ALT 250 FOR IP): Performed by: STUDENT IN AN ORGANIZED HEALTH CARE EDUCATION/TRAINING PROGRAM

## 2025-07-21 PROCEDURE — 6360000002 HC RX W HCPCS: Performed by: STUDENT IN AN ORGANIZED HEALTH CARE EDUCATION/TRAINING PROGRAM

## 2025-07-21 PROCEDURE — 2500000003 HC RX 250 WO HCPCS: Performed by: STUDENT IN AN ORGANIZED HEALTH CARE EDUCATION/TRAINING PROGRAM

## 2025-07-21 RX ORDER — POTASSIUM CHLORIDE 750 MG/1
40 TABLET, EXTENDED RELEASE ORAL ONCE
Status: COMPLETED | OUTPATIENT
Start: 2025-07-21 | End: 2025-07-21

## 2025-07-21 RX ORDER — CEPHALEXIN 500 MG/1
500 CAPSULE ORAL 2 TIMES DAILY
Qty: 14 CAPSULE | Refills: 0 | Status: SHIPPED | OUTPATIENT
Start: 2025-07-21 | End: 2025-07-28

## 2025-07-21 RX ORDER — 0.9 % SODIUM CHLORIDE 0.9 %
1000 INTRAVENOUS SOLUTION INTRAVENOUS ONCE
Status: COMPLETED | OUTPATIENT
Start: 2025-07-21 | End: 2025-07-21

## 2025-07-21 RX ADMIN — SODIUM CHLORIDE 1000 ML: 0.9 INJECTION, SOLUTION INTRAVENOUS at 00:43

## 2025-07-21 RX ADMIN — POTASSIUM CHLORIDE 40 MEQ: 750 TABLET, FILM COATED, EXTENDED RELEASE ORAL at 01:50

## 2025-07-21 RX ADMIN — WATER 1000 MG: 1 INJECTION INTRAMUSCULAR; INTRAVENOUS; SUBCUTANEOUS at 00:39

## 2025-07-21 NOTE — ED TRIAGE NOTES
Pt ambulatory to ED w/cane [facility] \"not feeling good\". Pt endorses being lightheaded, and having abd pain. Denies N/V/D. Last BM was this am.     Patient is unsure of past medical history and is a poor historian.

## 2025-07-24 ENCOUNTER — OFFICE VISIT (OUTPATIENT)
Age: 89
End: 2025-07-24
Payer: MEDICARE

## 2025-07-24 DIAGNOSIS — E11.21 TYPE 2 DIABETES MELLITUS WITH DIABETIC NEPHROPATHY, WITHOUT LONG-TERM CURRENT USE OF INSULIN (HCC): Primary | ICD-10-CM

## 2025-07-24 LAB
BACTERIA SPEC CULT: ABNORMAL
CC UR VC: ABNORMAL
SERVICE CMNT-IMP: ABNORMAL

## 2025-07-24 PROCEDURE — G0108 DIAB MANAGE TRN  PER INDIV: HCPCS

## 2025-07-24 NOTE — PROGRESS NOTES
[facility]  Diabetes Self-Management Education & Support Program  Post-program Evaluation    EVALUATION @ COMPLETION OF THE PROGRAM    Ammy Tarango completed 8 of diabetes self-management education. The participant acquired new knowledge and demonstrated new skills during the program.     The participant worked on the following SMART GOAL(s) to improve their diabetes self-management:    Will consult with her provider regarding sx of flame in her throat and taking regular lozenges.   ACHIEVEMENT OF GOAL(S) : 100%  states taking a nasal spray and not taking the regular lozenges.     2.   Will purchase glucose tablets.   ACHIEVEMENT OF GOAL(S) :  100%       The participant's pre-program A1c was No results found for: \"HBA1C\", \"GOU0EYGE\".   The post-evaluation A1c is 7.8 % - 7/14/25  Started program on 2-26-25; Hgb A1c= 11.2 % - 2/17/25; Hgb A1c= 9.2 %- 3-.    The participant improved their Diabetes Skills, Confidence and Preparedness Index (scored out of 7):    Total score: 5.38  When started program= 3.96    FINAL RECOMMENDATIONS:  Continue with lifestyle changes  Check bs - pt declined.   Discuss with provider, target for Hgb A1c and blood sugars.   Need feet exam.  Congratulated patient for great effort to control blood sugars Hgb A1c = 11.2 % , now Hgb A1c= 7.8%     Next provider visit is scheduled for   Future Appointments         Provider Specialty Dept Phone    7/25/2025  9:30 AM [facility] PAT EXAM RM 2 Pre-Admission Testing [phone]    8/19/2025 10:00 AM Freda Tolentino MD Internal Medicine [phone]    8/20/2025 8:00 AM Breonna Montana APRN - NP General Surgery [phone]    9/30/2025 1:20 PM Alen Ovalles MD Cardiology [phone]    12/11/2025 11:10 AM Katt Self MD Endocrinology [phone]                     Metric Patient's responses (7/24/2025) Areas for improvement     Healthy Eating       The participant is using Healthy Plate to control carbohydrate

## 2025-07-25 ENCOUNTER — HOSPITAL ENCOUNTER (OUTPATIENT)
Facility: HOSPITAL | Age: 89
Discharge: HOME | End: 2025-07-28
Payer: MEDICARE

## 2025-07-25 VITALS
HEART RATE: 78 BPM | TEMPERATURE: 98.2 F | OXYGEN SATURATION: 99 % | HEIGHT: 60 IN | RESPIRATION RATE: 18 BRPM | DIASTOLIC BLOOD PRESSURE: 70 MMHG | SYSTOLIC BLOOD PRESSURE: 125 MMHG | WEIGHT: 172 LBS | BODY MASS INDEX: 33.77 KG/M2

## 2025-07-25 LAB
ALBUMIN SERPL-MCNC: 3.8 G/DL (ref 3.5–5)
ALBUMIN/GLOB SERPL: 1 (ref 1.1–2.2)
ALP SERPL-CCNC: 89 U/L (ref 45–117)
ALT SERPL-CCNC: 32 U/L (ref 12–78)
ANION GAP SERPL CALC-SCNC: 7 MMOL/L (ref 2–12)
AST SERPL-CCNC: 28 U/L (ref 15–37)
BILIRUB SERPL-MCNC: 0.4 MG/DL (ref 0.2–1)
BUN SERPL-MCNC: 15 MG/DL (ref 6–20)
BUN/CREAT SERPL: 11 (ref 12–20)
CALCIUM SERPL-MCNC: 8.9 MG/DL (ref 8.5–10.1)
CHLORIDE SERPL-SCNC: 95 MMOL/L (ref 97–108)
CO2 SERPL-SCNC: 30 MMOL/L (ref 21–32)
CREAT SERPL-MCNC: 1.36 MG/DL (ref 0.55–1.02)
GLOBULIN SER CALC-MCNC: 3.7 G/DL (ref 2–4)
GLUCOSE SERPL-MCNC: 184 MG/DL (ref 65–100)
POTASSIUM SERPL-SCNC: 3.2 MMOL/L (ref 3.5–5.1)
PROT SERPL-MCNC: 7.5 G/DL (ref 6.4–8.2)
SODIUM SERPL-SCNC: 132 MMOL/L (ref 136–145)

## 2025-07-25 PROCEDURE — 80053 COMPREHEN METABOLIC PANEL: CPT

## 2025-07-25 PROCEDURE — 93005 ELECTROCARDIOGRAM TRACING: CPT | Performed by: SURGERY

## 2025-07-25 NOTE — PERIOP NOTE
[address] [address]      MAIN PRE OP            [phone]                                                                                AMBULATORY PRE OP          [phone]    PRE-ADMISSION TESTING    [phone]     Surgery Date:  08-        [facility] staff will call you between 4 and 7pm the day before your surgery with your arrival time.   (If your surgery is on a Monday, we will call you the Friday before.)    Call [phone] after 7pm Monday-Friday if you did not receive this call.    INSTRUCTIONS BEFORE YOUR SURGERY   When You  Arrive Arrive at [facility] Patient Access on 1st floor the day of your surgery.    Have your insurance card, photo ID,living will/advanced directive/POA (if applicable),  and any copayment (if needed)   Food   and   Drink NO solid food after midnight the night before surgery. You can drink clear liquids from midnight until ONE hour prior to your arrival at the hospital on the day of your surgery.     Clear liquids include:  Water  Apple juice (no sediment)  Carbonated beverages  Black coffee(no cream/milk)  Tea(no cream/milk)  Gatorade    No alcohol (beer, wine, liquor) or marijuana (smoking) 24 hours prior to surgery.   No marijuana edibles for 3 days prior to surgery.    Stop smoking cigarettes 14 days before surgery (helps w/healing and breathing).   Medications to   TAKE   Morning of Surgery MEDICATIONS TO TAKE THE MORNING OF SURGERY: atorvastatin, metoprolol Tartrate, amlodipine, Flomax    You may take these medications, IF NEEDED, the morning of surgery: Tylenol    Ask your surgeon/prescribing doctor for instructions on taking or stopping these medications prior to surgery:    Medications to STOP  before surgery Non-Steroidal anti-inflammatory Drugs (NSAID's): for example, Diclofenac (Voltaren), Ibuprofen (Advil, Motrin), Naproxen (Aleve) 7 days    STOP all herbal supplements and  vitamins(unless prescribed by your doctor), and fish oil for 7 days    Other:Hold Ozempic on 08-  Hold Jardiance  starting on 08-    (Pain medications not listed above, including Tylenol may be taken up until 4 hours prior to arrival time)     Blood  Thinners If you take Aspirin, Eliquis, Plavix, Coumadin, or any blood-thinning or anti-blood clot medicine, talk to the doctor who prescribed the medications for pre-operative instructions.    If you take aspirin or aspirin containing products for pain, stop 7 days prior to surgery     Bathing Clothing  Jewelry  Valuables     When you shower the morning of surgery, please do not apply anything to your skin, such as lotions, powders or makeup, (especially mascara).     No deodorant if you are having breast surgery.    Remove fingernail polish except for clear.    Do not shave or trim anywhere 24 hours before surgery    Wear your hair loose or down; no pony-tails, buns, or metal hair clips    Wear loose, comfortable, clean clothes    Wear glasses instead of contacts. Bring a case to keep your glasses safe.    Leave money, valuables, and jewelry, including body piercings, at home    If you use inhalers or CPAP machine, bring it with you the day of surgery.     Going Home - or Spending the Night OUTPATIENT SURGERY: You must have a responsible adult drive you home and stay with you 24 hours after surgery. You may not drive for 24 hours after surgery.    ADMITS: If your doctor is keeping you in the hospital after surgery, leave personal belongings/luggage in your car until you have a hospital room number.    Hospital discharge time is 12 noon  Drivers must be here before 12 noon unless you are told differently   Special Instructions Free  parking available from 6 AM until 4:30 PM.       Preventing Infections Before and After - Your Surgery    IMPORTANT INSTRUCTIONS      You play an important role in your health and preparation for surgery. To reduce the

## 2025-07-26 LAB
EKG ATRIAL RATE: 76 BPM
EKG DIAGNOSIS: NORMAL
EKG P AXIS: 54 DEGREES
EKG P-R INTERVAL: 220 MS
EKG Q-T INTERVAL: 414 MS
EKG QRS DURATION: 114 MS
EKG QTC CALCULATION (BAZETT): 465 MS
EKG R AXIS: -71 DEGREES
EKG T AXIS: 82 DEGREES
EKG VENTRICULAR RATE: 76 BPM

## 2025-07-26 PROCEDURE — 93010 ELECTROCARDIOGRAM REPORT: CPT | Performed by: SPECIALIST

## 2025-07-27 ENCOUNTER — HOSPITAL ENCOUNTER (EMERGENCY)
Facility: HOSPITAL | Age: 89
Discharge: HOME | End: 2025-07-27
Attending: EMERGENCY MEDICINE
Payer: MEDICARE

## 2025-07-27 VITALS
TEMPERATURE: 97.7 F | OXYGEN SATURATION: 96 % | BODY MASS INDEX: 36.85 KG/M2 | WEIGHT: 187.7 LBS | HEART RATE: 72 BPM | DIASTOLIC BLOOD PRESSURE: 87 MMHG | HEIGHT: 60 IN | SYSTOLIC BLOOD PRESSURE: 182 MMHG | RESPIRATION RATE: 17 BRPM

## 2025-07-27 DIAGNOSIS — E87.6 HYPOKALEMIA: ICD-10-CM

## 2025-07-27 DIAGNOSIS — R53.83 OTHER FATIGUE: Primary | ICD-10-CM

## 2025-07-27 DIAGNOSIS — E87.1 HYPONATREMIA: ICD-10-CM

## 2025-07-27 LAB
ALBUMIN SERPL-MCNC: 3.9 G/DL (ref 3.5–5)
ALBUMIN/GLOB SERPL: 1 (ref 1.1–2.2)
ALP SERPL-CCNC: 92 U/L (ref 45–117)
ALT SERPL-CCNC: 32 U/L (ref 12–78)
ANION GAP SERPL CALC-SCNC: 8 MMOL/L (ref 2–12)
APPEARANCE UR: CLEAR
AST SERPL-CCNC: 28 U/L (ref 15–37)
BACTERIA URNS QL MICRO: NEGATIVE /HPF
BASOPHILS # BLD: 0.02 K/UL (ref 0–0.1)
BASOPHILS NFR BLD: 0.5 % (ref 0–1)
BILIRUB SERPL-MCNC: 0.4 MG/DL (ref 0.2–1)
BILIRUB UR QL: NEGATIVE
BUN SERPL-MCNC: 16 MG/DL (ref 6–20)
BUN/CREAT SERPL: 12 (ref 12–20)
CALCIUM SERPL-MCNC: 9 MG/DL (ref 8.5–10.1)
CHLORIDE SERPL-SCNC: 93 MMOL/L (ref 97–108)
CO2 SERPL-SCNC: 28 MMOL/L (ref 21–32)
COLOR UR: ABNORMAL
COMMENT:: NORMAL
CREAT SERPL-MCNC: 1.3 MG/DL (ref 0.55–1.02)
DIFFERENTIAL METHOD BLD: ABNORMAL
EKG ATRIAL RATE: 70 BPM
EKG DIAGNOSIS: NORMAL
EKG P AXIS: 61 DEGREES
EKG P-R INTERVAL: 230 MS
EKG Q-T INTERVAL: 414 MS
EKG QRS DURATION: 114 MS
EKG QTC CALCULATION (BAZETT): 447 MS
EKG R AXIS: -75 DEGREES
EKG T AXIS: -27 DEGREES
EKG VENTRICULAR RATE: 70 BPM
EOSINOPHIL # BLD: 0.12 K/UL (ref 0–0.4)
EOSINOPHIL NFR BLD: 2.8 % (ref 0–7)
EPITH CASTS URNS QL MICRO: ABNORMAL /LPF
ERYTHROCYTE [DISTWIDTH] IN BLOOD BY AUTOMATED COUNT: 15.2 % (ref 11.5–14.5)
GLOBULIN SER CALC-MCNC: 4.1 G/DL (ref 2–4)
GLUCOSE SERPL-MCNC: 123 MG/DL (ref 65–100)
GLUCOSE UR STRIP.AUTO-MCNC: >1000 MG/DL
HCT VFR BLD AUTO: 37.3 % (ref 35–47)
HGB BLD-MCNC: 11.9 G/DL (ref 11.5–16)
HGB UR QL STRIP: NEGATIVE
HYALINE CASTS URNS QL MICRO: ABNORMAL /LPF (ref 0–5)
IMM GRANULOCYTES # BLD AUTO: 0.03 K/UL (ref 0–0.04)
IMM GRANULOCYTES NFR BLD AUTO: 0.7 % (ref 0–0.5)
KETONES UR QL STRIP.AUTO: NEGATIVE MG/DL
LEUKOCYTE ESTERASE UR QL STRIP.AUTO: NEGATIVE
LYMPHOCYTES # BLD: 1.02 K/UL (ref 0.8–3.5)
LYMPHOCYTES NFR BLD: 23.8 % (ref 12–49)
MAGNESIUM SERPL-MCNC: 2.1 MG/DL (ref 1.6–2.4)
MCH RBC QN AUTO: 25.4 PG (ref 26–34)
MCHC RBC AUTO-ENTMCNC: 31.9 G/DL (ref 30–36.5)
MCV RBC AUTO: 79.7 FL (ref 80–99)
MONOCYTES # BLD: 0.47 K/UL (ref 0–1)
MONOCYTES NFR BLD: 11 % (ref 5–13)
NEUTS SEG # BLD: 2.62 K/UL (ref 1.8–8)
NEUTS SEG NFR BLD: 61.2 % (ref 32–75)
NITRITE UR QL STRIP.AUTO: NEGATIVE
NRBC # BLD: 0 K/UL (ref 0–0.01)
NRBC BLD-RTO: 0 PER 100 WBC
PH UR STRIP: 7.5 (ref 5–8)
PLATELET # BLD AUTO: 193 K/UL (ref 150–400)
PMV BLD AUTO: 9.2 FL (ref 8.9–12.9)
POTASSIUM SERPL-SCNC: 3.1 MMOL/L (ref 3.5–5.1)
PROT SERPL-MCNC: 8 G/DL (ref 6.4–8.2)
PROT UR STRIP-MCNC: NEGATIVE MG/DL
RBC # BLD AUTO: 4.68 M/UL (ref 3.8–5.2)
RBC #/AREA URNS HPF: ABNORMAL /HPF (ref 0–5)
SODIUM SERPL-SCNC: 129 MMOL/L (ref 136–145)
SP GR UR REFRACTOMETRY: 1.01 (ref 1–1.03)
SPECIMEN HOLD: NORMAL
SPECIMEN HOLD: NORMAL
TROPONIN I SERPL HS-MCNC: 9 NG/L (ref 0–51)
UROBILINOGEN UR QL STRIP.AUTO: 0.2 EU/DL (ref 0.2–1)
WBC # BLD AUTO: 4.3 K/UL (ref 3.6–11)
WBC URNS QL MICRO: ABNORMAL /HPF (ref 0–4)

## 2025-07-27 PROCEDURE — 96360 HYDRATION IV INFUSION INIT: CPT

## 2025-07-27 PROCEDURE — 2580000003 HC RX 258

## 2025-07-27 PROCEDURE — 6370000000 HC RX 637 (ALT 250 FOR IP): Performed by: EMERGENCY MEDICINE

## 2025-07-27 PROCEDURE — 83735 ASSAY OF MAGNESIUM: CPT

## 2025-07-27 PROCEDURE — 81001 URINALYSIS AUTO W/SCOPE: CPT

## 2025-07-27 PROCEDURE — 84484 ASSAY OF TROPONIN QUANT: CPT

## 2025-07-27 PROCEDURE — 99284 EMERGENCY DEPT VISIT MOD MDM: CPT

## 2025-07-27 PROCEDURE — 93005 ELECTROCARDIOGRAM TRACING: CPT

## 2025-07-27 PROCEDURE — 85025 COMPLETE CBC W/AUTO DIFF WBC: CPT

## 2025-07-27 PROCEDURE — 80053 COMPREHEN METABOLIC PANEL: CPT

## 2025-07-27 RX ORDER — 0.9 % SODIUM CHLORIDE 0.9 %
1000 INTRAVENOUS SOLUTION INTRAVENOUS ONCE
Status: COMPLETED | OUTPATIENT
Start: 2025-07-27 | End: 2025-07-27

## 2025-07-27 RX ORDER — POTASSIUM CHLORIDE 750 MG/1
40 TABLET, EXTENDED RELEASE ORAL ONCE
Status: COMPLETED | OUTPATIENT
Start: 2025-07-27 | End: 2025-07-27

## 2025-07-27 RX ADMIN — SODIUM CHLORIDE 1000 ML: 0.9 INJECTION, SOLUTION INTRAVENOUS at 17:30

## 2025-07-27 RX ADMIN — POTASSIUM CHLORIDE 40 MEQ: 750 TABLET, FILM COATED, EXTENDED RELEASE ORAL at 17:24

## 2025-07-27 ASSESSMENT — PAIN - FUNCTIONAL ASSESSMENT: PAIN_FUNCTIONAL_ASSESSMENT: NONE - DENIES PAIN

## 2025-07-27 NOTE — ED TRIAGE NOTES
Patient arrives ambulatory with complaints of ongoing fatigue since she was seen on 7/20. +lightheaded starting today.

## 2025-07-27 NOTE — CARE COORDINATION
SATURNINO- Pending home health referrals to 12 agencies.    CM received a call from pt's nurse informing this CM that this pt has home health orders. Since this pt has [facility] Medicare, CM sent out home health referrals to 12 different agencies. It can be difficult to find home health agencies that accept Managed Medicare. Will follow. ANGELIA Talbert,ACM-SW

## 2025-07-27 NOTE — ED PROVIDER NOTES
[facility] EMERGENCY DEPARTMENT  EMERGENCY DEPARTMENT ENCOUNTER      Pt Name: Ammy Tarango  MRN: [id number]  Birthdate 3/1/1934  Date of evaluation: 7/27/2025  Provider: Sony Burgess PA-C    CHIEF COMPLAINT       Chief Complaint   Patient presents with    Fatigue         HISTORY OF PRESENT ILLNESS   (Location/Symptom, Timing/Onset, Context/Setting, Quality, Duration, Modifying Factors, Severity)  Note limiting factors.   Ammy Tarango is a 89 y.o. female who presents to the emergency department for feeling fatigued for the past several weeks. Patient states she was seen a week ago and diagnosed with a UTI. She has completed her antibiotic therapy. Denies fever, chills, neck pain, fall, chest pain, shortness of breath, abdominal pain, nausea, vomiting, constipation, diarrhea, or urinary symptoms. She states she eats three meals a day but denies drinking enough water daily.       The history is provided by the patient and a relative.         Review of External Medical Records:     Nursing Notes were reviewed.    REVIEW OF SYSTEMS    (2-9 systems for level 4, 10 or more for level 5)     Review of Systems    Except as noted above the remainder of the review of systems was reviewed and negative.       PAST MEDICAL HISTORY     Past Medical History:   Diagnosis Date    Adverse effect of anesthesia     pt states she was able to feel everything during colonoscopy    Chronic bronchitis (HCC)     Diabetes (HCC)     Essential hypertension     Hypercholesterolemia     Hypertension          SURGICAL HISTORY       Past Surgical History:   Procedure Laterality Date    CATARACT REMOVAL Bilateral     CHOLECYSTECTOMY      COLONOSCOPY N/A 9/25/2017    COLONOSCOPY performed by Nikolay Oglesby MD at [facility] ENDOSCOPY    HYSTERECTOMY (CERVIX STATUS UNKNOWN)      TUBAL LIGATION           CURRENT MEDICATIONS       Previous Medications    ACETAMINOPHEN (TYLENOL) 325 MG TABLET    Take by mouth every 4 hours as needed

## 2025-07-27 NOTE — PROGRESS NOTES
SATURNINO- D/c home with [facility]  CM reviewed [facility] and noted that [facility] has accepted this pt for home health. AVS updated. Jasmin Vale,ANGELIA,ACM-SW

## 2025-07-28 NOTE — PERIOP NOTE
Sent note to Annie Edwards , nurse for Dr. Tyrone Chester via cc          Hi Annie,    Forgive me if I have already sent you this message but this patient came in for pre admit testing on 7/25/25 for surgery with Dr. Chester on 8/7/25 and abnormal labs as follows..    Potassium 3.2 (L)    Sodium 132 (L)    Thanks  Trista Beal  Pre admit testing @[facility]

## 2025-07-28 NOTE — PERIOP NOTE
Note sent via cc to surgeons office.       Hi Annie,    Patient came in for preop lab work and testing on 7/25/25 and abnormal lab values as follows..    Sodium 132 (L)    Potassium 3.2 (L)    Hgaic 7.8 (H)    Surgery with Dr. Chester scheduled 8/7/25.    Thanks  Trista Beal RN  Pre admit testing @[facility]

## 2025-08-06 ENCOUNTER — ANESTHESIA EVENT (OUTPATIENT)
Facility: HOSPITAL | Age: 89
End: 2025-08-06
Payer: MEDICARE

## 2025-08-06 NOTE — ED PROVIDER NOTES
HISTORY OF PRESENT ILLNESS  89-year-old female with a history of hypertension, diabetes, and hypercholesterolemia presented with complaints of lightheadedness and abdominal pain. She denies associated nausea, vomiting, or diarrhea. Her last bowel movement was this morning.    PAST MEDICAL HISTORY  - Hypertension  - Diabetes mellitus  - Hypercholesterolemia    PHYSICAL EXAM  Vitals: Interpreted as normal for this patient.  General: NAD; well-kept female adult; no acute respiratory distress.  Eyes: Appear normal with no scleral icterus.  HENT: Atraumatic; moist mucous membranes; no pharyngeal erythema, edema or lesions.  Neck: Atraumatic, supple.  Cardiac: Regular rate, regular rhythm, no significant murmurs appreciated.  Respiratory: No respiratory distress; clear lungs bilaterally with no abnormal breath sounds; lung sounds clear throughout, even and unlabored.  Abdomen: Soft; slightly tender throughout; nondistended; no rebound; no guarding; no tenderness over McBurney's point, no tenderness over the liver or spleen, no Bhat's sign, no pulsatile abdominal mass.  : No CVAT.  MS: Extremities atraumatic; no edema; no calf tenderness to palpation.  Skin: No exanthems, no cyanosis, no diaphoresis.  Back exam: Atraumatic.  Neurologic: No altered mental status; speech is fluent; gait is within normal limits; no cerebellar deficits; no motor deficits; no sensory deficits; alert and oriented x 4.  Psychological: Cooperative and participatory with examination.    SUMMARY  89-year-old female presented with lightheadedness and abdominal pain. Exam notable for mild diffuse abdominal tenderness, alert and oriented x4, no acute distress. Urinalysis revealed 4+ bacteria, consistent with urinary tract infection. Plan of care and results discussed with patient, who was agreeable to discharge home with oral antibiotics.    LABS  Notable laboratory findings included: urinalysis showed 4+ bacteria, consistent with urinary tract

## 2025-08-07 ENCOUNTER — HOSPITAL ENCOUNTER (OUTPATIENT)
Facility: HOSPITAL | Age: 89
Setting detail: OUTPATIENT SURGERY
Discharge: HOME | End: 2025-08-07
Attending: SURGERY | Admitting: SURGERY
Payer: MEDICARE

## 2025-08-07 ENCOUNTER — ANESTHESIA (OUTPATIENT)
Facility: HOSPITAL | Age: 89
End: 2025-08-07
Payer: MEDICARE

## 2025-08-07 VITALS
HEART RATE: 75 BPM | OXYGEN SATURATION: 96 % | RESPIRATION RATE: 17 BRPM | DIASTOLIC BLOOD PRESSURE: 67 MMHG | SYSTOLIC BLOOD PRESSURE: 127 MMHG | TEMPERATURE: 97.4 F

## 2025-08-07 LAB
GLUCOSE BLD STRIP.AUTO-MCNC: 118 MG/DL (ref 65–117)
GLUCOSE BLD STRIP.AUTO-MCNC: 138 MG/DL (ref 65–117)
SERVICE CMNT-IMP: ABNORMAL
SERVICE CMNT-IMP: ABNORMAL

## 2025-08-07 PROCEDURE — 6360000002 HC RX W HCPCS: Performed by: SURGERY

## 2025-08-07 PROCEDURE — 21930 EXC BACK LES SC < 3 CM: CPT | Performed by: SURGERY

## 2025-08-07 PROCEDURE — 2500000003 HC RX 250 WO HCPCS: Performed by: SURGERY

## 2025-08-07 PROCEDURE — 3700000000 HC ANESTHESIA ATTENDED CARE: Performed by: SURGERY

## 2025-08-07 PROCEDURE — 82962 GLUCOSE BLOOD TEST: CPT

## 2025-08-07 PROCEDURE — 3600000002 HC SURGERY LEVEL 2 BASE: Performed by: SURGERY

## 2025-08-07 PROCEDURE — 7100000000 HC PACU RECOVERY - FIRST 15 MIN: Performed by: SURGERY

## 2025-08-07 PROCEDURE — 88304 TISSUE EXAM BY PATHOLOGIST: CPT

## 2025-08-07 PROCEDURE — 3700000001 HC ADD 15 MINUTES (ANESTHESIA): Performed by: SURGERY

## 2025-08-07 PROCEDURE — 2709999900 HC NON-CHARGEABLE SUPPLY: Performed by: SURGERY

## 2025-08-07 PROCEDURE — 7100000011 HC PHASE II RECOVERY - ADDTL 15 MIN: Performed by: SURGERY

## 2025-08-07 PROCEDURE — 2580000003 HC RX 258: Performed by: ANESTHESIOLOGY

## 2025-08-07 PROCEDURE — 7100000010 HC PHASE II RECOVERY - FIRST 15 MIN: Performed by: SURGERY

## 2025-08-07 PROCEDURE — 7100000001 HC PACU RECOVERY - ADDTL 15 MIN: Performed by: SURGERY

## 2025-08-07 PROCEDURE — 3600000012 HC SURGERY LEVEL 2 ADDTL 15MIN: Performed by: SURGERY

## 2025-08-07 RX ORDER — HYDROMORPHONE HYDROCHLORIDE 1 MG/ML
0.5 INJECTION, SOLUTION INTRAMUSCULAR; INTRAVENOUS; SUBCUTANEOUS EVERY 5 MIN PRN
Status: DISCONTINUED | OUTPATIENT
Start: 2025-08-07 | End: 2025-08-07 | Stop reason: HOSPADM

## 2025-08-07 RX ORDER — SODIUM CHLORIDE 9 MG/ML
INJECTION, SOLUTION INTRAVENOUS PRN
Status: DISCONTINUED | OUTPATIENT
Start: 2025-08-07 | End: 2025-08-07 | Stop reason: HOSPADM

## 2025-08-07 RX ORDER — ONDANSETRON 2 MG/ML
4 INJECTION INTRAMUSCULAR; INTRAVENOUS
Status: DISCONTINUED | OUTPATIENT
Start: 2025-08-07 | End: 2025-08-07 | Stop reason: HOSPADM

## 2025-08-07 RX ORDER — LABETALOL HYDROCHLORIDE 5 MG/ML
10 INJECTION, SOLUTION INTRAVENOUS
Status: DISCONTINUED | OUTPATIENT
Start: 2025-08-07 | End: 2025-08-07 | Stop reason: HOSPADM

## 2025-08-07 RX ORDER — FENTANYL CITRATE 50 UG/ML
25 INJECTION, SOLUTION INTRAMUSCULAR; INTRAVENOUS EVERY 5 MIN PRN
Status: DISCONTINUED | OUTPATIENT
Start: 2025-08-07 | End: 2025-08-07 | Stop reason: HOSPADM

## 2025-08-07 RX ORDER — BUPIVACAINE HYDROCHLORIDE 2.5 MG/ML
INJECTION, SOLUTION EPIDURAL; INFILTRATION; INTRACAUDAL; PERINEURAL PRN
Status: DISCONTINUED | OUTPATIENT
Start: 2025-08-07 | End: 2025-08-07 | Stop reason: HOSPADM

## 2025-08-07 RX ORDER — SODIUM CHLORIDE 0.9 % (FLUSH) 0.9 %
5-40 SYRINGE (ML) INJECTION EVERY 12 HOURS SCHEDULED
Status: DISCONTINUED | OUTPATIENT
Start: 2025-08-07 | End: 2025-08-07 | Stop reason: HOSPADM

## 2025-08-07 RX ORDER — SODIUM CHLORIDE 0.9 % (FLUSH) 0.9 %
5-40 SYRINGE (ML) INJECTION PRN
Status: DISCONTINUED | OUTPATIENT
Start: 2025-08-07 | End: 2025-08-07 | Stop reason: HOSPADM

## 2025-08-07 RX ORDER — SODIUM CHLORIDE 9 MG/ML
INJECTION, SOLUTION INTRAVENOUS CONTINUOUS
Status: DISCONTINUED | OUTPATIENT
Start: 2025-08-07 | End: 2025-08-07 | Stop reason: HOSPADM

## 2025-08-07 RX ORDER — PROCHLORPERAZINE EDISYLATE 5 MG/ML
5 INJECTION INTRAMUSCULAR; INTRAVENOUS
Status: DISCONTINUED | OUTPATIENT
Start: 2025-08-07 | End: 2025-08-07 | Stop reason: HOSPADM

## 2025-08-07 RX ORDER — SODIUM CHLORIDE, SODIUM LACTATE, POTASSIUM CHLORIDE, CALCIUM CHLORIDE 600; 310; 30; 20 MG/100ML; MG/100ML; MG/100ML; MG/100ML
INJECTION, SOLUTION INTRAVENOUS CONTINUOUS
Status: DISCONTINUED | OUTPATIENT
Start: 2025-08-07 | End: 2025-08-07 | Stop reason: HOSPADM

## 2025-08-07 RX ADMIN — SODIUM CHLORIDE, SODIUM LACTATE, POTASSIUM CHLORIDE, AND CALCIUM CHLORIDE: .6; .31; .03; .02 INJECTION, SOLUTION INTRAVENOUS at 10:36

## 2025-08-07 RX ADMIN — WATER 2000 MG: 1 INJECTION INTRAMUSCULAR; INTRAVENOUS; SUBCUTANEOUS at 11:20

## 2025-08-07 ASSESSMENT — PAIN - FUNCTIONAL ASSESSMENT
PAIN_FUNCTIONAL_ASSESSMENT: 0-10

## 2025-08-11 DIAGNOSIS — T78.40XS ALLERGY, SEQUELA: ICD-10-CM

## 2025-08-12 RX ORDER — AMLODIPINE BESYLATE 10 MG/1
10 TABLET ORAL DAILY
Qty: 90 TABLET | Refills: 0 | Status: SHIPPED | OUTPATIENT
Start: 2025-08-12

## 2025-08-12 RX ORDER — LORATADINE 10 MG/1
10 TABLET ORAL
Qty: 90 TABLET | Refills: 0 | Status: SHIPPED | OUTPATIENT
Start: 2025-08-12

## 2025-08-16 DIAGNOSIS — F51.01 PRIMARY INSOMNIA: ICD-10-CM

## 2025-08-18 ENCOUNTER — RESULTS FOLLOW-UP (OUTPATIENT)
Age: 89
End: 2025-08-18

## 2025-08-18 RX ORDER — MIRTAZAPINE 15 MG/1
15 TABLET, FILM COATED ORAL NIGHTLY
Qty: 90 TABLET | Refills: 0 | Status: SHIPPED | OUTPATIENT
Start: 2025-08-18

## 2025-08-19 ENCOUNTER — TELEPHONE (OUTPATIENT)
Age: 89
End: 2025-08-19

## 2025-08-20 ENCOUNTER — TELEPHONE (OUTPATIENT)
Age: 89
End: 2025-08-20

## 2025-08-20 ENCOUNTER — TELEMEDICINE (OUTPATIENT)
Age: 89
End: 2025-08-20

## 2025-08-20 VITALS — HEIGHT: 60 IN | BODY MASS INDEX: 36.66 KG/M2

## 2025-08-20 DIAGNOSIS — Z51.89 VISIT FOR WOUND CHECK: Primary | ICD-10-CM

## 2025-08-20 ASSESSMENT — PATIENT HEALTH QUESTIONNAIRE - PHQ9
SUM OF ALL RESPONSES TO PHQ QUESTIONS 1-9: 0
2. FEELING DOWN, DEPRESSED OR HOPELESS: NOT AT ALL
SUM OF ALL RESPONSES TO PHQ QUESTIONS 1-9: 0
1. LITTLE INTEREST OR PLEASURE IN DOING THINGS: NOT AT ALL
SUM OF ALL RESPONSES TO PHQ QUESTIONS 1-9: 0
SUM OF ALL RESPONSES TO PHQ QUESTIONS 1-9: 0

## 2025-08-20 ASSESSMENT — PAIN SCALES - GENERAL: PAINLEVEL_OUTOF10: 0

## 2025-08-29 RX ORDER — METOPROLOL TARTRATE 75 MG/1
TABLET ORAL
Qty: 180 TABLET | Refills: 0 | Status: SHIPPED | OUTPATIENT
Start: 2025-08-29

## 2025-09-04 RX ORDER — EMPAGLIFLOZIN 10 MG/1
10 TABLET, FILM COATED ORAL DAILY
Qty: 90 TABLET | Refills: 0 | Status: SHIPPED | OUTPATIENT
Start: 2025-09-04

## (undated) DEVICE — BW-412T DISP COMBO CLEANING BRUSH: Brand: SINGLE USE COMBINATION CLEANING BRUSH

## (undated) DEVICE — PACK PROCEDURE SURG LAP III ECLIPSE W/ 2 REINF GWN STRL

## (undated) DEVICE — BAG SPEC BIOHZD LF 2MIL 6X10IN -- CONVERT TO ITEM 357326

## (undated) DEVICE — SUTURE MONOCRYL STRATAFIX SPRL + SZ 3-0 L24IN ABSRB UD PS-2 SXMP1B108

## (undated) DEVICE — CONTAINER SPEC 20 ML LID NEUT BUFF FORMALIN 10 % POLYPR STS

## (undated) DEVICE — Z DISCONTINUED USE 2751540 TUBING IRRIG L10IN DISP PMP ENDOGATOR

## (undated) DEVICE — SYRINGE MED 20ML STD CLR PLAS LUERLOCK TIP N CTRL DISP

## (undated) DEVICE — TRAY SKIN SCRUB W/4 COMPARTMENT

## (undated) DEVICE — ELECTRODE PT RET AD L9FT HI MOIST COND ADH HYDRGEL CORDED

## (undated) DEVICE — CONNECTOR TBNG AUX H2O JET DISP FOR OLY 160/180 SER

## (undated) DEVICE — GLOVE ORANGE PI 7 1/2   MSG9075

## (undated) DEVICE — SET ADMIN 16ML TBNG L100IN 2 Y INJ SITE IV PIGGY BK DISP

## (undated) DEVICE — KENDALL RADIOLUCENT FOAM MONITORING ELECTRODE -RECTANGULAR SHAPE: Brand: KENDALL

## (undated) DEVICE — Device

## (undated) DEVICE — ENDO CARRY-ON PROCEDURE KIT INCLUDES ENZYMATIC SPONGE, GAUZE, BIOHAZARD LABEL, TRAY, LUBRICANT, DIRTY SCOPE LABEL, WATER LABEL, TRAY, DRAWSTRING PAD, AND DEFENDO 4-PIECE KIT.: Brand: ENDO CARRY-ON PROCEDURE KIT

## (undated) DEVICE — NEONATAL-ADULT SPO2 SENSOR: Brand: NELLCOR

## (undated) DEVICE — GLOVE SURG SZ 75 L12IN FNGR THK79MIL GRN LTX FREE

## (undated) DEVICE — NEEDLE HYPO 18GA L1.5IN PNK S STL HUB POLYPR SHLD REG BVL

## (undated) DEVICE — AIRLIFE™ U/CONNECT-IT OXYGEN TUBING 7 FEET (2.1 M) CRUSH-RESISTANT OXYGEN TUBING, VINYL TIPPED: Brand: AIRLIFE™

## (undated) DEVICE — 1200 GUARD II KIT W/5MM TUBE W/O VAC TUBE: Brand: GUARDIAN

## (undated) DEVICE — CANN NASAL O2 CAPNOGRAPHY AD -- FILTERLINE

## (undated) DEVICE — SOLIDIFIER FLUID 3000 CC ABSORB

## (undated) DEVICE — BITE BLK ENDOSCP AD 54FR GRN POLYETH ENDOSCP W STRP SLD

## (undated) DEVICE — CATH IV AUTOGRD BC BLU 22GA 25 -- INSYTE

## (undated) DEVICE — SET EXTN TBNG L BOR 4 W STPCOCK ST 32IN PRIMING VOL 6ML

## (undated) DEVICE — BAG BELONG PT PERS CLEAR HANDL

## (undated) DEVICE — KIT IV STRT W CHLORAPREP PD 1ML

## (undated) DEVICE — QUILTED PREMIUM COMFORT UNDERPAD,EXTRA HEAVY: Brand: WINGS

## (undated) DEVICE — ADHESIVE SKIN CLOSURE TOP 0.8 CC PREM PUR LIQUIBAND RAPID LF

## (undated) DEVICE — DRAPE SURG W15XL26IN 100% POLYPR SMS FAB W/ TAPE STD UTIL

## (undated) DEVICE — WRISTBAND ID AD W1XL11.5IN RED POLY ALRG PREPRINTED PERM

## (undated) DEVICE — FORCEPS BX L240CM JAW DIA2.8MM L CAP W/ NDL MIC MESH TOOTH

## (undated) DEVICE — SUTURE VICRYL + SZ 2-0 L27IN ABSRB WHT SH 1/2 CIR TAPERCUT VCP417H